# Patient Record
Sex: MALE | Race: WHITE | NOT HISPANIC OR LATINO | Employment: FULL TIME | URBAN - METROPOLITAN AREA
[De-identification: names, ages, dates, MRNs, and addresses within clinical notes are randomized per-mention and may not be internally consistent; named-entity substitution may affect disease eponyms.]

---

## 2017-02-17 ENCOUNTER — PATIENT MESSAGE (OUTPATIENT)
Dept: ORTHOPEDICS | Facility: CLINIC | Age: 37
End: 2017-02-17

## 2017-03-03 ENCOUNTER — OFFICE VISIT (OUTPATIENT)
Dept: INTERNAL MEDICINE | Facility: CLINIC | Age: 37
End: 2017-03-03
Payer: COMMERCIAL

## 2017-03-03 VITALS
WEIGHT: 315 LBS | TEMPERATURE: 99 F | BODY MASS INDEX: 40.43 KG/M2 | SYSTOLIC BLOOD PRESSURE: 146 MMHG | HEART RATE: 84 BPM | HEIGHT: 74 IN | DIASTOLIC BLOOD PRESSURE: 96 MMHG

## 2017-03-03 DIAGNOSIS — J02.9 PHARYNGITIS, UNSPECIFIED ETIOLOGY: Primary | ICD-10-CM

## 2017-03-03 PROCEDURE — 99214 OFFICE O/P EST MOD 30 MIN: CPT | Mod: S$GLB,,, | Performed by: FAMILY MEDICINE

## 2017-03-03 PROCEDURE — 99999 PR PBB SHADOW E&M-EST. PATIENT-LVL III: CPT | Mod: PBBFAC,,, | Performed by: FAMILY MEDICINE

## 2017-03-03 PROCEDURE — 1160F RVW MEDS BY RX/DR IN RCRD: CPT | Mod: S$GLB,,, | Performed by: FAMILY MEDICINE

## 2017-03-03 PROCEDURE — 3077F SYST BP >= 140 MM HG: CPT | Mod: S$GLB,,, | Performed by: FAMILY MEDICINE

## 2017-03-03 PROCEDURE — 3080F DIAST BP >= 90 MM HG: CPT | Mod: S$GLB,,, | Performed by: FAMILY MEDICINE

## 2017-03-03 RX ORDER — OMEPRAZOLE 20 MG/1
20 CAPSULE, DELAYED RELEASE ORAL DAILY
COMMUNITY
End: 2018-10-08

## 2017-03-03 RX ORDER — BENZONATATE 200 MG/1
200 CAPSULE ORAL 3 TIMES DAILY PRN
Qty: 30 CAPSULE | Refills: 0 | Status: SHIPPED | OUTPATIENT
Start: 2017-03-03 | End: 2017-03-13

## 2017-03-03 RX ORDER — AZITHROMYCIN 250 MG/1
TABLET, FILM COATED ORAL
Qty: 6 TABLET | Refills: 0 | Status: SHIPPED | OUTPATIENT
Start: 2017-03-03 | End: 2017-03-31

## 2017-03-03 NOTE — MR AVS SNAPSHOT
[place] [facility]   [facility]  [place] [address]  Phone: [phone]  Fax: [phone]                  Dominic Collazo   3/3/2017 3:40 PM   Office Visit    Description:  Male : 1980   Provider:  Arjun Cross MD   Department:  [place] [facility]           Reason for Visit     Sore Throat           Diagnoses this Visit        Comments    Pharyngitis, unspecified etiology    -  Primary            To Do List           Goals (5 Years of Data)     None      Follow-Up and Disposition     Return if symptoms worsen or fail to improve.       These Medications        Disp Refills Start End    azithromycin (ZITHROMAX Z-PARUL) 250 MG tablet 6 tablet 0 3/3/2017     Take 2 tablets on day 1, then 1 tablet on days 2-5.    Pharmacy: [address] Ph #: [phone]       benzonatate (TESSALON) 200 MG capsule 30 capsule 0 3/3/2017 3/13/2017    Take 1 capsule (200 mg total) by mouth 3 (three) times daily as needed for Cough. - Oral    Pharmacy: [address] Ph #: [phone]         [facility]     [facility] On Call Nurse Care Line -  Assistance  Registered nurses in the [facility] provide clinical advisement, health education, appointment booking, and other advisory services.  Call for this free service at [phone].             Medications           Message regarding Medications     Verify the changes and/or additions to your medication regime listed below are the same as discussed with your clinician today.  If any of these changes or additions are incorrect, please notify your healthcare provider.        START taking these NEW medications        Refills    azithromycin (ZITHROMAX Z-PARUL) 250 MG tablet 0    Sig: Take 2 tablets on day 1, then 1 tablet on days 2-5.    Class: Normal    benzonatate (TESSALON) 200 MG capsule 0  "   Sig: Take 1 capsule (200 mg total) by mouth 3 (three) times daily as needed for Cough.    Class: Normal    Route: Oral      STOP taking these medications     hydrocodone-acetaminophen 10-325mg (NORCO)  mg Tab Take 1 tablet by mouth every 4 (four) hours as needed.    polyethylene glycol (GLYCOLAX) 17 gram/dose powder Take 17 g by mouth once daily.    celecoxib (CELEBREX) 200 MG capsule Take 1 capsule (200 mg total) by mouth 2 (two) times daily.    promethazine (PHENERGAN) 25 MG tablet Take 1 tablet (25 mg total) by mouth every 4 (four) hours as needed for Nausea.    pantoprazole (PROTONIX) 40 MG tablet Take 1 tablet (40 mg total) by mouth 2 (two) times daily.           Verify that the below list of medications is an accurate representation of the medications you are currently taking.  If none reported, the list may be blank. If incorrect, please contact your healthcare provider. Carry this list with you in case of emergency.           Current Medications     buPROPion (WELLBUTRIN XL) 300 MG 24 hr tablet take 1 tablet by mouth once daily    busPIRone (BUSPAR) 10 MG tablet take 1 tablet by mouth three times a day    metoprolol succinate (TOPROL-XL) 100 MG 24 hr tablet take 1 tablet by mouth once daily    omeprazole (PRILOSEC) 20 MG capsule Take 20 mg by mouth once daily.    pravastatin (PRAVACHOL) 10 MG tablet take 1 tablet by mouth once daily    azithromycin (ZITHROMAX Z-PARUL) 250 MG tablet Take 2 tablets on day 1, then 1 tablet on days 2-5.    benzonatate (TESSALON) 200 MG capsule Take 1 capsule (200 mg total) by mouth 3 (three) times daily as needed for Cough.           Clinical Reference Information           Your Vitals Were     BP Pulse Temp Height Weight BMI    146/96 84 99.3 °F (37.4 °C) 6' 2" (1.88 m) 164 kg (361 lb 8.9 oz) 46.42 kg/m2      Blood Pressure          Most Recent Value    BP  (!)  146/96      Allergies as of 3/3/2017     Ciprofloxacin    Penicillins    Sulfa (Sulfonamide Antibiotics)    " Toradol [Ketorolac]    Green Pepper    Meloxicam      Immunizations Administered on Date of Encounter - 3/3/2017     None      Language Assistance Services     ATTENTION: Language assistance services are available, free of charge. Please call [phone].      ATENCIÓN: Si reed puente, tiene a amezcua disposición servicios gratuitos de asistencia lingüística. Llame al [phone].     RADHA Ý: N?u b?n nói Ti?ng Vi?t, có các d?ch v? h? tr? ngôn ng? mi?n phí dành cho b?n. G?i s? [phone].         [place] [facility] complies with applicable Federal civil rights laws and does not discriminate on the basis of race, color, national origin, age, disability, or sex.

## 2017-03-03 NOTE — PROGRESS NOTES
Subjective:       Patient ID: Dominic Collazo is a 36 y.o. male.    Chief Complaint: Sore Throat    HPI 36-year-old white male presents to clinic today secondary to a complaint of subjective fever and chills, fatigue, sore throat, cough, nausea, and generalized body aches worsening since Tuesday.  He has been using Chloraseptic, Allegra, and Neema-Elmira with minimal relief.  His son has also been sick with similar symptoms.  Review of Systems   Constitutional: Positive for chills, fatigue and fever. Negative for appetite change.   HENT: Positive for sore throat. Negative for congestion, ear pain, hearing loss, postnasal drip, rhinorrhea, sinus pressure and tinnitus.    Eyes: Negative for redness, itching and visual disturbance.   Respiratory: Positive for cough. Negative for chest tightness and shortness of breath.    Cardiovascular: Negative for chest pain and palpitations.   Gastrointestinal: Positive for nausea. Negative for abdominal pain, constipation, diarrhea and vomiting.   Genitourinary: Negative for decreased urine volume, difficulty urinating, dysuria, frequency, hematuria and urgency.   Musculoskeletal: Positive for myalgias. Negative for back pain, neck pain and neck stiffness.   Skin: Negative for rash.   Neurological: Negative for dizziness, light-headedness and headaches.   Psychiatric/Behavioral: Negative.        Objective:      Physical Exam   Constitutional: He is oriented to person, place, and time. He appears well-developed and well-nourished. No distress.   HENT:   Head: Normocephalic and atraumatic.   Right Ear: External ear normal. A middle ear effusion is present.   Left Ear: External ear normal. A middle ear effusion is present.   Nose: Mucosal edema and rhinorrhea present. No nose lacerations, sinus tenderness, nasal deformity, septal deviation or nasal septal hematoma. No epistaxis.  No foreign bodies. Right sinus exhibits no maxillary sinus tenderness and no frontal sinus  tenderness. Left sinus exhibits no maxillary sinus tenderness and no frontal sinus tenderness.   Mouth/Throat: Posterior oropharyngeal erythema present. No oropharyngeal exudate.   Eyes: Conjunctivae and EOM are normal. Pupils are equal, round, and reactive to light. Right eye exhibits no discharge. Left eye exhibits no discharge. No scleral icterus.   Neck: Normal range of motion. Neck supple. No JVD present. No tracheal deviation present. No thyromegaly present.   Cardiovascular: Normal rate, regular rhythm, normal heart sounds and intact distal pulses.  Exam reveals no gallop and no friction rub.    No murmur heard.  Pulmonary/Chest: Effort normal and breath sounds normal. No stridor. No respiratory distress. He has no wheezes. He has no rales.   Abdominal: Soft. Bowel sounds are normal. He exhibits no distension and no mass. There is no tenderness. There is no rebound and no guarding.   Musculoskeletal: Normal range of motion. He exhibits no edema or tenderness.   Lymphadenopathy:     He has no cervical adenopathy.   Neurological: He is alert and oriented to person, place, and time.   Skin: Skin is warm and dry. No rash noted. He is not diaphoretic. No erythema. No pallor.   Psychiatric: He has a normal mood and affect. His behavior is normal. Judgment and thought content normal.   Nursing note and vitals reviewed.      Assessment:       1. Pharyngitis, unspecified etiology        Plan:       Pharyngitis, unspecified etiology  -     azithromycin (ZITHROMAX Z-PARUL) 250 MG tablet; Take 2 tablets on day 1, then 1 tablet on days 2-5.  Dispense: 6 tablet; Refill: 0  -     benzonatate (TESSALON) 200 MG capsule; Take 1 capsule (200 mg total) by mouth 3 (three) times daily as needed for Cough.  Dispense: 30 capsule; Refill: 0      Tylenol and ibuprofen as needed for fever or pain.  Saltwater or Listerine gargle as needed for sore throat.  Continue Allegra.  Return to clinic as needed if symptoms persist or worsen.

## 2017-03-27 ENCOUNTER — PATIENT MESSAGE (OUTPATIENT)
Dept: ORTHOPEDICS | Facility: CLINIC | Age: 37
End: 2017-03-27

## 2017-03-31 ENCOUNTER — OFFICE VISIT (OUTPATIENT)
Dept: ORTHOPEDICS | Facility: CLINIC | Age: 37
End: 2017-03-31
Payer: COMMERCIAL

## 2017-03-31 VITALS — HEIGHT: 75 IN | BODY MASS INDEX: 39.17 KG/M2 | WEIGHT: 315 LBS

## 2017-03-31 DIAGNOSIS — M89.8X1 PERISCAPULAR PAIN: Primary | ICD-10-CM

## 2017-03-31 PROCEDURE — 99999 PR PBB SHADOW E&M-EST. PATIENT-LVL III: CPT | Mod: PBBFAC,,, | Performed by: PHYSICIAN ASSISTANT

## 2017-03-31 PROCEDURE — 1160F RVW MEDS BY RX/DR IN RCRD: CPT | Mod: S$GLB,,, | Performed by: PHYSICIAN ASSISTANT

## 2017-03-31 PROCEDURE — 99213 OFFICE O/P EST LOW 20 MIN: CPT | Mod: S$GLB,,, | Performed by: PHYSICIAN ASSISTANT

## 2017-03-31 NOTE — LETTER
March 31, 2017                     [facility]  [address]  [place] [address]  Phone: [phone]   Patient: Dominic Collazo   MR Number: [id number]   YOB: 1980   Date of Visit: 3/31/2017     To whom it may concern,    Mr. Dominic Collazo was seen in the Orthopaedic clinic 3/31/2017.  It would be medically beneficial for him to have a [facility] massage chair.       Sincerely,        Griselda Cao PA-C

## 2017-04-03 NOTE — PROGRESS NOTES
DATE: 4/3/2017  PATIENT: Dominic Collazo    Attending Physician: Marvin Chandler M.D.    HISTORY:  Dominic Collazo is a 36 y.o. male who returns to me today for follow up of periscapular pain.  He was last seen by me 8/23/2016.  Today he is doing well but notes continued left periscapular pain.  He had an GILBERT with Dr. Conner 10/21/2016 and reports some mild relief but says he was also going to PT at the time and isn't sure which gave him better relief.  He has not had any dry needling as the therapist told him it is too dangerous.      The Patient denies myelopathic symptoms such as handwriting changes or difficulty with buttons/coins/keys. Denies perineal paresthesias, bowel/bladder dysfunction.    PMH/PSH/FamHx/SocHx:  Unchanged from prior visit    ROS:  REVIEW OF SYSTEMS:  Constitution: Negative. Negative for chills, fever and night sweats.   HENT: Negative for congestion and headaches.    Eyes: Negative for blurred vision, left vision loss and right vision loss.   Cardiovascular: Negative for chest pain and syncope.   Respiratory: Negative for cough and shortness of breath.    Endocrine: Negative for polydipsia, polyphagia and polyuria.   Hematologic/Lymphatic: Negative for bleeding problem. Does not bruise/bleed easily.   Skin: Negative for dry skin, itching and rash.   Musculoskeletal: Negative for falls and muscle weakness.   Gastrointestinal: Negative for abdominal pain and bowel incontinence.   Allergic/Immunologic: Negative for hives and persistent infections.  Genitourinary: Negative for urinary retention/incontinence and nocturia.   Neurological: Negative for disturbances in coordination, no myelopathic symptoms such as handwriting changes or difficulty with buttons, coins, keys or small objects. No loss of balance and seizures.   Psychiatric/Behavioral: Negative for depression. The patient does not have insomnia.   Denies myelopathic symptoms, perineal paresthesias, bowel or  "bladder incontinence    EXAM:  Ht 6' 3" (1.905 m)  Wt (!) 165.3 kg (364 lb 6.7 oz)  BMI 45.55 kg/m2    My physical examination was notable for the following findings:     Normal station and gait, no difficulty with toe or heel walk.   Cervical skin negative for rashes, lesions, hairy patches and surgical scars.   Cervical range of motion is acceptable.  There is mild periscapular tenderness to palpation.  No pain with range of motion of the bilateral shoulders and elbows.  Normal bulk and contour of the bilateral hands.    Bilateral hands warm and well perfused, radial pulses 2+ bilaterally.   Normal strength and tone in all major motor groups in the bilateral upper and lower extremities. Normal sensation to light touch in the C5-T1 and L2-S1 dermatomes bilaterally.   Deep tendon reflexes symmetric 2+ in the bilateral upper and lower extremities.  Negative Inverted Radial Reflex and Chapman's bilaterally. Negative Babinski bilaterally.     IMAGING:  MRI cervical spine demonstrates mild degenerative changes with no significant spinal stenosis. Mild right neural foraminal narrowing at C3/4 and C4/5.      Today I personally re-reviewed AP, Lat and Flex/Ex upright C-spine films that demonstrate mild straightening of the normal cervical lordosis.       ASSESSMENT/PLAN:    Diagnoses and all orders for this visit:    Periscapular pain    Discussed with Dr. Chandler.  There is no surgical intervention indicated at this time.  The patient continues to have periscapular muscle pain.  He did not have significant relief with the injection.  I think he would benefit from dry needling however he says the therapist would not do it.  I will call PT and get more information regarding dry needling.     Return if symptoms worsen or fail to improve.          "

## 2017-04-05 ENCOUNTER — PATIENT MESSAGE (OUTPATIENT)
Dept: ORTHOPEDICS | Facility: CLINIC | Age: 37
End: 2017-04-05

## 2017-04-05 DIAGNOSIS — M89.8X1 PERISCAPULAR PAIN: Primary | ICD-10-CM

## 2017-04-05 DIAGNOSIS — M54.2 NECK PAIN: ICD-10-CM

## 2017-05-08 ENCOUNTER — LAB VISIT (OUTPATIENT)
Dept: LAB | Facility: HOSPITAL | Age: 37
End: 2017-05-08
Attending: UROLOGY
Payer: COMMERCIAL

## 2017-05-08 ENCOUNTER — OFFICE VISIT (OUTPATIENT)
Dept: UROLOGY | Facility: CLINIC | Age: 37
End: 2017-05-08
Payer: COMMERCIAL

## 2017-05-08 VITALS
SYSTOLIC BLOOD PRESSURE: 114 MMHG | DIASTOLIC BLOOD PRESSURE: 78 MMHG | HEIGHT: 75 IN | BODY MASS INDEX: 39.17 KG/M2 | HEART RATE: 61 BPM | WEIGHT: 315 LBS

## 2017-05-08 DIAGNOSIS — N48.89 PAINFUL PENIS: ICD-10-CM

## 2017-05-08 DIAGNOSIS — N48.6 PEYRONIE'S DISEASE: ICD-10-CM

## 2017-05-08 DIAGNOSIS — R79.89 LOW TESTOSTERONE: ICD-10-CM

## 2017-05-08 DIAGNOSIS — N52.9 ED (ERECTILE DYSFUNCTION) OF ORGANIC ORIGIN: ICD-10-CM

## 2017-05-08 DIAGNOSIS — Z80.42 FAMILY HISTORY OF PROSTATE CANCER: ICD-10-CM

## 2017-05-08 DIAGNOSIS — R79.89 LOW TESTOSTERONE: Primary | ICD-10-CM

## 2017-05-08 LAB — TESTOST SERPL-MCNC: 289 NG/DL

## 2017-05-08 PROCEDURE — 36415 COLL VENOUS BLD VENIPUNCTURE: CPT | Mod: PO

## 2017-05-08 PROCEDURE — 99215 OFFICE O/P EST HI 40 MIN: CPT | Mod: S$GLB,,, | Performed by: UROLOGY

## 2017-05-08 PROCEDURE — 3074F SYST BP LT 130 MM HG: CPT | Mod: S$GLB,,, | Performed by: UROLOGY

## 2017-05-08 PROCEDURE — 1160F RVW MEDS BY RX/DR IN RCRD: CPT | Mod: S$GLB,,, | Performed by: UROLOGY

## 2017-05-08 PROCEDURE — 3078F DIAST BP <80 MM HG: CPT | Mod: S$GLB,,, | Performed by: UROLOGY

## 2017-05-08 PROCEDURE — 84403 ASSAY OF TOTAL TESTOSTERONE: CPT

## 2017-05-08 PROCEDURE — 99999 PR PBB SHADOW E&M-EST. PATIENT-LVL III: CPT | Mod: PBBFAC,,, | Performed by: UROLOGY

## 2017-05-08 RX ORDER — PENTOXIFYLLINE 400 MG/1
400 TABLET, EXTENDED RELEASE ORAL
Qty: 90 TABLET | Refills: 11 | Status: SHIPPED | OUTPATIENT
Start: 2017-05-08 | End: 2018-07-12

## 2017-05-08 RX ORDER — VITAMIN E 268 MG
400 CAPSULE ORAL DAILY
Qty: 100 CAPSULE | Status: SHIPPED | OUTPATIENT
Start: 2017-05-08 | End: 2019-03-18

## 2017-05-08 RX ORDER — SILDENAFIL 100 MG/1
100 TABLET, FILM COATED ORAL DAILY PRN
Qty: 6 TABLET | Refills: 12 | Status: SHIPPED | OUTPATIENT
Start: 2017-05-08 | End: 2018-05-17

## 2017-05-08 NOTE — MR AVS SNAPSHOT
[place] [facility]   [facility]  [place] [address]  Phone: [phone]                  Dominic Collazo   2017 8:40 AM   Office Visit    Description:  Male : 1980   Provider:  Kwesi Rosas MD   Department:  [place] [facility]           Reason for Visit     Low Testosterone           Diagnoses this Visit        Comments    Low testosterone    -  Primary     Family history of prostate cancer         ED (erectile dysfunction) of organic origin         Peyronie's disease         Painful penis                To Do List           Goals (5 Years of Data)     None      Follow-Up and Disposition     Return in about 3 months (around 2017).       These Medications        Disp Refills Start End    vitamin E 400 UNIT capsule 100 capsule prn 2017     Take 1 capsule (400 Units total) by mouth once daily. - Oral    Pharmacy: [address] Ph #: [phone]       pentoxifylline (TRENTAL) 400 mg TbSR 90 tablet 11 2017    Take 1 tablet (400 mg total) by mouth 3 (three) times daily with meals. - Oral    Pharmacy: [address] Ph #: [phone]       sildenafil (VIAGRA) 100 MG tablet 6 tablet 12 2017    Take 1 tablet (100 mg total) by mouth daily as needed for Erectile Dysfunction. - Oral    Pharmacy: [address] Ph #: [phone]         [facility]     [facility] Nurse Care Line -  Assistance  Unless otherwise directed by your provider, please contact [facility], our nurse care line that is available for  assistance.     Registered nurses in the [facility] provide: appointment scheduling, clinical advisement, health education, and other advisory services.  Call: [phone] (toll free)               Medications           Message regarding  "Medications     Verify the changes and/or additions to your medication regime listed below are the same as discussed with your clinician today.  If any of these changes or additions are incorrect, please notify your healthcare provider.        START taking these NEW medications        Refills    vitamin E 400 UNIT capsule prn    Sig: Take 1 capsule (400 Units total) by mouth once daily.    Class: Normal    Route: Oral    pentoxifylline (TRENTAL) 400 mg TbSR 11    Sig: Take 1 tablet (400 mg total) by mouth 3 (three) times daily with meals.    Class: Normal    Route: Oral    sildenafil (VIAGRA) 100 MG tablet 12    Sig: Take 1 tablet (100 mg total) by mouth daily as needed for Erectile Dysfunction.    Class: Print    Route: Oral           Verify that the below list of medications is an accurate representation of the medications you are currently taking.  If none reported, the list may be blank. If incorrect, please contact your healthcare provider. Carry this list with you in case of emergency.           Current Medications     buPROPion (WELLBUTRIN XL) 300 MG 24 hr tablet take 1 tablet by mouth once daily    busPIRone (BUSPAR) 10 MG tablet take 1 tablet by mouth three times a day    metoprolol succinate (TOPROL-XL) 100 MG 24 hr tablet take 1 tablet by mouth once daily    omeprazole (PRILOSEC) 20 MG capsule Take 20 mg by mouth once daily.    pravastatin (PRAVACHOL) 10 MG tablet take 1 tablet by mouth once daily    pentoxifylline (TRENTAL) 400 mg TbSR Take 1 tablet (400 mg total) by mouth 3 (three) times daily with meals.    sildenafil (VIAGRA) 100 MG tablet Take 1 tablet (100 mg total) by mouth daily as needed for Erectile Dysfunction.    vitamin E 400 UNIT capsule Take 1 capsule (400 Units total) by mouth once daily.           Clinical Reference Information           Your Vitals Were     BP Pulse Height Weight BMI    114/78 61 6' 3" (1.905 m) 160.6 kg (354 lb 0.9 oz) 44.25 kg/m2      Blood Pressure          Most " Recent Value    BP  114/78      Allergies as of 5/8/2017     Ciprofloxacin    Penicillins    Sulfa (Sulfonamide Antibiotics)    Toradol [Ketorolac]    Green Pepper    Meloxicam      Immunizations Administered on Date of Encounter - 5/8/2017     None      Orders Placed During Today's Visit     Future Labs/Procedures Expected by Expires    Testosterone  5/8/2017 7/7/2018      Language Assistance Services     ATTENTION: Language assistance services are available, free of charge. Please call [phone].      ATENCIÓN: Si habla kawme, tiene a amezcua disposición servicios gratuitos de asistencia lingüística. Llame al [phone].     CHÚ Ý: N?u b?n nói Ti?ng Vi?t, có các d?ch v? h? tr? ngôn ng? mi?n phí dành cho b?n. G?i s? [phone].         [place] [facility] complies with applicable Federal civil rights laws and does not discriminate on the basis of race, color, national origin, age, disability, or sex.
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
## 2017-05-24 ENCOUNTER — OFFICE VISIT (OUTPATIENT)
Dept: INTERNAL MEDICINE | Facility: CLINIC | Age: 37
End: 2017-05-24
Payer: COMMERCIAL

## 2017-05-24 VITALS
SYSTOLIC BLOOD PRESSURE: 118 MMHG | BODY MASS INDEX: 39.17 KG/M2 | WEIGHT: 315 LBS | DIASTOLIC BLOOD PRESSURE: 88 MMHG | RESPIRATION RATE: 16 BRPM | TEMPERATURE: 98 F | HEART RATE: 68 BPM | HEIGHT: 75 IN

## 2017-05-24 DIAGNOSIS — F41.9 ANXIETY: Primary | ICD-10-CM

## 2017-05-24 PROCEDURE — 3079F DIAST BP 80-89 MM HG: CPT | Mod: S$GLB,,, | Performed by: FAMILY MEDICINE

## 2017-05-24 PROCEDURE — 3074F SYST BP LT 130 MM HG: CPT | Mod: S$GLB,,, | Performed by: FAMILY MEDICINE

## 2017-05-24 PROCEDURE — 99999 PR PBB SHADOW E&M-EST. PATIENT-LVL III: CPT | Mod: PBBFAC,,, | Performed by: FAMILY MEDICINE

## 2017-05-24 PROCEDURE — 1160F RVW MEDS BY RX/DR IN RCRD: CPT | Mod: S$GLB,,, | Performed by: FAMILY MEDICINE

## 2017-05-24 PROCEDURE — 99214 OFFICE O/P EST MOD 30 MIN: CPT | Mod: S$GLB,,, | Performed by: FAMILY MEDICINE

## 2017-05-24 RX ORDER — BUPROPION HYDROCHLORIDE 150 MG/1
150 TABLET ORAL DAILY
Qty: 30 TABLET | Refills: 11 | Status: SHIPPED | OUTPATIENT
Start: 2017-05-24 | End: 2018-05-17

## 2017-05-24 RX ORDER — LORAZEPAM 0.5 MG/1
0.5 TABLET ORAL EVERY 8 HOURS PRN
Qty: 30 TABLET | Refills: 0 | Status: SHIPPED | OUTPATIENT
Start: 2017-05-24 | End: 2018-07-12 | Stop reason: SDUPTHER
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
## 2017-07-16 RX ORDER — BUPROPION HYDROCHLORIDE 300 MG/1
TABLET ORAL
Qty: 30 TABLET | Refills: 11 | Status: SHIPPED | OUTPATIENT
Start: 2017-07-16 | End: 2018-07-25 | Stop reason: SDUPTHER

## 2017-07-16 RX ORDER — METOPROLOL SUCCINATE 100 MG/1
TABLET, EXTENDED RELEASE ORAL
Qty: 30 TABLET | Refills: 11 | Status: SHIPPED | OUTPATIENT
Start: 2017-07-16 | End: 2018-07-24 | Stop reason: SDUPTHER

## 2017-12-28 ENCOUNTER — TELEPHONE (OUTPATIENT)
Dept: ORTHOPEDICS | Facility: CLINIC | Age: 37
End: 2017-12-28

## 2018-01-04 RX ORDER — PRAVASTATIN SODIUM 10 MG/1
TABLET ORAL
Qty: 30 TABLET | Refills: 11 | Status: SHIPPED | OUTPATIENT
Start: 2018-01-04 | End: 2018-08-09 | Stop reason: SDUPTHER

## 2018-01-04 RX ORDER — BUSPIRONE HYDROCHLORIDE 10 MG/1
TABLET ORAL
Qty: 90 TABLET | Refills: 11 | Status: SHIPPED | OUTPATIENT
Start: 2018-01-04 | End: 2018-08-09 | Stop reason: SDUPTHER

## 2018-01-14 RX ORDER — PRAVASTATIN SODIUM 10 MG/1
TABLET ORAL
Qty: 30 TABLET | Refills: 11 | Status: SHIPPED | OUTPATIENT
Start: 2018-01-14 | End: 2018-07-12 | Stop reason: SDUPTHER

## 2018-01-14 RX ORDER — BUSPIRONE HYDROCHLORIDE 10 MG/1
TABLET ORAL
Qty: 90 TABLET | Refills: 11 | Status: SHIPPED | OUTPATIENT
Start: 2018-01-14 | End: 2018-07-12 | Stop reason: SDUPTHER

## 2018-03-06 ENCOUNTER — OFFICE VISIT (OUTPATIENT)
Dept: INTERNAL MEDICINE | Facility: CLINIC | Age: 38
End: 2018-03-06
Payer: COMMERCIAL

## 2018-03-06 VITALS
RESPIRATION RATE: 16 BRPM | DIASTOLIC BLOOD PRESSURE: 95 MMHG | SYSTOLIC BLOOD PRESSURE: 169 MMHG | BODY MASS INDEX: 39.17 KG/M2 | WEIGHT: 315 LBS | HEART RATE: 79 BPM | HEIGHT: 75 IN | TEMPERATURE: 98 F

## 2018-03-06 DIAGNOSIS — R53.1 DECREASED STRENGTH: ICD-10-CM

## 2018-03-06 DIAGNOSIS — Z00.00 ANNUAL PHYSICAL EXAM: Primary | ICD-10-CM

## 2018-03-06 DIAGNOSIS — R68.82 DECREASED SEX DRIVE: ICD-10-CM

## 2018-03-06 DIAGNOSIS — F41.9 ANXIETY: ICD-10-CM

## 2018-03-06 DIAGNOSIS — E78.9 LIPID DISORDER: ICD-10-CM

## 2018-03-06 DIAGNOSIS — I49.9 IRREGULAR HEART RHYTHM: ICD-10-CM

## 2018-03-06 PROCEDURE — 99999 PR PBB SHADOW E&M-EST. PATIENT-LVL III: CPT | Mod: PBBFAC,,, | Performed by: INTERNAL MEDICINE

## 2018-03-06 PROCEDURE — 99395 PREV VISIT EST AGE 18-39: CPT | Mod: S$GLB,,, | Performed by: INTERNAL MEDICINE

## 2018-03-09 ENCOUNTER — LAB VISIT (OUTPATIENT)
Dept: LAB | Facility: HOSPITAL | Age: 38
End: 2018-03-09
Attending: INTERNAL MEDICINE
Payer: COMMERCIAL

## 2018-03-09 DIAGNOSIS — R68.82 DECREASED SEX DRIVE: ICD-10-CM

## 2018-03-09 DIAGNOSIS — R53.1 DECREASED STRENGTH: ICD-10-CM

## 2018-03-09 DIAGNOSIS — Z00.00 ANNUAL PHYSICAL EXAM: ICD-10-CM

## 2018-03-09 DIAGNOSIS — E78.9 LIPID DISORDER: ICD-10-CM

## 2018-03-09 LAB
ALBUMIN SERPL BCP-MCNC: 4.1 G/DL
ALP SERPL-CCNC: 58 U/L
ALT SERPL W/O P-5'-P-CCNC: 78 U/L
ANION GAP SERPL CALC-SCNC: 10 MMOL/L
AST SERPL-CCNC: 39 U/L
BASOPHILS # BLD AUTO: 0.08 K/UL
BASOPHILS NFR BLD: 1 %
BILIRUB SERPL-MCNC: 0.3 MG/DL
BUN SERPL-MCNC: 18 MG/DL
CALCIUM SERPL-MCNC: 9.3 MG/DL
CHLORIDE SERPL-SCNC: 104 MMOL/L
CHOLEST SERPL-MCNC: 206 MG/DL
CHOLEST/HDLC SERPL: 6.6 {RATIO}
CO2 SERPL-SCNC: 26 MMOL/L
CREAT SERPL-MCNC: 1 MG/DL
DIFFERENTIAL METHOD: ABNORMAL
EOSINOPHIL # BLD AUTO: 0.6 K/UL
EOSINOPHIL NFR BLD: 7.6 %
ERYTHROCYTE [DISTWIDTH] IN BLOOD BY AUTOMATED COUNT: 13.9 %
ERYTHROCYTE [SEDIMENTATION RATE] IN BLOOD BY WESTERGREN METHOD: 7 MM/HR
EST. GFR  (AFRICAN AMERICAN): >60 ML/MIN/1.73 M^2
EST. GFR  (NON AFRICAN AMERICAN): >60 ML/MIN/1.73 M^2
FSH SERPL-ACNC: 2.7 MIU/ML
GLUCOSE SERPL-MCNC: 97 MG/DL
HCT VFR BLD AUTO: 39.5 %
HDLC SERPL-MCNC: 31 MG/DL
HDLC SERPL: 15 %
HGB BLD-MCNC: 12.9 G/DL
IMM GRANULOCYTES # BLD AUTO: 0.02 K/UL
IMM GRANULOCYTES NFR BLD AUTO: 0.3 %
LDLC SERPL CALC-MCNC: 143.2 MG/DL
LYMPHOCYTES # BLD AUTO: 2.5 K/UL
LYMPHOCYTES NFR BLD: 32.8 %
MCH RBC QN AUTO: 28.5 PG
MCHC RBC AUTO-ENTMCNC: 32.7 G/DL
MCV RBC AUTO: 87 FL
MONOCYTES # BLD AUTO: 0.6 K/UL
MONOCYTES NFR BLD: 7.6 %
NEUTROPHILS # BLD AUTO: 3.9 K/UL
NEUTROPHILS NFR BLD: 50.7 %
NONHDLC SERPL-MCNC: 175 MG/DL
NRBC BLD-RTO: 0 /100 WBC
PLATELET # BLD AUTO: 258 K/UL
PMV BLD AUTO: 11.8 FL
POTASSIUM SERPL-SCNC: 4.3 MMOL/L
PROT SERPL-MCNC: 7.1 G/DL
RBC # BLD AUTO: 4.52 M/UL
SODIUM SERPL-SCNC: 140 MMOL/L
TESTOST SERPL-MCNC: 254 NG/DL
TRIGL SERPL-MCNC: 159 MG/DL
TSH SERPL DL<=0.005 MIU/L-ACNC: 1.65 UIU/ML
WBC # BLD AUTO: 7.65 K/UL

## 2018-03-09 PROCEDURE — 85025 COMPLETE CBC W/AUTO DIFF WBC: CPT

## 2018-03-09 PROCEDURE — 80061 LIPID PANEL: CPT

## 2018-03-09 PROCEDURE — 80053 COMPREHEN METABOLIC PANEL: CPT

## 2018-03-09 PROCEDURE — 84403 ASSAY OF TOTAL TESTOSTERONE: CPT

## 2018-03-09 PROCEDURE — 83001 ASSAY OF GONADOTROPIN (FSH): CPT

## 2018-03-09 PROCEDURE — 36415 COLL VENOUS BLD VENIPUNCTURE: CPT | Mod: PO

## 2018-03-09 PROCEDURE — 84443 ASSAY THYROID STIM HORMONE: CPT

## 2018-03-09 PROCEDURE — 85651 RBC SED RATE NONAUTOMATED: CPT

## 2018-03-10 ENCOUNTER — TELEPHONE (OUTPATIENT)
Dept: INTERNAL MEDICINE | Facility: CLINIC | Age: 38
End: 2018-03-10

## 2018-03-10 NOTE — TELEPHONE ENCOUNTER
His lft and lipids are elevated, and if he uses etoh he should stop.  Otherwise get on lo fat diet, st loss and exercise.  He should see Imsais in 3-4 mo to see if this has improved and needs anything more

## 2018-03-10 NOTE — PROGRESS NOTES
Subjective:       Patient ID: Dominic Collazo is a 37 y.o. male.    Chief Complaint: Annual Exam  HISTORY OF PRESENT ILLNESS:  A 37-year-old white male patient of Dr. Cross   comes in for a physical because Dr. Cross was out of town.  The patient did not   come in fasting.  The patient appears to have last seen Dr. Cross for a   general physical in November 2015.  He comes in feeling that he may be low in   testosterone, which he had been low on before.  He also has a history of anxiety   for which he is on Wellbutrin and BuSpar.  The patient has a history of   hyperlipidemia, obesity, low testosterone for which he was on Clomid given to   him by Dr. Rosas, which raised his testosterone but he did not really feel any   better.  He also has a history of CPAP dependency, but has not been using that   and apparently had an abnormal sleep study prior to that.  The patient in   addition to his anxiety, complains of feeling like his strength is reduced, his   energy level reduced and he says that occasionally he will have palpitations,   but apparently it sounds like just a skipped beat.    PHYSICAL EXAMINATION:  VITAL SIGNS:  Normal except his blood pressure 169/95 but he rushed to get here   and that he was late, so I asked him to monitor that.  HEENT:  Clear.  NECK:  Shows no adenopathy, thyroid enlargement or bruit.  CHEST:  Clear.  HEART:  There is no murmur or gallop.  ABDOMEN:  Nontender and obese.  GENITOURINARY:  Scrotal exam shows normal testicles.  No nodules.  No inguinal   hernia.  Penis appears normal.  EXTREMITIES:  Show normal muscles, joints, pulses.  NEUROLOGIC:  Normal.    IMPRESSION:  1.  History of low testosterone and going to be rechecked.  2.  Anxiety disorder for which he is on medication.  3.  History of GERD, on Prilosec.  4.  Hyperlipidemia, on medication.   5.  Erectile dysfunction for which he gets Viagra.  I have ordered the   appropriate lab.  I also ordered an echocardiogram  because of his history of   irregular heartbeat and his reduced strength and fatigue.  5.  Obesity.    His lab has come back showing normal FSH, CBC, cholesterol 206, triglyceride   159, low HDL.  Chemistries are normal except for an elevated ALT, TSH is normal.    Testosterone is 254, which is on low side.  Sed rate of 7.  Urinalysis   unremarkable.  His echo is pending.  Going to suggest exercise, weight loss   again because of his lab work and he will need followup regarding his elevated   ALT.  If he is drinking alcohol, he will be instructed to discontinue.  I will   have him come back and see Dr. Cross in three to four months for the lab.      JDC/HN  dd: 03/10/2018 13:00:29 (CST)  td: 03/10/2018 16:43:10 (CST)  Doc ID   #[id number]  Job ID #[id number]    CC:     Dict [id number]  HPI  Review of Systems   Constitutional: Positive for fatigue. Negative for activity change, appetite change and unexpected weight change.   HENT: Negative for dental problem, hearing loss, mouth sores, postnasal drip and sinus pressure.    Eyes: Negative for discharge and visual disturbance.   Respiratory: Negative for cough and shortness of breath.    Cardiovascular: Negative for chest pain and palpitations.   Gastrointestinal: Negative for abdominal pain, blood in stool, constipation, diarrhea and nausea.   Genitourinary: Negative for dysuria, hematuria and testicular pain.   Musculoskeletal: Negative for arthralgias, back pain, joint swelling and neck pain.   Skin: Negative for rash.   Neurological: Negative for weakness and headaches.   Psychiatric/Behavioral: Positive for agitation. Negative for sleep disturbance. The patient is nervous/anxious.        Objective:      Physical Exam   Constitutional: He is oriented to person, place, and time. He appears well-developed and well-nourished.   HENT:   Head: Normocephalic.   Right Ear: External ear normal.   Left Ear: External ear normal.   Mouth/Throat: Oropharynx is clear and moist.   Eyes:  EOM are normal. Pupils are equal, round, and reactive to light. Right eye exhibits no discharge.   Neck: Normal range of motion. No JVD present. No tracheal deviation present. No thyromegaly present.   Cardiovascular: Normal rate, regular rhythm, normal heart sounds and intact distal pulses.  Exam reveals no gallop.    No murmur heard.  Pulmonary/Chest: Effort normal and breath sounds normal. He has no wheezes. He has no rales.   Abdominal: Soft. Bowel sounds are normal. He exhibits no mass. There is no tenderness.   Genitourinary: Prostate normal and penis normal. Rectal exam shows guaiac negative stool.   Musculoskeletal: Normal range of motion. He exhibits no edema.   Lymphadenopathy:     He has no cervical adenopathy.   Neurological: He is oriented to person, place, and time. He displays normal reflexes. No cranial nerve deficit. Coordination normal.   Skin: Skin is warm. No rash noted. No pallor.   Psychiatric: He has a normal mood and affect.       Assessment:       1. Annual physical exam    2. Anxiety    3. Decreased strength    4. Decreased sex drive    5. Lipid disorder    6. Irregular heart rhythm        Plan:

## 2018-03-12 ENCOUNTER — CLINICAL SUPPORT (OUTPATIENT)
Dept: CARDIOLOGY | Facility: CLINIC | Age: 38
End: 2018-03-12
Attending: INTERNAL MEDICINE
Payer: COMMERCIAL

## 2018-03-12 DIAGNOSIS — I49.9 IRREGULAR HEART RHYTHM: ICD-10-CM

## 2018-03-12 LAB
DIASTOLIC DYSFUNCTION: NO
ESTIMATED PA SYSTOLIC PRESSURE: 22.54
RETIRED EF AND QEF - SEE NOTES: 60 (ref 55–65)
TRICUSPID VALVE REGURGITATION: NORMAL

## 2018-03-12 PROCEDURE — 93306 TTE W/DOPPLER COMPLETE: CPT | Mod: S$GLB,,, | Performed by: INTERNAL MEDICINE

## 2018-03-27 ENCOUNTER — TELEPHONE (OUTPATIENT)
Dept: ORTHOPEDICS | Facility: CLINIC | Age: 38
End: 2018-03-27

## 2018-03-27 NOTE — TELEPHONE ENCOUNTER
----- Message from Justyna Serrano MA sent at 3/27/2018 10:02 AM CDT -----  Contact: Melisa (Jenny Styles )  Message will be printed and put on your desk. Please advise.   ----- Message -----  From: Esthela Tillman  Sent: 3/27/2018   9:43 AM  To: Linda Russo    x_  1st Request  _  2nd Request  _  3rd Request    Who: Melisa (Jenny Styles )    Why: Melisa would like to speak with jac in reference to scheduling a deposition.      What Number to Call Back:[phone]    When to Expect a call back: (Within 24 hours)    Please return the call at earliest convenience. Thanks!

## 2018-04-01 ENCOUNTER — PATIENT MESSAGE (OUTPATIENT)
Dept: INTERNAL MEDICINE | Facility: CLINIC | Age: 38
End: 2018-04-01

## 2018-04-01 DIAGNOSIS — N48.6 PEYRONIE'S DISEASE: ICD-10-CM

## 2018-04-01 DIAGNOSIS — R79.89 LOW TESTOSTERONE: Primary | ICD-10-CM

## 2018-04-01 DIAGNOSIS — G47.33 OSA (OBSTRUCTIVE SLEEP APNEA): ICD-10-CM

## 2018-04-02 ENCOUNTER — PATIENT MESSAGE (OUTPATIENT)
Dept: INTERNAL MEDICINE | Facility: CLINIC | Age: 38
End: 2018-04-02

## 2018-04-19 ENCOUNTER — TELEPHONE (OUTPATIENT)
Dept: INTERNAL MEDICINE | Facility: CLINIC | Age: 38
End: 2018-04-19

## 2018-04-19 NOTE — TELEPHONE ENCOUNTER
"" His lft and lipids are elevated, and if he uses etoh he should stop.  Otherwise get on lo fat diet, st loss and exercise.  He should see Imsais in 3-4 mo to see if this has improved and needs anything more "    Sent email to patient on all.  "

## 2018-04-19 NOTE — TELEPHONE ENCOUNTER
----- Message from Margarito Silva MD sent at 4/19/2018  7:58 AM CDT -----  His echocardiogram is normal and lab is also good including sex hormone levels.  His testosterone is on low side, so next step if he wants to take wit would be to see urology for trial of testosterone

## 2018-05-17 ENCOUNTER — OFFICE VISIT (OUTPATIENT)
Dept: SLEEP MEDICINE | Facility: CLINIC | Age: 38
End: 2018-05-17
Payer: COMMERCIAL

## 2018-05-17 DIAGNOSIS — G47.33 OSA (OBSTRUCTIVE SLEEP APNEA): Primary | ICD-10-CM

## 2018-05-17 PROCEDURE — 99999 PR PBB SHADOW E&M-EST. PATIENT-LVL II: CPT | Mod: PBBFAC,,, | Performed by: PSYCHIATRY & NEUROLOGY

## 2018-05-17 PROCEDURE — 99204 OFFICE O/P NEW MOD 45 MIN: CPT | Mod: S$GLB,,, | Performed by: PSYCHIATRY & NEUROLOGY

## 2018-05-17 NOTE — PROGRESS NOTES
Dominic Collazo  was seen at the request of  Self, Aaareferral for sleep evaluation.    05/17/2018 INITIAL HISTORY OF PRESENT ILLNESS:  Dominic Collazo is a 37 y.o. male is here to be evaluated for a sleep disorder.       CHIEF COMPLAINT:      The patient's complaints include excessive daytime sleepiness, excessive daytime fatigue, snoring,  witnessed breathing pauses,  gasping for air in sleep and interrupted sleep since  Over 5 yrs ago. Received CPAP->BPAP after being diagnosed with LYNDA 5 yrs ago. Used CPAP, but     mouth breathing was a problem with his straight CPAP and BPAP machines (did not bring today). Met compliance at that point. Was benefiting from using CPAP/BPAP.  Eligible replacement - interested in autoBPAP.    Difficulty with mouth breath breathing - but FFM made his skin break.      EPWORTH SLEEPINESS SCALE 5/17/2018   Sitting and reading 3   Watching TV 2   Sitting, inactive in a public place (e.g. a theatre or a meeting) 1   As a passenger in a car for an hour without a break 3   Lying down to rest in the afternoon when circumstances permit 2   Sitting and talking to someone 0   Sitting quietly after a lunch without alcohol 0   In a car, while stopped for a few minutes in traffic 0   Total score 11       SLEEP ROUTINE AND LIFESTYLE 05/17/2018 :  Sleep Clinic New Patient 5/17/2018   What time do you go to bed on a week day? (Give a range) 11pm   What time do you go to bed on a day off? (Give a range) 12am   How long does it take you to fall asleep? (Give a range) 30min-1 hour   How long does it take you to fall back into sleep? (Give a range) 3-5  5-10mins   What time do you wake up to start your day on a week day? (Give a range) 615am   What time do you wake up to start your day on a day off? (Give a range) 9am   What time do you get out of bed? (Give a range) 615am-630am   Rate your sleep quality from 0 to 5 (0-poor, 5-great). 2   Have you experienced:  N/a   Have you ever had a  sleep study/CPAP machine/surgery for sleep apnea? Yes   Have you ever had a CPAP machine for sleep apnea? Yes   Have you ever had surgery for sleep apnea? No       Sleep Clinic ROS  5/17/2018   Difficulty breathing through the nose?  Sometimes   Sore throat or dry mouth in the morning? Yes   Irregular or very fast heart beat?  Sometimes   Shortness of breath?  Sometimes   Acid reflux? Yes   Body aches and pains?  Yes   Morning headaches? No   Dizziness? No   Mood changes?  Sometimes   Do you exercise?  Sometimes   Do you feel like moving your legs a lot?  Yes     INTERVAL HISTORY:          ADDENDUM.   07/02/2018: sleep study results were received from [facility] - [facility] order BPAP machine; EPAP min 9; IPAP max 25; PS 4-8 cm H2O  PSG 12/26/9: Significant Obstructive sleep apnea (LYNDA) with AHI (apnea hypopnea Index) of 15.7 and SaO2 of 81% (weight  325).  CPAP titration on 12/28/09: Effective control of respiratory events was achieved at 8 cm of H2O. Weight 325.  CPAP titration on 10.27.11: Effective control of respiratory events was achieved at 16/12 cm of H2O. Weight 312.       Social History:      Occupation:teacher [employer]  Bed partner: his wife  Exercise routine: trying       PREVIOUS SLEEP STUDIES:   Will be sent from [facility] (sleep center [facility])      DME:       PAST MEDICAL HISTORY:    Active Ambulatory Problems     Diagnosis Date Noted    HTN (hypertension) 09/09/2014    Hyperlipidemia 09/09/2014    Low testosterone 09/09/2014    Anxiety 09/09/2014    LYNDA (obstructive sleep apnea) 09/09/2014    Acromioclavicular joint separation, type 1 09/25/2014    Family history of prostate cancer 09/29/2014    ED (erectile dysfunction) of organic origin 04/06/2015    Left wrist sprain 05/09/2016    Whiplash injury 05/09/2016    Mid back pain 06/03/2016    Decreased ROM of wrist 06/20/2016    Decreased strength 06/20/2016    Chronic pain 10/21/2016    Tenosynovitis, de Quervain  11/02/2016    Peyronie's disease 05/08/2017    Painful penis 05/08/2017     Resolved Ambulatory Problems     Diagnosis Date Noted    No Resolved Ambulatory Problems     Past Medical History:   Diagnosis Date    Anxiety     Colon polyp     Depression     Hyperlipidemia     Hypertension     Low testosterone                 PAST SURGICAL HISTORY:    Past Surgical History:   Procedure Laterality Date    ADENOIDECTOMY      TONSILLECTOMY           FAMILY HISTORY:                Family History   Problem Relation Age of Onset    Hypertension Mother     Hyperlipidemia Mother     Diabetes Father     Hyperlipidemia Father     Hypertension Father     Heart disease Father 46        CAD    No Known Problems Sister        SOCIAL HISTORY:          Tobacco:   History   Smoking Status    Never Smoker   Smokeless Tobacco    Never Used       alcohol use:    History   Alcohol Use    Yes     Comment: rare                   ALLERGIES:    Review of patient's allergies indicates:   Allergen Reactions    Ciprofloxacin      swelling    Penicillins Rash    Sulfa (sulfonamide antibiotics) Rash    Toradol [ketorolac] Nausea Only    Green pepper Nausea Only    Meloxicam Nausea Only       CURRENT MEDICATIONS:    Current Outpatient Prescriptions   Medication Sig Dispense Refill    buPROPion (WELLBUTRIN XL) 300 MG 24 hr tablet take 1 tablet by mouth once daily 30 tablet 11    busPIRone (BUSPAR) 10 MG tablet take 1 tablet by mouth three times a day 90 tablet 11    busPIRone (BUSPAR) 10 MG tablet take 1 tablet by mouth three times a day 90 tablet 11    metoprolol succinate (TOPROL-XL) 100 MG 24 hr tablet take 1 tablet by mouth once daily 30 tablet 11    omeprazole (PRILOSEC) 20 MG capsule Take 20 mg by mouth once daily.      pravastatin (PRAVACHOL) 10 MG tablet take 1 tablet by mouth once daily 30 tablet 11    pravastatin (PRAVACHOL) 10 MG tablet take 1 tablet by mouth once daily 30 tablet 11    vitamin E 400 UNIT  capsule Take 1 capsule (400 Units total) by mouth once daily. 100 capsule prn    lorazepam (ATIVAN) 0.5 MG tablet Take 1 tablet (0.5 mg total) by mouth every 8 (eight) hours as needed for Anxiety. 30 tablet 0    pentoxifylline (TRENTAL) 400 mg TbSR Take 1 tablet (400 mg total) by mouth 3 (three) times daily with meals. 90 tablet 11     No current facility-administered medications for this visit.                     Otherwise, a balance of 10 systems reviewed is negative.    PHYSICAL EXAM:  There were no vitals taken for this visit.  GENERAL: Normal development, well groomed.  HEENT:   HEENT:  Conjunctivae are non-erythematous; Pupils equal, round, and reactive to light; Nose is symmetrical; Nasal mucosa is pink and moist; Septum is midline; Inferior turbinates are normal; Nasal airflow is normal; Posterior pharynx is pink; Modified Mallampati:III-IV; Posterior palate is low; Tonsils not visualized; Uvula is reddened;Tongue is enlarged; Dentition is fair; No TMJ tenderness; Jaw opening and protrusion without click and without discomfort.  NECK: Supple. Neck circumference is  inches. No thyromegaly. No palpable nodes.     SKIN: On face and neck: No abrasions, no rashes, no lesions.  No subcutaneous nodules are palpable.  RESPIRATORY: Chest is clear to auscultation.  Normal chest expansion and non-labored breathing at rest.  CARDIOVASCULAR: Normal S1, S2.  No murmurs, gallops or rubs. No carotid bruits bilaterally.  No edema. No clubbing. No cyanosis.    NEURO: Oriented to time, place and person. Normal attention span and concentration. Gait normal.    PSYCH: Affect is full. Mood is normal  MUSCULOSKELETAL: Moves 4 extremities. Gait normal.         Using [facility]: yes      ASSESSMENT:    1. LYNDA. Previously improved on BPAP - needs replacement. The patient symptomatically has  excessive daytime sleepiness, snoring,  witnessed breathing pauses, excessive daytime fatigue, gasping for air in sleep, interrupted sleep  "and nocturia  with exam findings of "a crowded oral airway and elevated body mass index. The patient has medical co-morbidities of hypertension,  which can be worsened by LYNDA. This warrants treatment.          PLAN:      Will order AUtoBPAP once we get psg and titration rep oprt from [facility].  Will order will [facility].  Will expect his communication through [facility] and will schedule follow up.    Treatment: prescription  for  cm with the mask of a patient's choice was given to the patient.    Education: During our discussion today, we talked about the etiology of obstructive sleep apnea as well as the potential ramifications of untreated sleep apnea, which could include daytime sleepiness, hypertension, heart disease and/or stroke.      We discussed potential treatment options, which could include weight loss, body positioning, continuous positive airway pressure (CPAP), or referral for surgical consideration. The patient preferred CPAP option.     Discussed purpose of PAP therapy, health benefits of CPAP, as well as the potential ramifications of untreated sleep apnea, which could include daytime sleepiness, hypertension, heart disease and/or stroke. An AHI of 15 is associated with increased risk CVD.     The patient should avoid ETOH and sedatives at night, as it tends to aggravate LYNDA. Regular replacement of CPAP mask, tubing and filter was recommended.    Precautions: The patient was advised to abstain from driving should he feel sleepy or drowsy.    Follow up: MD/NP after 1 month of PAP use.    Thank you for allowing me the opportunity to participate in the care of your patient.    "

## 2018-05-17 NOTE — PATIENT INSTRUCTIONS
Please send me a message if you have not heard from [facility] by next Wed    DME [facility]:  [phone] - Religious:  or [phone] (ext [phone])- [facility]  30 days to swap the mask

## 2018-05-22 ENCOUNTER — PATIENT MESSAGE (OUTPATIENT)
Dept: SLEEP MEDICINE | Facility: CLINIC | Age: 38
End: 2018-05-22

## 2018-06-01 ENCOUNTER — TELEPHONE (OUTPATIENT)
Dept: SLEEP MEDICINE | Facility: CLINIC | Age: 38
End: 2018-06-01

## 2018-06-01 ENCOUNTER — PATIENT MESSAGE (OUTPATIENT)
Dept: SLEEP MEDICINE | Facility: CLINIC | Age: 38
End: 2018-06-01

## 2018-06-01 NOTE — TELEPHONE ENCOUNTER
----- Message from Makayla Larson MA sent at 5/31/2018  4:29 PM CDT -----  Shalom Benavides sent the release form to Will be sent from [facility] (sleep [facility])   - I still do not see them in Media.   Could you please call Will be sent from [facility] ([facility]) again to swee if they could re-fax them?     TY!!!! (Routing comment)

## 2018-06-01 NOTE — TELEPHONE ENCOUNTER
----- Message from Viki Cunningham sent at 6/1/2018 11:20 AM CDT -----  Contact: SELWYN OLEA [[id number]]            Name of Who is Calling: SELWYN OLEA [[id number]]    What is the request in detail: Returning a call. Patient states sleep study place is [facility] phone number [phone]    Can the clinic reply by [facility]:no    What Number to Call Back if not in [facility]: [phone] (home)

## 2018-06-01 NOTE — TELEPHONE ENCOUNTER
Patient returned my phone call in search of the name of the facility in which he had his initial sleep study done in [facility].  Patient did confirm that the name of the facility is the [facility].     Release of information will be re-faxed over to them so that we may obtain those sleep study records.

## 2018-06-01 NOTE — TELEPHONE ENCOUNTER
Left vm for the patient to return call to the office and possibly be able to tell us the name of the company in [place], [place] where he had his sleep study done.    Please advise.

## 2018-06-07 ENCOUNTER — PATIENT MESSAGE (OUTPATIENT)
Dept: SLEEP MEDICINE | Facility: CLINIC | Age: 38
End: 2018-06-07

## 2018-06-07 ENCOUNTER — TELEPHONE (OUTPATIENT)
Dept: SLEEP MEDICINE | Facility: CLINIC | Age: 38
End: 2018-06-07

## 2018-06-07 NOTE — TELEPHONE ENCOUNTER
----- Message from Viki Cunningham sent at 6/7/2018  3:52 PM CDT -----  Contact: SELWYN OLEA [[id number]]            Name of Who is Calling: SELWYN OLEA [[id number]]    What is the request in detail: pt would like to know if sleep study record was received. Please call      Can the clinic reply by [facility]:no      What Number to Call Back if not in [facility]: [phone]

## 2018-06-08 ENCOUNTER — TELEPHONE (OUTPATIENT)
Dept: SLEEP MEDICINE | Facility: CLINIC | Age: 38
End: 2018-06-08

## 2018-06-09 NOTE — TELEPHONE ENCOUNTER
Makayla,    Could you please call [facility] to tell them that we only received his CPAP/BPAP study, but did not receive his baseline study.   Could you check if they have the baseline study?    Thanks!    anival    -------------------------------------------  Dear Mr. Collazo       We recently received sleep study from [facility], however it is the second study with the machine, and they did not send up the first baseline study proving that you have sleep apnea in the first place.   Without us submitting the baseline study, insurance won't approve the machine.    I will have my medical assistant, Makayla, call [facility] again to clarify if they have your baseline study.  Please check back if you have not received response by Wednesday.    Sincerely,    Latasha Jones MD    ===View-only below this line===      ----- Message -----     From: Dominic Collazo     Sent: 6/7/2018  5:03 PM CDT       To: Latasha Jones MD  Subject: Visit Follow-Up    Could you please let me know where we are at in regards to getting my BiPAP machine at this point? I havent heard anything other than that you have my previous study, but Im not sure if its still good etc.   ----- Message -----  From: Latasha Jones MD  Sent: 5/23/2018  3:43 PM CDT  To: Dominic Collazo  Subject: RE: Visit Follow-Up  Thank you!    ----- Message -----     From: Dominic Collazo     Sent: 5/22/2018  2:25 PM CDT       To: Latasha Jones MD  Subject: Visit Follow-Up    I was wanting to see if my previous test results came back and if that was going to be sufficient to attempt to get an auto-BiPAP machine. I'd like to get started with that as soon as possible.

## 2018-06-29 ENCOUNTER — TELEPHONE (OUTPATIENT)
Dept: SLEEP MEDICINE | Facility: CLINIC | Age: 38
End: 2018-06-29

## 2018-06-29 NOTE — TELEPHONE ENCOUNTER
Called patient to let him know that we have received his records from [facility].     Please advise.

## 2018-06-29 NOTE — TELEPHONE ENCOUNTER
----- Message from Melanie Tsang sent at 6/29/2018 12:24 PM CDT -----  Contact: Pt  Name of Who is Calling: SELWYN OLEA [[id number]]      What is the request in detail: Patient states he would like to speak with the staff to verify his medical records have been received......Please contact to further discuss and advise       Can the clinic reply by [facility]: No      [facility]: [phone]

## 2018-07-02 ENCOUNTER — TELEPHONE (OUTPATIENT)
Dept: SLEEP MEDICINE | Facility: CLINIC | Age: 38
End: 2018-07-02

## 2018-07-02 NOTE — TELEPHONE ENCOUNTER
----- Message from Jaci Hoffmann sent at 7/2/2018  4:36 PM CDT -----  Contact: Self            Name of Who is Calling: Self      What is the request in detail: Pt states he would like to follow up with the clinical team regarding hsi records being received from another facility.      Can the clinic reply by [facility]: N      What Number to Call Back if not in [facility]: [phone]

## 2018-07-02 NOTE — TELEPHONE ENCOUNTER
Patient called to check on receipt of outside records.   Informed the patient that we did leave him a message on 6/29 informing him that we had in fact received those records.   Advised patient that Dr. Jones would now place order for machine.

## 2018-07-12 ENCOUNTER — OFFICE VISIT (OUTPATIENT)
Dept: INTERNAL MEDICINE | Facility: CLINIC | Age: 38
End: 2018-07-12
Payer: COMMERCIAL

## 2018-07-12 VITALS
BODY MASS INDEX: 39.17 KG/M2 | WEIGHT: 315 LBS | HEIGHT: 75 IN | DIASTOLIC BLOOD PRESSURE: 86 MMHG | SYSTOLIC BLOOD PRESSURE: 112 MMHG | OXYGEN SATURATION: 98 % | HEART RATE: 74 BPM | TEMPERATURE: 98 F | RESPIRATION RATE: 18 BRPM

## 2018-07-12 DIAGNOSIS — S39.012A STRAIN OF LUMBAR REGION, INITIAL ENCOUNTER: Primary | ICD-10-CM

## 2018-07-12 DIAGNOSIS — F41.9 ANXIETY: ICD-10-CM

## 2018-07-12 PROCEDURE — 3074F SYST BP LT 130 MM HG: CPT | Mod: CPTII,S$GLB,, | Performed by: FAMILY MEDICINE

## 2018-07-12 PROCEDURE — 99214 OFFICE O/P EST MOD 30 MIN: CPT | Mod: S$GLB,,, | Performed by: FAMILY MEDICINE

## 2018-07-12 PROCEDURE — 3079F DIAST BP 80-89 MM HG: CPT | Mod: CPTII,S$GLB,, | Performed by: FAMILY MEDICINE

## 2018-07-12 PROCEDURE — 3008F BODY MASS INDEX DOCD: CPT | Mod: CPTII,S$GLB,, | Performed by: FAMILY MEDICINE

## 2018-07-12 PROCEDURE — 99999 PR PBB SHADOW E&M-EST. PATIENT-LVL III: CPT | Mod: PBBFAC,,, | Performed by: FAMILY MEDICINE

## 2018-07-12 RX ORDER — CYCLOBENZAPRINE HCL 10 MG
10 TABLET ORAL 3 TIMES DAILY PRN
Qty: 30 TABLET | Refills: 3 | Status: SHIPPED | OUTPATIENT
Start: 2018-07-12 | End: 2018-07-22

## 2018-07-12 RX ORDER — LORAZEPAM 0.5 MG/1
0.5 TABLET ORAL EVERY 8 HOURS PRN
Qty: 30 TABLET | Refills: 0 | Status: SHIPPED | OUTPATIENT
Start: 2018-07-12 | End: 2019-02-04 | Stop reason: SDUPTHER

## 2018-07-12 NOTE — PROGRESS NOTES
Subjective:       Patient ID: Dominic Collazo is a 37 y.o. male.    Chief Complaint: Low-back Pain (6/10 when still, 10/10 when in motion) and Medication Refill (ativan)    HPI 37-year-old white male presents to clinic today secondary to a complaint of low back pain without radiation into his extremities since yesterday.  He reports that he is currently in the process of moving and has been running up and down stairs all day.  Yesterday evening he began noticing tightness and stiffness to his lower back.  He has attempted use of a heating pad with mild relief but notes that the pain returned once he began moving today.  Secondarily he requests a refill of Ativan which he uses as needed for anxiety.  Last refill was 1 year ago.  Review of Systems   Constitutional: Negative for appetite change, chills, fatigue and fever.   HENT: Negative for congestion, ear pain, hearing loss, postnasal drip, rhinorrhea, sinus pressure, sore throat and tinnitus.    Eyes: Negative for redness, itching and visual disturbance.   Respiratory: Negative for cough, chest tightness and shortness of breath.    Cardiovascular: Negative for chest pain and palpitations.   Gastrointestinal: Negative for abdominal pain, constipation, diarrhea, nausea and vomiting.   Genitourinary: Negative for decreased urine volume, difficulty urinating, dysuria, frequency, hematuria and urgency.   Musculoskeletal: Positive for back pain. Negative for myalgias, neck pain and neck stiffness.   Skin: Negative for rash.   Neurological: Negative for dizziness, light-headedness and headaches.   Psychiatric/Behavioral: Negative.        Objective:      Physical Exam   Constitutional: He is oriented to person, place, and time. He appears well-developed and well-nourished. No distress.   HENT:   Head: Normocephalic and atraumatic.   Right Ear: External ear normal.   Left Ear: External ear normal.   Nose: Nose normal.   Mouth/Throat: Oropharynx is clear and moist.  No oropharyngeal exudate.   Eyes: Conjunctivae and EOM are normal. Pupils are equal, round, and reactive to light. Right eye exhibits no discharge. Left eye exhibits no discharge. No scleral icterus.   Neck: Normal range of motion. Neck supple. No JVD present. No tracheal deviation present. No thyromegaly present.   Cardiovascular: Normal rate, regular rhythm, normal heart sounds and intact distal pulses.  Exam reveals no gallop and no friction rub.    No murmur heard.  Pulmonary/Chest: Effort normal and breath sounds normal. No stridor. No respiratory distress. He has no wheezes. He has no rales.   Abdominal: Soft. Bowel sounds are normal. He exhibits no distension and no mass. There is no tenderness. There is no rebound and no guarding.   Musculoskeletal: Normal range of motion. He exhibits no edema.        Lumbar back: He exhibits tenderness, pain and spasm.   Lymphadenopathy:     He has no cervical adenopathy.   Neurological: He is alert and oriented to person, place, and time.   Skin: Skin is warm and dry. No rash noted. He is not diaphoretic. No erythema. No pallor.   Psychiatric: He has a normal mood and affect. His behavior is normal. Judgment and thought content normal.   Nursing note and vitals reviewed.      Assessment:       1. Strain of lumbar region, initial encounter    2. Anxiety        Plan:       Strain of lumbar region, initial encounter  -     cyclobenzaprine (FLEXERIL) 10 MG tablet; Take 1 tablet (10 mg total) by mouth 3 (three) times daily as needed for Muscle spasms.  Dispense: 30 tablet; Refill: 3   Tylenol and ibuprofen as needed for pain.   Heat, massage, and stretching as discussed.    Anxiety  -     LORazepam (ATIVAN) 0.5 MG tablet; Take 1 tablet (0.5 mg total) by mouth every 8 (eight) hours as needed for Anxiety.  Dispense: 30 tablet; Refill: 0      Return to clinic as needed if symptoms persist or worsen.

## 2018-07-20 ENCOUNTER — TELEPHONE (OUTPATIENT)
Dept: ORTHOPEDICS | Facility: CLINIC | Age: 38
End: 2018-07-20

## 2018-07-20 NOTE — TELEPHONE ENCOUNTER
----- Message from Joselyn Mancilla sent at 7/20/2018 10:41 AM CDT -----            Name of Who is Calling: Melisa(Jenny Pacheco)      What is the request in detail: Rep calling to speak with Danna to schedule a deposition with Dr. Mckeon. Please call to discuss.      Can the clinic reply by [facility]: no      What Number to Call Back if not in [facility]: [phone]

## 2018-07-20 NOTE — TELEPHONE ENCOUNTER
Spoke w/Melisa. Notified all depostions need to be scheduled thru Litigation [employer], phone number provided.      Telephone: [phone]

## 2018-07-25 RX ORDER — METOPROLOL SUCCINATE 100 MG/1
100 TABLET, EXTENDED RELEASE ORAL DAILY
Qty: 30 TABLET | Refills: 11 | Status: SHIPPED | OUTPATIENT
Start: 2018-07-25 | End: 2018-08-01 | Stop reason: SDUPTHER

## 2018-07-25 RX ORDER — BUPROPION HYDROCHLORIDE 300 MG/1
300 TABLET ORAL DAILY
Qty: 30 TABLET | Refills: 11 | Status: SHIPPED | OUTPATIENT
Start: 2018-07-25 | End: 2018-07-30 | Stop reason: SDUPTHER

## 2018-07-25 NOTE — TELEPHONE ENCOUNTER
"----- Message from Chanelle Malloy sent at 7/25/2018  3:24 PM CDT -----  Contact: Self   RX request - refill or new RX.  Is this a refill or new RX:  Refill   RX name and strength: buPROPion (WELLBUTRIN XL) 300 MG 24 hr tablet  Directions:   Is this a 30 day or 90 day RX:    Local pharmacy or mail order pharmacy:  Local Pharmacy   Pharmacy name and phone # (DON'T enter "on file" or "in chart"): [id number] - [place], [place] - [address] AT [facility] [address] [phone] (Phone)  [phone] (Fax)      Comments:        RX request - refill or new RX.  Is this a refill or new RX:  Refill   RX name and strength: metoprolol succinate (TOPROL-XL) 100 MG 24 hr tablet  Directions:   Is this a 30 day or 90 day RX:    Local pharmacy or mail order pharmacy:  Local Pharmacy   Pharmacy name and phone # (DON'T enter "on file" or "in chart"): [id number] - [place], [place] - [address] AT [facility] [address] [phone] (Phone)  [phone] (Fax)      Comments:          "

## 2018-07-26 ENCOUNTER — NURSE TRIAGE (OUTPATIENT)
Dept: ADMINISTRATIVE | Facility: CLINIC | Age: 38
End: 2018-07-26

## 2018-07-27 NOTE — TELEPHONE ENCOUNTER
Reason for Disposition   [1] Prescription not at pharmacy AND [2] was prescribed today by PCP    Protocols used: ST MEDICATION QUESTION CALL-A-    Pt calling regarding medication that was suppose to be refilled was not.  Called local [facility] and spoke to a Staff Member.  Medication was not electronically sent.  Noted in medication section of Pt's chart two medications (Toprol-XL and Wellbutrin XL) prescribed on 7/25/2018.  Medications phoned in per protocol.  Pt called and notified.  (Pt is currently in [place], notified Pt medication will be placed on hold until he goes to [facility] in [place]).

## 2018-07-30 RX ORDER — BUPROPION HYDROCHLORIDE 300 MG/1
300 TABLET ORAL DAILY
Qty: 30 TABLET | Refills: 11 | Status: SHIPPED | OUTPATIENT
Start: 2018-07-30 | End: 2018-08-01 | Stop reason: SDUPTHER

## 2018-08-01 RX ORDER — METOPROLOL SUCCINATE 100 MG/1
100 TABLET, EXTENDED RELEASE ORAL DAILY
Qty: 30 TABLET | Refills: 11 | Status: SHIPPED | OUTPATIENT
Start: 2018-08-01 | End: 2018-08-09 | Stop reason: SDUPTHER

## 2018-08-01 RX ORDER — BUPROPION HYDROCHLORIDE 300 MG/1
300 TABLET ORAL DAILY
Qty: 30 TABLET | Refills: 11 | Status: SHIPPED | OUTPATIENT
Start: 2018-08-01 | End: 2018-08-09 | Stop reason: SDUPTHER

## 2018-08-09 RX ORDER — METOPROLOL SUCCINATE 100 MG/1
100 TABLET, EXTENDED RELEASE ORAL DAILY
Qty: 90 TABLET | Refills: 3 | Status: SHIPPED | OUTPATIENT
Start: 2018-08-09 | End: 2019-06-12 | Stop reason: SDUPTHER

## 2018-08-09 RX ORDER — PRAVASTATIN SODIUM 10 MG/1
10 TABLET ORAL DAILY
Qty: 90 TABLET | Refills: 3 | Status: SHIPPED | OUTPATIENT
Start: 2018-08-09 | End: 2019-06-12 | Stop reason: SDUPTHER

## 2018-08-09 RX ORDER — BUPROPION HYDROCHLORIDE 300 MG/1
300 TABLET ORAL DAILY
Qty: 90 TABLET | Refills: 3 | Status: SHIPPED | OUTPATIENT
Start: 2018-08-09 | End: 2019-05-02 | Stop reason: SDUPTHER

## 2018-08-09 RX ORDER — BUSPIRONE HYDROCHLORIDE 10 MG/1
10 TABLET ORAL 3 TIMES DAILY
Qty: 270 TABLET | Refills: 3 | Status: SHIPPED | OUTPATIENT
Start: 2018-08-09 | End: 2018-12-19

## 2018-08-29 ENCOUNTER — PATIENT MESSAGE (OUTPATIENT)
Dept: ADMINISTRATIVE | Facility: OTHER | Age: 38
End: 2018-08-29

## 2018-08-29 ENCOUNTER — PATIENT MESSAGE (OUTPATIENT)
Dept: SLEEP MEDICINE | Facility: CLINIC | Age: 38
End: 2018-08-29

## 2018-10-08 ENCOUNTER — OFFICE VISIT (OUTPATIENT)
Dept: SLEEP MEDICINE | Facility: CLINIC | Age: 38
End: 2018-10-08
Payer: COMMERCIAL

## 2018-10-08 VITALS
DIASTOLIC BLOOD PRESSURE: 77 MMHG | WEIGHT: 315 LBS | HEIGHT: 75 IN | BODY MASS INDEX: 39.17 KG/M2 | SYSTOLIC BLOOD PRESSURE: 120 MMHG | HEART RATE: 69 BPM

## 2018-10-08 DIAGNOSIS — G47.33 OSA (OBSTRUCTIVE SLEEP APNEA): Primary | ICD-10-CM

## 2018-10-08 PROCEDURE — 99999 PR PBB SHADOW E&M-EST. PATIENT-LVL III: CPT | Mod: PBBFAC,,, | Performed by: PSYCHIATRY & NEUROLOGY

## 2018-10-08 PROCEDURE — 3078F DIAST BP <80 MM HG: CPT | Mod: CPTII,S$GLB,, | Performed by: PSYCHIATRY & NEUROLOGY

## 2018-10-08 PROCEDURE — 3008F BODY MASS INDEX DOCD: CPT | Mod: CPTII,S$GLB,, | Performed by: PSYCHIATRY & NEUROLOGY

## 2018-10-08 PROCEDURE — 99214 OFFICE O/P EST MOD 30 MIN: CPT | Mod: S$GLB,,, | Performed by: PSYCHIATRY & NEUROLOGY

## 2018-10-08 PROCEDURE — 3074F SYST BP LT 130 MM HG: CPT | Mod: CPTII,S$GLB,, | Performed by: PSYCHIATRY & NEUROLOGY

## 2018-10-08 RX ORDER — MODAFINIL 100 MG/1
TABLET ORAL
Qty: 30 TABLET | Refills: 5 | Status: SHIPPED | OUTPATIENT
Start: 2018-10-08 | End: 2020-12-07
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
## 2018-10-08 NOTE — PATIENT INSTRUCTIONS
PLAN:    Will continue autoBPAP - will increase starting pressure to 14/10.    Given significant residual sleepiness, will order titration with ETCO2 if possible (will need to go to [facility]).   Will order AUtoBPAP once we get psg and titration rep oprt from [facility].  Will add Dtremwear with prongs and heated hose to order.   Will order will Imani View FFM.  Will expect his communication through [facility] and will schedule follow up.    Will start Modafinil when sleepy in the early 12-1 afternoon - please start with 1/2 pillSLEEP LAB ([facility]) will contact you to schedulethe sleep study. Their number is [phone] (ext 2). Please call them if you do not hear from them in 10 business days from now.  The [facility] Sleep Lab is located on 7th floor of the [facility]; [place] lab is located in [facility].    SLEEP CLINIC (my assistant) will call you when the sleep study results are ready - if you have not heard from us by 2 weeks from the date of the study, please call [phone] (ext 1) or you can use [facility] to contact me.    You are advised to abstain from driving should you feel sleepy or drowsy.  Consider checking B12, D,  Ferritin, Iron, CBC and Chem 14 as other possible causes of daytime fatigue. TSH - was already NL

## 2018-10-10 ENCOUNTER — TELEPHONE (OUTPATIENT)
Dept: SLEEP MEDICINE | Facility: OTHER | Age: 38
End: 2018-10-10

## 2018-10-16 ENCOUNTER — OFFICE VISIT (OUTPATIENT)
Dept: INTERNAL MEDICINE | Facility: CLINIC | Age: 38
End: 2018-10-16
Payer: COMMERCIAL

## 2018-10-16 VITALS
DIASTOLIC BLOOD PRESSURE: 85 MMHG | HEIGHT: 75 IN | WEIGHT: 315 LBS | TEMPERATURE: 98 F | SYSTOLIC BLOOD PRESSURE: 166 MMHG | BODY MASS INDEX: 39.17 KG/M2 | RESPIRATION RATE: 16 BRPM | HEART RATE: 62 BPM

## 2018-10-16 DIAGNOSIS — E66.01 MORBID OBESITY: ICD-10-CM

## 2018-10-16 DIAGNOSIS — S16.1XXA STRAIN OF NECK MUSCLE, INITIAL ENCOUNTER: Primary | ICD-10-CM

## 2018-10-16 PROCEDURE — 96372 THER/PROPH/DIAG INJ SC/IM: CPT | Mod: S$GLB,,, | Performed by: FAMILY MEDICINE

## 2018-10-16 PROCEDURE — 99999 PR PBB SHADOW E&M-EST. PATIENT-LVL IV: CPT | Mod: PBBFAC,,, | Performed by: FAMILY MEDICINE

## 2018-10-16 PROCEDURE — 3077F SYST BP >= 140 MM HG: CPT | Mod: CPTII,S$GLB,, | Performed by: FAMILY MEDICINE

## 2018-10-16 PROCEDURE — 3079F DIAST BP 80-89 MM HG: CPT | Mod: CPTII,S$GLB,, | Performed by: FAMILY MEDICINE

## 2018-10-16 PROCEDURE — 3008F BODY MASS INDEX DOCD: CPT | Mod: CPTII,S$GLB,, | Performed by: FAMILY MEDICINE

## 2018-10-16 PROCEDURE — 99214 OFFICE O/P EST MOD 30 MIN: CPT | Mod: 25,S$GLB,, | Performed by: FAMILY MEDICINE

## 2018-10-16 RX ORDER — TRIAMCINOLONE ACETONIDE 40 MG/ML
40 INJECTION, SUSPENSION INTRA-ARTICULAR; INTRAMUSCULAR
Status: COMPLETED | OUTPATIENT
Start: 2018-10-16 | End: 2018-10-16

## 2018-10-16 RX ORDER — CYCLOBENZAPRINE HCL 10 MG
10 TABLET ORAL 3 TIMES DAILY PRN
Qty: 30 TABLET | Refills: 0 | Status: SHIPPED | OUTPATIENT
Start: 2018-10-16 | End: 2018-10-26

## 2018-10-16 RX ADMIN — TRIAMCINOLONE ACETONIDE 40 MG: 40 INJECTION, SUSPENSION INTRA-ARTICULAR; INTRAMUSCULAR at 04:10
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
## 2018-10-19 ENCOUNTER — TELEPHONE (OUTPATIENT)
Dept: SLEEP MEDICINE | Facility: OTHER | Age: 38
End: 2018-10-19

## 2018-10-25 ENCOUNTER — TELEPHONE (OUTPATIENT)
Dept: SLEEP MEDICINE | Facility: OTHER | Age: 38
End: 2018-10-25

## 2018-11-05 ENCOUNTER — TELEPHONE (OUTPATIENT)
Dept: SLEEP MEDICINE | Facility: OTHER | Age: 38
End: 2018-11-05

## 2018-12-19 ENCOUNTER — TELEPHONE (OUTPATIENT)
Dept: INTERNAL MEDICINE | Facility: CLINIC | Age: 38
End: 2018-12-19

## 2018-12-19 RX ORDER — HYDROXYZINE PAMOATE 25 MG/1
25 CAPSULE ORAL EVERY 8 HOURS
Qty: 270 CAPSULE | Refills: 3 | Status: SHIPPED | OUTPATIENT
Start: 2018-12-19 | End: 2019-01-02 | Stop reason: SDUPTHER

## 2018-12-19 NOTE — TELEPHONE ENCOUNTER
----- Message from Izabela Enamorado sent at 12/19/2018  2:27 PM CST -----  Contact: Luiza with [facility] [phone]  Pharmacy is calling to clarify an RX.  RX name:   busPIRone (BUSPAR) 10 MG tablet  What do they need to clarify:  This Rx is currently on indefinite back order / Please call in a different medication for patient.   Comments:

## 2019-01-02 RX ORDER — HYDROXYZINE PAMOATE 25 MG/1
25 CAPSULE ORAL EVERY 8 HOURS
Qty: 270 CAPSULE | Refills: 3 | Status: SHIPPED | OUTPATIENT
Start: 2019-01-02 | End: 2019-11-19

## 2019-01-02 NOTE — TELEPHONE ENCOUNTER
"----- Message from Tabatha Nazario sent at 1/2/2019 12:22 PM CST -----  Contact: self [phone]  RX request - refill or new RX.  Is this a refill or new RX:  Refill   RX name and strength: hydrOXYzine pamoate (VISTARIL) 25 MG Cap  Directions: Take 1 capsule (25 mg total) by mouth every 8 (eight) hours.  Is this a 30 day or 90 day RX:    Local pharmacy or mail order pharmacy:  Mail order   Pharmacy name and phone # (DON'T enter "on file" or "in chart"): [facility]  [phone] (Phone)  [phone] (Fax)  Comments:          "

## 2019-01-24 ENCOUNTER — TELEPHONE (OUTPATIENT)
Dept: INTERNAL MEDICINE | Facility: CLINIC | Age: 39
End: 2019-01-24

## 2019-01-24 ENCOUNTER — PATIENT MESSAGE (OUTPATIENT)
Dept: INTERNAL MEDICINE | Facility: CLINIC | Age: 39
End: 2019-01-24

## 2019-01-24 DIAGNOSIS — R79.89 LOW TESTOSTERONE: ICD-10-CM

## 2019-01-24 DIAGNOSIS — I10 ESSENTIAL HYPERTENSION: ICD-10-CM

## 2019-01-24 DIAGNOSIS — N48.6 PEYRONIE'S DISEASE: ICD-10-CM

## 2019-01-24 DIAGNOSIS — E78.5 HYPERLIPIDEMIA, UNSPECIFIED HYPERLIPIDEMIA TYPE: ICD-10-CM

## 2019-01-24 DIAGNOSIS — Z00.00 WELL ADULT EXAM: Primary | ICD-10-CM

## 2019-02-03 ENCOUNTER — PATIENT MESSAGE (OUTPATIENT)
Dept: INTERNAL MEDICINE | Facility: CLINIC | Age: 39
End: 2019-02-03

## 2019-02-03 DIAGNOSIS — F41.9 ANXIETY: ICD-10-CM

## 2019-02-04 RX ORDER — LORAZEPAM 0.5 MG/1
0.5 TABLET ORAL EVERY 8 HOURS PRN
Qty: 30 TABLET | Refills: 0 | Status: SHIPPED | OUTPATIENT
Start: 2019-02-04 | End: 2019-06-12 | Stop reason: SDUPTHER

## 2019-02-04 NOTE — TELEPHONE ENCOUNTER
Refill approved.  I also recommend that the patient follow up with a psychiatrist for further evaluation for other alternatives.

## 2019-02-19 ENCOUNTER — PATIENT MESSAGE (OUTPATIENT)
Dept: INTERNAL MEDICINE | Facility: CLINIC | Age: 39
End: 2019-02-19

## 2019-02-22 ENCOUNTER — OFFICE VISIT (OUTPATIENT)
Dept: INTERNAL MEDICINE | Facility: CLINIC | Age: 39
End: 2019-02-22
Payer: COMMERCIAL

## 2019-02-22 VITALS
HEIGHT: 75 IN | RESPIRATION RATE: 16 BRPM | OXYGEN SATURATION: 97 % | WEIGHT: 315 LBS | SYSTOLIC BLOOD PRESSURE: 115 MMHG | HEART RATE: 72 BPM | DIASTOLIC BLOOD PRESSURE: 80 MMHG | BODY MASS INDEX: 39.17 KG/M2 | TEMPERATURE: 98 F

## 2019-02-22 DIAGNOSIS — I10 ESSENTIAL HYPERTENSION: ICD-10-CM

## 2019-02-22 DIAGNOSIS — R79.89 LOW TESTOSTERONE: ICD-10-CM

## 2019-02-22 DIAGNOSIS — E78.5 HYPERLIPIDEMIA, UNSPECIFIED HYPERLIPIDEMIA TYPE: ICD-10-CM

## 2019-02-22 DIAGNOSIS — M54.5 LOW BACK PAIN, UNSPECIFIED BACK PAIN LATERALITY, UNSPECIFIED CHRONICITY, WITH SCIATICA PRESENCE UNSPECIFIED: ICD-10-CM

## 2019-02-22 DIAGNOSIS — Z00.00 WELL ADULT EXAM: Primary | ICD-10-CM

## 2019-02-22 DIAGNOSIS — M54.9 MID BACK PAIN: ICD-10-CM

## 2019-02-22 PROCEDURE — 3074F PR MOST RECENT SYSTOLIC BLOOD PRESSURE < 130 MM HG: ICD-10-PCS | Mod: CPTII,S$GLB,, | Performed by: FAMILY MEDICINE

## 2019-02-22 PROCEDURE — 99395 PR PREVENTIVE VISIT,EST,18-39: ICD-10-PCS | Mod: S$GLB,,, | Performed by: FAMILY MEDICINE

## 2019-02-22 PROCEDURE — 99999 PR PBB SHADOW E&M-EST. PATIENT-LVL IV: ICD-10-PCS | Mod: PBBFAC,,, | Performed by: FAMILY MEDICINE

## 2019-02-22 PROCEDURE — 99999 PR PBB SHADOW E&M-EST. PATIENT-LVL IV: CPT | Mod: PBBFAC,,, | Performed by: FAMILY MEDICINE

## 2019-02-22 PROCEDURE — 3079F DIAST BP 80-89 MM HG: CPT | Mod: CPTII,S$GLB,, | Performed by: FAMILY MEDICINE

## 2019-02-22 PROCEDURE — 99395 PREV VISIT EST AGE 18-39: CPT | Mod: S$GLB,,, | Performed by: FAMILY MEDICINE

## 2019-02-22 PROCEDURE — 3079F PR MOST RECENT DIASTOLIC BLOOD PRESSURE 80-89 MM HG: ICD-10-PCS | Mod: CPTII,S$GLB,, | Performed by: FAMILY MEDICINE

## 2019-02-22 PROCEDURE — 3074F SYST BP LT 130 MM HG: CPT | Mod: CPTII,S$GLB,, | Performed by: FAMILY MEDICINE
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
## 2019-02-23 ENCOUNTER — LAB VISIT (OUTPATIENT)
Dept: LAB | Facility: HOSPITAL | Age: 39
End: 2019-02-23
Attending: FAMILY MEDICINE
Payer: COMMERCIAL

## 2019-02-23 DIAGNOSIS — N48.6 PEYRONIE'S DISEASE: ICD-10-CM

## 2019-02-23 DIAGNOSIS — E78.5 HYPERLIPIDEMIA, UNSPECIFIED HYPERLIPIDEMIA TYPE: ICD-10-CM

## 2019-02-23 DIAGNOSIS — R79.89 LOW TESTOSTERONE: ICD-10-CM

## 2019-02-23 DIAGNOSIS — Z00.00 WELL ADULT EXAM: ICD-10-CM

## 2019-02-23 LAB
25(OH)D3+25(OH)D2 SERPL-MCNC: 30 NG/ML
ALBUMIN SERPL BCP-MCNC: 4.1 G/DL
ALP SERPL-CCNC: 58 U/L
ALT SERPL W/O P-5'-P-CCNC: 94 U/L
ANION GAP SERPL CALC-SCNC: 11 MMOL/L
AST SERPL-CCNC: 56 U/L
BASOPHILS # BLD AUTO: 0.1 K/UL
BASOPHILS NFR BLD: 1.1 %
BILIRUB SERPL-MCNC: 0.3 MG/DL
BUN SERPL-MCNC: 15 MG/DL
CALCIUM SERPL-MCNC: 9.4 MG/DL
CHLORIDE SERPL-SCNC: 104 MMOL/L
CHOLEST SERPL-MCNC: 261 MG/DL
CHOLEST/HDLC SERPL: 7.3 {RATIO}
CO2 SERPL-SCNC: 23 MMOL/L
CREAT SERPL-MCNC: 0.9 MG/DL
DIFFERENTIAL METHOD: ABNORMAL
EOSINOPHIL # BLD AUTO: 0.3 K/UL
EOSINOPHIL NFR BLD: 3.3 %
ERYTHROCYTE [DISTWIDTH] IN BLOOD BY AUTOMATED COUNT: 14.2 %
EST. GFR  (AFRICAN AMERICAN): >60 ML/MIN/1.73 M^2
EST. GFR  (NON AFRICAN AMERICAN): >60 ML/MIN/1.73 M^2
ESTIMATED AVG GLUCOSE: 128 MG/DL
GLUCOSE SERPL-MCNC: 99 MG/DL
HBA1C MFR BLD HPLC: 6.1 %
HCT VFR BLD AUTO: 45.5 %
HDLC SERPL-MCNC: 36 MG/DL
HDLC SERPL: 13.8 %
HGB BLD-MCNC: 14.2 G/DL
IMM GRANULOCYTES # BLD AUTO: 0.02 K/UL
IMM GRANULOCYTES NFR BLD AUTO: 0.2 %
LDLC SERPL CALC-MCNC: 185.4 MG/DL
LYMPHOCYTES # BLD AUTO: 2.9 K/UL
LYMPHOCYTES NFR BLD: 32.4 %
MCH RBC QN AUTO: 28.5 PG
MCHC RBC AUTO-ENTMCNC: 31.2 G/DL
MCV RBC AUTO: 91 FL
MONOCYTES # BLD AUTO: 0.7 K/UL
MONOCYTES NFR BLD: 7.6 %
NEUTROPHILS # BLD AUTO: 5 K/UL
NEUTROPHILS NFR BLD: 55.4 %
NONHDLC SERPL-MCNC: 225 MG/DL
NRBC BLD-RTO: 0 /100 WBC
PLATELET # BLD AUTO: 319 K/UL
PMV BLD AUTO: 11.6 FL
POTASSIUM SERPL-SCNC: 4.3 MMOL/L
PROT SERPL-MCNC: 7.5 G/DL
RBC # BLD AUTO: 4.99 M/UL
SODIUM SERPL-SCNC: 138 MMOL/L
T4 FREE SERPL-MCNC: 1.2 NG/DL
TESTOST SERPL-MCNC: 224 NG/DL
TRIGL SERPL-MCNC: 198 MG/DL
TSH SERPL DL<=0.005 MIU/L-ACNC: 1.47 UIU/ML
WBC # BLD AUTO: 8.98 K/UL

## 2019-02-23 PROCEDURE — 84439 ASSAY OF FREE THYROXINE: CPT

## 2019-02-23 PROCEDURE — 80061 LIPID PANEL: CPT

## 2019-02-23 PROCEDURE — 83036 HEMOGLOBIN GLYCOSYLATED A1C: CPT

## 2019-02-23 PROCEDURE — 84403 ASSAY OF TOTAL TESTOSTERONE: CPT

## 2019-02-23 PROCEDURE — 80053 COMPREHEN METABOLIC PANEL: CPT

## 2019-02-23 PROCEDURE — 85025 COMPLETE CBC W/AUTO DIFF WBC: CPT

## 2019-02-23 PROCEDURE — 36415 COLL VENOUS BLD VENIPUNCTURE: CPT | Mod: PO

## 2019-02-23 PROCEDURE — 84443 ASSAY THYROID STIM HORMONE: CPT

## 2019-02-23 PROCEDURE — 82306 VITAMIN D 25 HYDROXY: CPT

## 2019-02-25 ENCOUNTER — PATIENT MESSAGE (OUTPATIENT)
Dept: INTERNAL MEDICINE | Facility: CLINIC | Age: 39
End: 2019-02-25

## 2019-02-25 ENCOUNTER — PATIENT MESSAGE (OUTPATIENT)
Dept: UROLOGY | Facility: CLINIC | Age: 39
End: 2019-02-25

## 2019-02-25 DIAGNOSIS — E66.01 MORBID OBESITY: ICD-10-CM

## 2019-02-25 DIAGNOSIS — R79.89 LOW TESTOSTERONE: ICD-10-CM

## 2019-02-25 DIAGNOSIS — N48.6 PEYRONIE'S DISEASE: ICD-10-CM

## 2019-02-25 DIAGNOSIS — R73.03 PREDIABETES: ICD-10-CM

## 2019-03-07 ENCOUNTER — CLINICAL SUPPORT (OUTPATIENT)
Dept: REHABILITATION | Facility: HOSPITAL | Age: 39
End: 2019-03-07
Attending: FAMILY MEDICINE
Payer: COMMERCIAL

## 2019-03-07 DIAGNOSIS — G89.29 CHRONIC LEFT-SIDED THORACIC BACK PAIN: ICD-10-CM

## 2019-03-07 DIAGNOSIS — M54.6 CHRONIC LEFT-SIDED THORACIC BACK PAIN: ICD-10-CM

## 2019-03-07 PROCEDURE — 97530 THERAPEUTIC ACTIVITIES: CPT | Mod: PO

## 2019-03-07 PROCEDURE — 97161 PT EVAL LOW COMPLEX 20 MIN: CPT | Mod: PO

## 2019-03-07 NOTE — PROGRESS NOTES
[facility]  Physical Therapy Initial Evaluation    Name: Dominic Collazo  Clinic Number: [id number]    Therapy Diagnosis:   Encounter Diagnosis   Name Primary?    Chronic left-sided thoracic back pain      Physician: Arjun Cross MD    Physician Orders: PT Eval and Treat   Medical Diagnosis: mid back pain  Evaluation Date: 3/7/2019  Authorization period Expiration: 2/22/2020  Plan of Care Certification Period: 5/7/2019    Visit #: 1/ Visits authorized: 25  Time In:4:00  Time Out: 5:00  Total Billable Time: 60 minutes    Precautions: Standard  Subjective   Date of onset: April of 2016  History of current condition - Dominic reports: continued pain in the L side of his mid back.       Past Medical History:   Diagnosis Date    Anxiety     Colon polyp     Depression     Hyperlipidemia     Hypertension     Low testosterone      Dominic Collazo  has a past surgical history that includes Tonsillectomy and Adenoidectomy.    Dominic has a current medication list which includes the following prescription(s): bupropion, hydroxyzine pamoate, lorazepam, metoprolol succinate, modafinil, pravastatin, and vitamin e.    Review of patient's allergies indicates:   Allergen Reactions    Ciprofloxacin      swelling    Penicillins Rash    Sulfa (sulfonamide antibiotics) Rash    Toradol [ketorolac] Nausea Only    Green pepper Nausea Only    Meloxicam Nausea Only        Imaging, X rays:     Prior Therapy: yes  Social History: Pt lives in a multi- level home.  Pt lives with his family  Occupation: teacher  Prior Level of Function: Independent in [facility]'s  Current Level of Function: may miss work or social events secondary to pain    Pain:  Current 4/10, worst 8/10, best 4/10   Location: back  left  Description: Sharp  Aggravating Factors: Strenuous activity  Easing Factors: Stretching and a heating pad/ice pack    Pts goals: To relieve his pain        Objective     Mental  status :alert  Posture Alignment :decreased kyphosis  Sensation: Light Touch: Intact         ,.    ROM:  Cx spine  Flexion WFL no Sx  Extension 50% sore L scapulae  Rotation WFL to R & L  Side bending R tight on L, L WFL    UPPER EXTREMITY--AROM/PROM  (R) UE: limited as follows: Shoulder flexion 173 degrees  (L) UE: limited as follows:  Shoulder flexion 167 degrees           RANGE OF MOTION--LOWER EXTREMITIES - not tested       Upper Extremity Strength  (R) UE  (L) UE    Shoulder flexion: 5/5 Shoulder flexion: 4/5 p   Shoulder Abduction: nt Shoulder abduction: nt   Elbow flexion: 5/5 Elbow flexion: 4+/5   Elbow extension: 5/5 Elbow extension: 4/5   Wrist flexion: 5/5 Wrist flexion: 4/5   Wrist extension: 5/5 Wrist extension: 4/5    5/5 : 4/5       Lower Extremity Strength - not tested  GAIT: Dominic ambulates 200 feet with no assistive device, independently.     GAIT DEVIATIONS: Dominic displays steady gait    L upper thoracic spine is hypomobile and painful to palpation.    TREATMENT   Treatment Time In: 4:45  Treatment Time Out: 5:15  Total Treatment time separate from Evaluation time:30 min    Dominic received therapeutic exercises to develop posture for 10 minutes including:  Scapular retractions  Shoulder shrugs    Dominic received the following manual therapy techniques: Joint mobilizations, Manual traction and Soft tissue Mobilization were applied to the: Cx and thoracic spine for 20  minutes.       Home Exercises and Patient Education Provided    Education provided re: Scapular retractions and shoulder shrugs  - progress towards goals   - role of therapy in multi - disciplinary team, goals for therapy  No spiritual or educational barriers to learning provided    Written Home Exercises Provided: no.  Exercises were reviewed and Dominic was able to demonstrate them prior to the end of the session.  Dominic demonstrated good  understanding of the education provided.       Assessment   Dominic is a 38  y.o. male referred to outpatient Physical Therapy with a medical diagnosis of Mid back pain. Pt presents with Pt in his L scapular region, a hypomobile lower Cx and upper Thoracic spine on the L and limited L shoulder flexion.    Pt prognosis is Good.   Pt will benefit from skilled outpatient Physical Therapy to address the deficits stated above and in the chart below, provide pt/family education, and to maximize pt's level of independence.     Plan of care discussed with patient: No  Pt's spiritual, cultural and educational needs considered and pt agreeable to plan of care and goals as stated below:     Anticipated Barriers for therapy: none    Medical Necessity is demonstrated by the following  History  Co-morbidities and personal factors that may impact the plan of care Examination  Body Structures and Functions, activity limitations and participation restrictions that may impact the plan of care    Clinical Presentation   Co-morbidities:   anxiety, depression, difficulty sleeping and high BMI        Personal Factors:   no deficits Body Regions:   neck  back  upper extremities    Body Systems:    ROM  strength            Participation Restrictions:   Over head work     Activity limitations:   Learning and applying knowledge  no deficits    General Tasks and Commands  no deficits    Communication  no deficits    Mobility  lifting and carrying objects    Self care  no deficits    Domestic Life  doing house work (cleaning house, washing dishes, laundry)    Interactions/Relationships  no deficits    Life Areas  no deficits    Community and Social Life  no deficits         stable and uncomplicated                      low   low  low Decision Making/ Complexity Score:  low       Short Term GOALS: 3 weeks. Pt agrees with goals set.  Pt independent in a HEP to address functional deficits  Pt with improved joint mobility in his L upper thoracic spine and rib cage    Long Term GOALS: 8  weeks. Pt agrees with goals set.  Pt  to report decreased L upper thoracic pain at a level </= 3/10, at worse with ADL's  Pt with improved thoracic spine mobility WFL to allow for improved function  Pt with improved L upper 1/4 MMT to improve function  PT to report improved functional abilities through an improved FOTO score      Plan     Certification Period: 3/7/2019 to 5/7/2019.    Outpatient Physical Therapy 2 times weekly for 8 weeks to include the following interventions: Cervical/Lumbar Traction, Electrical Stimulation IFC, Manual Therapy, Moist Heat/ Ice, Neuromuscular Re-ed, Patient Education, Therapeutic Activites, Therapeutic Exercise and Dry needling.     Arjun Pace, PT

## 2019-03-08 NOTE — PLAN OF CARE
[facility]  Physical Therapy Initial Evaluation    Name: Dominic Collazo  Clinic Number: [id number]    Therapy Diagnosis:   Encounter Diagnosis   Name Primary?    Chronic left-sided thoracic back pain      Physician: Arjun Cross MD    Physician Orders: PT Eval and Treat   Medical Diagnosis: mid back pain  Evaluation Date: 3/7/2019  Authorization period Expiration: 2/22/2020  Plan of Care Certification Period: 5/7/2019    Visit #: 1/ Visits authorized: 25  Time In:4:00  Time Out: 5:00  Total Billable Time: 60 minutes    Precautions: Standard  Subjective   Date of onset: April of 2016  History of current condition - Dominic reports: continued pain in the L side of his mid back.       Past Medical History:   Diagnosis Date    Anxiety     Colon polyp     Depression     Hyperlipidemia     Hypertension     Low testosterone      Dominic Collazo  has a past surgical history that includes Tonsillectomy and Adenoidectomy.    Dominic has a current medication list which includes the following prescription(s): bupropion, hydroxyzine pamoate, lorazepam, metoprolol succinate, modafinil, pravastatin, and vitamin e.    Review of patient's allergies indicates:   Allergen Reactions    Ciprofloxacin      swelling    Penicillins Rash    Sulfa (sulfonamide antibiotics) Rash    Toradol [ketorolac] Nausea Only    Green pepper Nausea Only    Meloxicam Nausea Only        Imaging, X rays:     Prior Therapy: yes  Social History: Pt lives in a multi- level home.  Pt lives with his family  Occupation: teacher  Prior Level of Function: Independent in [facility]'s  Current Level of Function: may miss work or social events secondary to pain    Pain:  Current 4/10, worst 8/10, best 4/10   Location: back  left  Description: Sharp  Aggravating Factors: Strenuous activity  Easing Factors: Stretching and a heating pad/ice pack    Pts goals: To relieve his pain        Objective     Mental  status :alert  Posture Alignment :decreased kyphosis  Sensation: Light Touch: Intact         ,.    ROM:  Cx spine  Flexion WFL no Sx  Extension 50% sore L scapulae  Rotation WFL to R & L  Side bending R tight on L, L WFL    UPPER EXTREMITY--AROM/PROM  (R) UE: limited as follows: Shoulder flexion 173 degrees  (L) UE: limited as follows:  Shoulder flexion 167 degrees           RANGE OF MOTION--LOWER EXTREMITIES - not tested       Upper Extremity Strength  (R) UE  (L) UE    Shoulder flexion: 5/5 Shoulder flexion: 4/5 p   Shoulder Abduction: nt Shoulder abduction: nt   Elbow flexion: 5/5 Elbow flexion: 4+/5   Elbow extension: 5/5 Elbow extension: 4/5   Wrist flexion: 5/5 Wrist flexion: 4/5   Wrist extension: 5/5 Wrist extension: 4/5    5/5 : 4/5       Lower Extremity Strength - not tested  GAIT: Dominic ambulates 200 feet with no assistive device, independently.     GAIT DEVIATIONS: Dominic displays steady gait    L upper thoracic spine is hypomobile and painful to palpation.    TREATMENT   Treatment Time In: 4:45  Treatment Time Out: 5:15  Total Treatment time separate from Evaluation time:30 min    Dominic received therapeutic exercises to develop posture for 10 minutes including:  Scapular retractions  Shoulder shrugs    Dominic received the following manual therapy techniques: Joint mobilizations, Manual traction and Soft tissue Mobilization were applied to the: Cx and thoracic spine for 20  minutes.       Home Exercises and Patient Education Provided    Education provided re: Scapular retractions and shoulder shrugs  - progress towards goals   - role of therapy in multi - disciplinary team, goals for therapy  No spiritual or educational barriers to learning provided    Written Home Exercises Provided: no.  Exercises were reviewed and Dominic was able to demonstrate them prior to the end of the session.  Dominic demonstrated good  understanding of the education provided.       Assessment   Dominic is a 38  y.o. male referred to outpatient Physical Therapy with a medical diagnosis of Mid back pain. Pt presents with Pt in his L scapular region, a hypomobile lower Cx and upper Thoracic spine on the L and limited L shoulder flexion.    Pt prognosis is Good.   Pt will benefit from skilled outpatient Physical Therapy to address the deficits stated above and in the chart below, provide pt/family education, and to maximize pt's level of independence.     Plan of care discussed with patient: No  Pt's spiritual, cultural and educational needs considered and pt agreeable to plan of care and goals as stated below:     Anticipated Barriers for therapy: none    Medical Necessity is demonstrated by the following  History  Co-morbidities and personal factors that may impact the plan of care Examination  Body Structures and Functions, activity limitations and participation restrictions that may impact the plan of care    Clinical Presentation   Co-morbidities:   anxiety, depression, difficulty sleeping and high BMI        Personal Factors:   no deficits Body Regions:   neck  back  upper extremities    Body Systems:    ROM  strength            Participation Restrictions:   Over head work     Activity limitations:   Learning and applying knowledge  no deficits    General Tasks and Commands  no deficits    Communication  no deficits    Mobility  lifting and carrying objects    Self care  no deficits    Domestic Life  doing house work (cleaning house, washing dishes, laundry)    Interactions/Relationships  no deficits    Life Areas  no deficits    Community and Social Life  no deficits         stable and uncomplicated                      low   low  low Decision Making/ Complexity Score:  low       Short Term GOALS: 3 weeks. Pt agrees with goals set.  Pt independent in a HEP to address functional deficits  Pt with improved joint mobility in his L upper thoracic spine and rib cage    Long Term GOALS: 8  weeks. Pt agrees with goals set.  Pt  to report decreased L upper thoracic pain at a level </= 3/10, at worse with ADL's  Pt with improved thoracic spine mobility WFL to allow for improved function  Pt with improved L upper 1/4 MMT to improve function  PT to report improved functional abilities through an improved FOTO score      Plan     Certification Period: 3/7/2019 to 5/7/2019.    Outpatient Physical Therapy 2 times weekly for 8 weeks to include the following interventions: Cervical/Lumbar Traction, Electrical Stimulation IFC, Manual Therapy, Moist Heat/ Ice, Neuromuscular Re-ed, Patient Education, Therapeutic Activites, Therapeutic Exercise and Dry needling.     Arjun Pace, PT

## 2019-03-18 ENCOUNTER — OFFICE VISIT (OUTPATIENT)
Dept: UROLOGY | Facility: CLINIC | Age: 39
End: 2019-03-18
Payer: COMMERCIAL

## 2019-03-18 VITALS
HEART RATE: 73 BPM | BODY MASS INDEX: 39.17 KG/M2 | HEIGHT: 75 IN | WEIGHT: 315 LBS | SYSTOLIC BLOOD PRESSURE: 130 MMHG | DIASTOLIC BLOOD PRESSURE: 80 MMHG

## 2019-03-18 DIAGNOSIS — N52.01 ERECTILE DYSFUNCTION DUE TO ARTERIAL INSUFFICIENCY: ICD-10-CM

## 2019-03-18 DIAGNOSIS — I10 ESSENTIAL HYPERTENSION: ICD-10-CM

## 2019-03-18 DIAGNOSIS — E78.5 HYPERLIPIDEMIA, UNSPECIFIED HYPERLIPIDEMIA TYPE: ICD-10-CM

## 2019-03-18 DIAGNOSIS — E29.1 MALE HYPOGONADISM: Primary | ICD-10-CM

## 2019-03-18 PROBLEM — N48.89: Status: RESOLVED | Noted: 2017-05-08 | Resolved: 2019-03-18

## 2019-03-18 PROBLEM — N48.6 PEYRONIE'S DISEASE: Status: RESOLVED | Noted: 2017-05-08 | Resolved: 2019-03-18

## 2019-03-18 PROCEDURE — 99244 PR OFFICE CONSULTATION,LEVEL IV: ICD-10-PCS | Mod: S$GLB,,, | Performed by: UROLOGY

## 2019-03-18 PROCEDURE — 99999 PR PBB SHADOW E&M-EST. PATIENT-LVL III: CPT | Mod: PBBFAC,,, | Performed by: UROLOGY

## 2019-03-18 PROCEDURE — 99999 PR PBB SHADOW E&M-EST. PATIENT-LVL III: ICD-10-PCS | Mod: PBBFAC,,, | Performed by: UROLOGY

## 2019-03-18 PROCEDURE — 99244 OFF/OP CNSLTJ NEW/EST MOD 40: CPT | Mod: S$GLB,,, | Performed by: UROLOGY

## 2019-03-18 NOTE — LETTER
March 18, 2019      Arjun Cross MD  [address] [facility] [place] [address]           [facility] 4th [facility]  [address]  [facility] [address]  Phone: [phone]          Patient: Dominic Collazo   MR Number: [id number]   YOB: 1980   Date of Visit: 3/18/2019       Dear Dr. Arjun Cross:    Thank you for referring Dominic Collazo to me for evaluation. Attached you will find relevant portions of my assessment and plan of care.    If you have questions, please do not hesitate to call me. I look forward to following Dominic Collazo along with you.    Sincerely,    Wil Pimentel MD    Enclosure  CC:  No Recipients    If you would like to receive this communication electronically, please contact [email or web address] or [phone] to request more information on [facility] Link access.    For providers and/or their staff who would like to refer a patient to [facility], please contact us through our one-stop-shop provider referral line, [facility], at [phone].    If you feel you have received this communication in error or would no longer like to receive these types of communications, please e-mail [email or web address]

## 2019-03-18 NOTE — PATIENT INSTRUCTIONS
Testosterone Implant  What is this medicine?  TESTOSTERONE (linda TOS ter one) is the main male hormone. It supports normal male traits such as muscle growth, facial hair, and deep voice. This medicine is used in males to treat low testosterone levels.  This medicine may be used for other purposes; ask your health care provider or pharmacist if you have questions.  What should I tell my health care provider before I take this medicine?  They need to know if you have any of these conditions:  · breast cancer  · diabetes  · heart disease  · kidney disease  · liver disease  · lung disease  · prostate cancer, enlargement  · an unusual or allergic reaction to testosterone, other medicines, foods, dyes, or preservatives  · this drug is not for use in females  How should I use this medicine?  This medicine will be inserted under your skin by your doctor or health care professional.  Contact your pediatrician or health care professional regarding the use of this medicine in children. While this medicine may be prescribed for children for selected conditions, precautions do apply.  Overdosage: If you think you have taken too much of this medicine contact a poison control center or emergency room at once.  NOTE: This medicine is only for you. Do not share this medicine with others.  What if I miss a dose?  Try not to miss a dose. Your doctor or health care professional will tell you when your next dose is due. Notify the office if you are unable to keep an appointment.  What may interact with this medicine?  · medicines for diabetes  · medicines that treat or prevent blood clots like warfarin  · oxyphenbutazone  · propranolol  · steroid medicines like prednisone or cortisone  This list may not describe all possible interactions. Give your health care provider a list of all the medicines, herbs, non-prescription drugs, or dietary supplements you use. Also tell them if you smoke, drink alcohol, or use illegal drugs. Some items  may interact with your medicine.  What should I watch for while using this medicine?  Visit your doctor or health care professional for regular checks on your progress. They will need to check the level of testosterone in your blood.  This medicine may affect blood sugar levels. If you have diabetes, check with your doctor or health care professional before you change your diet or the dose of your diabetic medicine.  Call your doctor or health care professional if you notice a change in the way this medicine is working. It is possible that the pellets may accidentally fall out.  This drug is banned from use in athletes by most athletic organizations.  What side effects may I notice from receiving this medicine?  Side effects that you should report to your doctor or health care professional as soon as possible:  · allergic reactions like skin rash, itching or hives, swelling of the face, lips, or tongue  · Infection  · breast enlargement  · breathing problems  · changes in mood, especially anger, depression, or rage  · dark urine  · general ill feeling or flu-like symptoms  · light-colored stools  · loss of appetite, nausea  · nausea, vomiting  · right upper belly pain  · stomach pain  · swelling of ankles  · too frequent or persistent erections  · trouble passing urine or change in the amount of urine  · unusually weak or tired  · yellowing of eyes or skin  Side effects that usually do not require medical attention (report to your doctor or health care professional if they continue or are bothersome):  · acne  · change in sex drive or performance  · hair loss  · headache  This list may not describe all possible side effects. Call your doctor for medical advice about side effects. You may report side effects to FDA at 2-827-FDA-4523.  Where should I keep my medicine?  This drug will be inserted under your skin by your doctor. It will not be stored at home.  NOTE:This sheet is a summary. It may not cover all possible  information. If you have questions about this medicine, talk to your doctor, pharmacist, or health care provider. Copyright© 2011 [employer]

## 2019-03-18 NOTE — PROGRESS NOTES
CHIEF COMPLAINT:    Mr. Collazo is a 38 y.o. male presenting for a consultation at the request of Dr. Cross. Patient presents with hypogonadism    PRESENTING ILLNESS:    Dominic Collazo is a 38 y.o. male who c/o hypogonadism.  This has been present for > 1 year.  He has fatigue, ED, and loss of body hair.  While on TRT, he has improvement.  Was on IM TRT in the past.  Has been on clomid as well, but he didn't notice improvement in his symptoms while on the clomid.  His wife has had a hysterectomy, so he's no longer interested in fertility.    He denies hematuria.  No dysuria.  He's pleased with how he voids.    The ED improves with TRT.    REVIEW OF SYSTEMS:    Dominic Collazo denies headache, blurred vision, fever, nausea, vomiting, chills, abdominal pain, bleeding per rectum, cough, SOB, recent loss of consciousness, recent mental status changes, seizures, dizziness, or upper or lower extremity weakness.    YUSRA  1. 2  2. 1  3. 2  4. 2  5. 1      PATIENT HISTORY:    Past Medical History:   Diagnosis Date    Anxiety     Colon polyp     Depression     Family history of prostate cancer 9/29/2014    Hyperlipidemia     Hypertension     Low testosterone        Past Surgical History:   Procedure Laterality Date    ADENOIDECTOMY      INJECTION-STEROID-EPIDURAL-CERVICAL N/A 10/21/2016    Performed by Joaquín Conner MD at [facility], operated by [facility]    RELEASE-DEQUERVAIN'S left Left 11/2/2016    Performed by Ciarra Mckeon [facility] at [facility] OR [facility]    TONSILLECTOMY         Family History   Problem Relation Age of Onset    Hypertension Mother     Hyperlipidemia Mother     Diabetes Father     Hyperlipidemia Father     Hypertension Father     Heart disease Father 46        CAD    No Known Problems Sister     Cancer Paternal Uncle         prostate cancer       Social History     Socioeconomic History    Marital status:      Spouse name: Not on file    Number of children: Not on file     Years of education: Not on file    Highest education level: Not on file   Social Needs    Financial resource strain: Not on file    Food insecurity - worry: Not on file    Food insecurity - inability: Not on file    Transportation needs - medical: Not on file    Transportation needs - non-medical: Not on file   Occupational History    Occupation: Teacher      Employer: [employer]     Comment: [employer]   Tobacco Use    Smoking status: Never Smoker    Smokeless tobacco: Never Used   Substance and Sexual Activity    Alcohol use: Yes     Comment: rare    Drug use: No    Sexual activity: Yes     Partners: Female   Other Topics Concern    Not on file   Social History Narrative    Not on file       Allergies:  Ciprofloxacin; Penicillins; Sulfa (sulfonamide antibiotics); Toradol [ketorolac]; Green pepper; and Meloxicam    Medications:    Current Outpatient Medications:     buPROPion (WELLBUTRIN XL) 300 MG 24 hr tablet, Take 1 tablet (300 mg total) by mouth once daily., Disp: 90 tablet, Rfl: 3    hydrOXYzine pamoate (VISTARIL) 25 MG Cap, Take 1 capsule (25 mg total) by mouth every 8 (eight) hours., Disp: 270 capsule, Rfl: 3    metoprolol succinate (TOPROL-XL) 100 MG 24 hr tablet, Take 1 tablet (100 mg total) by mouth once daily., Disp: 90 tablet, Rfl: 3    modafinil (PROVIGIL) 100 MG Tab, 1 pill PO as needed morning or early afternoon for excessive daytime sleepiness., Disp: 30 tablet, Rfl: 5    pravastatin (PRAVACHOL) 10 MG tablet, Take 1 tablet (10 mg total) by mouth once daily., Disp: 90 tablet, Rfl: 3    LORazepam (ATIVAN) 0.5 MG tablet, Take 1 tablet (0.5 mg total) by mouth every 8 (eight) hours as needed for Anxiety., Disp: 30 tablet, Rfl: 0    PHYSICAL EXAMINATION:    The patient generally appears in good health, is appropriately interactive, and is in no apparent distress.     Eyes: anicteric sclerae, moist conjunctivae; no lid-lag; PERRLA     HENT: Atraumatic; oropharynx clear with  moist mucous membranes and no mucosal ulcerations;normal hard and soft palate.  No evidence of lymphadenopathy.    Neck: Trachea midline.  No thyromegaly.    Musculoskeletal: No abnormal gait.    Skin: No lesions.    Mental: Cooperative with normal affect.  Is oriented to time, place, and person.    Neuro: Grossly intact.    Chest: Normal inspiratory effort.   No accessory muscles.  No audible wheezes.  Respirations symmetric on inspiration and expiration.    Heart: Regular rhythm.      Abdomen:  Soft, non-tender. No masses or organomegaly. Bladder is not palpable. No evidence of flank discomfort. No evidence of inguinal hernia.    Genitourinary: The penis is circumcised with no evidence of plaques or induration. The urethral meatus is normal. The testes, epididymides, and cord structures are normal in size and contour bilaterally. The scrotum is normal in size and contour.    Extremities: No clubbing, cyanosis, or edema      LABS:      Lab Results   Component Value Date    PSADIAG 1.6 09/29/2014       IMPRESSION:    Encounter Diagnoses   Name Primary?    Male hypogonadism Yes    Erectile dysfunction due to arterial insufficiency     Essential hypertension     Hyperlipidemia, unspecified hyperlipidemia type    HTN, controlled  Hyperlipidemia, controlled      PLAN:    1. Discussed the risks and benefits of testosterone replacement today.  This included possible cardiac risks.  He would like to try this.  He'd like testopel.  2. Risks and benefits of testopel were discussed today.  We discussed infection, pain, bleeding, pellet extrusion.  He was given the chance to ask questions.  3. Will use TRT for the ED.    4. RTC for testopel    Copy to: [place]

## 2019-03-21 ENCOUNTER — CLINICAL SUPPORT (OUTPATIENT)
Dept: REHABILITATION | Facility: HOSPITAL | Age: 39
End: 2019-03-21
Attending: FAMILY MEDICINE
Payer: COMMERCIAL

## 2019-03-21 DIAGNOSIS — M54.6 CHRONIC LEFT-SIDED THORACIC BACK PAIN: ICD-10-CM

## 2019-03-21 DIAGNOSIS — G89.29 CHRONIC LEFT-SIDED THORACIC BACK PAIN: ICD-10-CM

## 2019-03-21 PROCEDURE — 97140 MANUAL THERAPY 1/> REGIONS: CPT | Mod: PO
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
## 2019-03-22 ENCOUNTER — CLINICAL SUPPORT (OUTPATIENT)
Dept: REHABILITATION | Facility: HOSPITAL | Age: 39
End: 2019-03-22
Attending: FAMILY MEDICINE
Payer: COMMERCIAL

## 2019-03-22 DIAGNOSIS — G89.29 CHRONIC LEFT-SIDED THORACIC BACK PAIN: ICD-10-CM

## 2019-03-22 DIAGNOSIS — M54.6 CHRONIC LEFT-SIDED THORACIC BACK PAIN: ICD-10-CM

## 2019-03-22 PROCEDURE — 97140 MANUAL THERAPY 1/> REGIONS: CPT | Mod: PO

## 2019-03-22 PROCEDURE — 97530 THERAPEUTIC ACTIVITIES: CPT | Mod: PO
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
## 2019-03-27 ENCOUNTER — CLINICAL SUPPORT (OUTPATIENT)
Dept: REHABILITATION | Facility: HOSPITAL | Age: 39
End: 2019-03-27
Attending: FAMILY MEDICINE
Payer: COMMERCIAL

## 2019-03-27 DIAGNOSIS — G89.29 CHRONIC LEFT-SIDED THORACIC BACK PAIN: ICD-10-CM

## 2019-03-27 DIAGNOSIS — M54.6 CHRONIC LEFT-SIDED THORACIC BACK PAIN: ICD-10-CM

## 2019-03-27 PROCEDURE — 97110 THERAPEUTIC EXERCISES: CPT | Mod: PO

## 2019-03-27 PROCEDURE — 97140 MANUAL THERAPY 1/> REGIONS: CPT | Mod: PO
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
## 2019-04-02 ENCOUNTER — CLINICAL SUPPORT (OUTPATIENT)
Dept: REHABILITATION | Facility: HOSPITAL | Age: 39
End: 2019-04-02
Attending: FAMILY MEDICINE
Payer: COMMERCIAL

## 2019-04-02 DIAGNOSIS — M54.6 CHRONIC LEFT-SIDED THORACIC BACK PAIN: ICD-10-CM

## 2019-04-02 DIAGNOSIS — G89.29 CHRONIC LEFT-SIDED THORACIC BACK PAIN: ICD-10-CM

## 2019-04-02 PROCEDURE — 97140 MANUAL THERAPY 1/> REGIONS: CPT | Mod: PO

## 2019-04-02 PROCEDURE — 97530 THERAPEUTIC ACTIVITIES: CPT | Mod: PO
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
## 2019-04-11 ENCOUNTER — CLINICAL SUPPORT (OUTPATIENT)
Dept: REHABILITATION | Facility: HOSPITAL | Age: 39
End: 2019-04-11
Attending: FAMILY MEDICINE
Payer: COMMERCIAL

## 2019-04-11 DIAGNOSIS — G89.29 CHRONIC LEFT-SIDED THORACIC BACK PAIN: ICD-10-CM

## 2019-04-11 DIAGNOSIS — M54.6 CHRONIC LEFT-SIDED THORACIC BACK PAIN: ICD-10-CM

## 2019-04-11 PROCEDURE — 97140 MANUAL THERAPY 1/> REGIONS: CPT | Mod: PO

## 2019-04-11 PROCEDURE — 97110 THERAPEUTIC EXERCISES: CPT | Mod: PO
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
## 2019-04-17 ENCOUNTER — PATIENT MESSAGE (OUTPATIENT)
Dept: UROLOGY | Facility: CLINIC | Age: 39
End: 2019-04-17

## 2019-04-18 ENCOUNTER — PATIENT MESSAGE (OUTPATIENT)
Dept: UROLOGY | Facility: CLINIC | Age: 39
End: 2019-04-18

## 2019-04-18 ENCOUNTER — TELEPHONE (OUTPATIENT)
Dept: UROLOGY | Facility: CLINIC | Age: 39
End: 2019-04-18

## 2019-04-18 NOTE — TELEPHONE ENCOUNTER
Message left on pts voicemail informing him appt needed to be rescheduled. Asked to please contact us back to reschedule appt.

## 2019-04-26 ENCOUNTER — CLINICAL SUPPORT (OUTPATIENT)
Dept: REHABILITATION | Facility: HOSPITAL | Age: 39
End: 2019-04-26
Attending: FAMILY MEDICINE
Payer: COMMERCIAL

## 2019-04-26 DIAGNOSIS — M54.6 CHRONIC LEFT-SIDED THORACIC BACK PAIN: ICD-10-CM

## 2019-04-26 DIAGNOSIS — G89.29 CHRONIC LEFT-SIDED THORACIC BACK PAIN: ICD-10-CM

## 2019-04-26 PROCEDURE — 97140 MANUAL THERAPY 1/> REGIONS: CPT | Mod: PO

## 2019-04-29 ENCOUNTER — PATIENT MESSAGE (OUTPATIENT)
Dept: UROLOGY | Facility: CLINIC | Age: 39
End: 2019-04-29
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
## 2019-05-02 RX ORDER — BUPROPION HYDROCHLORIDE 300 MG/1
300 TABLET ORAL DAILY
Qty: 90 TABLET | Refills: 3 | Status: SHIPPED | OUTPATIENT
Start: 2019-05-02 | End: 2020-04-07

## 2019-05-15 ENCOUNTER — OFFICE VISIT (OUTPATIENT)
Dept: UROLOGY | Facility: CLINIC | Age: 39
End: 2019-05-15
Payer: COMMERCIAL

## 2019-05-15 VITALS
DIASTOLIC BLOOD PRESSURE: 83 MMHG | SYSTOLIC BLOOD PRESSURE: 129 MMHG | HEIGHT: 75 IN | WEIGHT: 315 LBS | BODY MASS INDEX: 39.17 KG/M2 | HEART RATE: 71 BPM

## 2019-05-15 DIAGNOSIS — E29.1 MALE HYPOGONADISM: Primary | ICD-10-CM

## 2019-05-15 DIAGNOSIS — N52.01 ERECTILE DYSFUNCTION DUE TO ARTERIAL INSUFFICIENCY: ICD-10-CM

## 2019-05-15 DIAGNOSIS — E78.5 HYPERLIPIDEMIA, UNSPECIFIED HYPERLIPIDEMIA TYPE: ICD-10-CM

## 2019-05-15 DIAGNOSIS — I10 ESSENTIAL HYPERTENSION: ICD-10-CM

## 2019-05-15 PROCEDURE — 3079F DIAST BP 80-89 MM HG: CPT | Mod: CPTII,S$GLB,ICN, | Performed by: UROLOGY

## 2019-05-15 PROCEDURE — 11980 IMPLANT HORMONE PELLET(S): CPT | Mod: S$GLB,ICN,, | Performed by: UROLOGY

## 2019-05-15 PROCEDURE — 99214 PR OFFICE/OUTPT VISIT, EST, LEVL IV, 30-39 MIN: ICD-10-PCS | Mod: 25,S$GLB,ICN, | Performed by: UROLOGY

## 2019-05-15 PROCEDURE — 99999 PR PBB SHADOW E&M-EST. PATIENT-LVL III: ICD-10-PCS | Mod: PBBFAC,,, | Performed by: UROLOGY

## 2019-05-15 PROCEDURE — 11980 PR IMPLANT,HORMONE,SUBCUTANEOUS: ICD-10-PCS | Mod: S$GLB,ICN,, | Performed by: UROLOGY

## 2019-05-15 PROCEDURE — 3008F BODY MASS INDEX DOCD: CPT | Mod: CPTII,S$GLB,ICN, | Performed by: UROLOGY

## 2019-05-15 PROCEDURE — 3074F SYST BP LT 130 MM HG: CPT | Mod: CPTII,S$GLB,ICN, | Performed by: UROLOGY

## 2019-05-15 PROCEDURE — 99999 PR PBB SHADOW E&M-EST. PATIENT-LVL III: CPT | Mod: PBBFAC,,, | Performed by: UROLOGY

## 2019-05-15 PROCEDURE — 3079F PR MOST RECENT DIASTOLIC BLOOD PRESSURE 80-89 MM HG: ICD-10-PCS | Mod: CPTII,S$GLB,ICN, | Performed by: UROLOGY

## 2019-05-15 PROCEDURE — 3008F PR BODY MASS INDEX (BMI) DOCUMENTED: ICD-10-PCS | Mod: CPTII,S$GLB,ICN, | Performed by: UROLOGY

## 2019-05-15 PROCEDURE — 3074F PR MOST RECENT SYSTOLIC BLOOD PRESSURE < 130 MM HG: ICD-10-PCS | Mod: CPTII,S$GLB,ICN, | Performed by: UROLOGY

## 2019-05-15 PROCEDURE — S0189 TESTOSTERONE PELLET 75 MG: HCPCS | Mod: S$GLB,ICN,, | Performed by: UROLOGY

## 2019-05-15 PROCEDURE — 99214 OFFICE O/P EST MOD 30 MIN: CPT | Mod: 25,S$GLB,ICN, | Performed by: UROLOGY

## 2019-05-15 PROCEDURE — S0189 PR TESTOSTERONE PELLET 75 MG: ICD-10-PCS | Mod: S$GLB,ICN,, | Performed by: UROLOGY

## 2019-05-15 NOTE — PROGRESS NOTES
CHIEF COMPLAINT:    Mr. Collazo is a 38 y.o. male presenting with hypogonadism    PRESENTING ILLNESS:    Dominic Collazo is a 38 y.o. male who c/o hypogonadism.  This has been present for > 1 year.  He has fatigue, ED, and loss of body hair.  While on TRT, he has improvement.  Was on IM TRT in the past.  Has been on clomid as well, but he didn't notice improvement in his symptoms while on the clomid.  His wife has had a hysterectomy, so he's no longer interested in fertility.    He denies hematuria.  No dysuria.  He's pleased with how he voids.    The ED improves with TRT.    REVIEW OF SYSTEMS:    Dominic Collazo denies headache, blurred vision, fever, nausea, vomiting, chills, abdominal pain, bleeding per rectum, cough, SOB, recent loss of consciousness, recent mental status changes, seizures, dizziness, or upper or lower extremity weakness.    YUSRA  1. 2  2. 1  3. 2  4. 2  5. 1      PATIENT HISTORY:    Past Medical History:   Diagnosis Date    Anxiety     Colon polyp     Depression     Family history of prostate cancer 9/29/2014    Hyperlipidemia     Hypertension     Low testosterone        Past Surgical History:   Procedure Laterality Date    ADENOIDECTOMY      INJECTION-STEROID-EPIDURAL-CERVICAL N/A 10/21/2016    Performed [facility] at [facility] PAIN MGT    RELEASE-DEQUERVAIN'S left Left 11/2/2016    [facility] OR 1ST FLR    TONSILLECTOMY         Family History   Problem Relation Age of Onset    Hypertension Mother     Hyperlipidemia Mother     Diabetes Father     Hyperlipidemia Father     Hypertension Father     Heart disease Father 46        CAD    No Known Problems Sister     Cancer Paternal Uncle         prostate cancer       Social History     Socioeconomic History    Marital status:      Spouse name: Not on file    Number of children: Not on file    Years of education: Not on file    Highest education level: Not  on file   Occupational History    Occupation: Teacher      Employer: [employer]     Comment: [employer]   Social Needs    Financial resource strain: Not on file    Food insecurity:     Worry: Not on file     Inability: Not on file    Transportation needs:     Medical: Not on file     Non-medical: Not on file   Tobacco Use    Smoking status: Never Smoker    Smokeless tobacco: Never Used   Substance and Sexual Activity    Alcohol use: Yes     Comment: rare    Drug use: No    Sexual activity: Yes     Partners: Female   Lifestyle    Physical activity:     Days per week: Not on file     Minutes per session: Not on file    Stress: Not on file   Relationships    Social connections:     Talks on phone: Not on file     Gets together: Not on file     Attends Protestant service: Not on file     Active member of club or organization: Not on file     Attends meetings of clubs or organizations: Not on file     Relationship status: Not on file   Other Topics Concern    Not on file   Social History Narrative    Not on file       Allergies:  Ciprofloxacin; Penicillins; Sulfa (sulfonamide antibiotics); Toradol [ketorolac]; Green pepper; and Meloxicam    Medications:    Current Outpatient Medications:     buPROPion (WELLBUTRIN XL) 300 MG 24 hr tablet, Take 1 tablet (300 mg total) by mouth once daily., Disp: 90 tablet, Rfl: 3    hydrOXYzine pamoate (VISTARIL) 25 MG Cap, Take 1 capsule (25 mg total) by mouth every 8 (eight) hours., Disp: 270 capsule, Rfl: 3    metoprolol succinate (TOPROL-XL) 100 MG 24 hr tablet, Take 1 tablet (100 mg total) by mouth once daily., Disp: 90 tablet, Rfl: 3    modafinil (PROVIGIL) 100 MG Tab, 1 pill PO as needed morning or early afternoon for excessive daytime sleepiness., Disp: 30 tablet, Rfl: 5    pravastatin (PRAVACHOL) 10 MG tablet, Take 1 tablet (10 mg total) by mouth once daily., Disp: 90 tablet, Rfl: 3    LORazepam (ATIVAN) 0.5 MG tablet, Take 1 tablet (0.5 mg total) by  mouth every 8 (eight) hours as needed for Anxiety., Disp: 30 tablet, Rfl: 0  No current facility-administered medications for this visit.     PHYSICAL EXAMINATION:    The patient generally appears in good health, is appropriately interactive, and is in no apparent distress.     Eyes: anicteric sclerae, moist conjunctivae; no lid-lag; PERRLA     HENT: Atraumatic; oropharynx clear with moist mucous membranes and no mucosal ulcerations;normal hard and soft palate.  No evidence of lymphadenopathy.    Neck: Trachea midline.  No thyromegaly.    Musculoskeletal: No abnormal gait.    Skin: No lesions.    Mental: Cooperative with normal affect.  Is oriented to time, place, and person.    Neuro: Grossly intact.    Chest: Normal inspiratory effort.   No accessory muscles.  No audible wheezes.  Respirations symmetric on inspiration and expiration.    Heart: Regular rhythm.      Abdomen:  Soft, non-tender. No masses or organomegaly. Bladder is not palpable. No evidence of flank discomfort. No evidence of inguinal hernia.    Genitourinary: The penis is circumcised with no evidence of plaques or induration. The urethral meatus is normal. The testes, epididymides, and cord structures are normal in size and contour bilaterally. The scrotum is normal in size and contour.    Extremities: No clubbing, cyanosis, or edema      LABS:      Lab Results   Component Value Date    PSADIAG 1.6 09/29/2014       IMPRESSION:    Encounter Diagnoses   Name Primary?    Male hypogonadism Yes    Erectile dysfunction due to arterial insufficiency     Hyperlipidemia, unspecified hyperlipidemia type     Essential hypertension    HTN, controlled  Hyperlipidemia, controlled      PLAN:    1. Will use TRT for the ED.    2. Procedure Report:Testopel Insertion    A Time Out was performed with the patient and nurse in the room.   The site was marked with a yes. Verbal consent was obtained. The risks and benefits of testosterone replacement were explained.  The alternatives of observation and treatment with injections and gel therapy were discussed.     The risks of testopel explained to the patient include but are not limited to worsening of BPH, edema with or without congestive heart failure, gynecomastia, cellulitis and abscess, venous thromboembolic events, testicular atrophy, possible cardiac sequelae and with high dose testosterone the risk of liver function elevation (peliosis hepatitis) and hepatocellular carcinoma. Peliosis hepatitis can be a fatal complication.     L gluteal region prepped and draped in sterile fashion.  20 cc of 2% lidocaine used for local analgesia.  Small incision made with a 15 blade.  Standard trocars used for subcutaneous insertion of 12 pellets total in a V shaped tract.  No active bleeding noted.  Area cleaned and dried. Steri strips applied. Dressing applied.      He can RTC 6 months with T, psa, cbc, hepatic panel lipid panel.     Will check a T in 1 month.    Copy to:

## 2019-06-12 DIAGNOSIS — F41.9 ANXIETY: ICD-10-CM

## 2019-06-12 RX ORDER — METOPROLOL SUCCINATE 100 MG/1
TABLET, EXTENDED RELEASE ORAL
Qty: 90 TABLET | Refills: 3 | Status: SHIPPED | OUTPATIENT
Start: 2019-06-12 | End: 2020-05-09

## 2019-06-12 RX ORDER — PRAVASTATIN SODIUM 10 MG/1
TABLET ORAL
Qty: 90 TABLET | Refills: 3 | Status: SHIPPED | OUTPATIENT
Start: 2019-06-12 | End: 2020-05-09

## 2019-06-12 RX ORDER — LORAZEPAM 0.5 MG/1
TABLET ORAL
Qty: 30 TABLET | Refills: 0 | Status: SHIPPED | OUTPATIENT
Start: 2019-06-12 | End: 2020-12-07

## 2019-06-14 ENCOUNTER — LAB VISIT (OUTPATIENT)
Dept: LAB | Facility: HOSPITAL | Age: 39
End: 2019-06-14
Attending: UROLOGY
Payer: COMMERCIAL

## 2019-06-14 DIAGNOSIS — E29.1 MALE HYPOGONADISM: ICD-10-CM

## 2019-06-14 LAB — TESTOST SERPL-MCNC: 644 NG/DL (ref 304–1227)

## 2019-06-14 PROCEDURE — 84403 ASSAY OF TOTAL TESTOSTERONE: CPT

## 2019-06-14 PROCEDURE — 36415 COLL VENOUS BLD VENIPUNCTURE: CPT | Mod: PO

## 2019-09-22 ENCOUNTER — PATIENT MESSAGE (OUTPATIENT)
Dept: INTERNAL MEDICINE | Facility: CLINIC | Age: 39
End: 2019-09-22

## 2019-09-23 RX ORDER — SCOLOPAMINE TRANSDERMAL SYSTEM 1 MG/1
1 PATCH, EXTENDED RELEASE TRANSDERMAL
Qty: 10 PATCH | Refills: 0 | Status: SHIPPED | OUTPATIENT
Start: 2019-09-23 | End: 2020-12-07

## 2019-11-16 ENCOUNTER — LAB VISIT (OUTPATIENT)
Dept: LAB | Facility: HOSPITAL | Age: 39
End: 2019-11-16
Attending: UROLOGY
Payer: COMMERCIAL

## 2019-11-16 DIAGNOSIS — E29.1 MALE HYPOGONADISM: ICD-10-CM

## 2019-11-16 LAB
ALBUMIN SERPL BCP-MCNC: 3.9 G/DL (ref 3.5–5.2)
ALP SERPL-CCNC: 52 U/L (ref 55–135)
ALT SERPL W/O P-5'-P-CCNC: 113 U/L (ref 10–44)
AST SERPL-CCNC: 58 U/L (ref 10–40)
BASOPHILS # BLD AUTO: 0.08 K/UL (ref 0–0.2)
BASOPHILS NFR BLD: 0.9 % (ref 0–1.9)
BILIRUB DIRECT SERPL-MCNC: 0.2 MG/DL (ref 0.1–0.3)
BILIRUB SERPL-MCNC: 0.3 MG/DL (ref 0.1–1)
CHOLEST SERPL-MCNC: 214 MG/DL (ref 120–199)
CHOLEST/HDLC SERPL: 5.1 {RATIO} (ref 2–5)
COMPLEXED PSA SERPL-MCNC: 0.86 NG/ML (ref 0–4)
DIFFERENTIAL METHOD: ABNORMAL
EOSINOPHIL # BLD AUTO: 0.4 K/UL (ref 0–0.5)
EOSINOPHIL NFR BLD: 3.9 % (ref 0–8)
ERYTHROCYTE [DISTWIDTH] IN BLOOD BY AUTOMATED COUNT: 15.4 % (ref 11.5–14.5)
HCT VFR BLD AUTO: 43.1 % (ref 40–54)
HDLC SERPL-MCNC: 42 MG/DL (ref 40–75)
HDLC SERPL: 19.6 % (ref 20–50)
HGB BLD-MCNC: 14 G/DL (ref 14–18)
IMM GRANULOCYTES # BLD AUTO: 0.03 K/UL (ref 0–0.04)
IMM GRANULOCYTES NFR BLD AUTO: 0.3 % (ref 0–0.5)
LDLC SERPL CALC-MCNC: 146 MG/DL (ref 63–159)
LYMPHOCYTES # BLD AUTO: 3.1 K/UL (ref 1–4.8)
LYMPHOCYTES NFR BLD: 35 % (ref 18–48)
MCH RBC QN AUTO: 29.2 PG (ref 27–31)
MCHC RBC AUTO-ENTMCNC: 32.5 G/DL (ref 32–36)
MCV RBC AUTO: 90 FL (ref 82–98)
MONOCYTES # BLD AUTO: 0.7 K/UL (ref 0.3–1)
MONOCYTES NFR BLD: 7.9 % (ref 4–15)
NEUTROPHILS # BLD AUTO: 4.7 K/UL (ref 1.8–7.7)
NEUTROPHILS NFR BLD: 52 % (ref 38–73)
NONHDLC SERPL-MCNC: 172 MG/DL
NRBC BLD-RTO: 0 /100 WBC
PLATELET # BLD AUTO: 291 K/UL (ref 150–350)
PMV BLD AUTO: 11.8 FL (ref 9.2–12.9)
PROT SERPL-MCNC: 6.9 G/DL (ref 6–8.4)
RBC # BLD AUTO: 4.8 M/UL (ref 4.6–6.2)
TESTOST SERPL-MCNC: 243 NG/DL (ref 304–1227)
TRIGL SERPL-MCNC: 130 MG/DL (ref 30–150)
WBC # BLD AUTO: 8.97 K/UL (ref 3.9–12.7)

## 2019-11-16 PROCEDURE — 80076 HEPATIC FUNCTION PANEL: CPT

## 2019-11-16 PROCEDURE — 36415 COLL VENOUS BLD VENIPUNCTURE: CPT | Mod: PO

## 2019-11-16 PROCEDURE — 84403 ASSAY OF TOTAL TESTOSTERONE: CPT

## 2019-11-16 PROCEDURE — 80061 LIPID PANEL: CPT

## 2019-11-16 PROCEDURE — 85025 COMPLETE CBC W/AUTO DIFF WBC: CPT

## 2019-11-16 PROCEDURE — 84153 ASSAY OF PSA TOTAL: CPT

## 2019-11-18 ENCOUNTER — PATIENT MESSAGE (OUTPATIENT)
Dept: UROLOGY | Facility: CLINIC | Age: 39
End: 2019-11-18

## 2019-11-18 ENCOUNTER — TELEPHONE (OUTPATIENT)
Dept: UROLOGY | Facility: CLINIC | Age: 39
End: 2019-11-18

## 2019-11-18 NOTE — TELEPHONE ENCOUNTER
Please notify his lipids are high.  He should see his PCP regarding this.    His liver enzymes are high as well.  Needs to be evaluated and cleared to continue TRT before we can do testopel again.  Can see hepatology or his PCP for this.

## 2019-11-19 ENCOUNTER — OFFICE VISIT (OUTPATIENT)
Dept: INTERNAL MEDICINE | Facility: CLINIC | Age: 39
End: 2019-11-19
Payer: COMMERCIAL

## 2019-11-19 ENCOUNTER — LAB VISIT (OUTPATIENT)
Dept: LAB | Facility: HOSPITAL | Age: 39
End: 2019-11-19
Attending: FAMILY MEDICINE
Payer: COMMERCIAL

## 2019-11-19 VITALS
HEIGHT: 75 IN | SYSTOLIC BLOOD PRESSURE: 108 MMHG | WEIGHT: 315 LBS | TEMPERATURE: 98 F | RESPIRATION RATE: 18 BRPM | BODY MASS INDEX: 39.17 KG/M2 | DIASTOLIC BLOOD PRESSURE: 80 MMHG | HEART RATE: 79 BPM

## 2019-11-19 DIAGNOSIS — R79.89 ELEVATED LFTS: Primary | ICD-10-CM

## 2019-11-19 DIAGNOSIS — R79.89 ELEVATED LFTS: ICD-10-CM

## 2019-11-19 DIAGNOSIS — E66.01 MORBID OBESITY: ICD-10-CM

## 2019-11-19 PROCEDURE — 99214 OFFICE O/P EST MOD 30 MIN: CPT | Mod: S$GLB,,, | Performed by: FAMILY MEDICINE

## 2019-11-19 PROCEDURE — 3008F BODY MASS INDEX DOCD: CPT | Mod: CPTII,S$GLB,, | Performed by: FAMILY MEDICINE

## 2019-11-19 PROCEDURE — 3079F PR MOST RECENT DIASTOLIC BLOOD PRESSURE 80-89 MM HG: ICD-10-PCS | Mod: CPTII,S$GLB,, | Performed by: FAMILY MEDICINE

## 2019-11-19 PROCEDURE — 3008F PR BODY MASS INDEX (BMI) DOCUMENTED: ICD-10-PCS | Mod: CPTII,S$GLB,, | Performed by: FAMILY MEDICINE

## 2019-11-19 PROCEDURE — 3079F DIAST BP 80-89 MM HG: CPT | Mod: CPTII,S$GLB,, | Performed by: FAMILY MEDICINE

## 2019-11-19 PROCEDURE — 99214 PR OFFICE/OUTPT VISIT, EST, LEVL IV, 30-39 MIN: ICD-10-PCS | Mod: S$GLB,,, | Performed by: FAMILY MEDICINE

## 2019-11-19 PROCEDURE — 99999 PR PBB SHADOW E&M-EST. PATIENT-LVL III: ICD-10-PCS | Mod: PBBFAC,,, | Performed by: FAMILY MEDICINE

## 2019-11-19 PROCEDURE — 3074F PR MOST RECENT SYSTOLIC BLOOD PRESSURE < 130 MM HG: ICD-10-PCS | Mod: CPTII,S$GLB,, | Performed by: FAMILY MEDICINE

## 2019-11-19 PROCEDURE — 36415 COLL VENOUS BLD VENIPUNCTURE: CPT | Mod: PO

## 2019-11-19 PROCEDURE — 3074F SYST BP LT 130 MM HG: CPT | Mod: CPTII,S$GLB,, | Performed by: FAMILY MEDICINE

## 2019-11-19 PROCEDURE — 99999 PR PBB SHADOW E&M-EST. PATIENT-LVL III: CPT | Mod: PBBFAC,,, | Performed by: FAMILY MEDICINE

## 2019-11-19 PROCEDURE — 80074 ACUTE HEPATITIS PANEL: CPT

## 2019-11-19 RX ORDER — BUSPIRONE HYDROCHLORIDE 10 MG/1
10 TABLET ORAL 3 TIMES DAILY
Qty: 270 TABLET | Refills: 3 | Status: SHIPPED | OUTPATIENT
Start: 2019-11-19 | End: 2020-10-04

## 2019-11-19 NOTE — PROGRESS NOTES
Subjective:       Patient ID: Dominic Collazo is a 39 y.o. male.    Chief Complaint: Advice Only (labs concerns)    HPI 39-year-old morbidly obese white male presents to clinic today for further evaluation of elevated liver enzymes.  The patient has underwent treatment in the past for low testosterone and prior to repeat treatment the patient's labs were checked including lipid panel and hepatic panel.  Hepatic panel returned revealing elevated liver enzymes.  The patient presents for further evaluation.  I have recommended acute hepatitis panel and liver ultrasound for further evaluation but suspect that the levels are likely elevated secondary to fatty liver disease as the patient is morbidly obese with a BMI of 46.9.  I have encouraged diet exercise for further treatment.  Finally, the patient is interested in restarting BuSpar for treatment of anxiety.  Review of Systems   Constitutional: Negative for appetite change, chills, fatigue and fever.   HENT: Negative for congestion, ear pain, hearing loss, postnasal drip, rhinorrhea, sinus pressure, sore throat and tinnitus.    Eyes: Negative for redness, itching and visual disturbance.   Respiratory: Negative for cough, chest tightness and shortness of breath.    Cardiovascular: Negative for chest pain and palpitations.   Gastrointestinal: Negative for abdominal pain, constipation, diarrhea, nausea and vomiting.   Genitourinary: Negative for decreased urine volume, difficulty urinating, dysuria, frequency, hematuria and urgency.   Musculoskeletal: Negative for back pain, myalgias, neck pain and neck stiffness.   Skin: Negative for rash.   Neurological: Negative for dizziness, light-headedness and headaches.   Psychiatric/Behavioral: Negative.        Objective:      Physical Exam   Constitutional: He is oriented to person, place, and time. He appears well-developed and well-nourished. No distress.   HENT:   Head: Normocephalic and atraumatic.   Right Ear:  External ear normal.   Left Ear: External ear normal.   Nose: Nose normal.   Mouth/Throat: Oropharynx is clear and moist. No oropharyngeal exudate.   Eyes: Pupils are equal, round, and reactive to light. Conjunctivae and EOM are normal. Right eye exhibits no discharge. Left eye exhibits no discharge. No scleral icterus.   Neck: Normal range of motion. Neck supple. No JVD present. No tracheal deviation present. No thyromegaly present.   Cardiovascular: Normal rate, regular rhythm, normal heart sounds and intact distal pulses. Exam reveals no gallop and no friction rub.   No murmur heard.  Pulmonary/Chest: Effort normal and breath sounds normal. No stridor. No respiratory distress. He has no wheezes. He has no rales.   Abdominal: Soft. Bowel sounds are normal. He exhibits no distension and no mass. There is no tenderness. There is no rebound and no guarding.   Musculoskeletal: Normal range of motion. He exhibits no edema or tenderness.   Lymphadenopathy:     He has no cervical adenopathy.   Neurological: He is alert and oriented to person, place, and time.   Skin: Skin is warm and dry. No rash noted. He is not diaphoretic. No erythema. No pallor.   Psychiatric: He has a normal mood and affect. His behavior is normal. Judgment and thought content normal.   Nursing note and vitals reviewed.      Assessment:       1. Elevated LFTs    2. Morbid obesity        Plan:       Elevated LFTs  -     US Abdomen Limited; Future; Expected date: 11/19/2019  -     HEPATITIS PANEL, ACUTE; Future; Expected date: 11/19/2019    Morbid obesity   Encourage diet and exercise.    Other orders  -     busPIRone (BUSPAR) 10 MG tablet; Take 1 tablet (10 mg total) by mouth 3 (three) times daily.  Dispense: 270 tablet; Refill: 3      Return to clinic as needed if symptoms persist or worsen.

## 2019-11-20 ENCOUNTER — PATIENT OUTREACH (OUTPATIENT)
Dept: ADMINISTRATIVE | Facility: OTHER | Age: 39
End: 2019-11-20

## 2019-11-20 LAB
HAV IGM SERPL QL IA: NEGATIVE
HBV CORE IGM SERPL QL IA: NEGATIVE
HBV SURFACE AG SERPL QL IA: NEGATIVE
HCV AB SERPL QL IA: NEGATIVE

## 2019-11-25 ENCOUNTER — HOSPITAL ENCOUNTER (OUTPATIENT)
Dept: RADIOLOGY | Facility: HOSPITAL | Age: 39
Discharge: HOME | End: 2019-11-25
Attending: FAMILY MEDICINE
Payer: COMMERCIAL

## 2019-11-25 DIAGNOSIS — R79.89 ELEVATED LFTS: ICD-10-CM

## 2019-11-25 PROCEDURE — 76705 ECHO EXAM OF ABDOMEN: CPT | Mod: 26,,, | Performed by: INTERNAL MEDICINE

## 2019-11-25 PROCEDURE — 76705 ECHO EXAM OF ABDOMEN: CPT | Mod: TC

## 2019-11-25 PROCEDURE — 76705 US ABDOMEN LIMITED: ICD-10-PCS | Mod: 26,,, | Performed by: INTERNAL MEDICINE

## 2019-12-27 ENCOUNTER — PATIENT MESSAGE (OUTPATIENT)
Dept: SLEEP MEDICINE | Facility: CLINIC | Age: 39
End: 2019-12-27

## 2020-01-29 NOTE — TELEPHONE ENCOUNTER
----- Message from Radha Alvarenga sent at 5/2/2019  2:57 PM CDT -----  Contact: Steven @ [facility] # [phone], fax# [phone]   Patient is calling for an RX refill or new RX.  Is this a refill or new RX:  Refill  RX name and strength: Buspirone 10 mg  Directions (copy/paste from chart):    Is this a 30 day or 90 day RX:  90  Local pharmacy or mail order pharmacy:  [place] [address] [address] -[phone]  Pharmacy name and phone [facility] Phone#  [phone],Fax# [phone]      Discussed \"flutters\" in heart rate. Occurs after activity, but can also occur at rest. Denies chest pain, denies SOB , is able to lie supine without difficulty, denies increase in swelling, denies numbness or tingling. Episodes spontaneously resolve.  Discussed warning signs and to call if any worsening symptoms. Discussed referral to cardiology.  Labor precautions discussed.   IOL after 39 weeks discussed.

## 2020-01-30 ENCOUNTER — OFFICE VISIT (OUTPATIENT)
Dept: INTERNAL MEDICINE | Facility: CLINIC | Age: 40
End: 2020-01-30
Payer: COMMERCIAL

## 2020-01-30 VITALS
BODY MASS INDEX: 39.17 KG/M2 | TEMPERATURE: 98 F | HEIGHT: 75 IN | DIASTOLIC BLOOD PRESSURE: 84 MMHG | HEART RATE: 66 BPM | WEIGHT: 315 LBS | RESPIRATION RATE: 16 BRPM | SYSTOLIC BLOOD PRESSURE: 116 MMHG

## 2020-01-30 DIAGNOSIS — E66.01 MORBID OBESITY: ICD-10-CM

## 2020-01-30 DIAGNOSIS — G44.209 TENSION HEADACHE: Primary | ICD-10-CM

## 2020-01-30 PROCEDURE — 3079F PR MOST RECENT DIASTOLIC BLOOD PRESSURE 80-89 MM HG: ICD-10-PCS | Mod: CPTII,S$GLB,, | Performed by: FAMILY MEDICINE

## 2020-01-30 PROCEDURE — 99214 PR OFFICE/OUTPT VISIT, EST, LEVL IV, 30-39 MIN: ICD-10-PCS | Mod: S$GLB,,, | Performed by: FAMILY MEDICINE

## 2020-01-30 PROCEDURE — 3074F SYST BP LT 130 MM HG: CPT | Mod: CPTII,S$GLB,, | Performed by: FAMILY MEDICINE

## 2020-01-30 PROCEDURE — 3079F DIAST BP 80-89 MM HG: CPT | Mod: CPTII,S$GLB,, | Performed by: FAMILY MEDICINE

## 2020-01-30 PROCEDURE — 3074F PR MOST RECENT SYSTOLIC BLOOD PRESSURE < 130 MM HG: ICD-10-PCS | Mod: CPTII,S$GLB,, | Performed by: FAMILY MEDICINE

## 2020-01-30 PROCEDURE — 99999 PR PBB SHADOW E&M-EST. PATIENT-LVL III: CPT | Mod: PBBFAC,,, | Performed by: FAMILY MEDICINE

## 2020-01-30 PROCEDURE — 99999 PR PBB SHADOW E&M-EST. PATIENT-LVL III: ICD-10-PCS | Mod: PBBFAC,,, | Performed by: FAMILY MEDICINE

## 2020-01-30 PROCEDURE — 99214 OFFICE O/P EST MOD 30 MIN: CPT | Mod: S$GLB,,, | Performed by: FAMILY MEDICINE

## 2020-01-30 PROCEDURE — 3008F PR BODY MASS INDEX (BMI) DOCUMENTED: ICD-10-PCS | Mod: CPTII,S$GLB,, | Performed by: FAMILY MEDICINE

## 2020-01-30 PROCEDURE — 3008F BODY MASS INDEX DOCD: CPT | Mod: CPTII,S$GLB,, | Performed by: FAMILY MEDICINE
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
## 2020-02-03 ENCOUNTER — OFFICE VISIT (OUTPATIENT)
Dept: INTERNAL MEDICINE | Facility: CLINIC | Age: 40
End: 2020-02-03
Payer: COMMERCIAL

## 2020-02-03 ENCOUNTER — DOCUMENTATION ONLY (OUTPATIENT)
Dept: TRANSPLANT | Facility: CLINIC | Age: 40
End: 2020-02-03

## 2020-02-03 VITALS
SYSTOLIC BLOOD PRESSURE: 120 MMHG | DIASTOLIC BLOOD PRESSURE: 84 MMHG | RESPIRATION RATE: 16 BRPM | TEMPERATURE: 99 F | HEART RATE: 64 BPM | BODY MASS INDEX: 39.17 KG/M2 | WEIGHT: 315 LBS | HEIGHT: 75 IN

## 2020-02-03 DIAGNOSIS — R73.03 PREDIABETES: ICD-10-CM

## 2020-02-03 DIAGNOSIS — G47.9 SLEEP DISTURBANCES: ICD-10-CM

## 2020-02-03 DIAGNOSIS — G47.33 OSA (OBSTRUCTIVE SLEEP APNEA): ICD-10-CM

## 2020-02-03 DIAGNOSIS — Z00.00 ANNUAL PHYSICAL EXAM: Primary | ICD-10-CM

## 2020-02-03 DIAGNOSIS — F41.9 ANXIETY: ICD-10-CM

## 2020-02-03 DIAGNOSIS — E78.5 HYPERLIPIDEMIA, UNSPECIFIED HYPERLIPIDEMIA TYPE: ICD-10-CM

## 2020-02-03 DIAGNOSIS — L98.491 CHRONIC SKIN ULCER, LIMITED TO BREAKDOWN OF SKIN: ICD-10-CM

## 2020-02-03 DIAGNOSIS — R79.89 LOW TESTOSTERONE: ICD-10-CM

## 2020-02-03 DIAGNOSIS — E66.01 MORBID OBESITY: ICD-10-CM

## 2020-02-03 DIAGNOSIS — I10 ESSENTIAL HYPERTENSION: ICD-10-CM

## 2020-02-03 DIAGNOSIS — K76.0 FATTY LIVER: ICD-10-CM

## 2020-02-03 DIAGNOSIS — R16.1 SPLENOMEGALY: ICD-10-CM

## 2020-02-03 PROCEDURE — 99999 PR PBB SHADOW E&M-EST. PATIENT-LVL V: CPT | Mod: PBBFAC,,, | Performed by: INTERNAL MEDICINE

## 2020-02-03 PROCEDURE — 3074F SYST BP LT 130 MM HG: CPT | Mod: CPTII,S$GLB,, | Performed by: INTERNAL MEDICINE

## 2020-02-03 PROCEDURE — 99395 PR PREVENTIVE VISIT,EST,18-39: ICD-10-PCS | Mod: S$GLB,,, | Performed by: INTERNAL MEDICINE

## 2020-02-03 PROCEDURE — 99395 PREV VISIT EST AGE 18-39: CPT | Mod: S$GLB,,, | Performed by: INTERNAL MEDICINE

## 2020-02-03 PROCEDURE — 99999 PR PBB SHADOW E&M-EST. PATIENT-LVL V: ICD-10-PCS | Mod: PBBFAC,,, | Performed by: INTERNAL MEDICINE

## 2020-02-03 PROCEDURE — 3074F PR MOST RECENT SYSTOLIC BLOOD PRESSURE < 130 MM HG: ICD-10-PCS | Mod: CPTII,S$GLB,, | Performed by: INTERNAL MEDICINE

## 2020-02-03 PROCEDURE — 3079F DIAST BP 80-89 MM HG: CPT | Mod: CPTII,S$GLB,, | Performed by: INTERNAL MEDICINE

## 2020-02-03 PROCEDURE — 3079F PR MOST RECENT DIASTOLIC BLOOD PRESSURE 80-89 MM HG: ICD-10-PCS | Mod: CPTII,S$GLB,, | Performed by: INTERNAL MEDICINE

## 2020-02-03 RX ORDER — ZOLPIDEM TARTRATE 10 MG/1
10 TABLET ORAL NIGHTLY PRN
Qty: 30 TABLET | Refills: 1 | Status: SHIPPED | OUTPATIENT
Start: 2020-02-03 | End: 2020-03-23

## 2020-02-03 RX ORDER — FEXOFENADINE HCL 60 MG
60 TABLET ORAL EVERY MORNING
COMMUNITY
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
## 2020-02-03 NOTE — LETTER
February 3, 2020    Dominic Collazo  [address]   [id number]  [facility] [address]      Dear Dominic Collazo:    Your doctor has referred you to the [facility]. We are sending this letter to remind you to make an appointment with us to complete the referral process.     Please call us at [phone] to schedule an appointment. We look forward to seeing you soon.     If you received a call and have been scheduled, please disregard this letter.       Sincerely,        [facility]   [facility] [place] [address]  [phone]

## 2020-02-03 NOTE — NURSING
Pt records reviewed.   Pt will be referred to Hepatology.  Fatty liver  Splenomegaly  Low testosterone  Initial referral received  from the [facility].   Referring Provider/diagnosis  Margarito Silva MD      Referral letter sent to patient.

## 2020-02-19 ENCOUNTER — PATIENT MESSAGE (OUTPATIENT)
Dept: UROLOGY | Facility: CLINIC | Age: 40
End: 2020-02-19

## 2020-03-06 ENCOUNTER — OFFICE VISIT (OUTPATIENT)
Dept: URGENT CARE | Facility: CLINIC | Age: 40
End: 2020-03-06
Payer: COMMERCIAL

## 2020-03-06 ENCOUNTER — TELEPHONE (OUTPATIENT)
Dept: INTERNAL MEDICINE | Facility: CLINIC | Age: 40
End: 2020-03-06

## 2020-03-06 ENCOUNTER — PATIENT OUTREACH (OUTPATIENT)
Dept: ADMINISTRATIVE | Facility: OTHER | Age: 40
End: 2020-03-06

## 2020-03-06 VITALS
WEIGHT: 315 LBS | SYSTOLIC BLOOD PRESSURE: 121 MMHG | RESPIRATION RATE: 19 BRPM | TEMPERATURE: 99 F | DIASTOLIC BLOOD PRESSURE: 82 MMHG | BODY MASS INDEX: 39.17 KG/M2 | OXYGEN SATURATION: 98 % | HEART RATE: 72 BPM | HEIGHT: 75 IN

## 2020-03-06 DIAGNOSIS — Z86.59 HISTORY OF ANXIETY: Primary | ICD-10-CM

## 2020-03-06 DIAGNOSIS — Z76.0 ENCOUNTER FOR MEDICATION REFILL: ICD-10-CM

## 2020-03-06 PROCEDURE — 99214 OFFICE O/P EST MOD 30 MIN: CPT | Mod: S$GLB,,, | Performed by: NURSE PRACTITIONER

## 2020-03-06 PROCEDURE — 99214 PR OFFICE/OUTPT VISIT, EST, LEVL IV, 30-39 MIN: ICD-10-PCS | Mod: S$GLB,,, | Performed by: NURSE PRACTITIONER

## 2020-03-06 NOTE — PROGRESS NOTES
"Subjective:       Patient ID: Dominic Collazo is a 39 y.o. male.    Vitals:  height is 6' 3" (1.905 m) and weight is 165.6 kg (365 lb) (abnormal). His oral temperature is 98.9 °F (37.2 °C). His blood pressure is 121/82 and his pulse is 72. His respiration is 19 and oxygen saturation is 98%.     Chief Complaint: Panic Attack    Patient states that he has had 8-9 panic attacks in the past 24 hours.  PATIENT IS NO APPARENT DISTRESS AT THIS TIME THERE IS NO EVIDENCE OF PANIC ATTACK.  PATIENT HAS COUGH AND ABLE TO SPEAK IN FULL SENTENCES WITHOUT DIFFICULTY.  PATIENT STATES THAT HE HAS HAD A STRESSFUL WEEK AT WORK AND IS NOT, HAVING A PANIC ATTACK AT THIS TIME BUT WOULD LIKE OUT PRESCRIPTIONS OF BENZOS SINCE HIS DOCTOR CANNOT SEE HIM AT 4:00 P.M..    Other   This is a new problem. The current episode started yesterday. The problem occurs constantly. The problem has been unchanged. Pertinent negatives include no abdominal pain, anorexia, arthralgias, change in bowel habit, chest pain, chills, congestion, coughing, diaphoresis, fatigue, fever, headaches, joint swelling, myalgias, nausea, neck pain, numbness, rash, sore throat, swollen glands, urinary symptoms, vertigo, visual change, vomiting or weakness. Associated symptoms comments: Crying, confusion, SOB . Treatments tried: anxiety medication. The treatment provided mild relief.       Constitution: Negative for chills, sweating, fatigue and fever.   HENT: Negative for congestion and sore throat.    Neck: Negative for neck pain and painful lymph nodes.   Cardiovascular: Negative for chest pain and leg swelling.   Eyes: Negative for double vision and blurred vision.   Respiratory: Negative for cough and shortness of breath.    Gastrointestinal: Negative for abdominal pain, nausea, vomiting and diarrhea.   Genitourinary: Negative for dysuria, frequency and urgency.   Musculoskeletal: Negative for joint pain, joint swelling, muscle cramps and muscle ache.   Skin: " Negative for color change, pale and rash.   Allergic/Immunologic: Negative for seasonal allergies.   Neurological: Negative for dizziness, history of vertigo, light-headedness, passing out, headaches and numbness.   Hematologic/Lymphatic: Negative for swollen lymph nodes, easy bruising/bleeding and history of blood clots. Does not bruise/bleed easily.   Psychiatric/Behavioral: Positive for nervous/anxious. Negative for sleep disturbance and depression. The patient is nervous/anxious.        Objective:      Physical Exam   Constitutional: He is oriented to person, place, and time. He appears well-developed and well-nourished. He is cooperative.  Non-toxic appearance. He does not appear ill. No distress.   HENT:   Head: Normocephalic and atraumatic.   Right Ear: Hearing, tympanic membrane, external ear and ear canal normal.   Left Ear: Hearing, tympanic membrane, external ear and ear canal normal.   Nose: Nose normal. No mucosal edema, rhinorrhea or nasal deformity. No epistaxis. Right sinus exhibits no maxillary sinus tenderness and no frontal sinus tenderness. Left sinus exhibits no maxillary sinus tenderness and no frontal sinus tenderness.   Mouth/Throat: Uvula is midline, oropharynx is clear and moist and mucous membranes are normal. No trismus in the jaw. Normal dentition. No uvula swelling. No posterior oropharyngeal erythema.   Eyes: Conjunctivae and lids are normal. Right eye exhibits no discharge. Left eye exhibits no discharge. No scleral icterus.   Neck: Trachea normal, normal range of motion, full passive range of motion without pain and phonation normal. Neck supple.   Cardiovascular: Normal rate, regular rhythm, normal heart sounds, intact distal pulses and normal pulses.   Pulmonary/Chest: Effort normal and breath sounds normal. No respiratory distress.   Abdominal: Soft. Normal appearance and bowel sounds are normal. He exhibits no distension, no pulsatile midline mass and no mass. There is no  tenderness.   Musculoskeletal: Normal range of motion. He exhibits no edema or deformity.   Neurological: He is alert and oriented to person, place, and time. He exhibits normal muscle tone. Coordination normal.   Skin: Skin is warm, dry, intact, not diaphoretic and not pale.   Psychiatric: He has a normal mood and affect. His speech is normal and behavior is normal. Judgment and thought content normal. His mood appears not anxious. His affect is not angry, not blunt, not labile and not inappropriate. He is not actively hallucinating. Cognition and memory are normal. He does not exhibit a depressed mood. He is attentive.   Nursing note and vitals reviewed.        Assessment:       1. History of anxiety    2. Encounter for medication refill        Plan:     ALTHOUGH THE PATIENT HISTORY OF PATIENT DECLINED STATING HE DOES NOT WANT THAT HE WANTS 20 BENZODIAZEPINES.  I ADVISED PATIENT THAT HE SHOULD KEEP HIS APPOINTMENT AT 4:00 P.M. TO FOLLOW UP WITH HIS PRIMARY CARE PROVIDER IN ORDER TO GET THOSE MEDICATIONS.  PATIENT IS IN NO APPARENT DISTRESS AT THIS TIME NO ACTIVE PANIC ATTACK.  PATIENT JUST DID NOT WANT TO WAIT TILL 4:00 P.M. TO GO SEE HIS PRIMARY CARE PROVIDER.  PATIENT VERBALIZED UNDERSTANDING ER PRECAUTIONS GIVEN.    History of anxiety    Encounter for medication refill          Patient Instructions   General Discharge Instructions   If you were prescribed a narcotic or controlled medication, do not drive or operate heavy equipment or machinery while taking these medications.  If you were prescribed antibiotics, please take them to completion.  You must understand that you've received an Urgent Care treatment only and that you may be released before all your medical problems are known or treated. You, the patient, will arrange for follow up care as instructed.  Follow up with your PCP or specialty clinic as directed in the next 1-2 weeks if not improved or as needed.  You can call [phone] to schedule an  appointment with the appropriate provider.  If your condition worsens we recommend that you receive another evaluation at the emergency room immediately or contact your primary medical clinics after hours call service to discuss your concerns.  Please return here or go to the Emergency Department for any concerns or worsening of condition.       RED FLAGS/WARNING SYMPTOMS DISCUSSED WITH PATIENT THAT WOULD WARRANT EMERGENT MEDICAL ATTENTION. PATIENT VERBALIZED UNDERSTANDING.       PLEASE GO TO YOUR APPOINTMENT AT 4 PM AS DISCUSSED

## 2020-03-06 NOTE — TELEPHONE ENCOUNTER
----- Message from Izabela Enamorado sent at 3/6/2020 10:08 AM CST -----  Contact: [phone]  Patient is having panic attacks and would like to see or call in an Rx,. He tentatively made a late appointment , 4:00 pm today with Dr Plunkett but not sure if he wait that long.       [facility]  [phone] (Phone) and [phone] (Fax)

## 2020-03-06 NOTE — PATIENT INSTRUCTIONS
General Discharge Instructions   If you were prescribed a narcotic or controlled medication, do not drive or operate heavy equipment or machinery while taking these medications.  If you were prescribed antibiotics, please take them to completion.  You must understand that you've received an Urgent Care treatment only and that you may be released before all your medical problems are known or treated. You, the patient, will arrange for follow up care as instructed.  Follow up with your PCP or specialty clinic as directed in the next 1-2 weeks if not improved or as needed.  You can call [phone] to schedule an appointment with the appropriate provider.  If your condition worsens we recommend that you receive another evaluation at the emergency room immediately or contact your primary medical clinics after hours call service to discuss your concerns.  Please return here or go to the Emergency Department for any concerns or worsening of condition.       RED FLAGS/WARNING SYMPTOMS DISCUSSED WITH PATIENT THAT WOULD WARRANT EMERGENT MEDICAL ATTENTION. PATIENT VERBALIZED UNDERSTANDING.       PLEASE GO TO YOUR APPOINTMENT AT 4 PM AS DISCUSSED

## 2020-03-06 NOTE — TELEPHONE ENCOUNTER
If the patient is actively having a panic attack, he should proceed to urgent care to be seen.  Please inform the patient.  Thank you.

## 2020-03-06 NOTE — TELEPHONE ENCOUNTER
Spoke with pt re: anxiety (panic attack). Pt needs to keep appt with Dr. Plunkett or go to an Urgent care now . De can not prescribe anything without seeing the pt.

## 2020-03-09 ENCOUNTER — PATIENT MESSAGE (OUTPATIENT)
Dept: UROLOGY | Facility: CLINIC | Age: 40
End: 2020-03-09

## 2020-03-22 DIAGNOSIS — G47.9 SLEEP DISTURBANCES: ICD-10-CM

## 2020-03-23 RX ORDER — ZOLPIDEM TARTRATE 10 MG/1
TABLET ORAL
Qty: 30 TABLET | Refills: 0 | Status: SHIPPED | OUTPATIENT
Start: 2020-03-23 | End: 2020-04-19

## 2020-03-25 ENCOUNTER — TELEPHONE (OUTPATIENT)
Dept: HEPATOLOGY | Facility: CLINIC | Age: 40
End: 2020-03-25

## 2020-03-25 NOTE — TELEPHONE ENCOUNTER
Attempted to contact patient and offer video visit on tomorrow or Friday with Dr. Sherman but patient did not answer. Left patient a voicemail stating the purpose for the call. Awaiting a call back.

## 2020-04-03 ENCOUNTER — TELEPHONE (OUTPATIENT)
Dept: TRANSPLANT | Facility: CLINIC | Age: 40
End: 2020-04-03

## 2020-04-03 ENCOUNTER — TELEPHONE (OUTPATIENT)
Dept: HEPATOLOGY | Facility: CLINIC | Age: 40
End: 2020-04-03

## 2020-04-03 NOTE — TELEPHONE ENCOUNTER
----- Message from Emy Gonzales sent at 4/3/2020  4:23 PM CDT -----  Regarding: Referral   All, just an FYI, still unsuccessful in reaching Mr. Collazo to offer him an appointment w/Hep, l/m on v/m, final letter mailed.    Emy,    ----- Message -----  From: Colleen Adame LPN  Sent: 2/3/2020   4:11 PM CDT  To: Hepatology Scheduling    Pt records reviewed.   Pt will be referred to Hepatology.  Fatty liver  Splenomegaly  Low testosterone  Initial referral received  from the [facility].   Referring Provider/diagnosis  Margarito Silva MD

## 2020-04-04 ENCOUNTER — TELEPHONE (OUTPATIENT)
Dept: INTERNAL MEDICINE | Facility: CLINIC | Age: 40
End: 2020-04-04

## 2020-04-05 NOTE — TELEPHONE ENCOUNTER
Noted and better that this be put off until after the COVID crisis has cleared.  This is not urgent problem under circumstances.  He should call PCP again after things have cleared to proceed

## 2020-04-07 RX ORDER — BUPROPION HYDROCHLORIDE 300 MG/1
TABLET ORAL
Qty: 90 TABLET | Refills: 3 | Status: SHIPPED | OUTPATIENT
Start: 2020-04-07 | End: 2021-01-06

## 2020-04-19 DIAGNOSIS — G47.9 SLEEP DISTURBANCES: ICD-10-CM

## 2020-04-19 RX ORDER — ZOLPIDEM TARTRATE 10 MG/1
TABLET ORAL
Qty: 30 TABLET | Refills: 0 | Status: SHIPPED | OUTPATIENT
Start: 2020-04-19 | End: 2020-04-27 | Stop reason: SDUPTHER

## 2020-04-27 ENCOUNTER — PATIENT MESSAGE (OUTPATIENT)
Dept: INTERNAL MEDICINE | Facility: CLINIC | Age: 40
End: 2020-04-27

## 2020-04-27 DIAGNOSIS — G47.9 SLEEP DISTURBANCES: ICD-10-CM

## 2020-04-27 RX ORDER — ZOLPIDEM TARTRATE 10 MG/1
10 TABLET ORAL NIGHTLY PRN
Qty: 30 TABLET | Refills: 0 | Status: SHIPPED | OUTPATIENT
Start: 2020-04-27 | End: 2020-05-22 | Stop reason: SDUPTHER

## 2020-05-09 RX ORDER — PRAVASTATIN SODIUM 10 MG/1
TABLET ORAL
Qty: 90 TABLET | Refills: 3 | Status: SHIPPED | OUTPATIENT
Start: 2020-05-09 | End: 2021-03-31

## 2020-05-09 RX ORDER — METOPROLOL SUCCINATE 100 MG/1
TABLET, EXTENDED RELEASE ORAL
Qty: 90 TABLET | Refills: 3 | Status: SHIPPED | OUTPATIENT
Start: 2020-05-09 | End: 2021-03-31

## 2020-05-21 ENCOUNTER — PATIENT MESSAGE (OUTPATIENT)
Dept: INTERNAL MEDICINE | Facility: CLINIC | Age: 40
End: 2020-05-21

## 2020-05-21 DIAGNOSIS — G47.9 SLEEP DISTURBANCES: ICD-10-CM

## 2020-05-22 RX ORDER — ZOLPIDEM TARTRATE 10 MG/1
10 TABLET ORAL NIGHTLY PRN
Qty: 30 TABLET | Refills: 0 | Status: SHIPPED | OUTPATIENT
Start: 2020-05-22 | End: 2020-06-22 | Stop reason: SDUPTHER

## 2020-06-22 ENCOUNTER — PATIENT MESSAGE (OUTPATIENT)
Dept: INTERNAL MEDICINE | Facility: CLINIC | Age: 40
End: 2020-06-22

## 2020-06-22 DIAGNOSIS — G47.9 SLEEP DISTURBANCES: ICD-10-CM

## 2020-06-22 RX ORDER — ZOLPIDEM TARTRATE 10 MG/1
10 TABLET ORAL NIGHTLY PRN
Qty: 30 TABLET | Refills: 0 | Status: SHIPPED | OUTPATIENT
Start: 2020-06-22 | End: 2020-07-20 | Stop reason: SDUPTHER

## 2020-07-20 ENCOUNTER — OFFICE VISIT (OUTPATIENT)
Dept: INTERNAL MEDICINE | Facility: CLINIC | Age: 40
End: 2020-07-20
Payer: COMMERCIAL

## 2020-07-20 ENCOUNTER — PATIENT MESSAGE (OUTPATIENT)
Dept: INTERNAL MEDICINE | Facility: CLINIC | Age: 40
End: 2020-07-20

## 2020-07-20 DIAGNOSIS — G47.9 SLEEP DISTURBANCES: ICD-10-CM

## 2020-07-20 DIAGNOSIS — E66.01 MORBID OBESITY: ICD-10-CM

## 2020-07-20 DIAGNOSIS — J06.9 UPPER RESPIRATORY TRACT INFECTION, UNSPECIFIED TYPE: Primary | ICD-10-CM

## 2020-07-20 PROCEDURE — 99213 OFFICE O/P EST LOW 20 MIN: CPT | Mod: 95,,, | Performed by: INTERNAL MEDICINE

## 2020-07-20 PROCEDURE — 99213 PR OFFICE/OUTPT VISIT, EST, LEVL III, 20-29 MIN: ICD-10-PCS | Mod: 95,,, | Performed by: INTERNAL MEDICINE

## 2020-07-20 RX ORDER — ZOLPIDEM TARTRATE 10 MG/1
10 TABLET ORAL NIGHTLY PRN
Qty: 90 TABLET | Refills: 0 | Status: SHIPPED | OUTPATIENT
Start: 2020-07-20 | End: 2020-11-30 | Stop reason: SDUPTHER

## 2020-07-20 RX ORDER — ZOLPIDEM TARTRATE 10 MG/1
10 TABLET ORAL NIGHTLY PRN
Qty: 30 TABLET | Refills: 0 | Status: SHIPPED | OUTPATIENT
Start: 2020-07-20 | End: 2020-07-20 | Stop reason: SDUPTHER

## 2020-07-29 NOTE — PROGRESS NOTES
Subjective:       Patient ID: Dominic Collazo is a 39 y.o. male.    Chief Complaint: No chief complaint on file.  Dictation #1  MRN:[id number]  CSN:[id number]  Dict   HPI  Review of Systems   Constitutional: Negative for activity change, appetite change, fatigue and unexpected weight change.   HENT: Positive for nasal congestion and postnasal drip. Negative for dental problem, hearing loss, mouth sores and sinus pressure/congestion.    Eyes: Negative for discharge and visual disturbance.   Respiratory: Positive for cough. Negative for shortness of breath.    Cardiovascular: Negative for chest pain and palpitations.   Gastrointestinal: Negative for abdominal pain, blood in stool, constipation, diarrhea and nausea.   Genitourinary: Negative for dysuria, hematuria and testicular pain.   Musculoskeletal: Positive for myalgias. Negative for arthralgias, back pain, joint swelling and neck pain.   Integumentary:  Negative for rash.   Neurological: Positive for headaches. Negative for weakness.   Psychiatric/Behavioral: Negative for agitation and sleep disturbance.       The patient location is: home  The chief complaint leading to consultation is: refills    Visit type: audiovisual    Face to Face time with patient: 10 min  15 minutes of total time spent on the encounter, which includes face to face time and non-face to face time preparing to see the patient (eg, review of tests), Obtaining and/or reviewing separately obtained history, Documenting clinical information in the electronic or other health record, Independently interpreting results (not separately reported) and communicating results to the patient/family/caregiver, or Care coordination (not separately reported).         Each patient to whom he or she provides medical services by telemedicine is:  (1) informed of the relationship between the physician and patient and the respective role of any other health care provider with respect to management of the  patient; and (2) notified that he or she may decline to receive medical services by telemedicine and may withdraw from such care at any time.    Notes:  39-year-old male patient who is calling for refill of his Ambien.  He has a longstanding sleep disorder and his doctor is currently on available.    However he relates to me that he has been sick for approximately 3 days with the severe upper respiratory infection consisting of sinus and nasal congestion, headache, muscle aches, cough which is nonproductive.  He has no shortness of breath, change in taste or smell, chest pain.  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    Physical exam:  Not done since this is a virtual visit    Impression:  1.  Sleep disorder-refilled his Ambien  2.  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    Plan:  1.  I discussed at length his illness is consistent with COVID and he has agreed to have lab testing done for COVID and some additional testing with CBC, BMP, sed rate to see whether not any of those are out of order.  He has some metabolic problems and is obese.  2.  Ambien was written.    Objective:        Assessment:       1. Upper respiratory tract infection, unspecified type    2. Sleep disturbances        Plan:

## 2020-08-27 ENCOUNTER — TELEPHONE (OUTPATIENT)
Dept: INTERNAL MEDICINE | Facility: CLINIC | Age: 40
End: 2020-08-27

## 2020-08-27 ENCOUNTER — PATIENT MESSAGE (OUTPATIENT)
Dept: INTERNAL MEDICINE | Facility: CLINIC | Age: 40
End: 2020-08-27

## 2020-08-27 DIAGNOSIS — Z12.11 COLON CANCER SCREENING: ICD-10-CM

## 2020-08-27 DIAGNOSIS — Z80.0 FAMILY HISTORY OF COLON CANCER: Primary | ICD-10-CM

## 2020-08-28 ENCOUNTER — LAB VISIT (OUTPATIENT)
Dept: LAB | Facility: HOSPITAL | Age: 40
End: 2020-08-28
Attending: INTERNAL MEDICINE
Payer: COMMERCIAL

## 2020-08-28 ENCOUNTER — OFFICE VISIT (OUTPATIENT)
Dept: HEPATOLOGY | Facility: CLINIC | Age: 40
End: 2020-08-28
Payer: COMMERCIAL

## 2020-08-28 ENCOUNTER — PATIENT OUTREACH (OUTPATIENT)
Dept: ADMINISTRATIVE | Facility: OTHER | Age: 40
End: 2020-08-28

## 2020-08-28 DIAGNOSIS — R73.03 PRE-DIABETES: ICD-10-CM

## 2020-08-28 DIAGNOSIS — R74.8 ELEVATED LIVER ENZYMES: ICD-10-CM

## 2020-08-28 DIAGNOSIS — Z00.00 ANNUAL PHYSICAL EXAM: ICD-10-CM

## 2020-08-28 DIAGNOSIS — K76.0 NAFLD (NONALCOHOLIC FATTY LIVER DISEASE): Primary | ICD-10-CM

## 2020-08-28 DIAGNOSIS — J06.9 UPPER RESPIRATORY TRACT INFECTION, UNSPECIFIED TYPE: ICD-10-CM

## 2020-08-28 LAB
SARS-COV-2 IGG SERPLBLD QL IA.RAPID: NEGATIVE
T4 FREE SERPL-MCNC: 1.15 NG/DL (ref 0.71–1.51)
TSH SERPL DL<=0.005 MIU/L-ACNC: 1.55 UIU/ML (ref 0.4–4)

## 2020-08-28 PROCEDURE — 99214 OFFICE O/P EST MOD 30 MIN: CPT | Mod: 95,,, | Performed by: NURSE PRACTITIONER

## 2020-08-28 PROCEDURE — 84439 ASSAY OF FREE THYROXINE: CPT

## 2020-08-28 PROCEDURE — 99214 PR OFFICE/OUTPT VISIT, EST, LEVL IV, 30-39 MIN: ICD-10-PCS | Mod: 95,,, | Performed by: NURSE PRACTITIONER

## 2020-08-28 PROCEDURE — 86769 SARS-COV-2 COVID-19 ANTIBODY: CPT

## 2020-08-28 PROCEDURE — 84443 ASSAY THYROID STIM HORMONE: CPT

## 2020-08-28 NOTE — PROGRESS NOTES
[facility] - New Patient    REFERRING PROVIDER: Dr. Margarito Silva    The patient location is: [place], [place]  The chief complaint leading to consultation is: Fatty liver, elevated liver enzymes    Visit type: audiovisual    Face to Face time with patient: 25 min  30 minutes of total time spent on the encounter, which includes face to face time and non-face to face time preparing to see the patient (eg, review of tests), Obtaining and/or reviewing separately obtained history, Documenting clinical information in the electronic or other health record, Independently interpreting results (not separately reported) and communicating results to the patient/family/caregiver, or Care coordination (not separately reported).     Each patient to whom he or she provides medical services by telemedicine is:  (1) informed of the relationship between the physician and patient and the respective role of any other health care provider with respect to management of the patient; and (2) notified that he or she may decline to receive medical services by telemedicine and may withdraw from such care at any time.        HPI: This is a 39 y.o. White male referred for evaluation of fatty liver first noted on abd US 11/2019.     Abd US 11/2019 -- hepatomegaly (22.9 cm), fatty liver, splenomegaly (14.4 cm), no biliary dilation    His transaminases have consistently been elevated since at least 9/2014. ALT>AST. Trending up more recently: AST 58, . Alk phos and synthetic liver function normal; platelets normal.    Screened for viral hepatitis last year, negative.    He has been receiving testosterone replacement since 2012. This has been put on hold recently due to higher liver enzymes.     The patient's risk factors for fatty liver include:     · Weight -- Last BMI ~45; reports weight is currently trending down  · Dyslipidemia -- yes, on statin                              · Insulin resistance / diabetes -- pre-diabetes           · Hypertension -- yes  · Alcohol use -- very rare, less than once a month    Meds:  Rx: on statin for multiple years; on testosterone since 2012  OTC: none concerning  Herbal supplements: none concerning     No family history of liver disease.      Denies symptoms of hepatic decompensation including jaundice, ascites, cognitive problems to suggest hepatic encephalopathy, or GI bleeding.     Works as a teacher.          Review of patient's allergies indicates:   Allergen Reactions    Ciprofloxacin      swelling    Penicillins Rash    Sulfa (sulfonamide antibiotics) Rash    Toradol [ketorolac] Nausea Only    Green pepper Nausea Only    Meloxicam Nausea Only        Current Outpatient Medications on File Prior to Visit   Medication Sig Dispense Refill    AFLURIA QUAD 2019-20,6MO UP, 60 mcg (15 mcg x 4)/0.5 mL Susp   0    buPROPion (WELLBUTRIN XL) 300 MG 24 hr tablet Take 1 tablet by mouth daily. 90 tablet 3    busPIRone (BUSPAR) 10 MG tablet Take 1 tablet (10 mg total) by mouth 3 (three) times daily. 270 tablet 3    ergocalciferol, vitamin D2, (VITAMIN D ORAL) Take 5,000 Units by mouth once daily.      fexofenadine (ALLEGRA) 60 MG tablet Take 60 mg by mouth every morning.      LORazepam (ATIVAN) 0.5 MG tablet TAKE 1 TABLET BY MOUTH EVERY 8 HOURS AS NEEDED FOR ANXIETY 30 tablet 0    metoprolol succinate (TOPROL-XL) 100 MG 24 hr tablet Take 1 tablet by mouth daily. 90 tablet 3    modafinil (PROVIGIL) 100 MG Tab 1 pill PO as needed morning or early afternoon for excessive daytime sleepiness. 30 tablet 5    multivitamin capsule Take 1 capsule by mouth once daily.      pravastatin (PRAVACHOL) 10 MG tablet Take 1 tablet by mouth daily. 90 tablet 3    scopolamine (TRANSDERM-SCOP) 1.3-1.5 mg (1 mg over 3 days) Place 1 patch onto the skin every 72 hours. (Patient not taking: Reported on 2/3/2020) 10 patch 0    zolpidem (AMBIEN) 10 mg Tab Take 1 tablet (10 mg total) by mouth nightly as needed. 90 tablet 0      No current facility-administered medications on file prior to visit.          PMHX:  has a past medical history of Anxiety, Colon polyp, Depression, Family history of prostate cancer (9/29/2014), Hyperlipidemia, Hypertension, and Low testosterone.    PSHX:  has a past surgical history that includes Tonsillectomy and Adenoidectomy.    FAMILY HISTORY:   Family History   Problem Relation Age of Onset    Hypertension Mother     Hyperlipidemia Mother     Diabetes Father     Hyperlipidemia Father     Hypertension Father     Heart disease Father 46        CAD    No Known Problems Sister     Cancer Paternal Uncle         prostate cancer       SOCIAL HISTORY:   Social History     Tobacco Use   Smoking Status Never Smoker   Smokeless Tobacco Never Used       Social History     Substance and Sexual Activity   Alcohol Use Yes    Frequency: Monthly or less    Drinks per session: 1 or 2    Binge frequency: Never    Comment: less than once per month       Social History     Substance and Sexual Activity   Drug Use No         Review of Systems   Constitutional: Negative for appetite change, chills, fever and unexpected weight change. (+) fatigue  Eyes: Negative for visual disturbance.   Respiratory: Negative for cough and shortness of breath.    Cardiovascular: Negative for chest pain, palpitations and leg swelling.   Gastrointestinal: Negative for abdominal distention, abdominal pain, blood in stool, constipation, diarrhea, nausea and vomiting. No change in stool color.  Genitourinary: Negative for dysuria and hematuria. Denies dark urine.   Musculoskeletal: Negative for arthralgias, gait problem, joint swelling and myalgias.   Skin: Negative for color change, rash and wound. Denies itching.   Neurological: Negative for dizziness, tremors, speech difficulty, and headaches.   Hematological: Does not bruise/bleed easily.   Psychiatric/Behavioral: Negative for confusion, decreased concentration and sleep disturbance.  Denies memory loss. Denies depression. The patient is not nervous/anxious.        Physical Exam   Limited by video visit  Constitutional: No distress. Alert and oriented to person, place, and time.  Pulmonary/Chest: No respiratory distress.    Skin: No jaundice.   Psychiatric: Normal mood and affect. Speech, behavior, and thought content normal. No depression or anxiety noted.         LABS:  Lab Results   Component Value Date    WBC 8.97 11/16/2019    HGB 14.0 11/16/2019    HCT 43.1 11/16/2019     11/16/2019     02/23/2019    K 4.3 02/23/2019    CREATININE 0.9 02/23/2019     (H) 11/16/2019    AST 58 (H) 11/16/2019    ALKPHOS 52 (L) 11/16/2019    BILITOT 0.3 11/16/2019    BILIDIR 0.2 11/16/2019    ALBUMIN 3.9 11/16/2019    CHOL 214 (H) 11/16/2019    TRIG 130 11/16/2019    LDLCALC 146.0 11/16/2019    HGBA1C 6.1 (H) 02/23/2019       No results found for: HEPAIGG    Hepatitis B Surface Ag   Date Value Ref Range Status   11/19/2019 Negative Negative Final     No results found for: HEPBCAB  No results found for: HEPBSAB  Hepatitis C Ab   Date Value Ref Range Status   11/19/2019 Negative Negative Final     Immunization History   Administered Date(s) Administered    Influenza 10/07/2019    Influenza - Quadrivalent 09/29/2017, 10/26/2018, 10/07/2019    Influenza - Quadrivalent - PF *Preferred* (6 months and older) 11/24/2015, 10/14/2016    Influenza - Trivalent - PF (ADULT) 11/24/2015    Influenza Split 10/14/2016          DIAGNOSTIC STUDIES:  ABD. US  Results for orders placed during the hospital encounter of 11/25/19   US Abdomen Limited    Narrative EXAMINATION:  US ABDOMEN LIMITED    CLINICAL HISTORY:  Abnormal results of liver function studies    TECHNIQUE:  Limited ultrasound of the right upper quadrant of the abdomen (including pancreas, liver, gallbladder, common bile duct, and right kidney) was performed.    COMPARISON:  None    FINDINGS:  The visualized pancreas is within normal  limits.    The liver is enlarged, 22.9 cm, and demonstrates fatty infiltration.    There is no gallstone and the common duct is normal caliber, 4 mm.    Spleen is mildly enlarged, 14.4 cm.      Impression Hepatosplenomegaly with fatty infiltration of the liver      Electronically signed by: Dali Rousseau MD  Date:    11/25/2019  Time:    15:48         ASSESSMENT / PLAN:  39 y.o. White male with:    1. NAFLD   -- Fibroscan would be limited by BMI 45, recommend MRI elastography for fibrosis and steatosis staging. Splenomegaly noted though platelets normal, ? advanced fibrosis   -- Discussed importance of lifestyle modifications including healthy diet and adequate physical activity to achieve and maintain ideal body weight.   -- Low carbohydrate, low sugar diet.  -- Maintain control of blood pressure, cholesterol, and blood sugar as these are risk factors for fatty liver  -- Will check immunity markers for hepatitis A/B and arrange for vaccination if needed.    *If statins are being considered for HLD, statins are safe in patients with liver disease. Statins can be used to treat dyslipidemia in patients with both NAFLD and HAMM.    2. Elevated liver enzymes  -- Likely due to fatty liver/HAMM though will complete serologic workup to rule out other causes of liver disease. Unlikely due to testosterone, no contraindications to restarting.  -- Repeat enzymes, may have improved as he reports weight loss recently     3. BMI 45, HTN, HLD, pre-diabetes  -- Interested in medical weight loss, referral to bariatric medicine         Follow-up visit 1-2 weeks after labs to discuss results, can do video visit.  He will let us know when he is ready to proceed with MRI elastography.         Orders Placed This Encounter   Procedures    Alpha-1-Antitrypsin    TEVIN Screen w/Reflex    Antimitochondrial Antibody    Anti-Smooth Muscle Antibody    Ceruloplasmin    IgG    Iron and TIBC    Ferritin    Hepatic function panel     Hepatitis A antibody, IgG    Hepatitis B Surface Ab, Qualitative    Hepatitis B Core Antibody, Total    Ambulatory referral/consult to Bariatric Medicine             EDUCATION / DISCUSSION:   We discussed the manifestations of fatty liver. At this time there is no FDA approved therapy for fatty liver. The patient and I discussed the importance of lifestyle modifications such as diet, exercise, and weight loss for management of fatty liver. We reviewed modification of risk factors such as hypertension, hyperlipidemia, and diabetes mellitus / impaired fasting glucose. Discussed that excessive alcohol consumption can also cause fatty liver. We discussed a low carb, low sugar diet and a goal of 30 minutes of exercise 5 times per week. Patient is aware that fatty liver can progress to steatohepatitis and possibly to cirrhosis, putting one at increased risk for liver cancer or liver failure.         Thank you for allowing me to participate in the care of Dominic Pope, AQUIL[facility]  Hepatology

## 2020-08-28 NOTE — LETTER
August 28, 2020      Margarito Silva MD  [address] [facility] [id number]

## 2020-08-28 NOTE — Clinical Note
Please schedule all labs anytime over the next few weeks. F/u visit 1-2 weeks after labs to discuss results (can do video visit). Thanks!

## 2020-08-31 PROBLEM — R74.8 ELEVATED LIVER ENZYMES: Status: ACTIVE | Noted: 2020-08-31

## 2020-08-31 PROBLEM — K76.0 NAFLD (NONALCOHOLIC FATTY LIVER DISEASE): Status: ACTIVE | Noted: 2020-08-31

## 2020-09-01 ENCOUNTER — TELEPHONE (OUTPATIENT)
Dept: INTERNAL MEDICINE | Facility: CLINIC | Age: 40
End: 2020-09-01

## 2020-09-01 NOTE — TELEPHONE ENCOUNTER
----- Message from Margarito Silva MD sent at 8/30/2020 11:12 PM CDT -----  Thyroid function is good

## 2020-09-02 ENCOUNTER — TELEPHONE (OUTPATIENT)
Dept: HEPATOLOGY | Facility: CLINIC | Age: 40
End: 2020-09-02

## 2020-09-02 NOTE — TELEPHONE ENCOUNTER
----- Message from Romel Walker MA sent at 8/31/2020  3:14 PM CDT -----    ----- Message -----  From: Jenny Pope NP  Sent: 8/31/2020  10:57 AM CDT  To: [facility] Staff    Please schedule all labs anytime over the next few weeks. F/u visit 1-2 weeks after labs to discuss results (can do video visit). Thanks!

## 2020-09-03 ENCOUNTER — OFFICE VISIT (OUTPATIENT)
Dept: UROLOGY | Facility: CLINIC | Age: 40
End: 2020-09-03
Payer: COMMERCIAL

## 2020-09-03 VITALS
WEIGHT: 315 LBS | SYSTOLIC BLOOD PRESSURE: 141 MMHG | HEIGHT: 75 IN | DIASTOLIC BLOOD PRESSURE: 94 MMHG | HEART RATE: 85 BPM | BODY MASS INDEX: 39.17 KG/M2

## 2020-09-03 DIAGNOSIS — I10 ESSENTIAL HYPERTENSION: ICD-10-CM

## 2020-09-03 DIAGNOSIS — E29.1 MALE HYPOGONADISM: Primary | ICD-10-CM

## 2020-09-03 DIAGNOSIS — E78.5 HYPERLIPIDEMIA, UNSPECIFIED HYPERLIPIDEMIA TYPE: ICD-10-CM

## 2020-09-03 DIAGNOSIS — N52.01 ERECTILE DYSFUNCTION DUE TO ARTERIAL INSUFFICIENCY: ICD-10-CM

## 2020-09-03 PROCEDURE — 99999 PR PBB SHADOW E&M-EST. PATIENT-LVL IV: ICD-10-PCS | Mod: PBBFAC,,, | Performed by: UROLOGY

## 2020-09-03 PROCEDURE — 99214 PR OFFICE/OUTPT VISIT, EST, LEVL IV, 30-39 MIN: ICD-10-PCS | Mod: S$GLB,,, | Performed by: UROLOGY

## 2020-09-03 PROCEDURE — 3077F SYST BP >= 140 MM HG: CPT | Mod: CPTII,S$GLB,, | Performed by: UROLOGY

## 2020-09-03 PROCEDURE — 3008F PR BODY MASS INDEX (BMI) DOCUMENTED: ICD-10-PCS | Mod: CPTII,S$GLB,, | Performed by: UROLOGY

## 2020-09-03 PROCEDURE — 3080F PR MOST RECENT DIASTOLIC BLOOD PRESSURE >= 90 MM HG: ICD-10-PCS | Mod: CPTII,S$GLB,, | Performed by: UROLOGY

## 2020-09-03 PROCEDURE — 3077F PR MOST RECENT SYSTOLIC BLOOD PRESSURE >= 140 MM HG: ICD-10-PCS | Mod: CPTII,S$GLB,, | Performed by: UROLOGY

## 2020-09-03 PROCEDURE — 3080F DIAST BP >= 90 MM HG: CPT | Mod: CPTII,S$GLB,, | Performed by: UROLOGY

## 2020-09-03 PROCEDURE — 99999 PR PBB SHADOW E&M-EST. PATIENT-LVL IV: CPT | Mod: PBBFAC,,, | Performed by: UROLOGY

## 2020-09-03 PROCEDURE — 99214 OFFICE O/P EST MOD 30 MIN: CPT | Mod: S$GLB,,, | Performed by: UROLOGY

## 2020-09-03 PROCEDURE — 3008F BODY MASS INDEX DOCD: CPT | Mod: CPTII,S$GLB,, | Performed by: UROLOGY

## 2020-09-03 RX ORDER — TESTOSTERONE 20.25 MG/1.25G
GEL TOPICAL
Qty: 1 BOTTLE | Refills: 5 | Status: SHIPPED | OUTPATIENT
Start: 2020-09-03 | End: 2021-03-03 | Stop reason: SDUPTHER

## 2020-09-03 NOTE — PROGRESS NOTES
CHIEF COMPLAINT:    Mr. Collazo is a 39 y.o. male presenting with hypogonadism    PRESENTING ILLNESS:    Dominic Collazo is a 39 y.o. male who c/o hypogonadism.  This has been present for > 1 year.  He has fatigue, ED, and loss of body hair.  While on TRT, he has improvement.  Was on IM TRT in the past.  Has been on clomid as well, but he didn't notice improvement in his symptoms while on the clomid.  His wife has had a hysterectomy, so he's no longer interested in fertility.  He'd like testopel, but he has BCBS.    He has a history of elevated LFT's and has been cleared by hepatology to continue TRT.    He denies hematuria.  No dysuria.  He's pleased with how he voids.    The ED improves with TRT.    REVIEW OF SYSTEMS:    Dominic Collazo denies chest pain, sore throat, headache, blurred vision, fever, nausea, vomiting, chills, abdominal pain, bleeding per rectum, cough, SOB, recent loss of consciousness, recent mental status changes, seizures, dizziness, or upper or lower extremity weakness.    YUSRA  1. 1  2. 0  3. 0  4. 0  5. 0     PATIENT HISTORY:    Past Medical History:   Diagnosis Date    Anxiety     Colon polyp     Depression     Family history of prostate cancer 9/29/2014    Hyperlipidemia     Hypertension     Low testosterone        Past Surgical History:   Procedure Laterality Date    ADENOIDECTOMY      TONSILLECTOMY         Family History   Problem Relation Age of Onset    Hypertension Mother     Hyperlipidemia Mother     Diabetes Father     Hyperlipidemia Father     Hypertension Father     Heart disease Father 46        CAD    No Known Problems Sister     Cancer Paternal Uncle         prostate cancer       Social History     Socioeconomic History    Marital status:      Spouse name: Not on file    Number of children: Not on file    Years of education: Not on file    Highest education level: Not on file   Occupational History    Occupation: Teacher       Employer: [employer]     Comment: [employer]   Social Needs    Financial resource strain: Not very hard    Food insecurity     Worry: Never true     Inability: Never true    Transportation needs     Medical: No     Non-medical: No   Tobacco Use    Smoking status: Never Smoker    Smokeless tobacco: Never Used   Substance and Sexual Activity    Alcohol use: Yes     Frequency: Monthly or less     Drinks per session: 1 or 2     Binge frequency: Never     Comment: less than once per month    Drug use: No    Sexual activity: Yes     Partners: Female   Lifestyle    Physical activity     Days per week: 3 days     Minutes per session: 10 min    Stress: To some extent   Relationships    Social connections     Talks on phone: Twice a week     Gets together: Three times a week     Attends Christian service: Not on file     Active member of club or organization: Yes     Attends meetings of clubs or organizations: More than 4 times per year     Relationship status:    Other Topics Concern    Not on file   Social History Narrative    Not on file       Allergies:  Ciprofloxacin, Penicillins, Sulfa (sulfonamide antibiotics), Toradol [ketorolac], Green pepper, and Meloxicam    Medications:    Current Outpatient Medications:     AFLURIA QUAD 2019-20,6MO UP, 60 mcg (15 mcg x 4)/0.5 mL Susp, , Disp: , Rfl: 0    buPROPion (WELLBUTRIN XL) 300 MG 24 hr tablet, Take 1 tablet by mouth daily., Disp: 90 tablet, Rfl: 3    busPIRone (BUSPAR) 10 MG tablet, Take 1 tablet (10 mg total) by mouth 3 (three) times daily., Disp: 270 tablet, Rfl: 3    ergocalciferol, vitamin D2, (VITAMIN D ORAL), Take 5,000 Units by mouth once daily., Disp: , Rfl:     fexofenadine (ALLEGRA) 60 MG tablet, Take 60 mg by mouth every morning., Disp: , Rfl:     LORazepam (ATIVAN) 0.5 MG tablet, TAKE 1 TABLET BY MOUTH EVERY 8 HOURS AS NEEDED FOR ANXIETY, Disp: 30 tablet, Rfl: 0    metoprolol succinate (TOPROL-XL) 100 MG 24 hr tablet, Take  1 tablet by mouth daily., Disp: 90 tablet, Rfl: 3    modafinil (PROVIGIL) 100 MG Tab, 1 pill PO as needed morning or early afternoon for excessive daytime sleepiness., Disp: 30 tablet, Rfl: 5    multivitamin capsule, Take 1 capsule by mouth once daily., Disp: , Rfl:     pravastatin (PRAVACHOL) 10 MG tablet, Take 1 tablet by mouth daily., Disp: 90 tablet, Rfl: 3    scopolamine (TRANSDERM-SCOP) 1.3-1.5 mg (1 mg over 3 days), Place 1 patch onto the skin every 72 hours. (Patient not taking: Reported on 2/3/2020), Disp: 10 patch, Rfl: 0    zolpidem (AMBIEN) 10 mg Tab, Take 1 tablet (10 mg total) by mouth nightly as needed., Disp: 90 tablet, Rfl: 0    PHYSICAL EXAMINATION:    The patient generally appears in good health, is appropriately interactive, and is in no apparent distress.     Eyes: anicteric sclerae, moist conjunctivae; no lid-lag; PERRLA     HENT: Atraumatic; oropharynx clear with moist mucous membranes and no mucosal ulcerations;normal hard and soft palate.  No evidence of lymphadenopathy.    Neck: Trachea midline.  No thyromegaly.    Musculoskeletal: No abnormal gait.    Skin: No lesions.    Mental: Cooperative with normal affect.  Is oriented to time, place, and person.    Neuro: Grossly intact.    Chest: Normal inspiratory effort.   No accessory muscles.  No audible wheezes.  Respirations symmetric on inspiration and expiration.    Heart: Regular rhythm.      Abdomen:  Soft, non-tender. No masses or organomegaly. Bladder is not palpable. No evidence of flank discomfort. No evidence of inguinal hernia.    Genitourinary: The penis is circumcised with no evidence of plaques or induration. The urethral meatus is normal. The testes, epididymides, and cord structures are normal in size and contour bilaterally. The scrotum is normal in size and contour.    Extremities: No clubbing, cyanosis, or edema      LABS:      Lab Results   Component Value Date    PSADIAG 0.86 11/16/2019    PSADIAG 1.6 09/29/2014        IMPRESSION:    Encounter Diagnoses   Name Primary?    Male hypogonadism Yes    Erectile dysfunction due to arterial insufficiency     Hyperlipidemia, unspecified hyperlipidemia type     Essential hypertension    HTN, controlled  Hyperlipidemia, controlled      PLAN:    1. Will use TRT for the ED.    2. Discussed the risks and benefits of testosterone replacement today.  This included possible cardiac risks.  He would like to try this.  Will check a T in 2 weeks and adjust the dose of TRT if necessary.  He can then RTC 6 months with T, PSA, CBC, hepatic panel, lipid panel.  A new Rx for Androgel 1.62% was given today.      Copy to:

## 2020-09-09 ENCOUNTER — OFFICE VISIT (OUTPATIENT)
Dept: DERMATOLOGY | Facility: CLINIC | Age: 40
End: 2020-09-09
Payer: COMMERCIAL

## 2020-09-09 DIAGNOSIS — D22.9 NUMEROUS MOLES: ICD-10-CM

## 2020-09-09 DIAGNOSIS — L57.0 AK (ACTINIC KERATOSIS): Primary | ICD-10-CM

## 2020-09-09 DIAGNOSIS — L91.0 KELOID: ICD-10-CM

## 2020-09-09 DIAGNOSIS — D22.9 BENIGN MOLE: ICD-10-CM

## 2020-09-09 PROCEDURE — 99999 PR PBB SHADOW E&M-EST. PATIENT-LVL III: CPT | Mod: PBBFAC,,, | Performed by: DERMATOLOGY

## 2020-09-09 PROCEDURE — 17000 DESTRUCT PREMALG LESION: CPT | Mod: S$GLB,,, | Performed by: DERMATOLOGY

## 2020-09-09 PROCEDURE — 99203 PR OFFICE/OUTPT VISIT, NEW, LEVL III, 30-44 MIN: ICD-10-PCS | Mod: 25,S$GLB,, | Performed by: DERMATOLOGY

## 2020-09-09 PROCEDURE — 99999 PR PBB SHADOW E&M-EST. PATIENT-LVL III: ICD-10-PCS | Mod: PBBFAC,,, | Performed by: DERMATOLOGY

## 2020-09-09 PROCEDURE — 99203 OFFICE O/P NEW LOW 30 MIN: CPT | Mod: 25,S$GLB,, | Performed by: DERMATOLOGY

## 2020-09-09 PROCEDURE — 17000 PR DESTRUCTION(LASER SURGERY,CRYOSURGERY,CHEMOSURGERY),PREMALIGNANT LESIONS,FIRST LESION: ICD-10-PCS | Mod: S$GLB,,, | Performed by: DERMATOLOGY

## 2020-09-09 RX ORDER — TRIAMCINOLONE ACETONIDE 1 MG/G
CREAM TOPICAL
Qty: 30 G | Refills: 0 | Status: SHIPPED | OUTPATIENT
Start: 2020-09-09 | End: 2022-07-22

## 2020-09-09 NOTE — PROGRESS NOTES
Subjective:       Patient ID:  Dominic Collazo is a 39 y.o. male who presents for   Chief Complaint   Patient presents with    Mole     R forearm, x 1 yr, not healing, bleeding, change in color, tx neosporin    Skin Tags     L hip, x yrs, painful, no tx      Mole - Initial  Affected locations: right arm  Signs / symptoms: pain (not healing)  Relieving factors/Treatments tried: OTC moisturizer    Skin Tags - Initial  Affected locations: left hip  Signs and Symptoms: comes and goes.  Relieving factors/Treatments tried: nothing        Review of Systems   Constitutional: Negative for fever, chills, fatigue and malaise.   HENT: Negative for congestion and sore throat.    Respiratory: Negative for cough and shortness of breath.    Skin: Positive for itching.        Objective:    Physical Exam   Constitutional: He appears well-developed and well-nourished. He is obese.  No distress.   Eyes: No conjunctival no injection.   Neurological: He is alert and oriented to person, place, and time. He is not disoriented.   Psychiatric: He has a normal mood and affect.   Skin:   Areas Examined (abnormalities noted in diagram):   Neck Inspection Performed  Chest / Axilla Inspection Performed  Abdomen Inspection Performed  Back Inspection Performed  RUE Inspected  LUE Inspection Performed              Diagram Legend     Erythematous scaling macule/papule c/w actinic keratosis       Vascular papule c/w angioma      Pigmented verrucoid papule/plaque c/w seborrheic keratosis      Yellow umbilicated papule c/w sebaceous hyperplasia      Irregularly shaped tan macule c/w lentigo     1-2 mm smooth white papules consistent with Milia      Movable subcutaneous cyst with punctum c/w epidermal inclusion cyst      Subcutaneous movable cyst c/w pilar cyst      Firm pink to brown papule c/w dermatofibroma      Pedunculated fleshy papule(s) c/w skin tag(s)      Evenly pigmented macule c/w junctional nevus     Mildly variegated pigmented,  slightly irregular-bordered macule c/w mildly atypical nevus      Flesh colored to evenly pigmented papule c/w intradermal nevus       Pink pearly papule/plaque c/w basal cell carcinoma      Erythematous hyperkeratotic cursted plaque c/w SCC      Surgical scar with no sign of skin cancer recurrence      Open and closed comedones      Inflammatory papules and pustules      Verrucoid papule consistent consistent with wart     Erythematous eczematous patches and plaques     Dystrophic onycholytic nail with subungual debris c/w onychomycosis     Umbilicated papule    Erythematous-base heme-crusted tan verrucoid plaque consistent with inflamed seborrheic keratosis     Erythematous Silvery Scaling Plaque c/w Psoriasis     See annotation      Assessment / Plan:        AK (actinic keratosis)  Discussed all of the following:  Actinic keratosis is a skin growth caused by sun damage. These growths are not cancer, but they may develop into skin cancer (precancerous). Many people get these growths, especially as they age. This is because of cumulative sun exposure over years. Multiple growths are called actinic keratoses.    Cryosurgery Procedure Note    Verbal consent from the patient is obtained including, but not limited to, risk of hypopigmentation/hyperpigmentation, scar, recurrence of lesion. The patient is aware of the precancerous quality and need for treatment of these lesions. Liquid nitrogen cryosurgery is applied to the 1 actinic keratoses, as detailed in the physical exam, to produce a freeze injury. The patient is aware that blisters may form and is instructed on wound care with gentle cleansing and use of vaseline ointment to keep moist until healed. The patient is supplied a handout on cryosurgery and is instructed to call if lesions do not completely resolve.    We will recheck the  r f arm at our follow up appointment.    Benign mole  Discussed all of the following with the patient.    Patient to check their  breasts (if applicable), buttocks, and groin with a mirror.  Patient deferred our examination of these areas today.    Each month, check your body for any spots such as freckles, age spots, and moles.  Watch for color changes and shape changes and growth in size.    Have your negron or  pay attention to any dark color changes to moles of the scalp.    Moles, also called nevi, are small, colored (pigmented) marks on the skin. They have no known purpose. Many moles appear before age 30, but they also increase frequently as people age. Moles most often are not cancer (benign) and are harmless. But some become cancerous (malignant). Thats why you need to watch the moles on your body and tell your healthcare provider about any that concern you.    Numerous moles  We will recheck the back at our follow up appointment.  Consider biopsy of any suspicious moles or lesions later.  Plan x 3 for back.    Keloid  -     triamcinolone acetonide 0.1% (KENALOG) 0.1 % cream; AAA bid  Dispense: 30 g; Refill: 0  Discussed with patient the etiology and pathogenesis of the disease or skin lesion(s) and possible treatments and aggravators.    Reviewed with patient different treatment options and associated risks.  Proper application of medications and or care for affected area(s) and condition(s) reviewed.    Irritated mole  Plan shave x 1 l lower back/waist line.  Discussed with patient the etiology and pathogenesis of the disease or skin lesion(s) and possible treatments and aggravators.               Follow up in about 4 weeks (around 10/7/2020).

## 2020-09-09 NOTE — LETTER
September 9, 2020      Margarito Silva MD  [address] [facility] [address]  [place] [address]           [facility] Dermatology [address]  [address]  [facility] [address]  Phone: [phone]  Fax: [phone]          Patient: Dominic Collazo   MR Number: [id number]   YOB: 1980   Date of Visit: 9/9/2020       Dear Dr. Margarito Silva:    Thank you for referring Dominic Collazo to me for evaluation. Attached you will find relevant portions of my assessment and plan of care.    If you have questions, please do not hesitate to call me. I look forward to following Dominic Collazo along with you.    Sincerely,    Emmanuel Galvan MD    Enclosure  CC:  No Recipients    If you would like to receive this communication electronically, please contact [email or web address] or [phone] to request more information on [employer] access.    For providers and/or their staff who would like to refer a patient to [facility], please contact us through our one-stop-shop provider referral line, [facility], at [phone].    If you feel you have received this communication in error or would no longer like to receive these types of communications, please e-mail [email or web address]

## 2020-09-09 NOTE — PATIENT INSTRUCTIONS

## 2020-09-20 ENCOUNTER — PATIENT MESSAGE (OUTPATIENT)
Dept: DERMATOLOGY | Facility: CLINIC | Age: 40
End: 2020-09-20

## 2020-09-22 ENCOUNTER — OFFICE VISIT (OUTPATIENT)
Dept: URGENT CARE | Facility: CLINIC | Age: 40
End: 2020-09-22
Payer: COMMERCIAL

## 2020-09-22 VITALS
RESPIRATION RATE: 16 BRPM | HEART RATE: 90 BPM | DIASTOLIC BLOOD PRESSURE: 76 MMHG | SYSTOLIC BLOOD PRESSURE: 138 MMHG | HEIGHT: 75 IN | BODY MASS INDEX: 39.17 KG/M2 | TEMPERATURE: 98 F | WEIGHT: 315 LBS | OXYGEN SATURATION: 96 %

## 2020-09-22 DIAGNOSIS — S05.01XA ABRASION OF RIGHT CORNEA, INITIAL ENCOUNTER: Primary | ICD-10-CM

## 2020-09-22 PROCEDURE — 99213 PR OFFICE/OUTPT VISIT, EST, LEVL III, 20-29 MIN: ICD-10-PCS | Mod: S$GLB,,, | Performed by: FAMILY MEDICINE

## 2020-09-22 PROCEDURE — 99213 OFFICE O/P EST LOW 20 MIN: CPT | Mod: S$GLB,,, | Performed by: FAMILY MEDICINE

## 2020-09-22 RX ORDER — ERYTHROMYCIN 5 MG/G
OINTMENT OPHTHALMIC 3 TIMES DAILY
Qty: 3.5 G | Refills: 0 | Status: SHIPPED | OUTPATIENT
Start: 2020-09-22 | End: 2020-12-07

## 2020-09-22 NOTE — PROGRESS NOTES
"Subjective:       Patient ID: Dominic Collazo is a 39 y.o. male.    Vitals:  height is 6' 3" (1.905 m) and weight is 165.6 kg (365 lb) (abnormal). His temperature is 98.3 °F (36.8 °C). His blood pressure is 138/76 and his pulse is 90. His respiration is 16 and oxygen saturation is 96%.     Chief Complaint: Eye Problem    Eye Problem   The right eye is affected. This is a new problem. The current episode started today. The problem occurs constantly. The problem has been gradually worsening. There was no injury mechanism. There is no known exposure to pink eye. He wears contacts. Associated symptoms include eye redness and itching. Pertinent negatives include no blurred vision, eye discharge, double vision, fever, nausea, photophobia or vomiting. He has tried commercial eye wash for the symptoms.       Constitution: Negative for chills and fever.   HENT: Negative for congestion and sinus pain.    Eyes: Positive for eye itching, eye pain and eye redness. Negative for eye trauma, foreign body in eye, eye discharge, photophobia, vision loss, double vision, blurred vision and eyelid swelling.   Gastrointestinal: Negative for nausea and vomiting.   Skin: Negative for rash.   Allergic/Immunologic: Negative for seasonal allergies and itching.   Neurological: Negative for headaches.       Objective:      Physical Exam   Eyes: Lids are normal. Lids are everted and swept, no foreign bodies found. Right eye exhibits discharge. No foreign body present in the right eye. Right conjunctiva is injected.     extraocular movement intactvision grossly intact  Nursing note and vitals reviewed.        Assessment:       1. Abrasion of right cornea, initial encounter        Plan:         Abrasion of right cornea, initial encounter  -     erythromycin (ROMYCIN) ophthalmic ointment; Place into both eyes 3 (three) times daily.  Dispense: 3.5 g; Refill: 0           "

## 2020-10-05 ENCOUNTER — PATIENT OUTREACH (OUTPATIENT)
Dept: ADMINISTRATIVE | Facility: OTHER | Age: 40
End: 2020-10-05

## 2020-10-05 ENCOUNTER — CLINICAL SUPPORT (OUTPATIENT)
Dept: URGENT CARE | Facility: CLINIC | Age: 40
End: 2020-10-05
Payer: COMMERCIAL

## 2020-10-05 VITALS — OXYGEN SATURATION: 97 % | TEMPERATURE: 98 F | HEART RATE: 82 BPM

## 2020-10-05 DIAGNOSIS — Z03.818 ENCOUNTER FOR OBSERVATION FOR SUSPECTED EXPOSURE TO OTHER BIOLOGICAL AGENTS RULED OUT: Primary | ICD-10-CM

## 2020-10-05 LAB
CTP QC/QA: YES
SARS-COV-2 RDRP RESP QL NAA+PROBE: NEGATIVE

## 2020-10-05 PROCEDURE — U0002 COVID-19 LAB TEST NON-CDC: HCPCS | Mod: QW,S$GLB,, | Performed by: STUDENT IN AN ORGANIZED HEALTH CARE EDUCATION/TRAINING PROGRAM

## 2020-10-05 PROCEDURE — U0002: ICD-10-PCS | Mod: QW,S$GLB,, | Performed by: STUDENT IN AN ORGANIZED HEALTH CARE EDUCATION/TRAINING PROGRAM

## 2020-10-05 NOTE — PROGRESS NOTES
Requested updates within Care Everywhere.  Patient's chart was reviewed for overdue TIA topics.  Immunizations reconciled.    Orders placed:n/a  Tasked appts:n/a  Labs Linked:n/a

## 2020-10-07 ENCOUNTER — OFFICE VISIT (OUTPATIENT)
Dept: DERMATOLOGY | Facility: CLINIC | Age: 40
End: 2020-10-07
Payer: COMMERCIAL

## 2020-10-07 DIAGNOSIS — D49.2 NEOPLASM OF SKIN OF BACK: ICD-10-CM

## 2020-10-07 DIAGNOSIS — D49.9 NEOPLASM: Primary | ICD-10-CM

## 2020-10-07 DIAGNOSIS — D22.9 BENIGN MOLE: ICD-10-CM

## 2020-10-07 DIAGNOSIS — D49.2 NEOPLASM OF SKIN: ICD-10-CM

## 2020-10-07 DIAGNOSIS — D48.9 NEOPLASM OF UNCERTAIN BEHAVIOR: ICD-10-CM

## 2020-10-07 DIAGNOSIS — L90.5 SCAR: ICD-10-CM

## 2020-10-07 DIAGNOSIS — L57.0 ACTINIC KERATOSIS: ICD-10-CM

## 2020-10-07 PROCEDURE — 88305 TISSUE EXAM BY PATHOLOGIST: ICD-10-PCS | Mod: 26,,, | Performed by: DERMATOLOGY

## 2020-10-07 PROCEDURE — 11103 TANGNTL BX SKIN EA SEP/ADDL: CPT | Mod: S$GLB,,, | Performed by: DERMATOLOGY

## 2020-10-07 PROCEDURE — 88342 IMHCHEM/IMCYTCHM 1ST ANTB: CPT | Mod: 26,,, | Performed by: DERMATOLOGY

## 2020-10-07 PROCEDURE — 11102 TANGNTL BX SKIN SINGLE LES: CPT | Mod: S$GLB,,, | Performed by: DERMATOLOGY

## 2020-10-07 PROCEDURE — 11103 PR TANGENTIAL BIOPSY, SKIN, EA ADDTL LESION: ICD-10-PCS | Mod: S$GLB,,, | Performed by: DERMATOLOGY

## 2020-10-07 PROCEDURE — 99213 OFFICE O/P EST LOW 20 MIN: CPT | Mod: 25,S$GLB,, | Performed by: DERMATOLOGY

## 2020-10-07 PROCEDURE — 99213 PR OFFICE/OUTPT VISIT, EST, LEVL III, 20-29 MIN: ICD-10-PCS | Mod: 25,S$GLB,, | Performed by: DERMATOLOGY

## 2020-10-07 PROCEDURE — 88305 TISSUE EXAM BY PATHOLOGIST: CPT | Mod: 26,,, | Performed by: DERMATOLOGY

## 2020-10-07 PROCEDURE — 99999 PR PBB SHADOW E&M-EST. PATIENT-LVL II: CPT | Mod: PBBFAC,,, | Performed by: DERMATOLOGY

## 2020-10-07 PROCEDURE — 99999 PR PBB SHADOW E&M-EST. PATIENT-LVL II: ICD-10-PCS | Mod: PBBFAC,,, | Performed by: DERMATOLOGY

## 2020-10-07 PROCEDURE — 88342 IMHCHEM/IMCYTCHM 1ST ANTB: CPT | Mod: 59 | Performed by: DERMATOLOGY

## 2020-10-07 PROCEDURE — 88342 CHG IMMUNOCYTOCHEMISTRY: ICD-10-PCS | Mod: 26,,, | Performed by: DERMATOLOGY

## 2020-10-07 PROCEDURE — 11102 PR TANGENTIAL BIOPSY, SKIN, SINGLE LESION: ICD-10-PCS | Mod: S$GLB,,, | Performed by: DERMATOLOGY

## 2020-10-07 PROCEDURE — 88305 TISSUE EXAM BY PATHOLOGIST: CPT | Mod: 59 | Performed by: DERMATOLOGY

## 2020-10-07 NOTE — PATIENT INSTRUCTIONS

## 2020-10-07 NOTE — PROGRESS NOTES
Subjective:       Patient ID:  Dominic Collazo is a 39 y.o. male who presents for   Chief Complaint   Patient presents with    Actinic Keratosis     Patient is a 40 yo mal e present for Ak f/u    Also has moles of the back for bx's.    Has scar on the l wrist.      Review of Systems   Constitutional: Negative for fever.   HENT: Negative for sore throat.    Respiratory: Negative for cough.    Skin: Negative for itching.        Objective:    Physical Exam   Constitutional: He appears well-developed and well-nourished. He is obese.    Eyes: No conjunctival no injection.   Neurological: He is alert and oriented to person, place, and time.   Psychiatric: He has a normal mood and affect.   Skin:   Areas Examined (abnormalities noted in diagram):   Back Inspection Performed  RUE Inspected  LUE Inspection Performed              Diagram Legend     Erythematous scaling macule/papule c/w actinic keratosis       Vascular papule c/w angioma      Pigmented verrucoid papule/plaque c/w seborrheic keratosis      Yellow umbilicated papule c/w sebaceous hyperplasia      Irregularly shaped tan macule c/w lentigo     1-2 mm smooth white papules consistent with Milia      Movable subcutaneous cyst with punctum c/w epidermal inclusion cyst      Subcutaneous movable cyst c/w pilar cyst      Firm pink to brown papule c/w dermatofibroma      Pedunculated fleshy papule(s) c/w skin tag(s)      Evenly pigmented macule c/w junctional nevus     Mildly variegated pigmented, slightly irregular-bordered macule c/w mildly atypical nevus      Flesh colored to evenly pigmented papule c/w intradermal nevus       Pink pearly papule/plaque c/w basal cell carcinoma      Erythematous hyperkeratotic cursted plaque c/w SCC      Surgical scar with no sign of skin cancer recurrence      Open and closed comedones      Inflammatory papules and pustules      Verrucoid papule consistent consistent with wart     Erythematous eczematous patches and  plaques     Dystrophic onycholytic nail with subungual debris c/w onychomycosis     Umbilicated papule    Erythematous-base heme-crusted tan verrucoid plaque consistent with inflamed seborrheic keratosis     Erythematous Silvery Scaling Plaque c/w Psoriasis     See annotation          Assessment / Plan:      Pathology Orders:     Normal Orders This Visit    Specimen to Pathology, Dermatology     Comments:    Number of Specimens:->3  ------------------------->-------------------------  Spec 1 Procedure:->Biopsy  Spec 1 Clinical Impression:->atyp mole  Spec 1 Source:->central back  ------------------------->-------------------------  Spec 2 Procedure:->Biopsy  Spec 2 Clinical Impression:->atyp mole  Spec 2 Source:->l back  ------------------------->-------------------------  Spec 3 Procedure:->Biopsy  Spec 3 Clinical Impression:->atyp mole  Spec 3 Source:->lower back    Questions:    Procedure Type: Dermatology and skin neoplasms    Number of Specimens: 3    ------------------------: -------------------------    Spec 1 Procedure: Biopsy    Spec 1 Clinical Impression: atyp mole    Spec 1 Source: central back    ------------------------: -------------------------    Spec 2 Procedure: Biopsy    Spec 2 Clinical Impression: atyp mole    Spec 2 Source: l back    ------------------------: -------------------------    Spec 3 Procedure: Biopsy    Spec 3 Clinical Impression: atyp mole    Spec 3 Source: lower back    Specimen to Pathology, Dermatology     Comments:    Number of Specimens:->1  ------------------------->-------------------------  Spec 1 Procedure:->Biopsy  Spec 1 Clinical Impression:->atyp mole  Spec 1 Source:->waistline    Questions:    Procedure Type: Dermatology and skin neoplasms    Number of Specimens: 1    ------------------------: -------------------------    Spec 1 Procedure: Biopsy    Spec 1 Clinical Impression: atyp mole    Spec 1 Source: waistline        Neoplasm  -     Specimen to Pathology,  Dermatology  Shave biopsy procedure note:    Shave biopsy performed after verbal consent including risk of infection, scar, recurrence, need for additional treatment of site. Area prepped with alcohol, anesthetized with approximately 1.0cc of 1% lidocaine with epinephrine. Lesional tissue shaved with razor blade. Hemostasis achieved with application of aluminum chloride followed by hyfrecation. No complications. Dressing applied. Wound care explained.      Neoplasm of skin  -     Specimen to Pathology, Dermatology  Shave biopsy procedure note:    Shave biopsy performed after verbal consent including risk of infection, scar, recurrence, need for additional treatment of site. Area prepped with alcohol, anesthetized with approximately 1.0cc of 1% lidocaine with epinephrine. Lesional tissue shaved with razor blade. Hemostasis achieved with application of aluminum chloride followed by hyfrecation. No complications. Dressing applied. Wound care explained.      Neoplasm of uncertain behavior  -     Specimen to Pathology, Dermatology  Shave biopsy procedure note:    Shave biopsy performed after verbal consent including risk of infection, scar, recurrence, need for additional treatment of site. Area prepped with alcohol, anesthetized with approximately 1.0cc of 1% lidocaine with epinephrine. Lesional tissue shaved with razor blade. Hemostasis achieved with application of aluminum chloride followed by hyfrecation. No complications. Dressing applied. Wound care explained.      Actinic keratosis  Resolved.  Patient to contact us for earlier follow up if anything recurs.  We will recheck the r f arm at our follow up appointment.  Discussed with patient the etiology and pathogenesis of the disease or skin lesion(s) and possible treatments and aggravators.    Instructed patient to use petroleum jelly at least daily on affected areas for residual scab.      Benign mole  Discussed with patient the benign nature of these lesions and  that no treatment is indicated.    We will recheck the l upper back at our follow up appointment.  Consider biopsy of any suspicious moles or lesions later.      Scar  Condition is stable.  We will continue present management.  Cont pt's tac bid focally.  Proper application of medications and or care for affected area(s) and condition(s) reviewed.    Neoplasm of skin of back  -     Specimen to Pathology, Dermatology  Shave biopsy procedure note:    Shave biopsy performed after verbal consent including risk of infection, scar, recurrence, need for additional treatment of site. Area prepped with alcohol, anesthetized with approximately 1.0cc of 1% lidocaine with epinephrine. Lesional tissue shaved with razor blade. Hemostasis achieved with application of aluminum chloride followed by hyfrecation. No complications. Dressing applied. Wound care explained.             Follow up in about 6 months (around 4/7/2021) for TBSE.

## 2020-10-12 ENCOUNTER — PATIENT MESSAGE (OUTPATIENT)
Dept: INTERNAL MEDICINE | Facility: CLINIC | Age: 40
End: 2020-10-12

## 2020-10-12 NOTE — TELEPHONE ENCOUNTER
"Get ready:  His email said :    " I would like to open a dialogue with you about my potential COVID risk factors. 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 Just today, I had a student with confirmed COVID-19. This student happened to be the literal closest student to my desk. They measured from his chair to mine, and it was 73", just out of the 6 ft. requirement. Needless to say, my keyboard, mouse, books etc. were all under the six feet threshold but that one inch is supposed to magically save me from exposure.     Am I crazy to be concerned? I have high blood pressure and obesity, which I am still working on with diet, but I know those are risk factors. My school system has a procedure in place for having teachers with risk factors, of which HBP is identified, teach from home. I was actually already on a 14 day quarantine due to exposure outside of work and I taught from home just fine.    Should I pursue teaching from home or should I just get over it? I can say that since March, I have been around fewer than 10 people closely. We don't go out too much and I take all the precautions I am supposed to with my wife and two children. I don't like the idea of getting exposed due to people playing it loose with the COVID protocol simply to save a few dollars on a substitute.     Any advice would be appreciated. Thank you for your time.  "    Please advise what we should tell him.  "

## 2020-10-12 NOTE — LETTER
October 12, 2020    Dominic Collazo  [address]  [address]  [place] [address]             [place] - Internal Medicine  [address] [facility]  [place] [address]  Phone: [phone]  Fax: [phone] To whom it May concern:    Secondary to his chronic medical conditions, it is in Mr. Collazo' best interest to work from home until further notice secondary to being in a high risk group if he were to contract COVID.  If you have any further questions or concerns, please do not hesitate to call.    Sincerely,    Arjun Cross MD

## 2020-10-13 ENCOUNTER — PATIENT MESSAGE (OUTPATIENT)
Dept: INTERNAL MEDICINE | Facility: CLINIC | Age: 40
End: 2020-10-13

## 2020-10-13 LAB
FINAL PATHOLOGIC DIAGNOSIS: NORMAL
GROSS: NORMAL
MICROSCOPIC EXAM: NORMAL

## 2020-10-15 ENCOUNTER — OFFICE VISIT (OUTPATIENT)
Dept: URGENT CARE | Facility: CLINIC | Age: 40
End: 2020-10-15
Payer: COMMERCIAL

## 2020-10-15 ENCOUNTER — HOSPITAL ENCOUNTER (OUTPATIENT)
Facility: HOSPITAL | Age: 40
Discharge: HOME | End: 2020-10-17
Attending: EMERGENCY MEDICINE | Admitting: STUDENT IN AN ORGANIZED HEALTH CARE EDUCATION/TRAINING PROGRAM
Payer: COMMERCIAL

## 2020-10-15 VITALS
OXYGEN SATURATION: 97 % | HEART RATE: 87 BPM | TEMPERATURE: 98 F | HEIGHT: 75 IN | DIASTOLIC BLOOD PRESSURE: 84 MMHG | SYSTOLIC BLOOD PRESSURE: 132 MMHG | WEIGHT: 315 LBS | BODY MASS INDEX: 39.17 KG/M2

## 2020-10-15 DIAGNOSIS — K42.9 UMBILICAL HERNIA WITHOUT OBSTRUCTION AND WITHOUT GANGRENE: Primary | ICD-10-CM

## 2020-10-15 DIAGNOSIS — K42.0 IRREDUCIBLE UMBILICAL HERNIA: Primary | ICD-10-CM

## 2020-10-15 LAB
ALBUMIN SERPL BCP-MCNC: 4.2 G/DL (ref 3.5–5.2)
ALP SERPL-CCNC: 56 U/L (ref 55–135)
ALT SERPL W/O P-5'-P-CCNC: 78 U/L (ref 10–44)
ANION GAP SERPL CALC-SCNC: 9 MMOL/L (ref 8–16)
AST SERPL-CCNC: 41 U/L (ref 10–40)
BASOPHILS # BLD AUTO: 0.08 K/UL (ref 0–0.2)
BASOPHILS NFR BLD: 0.9 % (ref 0–1.9)
BILIRUB SERPL-MCNC: 0.3 MG/DL (ref 0.1–1)
BUN SERPL-MCNC: 15 MG/DL (ref 6–20)
BUN SERPL-MCNC: 15 MG/DL (ref 6–30)
CALCIUM SERPL-MCNC: 9.3 MG/DL (ref 8.7–10.5)
CHLORIDE SERPL-SCNC: 103 MMOL/L (ref 95–110)
CHLORIDE SERPL-SCNC: 105 MMOL/L (ref 95–110)
CO2 SERPL-SCNC: 23 MMOL/L (ref 23–29)
CREAT SERPL-MCNC: 0.8 MG/DL (ref 0.5–1.4)
CREAT SERPL-MCNC: 0.9 MG/DL (ref 0.5–1.4)
CTP QC/QA: YES
DIFFERENTIAL METHOD: ABNORMAL
EOSINOPHIL # BLD AUTO: 0.4 K/UL (ref 0–0.5)
EOSINOPHIL NFR BLD: 4.3 % (ref 0–8)
ERYTHROCYTE [DISTWIDTH] IN BLOOD BY AUTOMATED COUNT: 14.1 % (ref 11.5–14.5)
EST. GFR  (AFRICAN AMERICAN): >60 ML/MIN/1.73 M^2
EST. GFR  (NON AFRICAN AMERICAN): >60 ML/MIN/1.73 M^2
GLUCOSE SERPL-MCNC: 76 MG/DL (ref 70–110)
GLUCOSE SERPL-MCNC: 78 MG/DL (ref 70–110)
HCT VFR BLD AUTO: 40.7 % (ref 40–54)
HCT VFR BLD CALC: 38 %PCV (ref 36–54)
HGB BLD-MCNC: 13.4 G/DL (ref 14–18)
IMM GRANULOCYTES # BLD AUTO: 0.02 K/UL (ref 0–0.04)
IMM GRANULOCYTES NFR BLD AUTO: 0.2 % (ref 0–0.5)
LACTATE SERPL-SCNC: 0.9 MMOL/L (ref 0.5–2.2)
LYMPHOCYTES # BLD AUTO: 3.5 K/UL (ref 1–4.8)
LYMPHOCYTES NFR BLD: 38.6 % (ref 18–48)
MCH RBC QN AUTO: 29.1 PG (ref 27–31)
MCHC RBC AUTO-ENTMCNC: 32.9 G/DL (ref 32–36)
MCV RBC AUTO: 88 FL (ref 82–98)
MONOCYTES # BLD AUTO: 0.7 K/UL (ref 0.3–1)
MONOCYTES NFR BLD: 7.3 % (ref 4–15)
NEUTROPHILS # BLD AUTO: 4.4 K/UL (ref 1.8–7.7)
NEUTROPHILS NFR BLD: 48.7 % (ref 38–73)
NRBC BLD-RTO: 0 /100 WBC
PLATELET # BLD AUTO: 275 K/UL (ref 150–350)
PMV BLD AUTO: 11.2 FL (ref 9.2–12.9)
POC IONIZED CALCIUM: 1.13 MMOL/L (ref 1.06–1.42)
POC TCO2 (MEASURED): 21 MMOL/L (ref 23–29)
POTASSIUM BLD-SCNC: 3.8 MMOL/L (ref 3.5–5.1)
POTASSIUM SERPL-SCNC: 3.9 MMOL/L (ref 3.5–5.1)
PROT SERPL-MCNC: 7.3 G/DL (ref 6–8.4)
RBC # BLD AUTO: 4.61 M/UL (ref 4.6–6.2)
SAMPLE: ABNORMAL
SARS-COV-2 RDRP RESP QL NAA+PROBE: NEGATIVE
SODIUM BLD-SCNC: 139 MMOL/L (ref 136–145)
SODIUM SERPL-SCNC: 137 MMOL/L (ref 136–145)
WBC # BLD AUTO: 8.98 K/UL (ref 3.9–12.7)

## 2020-10-15 PROCEDURE — 83605 ASSAY OF LACTIC ACID: CPT

## 2020-10-15 PROCEDURE — 99285 EMERGENCY DEPT VISIT HI MDM: CPT | Mod: ,,, | Performed by: EMERGENCY MEDICINE

## 2020-10-15 PROCEDURE — 85025 COMPLETE CBC W/AUTO DIFF WBC: CPT

## 2020-10-15 PROCEDURE — 63600175 PHARM REV CODE 636 W HCPCS: Performed by: EMERGENCY MEDICINE

## 2020-10-15 PROCEDURE — 82330 ASSAY OF CALCIUM: CPT

## 2020-10-15 PROCEDURE — 80053 COMPREHEN METABOLIC PANEL: CPT

## 2020-10-15 PROCEDURE — G0378 HOSPITAL OBSERVATION PER HR: HCPCS

## 2020-10-15 PROCEDURE — 99214 PR OFFICE/OUTPT VISIT, EST, LEVL IV, 30-39 MIN: ICD-10-PCS | Mod: S$GLB,,, | Performed by: INTERNAL MEDICINE

## 2020-10-15 PROCEDURE — 96376 TX/PRO/DX INJ SAME DRUG ADON: CPT

## 2020-10-15 PROCEDURE — 99285 EMERGENCY DEPT VISIT HI MDM: CPT | Mod: 25

## 2020-10-15 PROCEDURE — 99285 PR EMERGENCY DEPT VISIT,LEVEL V: ICD-10-PCS | Mod: ,,, | Performed by: EMERGENCY MEDICINE

## 2020-10-15 PROCEDURE — U0002 COVID-19 LAB TEST NON-CDC: HCPCS | Performed by: EMERGENCY MEDICINE

## 2020-10-15 PROCEDURE — 80047 BASIC METABLC PNL IONIZED CA: CPT

## 2020-10-15 PROCEDURE — 96374 THER/PROPH/DIAG INJ IV PUSH: CPT | Mod: 59

## 2020-10-15 PROCEDURE — 99214 OFFICE O/P EST MOD 30 MIN: CPT | Mod: S$GLB,,, | Performed by: INTERNAL MEDICINE

## 2020-10-15 PROCEDURE — 25500020 PHARM REV CODE 255: Performed by: EMERGENCY MEDICINE

## 2020-10-15 RX ORDER — PRAVASTATIN SODIUM 10 MG/1
10 TABLET ORAL DAILY
Status: DISCONTINUED | OUTPATIENT
Start: 2020-10-16 | End: 2020-10-17 | Stop reason: HOSPADM

## 2020-10-15 RX ORDER — MORPHINE SULFATE 2 MG/ML
6 INJECTION, SOLUTION INTRAMUSCULAR; INTRAVENOUS
Status: COMPLETED | OUTPATIENT
Start: 2020-10-15 | End: 2020-10-15

## 2020-10-15 RX ORDER — METOPROLOL SUCCINATE 100 MG/1
100 TABLET, EXTENDED RELEASE ORAL DAILY
Status: DISCONTINUED | OUTPATIENT
Start: 2020-10-16 | End: 2020-10-17 | Stop reason: HOSPADM

## 2020-10-15 RX ORDER — HYDROMORPHONE HYDROCHLORIDE 1 MG/ML
1 INJECTION, SOLUTION INTRAMUSCULAR; INTRAVENOUS; SUBCUTANEOUS EVERY 4 HOURS PRN
Status: DISCONTINUED | OUTPATIENT
Start: 2020-10-15 | End: 2020-10-17 | Stop reason: HOSPADM

## 2020-10-15 RX ORDER — SODIUM CHLORIDE 0.9 % (FLUSH) 0.9 %
10 SYRINGE (ML) INJECTION
Status: DISCONTINUED | OUTPATIENT
Start: 2020-10-15 | End: 2020-10-17 | Stop reason: HOSPADM

## 2020-10-15 RX ORDER — BUSPIRONE HYDROCHLORIDE 5 MG/1
10 TABLET ORAL 3 TIMES DAILY
Status: DISCONTINUED | OUTPATIENT
Start: 2020-10-16 | End: 2020-10-17 | Stop reason: HOSPADM

## 2020-10-15 RX ORDER — ONDANSETRON 2 MG/ML
4 INJECTION INTRAMUSCULAR; INTRAVENOUS EVERY 8 HOURS PRN
Status: DISCONTINUED | OUTPATIENT
Start: 2020-10-15 | End: 2020-10-17 | Stop reason: HOSPADM

## 2020-10-15 RX ORDER — HYDROMORPHONE HYDROCHLORIDE 1 MG/ML
0.5 INJECTION, SOLUTION INTRAMUSCULAR; INTRAVENOUS; SUBCUTANEOUS EVERY 4 HOURS PRN
Status: DISCONTINUED | OUTPATIENT
Start: 2020-10-15 | End: 2020-10-16

## 2020-10-15 RX ORDER — BUPROPION HYDROCHLORIDE 300 MG/1
300 TABLET ORAL DAILY
Status: DISCONTINUED | OUTPATIENT
Start: 2020-10-16 | End: 2020-10-17 | Stop reason: HOSPADM

## 2020-10-15 RX ADMIN — IOHEXOL 100 ML: 350 INJECTION, SOLUTION INTRAVENOUS at 09:10

## 2020-10-15 RX ADMIN — MORPHINE SULFATE 6 MG: 2 INJECTION, SOLUTION INTRAMUSCULAR; INTRAVENOUS at 08:10

## 2020-10-15 RX ADMIN — MORPHINE SULFATE 6 MG: 2 INJECTION, SOLUTION INTRAMUSCULAR; INTRAVENOUS at 06:10

## 2020-10-15 NOTE — PROGRESS NOTES
"Subjective:       Patient ID: Dominic Collazo is a 40 y.o. male.    Vitals:  height is 6' 3" (1.905 m) and weight is 165.6 kg (365 lb) (abnormal). His temperature is 97.5 °F (36.4 °C). His blood pressure is 132/84 and his pulse is 87. His oxygen saturation is 97%.     Chief Complaint: Umbilical Hernia    Pt states that he was moving something heavy yesterday, and felt something pop.    Other  This is a new problem. The current episode started yesterday. The problem occurs constantly. The problem has been unchanged. Pertinent negatives include no abdominal pain, anorexia, arthralgias, change in bowel habit, chest pain, chills, congestion, coughing, diaphoresis, fatigue, fever, headaches, joint swelling, myalgias, nausea, neck pain, numbness, rash, sore throat, swollen glands, urinary symptoms, vertigo, visual change, vomiting or weakness. The symptoms are aggravated by bending and standing. He has tried nothing for the symptoms. The treatment provided no relief.       Constitution: Negative for chills, sweating, fatigue and fever.   HENT: Negative for congestion and sore throat.    Neck: Negative for neck pain.   Cardiovascular: Negative for chest pain.   Respiratory: Negative for cough.    Gastrointestinal: Negative for abdominal pain, nausea and vomiting.   Musculoskeletal: Negative for joint pain, joint swelling and muscle ache.   Skin: Negative for rash.   Neurological: Negative for history of vertigo, headaches and numbness.       Objective:      Physical Exam   Constitutional: normal  HENT:   Head: Normocephalic and atraumatic.   Neck: Normal range of motion. Neck supple.   Cardiovascular: Normal rate and regular rhythm.   Pulmonary/Chest: Effort normal and breath sounds normal.   Abdominal: Normal appearance.      Comments: Palpable umbilical hernia; able to partially reduce but increasing pain and not able to fully reduce and began with radiation of pain across lower abdomen in to scrotal area "   Neurological: He is alert.   Nursing note and vitals reviewed.        Assessment:       1. Irreducible umbilical hernia        Plan:         Irreducible umbilical hernia

## 2020-10-16 ENCOUNTER — ANESTHESIA (OUTPATIENT)
Dept: SURGERY | Facility: HOSPITAL | Age: 40
End: 2020-10-16
Payer: COMMERCIAL

## 2020-10-16 ENCOUNTER — ANESTHESIA EVENT (OUTPATIENT)
Dept: SURGERY | Facility: HOSPITAL | Age: 40
End: 2020-10-16
Payer: COMMERCIAL

## 2020-10-16 PROBLEM — K42.9 UMBILICAL HERNIA WITHOUT OBSTRUCTION AND WITHOUT GANGRENE: Status: ACTIVE | Noted: 2020-10-16

## 2020-10-16 PROBLEM — K42.9 UMBILICAL HERNIA WITHOUT OBSTRUCTION AND WITHOUT GANGRENE: Status: RESOLVED | Noted: 2020-10-15 | Resolved: 2020-10-16

## 2020-10-16 PROBLEM — Z87.19 HX OF UMBILICAL HERNIA REPAIR: Status: ACTIVE | Noted: 2020-10-16

## 2020-10-16 PROBLEM — Z98.890 HX OF UMBILICAL HERNIA REPAIR: Status: ACTIVE | Noted: 2020-10-16

## 2020-10-16 LAB
ALBUMIN SERPL BCP-MCNC: 3.9 G/DL (ref 3.5–5.2)
ALP SERPL-CCNC: 51 U/L (ref 55–135)
ALT SERPL W/O P-5'-P-CCNC: 86 U/L (ref 10–44)
ANION GAP SERPL CALC-SCNC: 8 MMOL/L (ref 8–16)
AST SERPL-CCNC: 53 U/L (ref 10–40)
BASOPHILS # BLD AUTO: 0.08 K/UL (ref 0–0.2)
BASOPHILS NFR BLD: 1 % (ref 0–1.9)
BILIRUB SERPL-MCNC: 0.3 MG/DL (ref 0.1–1)
BILIRUB UR QL STRIP: NEGATIVE
BUN SERPL-MCNC: 15 MG/DL (ref 6–20)
CALCIUM SERPL-MCNC: 9.1 MG/DL (ref 8.7–10.5)
CHLORIDE SERPL-SCNC: 102 MMOL/L (ref 95–110)
CLARITY UR REFRACT.AUTO: CLEAR
CO2 SERPL-SCNC: 28 MMOL/L (ref 23–29)
COLOR UR AUTO: YELLOW
CREAT SERPL-MCNC: 1 MG/DL (ref 0.5–1.4)
DIFFERENTIAL METHOD: ABNORMAL
EOSINOPHIL # BLD AUTO: 0.4 K/UL (ref 0–0.5)
EOSINOPHIL NFR BLD: 4.8 % (ref 0–8)
ERYTHROCYTE [DISTWIDTH] IN BLOOD BY AUTOMATED COUNT: 14.1 % (ref 11.5–14.5)
EST. GFR  (AFRICAN AMERICAN): >60 ML/MIN/1.73 M^2
EST. GFR  (NON AFRICAN AMERICAN): >60 ML/MIN/1.73 M^2
GLUCOSE SERPL-MCNC: 152 MG/DL (ref 70–110)
GLUCOSE UR QL STRIP: NEGATIVE
HCT VFR BLD AUTO: 40.5 % (ref 40–54)
HGB BLD-MCNC: 13.1 G/DL (ref 14–18)
HGB UR QL STRIP: NEGATIVE
IMM GRANULOCYTES # BLD AUTO: 0.03 K/UL (ref 0–0.04)
IMM GRANULOCYTES NFR BLD AUTO: 0.4 % (ref 0–0.5)
KETONES UR QL STRIP: NEGATIVE
LEUKOCYTE ESTERASE UR QL STRIP: NEGATIVE
LYMPHOCYTES # BLD AUTO: 2.9 K/UL (ref 1–4.8)
LYMPHOCYTES NFR BLD: 34.5 % (ref 18–48)
MCH RBC QN AUTO: 29 PG (ref 27–31)
MCHC RBC AUTO-ENTMCNC: 32.3 G/DL (ref 32–36)
MCV RBC AUTO: 90 FL (ref 82–98)
MONOCYTES # BLD AUTO: 0.6 K/UL (ref 0.3–1)
MONOCYTES NFR BLD: 6.7 % (ref 4–15)
NEUTROPHILS # BLD AUTO: 4.4 K/UL (ref 1.8–7.7)
NEUTROPHILS NFR BLD: 52.6 % (ref 38–73)
NITRITE UR QL STRIP: NEGATIVE
NRBC BLD-RTO: 0 /100 WBC
PH UR STRIP: 5 [PH] (ref 5–8)
PLATELET # BLD AUTO: 247 K/UL (ref 150–350)
PMV BLD AUTO: 11.5 FL (ref 9.2–12.9)
POTASSIUM SERPL-SCNC: 3.5 MMOL/L (ref 3.5–5.1)
PROT SERPL-MCNC: 6.6 G/DL (ref 6–8.4)
PROT UR QL STRIP: NEGATIVE
RBC # BLD AUTO: 4.51 M/UL (ref 4.6–6.2)
SODIUM SERPL-SCNC: 138 MMOL/L (ref 136–145)
SP GR UR STRIP: >1.03 (ref 1–1.03)
URN SPEC COLLECT METH UR: ABNORMAL
WBC # BLD AUTO: 8.4 K/UL (ref 3.9–12.7)

## 2020-10-16 PROCEDURE — 63600175 PHARM REV CODE 636 W HCPCS: Performed by: STUDENT IN AN ORGANIZED HEALTH CARE EDUCATION/TRAINING PROGRAM

## 2020-10-16 PROCEDURE — 99499 NO LOS: ICD-10-PCS | Mod: ,,, | Performed by: STUDENT IN AN ORGANIZED HEALTH CARE EDUCATION/TRAINING PROGRAM

## 2020-10-16 PROCEDURE — 94761 N-INVAS EAR/PLS OXIMETRY MLT: CPT

## 2020-10-16 PROCEDURE — 49587 PR REPAIR UMBILICAL HERN,5+Y/O,STRANG: ICD-10-PCS | Mod: ,,, | Performed by: SURGERY

## 2020-10-16 PROCEDURE — 25000003 PHARM REV CODE 250: Performed by: SURGERY

## 2020-10-16 PROCEDURE — G0378 HOSPITAL OBSERVATION PER HR: HCPCS

## 2020-10-16 PROCEDURE — 80053 COMPREHEN METABOLIC PANEL: CPT

## 2020-10-16 PROCEDURE — 25000003 PHARM REV CODE 250: Performed by: STUDENT IN AN ORGANIZED HEALTH CARE EDUCATION/TRAINING PROGRAM

## 2020-10-16 PROCEDURE — C1781 MESH (IMPLANTABLE): HCPCS | Performed by: STUDENT IN AN ORGANIZED HEALTH CARE EDUCATION/TRAINING PROGRAM

## 2020-10-16 PROCEDURE — 81003 URINALYSIS AUTO W/O SCOPE: CPT

## 2020-10-16 PROCEDURE — 71000033 HC RECOVERY, INTIAL HOUR: Performed by: STUDENT IN AN ORGANIZED HEALTH CARE EDUCATION/TRAINING PROGRAM

## 2020-10-16 PROCEDURE — D9220A PRA ANESTHESIA: Mod: ,,, | Performed by: ANESTHESIOLOGY

## 2020-10-16 PROCEDURE — 49587 PR REPAIR UMBILICAL HERN,5+Y/O,STRANG: CPT | Mod: ,,, | Performed by: SURGERY

## 2020-10-16 PROCEDURE — 36415 COLL VENOUS BLD VENIPUNCTURE: CPT

## 2020-10-16 PROCEDURE — 37000008 HC ANESTHESIA 1ST 15 MINUTES: Performed by: STUDENT IN AN ORGANIZED HEALTH CARE EDUCATION/TRAINING PROGRAM

## 2020-10-16 PROCEDURE — D9220A PRA ANESTHESIA: ICD-10-PCS | Mod: ,,, | Performed by: ANESTHESIOLOGY

## 2020-10-16 PROCEDURE — 85025 COMPLETE CBC W/AUTO DIFF WBC: CPT

## 2020-10-16 PROCEDURE — 36000707: Performed by: STUDENT IN AN ORGANIZED HEALTH CARE EDUCATION/TRAINING PROGRAM

## 2020-10-16 PROCEDURE — 36000706: Performed by: STUDENT IN AN ORGANIZED HEALTH CARE EDUCATION/TRAINING PROGRAM

## 2020-10-16 PROCEDURE — 37000009 HC ANESTHESIA EA ADD 15 MINS: Performed by: STUDENT IN AN ORGANIZED HEALTH CARE EDUCATION/TRAINING PROGRAM

## 2020-10-16 PROCEDURE — 99499 UNLISTED E&M SERVICE: CPT | Mod: ,,, | Performed by: STUDENT IN AN ORGANIZED HEALTH CARE EDUCATION/TRAINING PROGRAM

## 2020-10-16 DEVICE — PATCH HERNIA MESH VENTRALEX: Type: IMPLANTABLE DEVICE | Site: ABDOMEN | Status: FUNCTIONAL

## 2020-10-16 RX ORDER — HYDROMORPHONE HYDROCHLORIDE 1 MG/ML
0.2 INJECTION, SOLUTION INTRAMUSCULAR; INTRAVENOUS; SUBCUTANEOUS EVERY 5 MIN PRN
Status: DISCONTINUED | OUTPATIENT
Start: 2020-10-16 | End: 2020-10-17 | Stop reason: HOSPADM

## 2020-10-16 RX ORDER — FENTANYL CITRATE 50 UG/ML
25 INJECTION, SOLUTION INTRAMUSCULAR; INTRAVENOUS EVERY 5 MIN PRN
Status: COMPLETED | OUTPATIENT
Start: 2020-10-16 | End: 2020-10-16

## 2020-10-16 RX ORDER — OXYCODONE AND ACETAMINOPHEN 10; 325 MG/1; MG/1
1 TABLET ORAL EVERY 4 HOURS PRN
Status: DISCONTINUED | OUTPATIENT
Start: 2020-10-16 | End: 2020-10-17 | Stop reason: HOSPADM

## 2020-10-16 RX ORDER — CETIRIZINE HYDROCHLORIDE 10 MG/1
10 TABLET ORAL DAILY
Status: DISCONTINUED | OUTPATIENT
Start: 2020-10-17 | End: 2020-10-17 | Stop reason: HOSPADM

## 2020-10-16 RX ORDER — ROCURONIUM BROMIDE 10 MG/ML
INJECTION, SOLUTION INTRAVENOUS
Status: DISCONTINUED | OUTPATIENT
Start: 2020-10-16 | End: 2020-10-16

## 2020-10-16 RX ORDER — LIDOCAINE HYDROCHLORIDE 20 MG/ML
INJECTION INTRAVENOUS
Status: DISCONTINUED | OUTPATIENT
Start: 2020-10-16 | End: 2020-10-16

## 2020-10-16 RX ORDER — CLINDAMYCIN PHOSPHATE 900 MG/50ML
INJECTION, SOLUTION INTRAVENOUS
Status: DISCONTINUED | OUTPATIENT
Start: 2020-10-16 | End: 2020-10-16

## 2020-10-16 RX ORDER — PROPOFOL 10 MG/ML
VIAL (ML) INTRAVENOUS
Status: DISCONTINUED | OUTPATIENT
Start: 2020-10-16 | End: 2020-10-16

## 2020-10-16 RX ORDER — KETAMINE HCL IN 0.9 % NACL 50 MG/5 ML
SYRINGE (ML) INTRAVENOUS
Status: DISCONTINUED | OUTPATIENT
Start: 2020-10-16 | End: 2020-10-16

## 2020-10-16 RX ORDER — SODIUM CHLORIDE 9 MG/ML
INJECTION, SOLUTION INTRAVENOUS CONTINUOUS PRN
Status: DISCONTINUED | OUTPATIENT
Start: 2020-10-16 | End: 2020-10-16

## 2020-10-16 RX ORDER — BUPIVACAINE HYDROCHLORIDE 5 MG/ML
INJECTION, SOLUTION EPIDURAL; INTRACAUDAL
Status: DISCONTINUED | OUTPATIENT
Start: 2020-10-16 | End: 2020-10-16 | Stop reason: HOSPADM

## 2020-10-16 RX ORDER — MIDAZOLAM HYDROCHLORIDE 1 MG/ML
INJECTION, SOLUTION INTRAMUSCULAR; INTRAVENOUS
Status: DISCONTINUED | OUTPATIENT
Start: 2020-10-16 | End: 2020-10-16

## 2020-10-16 RX ORDER — DEXAMETHASONE SODIUM PHOSPHATE 4 MG/ML
INJECTION, SOLUTION INTRA-ARTICULAR; INTRALESIONAL; INTRAMUSCULAR; INTRAVENOUS; SOFT TISSUE
Status: DISCONTINUED | OUTPATIENT
Start: 2020-10-16 | End: 2020-10-16

## 2020-10-16 RX ORDER — ONDANSETRON 2 MG/ML
4 INJECTION INTRAMUSCULAR; INTRAVENOUS DAILY PRN
Status: DISCONTINUED | OUTPATIENT
Start: 2020-10-16 | End: 2020-10-17 | Stop reason: HOSPADM

## 2020-10-16 RX ORDER — ONDANSETRON 2 MG/ML
INJECTION INTRAMUSCULAR; INTRAVENOUS
Status: DISCONTINUED | OUTPATIENT
Start: 2020-10-16 | End: 2020-10-16

## 2020-10-16 RX ORDER — ACETAMINOPHEN 325 MG/1
650 TABLET ORAL EVERY 6 HOURS PRN
Status: DISCONTINUED | OUTPATIENT
Start: 2020-10-16 | End: 2020-10-17 | Stop reason: HOSPADM

## 2020-10-16 RX ORDER — SODIUM CHLORIDE 0.9 % (FLUSH) 0.9 %
10 SYRINGE (ML) INJECTION
Status: DISCONTINUED | OUTPATIENT
Start: 2020-10-16 | End: 2020-10-17 | Stop reason: HOSPADM

## 2020-10-16 RX ORDER — FENTANYL CITRATE 50 UG/ML
INJECTION, SOLUTION INTRAMUSCULAR; INTRAVENOUS
Status: DISCONTINUED | OUTPATIENT
Start: 2020-10-16 | End: 2020-10-16

## 2020-10-16 RX ADMIN — PROPOFOL 30 MG: 10 INJECTION, EMULSION INTRAVENOUS at 11:10

## 2020-10-16 RX ADMIN — BUSPIRONE HYDROCHLORIDE 10 MG: 5 TABLET ORAL at 08:10

## 2020-10-16 RX ADMIN — FENTANYL CITRATE 25 MCG: 50 INJECTION INTRAMUSCULAR; INTRAVENOUS at 11:10

## 2020-10-16 RX ADMIN — PROPOFOL 20 MG: 10 INJECTION, EMULSION INTRAVENOUS at 11:10

## 2020-10-16 RX ADMIN — PROPOFOL 200 MG: 10 INJECTION, EMULSION INTRAVENOUS at 10:10

## 2020-10-16 RX ADMIN — SUGAMMADEX 600 MG: 100 INJECTION, SOLUTION INTRAVENOUS at 11:10

## 2020-10-16 RX ADMIN — ONDANSETRON 4 MG: 2 INJECTION, SOLUTION INTRAMUSCULAR; INTRAVENOUS at 11:10

## 2020-10-16 RX ADMIN — OXYCODONE HYDROCHLORIDE AND ACETAMINOPHEN 1 TABLET: 10; 325 TABLET ORAL at 11:10

## 2020-10-16 RX ADMIN — OXYCODONE HYDROCHLORIDE AND ACETAMINOPHEN 1 TABLET: 10; 325 TABLET ORAL at 10:10

## 2020-10-16 RX ADMIN — OXYCODONE HYDROCHLORIDE AND ACETAMINOPHEN 1 TABLET: 10; 325 TABLET ORAL at 06:10

## 2020-10-16 RX ADMIN — LIDOCAINE HYDROCHLORIDE 100 MG: 20 INJECTION, SOLUTION INTRAVENOUS at 10:10

## 2020-10-16 RX ADMIN — FENTANYL CITRATE 25 MCG: 50 INJECTION INTRAMUSCULAR; INTRAVENOUS at 12:10

## 2020-10-16 RX ADMIN — SODIUM CHLORIDE: 0.9 INJECTION, SOLUTION INTRAVENOUS at 09:10

## 2020-10-16 RX ADMIN — DEXAMETHASONE SODIUM PHOSPHATE 4 MG: 4 INJECTION, SOLUTION INTRAMUSCULAR; INTRAVENOUS at 10:10

## 2020-10-16 RX ADMIN — ROCURONIUM BROMIDE 50 MG: 10 INJECTION, SOLUTION INTRAVENOUS at 10:10

## 2020-10-16 RX ADMIN — ROCURONIUM BROMIDE 30 MG: 10 INJECTION, SOLUTION INTRAVENOUS at 10:10

## 2020-10-16 RX ADMIN — Medication 40 MG: at 10:10

## 2020-10-16 RX ADMIN — ONDANSETRON 4 MG: 2 INJECTION INTRAMUSCULAR; INTRAVENOUS at 12:10

## 2020-10-16 RX ADMIN — FENTANYL CITRATE 100 MCG: 50 INJECTION, SOLUTION INTRAMUSCULAR; INTRAVENOUS at 10:10

## 2020-10-16 RX ADMIN — CLINDAMYCIN PHOSPHATE 900 MG: 18 INJECTION, SOLUTION INTRAVENOUS at 10:10

## 2020-10-16 RX ADMIN — OXYCODONE HYDROCHLORIDE AND ACETAMINOPHEN 1 TABLET: 10; 325 TABLET ORAL at 02:10

## 2020-10-16 RX ADMIN — MIDAZOLAM HYDROCHLORIDE 2 MG: 1 INJECTION, SOLUTION INTRAMUSCULAR; INTRAVENOUS at 10:10

## 2020-10-16 NOTE — ASSESSMENT & PLAN NOTE
To OR today for umbilical hernia repair  NPO since midnight  We, again, discussed his risk for recurrence given his obesity  consented

## 2020-10-16 NOTE — ANESTHESIA POSTPROCEDURE EVALUATION
Anesthesia Post Evaluation    Patient: Dominic Collazo    Procedure(s) Performed: Procedure(s) (LRB):  REPAIR, HERNIA, UMBILICAL, INCARCERATED, AGE 5 YEARS OR OLDER with mesh  (N/A)    Final Anesthesia Type: general    Patient location during evaluation: PACU  Patient participation: Yes- Able to Participate  Level of consciousness: awake and alert  Post-procedure vital signs: reviewed and stable  Pain management: adequate  Airway patency: patent    PONV status at discharge: No PONV  Anesthetic complications: no      Cardiovascular status: blood pressure returned to baseline  Respiratory status: spontaneous ventilation and room air  Hydration status: euvolemic  Follow-up not needed.          Vitals Value Taken Time   /64 10/16/20 1218   Temp 36.7 °C (98 °F) 10/16/20 1215   Pulse 64 10/16/20 1223   Resp 18 10/16/20 1431   SpO2 94 % 10/16/20 1223   Vitals shown include unvalidated device data.      Event Time   Out of Recovery 10/16/2020 12:30:00         Pain/Vaibhav Score: Pain Rating Prior to Med Admin: 9 (10/16/2020  2:31 PM)  Pain Rating Post Med Admin: 3 (10/16/2020  1:00 PM)  Vaibhav Score: 10 (10/16/2020 12:00 PM)

## 2020-10-16 NOTE — PROGRESS NOTES
Arrived to floor via wheelchair with escort in attendance. Awake, alert oriented, ambulatory.. Assisted to stretcher. No apparent distress Noted.

## 2020-10-16 NOTE — PLAN OF CARE
Pt AAOx 4. Pt NAD; respirations 16, even, and unlabored. Pt skin warm, dry, and intact. Pt denies any pain upon assessment. Pt remained NPO after midnight for procedure this AM. Pt IV access flushes without difficulty. Pt remains afebrile and free from any fall or injury. Pt ambulatory and last BM was yesterday even. Pt VSS. Pt informed of POC; and verbalize understanding. All safety measure implemented. Pt bed wheels locked, SR up x2, pt given call light within reach, and bed in lowest position. Will continue to monitor.

## 2020-10-16 NOTE — NURSING
Received pt from PACU. Stable upon tx back to 7th OBS. Incision CDI w/ dermabond, light gauze laid over it to monitor for leakage. Reports pain but is not grimacing/guarding, and is sleeping between care. Mild nausea reported, but not due for meds quite yet - OK to wait per pt. Offered ice and water and crackers. Declined regular tray for lunch. WCTM. Wife @ bedside, involved & supportive.

## 2020-10-16 NOTE — SUBJECTIVE & OBJECTIVE
Interval History: no acute events. Still having pain.    Medications:  Continuous Infusions:  Scheduled Meds:   buPROPion  300 mg Oral Daily    busPIRone  10 mg Oral TID    metoprolol succinate  100 mg Oral Daily    pravastatin  10 mg Oral Daily     PRN Meds:HYDROmorphone, HYDROmorphone, ondansetron, sodium chloride 0.9%     Review of patient's allergies indicates:   Allergen Reactions    Ciprofloxacin      swelling    Penicillins Rash    Sulfa (sulfonamide antibiotics) Rash    Toradol [ketorolac] Nausea Only    Green pepper Nausea Only    Meloxicam Nausea Only     Objective:     Vital Signs (Most Recent):  Temp: 96.5 °F (35.8 °C) (10/16/20 0845)  Pulse: 72 (10/16/20 0845)  Resp: 18 (10/16/20 0845)  BP: (!) 104/52 (10/16/20 0845)  SpO2: 96 % (10/16/20 0845) Vital Signs (24h Range):  Temp:  [96.5 °F (35.8 °C)-98.4 °F (36.9 °C)] 96.5 °F (35.8 °C)  Pulse:  [70-87] 72  Resp:  [16-20] 18  SpO2:  [92 %-99 %] 96 %  BP: ()/(52-84) 104/52     Weight: (!) 167.8 kg (369 lb 14.9 oz)  Body mass index is 46.24 kg/m².    Intake/Output - Last 3 Shifts       10/14 0700 - 10/15 0659 10/15 0700 - 10/16 0659 10/16 0700 - 10/17 0659    Urine (mL/kg/hr)   600 (1.9)    Total Output   600    Net   -600                 Physical Exam  Vitals signs and nursing note reviewed.   Constitutional:       Appearance: Normal appearance. He is obese.   HENT:      Head: Normocephalic.   Neck:      Musculoskeletal: Normal range of motion.   Cardiovascular:      Rate and Rhythm: Normal rate.      Pulses: Normal pulses.   Pulmonary:      Effort: Pulmonary effort is normal.   Abdominal:      General: Abdomen is flat.      Palpations: Abdomen is soft.      Hernia: A hernia (painful, umbilical, non reducible) is present.   Musculoskeletal: Normal range of motion.   Skin:     General: Skin is warm and dry.      Capillary Refill: Capillary refill takes less than 2 seconds.   Neurological:      General: No focal deficit present.      Mental  Status: He is alert and oriented to person, place, and time.   Psychiatric:         Mood and Affect: Mood normal.         Behavior: Behavior normal.         Significant Labs:  CBC:   Recent Labs   Lab 10/16/20  0300   WBC 8.40   RBC 4.51*   HGB 13.1*   HCT 40.5      MCV 90   MCH 29.0   MCHC 32.3     CMP:   Recent Labs   Lab 10/16/20  0300   *   CALCIUM 9.1   ALBUMIN 3.9   PROT 6.6      K 3.5   CO2 28      BUN 15   CREATININE 1.0   ALKPHOS 51*   ALT 86*   AST 53*   BILITOT 0.3       Significant Diagnostics:  none

## 2020-10-16 NOTE — ANESTHESIA PREPROCEDURE EVALUATION
[facility]  Anesthesia Pre-Operative Evaluation         Patient Name: Dominic Collazo  YOB: 1980  MRN: [id number]    SUBJECTIVE:     Pre-operative evaluation for Procedure(s) (LRB):  REPAIR, HERNIA, UMBILICAL, INCARCERATED, AGE 5 YEARS OR OLDER (N/A)     10/16/2020    Dominic Collazo is a 40 y.o. male w/ a significant PMHx of HTN, HLD, LYNDA, anxiety, depression, obesity presents with non-reducible fat-containing umbilical hernia. Plan for open umbilical hernia repair with or without mesh.     Patient now presents for the above procedure(s).      LDA:        Peripheral IV - Single Lumen 10/15/20 1852 18 G Right Antecubital (Active)   Site Assessment Clean;Dry;Intact 10/15/20 1853   Line Status Blood return noted;Flushed 10/15/20 1853   Number of days: 0       Prev airway: None documented.    Drips: None       Patient Active Problem List   Diagnosis    HTN (hypertension)    Hyperlipidemia    Anxiety    LYNDA (obstructive sleep apnea)    Acromioclavicular joint separation, type 1    Left wrist sprain    Whiplash injury    Mid back pain    Decreased ROM of wrist    Decreased strength    Chronic pain    Tenosynovitis, de Quervain    Morbid obesity    Prediabetes    Thoracic back pain    Erectile dysfunction due to arterial insufficiency    Male hypogonadism    NAFLD (nonalcoholic fatty liver disease)    Elevated liver enzymes    Umbilical hernia without obstruction and without gangrene       Review of patient's allergies indicates:   Allergen Reactions    Ciprofloxacin      swelling    Penicillins Rash    Sulfa (sulfonamide antibiotics) Rash    Toradol [ketorolac] Nausea Only    Green pepper Nausea Only    Meloxicam Nausea Only       Current Inpatient Medications:   buPROPion  300 mg Oral Daily    busPIRone  10 mg Oral TID    metoprolol succinate  100 mg Oral Daily    pravastatin  10 mg Oral Daily       No current facility-administered  medications on file prior to encounter.      Current Outpatient Medications on File Prior to Encounter   Medication Sig Dispense Refill    buPROPion (WELLBUTRIN XL) 300 MG 24 hr tablet Take 1 tablet by mouth daily. 90 tablet 3    busPIRone (BUSPAR) 10 MG tablet Take 1 tablet by mouth three times daily. 270 tablet 1    ergocalciferol, vitamin D2, (VITAMIN D ORAL) Take 5,000 Units by mouth once daily.      fexofenadine (ALLEGRA) 60 MG tablet Take 60 mg by mouth every morning.      LORazepam (ATIVAN) 0.5 MG tablet TAKE 1 TABLET BY MOUTH EVERY 8 HOURS AS NEEDED FOR ANXIETY 30 tablet 0    metoprolol succinate (TOPROL-XL) 100 MG 24 hr tablet Take 1 tablet by mouth daily. 90 tablet 3    multivitamin capsule Take 1 capsule by mouth once daily.      pravastatin (PRAVACHOL) 10 MG tablet Take 1 tablet by mouth daily. 90 tablet 3    testosterone (ANDROGEL) 20.25 mg/1.25 gram (1.62 %) GlPm Apply 2 pumps to shoulders daily 1 Bottle 5    zolpidem (AMBIEN) 10 mg Tab Take 1 tablet (10 mg total) by mouth nightly as needed. 90 tablet 0    AFLURIA QUAD 2019-20,6MO UP, 60 mcg (15 mcg x 4)/0.5 mL Susp   0    erythromycin (ROMYCIN) ophthalmic ointment Place into both eyes 3 (three) times daily. (Patient not taking: Reported on 10/15/2020) 3.5 g 0    modafinil (PROVIGIL) 100 MG Tab 1 pill PO as needed morning or early afternoon for excessive daytime sleepiness. (Patient not taking: Reported on 10/15/2020) 30 tablet 5    scopolamine (TRANSDERM-SCOP) 1.3-1.5 mg (1 mg over 3 days) Place 1 patch onto the skin every 72 hours. (Patient not taking: Reported on 2/3/2020) 10 patch 0    triamcinolone acetonide 0.1% (KENALOG) 0.1 % cream AAA bid (Patient not taking: Reported on 10/15/2020) 30 g 0       Past Surgical History:   Procedure Laterality Date    ADENOIDECTOMY      TONSILLECTOMY         Social History     Socioeconomic History    Marital status:      Spouse name: Not on file    Number of children: Not on file     Years of education: Not on file    Highest education level: Not on file   Occupational History    Occupation: Teacher      Employer: [employer]     Comment: [employer]   Social Needs    Financial resource strain: Not very hard    Food insecurity     Worry: Never true     Inability: Never true    Transportation needs     Medical: No     Non-medical: No   Tobacco Use    Smoking status: Never Smoker    Smokeless tobacco: Never Used   Substance and Sexual Activity    Alcohol use: Yes     Frequency: Monthly or less     Drinks per session: 1 or 2     Binge frequency: Never     Comment: less than once per month    Drug use: No    Sexual activity: Yes     Partners: Female   Lifestyle    Physical activity     Days per week: 3 days     Minutes per session: 10 min    Stress: To some extent   Relationships    Social connections     Talks on phone: Twice a week     Gets together: Three times a week     Attends [employer] service: Not on file     Active member of club or organization: Yes     Attends meetings of clubs or organizations: More than 4 times per year     Relationship status:    Other Topics Concern    Not on file   Social History Narrative    Not on file       OBJECTIVE:     Vital Signs Range (Last 24H):  Temp:  [36.4 °C (97.5 °F)-36.9 °C (98.4 °F)]   Pulse:  [70-87]   Resp:  [16-20]   BP: ()/(55-84)   SpO2:  [92 %-99 %]       Significant Labs:  Lab Results   Component Value Date    WBC 8.98 10/15/2020    HGB 13.4 (L) 10/15/2020    HCT 38 10/15/2020     10/15/2020    CHOL 214 (H) 11/16/2019    TRIG 130 11/16/2019    HDL 42 11/16/2019    ALT 78 (H) 10/15/2020    AST 41 (H) 10/15/2020     10/15/2020    K 3.9 10/15/2020     10/15/2020    CREATININE 0.9 10/15/2020    BUN 15 10/15/2020    CO2 23 10/15/2020    TSH 1.555 08/28/2020    HGBA1C 6.1 (H) 02/23/2019       Diagnostic Studies:   CT Abd/Pelvis  Fat containing umbilical hernia without CT findings to suggest  strangulation.    EKG:   No results found for this or any previous visit.    2D ECHO:  TTE: 3/2018  CONCLUSIONS     1 - Normal left ventricular systolic function (EF 60-65%).     2 - Normal right ventricular systolic function .     3 - Normal left ventricular diastolic function.     4 - The estimated PA systolic pressure is 23 mmHg.     5 - Low central venous pressure.     ASSESSMENT/PLAN:         Anesthesia Evaluation    I have reviewed the Patient Summary Reports.    I have reviewed the Nursing Notes.    I have reviewed the Medications.     Review of Systems  Anesthesia Hx:  No problems with previous Anesthesia  History of prior surgery of interest to airway management or planning: Denies Family Hx of Anesthesia complications.   Denies Personal Hx of Anesthesia complications.   Hematology/Oncology:  Hematology Normal   Oncology Normal     Cardiovascular:   Hypertension    Pulmonary:   Sleep Apnea    Renal/:  Renal/ Normal     Hepatic/GI:   Denies GERD.    Neurological:  Neurology Normal    Endocrine:  Endocrine Normal    Psych:   anxiety depression          Physical Exam  General:  Obesity    Airway/Jaw/Neck:  Airway Findings: Mouth Opening: Normal Tongue: Large  General Airway Assessment: Possible difficult mask airway  Mallampati: II  TM Distance: < 4 cm  Jaw/Neck Findings:  Neck Findings:  Girth Increased, Short Neck      Dental:  Dental Findings: In tact   Chest/Lungs:  Chest/Lungs Findings: Clear to auscultation, Normal Respiratory Rate     Heart/Vascular:  Heart Findings: Rate: Normal  Rhythm: Regular Rhythm  Sounds: Normal        Mental Status:  Mental Status Findings:  Cooperative, Alert and Oriented         Anesthesia Plan  Type of Anesthesia, risks & benefits discussed:  Anesthesia Type:  general  Patient's Preference:   Intra-op Monitoring Plan: standard ASA monitors  Intra-op Monitoring Plan Comments:   Post Op Pain Control Plan: per primary service following discharge from PACU, IV/PO Opioids PRN  and multimodal analgesia  Post Op Pain Control Plan Comments:   Induction:   IV  Beta Blocker:         Informed Consent: Patient understands risks and agrees with Anesthesia plan.  Questions answered. Anesthesia consent signed with patient.  ASA Score: 3     Day of Surgery Review of History & Physical:    H&P update referred to the surgeon.         Ready For Surgery From Anesthesia Perspective.

## 2020-10-16 NOTE — TRANSFER OF CARE
"Anesthesia Transfer of Care Note    Patient: Dominic Collazo    Procedure(s) Performed: Procedure(s) (LRB):  REPAIR, HERNIA, UMBILICAL, INCARCERATED, AGE 5 YEARS OR OLDER with mesh  (N/A)    Anesthesia PACU Handoff    Last vitals:   Visit Vitals  /66 (BP Location: Left arm, Patient Position: Lying)   Pulse 74   Temp 36.6 °C (97.9 °F) (Temporal)   Resp 14   Ht 6' 3" (1.905 m)   Wt (!) 167.8 kg (370 lb)   SpO2 97%   BMI 46.25 kg/m²     "

## 2020-10-16 NOTE — PROGRESS NOTES
[facility]  General Surgery  Progress Note    Subjective:     History of Present Illness:  No notes on file    Post-Op Info:  Procedure(s) (LRB):  REPAIR, HERNIA, UMBILICAL, INCARCERATED, AGE 5 YEARS OR OLDER (N/A)   Day of Surgery     Interval History: no acute events. Still having pain.    Medications:  Continuous Infusions:  Scheduled Meds:   buPROPion  300 mg Oral Daily    busPIRone  10 mg Oral TID    metoprolol succinate  100 mg Oral Daily    pravastatin  10 mg Oral Daily     PRN Meds:HYDROmorphone, HYDROmorphone, ondansetron, sodium chloride 0.9%     Review of patient's allergies indicates:   Allergen Reactions    Ciprofloxacin      swelling    Penicillins Rash    Sulfa (sulfonamide antibiotics) Rash    Toradol [ketorolac] Nausea Only    Green pepper Nausea Only    Meloxicam Nausea Only     Objective:     Vital Signs (Most Recent):  Temp: 96.5 °F (35.8 °C) (10/16/20 0845)  Pulse: 72 (10/16/20 0845)  Resp: 18 (10/16/20 0845)  BP: (!) 104/52 (10/16/20 0845)  SpO2: 96 % (10/16/20 0845) Vital Signs (24h Range):  Temp:  [96.5 °F (35.8 °C)-98.4 °F (36.9 °C)] 96.5 °F (35.8 °C)  Pulse:  [70-87] 72  Resp:  [16-20] 18  SpO2:  [92 %-99 %] 96 %  BP: ()/(52-84) 104/52     Weight: (!) 167.8 kg (369 lb 14.9 oz)  Body mass index is 46.24 kg/m².    Intake/Output - Last 3 Shifts       10/14 0700 - 10/15 0659 10/15 0700 - 10/16 0659 10/16 0700 - 10/17 0659    Urine (mL/kg/hr)   600 (1.9)    Total Output   600    Net   -600                 Physical Exam  Vitals signs and nursing note reviewed.   Constitutional:       Appearance: Normal appearance. He is obese.   HENT:      Head: Normocephalic.   Neck:      Musculoskeletal: Normal range of motion.   Cardiovascular:      Rate and Rhythm: Normal rate.      Pulses: Normal pulses.   Pulmonary:      Effort: Pulmonary effort is normal.   Abdominal:      General: Abdomen is flat.      Palpations: Abdomen is soft.      Hernia: A hernia (painful,  umbilical, non reducible) is present.   Musculoskeletal: Normal range of motion.   Skin:     General: Skin is warm and dry.      Capillary Refill: Capillary refill takes less than 2 seconds.   Neurological:      General: No focal deficit present.      Mental Status: He is alert and oriented to person, place, and time.   Psychiatric:         Mood and Affect: Mood normal.         Behavior: Behavior normal.         Significant Labs:  CBC:   Recent Labs   Lab 10/16/20  0300   WBC 8.40   RBC 4.51*   HGB 13.1*   HCT 40.5      MCV 90   MCH 29.0   MCHC 32.3     CMP:   Recent Labs   Lab 10/16/20  0300   *   CALCIUM 9.1   ALBUMIN 3.9   PROT 6.6      K 3.5   CO2 28      BUN 15   CREATININE 1.0   ALKPHOS 51*   ALT 86*   AST 53*   BILITOT 0.3       Significant Diagnostics:  none    Assessment/Plan:     Umbilical hernia without obstruction and without gangrene  To OR today for umbilical hernia repair  NPO since midnight  We, again, discussed his risk for recurrence given his obesity  consented        Yanique Leger MD  General Surgery  [facility]

## 2020-10-16 NOTE — NURSING TRANSFER
Nursing Transfer Note      10/16/2020     Transfer to: 729    Transfer via: Stretcher    Transfer with: Face Mask in place    Transported by: PCT    Medicines sent: N/A    Chart send with patient: Yes    Notified: spouse; Report called to ROBI Juarez    Patient reassessed at: 1217

## 2020-10-16 NOTE — H&P
See consult note dated 10/15/2020 for H&P note.      Conrad Loco MD  General Surgery and Surgical Critical Care  [facility]

## 2020-10-16 NOTE — PLAN OF CARE
CM met with patient at the bedside to discuss discharge planning assessment. Pt lives with spouse and children and has transportation at discharge. Pt verified PCP and Pharmacy. CM will continue to follow for discharge needs.         10/16/20 0859   Discharge Assessment   Assessment Type Discharge Planning Assessment   Confirmed/corrected address and phone number on facesheet? Yes   Assessment information obtained from? Patient   Expected Length of Stay (days) 2   Communicated expected length of stay with patient/caregiver yes   Prior to hospitilization cognitive status: Alert/Oriented   Prior to hospitalization functional status: Independent   Current cognitive status: Alert/Oriented   Current Functional Status: Independent   Lives With spouse;child(rosario), dependent   Able to Return to Prior Arrangements yes   Is patient able to care for self after discharge? Yes   Patient's perception of discharge disposition home or selfcare   Readmission Within the Last 30 Days no previous admission in last 30 days   Patient currently being followed by outpatient case management? No   Patient currently receives any other outside agency services? No   Equipment Currently Used at Home CPAP   Do you have any problems affording any of your prescribed medications? No   Is the patient taking medications as prescribed? yes   Does the patient have transportation home? Yes   Transportation Anticipated family or friend will provide   Does the patient receive services at the [facility]madin Clinic? No   Discharge Plan A Home with family   Discharge Plan B Home with family   DME Needed Upon Discharge  none   Patient/Family in Agreement with Plan yes

## 2020-10-16 NOTE — OP NOTE
DATE: 10/16/2020.    PREOPERATIVE DIAGNOSIS:  Incarcerated umbilical hernia.    POSTOPERATIVE DIAGNOSIS:  Incarcerated umbilical hernia.    PROCEDURE:  Repair of incarcerated umbilical hernia with mesh.    ATTENDING SURGEON: Dante Houston MD.    RESIDENT: Killian Pickett MD.    ANESTHESIA:  General.    INDICATION:  Patient is a 40-year-old male who presented with severe pain at the umbilicus associated with an incarcerated umbilical hernia.  We recommended repair and the patient signed the informed consent.    PROCEDURE IN DETAIL:  Patient was taken to the operating room and placed supine.  After induction of general endotracheal tube anesthesia, his abdomen was prepped and draped in the standard fashion.  Time-out was performed.  Transverse infraumbilical incision was made and was carried down to the fascia.  Umbilical stalk and hernia were bluntly encircled and divided at the level of the fascia.  There was a 2 x 2 cm umbilical hernia defect present.  Contents were reduced.  Mesh was secured in a preperitoneal underlay with transfascial 0 PDS suture.  Defect was closed with interrupted 0 PDS suture.  Hemostasis confirmed.  Umbilicus was recreated by tacking the umbilical stalk to the investing fascia.  Wound was then closed in layers with 3-0 Vicryl deep and subcuticular 4-0 Monocryl.  Dermabond was applied.  Patient was awakened from anesthesia and transferred to the PACU in good condition.  All needle and sponge counts were correct at the conclusion of the case.  I was present for the procedure in its entirety.    EBL: Less than 5 mL.    FINDINGS: 2x2 cm umbilical hernia repaired with mesh.

## 2020-10-16 NOTE — ED PROVIDER NOTES
Encounter Date: 10/15/2020       History     Chief Complaint   Patient presents with    Abdominal Pain     sent from  not able to reduce umbilical hernia, pain started yest    Umbilical Hernia     Dominic Collazo is a 40-year-old male past medical history of depression, hypertension, hyperlipidemia presenting today with abdominal pain.  Symptoms started 2 days ago while lifting furniture, felt a pop in the center of his abdomen.  While taking shower the night, he noticed a bulge in the anterior part of the stomach near his umbilicus.  He reports pain since, described as throbbing/aching, 10/10, with associated nausea, and radiation to the groin.  No previous symptoms similar to this in the past.  Denies fever, chills, cough.  No chest pain.  Did report loose bowel movement yesterday.  No hematuria/dysuria        Review of patient's allergies indicates:   Allergen Reactions    Ciprofloxacin      swelling    Penicillins Rash    Sulfa (sulfonamide antibiotics) Rash    Toradol [ketorolac] Nausea Only    Green pepper Nausea Only    Meloxicam Nausea Only     Past Medical History:   Diagnosis Date    Anxiety     Colon polyp     Depression     Family history of prostate cancer 9/29/2014    Hyperlipidemia     Hypertension     Low testosterone      Past Surgical History:   Procedure Laterality Date    ADENOIDECTOMY      TONSILLECTOMY       Family History   Problem Relation Age of Onset    Hypertension Mother     Hyperlipidemia Mother     Diabetes Father     Hyperlipidemia Father     Hypertension Father     Heart disease Father 46        CAD    No Known Problems Sister     Cancer Paternal Uncle         prostate cancer     Social History     Tobacco Use    Smoking status: Never Smoker    Smokeless tobacco: Never Used   Substance Use Topics    Alcohol use: Yes     Frequency: Monthly or less     Drinks per session: 1 or 2     Binge frequency: Never     Comment: less than once per month    Drug use:  No     Review of Systems   Constitutional: Negative for fever.   HENT: Negative for congestion and sore throat.    Respiratory: Negative for shortness of breath.    Cardiovascular: Negative for chest pain.   Gastrointestinal: Positive for abdominal pain, diarrhea and nausea. Negative for vomiting.   Genitourinary: Negative for dysuria and hematuria.   Musculoskeletal: Negative for back pain and myalgias.   Skin: Negative for rash.   Neurological: Negative for weakness.   Hematological: Does not bruise/bleed easily.       Physical Exam     Initial Vitals [10/15/20 1711]   BP Pulse Resp Temp SpO2   125/77 74 18 98.4 °F (36.9 °C) 99 %      MAP       --         Physical Exam    Nursing note and vitals reviewed.  Constitutional: He appears well-developed and well-nourished. He appears distressed (Appears uncomfortable but nontoxic).   HENT:   Mouth/Throat: Oropharynx is clear and moist.   Eyes: Conjunctivae are normal. No scleral icterus.   Cardiovascular: Normal rate, regular rhythm and intact distal pulses.   Pulmonary/Chest: Breath sounds normal. He has no wheezes. He has no rales.   Abdominal: Soft. Bowel sounds are normal. There is abdominal tenderness (Focal tenderness with palpable hernia, unable to reduce.  No surrounding skin changes).   Musculoskeletal: No edema.   Neurological: He is alert.   Skin: Skin is warm. No rash noted.         ED Course   Procedures  Labs Reviewed   CBC W/ AUTO DIFFERENTIAL - Abnormal; Notable for the following components:       Result Value    Hemoglobin 13.4 (*)     All other components within normal limits   COMPREHENSIVE METABOLIC PANEL - Abnormal; Notable for the following components:    AST 41 (*)     ALT 78 (*)     All other components within normal limits   ISTAT PROCEDURE - Abnormal; Notable for the following components:    POC TCO2 (MEASURED) 21 (*)     All other components within normal limits   LACTIC ACID, PLASMA   URINALYSIS, REFLEX TO URINE CULTURE   SARS-COV-2 RDRP GENE    ISTAT CHEM8          Imaging Results          CT Abdomen Pelvis With Contrast (Final result)  Result time 10/15/20 21:37:46    Final result by Derek Foley MD (10/15/20 21:37:46)                 Impression:      No acute finding in the abdomen or pelvis.    Hepatomegaly and hepatic steatosis.    Fat containing umbilical hernia without CT findings to suggest strangulation.      Electronically signed by: Derek Foley MD  Date:    10/15/2020  Time:    21:37             Narrative:    EXAMINATION:  CT ABDOMEN PELVIS WITH CONTRAST    CLINICAL HISTORY:  Hernia, complicated;    TECHNIQUE:  Low dose axial images, sagittal and coronal reformations were obtained from the lung bases to the pubic symphysis following the IV administration of 100 mL of Omnipaque 350 .  Oral contrast was not given.    COMPARISON:  Abdominal ultrasound, 11/25/2019.    FINDINGS:  Lower Chest:    Lung bases are clear.  Heart size is normal.    Abdomen:    Liver is enlarged, measuring approximately 22 cm in craniocaudal length.  Mild diffuse hypoattenuation of the hepatic parenchyma suggestive of steatosis.  No suspicious hepatic mass identified.  Gallbladder is unremarkable. No intrahepatic biliary ductal dilatation.    Spleen is at the upper limits of normal in size at approximately 13 cm in length.  Adrenal glands are unremarkable.  Pancreas is unremarkable.    The kidneys are symmetric.  No hydronephrosis.    No small bowel obstruction.  No findings of acute appendicitis.    No pneumoperitoneum or organized fluid collection.    No bulky lymphadenopathy.    Abdominal aorta is normal in caliber.    Portal, splenic, and superior mesenteric veins are patent.    Pelvis:    Urinary bladder, pelvic organs, and rectum are unremarkable.  No free fluid in the pelvis.  No pelvic lymphadenopathy.    Bones and soft tissues:    No aggressive osseous lesions.  Mild degenerative changes in the spine.  Small umbilical hernia containing only abdominal  fat.  No surrounding fat stranding.  Minimal right inguinal hernia containing only fat.                                 Medical Decision Making:   History:   Old Medical Records: I decided to obtain old medical records.  Initial Assessment:   Emergent evaluation of 40-year-old male transferred from urgent care for incarcerated hernia.  Patient appears uncomfortable, but nontoxic.  Vital signs stable.  Differential Diagnosis:   Incarcerated hernia, obstruction, abscess, hematoma  Independently Interpreted Test(s):   I have ordered and independently interpreted X-rays - see prior notes.  Clinical Tests:   Lab Tests: Reviewed and Ordered  Radiological Study: Ordered and Reviewed  ED Management:  - labs  - ct abd/pelvis  - morphine IV    9:00 PM  Patient signed out to Dr. Cantor in stable condition pending CT abdomen pelvis, re-evaluation final disposition.                             Clinical Impression:     ICD-10-CM ICD-9-CM   1. Umbilical hernia without obstruction and without gangrene  K42.9 553.1                                               Alexandra Medina MD  10/15/20 6453

## 2020-10-16 NOTE — PLAN OF CARE
Pt AAOx4. Complaints of moderate pain to incisional abdomen. PRN PO and IV pain medication administered as ordered. Lower abdomen incision remains CDI with dermabond. Tolerating sips of water. VSS. Safety maintained. Pt to be transported back to room.

## 2020-10-16 NOTE — ANESTHESIA PROCEDURE NOTES
Intubation  Performed by: Jaylon Beard MD  Authorized by: Last Barrett Jr., MD     Intubation:     Induction:  Intravenous    Intubated:  Postinduction    Mask Ventilation:  Easy mask    Attempts:  1    Attempted By:  Resident anesthesiologist    Method of Intubation:  Direct    Blade:  Scott 2    Laryngeal View Grade: Grade I - full view of chords      Difficult Airway Encountered?: No      Complications:  None    Airway Device:  Oral endotracheal tube    Airway Device Size:  8.0    Style/Cuff Inflation:  Cuffed (inflated to minimal occlusive pressure)    Inflation Amount (mL):  8    Tube secured:  24    Secured at:  The teeth    Placement Verified By:  Capnometry    Complicating Factors:  None, obesity and oropharyngeal edema or fat    Findings Post-Intubation:  BS equal bilateral and atraumatic/condition of teeth unchanged

## 2020-10-17 VITALS
OXYGEN SATURATION: 95 % | RESPIRATION RATE: 18 BRPM | DIASTOLIC BLOOD PRESSURE: 57 MMHG | HEIGHT: 75 IN | WEIGHT: 315 LBS | HEART RATE: 80 BPM | TEMPERATURE: 97 F | BODY MASS INDEX: 39.17 KG/M2 | SYSTOLIC BLOOD PRESSURE: 116 MMHG

## 2020-10-17 PROCEDURE — 96375 TX/PRO/DX INJ NEW DRUG ADDON: CPT

## 2020-10-17 PROCEDURE — 25000003 PHARM REV CODE 250: Performed by: STUDENT IN AN ORGANIZED HEALTH CARE EDUCATION/TRAINING PROGRAM

## 2020-10-17 PROCEDURE — 94761 N-INVAS EAR/PLS OXIMETRY MLT: CPT

## 2020-10-17 PROCEDURE — 63600175 PHARM REV CODE 636 W HCPCS: Performed by: STUDENT IN AN ORGANIZED HEALTH CARE EDUCATION/TRAINING PROGRAM

## 2020-10-17 PROCEDURE — G0378 HOSPITAL OBSERVATION PER HR: HCPCS

## 2020-10-17 PROCEDURE — 96376 TX/PRO/DX INJ SAME DRUG ADON: CPT

## 2020-10-17 PROCEDURE — 25000003 PHARM REV CODE 250: Performed by: SURGERY

## 2020-10-17 RX ORDER — HYDROCODONE BITARTRATE AND ACETAMINOPHEN 5; 325 MG/1; MG/1
1 TABLET ORAL EVERY 6 HOURS PRN
Qty: 10 TABLET | Refills: 0 | Status: SHIPPED | OUTPATIENT
Start: 2020-10-17 | End: 2020-12-07

## 2020-10-17 RX ADMIN — BUPROPION HYDROCHLORIDE 300 MG: 300 TABLET, FILM COATED, EXTENDED RELEASE ORAL at 08:10

## 2020-10-17 RX ADMIN — PRAVASTATIN SODIUM 10 MG: 10 TABLET ORAL at 08:10

## 2020-10-17 RX ADMIN — OXYCODONE HYDROCHLORIDE AND ACETAMINOPHEN 1 TABLET: 10; 325 TABLET ORAL at 11:10

## 2020-10-17 RX ADMIN — CETIRIZINE HYDROCHLORIDE 10 MG: 10 TABLET, FILM COATED ORAL at 08:10

## 2020-10-17 RX ADMIN — BUSPIRONE HYDROCHLORIDE 10 MG: 5 TABLET ORAL at 08:10

## 2020-10-17 RX ADMIN — OXYCODONE HYDROCHLORIDE AND ACETAMINOPHEN 1 TABLET: 10; 325 TABLET ORAL at 05:10

## 2020-10-17 RX ADMIN — OXYCODONE HYDROCHLORIDE AND ACETAMINOPHEN 1 TABLET: 10; 325 TABLET ORAL at 01:10

## 2020-10-17 RX ADMIN — HYDROMORPHONE HYDROCHLORIDE 1 MG: 1 INJECTION, SOLUTION INTRAMUSCULAR; INTRAVENOUS; SUBCUTANEOUS at 08:10

## 2020-10-17 RX ADMIN — HYDROMORPHONE HYDROCHLORIDE 1 MG: 1 INJECTION, SOLUTION INTRAMUSCULAR; INTRAVENOUS; SUBCUTANEOUS at 04:10

## 2020-10-17 NOTE — PLAN OF CARE
Pt resting in bed in NAD. VSS. Pain well controlled this shift. Ambulates to bathroom w/o assist. No safety issues this shift

## 2020-10-17 NOTE — PLAN OF CARE
Patient awake, alert and responsive.  Oriented x 4.  Vitals stable.  No distress noted.  Pain managed with PRN pain medication.  Patient aware of the plan for discharge planning.  Verbalized understanding.

## 2020-10-17 NOTE — DISCHARGE SUMMARY
[facility]  General Surgery  Discharge Summary      Patient Name: Dominic Collazo  MRN: [id number]  Admission Date: 10/15/2020  Hospital Length of Stay: 0 days  Discharge Date and Time:  10/17/2020 10:27 AM  Attending Physician: Conrad Loco MD   Discharging Provider: Cassy Monroy MD  Primary Care Provider: Arjun Cross MD     HPI: Mr. Collazo is a 41 yo male with hypertension, anxiety, fatty liver and obesity (370 lbs BMI 46) here with two days of acute umbilical pain. He was lifting something heavy 2 days ago, felt a pop in his abdomen followed by intense pain. He has had the constant pain ever since and feels like he can't go on with his daily life the pain is so severe. Associated with nausea related to pain. He notes a bulge there. CT in ED shows a fat containing umbilical hernia. He states he hasn't been eating much d/t pain. Normal bowel function. Not on anticoagulation.    Procedure(s) (LRB):  REPAIR, HERNIA, UMBILICAL, INCARCERATED, AGE 5 YEARS OR OLDER with mesh  (N/A)     Hospital Course: Patient was admitted to the hospital on the 10/15/20 for evaluation of umbilical hernia, that was unable to be reduced in the emergency department. He was taken for an open umbilical hernia repair on 10/16/20, which he tolerated well without complication. Post-operatively, his diet was advanced as tolerated and his pain was well controlled. On POD#1 patient is deemed ready for discharge, he will follow-up with Dr. Loco as an outpatient in 2 weeks.     Consults:   Consults (From admission, onward)        Status Ordering Provider     Inpatient consult to General Surgery  Once     Provider:  (Not yet assigned)    Completed JEREMY TORREZ          Significant Diagnostic Studies: Labs:   BMP:   Recent Labs   Lab 10/15/20  1852 10/16/20  0300   GLU 78 152*    138   K 3.9 3.5    102   CO2 23 28   BUN 15 15   CREATININE 0.9 1.0   CALCIUM 9.3 9.1   , CMP   Recent  Labs   Lab 10/15/20  1852 10/16/20  0300    138   K 3.9 3.5    102   CO2 23 28   GLU 78 152*   BUN 15 15   CREATININE 0.9 1.0   CALCIUM 9.3 9.1   PROT 7.3 6.6   ALBUMIN 4.2 3.9   BILITOT 0.3 0.3   ALKPHOS 56 51*   AST 41* 53*   ALT 78* 86*   ANIONGAP 9 8   ESTGFRAFRICA >60.0 >60.0   EGFRNONAA >60.0 >60.0    and CBC   Recent Labs   Lab 10/15/20  1852  10/16/20  0300   WBC 8.98  --  8.40   HGB 13.4*  --  13.1*   HCT 40.7   < > 40.5     --  247    < > = values in this interval not displayed.     Physical Exam:  Overweight male lying in bed in no acute distress  RRR, CTAB  Soft, non-tender, non-distended abdomen with umbilical hernia incision clean, dry, and intact with dermabond intact    Pending Diagnostic Studies:     None        Final Active Diagnoses:    Diagnosis Date Noted POA    PRINCIPAL PROBLEM:  Hx of umbilical hernia repair [Z98.890, Z87.19] 10/16/2020 Not Applicable    Umbilical hernia without obstruction and without gangrene [K42.9] 10/16/2020 Yes      Problems Resolved During this Admission:    Diagnosis Date Noted Date Resolved POA    Umbilical hernia without obstruction and without gangrene [K42.9] 10/15/2020 10/16/2020 Yes      Discharged Condition: good    Disposition: Home or Self Care    Follow Up:  Follow-up Information     Conrad Loco MD In 2 weeks.    Specialty: General Surgery  Why: Post-op umbilical hernia repair   Contact information:  [address]  [facility] [address]  [phone]                 Patient Instructions:      Diet general     Call MD for:  temperature >100.4     Call MD for:  persistent nausea and vomiting     Call MD for:  severe uncontrolled pain     Call MD for:  redness, tenderness, or signs of infection (pain, swelling, redness, odor or green/yellow discharge around incision site)     No dressing needed     Activity as tolerated   Order Comments: No heavy lifting for 4-6 weeks, activity as tolerated. Patient can shower, allow water to run  over incisions. No soaking in bath or pools for 2 weeks. Do not pick at surgical glue, it will come off on its own.     Medications:  Reconciled Home Medications:      Medication List      START taking these medications    HYDROcodone-acetaminophen 5-325 mg per tablet  Commonly known as: NORCO  Take 1 tablet by mouth every 6 (six) hours as needed for Pain.        CONTINUE taking these medications    AFLURIA QUAD 2019-20(6MO UP) 60 mcg (15 mcg x 4)/0.5 mL Susp  Generic drug: flu vacc qs 2019-20 (6 mos up)     buPROPion 300 MG 24 hr tablet  Commonly known as: WELLBUTRIN XL  Take 1 tablet by mouth daily.     busPIRone 10 MG tablet  Commonly known as: BUSPAR  Take 1 tablet by mouth three times daily.     erythromycin ophthalmic ointment  Commonly known as: ROMYCIN  Place into both eyes 3 (three) times daily.     fexofenadine 60 MG tablet  Commonly known as: ALLEGRA  Take 60 mg by mouth every morning.     LORazepam 0.5 MG tablet  Commonly known as: ATIVAN  TAKE 1 TABLET BY MOUTH EVERY 8 HOURS AS NEEDED FOR ANXIETY     metoprolol succinate 100 MG 24 hr tablet  Commonly known as: TOPROL-XL  Take 1 tablet by mouth daily.     modafiniL 100 MG Tab  Commonly known as: PROVIGIL  1 pill PO as needed morning or early afternoon for excessive daytime sleepiness.     multivitamin capsule  Take 1 capsule by mouth once daily.     pravastatin 10 MG tablet  Commonly known as: PRAVACHOL  Take 1 tablet by mouth daily.     scopolamine 1.3-1.5 mg (1 mg over 3 days)  Commonly known as: TRANSDERM-SCOP  Place 1 patch onto the skin every 72 hours.     testosterone 20.25 mg/1.25 gram (1.62 %) Glpm  Commonly known as: AndroGeL  Apply 2 pumps to shoulders daily     triamcinolone acetonide 0.1% 0.1 % cream  Commonly known as: KENALOG  AAA bid     VITAMIN D ORAL  Take 5,000 Units by mouth once daily.     zolpidem 10 mg Tab  Commonly known as: AMBIEN  Take 1 tablet (10 mg total) by mouth nightly as needed.            Cassy Monroy MD  General  Surgery  [facility]

## 2020-10-19 ENCOUNTER — PATIENT MESSAGE (OUTPATIENT)
Dept: SURGERY | Facility: CLINIC | Age: 40
End: 2020-10-19

## 2020-10-19 NOTE — PLAN OF CARE
10/19/20 0937   Final Note   Assessment Type Final Discharge Note   Anticipated Discharge Disposition Home   What phone number can be called within the next 1-3 days to see how you are doing after discharge? [phone]   Discharge plans and expectations educations in teach back method with documentation complete? Yes   Right Care Referral Info   Post Acute Recommendation No Care   Post-Acute Status   Post-Acute Authorization Other     Future Appointments   Date Time Provider Department Center   10/29/2020  1:30 PM Arjun Cross MD [facility] IM [place]   11/3/2020 11:45 AM Emmanuel Galvan MD [facility] DERM [place] [facility]   12/3/2020  4:00 PM Alexa Moreno MD [facility] BARIAT [place]   3/3/2021  8:30 AM Wil Pimentel MD [facility] UROLOGY [place] [facility]

## 2020-10-20 ENCOUNTER — TELEPHONE (OUTPATIENT)
Dept: SURGERY | Facility: CLINIC | Age: 40
End: 2020-10-20

## 2020-10-20 RX ORDER — TRAMADOL HYDROCHLORIDE 50 MG/1
50 TABLET ORAL EVERY 6 HOURS
COMMUNITY
End: 2020-12-07

## 2020-10-20 NOTE — TELEPHONE ENCOUNTER
Rx called to pharmacy per Dr. Houston for Ultram 50mg Tab A3facpt prn for pain.  #20 with no refills.

## 2020-10-27 ENCOUNTER — PATIENT MESSAGE (OUTPATIENT)
Dept: SURGERY | Facility: CLINIC | Age: 40
End: 2020-10-27

## 2020-10-27 ENCOUNTER — OFFICE VISIT (OUTPATIENT)
Dept: URGENT CARE | Facility: CLINIC | Age: 40
End: 2020-10-27
Payer: COMMERCIAL

## 2020-10-27 VITALS
WEIGHT: 315 LBS | BODY MASS INDEX: 40.43 KG/M2 | SYSTOLIC BLOOD PRESSURE: 134 MMHG | HEART RATE: 71 BPM | DIASTOLIC BLOOD PRESSURE: 89 MMHG | TEMPERATURE: 97 F | HEIGHT: 74 IN
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
## 2020-10-27 DIAGNOSIS — R10.33 PERIUMBILICAL ABDOMINAL PAIN: Primary | ICD-10-CM

## 2020-10-27 DIAGNOSIS — Z98.890 STATUS POST SURGERY: ICD-10-CM

## 2020-10-27 PROCEDURE — 99203 PR OFFICE/OUTPT VISIT, NEW, LEVL III, 30-44 MIN: ICD-10-PCS | Mod: S$GLB,,, | Performed by: PHYSICIAN ASSISTANT

## 2020-10-27 PROCEDURE — 99203 OFFICE O/P NEW LOW 30 MIN: CPT | Mod: S$GLB,,, | Performed by: PHYSICIAN ASSISTANT

## 2020-10-27 NOTE — PROGRESS NOTES
Subjective:       Patient ID: Dominic Collazo is a 40 y.o. male.    Chief Complaint: Abdominal Pain    Pt. 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  He states that he went to fall and caught himself resulting in abdominal pain.  He states that he had a hernia repair surgery on 10/16/2020.  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  He states his pain is a 5/10,  It radiates from the midline to both sides.  LW    Abdominal Pain  Associated symptoms include nausea. Pertinent negatives include no arthralgias, diarrhea, fever or vomiting.       Constitution: Negative for chills and fever.   HENT: Negative for facial trauma.    Neck: Negative for neck pain and neck stiffness.   Cardiovascular: Negative for chest pain.   Eyes: Negative for eye trauma and eye pain.   Respiratory: Negative for shortness of breath and wheezing.    Gastrointestinal: Positive for abdominal pain and nausea. Negative for abdominal trauma, vomiting and diarrhea.   Musculoskeletal: Positive for pain. Negative for joint pain, joint swelling and abnormal ROM of joint.   Skin: Negative for wound, abrasion, laceration, puncture wound and bruising.   Neurological: Negative for loss of consciousness, numbness and tingling.   Psychiatric/Behavioral: Negative for nervous/anxious. The patient is not nervous/anxious.         Objective:      Physical Exam  Vitals signs and nursing note reviewed.   Constitutional:       Appearance: Normal appearance. He is well-developed. He is morbidly obese. He is not diaphoretic.   HENT:      Head: Normocephalic and atraumatic.      Right Ear: External ear normal.      Left Ear: External ear normal.      Nose: Nose normal.   Eyes:      General: Lids are normal.      Conjunctiva/sclera: Conjunctivae normal.   Neck:      Musculoskeletal: Full passive range of motion without pain and neck supple.       Trachea: Trachea normal.   Cardiovascular:      Rate and Rhythm: Normal rate and regular rhythm.      Heart sounds: Normal heart sounds.   Pulmonary:      Effort: Pulmonary effort is normal. No respiratory distress.      Breath sounds: Normal breath sounds.   Abdominal:      General: Bowel sounds are normal. There is no distension or abdominal bruit.      Palpations: Abdomen is soft. There is no mass or pulsatile mass.      Tenderness: There is abdominal tenderness in the periumbilical area.       Musculoskeletal: Normal range of motion.   Skin:     General: Skin is warm and dry.      Coloration: Skin is not pale.   Neurological:      Mental Status: He is alert and oriented to person, place, and time.   Psychiatric:         Speech: Speech normal.         Behavior: Behavior normal.         Thought Content: Thought content normal.         Judgment: Judgment normal.         Assessment:       1. Periumbilical abdominal pain    2. Status post surgery    3. Work related injury        Plan:            Patient Instructions: Referral to specialist to be scheduled, once authorized   Restrictions: Regular Duty, Discharged to Ortho/Neuro/Opthomologist/Surgeon  Follow up if symptoms worsen or fail to improve.

## 2020-10-27 NOTE — LETTER
[facility]  [address], [address]  [place] [address]  Phone: [phone]  Fax: [phone]  [facility]: [email or web address]    Pt Name: Dominic Collazo  Injury Date: 10/27/2020   Employee ID# [id number] Date of First Treatment: 10/27/2020   Company: [employer]      Appointment Time:  Arrived: 12:10 PM   Provider: [place] PA-C Time Out: 2:35 PM     Office Treatment:   1. Periumbilical abdominal pain    2. Status post surgery    3. Work related injury          Patient Instructions: Referral to specialist to be scheduled, once authorized    Restrictions: Regular Duty, Discharged to Ortho/Neuro/Opthomologist/Surgeon     SH

## 2020-11-03 ENCOUNTER — OFFICE VISIT (OUTPATIENT)
Dept: DERMATOLOGY | Facility: CLINIC | Age: 40
End: 2020-11-03
Payer: COMMERCIAL

## 2020-11-03 DIAGNOSIS — D22.9 ATYPICAL MOLE: Primary | ICD-10-CM

## 2020-11-03 PROCEDURE — 88305 TISSUE EXAM BY PATHOLOGIST: CPT | Mod: 26,,, | Performed by: PATHOLOGY

## 2020-11-03 PROCEDURE — 99499 UNLISTED E&M SERVICE: CPT | Mod: S$GLB,,, | Performed by: DERMATOLOGY

## 2020-11-03 PROCEDURE — 11301 SHAVE SKIN LESION 0.6-1.0 CM: CPT | Mod: S$GLB,,, | Performed by: DERMATOLOGY

## 2020-11-03 PROCEDURE — 88305 TISSUE EXAM BY PATHOLOGIST: CPT | Performed by: PATHOLOGY

## 2020-11-03 PROCEDURE — 99499 NO LOS: ICD-10-PCS | Mod: S$GLB,,, | Performed by: DERMATOLOGY

## 2020-11-03 PROCEDURE — 11301 PR SHAV SKIN LES 0.6-1.0 CM TRUNK,ARM,LEG: ICD-10-PCS | Mod: S$GLB,,, | Performed by: DERMATOLOGY

## 2020-11-03 PROCEDURE — 88305 TISSUE EXAM BY PATHOLOGIST: ICD-10-PCS | Mod: 26,,, | Performed by: PATHOLOGY

## 2020-11-03 PROCEDURE — 99999 PR PBB SHADOW E&M-EST. PATIENT-LVL III: ICD-10-PCS | Mod: PBBFAC,,, | Performed by: DERMATOLOGY

## 2020-11-03 PROCEDURE — 99999 PR PBB SHADOW E&M-EST. PATIENT-LVL III: CPT | Mod: PBBFAC,,, | Performed by: DERMATOLOGY

## 2020-11-03 NOTE — PROGRESS NOTES
Subjective:       Patient ID:  Dominic Collazo is a 40 y.o. male who presents for   Chief Complaint   Patient presents with    Biopsy     HPI    Review of Systems   Constitutional: Negative for fever and chills.   Respiratory: Negative for cough and shortness of breath.         Objective:    Physical Exam       Diagram Legend     Erythematous scaling macule/papule c/w actinic keratosis       Vascular papule c/w angioma      Pigmented verrucoid papule/plaque c/w seborrheic keratosis      Yellow umbilicated papule c/w sebaceous hyperplasia      Irregularly shaped tan macule c/w lentigo     1-2 mm smooth white papules consistent with Milia      Movable subcutaneous cyst with punctum c/w epidermal inclusion cyst      Subcutaneous movable cyst c/w pilar cyst      Firm pink to brown papule c/w dermatofibroma      Pedunculated fleshy papule(s) c/w skin tag(s)      Evenly pigmented macule c/w junctional nevus     Mildly variegated pigmented, slightly irregular-bordered macule c/w mildly atypical nevus      Flesh colored to evenly pigmented papule c/w intradermal nevus       Pink pearly papule/plaque c/w basal cell carcinoma      Erythematous hyperkeratotic cursted plaque c/w SCC      Surgical scar with no sign of skin cancer recurrence      Open and closed comedones      Inflammatory papules and pustules      Verrucoid papule consistent consistent with wart     Erythematous eczematous patches and plaques     Dystrophic onycholytic nail with subungual debris c/w onychomycosis     Umbilicated papule    Erythematous-base heme-crusted tan verrucoid plaque consistent with inflamed seborrheic keratosis     Erythematous Silvery Scaling Plaque c/w Psoriasis     See annotation      Assessment / Plan:      Pathology Orders:     Normal Orders This Visit    Specimen to Pathology, Dermatology     Questions:    Procedure Type: Dermatology and skin neoplasms    Number of Specimens: 1    ------------------------:  -------------------------    Spec 1 Procedure: Other (Specify) Comment - shave    Spec 1 Clinical Impression: mild atyp mole    Spec 1 Source: central back        Atypical mole  Shave removal procedure note:    Lesion size 0.8 cm    Shave performed after verbal consent including risk of infection, scar, recurrence, need for additional treatment of site. Area prepped with alcohol, anesthetized with approximately 1.0cc of 1% lidocaine with epinephrine. Lesional tissue shaved with razor blade. Hemostasis achieved with application of aluminum chloride followed by hyfrecation as needed. No complications. Dressing applied. Wound care explained.                 Follow up if symptoms worsen or fail to improve, for Regularly set appointment.

## 2020-11-03 NOTE — PATIENT INSTRUCTIONS
Shave Biopsy Wound Care    Your doctor has performed a shave biopsy today.  A band aid and vaseline ointment has been placed over the site.  This should remain in place for 24 hours.  It is recommended that you keep the area dry for the first 24 hours.  After 24 hours, you may remove the band aid and wash the area with warm soap and water and apply Vaseline jelly.  Many patients prefer to use Neosporin or Bacitracin ointment.  This is acceptable; however, know that you can develop an allergy to this medication even if you have used it safely for years.  It is important to keep the area moist.  Letting it dry out and get air slows healing time, and will worsen the scar.  Band aid is optional after first 24 hours.      If you notice increasing redness, tenderness, pain, or yellow drainage at the biopsy site, please notify your doctor.  These are signs of an infection.    If your biopsy site is bleeding, apply firm pressure for 15 minutes straight.  Repeat for another 15 minutes, if it is still bleeding.   If the surgical site continues to bleed, then please contact your doctor.      For [facility] users:   You will receive your biopsy results in [facility] as soon as they are available. Please be assured that your physician/provider will review your results and will then determine what further treatment, evaluation, or planning is required. You should be contacted by your physician's/provider's office within 5 business days of receiving your results; If not, please reach out to directly. This is one more way [facility] is putting you first.     [address] [place] [address]/ [phone] FAX/ [email or web address]

## 2020-11-06 ENCOUNTER — OFFICE VISIT (OUTPATIENT)
Dept: SURGERY | Facility: CLINIC | Age: 40
End: 2020-11-06
Payer: COMMERCIAL

## 2020-11-06 VITALS
DIASTOLIC BLOOD PRESSURE: 78 MMHG | HEIGHT: 74 IN | SYSTOLIC BLOOD PRESSURE: 147 MMHG | HEART RATE: 86 BPM | WEIGHT: 315 LBS | TEMPERATURE: 98 F | BODY MASS INDEX: 40.43 KG/M2

## 2020-11-06 DIAGNOSIS — Z09 S/P UMBILICAL HERNIA REPAIR, FOLLOW-UP EXAM: Primary | ICD-10-CM

## 2020-11-06 PROBLEM — K42.9 UMBILICAL HERNIA WITHOUT OBSTRUCTION AND WITHOUT GANGRENE: Status: RESOLVED | Noted: 2020-10-16 | Resolved: 2020-11-06

## 2020-11-06 PROCEDURE — 99999 PR PBB SHADOW E&M-EST. PATIENT-LVL IV: CPT | Mod: PBBFAC,,, | Performed by: STUDENT IN AN ORGANIZED HEALTH CARE EDUCATION/TRAINING PROGRAM

## 2020-11-06 PROCEDURE — 99999 PR PBB SHADOW E&M-EST. PATIENT-LVL IV: ICD-10-PCS | Mod: PBBFAC,,, | Performed by: STUDENT IN AN ORGANIZED HEALTH CARE EDUCATION/TRAINING PROGRAM

## 2020-11-06 PROCEDURE — 99024 POSTOP FOLLOW-UP VISIT: CPT | Mod: S$GLB,,, | Performed by: STUDENT IN AN ORGANIZED HEALTH CARE EDUCATION/TRAINING PROGRAM

## 2020-11-06 PROCEDURE — 99024 PR POST-OP FOLLOW-UP VISIT: ICD-10-PCS | Mod: S$GLB,,, | Performed by: STUDENT IN AN ORGANIZED HEALTH CARE EDUCATION/TRAINING PROGRAM

## 2020-11-06 NOTE — PROGRESS NOTES
[facility]  General Surgery  Post-Operative Note    Subjective:       Dominic Collazo presents to the clinic 3 weeks following open umbilical hernia repair with mesh due to an incarcerated umbilical hernia. He is eating a regular diet without difficulty. Bowel movements are normal.  Pain is controlled without any medications. 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 It has subsequently improved. No nausea or emesis. Ambulating without any issues. Wound with no drainage.     Objective:      There were no vitals taken for this visit.    General:  alert, appears stated age and cooperative   Abdomen: soft, bowel sounds active, non-tender   Incision:   healing well, no drainage, no erythema, no swelling, no dehiscence, incision well approximated       Assessment:     Dominic Collazo is doing well postoperatively s/p open umbilical hernia with mesh     Plan:     1. Continue any current medications.  2. Wound care discussed.  3. Return to full duty in 2 weeks.  4. Follow up as needed.    Conrad Loco MD  General Surgery and Surgical Critical Care  [facility]

## 2020-11-09 LAB
FINAL PATHOLOGIC DIAGNOSIS: NORMAL
GROSS: NORMAL
MICROSCOPIC EXAM: NORMAL

## 2020-11-30 ENCOUNTER — PATIENT MESSAGE (OUTPATIENT)
Dept: INTERNAL MEDICINE | Facility: CLINIC | Age: 40
End: 2020-11-30

## 2020-11-30 DIAGNOSIS — G47.9 SLEEP DISTURBANCES: ICD-10-CM

## 2020-11-30 DIAGNOSIS — Z00.00 WELL ADULT EXAM: Primary | ICD-10-CM

## 2020-11-30 DIAGNOSIS — Z12.5 PROSTATE CANCER SCREENING: ICD-10-CM

## 2020-11-30 RX ORDER — ZOLPIDEM TARTRATE 10 MG/1
10 TABLET ORAL NIGHTLY PRN
Qty: 90 TABLET | Refills: 0 | Status: SHIPPED | OUTPATIENT
Start: 2020-11-30 | End: 2021-01-06

## 2020-12-01 ENCOUNTER — PATIENT MESSAGE (OUTPATIENT)
Dept: BARIATRICS | Facility: CLINIC | Age: 40
End: 2020-12-01

## 2020-12-01 NOTE — TELEPHONE ENCOUNTER
Spoke to pt in regards to rescheduling consult with . Informed pt the $250 copay is a one time payment for the inial consult visit. Pt requested appt in January to prepared for payment.

## 2020-12-07 ENCOUNTER — OFFICE VISIT (OUTPATIENT)
Dept: INTERNAL MEDICINE | Facility: CLINIC | Age: 40
End: 2020-12-07
Payer: COMMERCIAL

## 2020-12-07 VITALS
HEART RATE: 75 BPM | BODY MASS INDEX: 40.43 KG/M2 | HEIGHT: 74 IN | SYSTOLIC BLOOD PRESSURE: 112 MMHG | DIASTOLIC BLOOD PRESSURE: 76 MMHG | WEIGHT: 315 LBS | TEMPERATURE: 97 F

## 2020-12-07 DIAGNOSIS — F41.9 ANXIETY: Primary | ICD-10-CM

## 2020-12-07 PROCEDURE — 90471 FLU VACCINE (QUAD) GREATER THAN OR EQUAL TO 3YO PRESERVATIVE FREE IM: ICD-10-PCS | Mod: S$GLB,,, | Performed by: FAMILY MEDICINE

## 2020-12-07 PROCEDURE — 90686 FLU VACCINE (QUAD) GREATER THAN OR EQUAL TO 3YO PRESERVATIVE FREE IM: ICD-10-PCS | Mod: S$GLB,,, | Performed by: FAMILY MEDICINE

## 2020-12-07 PROCEDURE — 1126F AMNT PAIN NOTED NONE PRSNT: CPT | Mod: S$GLB,,, | Performed by: FAMILY MEDICINE

## 2020-12-07 PROCEDURE — 90686 IIV4 VACC NO PRSV 0.5 ML IM: CPT | Mod: S$GLB,,, | Performed by: FAMILY MEDICINE

## 2020-12-07 PROCEDURE — 99999 PR PBB SHADOW E&M-EST. PATIENT-LVL IV: CPT | Mod: PBBFAC,,, | Performed by: FAMILY MEDICINE

## 2020-12-07 PROCEDURE — 99999 PR PBB SHADOW E&M-EST. PATIENT-LVL IV: ICD-10-PCS | Mod: PBBFAC,,, | Performed by: FAMILY MEDICINE

## 2020-12-07 PROCEDURE — 3074F PR MOST RECENT SYSTOLIC BLOOD PRESSURE < 130 MM HG: ICD-10-PCS | Mod: CPTII,S$GLB,, | Performed by: FAMILY MEDICINE

## 2020-12-07 PROCEDURE — 3074F SYST BP LT 130 MM HG: CPT | Mod: CPTII,S$GLB,, | Performed by: FAMILY MEDICINE

## 2020-12-07 PROCEDURE — 3008F BODY MASS INDEX DOCD: CPT | Mod: CPTII,S$GLB,, | Performed by: FAMILY MEDICINE

## 2020-12-07 PROCEDURE — 3078F DIAST BP <80 MM HG: CPT | Mod: CPTII,S$GLB,, | Performed by: FAMILY MEDICINE

## 2020-12-07 PROCEDURE — 1126F PR PAIN SEVERITY QUANTIFIED, NO PAIN PRESENT: ICD-10-PCS | Mod: S$GLB,,, | Performed by: FAMILY MEDICINE

## 2020-12-07 PROCEDURE — 99214 PR OFFICE/OUTPT VISIT, EST, LEVL IV, 30-39 MIN: ICD-10-PCS | Mod: 25,S$GLB,, | Performed by: FAMILY MEDICINE

## 2020-12-07 PROCEDURE — 3008F PR BODY MASS INDEX (BMI) DOCUMENTED: ICD-10-PCS | Mod: CPTII,S$GLB,, | Performed by: FAMILY MEDICINE

## 2020-12-07 PROCEDURE — 90471 IMMUNIZATION ADMIN: CPT | Mod: S$GLB,,, | Performed by: FAMILY MEDICINE

## 2020-12-07 PROCEDURE — 99214 OFFICE O/P EST MOD 30 MIN: CPT | Mod: 25,S$GLB,, | Performed by: FAMILY MEDICINE

## 2020-12-07 PROCEDURE — 3078F PR MOST RECENT DIASTOLIC BLOOD PRESSURE < 80 MM HG: ICD-10-PCS | Mod: CPTII,S$GLB,, | Performed by: FAMILY MEDICINE

## 2020-12-07 RX ORDER — ESCITALOPRAM OXALATE 5 MG/1
5 TABLET ORAL DAILY
Qty: 30 TABLET | Refills: 11 | Status: SHIPPED | OUTPATIENT
Start: 2020-12-07 | End: 2021-01-14

## 2020-12-07 NOTE — PROGRESS NOTES
Subjective:       Patient ID: Dominic Collazo is a 40 y.o. male.    Chief Complaint: Anxiety and Insomnia    HPI 40-year-old white male  presents to clinic today secondary to a complaint of worsening anxiety and difficulty sleeping.  He reports increased anxiety this year related to the pandemic and the affects of the pandemic.  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  He reports frequent difficulty sleeping as he has racing thoughts and had notes frequent difficulty turning of his mind at night to sleep.  Review of Systems   Constitutional: Negative for appetite change, chills, fatigue and fever.   HENT: Negative for nasal congestion, ear pain, hearing loss, postnasal drip, rhinorrhea, sinus pressure/congestion, sore throat and tinnitus.    Eyes: Negative for redness, itching and visual disturbance.   Respiratory: Negative for cough, chest tightness and shortness of breath.    Cardiovascular: Negative for chest pain and palpitations.   Gastrointestinal: Negative for abdominal pain, constipation, diarrhea, nausea and vomiting.   Genitourinary: Negative for decreased urine volume, difficulty urinating, dysuria, frequency, hematuria and urgency.   Musculoskeletal: Negative for back pain, myalgias, neck pain and neck stiffness.   Integumentary:  Negative for rash.   Neurological: Negative for dizziness, light-headedness and headaches.   Psychiatric/Behavioral: Positive for sleep disturbance. The patient is nervous/anxious.          Objective:      Physical Exam  Vitals signs and nursing note reviewed.   Constitutional:       General: He is not in acute distress.     Appearance: He is well-developed. He is not diaphoretic.   HENT:      Head:  Normocephalic and atraumatic.      Right Ear: External ear normal.      Left Ear: External ear normal.      Nose: Nose normal.      Mouth/Throat:      Pharynx: No oropharyngeal exudate.   Eyes:      General: No scleral icterus.        Right eye: No discharge.         Left eye: No discharge.      Conjunctiva/sclera: Conjunctivae normal.      Pupils: Pupils are equal, round, and reactive to light.   Neck:      Musculoskeletal: Normal range of motion and neck supple.      Thyroid: No thyromegaly.      Vascular: No JVD.      Trachea: No tracheal deviation.   Cardiovascular:      Rate and Rhythm: Normal rate and regular rhythm.      Heart sounds: Normal heart sounds. No murmur. No friction rub. No gallop.    Pulmonary:      Effort: Pulmonary effort is normal. No respiratory distress.      Breath sounds: Normal breath sounds. No stridor. No wheezing or rales.   Abdominal:      General: Bowel sounds are normal. There is no distension.      Palpations: Abdomen is soft. There is no mass.      Tenderness: There is no abdominal tenderness. There is no guarding or rebound.   Musculoskeletal: Normal range of motion.         General: No tenderness.   Lymphadenopathy:      Cervical: No cervical adenopathy.   Skin:     General: Skin is warm and dry.      Coloration: Skin is not pale.      Findings: No erythema or rash.   Neurological:      Mental Status: He is alert and oriented to person, place, and time.   Psychiatric:         Behavior: Behavior normal.         Thought Content: Thought content normal.         Judgment: Judgment normal.         Assessment:       1. Anxiety        Plan:       1.  Continue Wellbutrin 300 mg daily and BuSpar 20 mg in the morning and 10 mg in the evening.  Start Lexapro 5 mg daily.  2.  Refer to Psychiatry for further evaluation and treatment of anxiety.  3.  Return to clinic as needed or as previously scheduled for annual exam.

## 2020-12-11 ENCOUNTER — OFFICE VISIT (OUTPATIENT)
Dept: PSYCHIATRY | Facility: CLINIC | Age: 40
End: 2020-12-11
Payer: COMMERCIAL

## 2020-12-11 DIAGNOSIS — F41.0 PANIC ATTACK: ICD-10-CM

## 2020-12-11 DIAGNOSIS — F99 INSOMNIA DUE TO OTHER MENTAL DISORDER: ICD-10-CM

## 2020-12-11 DIAGNOSIS — F51.05 INSOMNIA DUE TO OTHER MENTAL DISORDER: ICD-10-CM

## 2020-12-11 DIAGNOSIS — F43.22 ADJUSTMENT DISORDER WITH ANXIOUS MOOD: ICD-10-CM

## 2020-12-11 DIAGNOSIS — F43.23 ADJUSTMENT DISORDER WITH MIXED ANXIETY AND DEPRESSED MOOD: Primary | ICD-10-CM

## 2020-12-11 PROCEDURE — 99205 PR OFFICE/OUTPT VISIT, NEW, LEVL V, 60-74 MIN: ICD-10-PCS | Mod: 95,,, | Performed by: NURSE PRACTITIONER

## 2020-12-11 PROCEDURE — 99205 OFFICE O/P NEW HI 60 MIN: CPT | Mod: 95,,, | Performed by: NURSE PRACTITIONER

## 2020-12-11 RX ORDER — LORAZEPAM 1 MG/1
1 TABLET ORAL EVERY 6 HOURS PRN
Qty: 5 TABLET | Refills: 0 | Status: SHIPPED | OUTPATIENT
Start: 2020-12-11 | End: 2021-01-06 | Stop reason: SDUPTHER

## 2020-12-11 RX ORDER — PROPRANOLOL HYDROCHLORIDE 20 MG/1
20 TABLET ORAL 3 TIMES DAILY
Qty: 90 TABLET | Refills: 2 | Status: SHIPPED | OUTPATIENT
Start: 2020-12-11 | End: 2021-03-01 | Stop reason: SDUPTHER
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
## 2020-12-18 ENCOUNTER — PATIENT MESSAGE (OUTPATIENT)
Dept: UROLOGY | Facility: CLINIC | Age: 40
End: 2020-12-18

## 2020-12-19 ENCOUNTER — LAB VISIT (OUTPATIENT)
Dept: LAB | Facility: HOSPITAL | Age: 40
End: 2020-12-19
Attending: FAMILY MEDICINE
Payer: COMMERCIAL

## 2020-12-19 DIAGNOSIS — Z12.5 PROSTATE CANCER SCREENING: ICD-10-CM

## 2020-12-19 DIAGNOSIS — E29.1 MALE HYPOGONADISM: ICD-10-CM

## 2020-12-19 DIAGNOSIS — Z00.00 WELL ADULT EXAM: ICD-10-CM

## 2020-12-19 LAB
25(OH)D3+25(OH)D2 SERPL-MCNC: 40 NG/ML (ref 30–96)
ALBUMIN SERPL BCP-MCNC: 3.9 G/DL (ref 3.5–5.2)
ALP SERPL-CCNC: 61 U/L (ref 55–135)
ALT SERPL W/O P-5'-P-CCNC: 82 U/L (ref 10–44)
ANION GAP SERPL CALC-SCNC: 9 MMOL/L (ref 8–16)
AST SERPL-CCNC: 40 U/L (ref 10–40)
BASOPHILS # BLD AUTO: 0.08 K/UL (ref 0–0.2)
BASOPHILS NFR BLD: 0.9 % (ref 0–1.9)
BILIRUB SERPL-MCNC: 0.3 MG/DL (ref 0.1–1)
BUN SERPL-MCNC: 14 MG/DL (ref 6–20)
CALCIUM SERPL-MCNC: 9.4 MG/DL (ref 8.7–10.5)
CHLORIDE SERPL-SCNC: 105 MMOL/L (ref 95–110)
CHOLEST SERPL-MCNC: 203 MG/DL (ref 120–199)
CHOLEST/HDLC SERPL: 4.8 {RATIO} (ref 2–5)
CO2 SERPL-SCNC: 25 MMOL/L (ref 23–29)
COMPLEXED PSA SERPL-MCNC: 0.59 NG/ML (ref 0–4)
CREAT SERPL-MCNC: 0.8 MG/DL (ref 0.5–1.4)
DIFFERENTIAL METHOD: ABNORMAL
EOSINOPHIL # BLD AUTO: 0.3 K/UL (ref 0–0.5)
EOSINOPHIL NFR BLD: 3 % (ref 0–8)
ERYTHROCYTE [DISTWIDTH] IN BLOOD BY AUTOMATED COUNT: 13.6 % (ref 11.5–14.5)
EST. GFR  (AFRICAN AMERICAN): >60 ML/MIN/1.73 M^2
EST. GFR  (NON AFRICAN AMERICAN): >60 ML/MIN/1.73 M^2
ESTIMATED AVG GLUCOSE: 140 MG/DL (ref 68–131)
GLUCOSE SERPL-MCNC: 127 MG/DL (ref 70–110)
HBA1C MFR BLD HPLC: 6.5 % (ref 4–5.6)
HCT VFR BLD AUTO: 43.1 % (ref 40–54)
HDLC SERPL-MCNC: 42 MG/DL (ref 40–75)
HDLC SERPL: 20.7 % (ref 20–50)
HGB BLD-MCNC: 13.4 G/DL (ref 14–18)
IMM GRANULOCYTES # BLD AUTO: 0.04 K/UL (ref 0–0.04)
IMM GRANULOCYTES NFR BLD AUTO: 0.4 % (ref 0–0.5)
LDLC SERPL CALC-MCNC: 127.4 MG/DL (ref 63–159)
LYMPHOCYTES # BLD AUTO: 3 K/UL (ref 1–4.8)
LYMPHOCYTES NFR BLD: 32.5 % (ref 18–48)
MCH RBC QN AUTO: 28.6 PG (ref 27–31)
MCHC RBC AUTO-ENTMCNC: 31.1 G/DL (ref 32–36)
MCV RBC AUTO: 92 FL (ref 82–98)
MONOCYTES # BLD AUTO: 0.7 K/UL (ref 0.3–1)
MONOCYTES NFR BLD: 7.2 % (ref 4–15)
NEUTROPHILS # BLD AUTO: 5.2 K/UL (ref 1.8–7.7)
NEUTROPHILS NFR BLD: 56 % (ref 38–73)
NONHDLC SERPL-MCNC: 161 MG/DL
NRBC BLD-RTO: 0 /100 WBC
PLATELET # BLD AUTO: 258 K/UL (ref 150–350)
PMV BLD AUTO: 11.6 FL (ref 9.2–12.9)
POTASSIUM SERPL-SCNC: 4.4 MMOL/L (ref 3.5–5.1)
PROT SERPL-MCNC: 7.1 G/DL (ref 6–8.4)
RBC # BLD AUTO: 4.68 M/UL (ref 4.6–6.2)
SODIUM SERPL-SCNC: 139 MMOL/L (ref 136–145)
T4 FREE SERPL-MCNC: 1 NG/DL (ref 0.71–1.51)
TESTOST SERPL-MCNC: 155 NG/DL (ref 304–1227)
TRIGL SERPL-MCNC: 168 MG/DL (ref 30–150)
TSH SERPL DL<=0.005 MIU/L-ACNC: 0.92 UIU/ML (ref 0.4–4)
WBC # BLD AUTO: 9.28 K/UL (ref 3.9–12.7)

## 2020-12-19 PROCEDURE — 80061 LIPID PANEL: CPT

## 2020-12-19 PROCEDURE — 80053 COMPREHEN METABOLIC PANEL: CPT

## 2020-12-19 PROCEDURE — 85025 COMPLETE CBC W/AUTO DIFF WBC: CPT

## 2020-12-19 PROCEDURE — 83036 HEMOGLOBIN GLYCOSYLATED A1C: CPT

## 2020-12-19 PROCEDURE — 36415 COLL VENOUS BLD VENIPUNCTURE: CPT | Mod: PO

## 2020-12-19 PROCEDURE — 84153 ASSAY OF PSA TOTAL: CPT

## 2020-12-19 PROCEDURE — 82306 VITAMIN D 25 HYDROXY: CPT

## 2020-12-19 PROCEDURE — 84439 ASSAY OF FREE THYROXINE: CPT

## 2020-12-19 PROCEDURE — 84443 ASSAY THYROID STIM HORMONE: CPT

## 2020-12-19 PROCEDURE — 84403 ASSAY OF TOTAL TESTOSTERONE: CPT

## 2020-12-21 ENCOUNTER — TELEPHONE (OUTPATIENT)
Dept: UROLOGY | Facility: CLINIC | Age: 40
End: 2020-12-21

## 2020-12-21 ENCOUNTER — PATIENT MESSAGE (OUTPATIENT)
Dept: UROLOGY | Facility: CLINIC | Age: 40
End: 2020-12-21

## 2020-12-21 ENCOUNTER — TELEPHONE (OUTPATIENT)
Dept: INTERNAL MEDICINE | Facility: CLINIC | Age: 40
End: 2020-12-21

## 2020-12-28 ENCOUNTER — TELEPHONE (OUTPATIENT)
Dept: INTERNAL MEDICINE | Facility: CLINIC | Age: 40
End: 2020-12-28

## 2020-12-28 ENCOUNTER — PATIENT MESSAGE (OUTPATIENT)
Dept: INTERNAL MEDICINE | Facility: CLINIC | Age: 40
End: 2020-12-28

## 2020-12-28 NOTE — TELEPHONE ENCOUNTER
----- Message from Rajat Cunningham DO sent at 12/23/2020  7:02 AM CST -----  This is Dr. Cunningham covering for Dr. Cross. Labs confirm you have mild diabetes. Discuss further at your visit with Dr. Cross. All other labs are stable.

## 2020-12-30 ENCOUNTER — PATIENT OUTREACH (OUTPATIENT)
Dept: ADMINISTRATIVE | Facility: OTHER | Age: 40
End: 2020-12-30

## 2020-12-30 NOTE — PROGRESS NOTES
LINKS immunization registry updated  Care Everywhere updated  Health Maintenance updated  Chart reviewed for overdue Proactive [facility] Encounters (TIA) health maintenance testing (CRS, Breast Ca, Diabetic Eye Exam)   Orders entered:N/A

## 2021-01-06 ENCOUNTER — OFFICE VISIT (OUTPATIENT)
Dept: PSYCHIATRY | Facility: CLINIC | Age: 41
End: 2021-01-06
Payer: COMMERCIAL

## 2021-01-06 DIAGNOSIS — F43.23 ADJUSTMENT DISORDER WITH MIXED ANXIETY AND DEPRESSED MOOD: ICD-10-CM

## 2021-01-06 DIAGNOSIS — F41.0 PANIC ATTACK: ICD-10-CM

## 2021-01-06 DIAGNOSIS — F51.02 ADJUSTMENT INSOMNIA: Primary | ICD-10-CM

## 2021-01-06 PROCEDURE — 99214 OFFICE O/P EST MOD 30 MIN: CPT | Mod: 95,,, | Performed by: NURSE PRACTITIONER

## 2021-01-06 PROCEDURE — 99214 PR OFFICE/OUTPT VISIT, EST, LEVL IV, 30-39 MIN: ICD-10-PCS | Mod: 95,,, | Performed by: NURSE PRACTITIONER

## 2021-01-06 PROCEDURE — 90833 PSYTX W PT W E/M 30 MIN: CPT | Mod: 95,,, | Performed by: NURSE PRACTITIONER

## 2021-01-06 PROCEDURE — 90833 PR PSYCHOTHERAPY W/PATIENT W/E&M, 30 MIN (ADD ON): ICD-10-PCS | Mod: 95,,, | Performed by: NURSE PRACTITIONER

## 2021-01-06 RX ORDER — BUPROPION HYDROCHLORIDE 150 MG/1
150 TABLET ORAL DAILY
Qty: 30 TABLET | Refills: 11 | Status: SHIPPED | OUTPATIENT
Start: 2021-01-06 | End: 2021-03-01 | Stop reason: SDUPTHER

## 2021-01-06 RX ORDER — FLUOXETINE HYDROCHLORIDE 20 MG/1
20 CAPSULE ORAL DAILY
Qty: 30 CAPSULE | Refills: 2 | Status: SHIPPED | OUTPATIENT
Start: 2021-01-06 | End: 2021-03-01 | Stop reason: SDUPTHER

## 2021-01-06 RX ORDER — LORAZEPAM 1 MG/1
1 TABLET ORAL EVERY 6 HOURS PRN
Qty: 5 TABLET | Refills: 0 | Status: SHIPPED | OUTPATIENT
Start: 2021-01-06 | End: 2021-09-16 | Stop reason: SDUPTHER

## 2021-01-06 RX ORDER — ZOLPIDEM TARTRATE 12.5 MG/1
12.5 TABLET, FILM COATED, EXTENDED RELEASE ORAL NIGHTLY PRN
Qty: 26 TABLET | Refills: 2 | Status: SHIPPED | OUTPATIENT
Start: 2021-01-06 | End: 2021-03-01

## 2021-01-12 RX ORDER — TESTOSTERONE 20.25 MG/1.25G
GEL TOPICAL
Qty: 1 BOTTLE | Refills: 5 | Status: CANCELLED | OUTPATIENT
Start: 2021-01-12

## 2021-01-14 ENCOUNTER — OFFICE VISIT (OUTPATIENT)
Dept: INTERNAL MEDICINE | Facility: CLINIC | Age: 41
End: 2021-01-14
Payer: COMMERCIAL

## 2021-01-14 VITALS
OXYGEN SATURATION: 96 % | HEIGHT: 75 IN | SYSTOLIC BLOOD PRESSURE: 130 MMHG | TEMPERATURE: 98 F | WEIGHT: 315 LBS | BODY MASS INDEX: 39.17 KG/M2 | DIASTOLIC BLOOD PRESSURE: 84 MMHG | HEART RATE: 67 BPM

## 2021-01-14 DIAGNOSIS — G47.33 OSA (OBSTRUCTIVE SLEEP APNEA): ICD-10-CM

## 2021-01-14 DIAGNOSIS — E66.01 MORBID OBESITY: ICD-10-CM

## 2021-01-14 DIAGNOSIS — F51.02 ADJUSTMENT INSOMNIA: ICD-10-CM

## 2021-01-14 DIAGNOSIS — Z00.00 WELL ADULT EXAM: Primary | ICD-10-CM

## 2021-01-14 DIAGNOSIS — F41.0 PANIC ATTACK: ICD-10-CM

## 2021-01-14 DIAGNOSIS — R74.8 ELEVATED LIVER ENZYMES: ICD-10-CM

## 2021-01-14 DIAGNOSIS — K76.0 NAFLD (NONALCOHOLIC FATTY LIVER DISEASE): ICD-10-CM

## 2021-01-14 DIAGNOSIS — R73.03 PREDIABETES: ICD-10-CM

## 2021-01-14 DIAGNOSIS — I10 ESSENTIAL HYPERTENSION: ICD-10-CM

## 2021-01-14 DIAGNOSIS — E78.5 HYPERLIPIDEMIA, UNSPECIFIED HYPERLIPIDEMIA TYPE: ICD-10-CM

## 2021-01-14 DIAGNOSIS — F43.23 ADJUSTMENT DISORDER WITH MIXED ANXIETY AND DEPRESSED MOOD: ICD-10-CM

## 2021-01-14 PROCEDURE — 3079F PR MOST RECENT DIASTOLIC BLOOD PRESSURE 80-89 MM HG: ICD-10-PCS | Mod: CPTII,S$GLB,, | Performed by: FAMILY MEDICINE

## 2021-01-14 PROCEDURE — 99999 PR PBB SHADOW E&M-EST. PATIENT-LVL III: ICD-10-PCS | Mod: PBBFAC,,, | Performed by: FAMILY MEDICINE

## 2021-01-14 PROCEDURE — 3075F PR MOST RECENT SYSTOLIC BLOOD PRESS GE 130-139MM HG: ICD-10-PCS | Mod: CPTII,S$GLB,, | Performed by: FAMILY MEDICINE

## 2021-01-14 PROCEDURE — 99396 PREV VISIT EST AGE 40-64: CPT | Mod: S$GLB,,, | Performed by: FAMILY MEDICINE

## 2021-01-14 PROCEDURE — 3079F DIAST BP 80-89 MM HG: CPT | Mod: CPTII,S$GLB,, | Performed by: FAMILY MEDICINE

## 2021-01-14 PROCEDURE — 99999 PR PBB SHADOW E&M-EST. PATIENT-LVL III: CPT | Mod: PBBFAC,,, | Performed by: FAMILY MEDICINE

## 2021-01-14 PROCEDURE — 3008F PR BODY MASS INDEX (BMI) DOCUMENTED: ICD-10-PCS | Mod: CPTII,S$GLB,, | Performed by: FAMILY MEDICINE

## 2021-01-14 PROCEDURE — 3075F SYST BP GE 130 - 139MM HG: CPT | Mod: CPTII,S$GLB,, | Performed by: FAMILY MEDICINE

## 2021-01-14 PROCEDURE — 99396 PR PREVENTIVE VISIT,EST,40-64: ICD-10-PCS | Mod: S$GLB,,, | Performed by: FAMILY MEDICINE

## 2021-01-14 PROCEDURE — 3008F BODY MASS INDEX DOCD: CPT | Mod: CPTII,S$GLB,, | Performed by: FAMILY MEDICINE

## 2021-01-14 RX ORDER — BUSPIRONE HYDROCHLORIDE 10 MG/1
10 TABLET ORAL 3 TIMES DAILY
Qty: 270 TABLET | Refills: 3 | Status: SHIPPED | OUTPATIENT
Start: 2021-01-14 | End: 2021-03-01 | Stop reason: SDUPTHER

## 2021-02-02 RX ORDER — TESTOSTERONE 20.25 MG/1.25G
GEL TOPICAL
Qty: 1 BOTTLE | Refills: 5 | OUTPATIENT
Start: 2021-02-02

## 2021-02-04 ENCOUNTER — DOCUMENTATION ONLY (OUTPATIENT)
Dept: PSYCHIATRY | Facility: HOSPITAL | Age: 41
End: 2021-02-04

## 2021-02-21 ENCOUNTER — OFFICE VISIT (OUTPATIENT)
Dept: URGENT CARE | Facility: CLINIC | Age: 41
End: 2021-02-21
Payer: COMMERCIAL

## 2021-02-21 VITALS
HEART RATE: 65 BPM | WEIGHT: 315 LBS | DIASTOLIC BLOOD PRESSURE: 75 MMHG | HEIGHT: 75 IN | SYSTOLIC BLOOD PRESSURE: 118 MMHG | TEMPERATURE: 99 F | OXYGEN SATURATION: 96 % | RESPIRATION RATE: 16 BRPM | BODY MASS INDEX: 39.17 KG/M2

## 2021-02-21 DIAGNOSIS — E66.01 MORBID OBESITY: ICD-10-CM

## 2021-02-21 DIAGNOSIS — L03.032 CELLULITIS OF LEFT TOE: Primary | ICD-10-CM

## 2021-02-21 DIAGNOSIS — L60.0 INGROWN TOENAIL OF LEFT FOOT: ICD-10-CM

## 2021-02-21 DIAGNOSIS — Z23 NEED FOR TDAP VACCINATION: ICD-10-CM

## 2021-02-21 DIAGNOSIS — R43.9 DISTURBANCE OF SMELL: ICD-10-CM

## 2021-02-21 LAB
CTP QC/QA: YES
SARS-COV-2 RDRP RESP QL NAA+PROBE: NEGATIVE

## 2021-02-21 PROCEDURE — 90715 TDAP VACCINE 7 YRS/> IM: CPT | Mod: S$GLB,,, | Performed by: PHYSICIAN ASSISTANT

## 2021-02-21 PROCEDURE — 90471 IMMUNIZATION ADMIN: CPT | Mod: S$GLB,,, | Performed by: PHYSICIAN ASSISTANT

## 2021-02-21 PROCEDURE — 3008F PR BODY MASS INDEX (BMI) DOCUMENTED: ICD-10-PCS | Mod: CPTII,S$GLB,, | Performed by: PHYSICIAN ASSISTANT

## 2021-02-21 PROCEDURE — 99214 OFFICE O/P EST MOD 30 MIN: CPT | Mod: 25,S$GLB,, | Performed by: PHYSICIAN ASSISTANT

## 2021-02-21 PROCEDURE — 90715 TDAP VACCINE GREATER THAN OR EQUAL TO 7YO IM: ICD-10-PCS | Mod: S$GLB,,, | Performed by: PHYSICIAN ASSISTANT

## 2021-02-21 PROCEDURE — U0002: ICD-10-PCS | Mod: QW,S$GLB,, | Performed by: PHYSICIAN ASSISTANT

## 2021-02-21 PROCEDURE — 99214 PR OFFICE/OUTPT VISIT, EST, LEVL IV, 30-39 MIN: ICD-10-PCS | Mod: 25,S$GLB,, | Performed by: PHYSICIAN ASSISTANT

## 2021-02-21 PROCEDURE — 90471 TDAP VACCINE GREATER THAN OR EQUAL TO 7YO IM: ICD-10-PCS | Mod: S$GLB,,, | Performed by: PHYSICIAN ASSISTANT

## 2021-02-21 PROCEDURE — U0002 COVID-19 LAB TEST NON-CDC: HCPCS | Mod: QW,S$GLB,, | Performed by: PHYSICIAN ASSISTANT

## 2021-02-21 PROCEDURE — 3008F BODY MASS INDEX DOCD: CPT | Mod: CPTII,S$GLB,, | Performed by: PHYSICIAN ASSISTANT

## 2021-02-21 RX ORDER — MUPIROCIN 20 MG/G
OINTMENT TOPICAL 3 TIMES DAILY
Qty: 1 TUBE | Refills: 0 | Status: SHIPPED | OUTPATIENT
Start: 2021-02-21 | End: 2021-02-28

## 2021-02-21 RX ORDER — DOXYCYCLINE 100 MG/1
100 CAPSULE ORAL 2 TIMES DAILY
Qty: 20 CAPSULE | Refills: 0 | Status: SHIPPED | OUTPATIENT
Start: 2021-02-21 | End: 2021-03-03

## 2021-03-01 ENCOUNTER — OFFICE VISIT (OUTPATIENT)
Dept: PODIATRY | Facility: CLINIC | Age: 41
End: 2021-03-01
Payer: COMMERCIAL

## 2021-03-01 ENCOUNTER — OFFICE VISIT (OUTPATIENT)
Dept: PSYCHIATRY | Facility: CLINIC | Age: 41
End: 2021-03-01
Payer: COMMERCIAL

## 2021-03-01 VITALS
WEIGHT: 315 LBS | DIASTOLIC BLOOD PRESSURE: 70 MMHG | HEART RATE: 58 BPM | BODY MASS INDEX: 44.42 KG/M2 | SYSTOLIC BLOOD PRESSURE: 116 MMHG

## 2021-03-01 DIAGNOSIS — F41.0 PANIC ATTACK: Primary | ICD-10-CM

## 2021-03-01 DIAGNOSIS — F43.23 ADJUSTMENT DISORDER WITH MIXED ANXIETY AND DEPRESSED MOOD: ICD-10-CM

## 2021-03-01 DIAGNOSIS — F51.02 ADJUSTMENT INSOMNIA: ICD-10-CM

## 2021-03-01 DIAGNOSIS — L60.0 INGROWN NAIL: Primary | ICD-10-CM

## 2021-03-01 PROCEDURE — 99999 PR PBB SHADOW E&M-EST. PATIENT-LVL III: ICD-10-PCS | Mod: PBBFAC,,, | Performed by: PODIATRIST

## 2021-03-01 PROCEDURE — 11750 PR REMOVAL OF NAIL BED: ICD-10-PCS | Mod: TA,S$GLB,, | Performed by: PODIATRIST

## 2021-03-01 PROCEDURE — 3074F SYST BP LT 130 MM HG: CPT | Mod: CPTII,S$GLB,, | Performed by: PODIATRIST

## 2021-03-01 PROCEDURE — 99214 PR OFFICE/OUTPT VISIT, EST, LEVL IV, 30-39 MIN: ICD-10-PCS | Mod: 95,,, | Performed by: NURSE PRACTITIONER

## 2021-03-01 PROCEDURE — 3008F BODY MASS INDEX DOCD: CPT | Mod: CPTII,S$GLB,, | Performed by: PODIATRIST

## 2021-03-01 PROCEDURE — 99203 PR OFFICE/OUTPT VISIT, NEW, LEVL III, 30-44 MIN: ICD-10-PCS | Mod: 25,S$GLB,, | Performed by: PODIATRIST

## 2021-03-01 PROCEDURE — 3008F PR BODY MASS INDEX (BMI) DOCUMENTED: ICD-10-PCS | Mod: CPTII,S$GLB,, | Performed by: PODIATRIST

## 2021-03-01 PROCEDURE — 3078F DIAST BP <80 MM HG: CPT | Mod: CPTII,S$GLB,, | Performed by: PODIATRIST

## 2021-03-01 PROCEDURE — 99214 OFFICE O/P EST MOD 30 MIN: CPT | Mod: 95,,, | Performed by: NURSE PRACTITIONER

## 2021-03-01 PROCEDURE — 11750 EXCISION NAIL&NAIL MATRIX: CPT | Mod: 51,T5,S$GLB, | Performed by: PODIATRIST

## 2021-03-01 PROCEDURE — 99999 PR PBB SHADOW E&M-EST. PATIENT-LVL III: CPT | Mod: PBBFAC,,, | Performed by: PODIATRIST

## 2021-03-01 PROCEDURE — 3074F PR MOST RECENT SYSTOLIC BLOOD PRESSURE < 130 MM HG: ICD-10-PCS | Mod: CPTII,S$GLB,, | Performed by: PODIATRIST

## 2021-03-01 PROCEDURE — 99203 OFFICE O/P NEW LOW 30 MIN: CPT | Mod: 25,S$GLB,, | Performed by: PODIATRIST

## 2021-03-01 PROCEDURE — 3078F PR MOST RECENT DIASTOLIC BLOOD PRESSURE < 80 MM HG: ICD-10-PCS | Mod: CPTII,S$GLB,, | Performed by: PODIATRIST

## 2021-03-01 RX ORDER — BUPROPION HYDROCHLORIDE 150 MG/1
150 TABLET ORAL DAILY
Qty: 30 TABLET | Refills: 11 | Status: SHIPPED | OUTPATIENT
Start: 2021-03-01 | End: 2021-09-16 | Stop reason: SDUPTHER

## 2021-03-01 RX ORDER — PROPRANOLOL HYDROCHLORIDE 10 MG/1
10 TABLET ORAL 2 TIMES DAILY
Qty: 60 TABLET | Refills: 0 | Status: SHIPPED | OUTPATIENT
Start: 2021-03-01 | End: 2021-06-24

## 2021-03-01 RX ORDER — FLUOXETINE HYDROCHLORIDE 20 MG/1
20 CAPSULE ORAL DAILY
Qty: 30 CAPSULE | Refills: 2 | Status: SHIPPED | OUTPATIENT
Start: 2021-03-01 | End: 2021-09-16

## 2021-03-01 RX ORDER — BUSPIRONE HYDROCHLORIDE 10 MG/1
10 TABLET ORAL 3 TIMES DAILY
Qty: 270 TABLET | Refills: 3 | Status: SHIPPED | OUTPATIENT
Start: 2021-03-01 | End: 2021-09-16 | Stop reason: SDUPTHER

## 2021-03-03 ENCOUNTER — PATIENT MESSAGE (OUTPATIENT)
Dept: PSYCHIATRY | Facility: CLINIC | Age: 41
End: 2021-03-03

## 2021-03-03 ENCOUNTER — OFFICE VISIT (OUTPATIENT)
Dept: UROLOGY | Facility: CLINIC | Age: 41
End: 2021-03-03
Payer: COMMERCIAL

## 2021-03-03 VITALS
DIASTOLIC BLOOD PRESSURE: 69 MMHG | BODY MASS INDEX: 39.17 KG/M2 | HEIGHT: 75 IN | WEIGHT: 315 LBS | HEART RATE: 62 BPM | SYSTOLIC BLOOD PRESSURE: 111 MMHG

## 2021-03-03 DIAGNOSIS — N40.1 BPH WITH URINARY OBSTRUCTION: ICD-10-CM

## 2021-03-03 DIAGNOSIS — I10 ESSENTIAL HYPERTENSION: ICD-10-CM

## 2021-03-03 DIAGNOSIS — E29.1 MALE HYPOGONADISM: Primary | ICD-10-CM

## 2021-03-03 DIAGNOSIS — E78.5 HYPERLIPIDEMIA, UNSPECIFIED HYPERLIPIDEMIA TYPE: ICD-10-CM

## 2021-03-03 DIAGNOSIS — N13.8 BPH WITH URINARY OBSTRUCTION: ICD-10-CM

## 2021-03-03 DIAGNOSIS — N52.01 ERECTILE DYSFUNCTION DUE TO ARTERIAL INSUFFICIENCY: ICD-10-CM

## 2021-03-03 PROBLEM — Z87.19 HX OF UMBILICAL HERNIA REPAIR: Status: RESOLVED | Noted: 2020-10-16 | Resolved: 2021-03-03

## 2021-03-03 PROBLEM — Z98.890 HX OF UMBILICAL HERNIA REPAIR: Status: RESOLVED | Noted: 2020-10-16 | Resolved: 2021-03-03

## 2021-03-03 PROCEDURE — 3078F DIAST BP <80 MM HG: CPT | Mod: CPTII,S$GLB,, | Performed by: UROLOGY

## 2021-03-03 PROCEDURE — 1126F PR PAIN SEVERITY QUANTIFIED, NO PAIN PRESENT: ICD-10-PCS | Mod: S$GLB,,, | Performed by: UROLOGY

## 2021-03-03 PROCEDURE — 99999 PR PBB SHADOW E&M-EST. PATIENT-LVL IV: CPT | Mod: PBBFAC,,, | Performed by: UROLOGY

## 2021-03-03 PROCEDURE — 3078F PR MOST RECENT DIASTOLIC BLOOD PRESSURE < 80 MM HG: ICD-10-PCS | Mod: CPTII,S$GLB,, | Performed by: UROLOGY

## 2021-03-03 PROCEDURE — 99999 PR PBB SHADOW E&M-EST. PATIENT-LVL IV: ICD-10-PCS | Mod: PBBFAC,,, | Performed by: UROLOGY

## 2021-03-03 PROCEDURE — 99214 OFFICE O/P EST MOD 30 MIN: CPT | Mod: S$GLB,,, | Performed by: UROLOGY

## 2021-03-03 PROCEDURE — 3008F PR BODY MASS INDEX (BMI) DOCUMENTED: ICD-10-PCS | Mod: CPTII,S$GLB,, | Performed by: UROLOGY

## 2021-03-03 PROCEDURE — 3074F PR MOST RECENT SYSTOLIC BLOOD PRESSURE < 130 MM HG: ICD-10-PCS | Mod: CPTII,S$GLB,, | Performed by: UROLOGY

## 2021-03-03 PROCEDURE — 1126F AMNT PAIN NOTED NONE PRSNT: CPT | Mod: S$GLB,,, | Performed by: UROLOGY

## 2021-03-03 PROCEDURE — 3074F SYST BP LT 130 MM HG: CPT | Mod: CPTII,S$GLB,, | Performed by: UROLOGY

## 2021-03-03 PROCEDURE — 99214 PR OFFICE/OUTPT VISIT, EST, LEVL IV, 30-39 MIN: ICD-10-PCS | Mod: S$GLB,,, | Performed by: UROLOGY

## 2021-03-03 PROCEDURE — 3008F BODY MASS INDEX DOCD: CPT | Mod: CPTII,S$GLB,, | Performed by: UROLOGY

## 2021-03-03 RX ORDER — TESTOSTERONE 20.25 MG/1.25G
GEL TOPICAL
Qty: 2 BOTTLE | Refills: 5 | Status: SHIPPED | OUTPATIENT
Start: 2021-03-03 | End: 2021-06-24

## 2021-03-09 ENCOUNTER — PATIENT MESSAGE (OUTPATIENT)
Dept: PODIATRY | Facility: CLINIC | Age: 41
End: 2021-03-09

## 2021-03-17 ENCOUNTER — LAB VISIT (OUTPATIENT)
Dept: LAB | Facility: HOSPITAL | Age: 41
End: 2021-03-17
Attending: UROLOGY
Payer: COMMERCIAL

## 2021-03-17 DIAGNOSIS — E29.1 MALE HYPOGONADISM: ICD-10-CM

## 2021-03-17 PROCEDURE — 36415 COLL VENOUS BLD VENIPUNCTURE: CPT | Mod: PO | Performed by: UROLOGY

## 2021-03-17 PROCEDURE — 84403 ASSAY OF TOTAL TESTOSTERONE: CPT | Performed by: UROLOGY

## 2021-03-18 ENCOUNTER — PATIENT MESSAGE (OUTPATIENT)
Dept: UROLOGY | Facility: CLINIC | Age: 41
End: 2021-03-18

## 2021-03-18 ENCOUNTER — TELEPHONE (OUTPATIENT)
Dept: UROLOGY | Facility: CLINIC | Age: 41
End: 2021-03-18

## 2021-03-18 LAB — TESTOST SERPL-MCNC: 360 NG/DL (ref 304–1227)

## 2021-03-19 ENCOUNTER — PATIENT MESSAGE (OUTPATIENT)
Dept: UROLOGY | Facility: CLINIC | Age: 41
End: 2021-03-19

## 2021-04-14 ENCOUNTER — LAB VISIT (OUTPATIENT)
Dept: LAB | Facility: HOSPITAL | Age: 41
End: 2021-04-14
Attending: FAMILY MEDICINE
Payer: COMMERCIAL

## 2021-04-14 DIAGNOSIS — R73.03 PREDIABETES: ICD-10-CM

## 2021-04-14 LAB
ALBUMIN SERPL BCP-MCNC: 3.8 G/DL (ref 3.5–5.2)
ALP SERPL-CCNC: 58 U/L (ref 55–135)
ALT SERPL W/O P-5'-P-CCNC: 49 U/L (ref 10–44)
ANION GAP SERPL CALC-SCNC: 10 MMOL/L (ref 8–16)
AST SERPL-CCNC: 31 U/L (ref 10–40)
BILIRUB SERPL-MCNC: 0.3 MG/DL (ref 0.1–1)
BUN SERPL-MCNC: 12 MG/DL (ref 6–20)
CALCIUM SERPL-MCNC: 9.3 MG/DL (ref 8.7–10.5)
CHLORIDE SERPL-SCNC: 106 MMOL/L (ref 95–110)
CO2 SERPL-SCNC: 25 MMOL/L (ref 23–29)
CREAT SERPL-MCNC: 0.8 MG/DL (ref 0.5–1.4)
EST. GFR  (AFRICAN AMERICAN): >60 ML/MIN/1.73 M^2
EST. GFR  (NON AFRICAN AMERICAN): >60 ML/MIN/1.73 M^2
ESTIMATED AVG GLUCOSE: 111 MG/DL (ref 68–131)
GLUCOSE SERPL-MCNC: 105 MG/DL (ref 70–110)
HBA1C MFR BLD: 5.5 % (ref 4–5.6)
POTASSIUM SERPL-SCNC: 4.4 MMOL/L (ref 3.5–5.1)
PROT SERPL-MCNC: 7 G/DL (ref 6–8.4)
SODIUM SERPL-SCNC: 141 MMOL/L (ref 136–145)

## 2021-04-14 PROCEDURE — 83036 HEMOGLOBIN GLYCOSYLATED A1C: CPT | Performed by: FAMILY MEDICINE

## 2021-04-14 PROCEDURE — 80053 COMPREHEN METABOLIC PANEL: CPT | Performed by: FAMILY MEDICINE

## 2021-04-14 PROCEDURE — 36415 COLL VENOUS BLD VENIPUNCTURE: CPT | Mod: PO | Performed by: FAMILY MEDICINE

## 2021-04-16 ENCOUNTER — PATIENT MESSAGE (OUTPATIENT)
Dept: RESEARCH | Facility: HOSPITAL | Age: 41
End: 2021-04-16

## 2021-05-29 ENCOUNTER — PATIENT MESSAGE (OUTPATIENT)
Dept: UROLOGY | Facility: CLINIC | Age: 41
End: 2021-05-29

## 2021-06-14 ENCOUNTER — OFFICE VISIT (OUTPATIENT)
Dept: UROLOGY | Facility: CLINIC | Age: 41
End: 2021-06-14
Payer: COMMERCIAL

## 2021-06-14 VITALS
DIASTOLIC BLOOD PRESSURE: 75 MMHG | BODY MASS INDEX: 39.17 KG/M2 | HEART RATE: 75 BPM | WEIGHT: 315 LBS | SYSTOLIC BLOOD PRESSURE: 114 MMHG | HEIGHT: 75 IN

## 2021-06-14 DIAGNOSIS — N13.8 BPH WITH URINARY OBSTRUCTION: ICD-10-CM

## 2021-06-14 DIAGNOSIS — I10 ESSENTIAL HYPERTENSION: ICD-10-CM

## 2021-06-14 DIAGNOSIS — E78.5 HYPERLIPIDEMIA, UNSPECIFIED HYPERLIPIDEMIA TYPE: ICD-10-CM

## 2021-06-14 DIAGNOSIS — E29.1 MALE HYPOGONADISM: Primary | ICD-10-CM

## 2021-06-14 DIAGNOSIS — N52.01 ERECTILE DYSFUNCTION DUE TO ARTERIAL INSUFFICIENCY: ICD-10-CM

## 2021-06-14 DIAGNOSIS — N40.1 BPH WITH URINARY OBSTRUCTION: ICD-10-CM

## 2021-06-14 PROCEDURE — 1126F PR PAIN SEVERITY QUANTIFIED, NO PAIN PRESENT: ICD-10-PCS | Mod: S$GLB,,, | Performed by: UROLOGY

## 2021-06-14 PROCEDURE — 99214 OFFICE O/P EST MOD 30 MIN: CPT | Mod: S$GLB,,, | Performed by: UROLOGY

## 2021-06-14 PROCEDURE — 99999 PR PBB SHADOW E&M-EST. PATIENT-LVL IV: ICD-10-PCS | Mod: PBBFAC,,, | Performed by: UROLOGY

## 2021-06-14 PROCEDURE — 1126F AMNT PAIN NOTED NONE PRSNT: CPT | Mod: S$GLB,,, | Performed by: UROLOGY

## 2021-06-14 PROCEDURE — 99214 PR OFFICE/OUTPT VISIT, EST, LEVL IV, 30-39 MIN: ICD-10-PCS | Mod: S$GLB,,, | Performed by: UROLOGY

## 2021-06-14 PROCEDURE — 3008F BODY MASS INDEX DOCD: CPT | Mod: CPTII,S$GLB,, | Performed by: UROLOGY

## 2021-06-14 PROCEDURE — 3008F PR BODY MASS INDEX (BMI) DOCUMENTED: ICD-10-PCS | Mod: CPTII,S$GLB,, | Performed by: UROLOGY

## 2021-06-14 PROCEDURE — 99999 PR PBB SHADOW E&M-EST. PATIENT-LVL IV: CPT | Mod: PBBFAC,,, | Performed by: UROLOGY

## 2021-06-19 ENCOUNTER — LAB VISIT (OUTPATIENT)
Dept: LAB | Facility: HOSPITAL | Age: 41
End: 2021-06-19
Attending: UROLOGY
Payer: COMMERCIAL

## 2021-06-19 DIAGNOSIS — E29.1 MALE HYPOGONADISM: ICD-10-CM

## 2021-06-19 LAB
ALBUMIN SERPL BCP-MCNC: 4 G/DL (ref 3.5–5.2)
ALP SERPL-CCNC: 57 U/L (ref 55–135)
ALT SERPL W/O P-5'-P-CCNC: 81 U/L (ref 10–44)
AST SERPL-CCNC: 52 U/L (ref 10–40)
BASOPHILS # BLD AUTO: 0.1 K/UL (ref 0–0.2)
BASOPHILS NFR BLD: 1.2 % (ref 0–1.9)
BILIRUB DIRECT SERPL-MCNC: 0.2 MG/DL (ref 0.1–0.3)
BILIRUB SERPL-MCNC: 0.4 MG/DL (ref 0.1–1)
CHOLEST SERPL-MCNC: 220 MG/DL (ref 120–199)
CHOLEST/HDLC SERPL: 4.7 {RATIO} (ref 2–5)
COMPLEXED PSA SERPL-MCNC: 0.79 NG/ML (ref 0–4)
DIFFERENTIAL METHOD: ABNORMAL
EOSINOPHIL # BLD AUTO: 0.4 K/UL (ref 0–0.5)
EOSINOPHIL NFR BLD: 4.5 % (ref 0–8)
ERYTHROCYTE [DISTWIDTH] IN BLOOD BY AUTOMATED COUNT: 13.2 % (ref 11.5–14.5)
HCT VFR BLD AUTO: 41.4 % (ref 40–54)
HDLC SERPL-MCNC: 47 MG/DL (ref 40–75)
HDLC SERPL: 21.4 % (ref 20–50)
HGB BLD-MCNC: 13.5 G/DL (ref 14–18)
IMM GRANULOCYTES # BLD AUTO: 0.02 K/UL (ref 0–0.04)
IMM GRANULOCYTES NFR BLD AUTO: 0.2 % (ref 0–0.5)
LDLC SERPL CALC-MCNC: 142.6 MG/DL (ref 63–159)
LYMPHOCYTES # BLD AUTO: 3 K/UL (ref 1–4.8)
LYMPHOCYTES NFR BLD: 34.9 % (ref 18–48)
MCH RBC QN AUTO: 29.2 PG (ref 27–31)
MCHC RBC AUTO-ENTMCNC: 32.6 G/DL (ref 32–36)
MCV RBC AUTO: 90 FL (ref 82–98)
MONOCYTES # BLD AUTO: 0.5 K/UL (ref 0.3–1)
MONOCYTES NFR BLD: 6.1 % (ref 4–15)
NEUTROPHILS # BLD AUTO: 4.5 K/UL (ref 1.8–7.7)
NEUTROPHILS NFR BLD: 53.1 % (ref 38–73)
NONHDLC SERPL-MCNC: 173 MG/DL
NRBC BLD-RTO: 0 /100 WBC
PLATELET # BLD AUTO: 297 K/UL (ref 150–450)
PMV BLD AUTO: 11.4 FL (ref 9.2–12.9)
PROT SERPL-MCNC: 7.1 G/DL (ref 6–8.4)
RBC # BLD AUTO: 4.62 M/UL (ref 4.6–6.2)
TESTOST SERPL-MCNC: 260 NG/DL (ref 304–1227)
TRIGL SERPL-MCNC: 152 MG/DL (ref 30–150)
WBC # BLD AUTO: 8.46 K/UL (ref 3.9–12.7)

## 2021-06-19 PROCEDURE — 84153 ASSAY OF PSA TOTAL: CPT | Performed by: UROLOGY

## 2021-06-19 PROCEDURE — 84403 ASSAY OF TOTAL TESTOSTERONE: CPT | Performed by: UROLOGY

## 2021-06-19 PROCEDURE — 85025 COMPLETE CBC W/AUTO DIFF WBC: CPT | Performed by: UROLOGY

## 2021-06-19 PROCEDURE — 80061 LIPID PANEL: CPT | Performed by: UROLOGY

## 2021-06-19 PROCEDURE — 36415 COLL VENOUS BLD VENIPUNCTURE: CPT | Mod: PO | Performed by: UROLOGY

## 2021-06-19 PROCEDURE — 80076 HEPATIC FUNCTION PANEL: CPT | Performed by: UROLOGY

## 2021-06-21 ENCOUNTER — TELEPHONE (OUTPATIENT)
Dept: UROLOGY | Facility: CLINIC | Age: 41
End: 2021-06-21

## 2021-06-24 ENCOUNTER — OFFICE VISIT (OUTPATIENT)
Dept: UROLOGY | Facility: CLINIC | Age: 41
End: 2021-06-24
Payer: COMMERCIAL

## 2021-06-24 VITALS
SYSTOLIC BLOOD PRESSURE: 126 MMHG | WEIGHT: 315 LBS | HEIGHT: 75 IN | DIASTOLIC BLOOD PRESSURE: 84 MMHG | BODY MASS INDEX: 39.17 KG/M2 | HEART RATE: 61 BPM

## 2021-06-24 DIAGNOSIS — R53.83 FATIGUE, UNSPECIFIED TYPE: ICD-10-CM

## 2021-06-24 DIAGNOSIS — E29.1 PRIMARY MALE HYPOGONADISM: Primary | ICD-10-CM

## 2021-06-24 PROCEDURE — 99999 PR PBB SHADOW E&M-EST. PATIENT-LVL IV: CPT | Mod: PBBFAC,,, | Performed by: NURSE PRACTITIONER

## 2021-06-24 PROCEDURE — 99499 UNLISTED E&M SERVICE: CPT | Mod: S$GLB,,, | Performed by: NURSE PRACTITIONER

## 2021-06-24 PROCEDURE — 99499 NO LOS: ICD-10-PCS | Mod: S$GLB,,, | Performed by: NURSE PRACTITIONER

## 2021-06-24 PROCEDURE — 96372 THER/PROPH/DIAG INJ SC/IM: CPT | Mod: S$GLB,,, | Performed by: NURSE PRACTITIONER

## 2021-06-24 PROCEDURE — 96372 PR INJECTION,THERAP/PROPH/DIAG2ST, IM OR SUBCUT: ICD-10-PCS | Mod: S$GLB,,, | Performed by: NURSE PRACTITIONER

## 2021-06-24 PROCEDURE — 3008F PR BODY MASS INDEX (BMI) DOCUMENTED: ICD-10-PCS | Mod: CPTII,S$GLB,, | Performed by: NURSE PRACTITIONER

## 2021-06-24 PROCEDURE — 1126F AMNT PAIN NOTED NONE PRSNT: CPT | Mod: S$GLB,,, | Performed by: NURSE PRACTITIONER

## 2021-06-24 PROCEDURE — 1126F PR PAIN SEVERITY QUANTIFIED, NO PAIN PRESENT: ICD-10-PCS | Mod: S$GLB,,, | Performed by: NURSE PRACTITIONER

## 2021-06-24 PROCEDURE — 99999 PR PBB SHADOW E&M-EST. PATIENT-LVL IV: ICD-10-PCS | Mod: PBBFAC,,, | Performed by: NURSE PRACTITIONER

## 2021-06-24 PROCEDURE — 3008F BODY MASS INDEX DOCD: CPT | Mod: CPTII,S$GLB,, | Performed by: NURSE PRACTITIONER

## 2021-07-21 ENCOUNTER — OFFICE VISIT (OUTPATIENT)
Dept: UROLOGY | Facility: CLINIC | Age: 41
End: 2021-07-21
Payer: COMMERCIAL

## 2021-07-21 VITALS
SYSTOLIC BLOOD PRESSURE: 127 MMHG | BODY MASS INDEX: 39.17 KG/M2 | WEIGHT: 315 LBS | HEART RATE: 69 BPM | HEIGHT: 75 IN | DIASTOLIC BLOOD PRESSURE: 86 MMHG

## 2021-07-21 DIAGNOSIS — E29.1 PRIMARY MALE HYPOGONADISM: Primary | ICD-10-CM

## 2021-07-21 DIAGNOSIS — R53.83 FATIGUE, UNSPECIFIED TYPE: ICD-10-CM

## 2021-07-21 PROCEDURE — 99214 OFFICE O/P EST MOD 30 MIN: CPT | Mod: 25,S$GLB,, | Performed by: NURSE PRACTITIONER

## 2021-07-21 PROCEDURE — 99214 PR OFFICE/OUTPT VISIT, EST, LEVL IV, 30-39 MIN: ICD-10-PCS | Mod: 25,S$GLB,, | Performed by: NURSE PRACTITIONER

## 2021-07-21 PROCEDURE — 99999 PR PBB SHADOW E&M-EST. PATIENT-LVL III: CPT | Mod: PBBFAC,,, | Performed by: NURSE PRACTITIONER

## 2021-07-21 PROCEDURE — 99999 PR PBB SHADOW E&M-EST. PATIENT-LVL III: ICD-10-PCS | Mod: PBBFAC,,, | Performed by: NURSE PRACTITIONER

## 2021-07-21 PROCEDURE — 96372 THER/PROPH/DIAG INJ SC/IM: CPT | Mod: S$GLB,,, | Performed by: NURSE PRACTITIONER

## 2021-07-21 PROCEDURE — 96372 PR INJECTION,THERAP/PROPH/DIAG2ST, IM OR SUBCUT: ICD-10-PCS | Mod: S$GLB,,, | Performed by: NURSE PRACTITIONER

## 2021-07-29 ENCOUNTER — OFFICE VISIT (OUTPATIENT)
Dept: URGENT CARE | Facility: CLINIC | Age: 41
End: 2021-07-29
Payer: COMMERCIAL

## 2021-07-29 VITALS
SYSTOLIC BLOOD PRESSURE: 147 MMHG | BODY MASS INDEX: 39.17 KG/M2 | TEMPERATURE: 99 F | HEART RATE: 73 BPM | HEIGHT: 75 IN | DIASTOLIC BLOOD PRESSURE: 99 MMHG | OXYGEN SATURATION: 96 % | WEIGHT: 315 LBS

## 2021-07-29 DIAGNOSIS — R09.81 SINUS CONGESTION: ICD-10-CM

## 2021-07-29 DIAGNOSIS — J34.89 SINUS PRESSURE: ICD-10-CM

## 2021-07-29 DIAGNOSIS — H93.8X3 EAR FULLNESS, BILATERAL: ICD-10-CM

## 2021-07-29 DIAGNOSIS — J34.9 SINUS PROBLEM: Primary | ICD-10-CM

## 2021-07-29 DIAGNOSIS — R03.0 ELEVATED BLOOD PRESSURE READING: ICD-10-CM

## 2021-07-29 LAB
CTP QC/QA: YES
SARS-COV-2 RDRP RESP QL NAA+PROBE: NEGATIVE

## 2021-07-29 PROCEDURE — 3044F HG A1C LEVEL LT 7.0%: CPT | Mod: CPTII,S$GLB,, | Performed by: PHYSICIAN ASSISTANT

## 2021-07-29 PROCEDURE — 1159F MED LIST DOCD IN RCRD: CPT | Mod: CPTII,S$GLB,, | Performed by: PHYSICIAN ASSISTANT

## 2021-07-29 PROCEDURE — 99214 PR OFFICE/OUTPT VISIT, EST, LEVL IV, 30-39 MIN: ICD-10-PCS | Mod: S$GLB,,, | Performed by: PHYSICIAN ASSISTANT

## 2021-07-29 PROCEDURE — 3044F PR MOST RECENT HEMOGLOBIN A1C LEVEL <7.0%: ICD-10-PCS | Mod: CPTII,S$GLB,, | Performed by: PHYSICIAN ASSISTANT

## 2021-07-29 PROCEDURE — 3077F PR MOST RECENT SYSTOLIC BLOOD PRESSURE >= 140 MM HG: ICD-10-PCS | Mod: CPTII,S$GLB,, | Performed by: PHYSICIAN ASSISTANT

## 2021-07-29 PROCEDURE — 3077F SYST BP >= 140 MM HG: CPT | Mod: CPTII,S$GLB,, | Performed by: PHYSICIAN ASSISTANT

## 2021-07-29 PROCEDURE — 3080F PR MOST RECENT DIASTOLIC BLOOD PRESSURE >= 90 MM HG: ICD-10-PCS | Mod: CPTII,S$GLB,, | Performed by: PHYSICIAN ASSISTANT

## 2021-07-29 PROCEDURE — 99214 OFFICE O/P EST MOD 30 MIN: CPT | Mod: S$GLB,,, | Performed by: PHYSICIAN ASSISTANT

## 2021-07-29 PROCEDURE — 1160F RVW MEDS BY RX/DR IN RCRD: CPT | Mod: CPTII,S$GLB,, | Performed by: PHYSICIAN ASSISTANT

## 2021-07-29 PROCEDURE — 1160F PR REVIEW ALL MEDS BY PRESCRIBER/CLIN PHARMACIST DOCUMENTED: ICD-10-PCS | Mod: CPTII,S$GLB,, | Performed by: PHYSICIAN ASSISTANT

## 2021-07-29 PROCEDURE — U0002 COVID-19 LAB TEST NON-CDC: HCPCS | Mod: QW,S$GLB,, | Performed by: PHYSICIAN ASSISTANT

## 2021-07-29 PROCEDURE — 1159F PR MEDICATION LIST DOCUMENTED IN MEDICAL RECORD: ICD-10-PCS | Mod: CPTII,S$GLB,, | Performed by: PHYSICIAN ASSISTANT

## 2021-07-29 PROCEDURE — 3008F PR BODY MASS INDEX (BMI) DOCUMENTED: ICD-10-PCS | Mod: CPTII,S$GLB,, | Performed by: PHYSICIAN ASSISTANT

## 2021-07-29 PROCEDURE — U0002: ICD-10-PCS | Mod: QW,S$GLB,, | Performed by: PHYSICIAN ASSISTANT

## 2021-07-29 PROCEDURE — 3080F DIAST BP >= 90 MM HG: CPT | Mod: CPTII,S$GLB,, | Performed by: PHYSICIAN ASSISTANT

## 2021-07-29 PROCEDURE — 3008F BODY MASS INDEX DOCD: CPT | Mod: CPTII,S$GLB,, | Performed by: PHYSICIAN ASSISTANT

## 2021-07-29 RX ORDER — FLUTICASONE PROPIONATE 50 MCG
1 SPRAY, SUSPENSION (ML) NASAL DAILY
Qty: 9.9 ML | Refills: 0 | Status: SHIPPED | OUTPATIENT
Start: 2021-07-29 | End: 2022-07-22

## 2021-07-29 RX ORDER — MONTELUKAST SODIUM 10 MG/1
10 TABLET ORAL NIGHTLY
Qty: 14 TABLET | Refills: 0 | Status: SHIPPED | OUTPATIENT
Start: 2021-07-29 | End: 2021-08-12

## 2021-09-14 ENCOUNTER — TELEPHONE (OUTPATIENT)
Dept: BARIATRICS | Facility: CLINIC | Age: 41
End: 2021-09-14

## 2021-09-16 ENCOUNTER — PATIENT MESSAGE (OUTPATIENT)
Dept: UROLOGY | Facility: CLINIC | Age: 41
End: 2021-09-16

## 2021-09-16 ENCOUNTER — OFFICE VISIT (OUTPATIENT)
Dept: PSYCHIATRY | Facility: CLINIC | Age: 41
End: 2021-09-16
Payer: COMMERCIAL

## 2021-09-16 DIAGNOSIS — F43.23 ADJUSTMENT DISORDER WITH MIXED ANXIETY AND DEPRESSED MOOD: ICD-10-CM

## 2021-09-16 DIAGNOSIS — F51.02 ADJUSTMENT INSOMNIA: Primary | ICD-10-CM

## 2021-09-16 PROCEDURE — 1160F PR REVIEW ALL MEDS BY PRESCRIBER/CLIN PHARMACIST DOCUMENTED: ICD-10-PCS | Mod: CPTII,95,, | Performed by: NURSE PRACTITIONER

## 2021-09-16 PROCEDURE — 1160F RVW MEDS BY RX/DR IN RCRD: CPT | Mod: CPTII,95,, | Performed by: NURSE PRACTITIONER

## 2021-09-16 PROCEDURE — 99214 OFFICE O/P EST MOD 30 MIN: CPT | Mod: 95,,, | Performed by: NURSE PRACTITIONER

## 2021-09-16 PROCEDURE — 3044F HG A1C LEVEL LT 7.0%: CPT | Mod: CPTII,95,, | Performed by: NURSE PRACTITIONER

## 2021-09-16 PROCEDURE — 1159F MED LIST DOCD IN RCRD: CPT | Mod: CPTII,95,, | Performed by: NURSE PRACTITIONER

## 2021-09-16 PROCEDURE — 99214 PR OFFICE/OUTPT VISIT, EST, LEVL IV, 30-39 MIN: ICD-10-PCS | Mod: 95,,, | Performed by: NURSE PRACTITIONER

## 2021-09-16 PROCEDURE — 3044F PR MOST RECENT HEMOGLOBIN A1C LEVEL <7.0%: ICD-10-PCS | Mod: CPTII,95,, | Performed by: NURSE PRACTITIONER

## 2021-09-16 PROCEDURE — 1159F PR MEDICATION LIST DOCUMENTED IN MEDICAL RECORD: ICD-10-PCS | Mod: CPTII,95,, | Performed by: NURSE PRACTITIONER

## 2021-09-16 RX ORDER — LORAZEPAM 1 MG/1
1 TABLET ORAL EVERY 6 HOURS PRN
Qty: 5 TABLET | Refills: 0 | Status: SHIPPED | OUTPATIENT
Start: 2021-09-16 | End: 2021-12-07 | Stop reason: SDUPTHER

## 2021-09-16 RX ORDER — ZOLPIDEM TARTRATE 12.5 MG/1
12.5 TABLET, FILM COATED, EXTENDED RELEASE ORAL NIGHTLY PRN
Qty: 26 TABLET | Refills: 2 | Status: SHIPPED | OUTPATIENT
Start: 2021-09-16 | End: 2022-02-14 | Stop reason: SDUPTHER

## 2021-09-16 RX ORDER — BUPROPION HYDROCHLORIDE 300 MG/1
300 TABLET ORAL DAILY
Qty: 90 TABLET | Refills: 0 | Status: SHIPPED | OUTPATIENT
Start: 2021-09-16 | End: 2021-12-07 | Stop reason: SDUPTHER

## 2021-09-16 RX ORDER — BUSPIRONE HYDROCHLORIDE 10 MG/1
10 TABLET ORAL 3 TIMES DAILY
Qty: 270 TABLET | Refills: 0 | Status: SHIPPED | OUTPATIENT
Start: 2021-09-16 | End: 2021-12-07 | Stop reason: SDUPTHER

## 2021-09-17 ENCOUNTER — TELEPHONE (OUTPATIENT)
Dept: BARIATRICS | Facility: CLINIC | Age: 41
End: 2021-09-17

## 2021-09-28 ENCOUNTER — OFFICE VISIT (OUTPATIENT)
Dept: UROLOGY | Facility: CLINIC | Age: 41
End: 2021-09-28
Payer: COMMERCIAL

## 2021-09-28 VITALS
WEIGHT: 315 LBS | DIASTOLIC BLOOD PRESSURE: 88 MMHG | BODY MASS INDEX: 39.17 KG/M2 | SYSTOLIC BLOOD PRESSURE: 130 MMHG | HEIGHT: 75 IN | HEART RATE: 74 BPM

## 2021-09-28 DIAGNOSIS — E29.1 PRIMARY MALE HYPOGONADISM: ICD-10-CM

## 2021-09-28 DIAGNOSIS — R53.83 FATIGUE, UNSPECIFIED TYPE: ICD-10-CM

## 2021-09-28 DIAGNOSIS — E29.1 MALE HYPOGONADISM: ICD-10-CM

## 2021-09-28 PROCEDURE — 99214 PR OFFICE/OUTPT VISIT, EST, LEVL IV, 30-39 MIN: ICD-10-PCS | Mod: 25,S$GLB,, | Performed by: NURSE PRACTITIONER

## 2021-09-28 PROCEDURE — 96372 PR INJECTION,THERAP/PROPH/DIAG2ST, IM OR SUBCUT: ICD-10-PCS | Mod: S$GLB,,, | Performed by: NURSE PRACTITIONER

## 2021-09-28 PROCEDURE — 1160F RVW MEDS BY RX/DR IN RCRD: CPT | Mod: CPTII,S$GLB,, | Performed by: NURSE PRACTITIONER

## 2021-09-28 PROCEDURE — 99999 PR PBB SHADOW E&M-EST. PATIENT-LVL III: ICD-10-PCS | Mod: PBBFAC,,, | Performed by: NURSE PRACTITIONER

## 2021-09-28 PROCEDURE — 3075F SYST BP GE 130 - 139MM HG: CPT | Mod: CPTII,S$GLB,, | Performed by: NURSE PRACTITIONER

## 2021-09-28 PROCEDURE — 1159F MED LIST DOCD IN RCRD: CPT | Mod: CPTII,S$GLB,, | Performed by: NURSE PRACTITIONER

## 2021-09-28 PROCEDURE — 99999 PR PBB SHADOW E&M-EST. PATIENT-LVL III: CPT | Mod: PBBFAC,,, | Performed by: NURSE PRACTITIONER

## 2021-09-28 PROCEDURE — 96372 THER/PROPH/DIAG INJ SC/IM: CPT | Mod: S$GLB,,, | Performed by: NURSE PRACTITIONER

## 2021-09-28 PROCEDURE — 3075F PR MOST RECENT SYSTOLIC BLOOD PRESS GE 130-139MM HG: ICD-10-PCS | Mod: CPTII,S$GLB,, | Performed by: NURSE PRACTITIONER

## 2021-09-28 PROCEDURE — 99214 OFFICE O/P EST MOD 30 MIN: CPT | Mod: 25,S$GLB,, | Performed by: NURSE PRACTITIONER

## 2021-09-28 PROCEDURE — 1159F PR MEDICATION LIST DOCUMENTED IN MEDICAL RECORD: ICD-10-PCS | Mod: CPTII,S$GLB,, | Performed by: NURSE PRACTITIONER

## 2021-09-28 PROCEDURE — 3079F DIAST BP 80-89 MM HG: CPT | Mod: CPTII,S$GLB,, | Performed by: NURSE PRACTITIONER

## 2021-09-28 PROCEDURE — 3044F PR MOST RECENT HEMOGLOBIN A1C LEVEL <7.0%: ICD-10-PCS | Mod: CPTII,S$GLB,, | Performed by: NURSE PRACTITIONER

## 2021-09-28 PROCEDURE — 3044F HG A1C LEVEL LT 7.0%: CPT | Mod: CPTII,S$GLB,, | Performed by: NURSE PRACTITIONER

## 2021-09-28 PROCEDURE — 3008F PR BODY MASS INDEX (BMI) DOCUMENTED: ICD-10-PCS | Mod: CPTII,S$GLB,, | Performed by: NURSE PRACTITIONER

## 2021-09-28 PROCEDURE — 1160F PR REVIEW ALL MEDS BY PRESCRIBER/CLIN PHARMACIST DOCUMENTED: ICD-10-PCS | Mod: CPTII,S$GLB,, | Performed by: NURSE PRACTITIONER

## 2021-09-28 PROCEDURE — 3079F PR MOST RECENT DIASTOLIC BLOOD PRESSURE 80-89 MM HG: ICD-10-PCS | Mod: CPTII,S$GLB,, | Performed by: NURSE PRACTITIONER

## 2021-09-28 PROCEDURE — 3008F BODY MASS INDEX DOCD: CPT | Mod: CPTII,S$GLB,, | Performed by: NURSE PRACTITIONER

## 2021-09-29 ENCOUNTER — OFFICE VISIT (OUTPATIENT)
Dept: INTERNAL MEDICINE | Facility: CLINIC | Age: 41
End: 2021-09-29
Payer: COMMERCIAL

## 2021-09-29 VITALS
HEIGHT: 75 IN | OXYGEN SATURATION: 98 % | RESPIRATION RATE: 16 BRPM | BODY MASS INDEX: 39.17 KG/M2 | HEART RATE: 70 BPM | DIASTOLIC BLOOD PRESSURE: 85 MMHG | SYSTOLIC BLOOD PRESSURE: 135 MMHG | WEIGHT: 315 LBS | TEMPERATURE: 98 F

## 2021-09-29 DIAGNOSIS — M54.31 RIGHT SIDED SCIATICA: Primary | ICD-10-CM

## 2021-09-29 PROCEDURE — 1160F PR REVIEW ALL MEDS BY PRESCRIBER/CLIN PHARMACIST DOCUMENTED: ICD-10-PCS | Mod: CPTII,S$GLB,, | Performed by: FAMILY MEDICINE

## 2021-09-29 PROCEDURE — 99214 PR OFFICE/OUTPT VISIT, EST, LEVL IV, 30-39 MIN: ICD-10-PCS | Mod: S$GLB,,, | Performed by: FAMILY MEDICINE

## 2021-09-29 PROCEDURE — 1160F RVW MEDS BY RX/DR IN RCRD: CPT | Mod: CPTII,S$GLB,, | Performed by: FAMILY MEDICINE

## 2021-09-29 PROCEDURE — 99214 OFFICE O/P EST MOD 30 MIN: CPT | Mod: S$GLB,,, | Performed by: FAMILY MEDICINE

## 2021-09-29 PROCEDURE — 1159F PR MEDICATION LIST DOCUMENTED IN MEDICAL RECORD: ICD-10-PCS | Mod: CPTII,S$GLB,, | Performed by: FAMILY MEDICINE

## 2021-09-29 PROCEDURE — 3044F PR MOST RECENT HEMOGLOBIN A1C LEVEL <7.0%: ICD-10-PCS | Mod: CPTII,S$GLB,, | Performed by: FAMILY MEDICINE

## 2021-09-29 PROCEDURE — 3044F HG A1C LEVEL LT 7.0%: CPT | Mod: CPTII,S$GLB,, | Performed by: FAMILY MEDICINE

## 2021-09-29 PROCEDURE — 99999 PR PBB SHADOW E&M-EST. PATIENT-LVL V: CPT | Mod: PBBFAC,,, | Performed by: FAMILY MEDICINE

## 2021-09-29 PROCEDURE — 1159F MED LIST DOCD IN RCRD: CPT | Mod: CPTII,S$GLB,, | Performed by: FAMILY MEDICINE

## 2021-09-29 PROCEDURE — 3079F PR MOST RECENT DIASTOLIC BLOOD PRESSURE 80-89 MM HG: ICD-10-PCS | Mod: CPTII,S$GLB,, | Performed by: FAMILY MEDICINE

## 2021-09-29 PROCEDURE — 99999 PR PBB SHADOW E&M-EST. PATIENT-LVL V: ICD-10-PCS | Mod: PBBFAC,,, | Performed by: FAMILY MEDICINE

## 2021-09-29 PROCEDURE — 3079F DIAST BP 80-89 MM HG: CPT | Mod: CPTII,S$GLB,, | Performed by: FAMILY MEDICINE

## 2021-09-29 PROCEDURE — 3008F BODY MASS INDEX DOCD: CPT | Mod: CPTII,S$GLB,, | Performed by: FAMILY MEDICINE

## 2021-09-29 PROCEDURE — 3075F SYST BP GE 130 - 139MM HG: CPT | Mod: CPTII,S$GLB,, | Performed by: FAMILY MEDICINE

## 2021-09-29 PROCEDURE — 3075F PR MOST RECENT SYSTOLIC BLOOD PRESS GE 130-139MM HG: ICD-10-PCS | Mod: CPTII,S$GLB,, | Performed by: FAMILY MEDICINE

## 2021-09-29 PROCEDURE — 3008F PR BODY MASS INDEX (BMI) DOCUMENTED: ICD-10-PCS | Mod: CPTII,S$GLB,, | Performed by: FAMILY MEDICINE

## 2021-09-29 RX ORDER — METHOCARBAMOL 750 MG/1
750 TABLET, FILM COATED ORAL 4 TIMES DAILY PRN
Qty: 40 TABLET | Refills: 0 | Status: SHIPPED | OUTPATIENT
Start: 2021-09-29 | End: 2022-01-11

## 2021-10-28 NOTE — TELEPHONE ENCOUNTER
Relevant Problems   PULMONARY   (positive) H/O Clostridium difficile infection   (positive) History of recurrent UTIs   (positive) History of urinary tract infection      NEURO   (positive) H/O Clostridium difficile infection   (positive) History of recurrent UTIs   (positive) History of self-care deficit   (positive) History of urinary tract infection      CARDIAC   (positive) Essential hypertension       Physical Exam    Airway   Mallampati: III  TM distance: >3 FB  Neck ROM: limited       Cardiovascular - normal exam  Rhythm: regular  Rate: normal  (-) murmur     Dental - normal exam           Pulmonary - normal exam  Breath sounds clear to auscultation     Abdominal    Neurological - normal exam                 Anesthesia Plan    ASA 3   ASA physical status 3 criteria: other (comment)    Plan - general       Airway plan will be LMA          Induction: intravenous    Postoperative Plan: Postoperative administration of opioids is intended.    Pertinent diagnostic labs and testing reviewed    Informed Consent:    Anesthetic plan and risks discussed with patient.    Use of blood products discussed with: patient whom consented to blood products.          Lab linked to scheduled appt

## 2021-12-07 ENCOUNTER — OFFICE VISIT (OUTPATIENT)
Dept: PSYCHIATRY | Facility: CLINIC | Age: 41
End: 2021-12-07
Payer: COMMERCIAL

## 2021-12-07 ENCOUNTER — OFFICE VISIT (OUTPATIENT)
Dept: UROLOGY | Facility: CLINIC | Age: 41
End: 2021-12-07
Payer: COMMERCIAL

## 2021-12-07 VITALS
BODY MASS INDEX: 39.17 KG/M2 | DIASTOLIC BLOOD PRESSURE: 89 MMHG | WEIGHT: 315 LBS | HEART RATE: 81 BPM | HEIGHT: 75 IN | SYSTOLIC BLOOD PRESSURE: 139 MMHG

## 2021-12-07 DIAGNOSIS — E78.5 DYSLIPIDEMIA: ICD-10-CM

## 2021-12-07 DIAGNOSIS — F99 INSOMNIA DUE TO OTHER MENTAL DISORDER: ICD-10-CM

## 2021-12-07 DIAGNOSIS — F43.23 ADJUSTMENT DISORDER WITH MIXED ANXIETY AND DEPRESSED MOOD: ICD-10-CM

## 2021-12-07 DIAGNOSIS — F51.05 INSOMNIA DUE TO OTHER MENTAL DISORDER: ICD-10-CM

## 2021-12-07 DIAGNOSIS — R53.83 FATIGUE, UNSPECIFIED TYPE: ICD-10-CM

## 2021-12-07 DIAGNOSIS — F51.02 ADJUSTMENT INSOMNIA: Primary | ICD-10-CM

## 2021-12-07 DIAGNOSIS — E29.1 PRIMARY MALE HYPOGONADISM: Primary | ICD-10-CM

## 2021-12-07 PROCEDURE — 99999 PR PBB SHADOW E&M-EST. PATIENT-LVL IV: ICD-10-PCS | Mod: PBBFAC,,, | Performed by: NURSE PRACTITIONER

## 2021-12-07 PROCEDURE — 96372 THER/PROPH/DIAG INJ SC/IM: CPT | Mod: S$GLB,,, | Performed by: NURSE PRACTITIONER

## 2021-12-07 PROCEDURE — 99214 PR OFFICE/OUTPT VISIT, EST, LEVL IV, 30-39 MIN: ICD-10-PCS | Mod: 95,,, | Performed by: NURSE PRACTITIONER

## 2021-12-07 PROCEDURE — 99499 UNLISTED E&M SERVICE: CPT | Mod: S$GLB,,, | Performed by: NURSE PRACTITIONER

## 2021-12-07 PROCEDURE — 99999 PR PBB SHADOW E&M-EST. PATIENT-LVL IV: CPT | Mod: PBBFAC,,, | Performed by: NURSE PRACTITIONER

## 2021-12-07 PROCEDURE — 99499 NO LOS: ICD-10-PCS | Mod: S$GLB,,, | Performed by: NURSE PRACTITIONER

## 2021-12-07 PROCEDURE — 99214 OFFICE O/P EST MOD 30 MIN: CPT | Mod: 95,,, | Performed by: NURSE PRACTITIONER

## 2021-12-07 PROCEDURE — 96372 PR INJECTION,THERAP/PROPH/DIAG2ST, IM OR SUBCUT: ICD-10-PCS | Mod: S$GLB,,, | Performed by: NURSE PRACTITIONER

## 2021-12-07 RX ORDER — DULOXETIN HYDROCHLORIDE 30 MG/1
30 CAPSULE, DELAYED RELEASE ORAL DAILY
Qty: 30 CAPSULE | Refills: 2 | Status: SHIPPED | OUTPATIENT
Start: 2021-12-07 | End: 2022-03-07

## 2021-12-07 RX ORDER — BUSPIRONE HYDROCHLORIDE 10 MG/1
10 TABLET ORAL 3 TIMES DAILY
Qty: 270 TABLET | Refills: 0 | Status: SHIPPED | OUTPATIENT
Start: 2021-12-07 | End: 2022-04-11 | Stop reason: SDUPTHER

## 2021-12-07 RX ORDER — BUPROPION HYDROCHLORIDE 150 MG/1
150 TABLET ORAL DAILY
Qty: 90 TABLET | Refills: 0 | Status: SHIPPED | OUTPATIENT
Start: 2021-12-07 | End: 2022-03-07

## 2021-12-07 RX ORDER — LORAZEPAM 1 MG/1
1 TABLET ORAL EVERY 6 HOURS PRN
Qty: 5 TABLET | Refills: 0 | Status: SHIPPED | OUTPATIENT
Start: 2021-12-07 | End: 2022-04-11 | Stop reason: SDUPTHER

## 2022-01-11 DIAGNOSIS — M54.31 RIGHT SIDED SCIATICA: ICD-10-CM

## 2022-01-11 RX ORDER — METHOCARBAMOL 750 MG/1
TABLET, FILM COATED ORAL
Qty: 40 TABLET | Refills: 0 | Status: SHIPPED | OUTPATIENT
Start: 2022-01-11 | End: 2022-10-03 | Stop reason: SDUPTHER

## 2022-01-11 NOTE — TELEPHONE ENCOUNTER
No new care gaps identified.  Powered by [employer]. Reference number: [id number].   1/11/2022 6:51:04 AM CST

## 2022-02-09 ENCOUNTER — PATIENT MESSAGE (OUTPATIENT)
Dept: PSYCHIATRY | Facility: CLINIC | Age: 42
End: 2022-02-09
Payer: COMMERCIAL

## 2022-02-09 DIAGNOSIS — E29.1 PRIMARY MALE HYPOGONADISM: Primary | ICD-10-CM

## 2022-02-09 DIAGNOSIS — R53.83 FATIGUE, UNSPECIFIED TYPE: ICD-10-CM

## 2022-02-14 DIAGNOSIS — F51.02 ADJUSTMENT INSOMNIA: ICD-10-CM

## 2022-02-14 RX ORDER — ZOLPIDEM TARTRATE 12.5 MG/1
12.5 TABLET, FILM COATED, EXTENDED RELEASE ORAL NIGHTLY PRN
Qty: 26 TABLET | Refills: 2 | Status: SHIPPED | OUTPATIENT
Start: 2022-02-14 | End: 2022-04-11 | Stop reason: SDUPTHER

## 2022-02-26 NOTE — TELEPHONE ENCOUNTER
No new care gaps identified.  Powered by [employer]. Reference number: [id number].   2/26/2022 7:13:46 AM CST

## 2022-03-03 RX ORDER — METOPROLOL SUCCINATE 100 MG/1
TABLET, EXTENDED RELEASE ORAL
Qty: 90 TABLET | Refills: 2 | Status: SHIPPED | OUTPATIENT
Start: 2022-03-03 | End: 2022-10-03 | Stop reason: SDUPTHER

## 2022-03-03 RX ORDER — PRAVASTATIN SODIUM 10 MG/1
TABLET ORAL
Qty: 90 TABLET | Refills: 0 | Status: SHIPPED | OUTPATIENT
Start: 2022-03-03 | End: 2022-05-25

## 2022-03-03 NOTE — TELEPHONE ENCOUNTER
Refill Authorization Note   Dominic Collazo  is requesting a refill authorization.  Brief Assessment and Rationale for Refill:  Approve     Medication Therapy Plan:  approve    Medication Reconciliation Completed: No   Comments:   --->Care Gap information included below if applicable.       Requested Prescriptions   Pending Prescriptions Disp Refills    metoprolol succinate (TOPROL-XL) 100 MG 24 hr tablet [Pharmacy Med Name: Metoprolol Succinate 100mg Extended-Release Tablet] 90 tablet 2     Sig: Take 1 tablet by mouth daily.       Cardiovascular:  Beta Blockers Passed - 3/3/2022  9:46 AM        Passed - Patient is at least 18 years old        Passed - Last BP in normal range within 360 days     BP Readings from Last 1 Encounters:   12/07/21 139/89               Passed - Last Heart Rate in normal range within 360 days     Pulse Readings from Last 1 Encounters:   12/07/21 81              Passed - Valid encounter within last 15 months     Recent Visits  Date Type Provider Dept   09/29/21 Office Visit Arjun Cross MD [facility]   01/14/21 Office Visit Arjun Cross MD [facility]   12/07/20 Office Visit Arjun Cross MD [facility]   Showing recent visits within past 720 days and meeting all other requirements  Future Appointments  No visits were found meeting these conditions.  Showing future appointments within next 150 days and meeting all other requirements                  pravastatin (PRAVACHOL) 10 MG tablet [Pharmacy Med Name: Pravastatin Sodium 10mg Tablet] 90 tablet 2     Sig: Take 1 tablet by mouth daily.       Cardiovascular:  Antilipid - Statins Passed - 3/3/2022  9:46 AM        Passed - Patient is at least 18 years old        Passed - Valid encounter within last 15 months     Recent Visits  Date Type Provider Dept   09/29/21 Office Visit Arjun Cross MD [facility]   01/14/21 Office Visit Arjun Cross MD [facility]    12/07/20 Office Visit Arjun Cross MD [facility]   Showing recent visits within past 720 days and meeting all other requirements  Future Appointments  No visits were found meeting these conditions.  Showing future appointments within next 150 days and meeting all other requirements                Passed - ALT is 131 or below and within 360 days     ALT   Date Value Ref Range Status   06/19/2021 81 (H) 10 - 44 U/L Final   04/14/2021 49 (H) 10 - 44 U/L Final   12/19/2020 82 (H) 10 - 44 U/L Final              Passed - AST is 119 or below and within 360 days     AST   Date Value Ref Range Status   06/19/2021 52 (H) 10 - 40 U/L Final   04/14/2021 31 10 - 40 U/L Final   12/19/2020 40 10 - 40 U/L Final              Passed - Total Cholesterol within 360 days     Lab Results   Component Value Date    CHOL 220 (H) 06/19/2021    CHOL 203 (H) 12/19/2020    CHOL 214 (H) 11/16/2019              Passed - LDL within 360 days     LDL Cholesterol   Date Value Ref Range Status   06/19/2021 142.6 63.0 - 159.0 mg/dL Final     Comment:     The National Cholesterol Education Program (NCEP) has set the  following guidelines (reference values) for LDL Cholesterol:  Optimal.......................<130 mg/dL  Borderline High...............130-159 mg/dL  High..........................160-189 mg/dL  Very High.....................>190 mg/dL              Passed - HDL within 360 days     HDL   Date Value Ref Range Status   06/19/2021 47 40 - 75 mg/dL Final     Comment:     The National Cholesterol Education Program (NCEP) has set the  following guidelines (reference values) for HDL Cholesterol:  Low...............<40 mg/dL  Optimal...........>60 mg/dL              Passed - Triglycerides within 360 days     Lab Results   Component Value Date    TRIG 152 (H) 06/19/2021    TRIG 168 (H) 12/19/2020    TRIG 130 11/16/2019                  Appointments  past 12m or future 3m with PCP    Date Provider   Last Visit   9/29/2021 Arjun BUSTAMANTE  [facility] [place]   Next Visit   Visit date not found Arjun Cross MD   ED visits in past 90 days: 0     Note composed:9:47 AM 03/03/2022

## 2022-04-11 ENCOUNTER — OFFICE VISIT (OUTPATIENT)
Dept: PSYCHIATRY | Facility: CLINIC | Age: 42
End: 2022-04-11
Payer: COMMERCIAL

## 2022-04-11 ENCOUNTER — PATIENT MESSAGE (OUTPATIENT)
Dept: PSYCHIATRY | Facility: CLINIC | Age: 42
End: 2022-04-11
Payer: COMMERCIAL

## 2022-04-11 DIAGNOSIS — F51.02 ADJUSTMENT INSOMNIA: ICD-10-CM

## 2022-04-11 DIAGNOSIS — F43.23 ADJUSTMENT DISORDER WITH MIXED ANXIETY AND DEPRESSED MOOD: Primary | ICD-10-CM

## 2022-04-11 PROCEDURE — 1160F RVW MEDS BY RX/DR IN RCRD: CPT | Mod: CPTII,95,, | Performed by: NURSE PRACTITIONER

## 2022-04-11 PROCEDURE — 1159F PR MEDICATION LIST DOCUMENTED IN MEDICAL RECORD: ICD-10-PCS | Mod: CPTII,95,, | Performed by: NURSE PRACTITIONER

## 2022-04-11 PROCEDURE — 1159F MED LIST DOCD IN RCRD: CPT | Mod: CPTII,95,, | Performed by: NURSE PRACTITIONER

## 2022-04-11 PROCEDURE — 99214 PR OFFICE/OUTPT VISIT, EST, LEVL IV, 30-39 MIN: ICD-10-PCS | Mod: 95,,, | Performed by: NURSE PRACTITIONER

## 2022-04-11 PROCEDURE — 1160F PR REVIEW ALL MEDS BY PRESCRIBER/CLIN PHARMACIST DOCUMENTED: ICD-10-PCS | Mod: CPTII,95,, | Performed by: NURSE PRACTITIONER

## 2022-04-11 PROCEDURE — 99214 OFFICE O/P EST MOD 30 MIN: CPT | Mod: 95,,, | Performed by: NURSE PRACTITIONER

## 2022-04-11 RX ORDER — BUPROPION HYDROCHLORIDE 150 MG/1
150 TABLET ORAL DAILY
Qty: 90 TABLET | Refills: 0 | Status: SHIPPED | OUTPATIENT
Start: 2022-04-11 | End: 2022-07-14 | Stop reason: SDUPTHER

## 2022-04-11 RX ORDER — ZOLPIDEM TARTRATE 12.5 MG/1
12.5 TABLET, FILM COATED, EXTENDED RELEASE ORAL NIGHTLY PRN
Qty: 26 TABLET | Refills: 2 | Status: SHIPPED | OUTPATIENT
Start: 2022-04-11 | End: 2022-05-24 | Stop reason: SDUPTHER

## 2022-04-11 RX ORDER — VENLAFAXINE HYDROCHLORIDE 75 MG/1
75 CAPSULE, EXTENDED RELEASE ORAL DAILY
Qty: 30 CAPSULE | Refills: 2 | Status: SHIPPED | OUTPATIENT
Start: 2022-04-11 | End: 2022-05-24

## 2022-04-11 RX ORDER — LORAZEPAM 1 MG/1
1 TABLET ORAL EVERY 6 HOURS PRN
Qty: 5 TABLET | Refills: 0 | Status: SHIPPED | OUTPATIENT
Start: 2022-04-11 | End: 2022-12-28

## 2022-04-11 RX ORDER — BUSPIRONE HYDROCHLORIDE 10 MG/1
10 TABLET ORAL 3 TIMES DAILY
Qty: 270 TABLET | Refills: 0 | Status: SHIPPED | OUTPATIENT
Start: 2022-04-11 | End: 2022-08-17 | Stop reason: SDUPTHER

## 2022-04-11 RX ORDER — VENLAFAXINE HYDROCHLORIDE 37.5 MG/1
37.5 CAPSULE, EXTENDED RELEASE ORAL DAILY
Qty: 7 CAPSULE | Refills: 0 | Status: SHIPPED | OUTPATIENT
Start: 2022-04-11 | End: 2022-05-24

## 2022-04-11 NOTE — PROGRESS NOTES
Outpatient Psychiatry Follow-Up Visit (MD/NP)    4/11/2022     The patient location is: [place]  The chief complaint leading to consultation is: adjustments    Visit type: audiovisual    Face to Face time with patient: 38 minutes  41 minutes of total time spent on the encounter, which includes face to face time and non-face to face time preparing to see the patient (eg, review of tests), Obtaining and/or reviewing separately obtained history, Documenting clinical information in the electronic or other health record, Independently interpreting results (not separately reported) and communicating results to the patient/family/caregiver, or Care coordination (not separately reported).     Each patient to whom he or she provides medical services by telemedicine is:  (1) informed of the relationship between the physician and patient and the respective role of any other health care provider with respect to management of the patient; and (2) notified that he or she may decline to receive medical services by telemedicine and may withdraw from such care at any time.    Clinical Status of Patient:  Outpatient (Ambulatory)    Chief Complaint:  Dominic Collazo is a 41 y.o. male who presents today for follow-up of depression, anxiety and adjustment problems.  Met with patient.      Interval History and Content of Current Session:  Interim Events/Subjective Report/Content of Current Session:   Dominic Collazo checked in on time for his appointment. Last visit we stopped Wellbutrin and started Cymbalta. Today he reports improvement in depression and mental cloudiness but not anxiety and also having sexual side effects and retrograde ejaculation. Will stop and switch to Effexor. Patient agreeable.  Denies SI/HI/AVH. No objective s/sx of psychosis or rehan. Patient verbalized motivation for compliance with medications and all other elements of treatment plan.     Doing bariatric surgery soon.  Trying to adopt an  19 y/o student of his - big adjustment for their family    Previous medication trials:  Ativan- effective  Seroquel- sedation  Lexapro  Wellbutrin and Buspar- effective,   vistaril- sedation, still had axniety  Prozac - sexual side effects  Cymbalta Sexual side effects and retrograde ejaculation    Review of Systems   · PSYCHIATRIC: Pertinant items are noted in the narrative.  · MUSCULOSKELETAL: No pain or stiffness of the joints.  · NEUROLOGIC: No weakness, sensory changes, seizures, confusion, memory loss, tremor or other abnormal movements.  · ENT: No dizziness, tinnitus or hearing loss.  · RESPIRATORY: No shortness of breath.  · CARDIOVASCULAR: No tachycardia or chest pain.  · GASTROINTESTINAL: No nausea, vomiting, pain, constipation or diarrhea.    Past Medical, Family and Social History: The patient's past medical, family and social history have been reviewed and updated as appropriate within the electronic medical record - see encounter notes.    Compliance: yes    Side effects: None    Risk Parameters:  Patient reports no suicidal ideation  Patient reports no homicidal ideation  Patient reports no self-injurious behavior  Patient reports no violent behavior    Exam (detailed: at least 9 elements; comprehensive: all 15 elements)   Constitutional  Vitals:    There were no vitals filed for this visit.     General:  unremarkable, age appropriate     Musculoskeletal  Muscle Strength/Tone:  not examined   Gait & Station:  THEA due to video visit      Psychiatric  Speech:  no latency; no press   Mood & Affect:  anxious  congruent and appropriate   Thought Process:  normal and logical   Associations:  intact   Thought Content:  normal, no suicidality, no homicidality, delusions, or paranoia   Insight:  intact   Judgement: behavior is adequate to circumstances   Orientation:  grossly intact   Memory: intact for content of interview   Language: grossly intact   Attention Span & Concentration:  able to focus   Fund of  "Knowledge:  intact and appropriate to age and level of education     Assessment and Diagnosis   Status/Progress: Based on the examination today, the patient's problem(s) is/are adequately but not ideally controlled.  New problems have not been presented today.   Co-morbidities, Diagnostic uncertainty and Lack of compliance are not complicating management of the primary condition.  There are no active rule-out diagnoses for this patient at this time.     General Impression: Dominic Collazo, a 40 y.o. male, presenting for follow-up visit related to anxiety. History of adjustment disorder with anxiety. Recent stressors include wife losing her license, pandemic and co-workers opinion of COVID ("hoaks"). PCP added Lexapro to current regimen - Wellbutrin and Buspar TID. Will stop Lexapro for now and start Propranolol. Presents 1/6/20- Propranolol not effective at 10 mg. Dose increased to 20 mg, Wellbutrin decreased, Prozac added. Patient referred to BMU. Presents 3/1/21- symptoms improvement but some sexual side effects. Presents 9/16/21- decreased motivation and return of insomnia. Presents 12/7/21- Will decrease Wellbutrin and add Cymbalta Presents 4/11/22- partial positive effect of Cymbalta but adverse sexual side effects, Effexor started       ICD-10-CM ICD-9-CM   1. Adjustment disorder with mixed anxiety and depressed mood  F43.23 309.28   2. Adjustment insomnia  F51.02 307.41       Intervention/Counseling/Treatment Plan   Treatment Plan/Recommendations:   · Medication Management: Continue current medications.   ? Wellbutrin  mg daily  ? Buspar - 10 mg TID  ? Stop Cymbalta    ? Start Effexor 37.5 mg x 7 days then increase to 75 mg.   ? Ambien to CR 12.5 formulation  ? Ativan 1 mg daily as needed   ? The risks and benefits of medication were discussed with the patient.  · The treatment plan and follow up plan were reviewed with the patient.  · Discussed diagnosis, risks and benefits of proposed " treatment above vs alternative treatments vs no treatment, and potential side effects of these treatments. The patient expresses understanding of the above and displays the capacity to agree with this treatment given said understanding. Patient also agrees that, currently, the benefits outweigh the risks and would like to pursue treatment at this time.  · Discussed inherent unpredictability of medications in each individual.   · Encouraged Patient to keep future appointments.   · Take medications as prescribed and abstain from substance abuse.   · In the event of an emergency patient was advised to go to the emergency room    Return to Clinic: 3 months    Stacia Dumont DNP-BC PMHNP  [facility]

## 2022-04-12 ENCOUNTER — TELEPHONE (OUTPATIENT)
Dept: BARIATRICS | Facility: CLINIC | Age: 42
End: 2022-04-12
Payer: COMMERCIAL

## 2022-05-19 ENCOUNTER — PATIENT MESSAGE (OUTPATIENT)
Dept: PSYCHIATRY | Facility: CLINIC | Age: 42
End: 2022-05-19
Payer: COMMERCIAL

## 2022-05-19 ENCOUNTER — TELEPHONE (OUTPATIENT)
Dept: BARIATRICS | Facility: CLINIC | Age: 42
End: 2022-05-19
Payer: COMMERCIAL

## 2022-05-24 DIAGNOSIS — F51.02 ADJUSTMENT INSOMNIA: ICD-10-CM

## 2022-05-24 DIAGNOSIS — F43.23 ADJUSTMENT DISORDER WITH MIXED ANXIETY AND DEPRESSED MOOD: ICD-10-CM

## 2022-05-24 RX ORDER — VENLAFAXINE HYDROCHLORIDE 75 MG/1
75 CAPSULE, EXTENDED RELEASE ORAL DAILY
Qty: 30 CAPSULE | Refills: 2 | Status: SHIPPED | OUTPATIENT
Start: 2022-05-24 | End: 2022-10-24 | Stop reason: SDUPTHER

## 2022-05-24 RX ORDER — ZOLPIDEM TARTRATE 12.5 MG/1
12.5 TABLET, FILM COATED, EXTENDED RELEASE ORAL NIGHTLY PRN
Qty: 26 TABLET | Refills: 2 | Status: SHIPPED | OUTPATIENT
Start: 2022-05-24 | End: 2022-07-22

## 2022-05-25 RX ORDER — PRAVASTATIN SODIUM 10 MG/1
TABLET ORAL
Qty: 90 TABLET | Refills: 0 | Status: SHIPPED | OUTPATIENT
Start: 2022-05-25 | End: 2022-08-21

## 2022-05-25 NOTE — TELEPHONE ENCOUNTER
Refill Authorization Note   Dominic Collazo  is requesting a refill authorization.  Brief Assessment and Rationale for Refill:  Approve    -Medication-Related Problems Identified: Requires labs  Medication Therapy Plan:       Medication Reconciliation Completed: No   Comments:     No Care Gaps recommended.     Note composed:10:59 AM 05/25/2022

## 2022-05-25 NOTE — TELEPHONE ENCOUNTER
Care Due:                  Date            Visit Type   Department     Provider  --------------------------------------------------------------------------------                                MYCHART                              FOLLOWUP/OF  [facility] INTERNAL  Last Visit: 09-      FICE VISIT   MEDICINE       Arjun Cross  Next Visit: None Scheduled  None         None Found                                                            Last  Test          Frequency    Reason                     Performed    Due Date  --------------------------------------------------------------------------------    CMP.........  12 months..  pravastatin..............  04- 06-    Lipid Panel.  12 months..  pravastatin..............  06- 06-    [employer] Embedded Care Gaps. Reference number: [id number]. 5/25/2022   5:14:30 AM CDT

## 2022-06-03 ENCOUNTER — OFFICE VISIT (OUTPATIENT)
Dept: SLEEP MEDICINE | Facility: CLINIC | Age: 42
End: 2022-06-03
Payer: COMMERCIAL

## 2022-06-03 DIAGNOSIS — G47.33 OSA (OBSTRUCTIVE SLEEP APNEA): Primary | ICD-10-CM

## 2022-06-03 DIAGNOSIS — E66.01 MORBID OBESITY: ICD-10-CM

## 2022-06-03 DIAGNOSIS — E78.5 HYPERLIPIDEMIA, UNSPECIFIED HYPERLIPIDEMIA TYPE: ICD-10-CM

## 2022-06-03 DIAGNOSIS — I10 PRIMARY HYPERTENSION: ICD-10-CM

## 2022-06-03 DIAGNOSIS — R73.03 PREDIABETES: ICD-10-CM

## 2022-06-03 DIAGNOSIS — G47.10 HYPERSOMNIA: ICD-10-CM

## 2022-06-03 PROCEDURE — 99203 PR OFFICE/OUTPT VISIT, NEW, LEVL III, 30-44 MIN: ICD-10-PCS | Mod: 95,,, | Performed by: INTERNAL MEDICINE

## 2022-06-03 PROCEDURE — 1160F PR REVIEW ALL MEDS BY PRESCRIBER/CLIN PHARMACIST DOCUMENTED: ICD-10-PCS | Mod: CPTII,95,, | Performed by: INTERNAL MEDICINE

## 2022-06-03 PROCEDURE — 1159F MED LIST DOCD IN RCRD: CPT | Mod: CPTII,95,, | Performed by: INTERNAL MEDICINE

## 2022-06-03 PROCEDURE — 1160F RVW MEDS BY RX/DR IN RCRD: CPT | Mod: CPTII,95,, | Performed by: INTERNAL MEDICINE

## 2022-06-03 PROCEDURE — 1159F PR MEDICATION LIST DOCUMENTED IN MEDICAL RECORD: ICD-10-PCS | Mod: CPTII,95,, | Performed by: INTERNAL MEDICINE

## 2022-06-03 PROCEDURE — 99203 OFFICE O/P NEW LOW 30 MIN: CPT | Mod: 95,,, | Performed by: INTERNAL MEDICINE

## 2022-06-03 RX ORDER — MODAFINIL 100 MG/1
100 TABLET ORAL DAILY
Qty: 30 TABLET | Refills: 0 | Status: SHIPPED | OUTPATIENT
Start: 2022-06-03 | End: 2022-07-11

## 2022-06-03 NOTE — PROGRESS NOTES
"Subjective:       Patient ID: Dominic Collazo is a 41 y.o. male.    Chief Complaint: Sleeping Problem    I had the pleasure of seeing Dominic Collazo today, who is a 41 y.o. male that presents with LYNDA.    Dominic Collazo does not have a CDL.    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    Dominic Collazo presents with LYNDA that has been going on for 5+ years   I have copy of 2 titrations in 2009 and 2011.   Currently on auto BIPAP with EPAP minimum 10, PS 4-8 and IPAP max 25.   Compliance 100%.  Estimated AHI 1.1    Bedtime when working ranges from 2200 to 2230.   When not working, bedtime ranges from 2130 to 2200.   Sleep latency ranges from 60 to 90 minutes.     Average number of awakenings is 0-1 and return to sleep is variable.   Wake up time when working is 0630 to 0630.   When not working, wake up time is 0700 to 0730.   Patient does not feel rested upon awakening.    Dominic Collazo consumes approximately 2 beverages with caffeine daily.   An average of 2 beverages with alcohol are consumed monthly   Medications taken for sleep currently: none  Previous medications taken: none     Dominic Collazo does experience daytime sleepiness.   Naps are taken about 5 times weekly, usually lasting 30 to 60 minutes.  Dominic currently does operate an automobile.  Dominic Collazo does not experience drowsiness when driving.   Patient does doze off when sedentary.   Dominic Collazo does not have auxiliary symptoms of narcolepsy including sleep onset paralysis, hypnagogic hallucinations, sleep attacks and cataplexy   Previously on modafinil and armodafinil but felt "jittery"  EPWORTH SLEEPINESS SCALE 12/1/2020   Sitting and reading 3   Watching TV 2   Sitting, inactive in a public place (e.g. a theatre or a meeting) 1   As a passenger in a car for an hour without a break 3   Lying down to rest in the afternoon when circumstances permit 2   Sitting " and talking to someone 0   Sitting quietly after a lunch without alcohol 1   In a car, while stopped for a few minutes in traffic 1   Total score 13       Dominic Collazo has a history of snoring.   Snoring is described as moderate and intermittent.   Apneic episodes have been noticed during sleep.   A witness to sleep is present.   The patient awakens with mouth dryness.      Dominic Collazo does not have symptoms of Restless Legs Syndrome. Nocturnal leg movements have not been noticed.   The patient does not experience sleep related leg cramps.   There is not a history of parasomnia.      Current Outpatient Medications:     buPROPion (WELLBUTRIN XL) 150 MG TB24 tablet, Take 1 tablet (150 mg total) by mouth once daily., Disp: 90 tablet, Rfl: 0    busPIRone (BUSPAR) 10 MG tablet, Take 1 tablet (10 mg total) by mouth 3 (three) times daily., Disp: 270 tablet, Rfl: 0    ergocalciferol, vitamin D2, (VITAMIN D ORAL), Take 5,000 Units by mouth once daily., Disp: , Rfl:     fexofenadine (ALLEGRA) 60 MG tablet, Take 60 mg by mouth every morning., Disp: , Rfl:     fluticasone propionate (FLONASE) 50 mcg/actuation nasal spray, 1 spray (50 mcg total) by Each Nostril route once daily., Disp: 9.9 mL, Rfl: 0    LORazepam (ATIVAN) 1 MG tablet, Take 1 tablet (1 mg total) by mouth every 6 (six) hours as needed for Anxiety., Disp: 5 tablet, Rfl: 0    methocarbamoL (ROBAXIN) 750 MG Tab, TAKE 1 TABLET(750 MG) BY MOUTH FOUR TIMES DAILY AS NEEDED FOR MUSCLE STRAIN, Disp: 40 tablet, Rfl: 0    metoprolol succinate (TOPROL-XL) 100 MG 24 hr tablet, Take 1 tablet by mouth daily., Disp: 90 tablet, Rfl: 2    multivitamin capsule, Take 1 capsule by mouth once daily., Disp: , Rfl:     pravastatin (PRAVACHOL) 10 MG tablet, Take 1 tablet by mouth daily., Disp: 90 tablet, Rfl: 0    triamcinolone acetonide 0.1% (KENALOG) 0.1 % cream, AAA bid, Disp: 30 g, Rfl: 0    venlafaxine (EFFEXOR-XR) 75 MG 24 hr capsule, Take 1  capsule (75 mg total) by mouth once daily., Disp: 30 capsule, Rfl: 2    zolpidem (AMBIEN CR) 12.5 MG CR tablet, Take 1 tablet (12.5 mg total) by mouth nightly as needed for Insomnia., Disp: 26 tablet, Rfl: 2     Review of patient's allergies indicates:   Allergen Reactions    Ciprofloxacin      swelling    Penicillins Rash    Sulfa (sulfonamide antibiotics) Rash    Toradol [ketorolac] Nausea Only    Green pepper Nausea Only    Meloxicam Nausea Only         Past Medical History:   Diagnosis Date    Adjustment disorder with mixed anxiety and depressed mood     Anxiety     Colon polyp     Depression     ED (erectile dysfunction)     Family history of prostate cancer 9/29/2014    Hx of umbilical hernia repair 10/16/2020    Hyperlipidemia     Hypertension     Hypogonadism male     Insomnia     Low testosterone     LYNDA (obstructive sleep apnea)     Prediabetes        Past Surgical History:   Procedure Laterality Date    ADENOIDECTOMY      HERNIA REPAIR      REPAIR OF INCARCERATED UMBILICAL HERNIA N/A 10/16/2020    Procedure: REPAIR, HERNIA, UMBILICAL, INCARCERATED, AGE 5 YEARS OR OLDER with mesh ;  Surgeon: Conrad Loco MD;  Location: [facility] OR [address] [place] [address];  Service: General;  Laterality: N/A;    TONSILLECTOMY         Family History   Problem Relation Age of Onset    Hypertension Mother     Hyperlipidemia Mother     Diabetes Father     Hyperlipidemia Father     Hypertension Father     Heart disease Father 46        CAD    No Known Problems Sister     Cancer Paternal Uncle         prostate cancer       Social History     Socioeconomic History    Marital status:    Occupational History    Occupation: Teacher      Employer: [employer]     Comment: [employer]   Tobacco Use    Smoking status: Never Smoker    Smokeless tobacco: Never Used   Substance and Sexual Activity    Alcohol use: Yes     Comment: less than once per month    Drug use: No    Sexual activity: Yes      Partners: Female           Old medical records.    There were no vitals filed for this visit.           The patient was given open opportunity to ask questions and/or express concerns about treatment plan.   All questions/concerns were discussed.   Driving precautions were provided.     Two patient identifiers used prior to evaluation.    Thank you for referring Dominic Oneill Zay for evaluation.             Past Medical History:   Diagnosis Date    Adjustment disorder with mixed anxiety and depressed mood     Anxiety     Colon polyp     Depression     ED (erectile dysfunction)     Family history of prostate cancer 9/29/2014    Hx of umbilical hernia repair 10/16/2020    Hyperlipidemia     Hypertension     Hypogonadism male     Insomnia     Low testosterone     LYNDA (obstructive sleep apnea)     Prediabetes      Past Surgical History:   Procedure Laterality Date    ADENOIDECTOMY      HERNIA REPAIR      REPAIR OF INCARCERATED UMBILICAL HERNIA N/A 10/16/2020    Procedure: REPAIR, HERNIA, UMBILICAL, INCARCERATED, AGE 5 YEARS OR OLDER with mesh ;  Surgeon: Conrad Loco MD;  Location: [facility] OR [address] [place] [address];  Service: General;  Laterality: N/A;    TONSILLECTOMY       Family History   Problem Relation Age of Onset    Hypertension Mother     Hyperlipidemia Mother     Diabetes Father     Hyperlipidemia Father     Hypertension Father     Heart disease Father 46        CAD    No Known Problems Sister     Cancer Paternal Uncle         prostate cancer     Social History     Socioeconomic History    Marital status:    Occupational History    Occupation: Teacher      Employer: [employer]     Comment: [employer]   Tobacco Use    Smoking status: Never Smoker    Smokeless tobacco: Never Used   Substance and Sexual Activity    Alcohol use: Yes     Comment: less than once per month    Drug use: No    Sexual activity: Yes     Partners: Female       Current Outpatient  Medications   Medication Sig Dispense Refill    buPROPion (WELLBUTRIN XL) 150 MG TB24 tablet Take 1 tablet (150 mg total) by mouth once daily. 90 tablet 0    busPIRone (BUSPAR) 10 MG tablet Take 1 tablet (10 mg total) by mouth 3 (three) times daily. 270 tablet 0    ergocalciferol, vitamin D2, (VITAMIN D ORAL) Take 5,000 Units by mouth once daily.      fexofenadine (ALLEGRA) 60 MG tablet Take 60 mg by mouth every morning.      fluticasone propionate (FLONASE) 50 mcg/actuation nasal spray 1 spray (50 mcg total) by Each Nostril route once daily. 9.9 mL 0    LORazepam (ATIVAN) 1 MG tablet Take 1 tablet (1 mg total) by mouth every 6 (six) hours as needed for Anxiety. 5 tablet 0    methocarbamoL (ROBAXIN) 750 MG Tab TAKE 1 TABLET(750 MG) BY MOUTH FOUR TIMES DAILY AS NEEDED FOR MUSCLE STRAIN 40 tablet 0    metoprolol succinate (TOPROL-XL) 100 MG 24 hr tablet Take 1 tablet by mouth daily. 90 tablet 2    multivitamin capsule Take 1 capsule by mouth once daily.      pravastatin (PRAVACHOL) 10 MG tablet Take 1 tablet by mouth daily. 90 tablet 0    triamcinolone acetonide 0.1% (KENALOG) 0.1 % cream AAA bid 30 g 0    venlafaxine (EFFEXOR-XR) 75 MG 24 hr capsule Take 1 capsule (75 mg total) by mouth once daily. 30 capsule 2    zolpidem (AMBIEN CR) 12.5 MG CR tablet Take 1 tablet (12.5 mg total) by mouth nightly as needed for Insomnia. 26 tablet 2     No current facility-administered medications for this visit.     Review of patient's allergies indicates:   Allergen Reactions    Ciprofloxacin      swelling    Penicillins Rash    Sulfa (sulfonamide antibiotics) Rash    Toradol [ketorolac] Nausea Only    Green pepper Nausea Only    Meloxicam Nausea Only       Review of Systems    Objective:      There were no vitals filed for this visit.  Physical Exam    Lab Review:   CBC:   Lab Results   Component Value Date    WBC 8.46 06/19/2021    RBC 4.62 06/19/2021    HGB 13.5 (L) 06/19/2021    HCT 41.4 06/19/2021    HCT 38  10/15/2020     06/19/2021     BMP:   Lab Results   Component Value Date     04/14/2021     04/14/2021    K 4.4 04/14/2021     04/14/2021    CO2 25 04/14/2021    BUN 12 04/14/2021    CREATININE 0.8 04/14/2021    CALCIUM 9.3 04/14/2021     Diagnostics Review: Echo: Reviewed     Assessment:       1. Morbid obesity    2. Prediabetes    3. LYNDA (obstructive sleep apnea)    4. Primary hypertension    5. Hyperlipidemia, unspecified hyperlipidemia type    6. Hypersomnia        Plan:         The pathophysiology of LYNDA was discussed.   The effects of LYNDA on patient's co-morbid conditions and the increased morbidity and/or mortality associated with this condition were reviewed.   The patient was given open opportunity to ask questions and/or express concerns about treatment plan.   All questions/concerns were discussed.   Driving precautions were provided.       Consider discontinuing Ambien.   Modafinil 100 mg.   Side effects discussed.   May need repeat study.      Thank you for referring Dominic Ortegaamanda Collazo for evaluation.            The patient location is: car  The chief complaint leading to consultation is: daytime sleepiness    Visit type: audiovisual    Face to Face time with patient: 22  32 minutes of total time spent on the encounter, which includes face to face time and non-face to face time preparing to see the patient (eg, review of tests), Obtaining and/or reviewing separately obtained history, Documenting clinical information in the electronic or other health record, Independently interpreting results (not separately reported) and communicating results to the patient/family/caregiver, or Care coordination (not separately reported).         Each patient to whom he or she provides medical services by telemedicine is:  (1) informed of the relationship between the physician and patient and the respective role of any other health care provider with respect to management of the patient;  and (2) notified that he or she may decline to receive medical services by telemedicine and may withdraw from such care at any time.    Notes:

## 2022-06-13 ENCOUNTER — PATIENT MESSAGE (OUTPATIENT)
Dept: SLEEP MEDICINE | Facility: CLINIC | Age: 42
End: 2022-06-13
Payer: COMMERCIAL

## 2022-06-14 ENCOUNTER — TELEPHONE (OUTPATIENT)
Dept: BARIATRICS | Facility: CLINIC | Age: 42
End: 2022-06-14
Payer: COMMERCIAL

## 2022-06-15 ENCOUNTER — PATIENT MESSAGE (OUTPATIENT)
Dept: INTERNAL MEDICINE | Facility: CLINIC | Age: 42
End: 2022-06-15
Payer: COMMERCIAL

## 2022-07-14 DIAGNOSIS — F43.23 ADJUSTMENT DISORDER WITH MIXED ANXIETY AND DEPRESSED MOOD: ICD-10-CM

## 2022-07-15 RX ORDER — BUPROPION HYDROCHLORIDE 150 MG/1
150 TABLET ORAL DAILY
Qty: 90 TABLET | Refills: 0 | Status: SHIPPED | OUTPATIENT
Start: 2022-07-15 | End: 2022-11-11

## 2022-07-22 ENCOUNTER — OFFICE VISIT (OUTPATIENT)
Dept: INTERNAL MEDICINE | Facility: CLINIC | Age: 42
End: 2022-07-22
Payer: COMMERCIAL

## 2022-07-22 VITALS
HEART RATE: 80 BPM | HEIGHT: 75 IN | WEIGHT: 315 LBS | OXYGEN SATURATION: 98 % | BODY MASS INDEX: 39.17 KG/M2 | TEMPERATURE: 98 F | SYSTOLIC BLOOD PRESSURE: 126 MMHG | DIASTOLIC BLOOD PRESSURE: 80 MMHG | RESPIRATION RATE: 16 BRPM

## 2022-07-22 DIAGNOSIS — L03.213 PERIORBITAL CELLULITIS OF LEFT EYE: Primary | ICD-10-CM

## 2022-07-22 PROCEDURE — 3008F BODY MASS INDEX DOCD: CPT | Mod: CPTII,S$GLB,, | Performed by: FAMILY MEDICINE

## 2022-07-22 PROCEDURE — 3080F DIAST BP >= 90 MM HG: CPT | Mod: CPTII,S$GLB,, | Performed by: FAMILY MEDICINE

## 2022-07-22 PROCEDURE — 1159F MED LIST DOCD IN RCRD: CPT | Mod: CPTII,S$GLB,, | Performed by: FAMILY MEDICINE

## 2022-07-22 PROCEDURE — 99214 OFFICE O/P EST MOD 30 MIN: CPT | Mod: S$GLB,,, | Performed by: FAMILY MEDICINE

## 2022-07-22 PROCEDURE — 99999 PR PBB SHADOW E&M-EST. PATIENT-LVL V: CPT | Mod: PBBFAC,,, | Performed by: FAMILY MEDICINE

## 2022-07-22 PROCEDURE — 99214 PR OFFICE/OUTPT VISIT, EST, LEVL IV, 30-39 MIN: ICD-10-PCS | Mod: S$GLB,,, | Performed by: FAMILY MEDICINE

## 2022-07-22 PROCEDURE — 4010F PR ACE/ARB THEARPY RXD/TAKEN: ICD-10-PCS | Mod: CPTII,S$GLB,, | Performed by: FAMILY MEDICINE

## 2022-07-22 PROCEDURE — 1159F PR MEDICATION LIST DOCUMENTED IN MEDICAL RECORD: ICD-10-PCS | Mod: CPTII,S$GLB,, | Performed by: FAMILY MEDICINE

## 2022-07-22 PROCEDURE — 1160F RVW MEDS BY RX/DR IN RCRD: CPT | Mod: CPTII,S$GLB,, | Performed by: FAMILY MEDICINE

## 2022-07-22 PROCEDURE — 3080F PR MOST RECENT DIASTOLIC BLOOD PRESSURE >= 90 MM HG: ICD-10-PCS | Mod: CPTII,S$GLB,, | Performed by: FAMILY MEDICINE

## 2022-07-22 PROCEDURE — 99999 PR PBB SHADOW E&M-EST. PATIENT-LVL V: ICD-10-PCS | Mod: PBBFAC,,, | Performed by: FAMILY MEDICINE

## 2022-07-22 PROCEDURE — 3008F PR BODY MASS INDEX (BMI) DOCUMENTED: ICD-10-PCS | Mod: CPTII,S$GLB,, | Performed by: FAMILY MEDICINE

## 2022-07-22 PROCEDURE — 4010F ACE/ARB THERAPY RXD/TAKEN: CPT | Mod: CPTII,S$GLB,, | Performed by: FAMILY MEDICINE

## 2022-07-22 PROCEDURE — 3074F SYST BP LT 130 MM HG: CPT | Mod: CPTII,S$GLB,, | Performed by: FAMILY MEDICINE

## 2022-07-22 PROCEDURE — 1160F PR REVIEW ALL MEDS BY PRESCRIBER/CLIN PHARMACIST DOCUMENTED: ICD-10-PCS | Mod: CPTII,S$GLB,, | Performed by: FAMILY MEDICINE

## 2022-07-22 PROCEDURE — 3074F PR MOST RECENT SYSTOLIC BLOOD PRESSURE < 130 MM HG: ICD-10-PCS | Mod: CPTII,S$GLB,, | Performed by: FAMILY MEDICINE

## 2022-07-22 RX ORDER — CLINDAMYCIN HYDROCHLORIDE 300 MG/1
300 CAPSULE ORAL EVERY 6 HOURS
Qty: 40 CAPSULE | Refills: 0 | Status: SHIPPED | OUTPATIENT
Start: 2022-07-22 | End: 2022-08-01

## 2022-07-22 RX ORDER — LANOLIN ALCOHOL/MO/W.PET/CERES
100 CREAM (GRAM) TOPICAL DAILY
COMMUNITY

## 2022-07-22 RX ORDER — ERYTHROMYCIN 5 MG/G
OINTMENT OPHTHALMIC 4 TIMES DAILY
Qty: 3.5 G | Refills: 0 | Status: SHIPPED | OUTPATIENT
Start: 2022-07-22 | End: 2022-12-13 | Stop reason: ALTCHOICE

## 2022-07-22 NOTE — PROGRESS NOTES
Subjective:       Patient ID: Dominic Collazo is a 41 y.o. male.    Chief Complaint: Eye Problem (Left eye swollen)    HPI 41-year-old white male presents to clinic today accompanied by his wife secondary to concerns of swelling to the left upper eyelid since yesterday.  He attempted using an over-the-counter stye drop but reports when he awoke this morning the swelling was worse.  Review of Systems   Constitutional: Negative for appetite change, chills, fatigue and fever.   HENT: Negative for nasal congestion, ear pain, hearing loss, postnasal drip, rhinorrhea, sinus pressure/congestion, sore throat and tinnitus.    Eyes: Positive for discharge (left ) and itching (left). Negative for redness and visual disturbance.        Left upper eye lid swelling     Respiratory: Negative for cough, chest tightness and shortness of breath.    Cardiovascular: Negative for chest pain and palpitations.   Gastrointestinal: Negative for abdominal pain, constipation, diarrhea, nausea and vomiting.   Genitourinary: Negative for decreased urine volume, difficulty urinating, dysuria, frequency, hematuria and urgency.   Musculoskeletal: Negative for back pain, myalgias, neck pain and neck stiffness.   Integumentary:  Negative for rash.   Neurological: Negative for dizziness, light-headedness and headaches.   Psychiatric/Behavioral: Negative.          Objective:      Physical Exam  Constitutional:       Appearance: Normal appearance.   HENT:      Head: Normocephalic and atraumatic.   Eyes:     Neurological:      Mental Status: He is alert.         Assessment:       Problem List Items Addressed This Visit    None     Visit Diagnoses     Periorbital cellulitis of left eye    -  Primary    Relevant Medications    erythromycin (ROMYCIN) ophthalmic ointment    clindamycin (CLEOCIN) 300 MG capsule          Plan:       1. Start erythromycin ophthalmic ointment 1 application to the affected eye 4 times daily for 10 days.  2. Clindamycin  300 mg q.i.d. times 10 days.  3. Cool compresses as needed for pain.  4. Return to clinic as needed if symptoms persist or worsen.

## 2022-07-30 ENCOUNTER — PATIENT MESSAGE (OUTPATIENT)
Dept: SLEEP MEDICINE | Facility: CLINIC | Age: 42
End: 2022-07-30
Payer: COMMERCIAL

## 2022-08-01 DIAGNOSIS — G47.33 OSA (OBSTRUCTIVE SLEEP APNEA): ICD-10-CM

## 2022-08-01 DIAGNOSIS — G47.10 HYPERSOMNIA: ICD-10-CM

## 2022-08-01 RX ORDER — MODAFINIL 200 MG/1
200 TABLET ORAL DAILY
Qty: 30 TABLET | Refills: 3 | Status: SHIPPED | OUTPATIENT
Start: 2022-08-01 | End: 2022-08-31

## 2022-08-03 ENCOUNTER — TELEPHONE (OUTPATIENT)
Dept: BARIATRICS | Facility: CLINIC | Age: 42
End: 2022-08-03
Payer: COMMERCIAL

## 2022-08-03 NOTE — TELEPHONE ENCOUNTER
Called and LVM for [facility] to discuss if pt is still interested in proceeding with bariatric workup and scheduling consult appts. Clinic phone number provided to pt in message.

## 2022-08-10 ENCOUNTER — PATIENT MESSAGE (OUTPATIENT)
Dept: SLEEP MEDICINE | Facility: CLINIC | Age: 42
End: 2022-08-10
Payer: COMMERCIAL

## 2022-08-17 ENCOUNTER — PATIENT MESSAGE (OUTPATIENT)
Dept: BARIATRICS | Facility: CLINIC | Age: 42
End: 2022-08-17
Payer: COMMERCIAL

## 2022-08-17 DIAGNOSIS — F43.23 ADJUSTMENT DISORDER WITH MIXED ANXIETY AND DEPRESSED MOOD: ICD-10-CM

## 2022-08-17 RX ORDER — BUSPIRONE HYDROCHLORIDE 10 MG/1
10 TABLET ORAL 3 TIMES DAILY
Qty: 270 TABLET | Refills: 0 | Status: SHIPPED | OUTPATIENT
Start: 2022-08-17 | End: 2022-12-28 | Stop reason: SDUPTHER

## 2022-08-20 NOTE — TELEPHONE ENCOUNTER
Care Due:                  Date            Visit Type   Department     Provider  --------------------------------------------------------------------------------                                SAME DAY -                              ESTABLISHED   [facility] INTERNAL  Last Visit: 07-      PATIENT      MEDICINE       Arjun Cross                              MYCHART                              FOLLOWUP/OF  [facility] INTERNAL  Next Visit: 08-      FICE VISIT   MEDICINE       Arjun Cross                                                            Last  Test          Frequency    Reason                     Performed    Due Date  --------------------------------------------------------------------------------    CMP.........  12 months..  pravastatin..............  04- 06-    Lipid Panel.  12 months..  pravastatin..............  06- 06-    [employer] Embedded Care Gaps. Reference number: [id number]. 8/20/2022   7:46:54 AM CDT

## 2022-08-20 NOTE — TELEPHONE ENCOUNTER
Refill Routing Note   Medication(s) are not appropriate for processing by [facility] for the following reason(s):      - Required laboratory values are outdated    [facility] action(s):  Defer          Medication reconciliation completed: No     Appointments  past 12m or future 3m with PCP    Date Provider   Last Visit   7/22/2022 Arjun Cross MD   Next Visit   8/30/2022 Arjun Cross MD   ED visits in past 90 days: 0        Note composed:9:54 AM 08/20/2022

## 2022-08-21 RX ORDER — PRAVASTATIN SODIUM 10 MG/1
TABLET ORAL
Qty: 90 TABLET | Refills: 3 | Status: SHIPPED | OUTPATIENT
Start: 2022-08-21 | End: 2022-10-03 | Stop reason: SDUPTHER

## 2022-08-31 DIAGNOSIS — I10 HTN (HYPERTENSION): ICD-10-CM

## 2022-09-06 ENCOUNTER — PATIENT MESSAGE (OUTPATIENT)
Dept: SLEEP MEDICINE | Facility: CLINIC | Age: 42
End: 2022-09-06
Payer: COMMERCIAL

## 2022-09-06 DIAGNOSIS — G47.30 HYPERSOMNIA WITH SLEEP APNEA: Primary | ICD-10-CM

## 2022-09-06 DIAGNOSIS — G47.10 HYPERSOMNIA WITH SLEEP APNEA: Primary | ICD-10-CM

## 2022-09-06 RX ORDER — MODAFINIL 200 MG/1
200 TABLET ORAL 2 TIMES DAILY
Qty: 60 TABLET | Refills: 0 | Status: SHIPPED | OUTPATIENT
Start: 2022-09-06 | End: 2022-10-06

## 2022-09-29 ENCOUNTER — OFFICE VISIT (OUTPATIENT)
Dept: URGENT CARE | Facility: CLINIC | Age: 42
End: 2022-09-29
Payer: COMMERCIAL

## 2022-09-29 VITALS
HEIGHT: 75 IN | RESPIRATION RATE: 17 BRPM | DIASTOLIC BLOOD PRESSURE: 81 MMHG | HEART RATE: 90 BPM | WEIGHT: 315 LBS | TEMPERATURE: 98 F | OXYGEN SATURATION: 96 % | SYSTOLIC BLOOD PRESSURE: 126 MMHG | BODY MASS INDEX: 39.17 KG/M2

## 2022-09-29 DIAGNOSIS — M54.2 NECK PAIN: Primary | ICD-10-CM

## 2022-09-29 DIAGNOSIS — T14.90XA TRAUMA: ICD-10-CM

## 2022-09-29 DIAGNOSIS — M25.519 SHOULDER PAIN, UNSPECIFIED CHRONICITY, UNSPECIFIED LATERALITY: ICD-10-CM

## 2022-09-29 PROCEDURE — 3008F PR BODY MASS INDEX (BMI) DOCUMENTED: ICD-10-PCS | Mod: CPTII,S$GLB,, | Performed by: FAMILY MEDICINE

## 2022-09-29 PROCEDURE — 99214 PR OFFICE/OUTPT VISIT, EST, LEVL IV, 30-39 MIN: ICD-10-PCS | Mod: S$GLB,,, | Performed by: FAMILY MEDICINE

## 2022-09-29 PROCEDURE — 3008F BODY MASS INDEX DOCD: CPT | Mod: CPTII,S$GLB,, | Performed by: FAMILY MEDICINE

## 2022-09-29 PROCEDURE — 73030 XR SHOULDER COMPLETE 2 OR MORE VIEWS LEFT: ICD-10-PCS | Mod: LT,S$GLB,, | Performed by: RADIOLOGY

## 2022-09-29 PROCEDURE — 3079F DIAST BP 80-89 MM HG: CPT | Mod: CPTII,S$GLB,, | Performed by: FAMILY MEDICINE

## 2022-09-29 PROCEDURE — 1159F MED LIST DOCD IN RCRD: CPT | Mod: CPTII,S$GLB,, | Performed by: FAMILY MEDICINE

## 2022-09-29 PROCEDURE — 73030 X-RAY EXAM OF SHOULDER: CPT | Mod: LT,S$GLB,, | Performed by: RADIOLOGY

## 2022-09-29 PROCEDURE — 3074F PR MOST RECENT SYSTOLIC BLOOD PRESSURE < 130 MM HG: ICD-10-PCS | Mod: CPTII,S$GLB,, | Performed by: FAMILY MEDICINE

## 2022-09-29 PROCEDURE — 1159F PR MEDICATION LIST DOCUMENTED IN MEDICAL RECORD: ICD-10-PCS | Mod: CPTII,S$GLB,, | Performed by: FAMILY MEDICINE

## 2022-09-29 PROCEDURE — 4010F PR ACE/ARB THEARPY RXD/TAKEN: ICD-10-PCS | Mod: CPTII,S$GLB,, | Performed by: FAMILY MEDICINE

## 2022-09-29 PROCEDURE — 3079F PR MOST RECENT DIASTOLIC BLOOD PRESSURE 80-89 MM HG: ICD-10-PCS | Mod: CPTII,S$GLB,, | Performed by: FAMILY MEDICINE

## 2022-09-29 PROCEDURE — 4010F ACE/ARB THERAPY RXD/TAKEN: CPT | Mod: CPTII,S$GLB,, | Performed by: FAMILY MEDICINE

## 2022-09-29 PROCEDURE — 72040 X-RAY EXAM NECK SPINE 2-3 VW: CPT | Mod: S$GLB,,, | Performed by: RADIOLOGY

## 2022-09-29 PROCEDURE — 72040 XR CERVICAL SPINE AP LATERAL: ICD-10-PCS | Mod: S$GLB,,, | Performed by: RADIOLOGY

## 2022-09-29 PROCEDURE — 99214 OFFICE O/P EST MOD 30 MIN: CPT | Mod: S$GLB,,, | Performed by: FAMILY MEDICINE

## 2022-09-29 PROCEDURE — 3074F SYST BP LT 130 MM HG: CPT | Mod: CPTII,S$GLB,, | Performed by: FAMILY MEDICINE

## 2022-09-29 RX ORDER — CYCLOBENZAPRINE HCL 10 MG
10 TABLET ORAL NIGHTLY PRN
Qty: 30 TABLET | Refills: 0 | Status: SHIPPED | OUTPATIENT
Start: 2022-09-29 | End: 2022-10-03

## 2022-09-29 RX ORDER — GABAPENTIN 300 MG/1
300 CAPSULE ORAL 3 TIMES DAILY
Qty: 90 CAPSULE | Refills: 11 | Status: SHIPPED | OUTPATIENT
Start: 2022-09-29 | End: 2022-12-13

## 2022-09-29 NOTE — PROGRESS NOTES
"Subjective:       Patient ID: Dominic Collazo is a 41 y.o. male.    Vitals:  height is 6' 3" (1.905 m) and weight is 162.4 kg (358 lb) (abnormal). His tympanic temperature is 98 °F (36.7 °C). His blood pressure is 126/81 and his pulse is 90. His respiration is 17 and oxygen saturation is 96%.     Chief Complaint: Cervical Spine Pain (C-spine)    41 y.o male presents today c/o upper/middle back pain that started today.  Patient denies injuries and denies hx of trauma to his upper back area.    Back Pain  This is a new problem. The current episode started today. The pain is present in the thoracic spine. The quality of the pain is described as aching. The pain does not radiate. The pain is at a severity of 4/10. The pain is mild. Exacerbated by: moving. Pertinent negatives include no abdominal pain, bladder incontinence, bowel incontinence, chest pain, dysuria, fever, headaches, leg pain, numbness, paresis, paresthesias, pelvic pain, perianal numbness, tingling, weakness or weight loss. He has tried nothing for the symptoms.     Constitution: Negative for fever.   Cardiovascular:  Negative for chest pain.   Gastrointestinal:  Negative for abdominal pain and bowel incontinence.   Genitourinary:  Negative for dysuria, bladder incontinence and pelvic pain.   Musculoskeletal:  Positive for back pain.   Neurological:  Negative for headaches and numbness.     Objective:      Physical Exam   Constitutional: No distress. obesity  HENT:   Head: Normocephalic and atraumatic.   Nose: No rhinorrhea or congestion.   Mouth/Throat: No oropharyngeal exudate.   Eyes: Conjunctivae are normal. Pupils are equal, round, and reactive to light. Extraocular movement intact   Neck: Neck supple. No JVD present. No crepitus. There are signs of injury. No torticollis present. No neck rigidity present. decreased range of motion present. pain with movement present. No spinous process tenderness present. muscular tenderness present. "   Pulmonary/Chest: Effort normal. No stridor. No respiratory distress.   Abdominal: Normal appearance.   Musculoskeletal:      Right shoulder: Normal. He exhibits normal range of motion and no tenderness.      Left shoulder: He exhibits decreased range of motion and tenderness.      Cervical back: He exhibits tenderness.   Neurological: no focal deficit. He is alert and at baseline. He is disoriented. He displays no weakness. No cranial nerve deficit or sensory deficit. Coordination normal.   Skin: Skin is warm and dry. Capillary refill takes less than 2 seconds. jaundice      Assessment:Plan:       1. Neck pain  - X-Ray Cervical Spine AP And Lateral; Future  - cyclobenzaprine (FLEXERIL) 10 MG tablet; Take 1 tablet (10 mg total) by mouth nightly as needed for Muscle spasms.  Dispense: 30 tablet; Refill: 0  - gabapentin (NEURONTIN) 300 MG capsule; Take 1 capsule (300 mg total) by mouth 3 (three) times daily.  Dispense: 90 capsule; Refill: 11    2. Shoulder pain, unspecified chronicity, unspecified laterality  - X-Ray Shoulder 2 or More Views Left; Future    3. Trauma          Xrays no fractures identified.  Cervical spasm on xray all results discussed with pt.

## 2022-10-03 ENCOUNTER — OFFICE VISIT (OUTPATIENT)
Dept: INTERNAL MEDICINE | Facility: CLINIC | Age: 42
End: 2022-10-03
Payer: COMMERCIAL

## 2022-10-03 VITALS
OXYGEN SATURATION: 96 % | HEIGHT: 75 IN | SYSTOLIC BLOOD PRESSURE: 120 MMHG | HEART RATE: 77 BPM | BODY MASS INDEX: 39.17 KG/M2 | DIASTOLIC BLOOD PRESSURE: 70 MMHG | WEIGHT: 315 LBS | TEMPERATURE: 98 F

## 2022-10-03 DIAGNOSIS — I10 PRIMARY HYPERTENSION: ICD-10-CM

## 2022-10-03 DIAGNOSIS — E66.01 MORBID OBESITY: ICD-10-CM

## 2022-10-03 DIAGNOSIS — F51.05 INSOMNIA DUE TO OTHER MENTAL DISORDER: ICD-10-CM

## 2022-10-03 DIAGNOSIS — G47.33 OSA (OBSTRUCTIVE SLEEP APNEA): ICD-10-CM

## 2022-10-03 DIAGNOSIS — M54.2 NECK PAIN: ICD-10-CM

## 2022-10-03 DIAGNOSIS — E29.1 MALE HYPOGONADISM: ICD-10-CM

## 2022-10-03 DIAGNOSIS — F99 INSOMNIA DUE TO OTHER MENTAL DISORDER: ICD-10-CM

## 2022-10-03 DIAGNOSIS — Z12.5 PROSTATE CANCER SCREENING: ICD-10-CM

## 2022-10-03 DIAGNOSIS — Z00.00 WELL ADULT EXAM: Primary | ICD-10-CM

## 2022-10-03 DIAGNOSIS — R73.03 PREDIABETES: ICD-10-CM

## 2022-10-03 DIAGNOSIS — E78.5 HYPERLIPIDEMIA, UNSPECIFIED HYPERLIPIDEMIA TYPE: ICD-10-CM

## 2022-10-03 PROCEDURE — 1160F PR REVIEW ALL MEDS BY PRESCRIBER/CLIN PHARMACIST DOCUMENTED: ICD-10-PCS | Mod: CPTII,S$GLB,, | Performed by: FAMILY MEDICINE

## 2022-10-03 PROCEDURE — 90471 FLU VACCINE (QUAD) GREATER THAN OR EQUAL TO 3YO PRESERVATIVE FREE IM: ICD-10-PCS | Mod: S$GLB,,, | Performed by: FAMILY MEDICINE

## 2022-10-03 PROCEDURE — 3008F BODY MASS INDEX DOCD: CPT | Mod: CPTII,S$GLB,, | Performed by: FAMILY MEDICINE

## 2022-10-03 PROCEDURE — 99396 PREV VISIT EST AGE 40-64: CPT | Mod: 25,S$GLB,, | Performed by: FAMILY MEDICINE

## 2022-10-03 PROCEDURE — 90686 FLU VACCINE (QUAD) GREATER THAN OR EQUAL TO 3YO PRESERVATIVE FREE IM: ICD-10-PCS | Mod: S$GLB,,, | Performed by: FAMILY MEDICINE

## 2022-10-03 PROCEDURE — 4010F ACE/ARB THERAPY RXD/TAKEN: CPT | Mod: CPTII,S$GLB,, | Performed by: FAMILY MEDICINE

## 2022-10-03 PROCEDURE — 3078F PR MOST RECENT DIASTOLIC BLOOD PRESSURE < 80 MM HG: ICD-10-PCS | Mod: CPTII,S$GLB,, | Performed by: FAMILY MEDICINE

## 2022-10-03 PROCEDURE — 3008F PR BODY MASS INDEX (BMI) DOCUMENTED: ICD-10-PCS | Mod: CPTII,S$GLB,, | Performed by: FAMILY MEDICINE

## 2022-10-03 PROCEDURE — 4010F PR ACE/ARB THEARPY RXD/TAKEN: ICD-10-PCS | Mod: CPTII,S$GLB,, | Performed by: FAMILY MEDICINE

## 2022-10-03 PROCEDURE — 3074F SYST BP LT 130 MM HG: CPT | Mod: CPTII,S$GLB,, | Performed by: FAMILY MEDICINE

## 2022-10-03 PROCEDURE — 90471 IMMUNIZATION ADMIN: CPT | Mod: S$GLB,,, | Performed by: FAMILY MEDICINE

## 2022-10-03 PROCEDURE — 99999 PR PBB SHADOW E&M-EST. PATIENT-LVL IV: ICD-10-PCS | Mod: PBBFAC,,, | Performed by: FAMILY MEDICINE

## 2022-10-03 PROCEDURE — 3074F PR MOST RECENT SYSTOLIC BLOOD PRESSURE < 130 MM HG: ICD-10-PCS | Mod: CPTII,S$GLB,, | Performed by: FAMILY MEDICINE

## 2022-10-03 PROCEDURE — 3078F DIAST BP <80 MM HG: CPT | Mod: CPTII,S$GLB,, | Performed by: FAMILY MEDICINE

## 2022-10-03 PROCEDURE — 1159F MED LIST DOCD IN RCRD: CPT | Mod: CPTII,S$GLB,, | Performed by: FAMILY MEDICINE

## 2022-10-03 PROCEDURE — 99999 PR PBB SHADOW E&M-EST. PATIENT-LVL IV: CPT | Mod: PBBFAC,,, | Performed by: FAMILY MEDICINE

## 2022-10-03 PROCEDURE — 90686 IIV4 VACC NO PRSV 0.5 ML IM: CPT | Mod: S$GLB,,, | Performed by: FAMILY MEDICINE

## 2022-10-03 PROCEDURE — 99396 PR PREVENTIVE VISIT,EST,40-64: ICD-10-PCS | Mod: 25,S$GLB,, | Performed by: FAMILY MEDICINE

## 2022-10-03 PROCEDURE — 1160F RVW MEDS BY RX/DR IN RCRD: CPT | Mod: CPTII,S$GLB,, | Performed by: FAMILY MEDICINE

## 2022-10-03 PROCEDURE — 1159F PR MEDICATION LIST DOCUMENTED IN MEDICAL RECORD: ICD-10-PCS | Mod: CPTII,S$GLB,, | Performed by: FAMILY MEDICINE

## 2022-10-03 RX ORDER — LISINOPRIL 2.5 MG/1
2.5 TABLET ORAL DAILY
COMMUNITY
Start: 2022-06-08 | End: 2022-10-03

## 2022-10-03 RX ORDER — PRAVASTATIN SODIUM 10 MG/1
10 TABLET ORAL DAILY
Qty: 90 TABLET | Refills: 3 | Status: SHIPPED | OUTPATIENT
Start: 2022-10-03 | End: 2023-08-21

## 2022-10-03 RX ORDER — METHOCARBAMOL 750 MG/1
TABLET, FILM COATED ORAL
Qty: 40 TABLET | Refills: 3 | Status: SHIPPED | OUTPATIENT
Start: 2022-10-03 | End: 2023-04-17 | Stop reason: SDUPTHER

## 2022-10-03 RX ORDER — SCOLOPAMINE TRANSDERMAL SYSTEM 1 MG/1
1 PATCH, EXTENDED RELEASE TRANSDERMAL
Qty: 4 PATCH | Refills: 0 | Status: SHIPPED | OUTPATIENT
Start: 2022-10-03

## 2022-10-03 RX ORDER — METOPROLOL SUCCINATE 100 MG/1
100 TABLET, EXTENDED RELEASE ORAL DAILY
Qty: 90 TABLET | Refills: 3 | Status: SHIPPED | OUTPATIENT
Start: 2022-10-03 | End: 2023-07-21
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
## 2022-10-06 ENCOUNTER — PATIENT MESSAGE (OUTPATIENT)
Dept: BARIATRICS | Facility: CLINIC | Age: 42
End: 2022-10-06
Payer: COMMERCIAL

## 2022-10-12 ENCOUNTER — TELEPHONE (OUTPATIENT)
Dept: SLEEP MEDICINE | Facility: CLINIC | Age: 42
End: 2022-10-12
Payer: COMMERCIAL

## 2022-10-13 ENCOUNTER — OFFICE VISIT (OUTPATIENT)
Dept: SLEEP MEDICINE | Facility: CLINIC | Age: 42
End: 2022-10-13
Payer: COMMERCIAL

## 2022-10-13 VITALS
WEIGHT: 315 LBS | HEIGHT: 75 IN | DIASTOLIC BLOOD PRESSURE: 86 MMHG | BODY MASS INDEX: 39.17 KG/M2 | SYSTOLIC BLOOD PRESSURE: 122 MMHG

## 2022-10-13 DIAGNOSIS — F51.09 OTHER INSOMNIA NOT DUE TO A SUBSTANCE OR KNOWN PHYSIOLOGICAL CONDITION: ICD-10-CM

## 2022-10-13 DIAGNOSIS — G47.10 HYPERSOMNOLENCE: Primary | ICD-10-CM

## 2022-10-13 DIAGNOSIS — G47.33 OSA (OBSTRUCTIVE SLEEP APNEA): ICD-10-CM

## 2022-10-13 PROCEDURE — 4010F ACE/ARB THERAPY RXD/TAKEN: CPT | Mod: CPTII,S$GLB,, | Performed by: INTERNAL MEDICINE

## 2022-10-13 PROCEDURE — 99999 PR PBB SHADOW E&M-EST. PATIENT-LVL III: ICD-10-PCS | Mod: PBBFAC,,, | Performed by: INTERNAL MEDICINE

## 2022-10-13 PROCEDURE — 1159F PR MEDICATION LIST DOCUMENTED IN MEDICAL RECORD: ICD-10-PCS | Mod: CPTII,S$GLB,, | Performed by: INTERNAL MEDICINE

## 2022-10-13 PROCEDURE — 99214 OFFICE O/P EST MOD 30 MIN: CPT | Mod: S$GLB,,, | Performed by: INTERNAL MEDICINE

## 2022-10-13 PROCEDURE — 3079F PR MOST RECENT DIASTOLIC BLOOD PRESSURE 80-89 MM HG: ICD-10-PCS | Mod: CPTII,S$GLB,, | Performed by: INTERNAL MEDICINE

## 2022-10-13 PROCEDURE — 99999 PR PBB SHADOW E&M-EST. PATIENT-LVL III: CPT | Mod: PBBFAC,,, | Performed by: INTERNAL MEDICINE

## 2022-10-13 PROCEDURE — 1159F MED LIST DOCD IN RCRD: CPT | Mod: CPTII,S$GLB,, | Performed by: INTERNAL MEDICINE

## 2022-10-13 PROCEDURE — 3079F DIAST BP 80-89 MM HG: CPT | Mod: CPTII,S$GLB,, | Performed by: INTERNAL MEDICINE

## 2022-10-13 PROCEDURE — 99214 PR OFFICE/OUTPT VISIT, EST, LEVL IV, 30-39 MIN: ICD-10-PCS | Mod: S$GLB,,, | Performed by: INTERNAL MEDICINE

## 2022-10-13 PROCEDURE — 3074F PR MOST RECENT SYSTOLIC BLOOD PRESSURE < 130 MM HG: ICD-10-PCS | Mod: CPTII,S$GLB,, | Performed by: INTERNAL MEDICINE

## 2022-10-13 PROCEDURE — 4010F PR ACE/ARB THEARPY RXD/TAKEN: ICD-10-PCS | Mod: CPTII,S$GLB,, | Performed by: INTERNAL MEDICINE

## 2022-10-13 PROCEDURE — 3074F SYST BP LT 130 MM HG: CPT | Mod: CPTII,S$GLB,, | Performed by: INTERNAL MEDICINE

## 2022-10-13 RX ORDER — ARMODAFINIL 250 MG/1
250 TABLET ORAL DAILY
Qty: 30 TABLET | Refills: 5 | Status: SHIPPED | OUTPATIENT
Start: 2022-10-13 | End: 2023-01-12 | Stop reason: SDUPTHER

## 2022-10-13 NOTE — PROGRESS NOTES
ESTABLISHED PATIENT VISIT (Dr. Souza 2022)    Dominic Collazo  is a pleasant 41 y.o. male  with history of  chronic pain, HTN, ED, BMI elevated.    Here today for: PAP follow-up    Plan last visit :  Trial of auto    Since last visit:   Modafinil 200mg helped him stay awake.  Armodafinil 250mg has worked for him in the past      PAP history   Problems    Mask Tried dreamwear nasal  Nasal pillows   Tried FFM (sensitive skin)   Pressure tolerating   Benefit    DME HME   Machine age Has refurbished auto-bipap   Download 10.13.22: AUto Bilevel 10-25 (PS 4-8) 89/90 x 8hr 33min,   (I x/15/18.7, E x/10/12.2), Leak 1hr 33min, AHI 1.1       SLEEP SCHEDULE   Environment    Bed Time 11pM   Sleep Latency    Arousals 3-5   Nocturia    Back to sleep    Wake time 6:15A (9A)   Naps yes   Work        Past Medical History:   Diagnosis Date    Adjustment disorder with mixed anxiety and depressed mood     Anxiety     Colon polyp     Depression     ED (erectile dysfunction)     Family history of prostate cancer 9/29/2014    Hx of umbilical hernia repair 10/16/2020    Hyperlipidemia     Hypertension     Hypogonadism male     Insomnia     Low testosterone     LYNDA (obstructive sleep apnea)     Prediabetes      Patient Active Problem List   Diagnosis    HTN (hypertension)    Hyperlipidemia    Adjustment disorder with mixed anxiety and depressed mood    LYNDA (obstructive sleep apnea)    Acromioclavicular joint separation, type 1    Left wrist sprain    Trauma    Mid back pain    Decreased ROM of wrist    Decreased strength    Chronic pain    Tenosynovitis, de Quervain    Morbid obesity    Prediabetes    Thoracic back pain    Erectile dysfunction due to arterial insufficiency    Male hypogonadism    NAFLD (nonalcoholic fatty liver disease)    Elevated liver enzymes    Panic attack    Insomnia due to other mental disorder    Adjustment insomnia    BPH with urinary obstruction    Shoulder pain    Neck pain       Current Outpatient  Medications:     buPROPion (WELLBUTRIN XL) 150 MG TB24 tablet, Take 1 tablet (150 mg total) by mouth once daily., Disp: 90 tablet, Rfl: 0    busPIRone (BUSPAR) 10 MG tablet, Take 1 tablet (10 mg total) by mouth 3 (three) times daily., Disp: 270 tablet, Rfl: 0    cyanocobalamin (VITAMIN B-12) 1000 MCG tablet, Take 100 mcg by mouth once daily., Disp: , Rfl:     ergocalciferol, vitamin D2, (VITAMIN D ORAL), Take 5,000 Units by mouth once daily., Disp: , Rfl:     erythromycin (ROMYCIN) ophthalmic ointment, Place into the left eye 4 (four) times daily., Disp: 3.5 g, Rfl: 0    fexofenadine (ALLEGRA) 60 MG tablet, Take 60 mg by mouth every morning., Disp: , Rfl:     gabapentin (NEURONTIN) 300 MG capsule, Take 1 capsule (300 mg total) by mouth 3 (three) times daily., Disp: 90 capsule, Rfl: 11    iron,carb/vit C/vit B12/folic (IRON 100 PLUS ORAL), Take by mouth once daily at 6am., Disp: , Rfl:     methocarbamoL (ROBAXIN) 750 MG Tab, TAKE 1 TABLET(750 MG) BY MOUTH FOUR TIMES DAILY AS NEEDED FOR MUSCLE STRAIN Strength: 750 mg, Disp: 40 tablet, Rfl: 3    metoprolol succinate (TOPROL-XL) 100 MG 24 hr tablet, Take 1 tablet (100 mg total) by mouth once daily., Disp: 90 tablet, Rfl: 3    multivitamin capsule, Take 1 capsule by mouth once daily., Disp: , Rfl:     pravastatin (PRAVACHOL) 10 MG tablet, Take 1 tablet (10 mg total) by mouth once daily., Disp: 90 tablet, Rfl: 3    scopolamine (TRANSDERM-SCOP) 1.3-1.5 mg (1 mg over 3 days), Place 1 patch onto the skin every 72 hours., Disp: 4 patch, Rfl: 0    venlafaxine (EFFEXOR-XR) 75 MG 24 hr capsule, Take 1 capsule (75 mg total) by mouth once daily., Disp: 30 capsule, Rfl: 2    LORazepam (ATIVAN) 1 MG tablet, Take 1 tablet (1 mg total) by mouth every 6 (six) hours as needed for Anxiety., Disp: 5 tablet, Rfl: 0       Vitals:    10/13/22 1526   BP: 122/86   BP Location: Right arm   Patient Position: Sitting   BP Method: Large (Automatic)   Weight: (!) 163.9 kg (361 lb 5.3 oz)   Height:  "6' 3" (1.905 m)     Physical Exam:    GEN:   Well-appearing  Psych:  Appropriate affect, demonstrates insight  SKIN:  No rash on the face or bridge of the nose    LABS:  No results found for: HGB, CO2      RECORDS REVIEWED PREVIOUSLY:    PSG Dec 26,2009: AHi 15.7  CPAP12.28.2009: rx 8cwp  BiPAP 10.27.2011: bipap 16/12      PROBLEM DESCRIPTION/ Sx on Presentation Interval Hx STATUS   LYNDA   Moderate severity   Good usage  High mask leak  New to me     Daytime Sx   + sleepiness when inactive   ESS 13/24 on intake armodafinil New to me   Xerostomia Severe dry mouth   persists    Insomnia   Onset:   Maintenance: waking 3-5 times per night  Prior hypnotics:   ambien CR 12.5     Current hypnotics:    Has weaned off of ambien CR 12.5 New to me   Nocturia   x 1-2 per sleep period 1-2 times per night New to me   Other issues:     PLAN     -CPAP titration for residual sleepiness on his current PAP  -trial of full face mask (air touch)  -trial of xylimelts  -using and benefitting from PAP therapy    RTC          "

## 2022-10-23 ENCOUNTER — NURSE TRIAGE (OUTPATIENT)
Dept: ADMINISTRATIVE | Facility: CLINIC | Age: 42
End: 2022-10-23
Payer: COMMERCIAL

## 2022-10-23 NOTE — TELEPHONE ENCOUNTER
[facility] incoming call - Pt c/o covid pos test at home on Friday, uncontrollable cough using tessalon pearles not working, febrile, mild bayron. Covid protocol followed and pt advised to see a doctor within the next 3-4 hours or use a telemedicine doctor or if he cannot do either to go to the UC/[facility] for further tx. [facility] offered and declined. Encounter routed to PCP/staff as high priority. Pt had no further questions at this time.  Reason for Disposition   MILD difficulty breathing (e.g., minimal/no SOB at rest, SOB with walking, pulse <100)    Additional Information   Negative: SEVERE difficulty breathing (e.g., struggling for each breath, speaks in single words)   Negative: Difficult to awaken or acting confused (e.g., disoriented, slurred speech)   Negative: Bluish (or gray) lips or face now   Negative: Shock suspected (e.g., cold/pale/clammy skin, too weak to stand, low BP, rapid pulse)   Negative: Sounds like a life-threatening emergency to the triager   Negative: SEVERE or constant chest pain or pressure  (Exception: Mild central chest pain, present only when coughing.)   Negative: MODERATE difficulty breathing (e.g., speaks in phrases, SOB even at rest, pulse 100-120)   Negative: [1] Headache AND [2] stiff neck (can't touch chin to chest)   Negative: Oxygen level (e.g., pulse oximetry) 90 percent or lower   Negative: Chest pain or pressure  (Exception: MILD central chest pain, present only when coughing)   Negative: Patient sounds very sick or weak to the triager    Protocols used: Coronavirus (COVID-19) Diagnosed or [id number]

## 2022-10-24 ENCOUNTER — PATIENT MESSAGE (OUTPATIENT)
Dept: INTERNAL MEDICINE | Facility: CLINIC | Age: 42
End: 2022-10-24
Payer: COMMERCIAL

## 2022-10-24 ENCOUNTER — TELEPHONE (OUTPATIENT)
Dept: SLEEP MEDICINE | Facility: OTHER | Age: 42
End: 2022-10-24
Payer: COMMERCIAL

## 2022-10-24 DIAGNOSIS — U07.1 COVID: ICD-10-CM

## 2022-10-24 DIAGNOSIS — R05.9 COUGH, UNSPECIFIED TYPE: Primary | ICD-10-CM

## 2022-10-24 DIAGNOSIS — R05.9 COUGH, UNSPECIFIED TYPE: ICD-10-CM

## 2022-10-24 RX ORDER — PROMETHAZINE HYDROCHLORIDE AND CODEINE PHOSPHATE 6.25; 1 MG/5ML; MG/5ML
5 SOLUTION ORAL NIGHTLY PRN
Qty: 240 ML | Refills: 0 | OUTPATIENT
Start: 2022-10-24 | End: 2022-11-03

## 2022-10-24 RX ORDER — PROMETHAZINE HYDROCHLORIDE AND CODEINE PHOSPHATE 6.25; 1 MG/5ML; MG/5ML
5 SOLUTION ORAL NIGHTLY PRN
Qty: 240 ML | Refills: 0 | Status: SHIPPED | OUTPATIENT
Start: 2022-10-24 | End: 2022-12-11

## 2022-10-24 RX ORDER — PROMETHAZINE HYDROCHLORIDE AND CODEINE PHOSPHATE 6.25; 1 MG/5ML; MG/5ML
5 SOLUTION ORAL NIGHTLY PRN
Qty: 240 ML | Refills: 0 | Status: SHIPPED | OUTPATIENT
Start: 2022-10-24 | End: 2022-10-24 | Stop reason: SDUPTHER

## 2022-10-24 NOTE — TELEPHONE ENCOUNTER
Pt states that he tested positive for COVID on Friday     Pt states that he has been taking OTC meds but his cough is really getting to him    PT states that he cant sleep bc of the cough    Pt would like something for the cough

## 2022-10-25 ENCOUNTER — PATIENT MESSAGE (OUTPATIENT)
Dept: PSYCHIATRY | Facility: CLINIC | Age: 42
End: 2022-10-25
Payer: COMMERCIAL

## 2022-11-28 ENCOUNTER — PATIENT MESSAGE (OUTPATIENT)
Dept: INTERNAL MEDICINE | Facility: CLINIC | Age: 42
End: 2022-11-28
Payer: COMMERCIAL

## 2022-11-28 DIAGNOSIS — M25.519 SHOULDER PAIN, UNSPECIFIED CHRONICITY, UNSPECIFIED LATERALITY: Primary | ICD-10-CM

## 2022-11-29 ENCOUNTER — TELEPHONE (OUTPATIENT)
Dept: ORTHOPEDICS | Facility: CLINIC | Age: 42
End: 2022-11-29
Payer: COMMERCIAL

## 2022-11-29 ENCOUNTER — TELEPHONE (OUTPATIENT)
Dept: INTERNAL MEDICINE | Facility: CLINIC | Age: 42
End: 2022-11-29
Payer: COMMERCIAL

## 2022-11-29 ENCOUNTER — OFFICE VISIT (OUTPATIENT)
Dept: ORTHOPEDICS | Facility: CLINIC | Age: 42
End: 2022-11-29
Payer: COMMERCIAL

## 2022-11-29 VITALS
BODY MASS INDEX: 39.17 KG/M2 | HEART RATE: 80 BPM | HEIGHT: 75 IN | SYSTOLIC BLOOD PRESSURE: 117 MMHG | DIASTOLIC BLOOD PRESSURE: 77 MMHG | WEIGHT: 315 LBS

## 2022-11-29 DIAGNOSIS — M25.519 SHOULDER PAIN, UNSPECIFIED CHRONICITY, UNSPECIFIED LATERALITY: ICD-10-CM

## 2022-11-29 DIAGNOSIS — G89.29 CHRONIC LEFT SHOULDER PAIN: Primary | ICD-10-CM

## 2022-11-29 DIAGNOSIS — M25.512 CHRONIC LEFT SHOULDER PAIN: Primary | ICD-10-CM

## 2022-11-29 PROCEDURE — 99999 PR PBB SHADOW E&M-EST. PATIENT-LVL V: ICD-10-PCS | Mod: PBBFAC,,, | Performed by: ORTHOPAEDIC SURGERY

## 2022-11-29 PROCEDURE — 99204 PR OFFICE/OUTPT VISIT, NEW, LEVL IV, 45-59 MIN: ICD-10-PCS | Mod: S$GLB,,, | Performed by: ORTHOPAEDIC SURGERY

## 2022-11-29 PROCEDURE — 3074F SYST BP LT 130 MM HG: CPT | Mod: CPTII,S$GLB,, | Performed by: ORTHOPAEDIC SURGERY

## 2022-11-29 PROCEDURE — 1160F RVW MEDS BY RX/DR IN RCRD: CPT | Mod: CPTII,S$GLB,, | Performed by: ORTHOPAEDIC SURGERY

## 2022-11-29 PROCEDURE — 99204 OFFICE O/P NEW MOD 45 MIN: CPT | Mod: S$GLB,,, | Performed by: ORTHOPAEDIC SURGERY

## 2022-11-29 PROCEDURE — 4010F ACE/ARB THERAPY RXD/TAKEN: CPT | Mod: CPTII,S$GLB,, | Performed by: ORTHOPAEDIC SURGERY

## 2022-11-29 PROCEDURE — 3078F DIAST BP <80 MM HG: CPT | Mod: CPTII,S$GLB,, | Performed by: ORTHOPAEDIC SURGERY

## 2022-11-29 PROCEDURE — 4010F PR ACE/ARB THEARPY RXD/TAKEN: ICD-10-PCS | Mod: CPTII,S$GLB,, | Performed by: ORTHOPAEDIC SURGERY

## 2022-11-29 PROCEDURE — 3074F PR MOST RECENT SYSTOLIC BLOOD PRESSURE < 130 MM HG: ICD-10-PCS | Mod: CPTII,S$GLB,, | Performed by: ORTHOPAEDIC SURGERY

## 2022-11-29 PROCEDURE — 3008F BODY MASS INDEX DOCD: CPT | Mod: CPTII,S$GLB,, | Performed by: ORTHOPAEDIC SURGERY

## 2022-11-29 PROCEDURE — 3078F PR MOST RECENT DIASTOLIC BLOOD PRESSURE < 80 MM HG: ICD-10-PCS | Mod: CPTII,S$GLB,, | Performed by: ORTHOPAEDIC SURGERY

## 2022-11-29 PROCEDURE — 1160F PR REVIEW ALL MEDS BY PRESCRIBER/CLIN PHARMACIST DOCUMENTED: ICD-10-PCS | Mod: CPTII,S$GLB,, | Performed by: ORTHOPAEDIC SURGERY

## 2022-11-29 PROCEDURE — 1159F MED LIST DOCD IN RCRD: CPT | Mod: CPTII,S$GLB,, | Performed by: ORTHOPAEDIC SURGERY

## 2022-11-29 PROCEDURE — 1159F PR MEDICATION LIST DOCUMENTED IN MEDICAL RECORD: ICD-10-PCS | Mod: CPTII,S$GLB,, | Performed by: ORTHOPAEDIC SURGERY

## 2022-11-29 PROCEDURE — 99999 PR PBB SHADOW E&M-EST. PATIENT-LVL V: CPT | Mod: PBBFAC,,, | Performed by: ORTHOPAEDIC SURGERY

## 2022-11-29 PROCEDURE — 3008F PR BODY MASS INDEX (BMI) DOCUMENTED: ICD-10-PCS | Mod: CPTII,S$GLB,, | Performed by: ORTHOPAEDIC SURGERY
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
## 2022-11-29 NOTE — TELEPHONE ENCOUNTER
----- Message from Eldon Burt sent at 11/29/2022 11:30 AM CST -----  Contact: pt  .Type:  Needs Medical Advice    Who Called: pt    Would the patient rather a call back or a response via [facility]? Call back  Best Call Back Number: [phone]  Additional Information: Pt is returning a call to schedule his x-ray prior to his appt.

## 2022-12-04 ENCOUNTER — HOSPITAL ENCOUNTER (OUTPATIENT)
Dept: RADIOLOGY | Facility: HOSPITAL | Age: 42
Discharge: HOME | End: 2022-12-04
Attending: ORTHOPAEDIC SURGERY
Payer: COMMERCIAL

## 2022-12-04 DIAGNOSIS — M25.512 CHRONIC LEFT SHOULDER PAIN: ICD-10-CM

## 2022-12-04 DIAGNOSIS — G89.29 CHRONIC LEFT SHOULDER PAIN: ICD-10-CM

## 2022-12-04 PROCEDURE — 73221 MRI JOINT UPR EXTREM W/O DYE: CPT | Mod: TC,LT

## 2022-12-04 PROCEDURE — 73221 MRI SHOULDER WITHOUT CONTRAST LEFT: ICD-10-PCS | Mod: 26,LT,, | Performed by: RADIOLOGY

## 2022-12-04 PROCEDURE — 73221 MRI JOINT UPR EXTREM W/O DYE: CPT | Mod: 26,LT,, | Performed by: RADIOLOGY

## 2022-12-05 ENCOUNTER — PATIENT MESSAGE (OUTPATIENT)
Dept: ORTHOPEDICS | Facility: CLINIC | Age: 42
End: 2022-12-05
Payer: COMMERCIAL

## 2022-12-07 ENCOUNTER — CLINICAL SUPPORT (OUTPATIENT)
Dept: LAB | Facility: HOSPITAL | Age: 42
End: 2022-12-07
Attending: ORTHOPAEDIC SURGERY
Payer: COMMERCIAL

## 2022-12-07 ENCOUNTER — LAB VISIT (OUTPATIENT)
Dept: LAB | Facility: HOSPITAL | Age: 42
End: 2022-12-07
Payer: COMMERCIAL

## 2022-12-07 ENCOUNTER — OFFICE VISIT (OUTPATIENT)
Dept: ORTHOPEDICS | Facility: CLINIC | Age: 42
End: 2022-12-07
Payer: COMMERCIAL

## 2022-12-07 VITALS
WEIGHT: 315 LBS | SYSTOLIC BLOOD PRESSURE: 120 MMHG | HEART RATE: 80 BPM | DIASTOLIC BLOOD PRESSURE: 85 MMHG | HEIGHT: 75 IN | BODY MASS INDEX: 39.17 KG/M2

## 2022-12-07 DIAGNOSIS — I10 HTN (HYPERTENSION): ICD-10-CM

## 2022-12-07 DIAGNOSIS — R73.03 PREDIABETES: ICD-10-CM

## 2022-12-07 DIAGNOSIS — Z01.818 PRE-OP TESTING: ICD-10-CM

## 2022-12-07 DIAGNOSIS — Z12.5 PROSTATE CANCER SCREENING: ICD-10-CM

## 2022-12-07 DIAGNOSIS — S43.432D SUPERIOR GLENOID LABRUM LESION OF LEFT SHOULDER, SUBSEQUENT ENCOUNTER: Primary | ICD-10-CM

## 2022-12-07 DIAGNOSIS — Z00.00 WELL ADULT EXAM: ICD-10-CM

## 2022-12-07 DIAGNOSIS — E78.5 HYPERLIPIDEMIA, UNSPECIFIED HYPERLIPIDEMIA TYPE: ICD-10-CM

## 2022-12-07 LAB
25(OH)D3+25(OH)D2 SERPL-MCNC: 32 NG/ML (ref 30–96)
ALBUMIN SERPL BCP-MCNC: 3.9 G/DL (ref 3.5–5.2)
ALBUMIN SERPL BCP-MCNC: 3.9 G/DL (ref 3.5–5.2)
ALP SERPL-CCNC: 66 U/L (ref 55–135)
ALP SERPL-CCNC: 66 U/L (ref 55–135)
ALT SERPL W/O P-5'-P-CCNC: 125 U/L (ref 10–44)
ALT SERPL W/O P-5'-P-CCNC: 125 U/L (ref 10–44)
ANION GAP SERPL CALC-SCNC: 10 MMOL/L (ref 8–16)
ANION GAP SERPL CALC-SCNC: 10 MMOL/L (ref 8–16)
AST SERPL-CCNC: 69 U/L (ref 10–40)
AST SERPL-CCNC: 69 U/L (ref 10–40)
BASOPHILS # BLD AUTO: 0.05 K/UL (ref 0–0.2)
BASOPHILS NFR BLD: 1 % (ref 0–1.9)
BILIRUB SERPL-MCNC: 0.6 MG/DL (ref 0.1–1)
BILIRUB SERPL-MCNC: 0.6 MG/DL (ref 0.1–1)
BUN SERPL-MCNC: 14 MG/DL (ref 6–20)
BUN SERPL-MCNC: 14 MG/DL (ref 6–20)
CALCIUM SERPL-MCNC: 9 MG/DL (ref 8.7–10.5)
CALCIUM SERPL-MCNC: 9 MG/DL (ref 8.7–10.5)
CHLORIDE SERPL-SCNC: 103 MMOL/L (ref 95–110)
CHLORIDE SERPL-SCNC: 103 MMOL/L (ref 95–110)
CHOLEST SERPL-MCNC: 234 MG/DL (ref 120–199)
CHOLEST/HDLC SERPL: 9 {RATIO} (ref 2–5)
CO2 SERPL-SCNC: 23 MMOL/L (ref 23–29)
CO2 SERPL-SCNC: 23 MMOL/L (ref 23–29)
COMPLEXED PSA SERPL-MCNC: 0.95 NG/ML (ref 0–4)
CREAT SERPL-MCNC: 0.9 MG/DL (ref 0.5–1.4)
CREAT SERPL-MCNC: 0.9 MG/DL (ref 0.5–1.4)
DIFFERENTIAL METHOD: ABNORMAL
EOSINOPHIL # BLD AUTO: 0.2 K/UL (ref 0–0.5)
EOSINOPHIL NFR BLD: 3.8 % (ref 0–8)
ERYTHROCYTE [DISTWIDTH] IN BLOOD BY AUTOMATED COUNT: 13.8 % (ref 11.5–14.5)
EST. GFR  (NO RACE VARIABLE): >60 ML/MIN/1.73 M^2
EST. GFR  (NO RACE VARIABLE): >60 ML/MIN/1.73 M^2
ESTIMATED AVG GLUCOSE: 295 MG/DL (ref 68–131)
GLUCOSE SERPL-MCNC: 188 MG/DL (ref 70–110)
GLUCOSE SERPL-MCNC: 188 MG/DL (ref 70–110)
HBA1C MFR BLD: 11.9 % (ref 4–5.6)
HCT VFR BLD AUTO: 41.3 % (ref 40–54)
HDLC SERPL-MCNC: 26 MG/DL (ref 40–75)
HDLC SERPL: 11.1 % (ref 20–50)
HGB BLD-MCNC: 13.7 G/DL (ref 14–18)
IMM GRANULOCYTES # BLD AUTO: 0.02 K/UL (ref 0–0.04)
IMM GRANULOCYTES NFR BLD AUTO: 0.4 % (ref 0–0.5)
LDLC SERPL CALC-MCNC: 161.8 MG/DL (ref 63–159)
LYMPHOCYTES # BLD AUTO: 1.6 K/UL (ref 1–4.8)
LYMPHOCYTES NFR BLD: 31.9 % (ref 18–48)
MCH RBC QN AUTO: 29 PG (ref 27–31)
MCHC RBC AUTO-ENTMCNC: 33.2 G/DL (ref 32–36)
MCV RBC AUTO: 88 FL (ref 82–98)
MONOCYTES # BLD AUTO: 0.6 K/UL (ref 0.3–1)
MONOCYTES NFR BLD: 12.4 % (ref 4–15)
NEUTROPHILS # BLD AUTO: 2.5 K/UL (ref 1.8–7.7)
NEUTROPHILS NFR BLD: 50.5 % (ref 38–73)
NONHDLC SERPL-MCNC: 208 MG/DL
NRBC BLD-RTO: 0 /100 WBC
PLATELET # BLD AUTO: 217 K/UL (ref 150–450)
PMV BLD AUTO: 11.2 FL (ref 9.2–12.9)
POTASSIUM SERPL-SCNC: 4.4 MMOL/L (ref 3.5–5.1)
POTASSIUM SERPL-SCNC: 4.4 MMOL/L (ref 3.5–5.1)
PROT SERPL-MCNC: 7 G/DL (ref 6–8.4)
PROT SERPL-MCNC: 7 G/DL (ref 6–8.4)
RBC # BLD AUTO: 4.72 M/UL (ref 4.6–6.2)
SODIUM SERPL-SCNC: 136 MMOL/L (ref 136–145)
SODIUM SERPL-SCNC: 136 MMOL/L (ref 136–145)
T4 FREE SERPL-MCNC: 0.96 NG/DL (ref 0.71–1.51)
TRIGL SERPL-MCNC: 231 MG/DL (ref 30–150)
TSH SERPL DL<=0.005 MIU/L-ACNC: 1.15 UIU/ML (ref 0.4–4)
WBC # BLD AUTO: 4.98 K/UL (ref 3.9–12.7)

## 2022-12-07 PROCEDURE — 3074F SYST BP LT 130 MM HG: CPT | Mod: CPTII,S$GLB,, | Performed by: ORTHOPAEDIC SURGERY

## 2022-12-07 PROCEDURE — 3046F PR MOST RECENT HEMOGLOBIN A1C LEVEL > 9.0%: ICD-10-PCS | Mod: CPTII,S$GLB,, | Performed by: ORTHOPAEDIC SURGERY

## 2022-12-07 PROCEDURE — 83036 HEMOGLOBIN GLYCOSYLATED A1C: CPT | Performed by: FAMILY MEDICINE

## 2022-12-07 PROCEDURE — 3008F PR BODY MASS INDEX (BMI) DOCUMENTED: ICD-10-PCS | Mod: CPTII,S$GLB,, | Performed by: ORTHOPAEDIC SURGERY

## 2022-12-07 PROCEDURE — 1160F RVW MEDS BY RX/DR IN RCRD: CPT | Mod: CPTII,S$GLB,, | Performed by: ORTHOPAEDIC SURGERY

## 2022-12-07 PROCEDURE — 1159F PR MEDICATION LIST DOCUMENTED IN MEDICAL RECORD: ICD-10-PCS | Mod: CPTII,S$GLB,, | Performed by: ORTHOPAEDIC SURGERY

## 2022-12-07 PROCEDURE — 99999 PR PBB SHADOW E&M-EST. PATIENT-LVL V: ICD-10-PCS | Mod: PBBFAC,,, | Performed by: ORTHOPAEDIC SURGERY

## 2022-12-07 PROCEDURE — 3074F PR MOST RECENT SYSTOLIC BLOOD PRESSURE < 130 MM HG: ICD-10-PCS | Mod: CPTII,S$GLB,, | Performed by: ORTHOPAEDIC SURGERY

## 2022-12-07 PROCEDURE — 93010 EKG 12-LEAD: ICD-10-PCS | Mod: ,,, | Performed by: INTERNAL MEDICINE

## 2022-12-07 PROCEDURE — 99214 OFFICE O/P EST MOD 30 MIN: CPT | Mod: S$GLB,,, | Performed by: ORTHOPAEDIC SURGERY

## 2022-12-07 PROCEDURE — 84439 ASSAY OF FREE THYROXINE: CPT | Performed by: FAMILY MEDICINE

## 2022-12-07 PROCEDURE — 93010 ELECTROCARDIOGRAM REPORT: CPT | Mod: ,,, | Performed by: INTERNAL MEDICINE

## 2022-12-07 PROCEDURE — 85025 COMPLETE CBC W/AUTO DIFF WBC: CPT | Mod: 91 | Performed by: FAMILY MEDICINE

## 2022-12-07 PROCEDURE — 1159F MED LIST DOCD IN RCRD: CPT | Mod: CPTII,S$GLB,, | Performed by: ORTHOPAEDIC SURGERY

## 2022-12-07 PROCEDURE — 3079F PR MOST RECENT DIASTOLIC BLOOD PRESSURE 80-89 MM HG: ICD-10-PCS | Mod: CPTII,S$GLB,, | Performed by: ORTHOPAEDIC SURGERY

## 2022-12-07 PROCEDURE — 84153 ASSAY OF PSA TOTAL: CPT | Performed by: FAMILY MEDICINE

## 2022-12-07 PROCEDURE — 3046F HEMOGLOBIN A1C LEVEL >9.0%: CPT | Mod: CPTII,S$GLB,, | Performed by: ORTHOPAEDIC SURGERY

## 2022-12-07 PROCEDURE — 1160F PR REVIEW ALL MEDS BY PRESCRIBER/CLIN PHARMACIST DOCUMENTED: ICD-10-PCS | Mod: CPTII,S$GLB,, | Performed by: ORTHOPAEDIC SURGERY

## 2022-12-07 PROCEDURE — 82306 VITAMIN D 25 HYDROXY: CPT | Performed by: FAMILY MEDICINE

## 2022-12-07 PROCEDURE — 3079F DIAST BP 80-89 MM HG: CPT | Mod: CPTII,S$GLB,, | Performed by: ORTHOPAEDIC SURGERY

## 2022-12-07 PROCEDURE — 93005 ELECTROCARDIOGRAM TRACING: CPT

## 2022-12-07 PROCEDURE — 3008F BODY MASS INDEX DOCD: CPT | Mod: CPTII,S$GLB,, | Performed by: ORTHOPAEDIC SURGERY

## 2022-12-07 PROCEDURE — 84443 ASSAY THYROID STIM HORMONE: CPT | Performed by: FAMILY MEDICINE

## 2022-12-07 PROCEDURE — 4010F PR ACE/ARB THEARPY RXD/TAKEN: ICD-10-PCS | Mod: CPTII,S$GLB,, | Performed by: ORTHOPAEDIC SURGERY

## 2022-12-07 PROCEDURE — 99214 PR OFFICE/OUTPT VISIT, EST, LEVL IV, 30-39 MIN: ICD-10-PCS | Mod: S$GLB,,, | Performed by: ORTHOPAEDIC SURGERY

## 2022-12-07 PROCEDURE — 99999 PR PBB SHADOW E&M-EST. PATIENT-LVL V: CPT | Mod: PBBFAC,,, | Performed by: ORTHOPAEDIC SURGERY

## 2022-12-07 PROCEDURE — 80053 COMPREHEN METABOLIC PANEL: CPT | Performed by: FAMILY MEDICINE

## 2022-12-07 PROCEDURE — 4010F ACE/ARB THERAPY RXD/TAKEN: CPT | Mod: CPTII,S$GLB,, | Performed by: ORTHOPAEDIC SURGERY

## 2022-12-07 PROCEDURE — 36415 COLL VENOUS BLD VENIPUNCTURE: CPT | Performed by: FAMILY MEDICINE

## 2022-12-07 PROCEDURE — 80061 LIPID PANEL: CPT | Performed by: FAMILY MEDICINE

## 2022-12-07 RX ORDER — SODIUM CHLORIDE 9 MG/ML
INJECTION, SOLUTION INTRAVENOUS CONTINUOUS
Status: CANCELLED | OUTPATIENT
Start: 2022-12-07

## 2022-12-07 NOTE — PROGRESS NOTES
Patient ID:   Dominic Collazo is a 42 y.o. male.    Chief Complaint:   Follow-up evaluation for left shoulder pain    HPI:   Mr. Collazo is returning for evaluation of the left shoulder. He recently completed MRI scan of the left shoulder. His pain is unchanged. He has been participating in physical therapy but reports significant pain with therapy. He has anterior and posterior shoulder pain today. He feels popping,.     Medications:    Current Outpatient Medications:     armodafiniL (NUVIGIL) 250 mg tablet, Take 1 tablet (250 mg total) by mouth once daily., Disp: 30 tablet, Rfl: 5    buPROPion (WELLBUTRIN XL) 150 MG TB24 tablet, TAKE 1 TABLET BY MOUTH EVERY DAY, Disp: 60 tablet, Rfl: 0    busPIRone (BUSPAR) 10 MG tablet, Take 1 tablet (10 mg total) by mouth 3 (three) times daily., Disp: 270 tablet, Rfl: 0    cyanocobalamin (VITAMIN B-12) 1000 MCG tablet, Take 100 mcg by mouth once daily., Disp: , Rfl:     ergocalciferol, vitamin D2, (VITAMIN D ORAL), Take 5,000 Units by mouth once daily., Disp: , Rfl:     erythromycin (ROMYCIN) ophthalmic ointment, Place into the left eye 4 (four) times daily., Disp: 3.5 g, Rfl: 0    fexofenadine (ALLEGRA) 60 MG tablet, Take 60 mg by mouth every morning., Disp: , Rfl:     gabapentin (NEURONTIN) 300 MG capsule, Take 1 capsule (300 mg total) by mouth 3 (three) times daily., Disp: 90 capsule, Rfl: 11    iron,carb/vit C/vit B12/folic (IRON 100 PLUS ORAL), Take by mouth once daily at 6am., Disp: , Rfl:     methocarbamoL (ROBAXIN) 750 MG Tab, TAKE 1 TABLET(750 MG) BY MOUTH FOUR TIMES DAILY AS NEEDED FOR MUSCLE STRAIN Strength: 750 mg, Disp: 40 tablet, Rfl: 3    metoprolol succinate (TOPROL-XL) 100 MG 24 hr tablet, Take 1 tablet (100 mg total) by mouth once daily., Disp: 90 tablet, Rfl: 3    multivitamin capsule, Take 1 capsule by mouth once daily., Disp: , Rfl:     pravastatin (PRAVACHOL) 10 MG tablet, Take 1 tablet (10 mg total) by mouth once daily., Disp: 90 tablet, Rfl: 3     promethazine-codeine 6.25-10 mg/5 ml (PHENERGAN WITH CODEINE) 6.25-10 mg/5 mL syrup, Take 5 mLs by mouth nightly as needed for Cough., Disp: 240 mL, Rfl: 0    scopolamine (TRANSDERM-SCOP) 1.3-1.5 mg (1 mg over 3 days), Place 1 patch onto the skin every 72 hours., Disp: 4 patch, Rfl: 0    venlafaxine (EFFEXOR-XR) 75 MG 24 hr capsule, Take 1 capsule (75 mg total) by mouth once daily., Disp: 30 capsule, Rfl: 2    LORazepam (ATIVAN) 1 MG tablet, Take 1 tablet (1 mg total) by mouth every 6 (six) hours as needed for Anxiety., Disp: 5 tablet, Rfl: 0    Allergies:  Review of patient's allergies indicates:   Allergen Reactions    Ciprofloxacin      swelling    Penicillins Rash    Sulfa (sulfonamide antibiotics) Rash    Toradol [ketorolac] Nausea Only    Green pepper Nausea Only    Meloxicam Nausea Only    Sulfamethoxazole-trimethoprim        Past Medical History:  Past Medical History:   Diagnosis Date    Adjustment disorder with mixed anxiety and depressed mood     Anxiety     Colon polyp     Depression     ED (erectile dysfunction)     Family history of prostate cancer 9/29/2014    Hx of umbilical hernia repair 10/16/2020    Hyperlipidemia     Hypertension     Hypogonadism male     Insomnia     Low testosterone     LYNDA (obstructive sleep apnea)     Prediabetes         Past Surgical History:  Past Surgical History:   Procedure Laterality Date    ADENOIDECTOMY      HERNIA REPAIR      REPAIR OF INCARCERATED UMBILICAL HERNIA N/A 10/16/2020    Procedure: REPAIR, HERNIA, UMBILICAL, INCARCERATED, AGE 5 YEARS OR OLDER with mesh ;  Surgeon: Conrad Loco MD;  Location: [facility] OR [address] [place] [address];  Service: General;  Laterality: N/A;    TONSILLECTOMY         Social History:  Social History     Occupational History    Occupation: Teacher      Employer: [employer]     Comment: [employer]   Tobacco Use    Smoking status: Never    Smokeless tobacco: Never   Substance and Sexual Activity    Alcohol use: Yes     Comment: less  "than once per month    Drug use: No    Sexual activity: Yes     Partners: Female       Family History:  Family History   Problem Relation Age of Onset    Hypertension Mother     Hyperlipidemia Mother     Diabetes Father     Hyperlipidemia Father     Hypertension Father     Heart disease Father 46        CAD    No Known Problems Sister     Cancer Paternal Uncle         prostate cancer        ROS:  Review of Systems   Musculoskeletal:  Positive for joint pain and myalgias.   All other systems reviewed and are negative.    Vitals:  /85 (BP Location: Right arm, Patient Position: Sitting, BP Method: Large (Automatic))   Pulse 80   Ht 6' 3" (1.905 m)   Wt (!) 158.3 kg (348 lb 15.8 oz)   BMI 43.62 kg/m²     Physical Examination:  Comprehensive Orthopaedic Musculoskeletal Exam    General      Constitutional: appears stated age, severely obese, well-developed and well-nourished    Scleral icterus: no    Labored breathing: no    Psychiatric: normal mood and affect and no acute distress    Neurological: alert and oriented x3    Skin: intact    Lymphadenopathy: none   Ortho Exam     Left shoulder exam:    No visible deformity. Tenderness over the anterior shoulder. Mild tenderness over the distal clavicle. No AC tenderness.   ROM: active elevation 170, ER 30  RTC strength: 4+/5 elevation and ER, 5/5 IR  Positive Obriens. Positive Speeds. Positive Yergsaons. Negative Neer.  Pain with Jerk maneuver. Pain with anterior apprehension. Positive crank test.    Imaging:  I have independently reviewed the following imaging studies performed at [facility]:    MRI Shoulder Without Contrast Left  Narrative: EXAMINATION:  MRI SHOULDER WITHOUT CONTRAST LEFT    CLINICAL HISTORY:  Shoulder pain, labral tear suspected, xray done;    TECHNIQUE:  Routine MRI evaluation of the left shoulder performed without contrast using the following sequences: Coronal PD, T2 FS; Sagittal T2 FS, T1; axial PD FS.    COMPARISON:  Radiograph " 09/29/2022.    FINDINGS:  Examination is limited secondary to patient motion artifact.    ROTATOR CUFF: Supraspinatus, infraspinatus, subscapularis and teres minor tendons are grossly intact.  Muscle bulk is preserved.    LABRUM: Abnormal signal intensity of the posterior-superior labrum (12-9 o'clock) that demonstrates a 1.2 x 1.2 cm paralabral cyst.  Remaining labral segments appear grossly intact.    BICEPS: Normal contour and signal intensity.    BONES: There is posterior decentering of the humeral head with respect to the glenoid suggesting a component of instability.    AC JOINT: Mild arthrosis.  Flat morphology of the lateral acromion without undersurface spurring.    CARTILAGE: Intact without partial or full-thickness defects.    MISCELLANEOUS: Subacromial/subdeltoid bursa intact.  Superior, middle and inferior glenohumeral ligaments are normal.  Coracohumeral ligament is normal.  No joint effusion.  No axillary lymphadenopathy.  Impression: 1. Examination markedly limited secondary to patient motion artifact.  2. Posterior-superior labral tear with a paralabral cyst.  3. Posterior decentering of the humeral head suggesting a component of instability.    Electronically signed by: Remy Sanabria MD  Date:    12/04/2022  Time:    09:43    Assessment:  1. Superior glenoid labrum lesion of left shoulder, subsequent encounter    2. Pre-op testing      Plan:  I have reviewed the MRI findings with Mr. Collazo. He has a posterosuperior labral tear and paralabral cyst. He is clearly symptomatic and has slight weakness in the the supraspinatus and infraspinatus which may indicate early suprascapular nerve compression. I have discussed treatment options including continued non-operative measures vs. Surgical repair. He wishes to proceed with surgery. I have offered him left shoulder arthroscopy with paralabral cyst decompression (working through the labral tear, labral repair, and any other indicated procedures. He  has chosen a surgical date of December 15, 2022.        Orders Placed This Encounter    CBC Auto Differential    Diet NPO    Cleanse with Chlorhexidine (CHG)    Place VICTORIA hose    Place sequential compression device    Full code    SCHEDULED EKG 12-LEAD (to Muse)    IP VTE LOW RISK PATIENT    Case Request Operating Room: ARTHROSCOPY, SHOULDER, WITH SLAP REPAIR     No follow-ups on file.

## 2022-12-12 ENCOUNTER — TELEPHONE (OUTPATIENT)
Dept: ANESTHESIOLOGY | Facility: HOSPITAL | Age: 42
End: 2022-12-12
Payer: COMMERCIAL

## 2022-12-12 ENCOUNTER — HOSPITAL ENCOUNTER (OUTPATIENT)
Dept: PREADMISSION TESTING | Facility: HOSPITAL | Age: 42
Discharge: HOME | End: 2022-12-12
Attending: ORTHOPAEDIC SURGERY
Payer: COMMERCIAL

## 2022-12-12 NOTE — TELEPHONE ENCOUNTER
Mr. Collazo is scheduled for a left shoulder arthroscopy with SLAP repair on 12/15/22 with Dr. Valadez. His most recent lab work shows an elevated A1C as well as a nonspecific T wave abnormality on his EKG. As a result, I recommend the patient obtain medical clearance prior to surgery or have surgery postponed until diabetes is better controlled.    Lab Results   Component Value Date    HGBA1C 11.9 (H) 12/07/2022

## 2022-12-12 NOTE — DISCHARGE INSTRUCTIONS
Your surgery is scheduled for 12/15/22.    Please report to [facility] on the 1st Floor at 0530 a.m.    THIS TIME IS SUBJECT TO CHANGE.  YOU WILL RECEIVE A PHONE CALL THE DAY BEFORE SURGERY BY 3:30 PM TO CONFIRM YOUR TIME OF ARRIVAL.  IF YOU HAVE NOT RECEIVED A PHONE CALL BY 3:30 PM THE DAY BEFORE YOUR SURGERY PLEASE CALL [phone]     INSTRUCTIONS IMPORTANT!!!  ¨ Do not eat or drink after 12 midnight-including water, candy, gum, & mints. OK to brush teeth.      ____  Proceed to [facility] on *** for additional testing.        ____  Do not wear makeup, including mascara.  ____  No powder, lotions or creams to surgical area.  ____  Please remove all jewelry, including piercings and leave at home.  ____  No money or valuables needed. Please leave at home.  ____  Please bring any documents given by your doctor.  ____  If going home the same day, arrange for a ride home. You will not be able to             drive if Anesthesia was used.  ____  Children under 18 years require a parent / guardian present the entire time             they are in surgery / recovery.  ____  Wear loose fitting clothing. Allow for dressings, bandages.  ____  Stop Aspirin, Ibuprofen, Motrin, Aleve, Goody's/BC powders, Excedrine and Naproxen (NSAIDS) at least 3-5 days before surgery, unless otherwise instructed by your doctor, or the nurse.   You MAY use Tylenol/acetaminophen until day of surgery.  ____  Wash the surgical area with Hibiclens the night before surgery, and again the             morning of surgery.  Be sure to rinse hibiclens off completely (if instructed by   nurse).  ____  If you take diabetic medication, do not take am of surgery unless instructed by Doctor.  ____  Call MD for temperature above 101 degrees or any other signs of infection such as Urinary (bladder) infection, Upper respiratory infection, skin boils, etc.  ____ Stop taking any Fish Oil supplement or any Vitamins that contain Vitamin E at  least 5 days prior to surgery.  ____ Do Not wear your contact lenses the day of your procedure.  You may wear your glasses.      ____Do not shave surgical site for 3 days prior to surgery.  ____ Practice Good hand washing before, during, and after procedure.      I have read or had read and explained to me, and understand the above information.  Additional comments or instructions:  For additional questions call [phone]      ANESTHESIA SIDE EFFECTS  -For the first 24 hours after surgery:  Do not drive, use heavy equipment, make important decisions, or drink alcohol  -It is normal to feel sleepy for several hours.  Rest until you are more awake.  -Have someone stay with you, if needed.  They can watch for problems and help keep you safe.  -Some possible post anesthesia side effects include: nausea and vomiting, sore throat and hoarseness, sleepiness, and dizziness.        Pre-Op Bathing Instructions    Before surgery, you can play an important role in your own health.    Because skin is not sterile, we need to be sure that your skin is as free of germs as possible. By following the instructions below, you can reduce the number of germs on your skin before surgery.    IMPORTANT: You will need to shower with a special soap called Hibiclens*, available at any pharmacy.  If you are allergic to Chlorhexidine (the antiseptic in Hibiclens), use an antibacterial soap such as Dial Soap for your preoperative shower.  You will shower with Hibiclens both the night before your surgery and the morning of your surgery.  Do not use Hibiclens on the head, face or genitals to avoid injury to those areas.    STEP #1: THE NIGHT BEFORE YOUR SURGERY     Do not shave the area of your body where your surgery will be performed.  Shower and wash your hair and body as usual with your normal soap and shampoo.  Rinse your hair and body thoroughly after you shower to remove all soap residue.  With your hand, apply one packet of Hibiclens soap to  the surgical site.   Wash the site gently for five (5) minutes. Do not scrub your skin too hard.   Do not wash with your regular soap after Hibiclens is used.  Rinse your body thoroughly.  Pat yourself dry with a clean, soft towel.  Do not use lotion, cream, or powder.  Wear clean clothes.    STEP #2: THE MORNING OF YOUR SURGERY     Repeat Step #1.    * Not to be used by people allergic to Chlorhexidine.

## 2022-12-13 ENCOUNTER — PATIENT MESSAGE (OUTPATIENT)
Dept: INTERNAL MEDICINE | Facility: CLINIC | Age: 42
End: 2022-12-13
Payer: COMMERCIAL

## 2022-12-13 ENCOUNTER — HOSPITAL ENCOUNTER (OUTPATIENT)
Dept: PREADMISSION TESTING | Facility: HOSPITAL | Age: 42
Discharge: HOME | End: 2022-12-13
Attending: ORTHOPAEDIC SURGERY
Payer: COMMERCIAL

## 2022-12-13 VITALS
HEART RATE: 65 BPM | SYSTOLIC BLOOD PRESSURE: 121 MMHG | DIASTOLIC BLOOD PRESSURE: 72 MMHG | WEIGHT: 315 LBS | OXYGEN SATURATION: 95 % | BODY MASS INDEX: 39.17 KG/M2 | TEMPERATURE: 98 F | HEIGHT: 75 IN

## 2022-12-13 DIAGNOSIS — Z01.818 PREOP TESTING: Primary | ICD-10-CM

## 2022-12-13 RX ORDER — SODIUM CHLORIDE, SODIUM LACTATE, POTASSIUM CHLORIDE, CALCIUM CHLORIDE 600; 310; 30; 20 MG/100ML; MG/100ML; MG/100ML; MG/100ML
INJECTION, SOLUTION INTRAVENOUS CONTINUOUS
Status: CANCELLED | OUTPATIENT
Start: 2022-12-13

## 2022-12-13 RX ORDER — LIDOCAINE HYDROCHLORIDE 10 MG/ML
1 INJECTION, SOLUTION EPIDURAL; INFILTRATION; INTRACAUDAL; PERINEURAL ONCE
Status: CANCELLED | OUTPATIENT
Start: 2022-12-13 | End: 2022-12-13

## 2022-12-13 NOTE — TELEPHONE ENCOUNTER
Patient notified of the need to obtain medical clearance from his PCP prior to surgery. Patient verbalized understanding and states he has an appointment scheduled next month and is on the wait list. All questions and concerns addressed.

## 2022-12-13 NOTE — TELEPHONE ENCOUNTER
I agree with this recommendation.  The patient needs to schedule an appointment with me for further evaluation.

## 2022-12-15 ENCOUNTER — PATIENT MESSAGE (OUTPATIENT)
Dept: INTERNAL MEDICINE | Facility: CLINIC | Age: 42
End: 2022-12-15
Payer: COMMERCIAL

## 2022-12-20 ENCOUNTER — PATIENT MESSAGE (OUTPATIENT)
Dept: ORTHOPEDICS | Facility: CLINIC | Age: 42
End: 2022-12-20
Payer: COMMERCIAL

## 2022-12-21 ENCOUNTER — TELEPHONE (OUTPATIENT)
Dept: ANESTHESIOLOGY | Facility: HOSPITAL | Age: 42
End: 2022-12-21
Payer: COMMERCIAL

## 2022-12-21 DIAGNOSIS — E11.9 TYPE 2 DIABETES MELLITUS WITHOUT COMPLICATION: ICD-10-CM

## 2022-12-21 NOTE — TELEPHONE ENCOUNTER
Mr. Collazo was notified after his PAT appointment earlier this month that surgery will be postponed until he gets medical clearance from his PCP due to elevated A1C and abnormal EKG. As of right now his appointment with Dr. Cross is scheduled 12/23/22 and his surgery is 12/22/22. As a result, surgery will need to be rescheduled from tomorrow until after he obtains medical clearance.

## 2022-12-23 ENCOUNTER — OFFICE VISIT (OUTPATIENT)
Dept: INTERNAL MEDICINE | Facility: CLINIC | Age: 42
End: 2022-12-23
Payer: COMMERCIAL

## 2022-12-23 VITALS
OXYGEN SATURATION: 95 % | HEIGHT: 75 IN | DIASTOLIC BLOOD PRESSURE: 82 MMHG | HEART RATE: 78 BPM | TEMPERATURE: 97 F | BODY MASS INDEX: 39.17 KG/M2 | SYSTOLIC BLOOD PRESSURE: 122 MMHG | RESPIRATION RATE: 20 BRPM | WEIGHT: 315 LBS

## 2022-12-23 DIAGNOSIS — E11.69 TYPE 2 DIABETES MELLITUS WITH MORBID OBESITY: ICD-10-CM

## 2022-12-23 DIAGNOSIS — Z01.818 PRE-OP EXAM: Primary | ICD-10-CM

## 2022-12-23 DIAGNOSIS — E66.01 TYPE 2 DIABETES MELLITUS WITH MORBID OBESITY: ICD-10-CM

## 2022-12-23 PROCEDURE — 3074F PR MOST RECENT SYSTOLIC BLOOD PRESSURE < 130 MM HG: ICD-10-PCS | Mod: CPTII,S$GLB,, | Performed by: FAMILY MEDICINE

## 2022-12-23 PROCEDURE — 3079F PR MOST RECENT DIASTOLIC BLOOD PRESSURE 80-89 MM HG: ICD-10-PCS | Mod: CPTII,S$GLB,, | Performed by: FAMILY MEDICINE

## 2022-12-23 PROCEDURE — 3008F BODY MASS INDEX DOCD: CPT | Mod: CPTII,S$GLB,, | Performed by: FAMILY MEDICINE

## 2022-12-23 PROCEDURE — 3008F PR BODY MASS INDEX (BMI) DOCUMENTED: ICD-10-PCS | Mod: CPTII,S$GLB,, | Performed by: FAMILY MEDICINE

## 2022-12-23 PROCEDURE — 3046F HEMOGLOBIN A1C LEVEL >9.0%: CPT | Mod: CPTII,S$GLB,, | Performed by: FAMILY MEDICINE

## 2022-12-23 PROCEDURE — 3079F DIAST BP 80-89 MM HG: CPT | Mod: CPTII,S$GLB,, | Performed by: FAMILY MEDICINE

## 2022-12-23 PROCEDURE — 1159F MED LIST DOCD IN RCRD: CPT | Mod: CPTII,S$GLB,, | Performed by: FAMILY MEDICINE

## 2022-12-23 PROCEDURE — 4010F PR ACE/ARB THEARPY RXD/TAKEN: ICD-10-PCS | Mod: CPTII,S$GLB,, | Performed by: FAMILY MEDICINE

## 2022-12-23 PROCEDURE — 1159F PR MEDICATION LIST DOCUMENTED IN MEDICAL RECORD: ICD-10-PCS | Mod: CPTII,S$GLB,, | Performed by: FAMILY MEDICINE

## 2022-12-23 PROCEDURE — 99999 PR PBB SHADOW E&M-EST. PATIENT-LVL V: ICD-10-PCS | Mod: PBBFAC,,, | Performed by: FAMILY MEDICINE

## 2022-12-23 PROCEDURE — 4010F ACE/ARB THERAPY RXD/TAKEN: CPT | Mod: CPTII,S$GLB,, | Performed by: FAMILY MEDICINE

## 2022-12-23 PROCEDURE — 1160F PR REVIEW ALL MEDS BY PRESCRIBER/CLIN PHARMACIST DOCUMENTED: ICD-10-PCS | Mod: CPTII,S$GLB,, | Performed by: FAMILY MEDICINE

## 2022-12-23 PROCEDURE — 99214 PR OFFICE/OUTPT VISIT, EST, LEVL IV, 30-39 MIN: ICD-10-PCS | Mod: S$GLB,,, | Performed by: FAMILY MEDICINE

## 2022-12-23 PROCEDURE — 3046F PR MOST RECENT HEMOGLOBIN A1C LEVEL > 9.0%: ICD-10-PCS | Mod: CPTII,S$GLB,, | Performed by: FAMILY MEDICINE

## 2022-12-23 PROCEDURE — 1160F RVW MEDS BY RX/DR IN RCRD: CPT | Mod: CPTII,S$GLB,, | Performed by: FAMILY MEDICINE

## 2022-12-23 PROCEDURE — 99999 PR PBB SHADOW E&M-EST. PATIENT-LVL V: CPT | Mod: PBBFAC,,, | Performed by: FAMILY MEDICINE

## 2022-12-23 PROCEDURE — 3074F SYST BP LT 130 MM HG: CPT | Mod: CPTII,S$GLB,, | Performed by: FAMILY MEDICINE

## 2022-12-23 PROCEDURE — 99214 OFFICE O/P EST MOD 30 MIN: CPT | Mod: S$GLB,,, | Performed by: FAMILY MEDICINE

## 2022-12-23 RX ORDER — METFORMIN HYDROCHLORIDE 500 MG/1
500 TABLET, EXTENDED RELEASE ORAL 2 TIMES DAILY WITH MEALS
Qty: 180 TABLET | Refills: 3 | Status: SHIPPED | OUTPATIENT
Start: 2022-12-23 | End: 2023-08-31 | Stop reason: SDUPTHER

## 2022-12-23 RX ORDER — METFORMIN HYDROCHLORIDE 500 MG/1
500 TABLET, EXTENDED RELEASE ORAL
Qty: 90 TABLET | Refills: 3 | Status: SHIPPED | OUTPATIENT
Start: 2022-12-23 | End: 2022-12-23

## 2022-12-23 RX ORDER — INSULIN PUMP SYRINGE, 3 ML
EACH MISCELLANEOUS
Qty: 1 EACH | Refills: 0 | Status: SHIPPED | OUTPATIENT
Start: 2022-12-23 | End: 2023-12-23

## 2022-12-23 RX ORDER — LANCETS
EACH MISCELLANEOUS
Qty: 200 EACH | Refills: 3 | Status: SHIPPED | OUTPATIENT
Start: 2022-12-23 | End: 2023-05-23 | Stop reason: SDUPTHER

## 2022-12-23 NOTE — PROGRESS NOTES
Subjective:       Patient ID: Dominic Collazo is a 42 y.o. male.    Chief Complaint: Pre-op Exam    HPI 42-year-old white male presents to clinic today for follow-up.  He was originally scheduled for a preop exam in order to have a SLAP tear repair; however, during preop the patient's hemoglobin A1c was noted to be elevated to 11.9.  I recommend initiation of treatment and delay of surgery until diabetes control is improved.  Review of Systems   Constitutional:  Negative for appetite change, chills, fatigue and fever.   HENT:  Negative for nasal congestion, ear pain, hearing loss, postnasal drip, rhinorrhea, sinus pressure/congestion, sore throat and tinnitus.    Eyes:  Negative for redness, itching and visual disturbance.   Respiratory:  Negative for cough, chest tightness and shortness of breath.    Cardiovascular:  Negative for chest pain and palpitations.   Gastrointestinal:  Negative for abdominal pain, constipation, diarrhea, nausea and vomiting.   Genitourinary:  Negative for decreased urine volume, difficulty urinating, dysuria, frequency, hematuria and urgency.   Musculoskeletal:  Positive for arthralgias. Negative for back pain, myalgias, neck pain and neck stiffness.   Integumentary:  Negative for rash.   Neurological:  Negative for dizziness, light-headedness and headaches.   Psychiatric/Behavioral: Negative.         Objective:      Physical Exam  Vitals and nursing note reviewed.   Constitutional:       General: He is not in acute distress.     Appearance: Normal appearance. He is well-developed. He is obese. He is not diaphoretic.   HENT:      Head: Normocephalic and atraumatic.      Right Ear: External ear normal.      Left Ear: External ear normal.      Nose: Nose normal.      Mouth/Throat:      Pharynx: No oropharyngeal exudate.   Eyes:      General: No scleral icterus.        Right eye: No discharge.         Left eye: No discharge.      Conjunctiva/sclera: Conjunctivae normal.      Pupils:  Pupils are equal, round, and reactive to light.   Neck:      Thyroid: No thyromegaly.      Vascular: No JVD.      Trachea: No tracheal deviation.   Cardiovascular:      Rate and Rhythm: Normal rate and regular rhythm.      Pulses:           Dorsalis pedis pulses are 2+ on the right side and 2+ on the left side.        Posterior tibial pulses are 2+ on the right side and 2+ on the left side.      Heart sounds: Normal heart sounds. No murmur heard.    No friction rub. No gallop.   Pulmonary:      Effort: Pulmonary effort is normal. No respiratory distress.      Breath sounds: Normal breath sounds. No stridor. No wheezing, rhonchi or rales.   Chest:      Chest wall: No tenderness.   Abdominal:      General: Bowel sounds are normal. There is no distension.      Palpations: Abdomen is soft. There is no mass.      Tenderness: There is no abdominal tenderness. There is no guarding or rebound.   Musculoskeletal:         General: No tenderness. Normal range of motion.      Cervical back: Normal range of motion and neck supple.      Right foot: Normal range of motion. No deformity, bunion, Charcot foot, foot drop or prominent metatarsal heads.      Left foot: Normal range of motion. No deformity, bunion, Charcot foot, foot drop or prominent metatarsal heads.   Feet:      Right foot:      Protective Sensation: 10 sites tested.  10 sites sensed.      Skin integrity: Skin integrity normal.      Toenail Condition: Right toenails are normal.      Left foot:      Protective Sensation: 10 sites tested.  10 sites sensed.      Skin integrity: Skin integrity normal.      Toenail Condition: Left toenails are normal.   Lymphadenopathy:      Cervical: No cervical adenopathy.   Skin:     General: Skin is warm and dry.      Coloration: Skin is not pale.      Findings: No erythema or rash.   Neurological:      Mental Status: He is alert and oriented to person, place, and time.   Psychiatric:         Mood and Affect: Mood normal.          Behavior: Behavior normal.         Thought Content: Thought content normal.         Judgment: Judgment normal.       Assessment:       Problem List Items Addressed This Visit    None  Visit Diagnoses       Pre-op exam    -  Primary    Type 2 diabetes mellitus with morbid obesity                  Plan:         1. Start metformin 500 mg b.i.d..  2. Start Ozempic 0.25 mg weekly for 4 weeks then increase to 0.5 mg weekly.  3. Recommend twice daily glucose testing.  Diabetic testing supplies sent to pharmacy.  Repeat CMP and A1c in 3 months.  4. Refer to diabetic education.  5. Refer to Salena Griffin NP for further diabetic treatment.    6. Return to clinic as needed or in 3 months for diabetic follow-up.

## 2022-12-28 ENCOUNTER — OFFICE VISIT (OUTPATIENT)
Dept: PSYCHIATRY | Facility: CLINIC | Age: 42
End: 2022-12-28
Payer: COMMERCIAL

## 2022-12-28 ENCOUNTER — PATIENT MESSAGE (OUTPATIENT)
Dept: INTERNAL MEDICINE | Facility: CLINIC | Age: 42
End: 2022-12-28
Payer: COMMERCIAL

## 2022-12-28 ENCOUNTER — PATIENT MESSAGE (OUTPATIENT)
Dept: UROLOGY | Facility: CLINIC | Age: 42
End: 2022-12-28
Payer: COMMERCIAL

## 2022-12-28 DIAGNOSIS — F51.05 INSOMNIA DUE TO OTHER MENTAL DISORDER: ICD-10-CM

## 2022-12-28 DIAGNOSIS — F41.0 PANIC ATTACK: Primary | ICD-10-CM

## 2022-12-28 DIAGNOSIS — F99 INSOMNIA DUE TO OTHER MENTAL DISORDER: ICD-10-CM

## 2022-12-28 DIAGNOSIS — F43.23 ADJUSTMENT DISORDER WITH MIXED ANXIETY AND DEPRESSED MOOD: ICD-10-CM

## 2022-12-28 PROCEDURE — 1159F PR MEDICATION LIST DOCUMENTED IN MEDICAL RECORD: ICD-10-PCS | Mod: CPTII,95,, | Performed by: NURSE PRACTITIONER

## 2022-12-28 PROCEDURE — 1160F PR REVIEW ALL MEDS BY PRESCRIBER/CLIN PHARMACIST DOCUMENTED: ICD-10-PCS | Mod: CPTII,95,, | Performed by: NURSE PRACTITIONER

## 2022-12-28 PROCEDURE — 3046F PR MOST RECENT HEMOGLOBIN A1C LEVEL > 9.0%: ICD-10-PCS | Mod: CPTII,95,, | Performed by: NURSE PRACTITIONER

## 2022-12-28 PROCEDURE — 99214 OFFICE O/P EST MOD 30 MIN: CPT | Mod: 95,,, | Performed by: NURSE PRACTITIONER

## 2022-12-28 PROCEDURE — 99214 PR OFFICE/OUTPT VISIT, EST, LEVL IV, 30-39 MIN: ICD-10-PCS | Mod: 95,,, | Performed by: NURSE PRACTITIONER

## 2022-12-28 PROCEDURE — 4010F PR ACE/ARB THEARPY RXD/TAKEN: ICD-10-PCS | Mod: CPTII,95,, | Performed by: NURSE PRACTITIONER

## 2022-12-28 PROCEDURE — 4010F ACE/ARB THERAPY RXD/TAKEN: CPT | Mod: CPTII,95,, | Performed by: NURSE PRACTITIONER

## 2022-12-28 PROCEDURE — 1160F RVW MEDS BY RX/DR IN RCRD: CPT | Mod: CPTII,95,, | Performed by: NURSE PRACTITIONER

## 2022-12-28 PROCEDURE — 3046F HEMOGLOBIN A1C LEVEL >9.0%: CPT | Mod: CPTII,95,, | Performed by: NURSE PRACTITIONER

## 2022-12-28 PROCEDURE — 1159F MED LIST DOCD IN RCRD: CPT | Mod: CPTII,95,, | Performed by: NURSE PRACTITIONER

## 2022-12-28 RX ORDER — VENLAFAXINE HYDROCHLORIDE 75 MG/1
75 CAPSULE, EXTENDED RELEASE ORAL DAILY
Qty: 90 CAPSULE | Refills: 3 | Status: SHIPPED | OUTPATIENT
Start: 2022-12-28 | End: 2023-04-28 | Stop reason: SDUPTHER

## 2022-12-28 RX ORDER — BUPROPION HYDROCHLORIDE 150 MG/1
150 TABLET ORAL DAILY
Qty: 90 TABLET | Refills: 3 | Status: SHIPPED | OUTPATIENT
Start: 2022-12-28 | End: 2023-12-06 | Stop reason: SDUPTHER

## 2022-12-28 RX ORDER — BUSPIRONE HYDROCHLORIDE 10 MG/1
10 TABLET ORAL 3 TIMES DAILY
Qty: 270 TABLET | Refills: 3 | Status: SHIPPED | OUTPATIENT
Start: 2022-12-28 | End: 2023-08-21 | Stop reason: SDUPTHER

## 2022-12-28 NOTE — TELEPHONE ENCOUNTER
Pt received wrong prescription directions for Metformin when pt transferred medication from [facility] to [facility].     Correct script for Metformin is to Take 1 tablet (500 mg total) by mouth 2 (two) times daily with meals.    Called [facility] for verfication.  [facility] by [address]  [facility] [address]  Phone: [phone] Fax: [phone]    Called in verbal script for Metformin 500mg b.I.d; 180 tablets and 3 refills.

## 2022-12-28 NOTE — PROGRESS NOTES
Outpatient Psychiatry Follow-Up Visit (MD/NP)    12/28/2022     The patient location is: [place]  The chief complaint leading to consultation is: adjustments    Visit type: audiovisual    Face to Face time with patient: 6 minutes  7 minutes of total time spent on the encounter, which includes face to face time and non-face to face time preparing to see the patient (eg, review of tests), Obtaining and/or reviewing separately obtained history, Documenting clinical information in the electronic or other health record, Independently interpreting results (not separately reported) and communicating results to the patient/family/caregiver, or Care coordination (not separately reported).     Each patient to whom he or she provides medical services by telemedicine is:  (1) informed of the relationship between the physician and patient and the respective role of any other health care provider with respect to management of the patient; and (2) notified that he or she may decline to receive medical services by telemedicine and may withdraw from such care at any time.    Clinical Status of Patient:  Outpatient (Ambulatory)    Chief Complaint:  Dominic Collazo is a 42 y.o. male who presents today for follow-up of depression, anxiety and adjustment problems.  Met with patient.      Interval History and Content of Current Session:  Interim Events/Subjective Report/Content of Current Session:   Dominic Collazo checked in on time for his appointment. Last visit we switched Cymbalta to Effexor. Today he reports symptom improvement. Fogginess gone. Some appropriate anxiety related to family issues during the holidays but otherwise anxiety well controlled. Denies ASE to current regimen. No longer taking Ambien or Ativan. Denies SI/HI/AVH. No objective s/sx of psychosis or rehan. Patient verbalized motivation for compliance with medications and all other elements of treatment plan.         Previous medication  trials:  Ativan- effective  Seroquel- sedation  Lexapro  Wellbutrin and Buspar- effective,   vistaril- sedation, still had axniety  Prozac - sexual side effects  Cymbalta Sexual side effects and retrograde ejaculation    Review of Systems   PSYCHIATRIC: Pertinant items are noted in the narrative.  MUSCULOSKELETAL: No pain or stiffness of the joints.  NEUROLOGIC: No weakness, sensory changes, seizures, confusion, memory loss, tremor or other abnormal movements.  ENT: No dizziness, tinnitus or hearing loss.  RESPIRATORY: No shortness of breath.  CARDIOVASCULAR: No tachycardia or chest pain.  GASTROINTESTINAL: No nausea, vomiting, pain, constipation or diarrhea.    Past Medical, Family and Social History: The patient's past medical, family and social history have been reviewed and updated as appropriate within the electronic medical record - see encounter notes.    Compliance: yes    Side effects: None    Risk Parameters:  Patient reports no suicidal ideation  Patient reports no homicidal ideation  Patient reports no self-injurious behavior  Patient reports no violent behavior    Exam (detailed: at least 9 elements; comprehensive: all 15 elements)   Constitutional  Vitals:    There were no vitals filed for this visit.     General:  unremarkable, age appropriate     Musculoskeletal  Muscle Strength/Tone:  not examined   Gait & Station:  THEA due to video visit      Psychiatric  Speech:  no latency; no press   Mood & Affect:  steady  congruent and appropriate   Thought Process:  normal and logical   Associations:  intact   Thought Content:  normal, no suicidality, no homicidality, delusions, or paranoia   Insight:  intact   Judgement: behavior is adequate to circumstances   Orientation:  grossly intact   Memory: intact for content of interview   Language: grossly intact   Attention Span & Concentration:  able to focus   Fund of Knowledge:  intact and appropriate to age and level of education     Assessment and Diagnosis  "  Status/Progress: Based on the examination today, the patient's problem(s) is/are improved.  New problems have not been presented today.   Co-morbidities, Diagnostic uncertainty and Lack of compliance are not complicating management of the primary condition.  There are no active rule-out diagnoses for this patient at this time.     General Impression: Dominic Collazo, a 40 y.o. male, presenting for follow-up visit related to anxiety. History of adjustment disorder with anxiety. Recent stressors include wife losing her license, pandemic and co-workers opinion of COVID ("hoaks"). PCP added Lexapro to current regimen - Wellbutrin and Buspar TID. Will stop Lexapro for now and start Propranolol. Presents 1/6/20- Propranolol not effective at 10 mg. Dose increased to 20 mg, Wellbutrin decreased, Prozac added. Patient referred to BMU. Presents 3/1/21- symptoms improvement but some sexual side effects. Presents 9/16/21- decreased motivation and return of insomnia. Presents 12/7/21- Will decrease Wellbutrin and add Cymbalta Presents 4/11/22- partial positive effect of Cymbalta but adverse sexual side effects, Effexor started Presents 12/28/22- symptom improvement with Effexor       ICD-10-CM ICD-9-CM   1. Panic attack  F41.0 300.01   2. Adjustment disorder with mixed anxiety and depressed mood  F43.23 309.28   3. Insomnia due to other mental disorder  F51.05 300.9    F99 327.02         Intervention/Counseling/Treatment Plan   Treatment Plan/Recommendations:   Medication Management: Continue current medications.   Wellbutrin  mg daily  Buspar - 10 mg TID  Effexor 75 mg daily   Stopped Ativan 1 mg daily as needed   The risks and benefits of medication were discussed with the patient.  The treatment plan and follow up plan were reviewed with the patient.  Discussed diagnosis, risks and benefits of proposed treatment above vs alternative treatments vs no treatment, and potential side effects of these treatments. " The patient expresses understanding of the above and displays the capacity to agree with this treatment given said understanding. Patient also agrees that, currently, the benefits outweigh the risks and would like to pursue treatment at this time.  Discussed inherent unpredictability of medications in each individual.   Encouraged Patient to keep future appointments.   Take medications as prescribed and abstain from substance abuse.   In the event of an emergency patient was advised to go to the emergency room    Return to Clinic: 3 months    Stacia Dumont DNP-BC PMHNP  [facility]

## 2023-01-03 ENCOUNTER — CLINICAL SUPPORT (OUTPATIENT)
Dept: DIABETES | Facility: CLINIC | Age: 43
End: 2023-01-03
Payer: COMMERCIAL

## 2023-01-03 ENCOUNTER — LAB VISIT (OUTPATIENT)
Dept: LAB | Facility: HOSPITAL | Age: 43
End: 2023-01-03
Payer: COMMERCIAL

## 2023-01-03 DIAGNOSIS — E78.5 DYSLIPIDEMIA: ICD-10-CM

## 2023-01-03 DIAGNOSIS — R53.83 FATIGUE, UNSPECIFIED TYPE: ICD-10-CM

## 2023-01-03 DIAGNOSIS — E66.01 TYPE 2 DIABETES MELLITUS WITH MORBID OBESITY: ICD-10-CM

## 2023-01-03 DIAGNOSIS — E11.69 TYPE 2 DIABETES MELLITUS WITH MORBID OBESITY: ICD-10-CM

## 2023-01-03 DIAGNOSIS — E29.1 PRIMARY MALE HYPOGONADISM: ICD-10-CM

## 2023-01-03 LAB
ALBUMIN SERPL BCP-MCNC: 4.3 G/DL (ref 3.5–5.2)
ALP SERPL-CCNC: 61 U/L (ref 55–135)
ALT SERPL W/O P-5'-P-CCNC: 95 U/L (ref 10–44)
AST SERPL-CCNC: 47 U/L (ref 10–40)
BASOPHILS # BLD AUTO: 0.09 K/UL (ref 0–0.2)
BASOPHILS NFR BLD: 0.9 % (ref 0–1.9)
BILIRUB DIRECT SERPL-MCNC: 0.1 MG/DL (ref 0.1–0.3)
BILIRUB SERPL-MCNC: 0.3 MG/DL (ref 0.1–1)
CHOLEST SERPL-MCNC: 230 MG/DL (ref 120–199)
CHOLEST/HDLC SERPL: 6.4 {RATIO} (ref 2–5)
COMPLEXED PSA SERPL-MCNC: 0.97 NG/ML (ref 0–4)
CREAT SERPL-MCNC: 0.9 MG/DL (ref 0.5–1.4)
DIFFERENTIAL METHOD: ABNORMAL
EOSINOPHIL # BLD AUTO: 0.4 K/UL (ref 0–0.5)
EOSINOPHIL NFR BLD: 4.5 % (ref 0–8)
ERYTHROCYTE [DISTWIDTH] IN BLOOD BY AUTOMATED COUNT: 14 % (ref 11.5–14.5)
EST. GFR  (NO RACE VARIABLE): >60 ML/MIN/1.73 M^2
HCT VFR BLD AUTO: 45.4 % (ref 40–54)
HDLC SERPL-MCNC: 36 MG/DL (ref 40–75)
HDLC SERPL: 15.7 % (ref 20–50)
HGB BLD-MCNC: 14.4 G/DL (ref 14–18)
IMM GRANULOCYTES # BLD AUTO: 0.03 K/UL (ref 0–0.04)
IMM GRANULOCYTES NFR BLD AUTO: 0.3 % (ref 0–0.5)
LDLC SERPL CALC-MCNC: 152.2 MG/DL (ref 63–159)
LYMPHOCYTES # BLD AUTO: 3.9 K/UL (ref 1–4.8)
LYMPHOCYTES NFR BLD: 40.5 % (ref 18–48)
MCH RBC QN AUTO: 28.8 PG (ref 27–31)
MCHC RBC AUTO-ENTMCNC: 31.7 G/DL (ref 32–36)
MCV RBC AUTO: 91 FL (ref 82–98)
MONOCYTES # BLD AUTO: 0.6 K/UL (ref 0.3–1)
MONOCYTES NFR BLD: 6.6 % (ref 4–15)
NEUTROPHILS # BLD AUTO: 4.6 K/UL (ref 1.8–7.7)
NEUTROPHILS NFR BLD: 47.2 % (ref 38–73)
NONHDLC SERPL-MCNC: 194 MG/DL
NRBC BLD-RTO: 0 /100 WBC
PLATELET # BLD AUTO: 295 K/UL (ref 150–450)
PMV BLD AUTO: 12.1 FL (ref 9.2–12.9)
PROT SERPL-MCNC: 7.5 G/DL (ref 6–8.4)
RBC # BLD AUTO: 5 M/UL (ref 4.6–6.2)
TESTOST SERPL-MCNC: 169 NG/DL (ref 304–1227)
TRIGL SERPL-MCNC: 209 MG/DL (ref 30–150)
WBC # BLD AUTO: 9.71 K/UL (ref 3.9–12.7)

## 2023-01-03 PROCEDURE — 84153 ASSAY OF PSA TOTAL: CPT | Performed by: NURSE PRACTITIONER

## 2023-01-03 PROCEDURE — 82565 ASSAY OF CREATININE: CPT | Performed by: NURSE PRACTITIONER

## 2023-01-03 PROCEDURE — 36415 COLL VENOUS BLD VENIPUNCTURE: CPT | Mod: PO | Performed by: NURSE PRACTITIONER

## 2023-01-03 PROCEDURE — 85025 COMPLETE CBC W/AUTO DIFF WBC: CPT | Performed by: NURSE PRACTITIONER

## 2023-01-03 PROCEDURE — 99999 PR PBB SHADOW E&M-EST. PATIENT-LVL I: ICD-10-PCS | Mod: PBBFAC,,, | Performed by: DIETITIAN, REGISTERED

## 2023-01-03 PROCEDURE — 80061 LIPID PANEL: CPT | Performed by: NURSE PRACTITIONER

## 2023-01-03 PROCEDURE — G0108 PR DIAB MANAGE TRN  PER INDIV: ICD-10-PCS | Mod: S$GLB,,, | Performed by: DIETITIAN, REGISTERED

## 2023-01-03 PROCEDURE — G0108 DIAB MANAGE TRN  PER INDIV: HCPCS | Mod: S$GLB,,, | Performed by: DIETITIAN, REGISTERED

## 2023-01-03 PROCEDURE — 99999 PR PBB SHADOW E&M-EST. PATIENT-LVL I: CPT | Mod: PBBFAC,,, | Performed by: DIETITIAN, REGISTERED

## 2023-01-03 PROCEDURE — 84403 ASSAY OF TOTAL TESTOSTERONE: CPT | Performed by: NURSE PRACTITIONER

## 2023-01-03 PROCEDURE — 80076 HEPATIC FUNCTION PANEL: CPT | Performed by: NURSE PRACTITIONER
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
## 2023-01-04 ENCOUNTER — OFFICE VISIT (OUTPATIENT)
Dept: UROLOGY | Facility: CLINIC | Age: 43
End: 2023-01-04
Payer: COMMERCIAL

## 2023-01-04 VITALS
HEIGHT: 75 IN | DIASTOLIC BLOOD PRESSURE: 83 MMHG | WEIGHT: 315 LBS | SYSTOLIC BLOOD PRESSURE: 123 MMHG | HEART RATE: 72 BPM | BODY MASS INDEX: 39.17 KG/M2

## 2023-01-04 DIAGNOSIS — N40.1 BPH WITH URINARY OBSTRUCTION: ICD-10-CM

## 2023-01-04 DIAGNOSIS — E29.1 PRIMARY MALE HYPOGONADISM: Primary | ICD-10-CM

## 2023-01-04 DIAGNOSIS — N13.8 BPH WITH URINARY OBSTRUCTION: ICD-10-CM

## 2023-01-04 DIAGNOSIS — N52.01 ERECTILE DYSFUNCTION DUE TO ARTERIAL INSUFFICIENCY: ICD-10-CM

## 2023-01-04 DIAGNOSIS — R53.83 FATIGUE, UNSPECIFIED TYPE: ICD-10-CM

## 2023-01-04 PROCEDURE — 1159F MED LIST DOCD IN RCRD: CPT | Mod: CPTII,S$GLB,, | Performed by: NURSE PRACTITIONER

## 2023-01-04 PROCEDURE — 3008F PR BODY MASS INDEX (BMI) DOCUMENTED: ICD-10-PCS | Mod: CPTII,S$GLB,, | Performed by: NURSE PRACTITIONER

## 2023-01-04 PROCEDURE — 99999 PR PBB SHADOW E&M-EST. PATIENT-LVL IV: ICD-10-PCS | Mod: PBBFAC,,, | Performed by: NURSE PRACTITIONER

## 2023-01-04 PROCEDURE — 3079F PR MOST RECENT DIASTOLIC BLOOD PRESSURE 80-89 MM HG: ICD-10-PCS | Mod: CPTII,S$GLB,, | Performed by: NURSE PRACTITIONER

## 2023-01-04 PROCEDURE — 3079F DIAST BP 80-89 MM HG: CPT | Mod: CPTII,S$GLB,, | Performed by: NURSE PRACTITIONER

## 2023-01-04 PROCEDURE — 1160F PR REVIEW ALL MEDS BY PRESCRIBER/CLIN PHARMACIST DOCUMENTED: ICD-10-PCS | Mod: CPTII,S$GLB,, | Performed by: NURSE PRACTITIONER

## 2023-01-04 PROCEDURE — 3008F BODY MASS INDEX DOCD: CPT | Mod: CPTII,S$GLB,, | Performed by: NURSE PRACTITIONER

## 2023-01-04 PROCEDURE — 99214 PR OFFICE/OUTPT VISIT, EST, LEVL IV, 30-39 MIN: ICD-10-PCS | Mod: S$GLB,,, | Performed by: NURSE PRACTITIONER

## 2023-01-04 PROCEDURE — 99214 OFFICE O/P EST MOD 30 MIN: CPT | Mod: S$GLB,,, | Performed by: NURSE PRACTITIONER

## 2023-01-04 PROCEDURE — 3074F SYST BP LT 130 MM HG: CPT | Mod: CPTII,S$GLB,, | Performed by: NURSE PRACTITIONER

## 2023-01-04 PROCEDURE — 99999 PR PBB SHADOW E&M-EST. PATIENT-LVL IV: CPT | Mod: PBBFAC,,, | Performed by: NURSE PRACTITIONER

## 2023-01-04 PROCEDURE — 3074F PR MOST RECENT SYSTOLIC BLOOD PRESSURE < 130 MM HG: ICD-10-PCS | Mod: CPTII,S$GLB,, | Performed by: NURSE PRACTITIONER

## 2023-01-04 PROCEDURE — 1160F RVW MEDS BY RX/DR IN RCRD: CPT | Mod: CPTII,S$GLB,, | Performed by: NURSE PRACTITIONER

## 2023-01-04 PROCEDURE — 1159F PR MEDICATION LIST DOCUMENTED IN MEDICAL RECORD: ICD-10-PCS | Mod: CPTII,S$GLB,, | Performed by: NURSE PRACTITIONER

## 2023-01-04 RX ORDER — TESTOSTERONE CYPIONATE 1000 MG/10ML
100 INJECTION, SOLUTION INTRAMUSCULAR WEEKLY
Qty: 10 ML | Refills: 5 | Status: SHIPPED | OUTPATIENT
Start: 2023-01-04 | End: 2023-01-10

## 2023-01-04 NOTE — PROGRESS NOTES
CHIEF COMPLAINT:    Mr. Collazo is a 42 y.o. male presenting for   Chief Complaint   Patient presents with    Other     Low testosterone labs ck 1/3/23       PRESENTING ILLNESS:    Dominic Collazo is a 42 y.o. male with a PMH of htn, hld, bph obstruction, nafld, robert who presents for male hypogonadism.     This has been present for > 1 year.  He has fatigue, ED, and loss of body hair.    While on TRT, he has improvement.    Was on IM TRT in the past.    Has been on clomid as well, but he didn't notice improvement in his symptoms while on the clomid.  His wife has had a hysterectomy, so he's no longer interested in fertility.    He failed on Androgel 1.62% using 4 pumps; had concerns with transference to his little girl   The ED improves with TRT.  Started on AVEED but unable to sustain the cost. Would like to return to IM testosterone.     He reports a good urinary stream and complete bladder emptying.  Has no urinary complaints.     Has erectile dysfunction.  Not currently taking any medications.  Would like to assess effects of testosterone replacement prior to adding another medication.    Does have family history of prostate cancer, paternal uncle.  PSA 0.97 1/3/23.      REVIEW OF SYSTEMS:    Review of Systems   Constitutional:  Negative for chills and fever.   Respiratory:  Negative for shortness of breath.    Cardiovascular:  Negative for chest pain.   Gastrointestinal:  Negative for constipation and diarrhea.   Genitourinary:  Negative for dysuria, flank pain, frequency, hematuria and urgency.   Neurological:  Negative for dizziness and weakness.     PATIENT HISTORY:    Past Medical History:   Diagnosis Date    Adjustment disorder with mixed anxiety and depressed mood     Anxiety     Colon polyp     Depression     ED (erectile dysfunction)     Family history of prostate cancer 9/29/2014    Hx of umbilical hernia repair 10/16/2020    Hyperlipidemia     Hypertension     Hypogonadism male     Insomnia      Low testosterone     LYNDA (obstructive sleep apnea)     Prediabetes        Family History   Problem Relation Age of Onset    Hypertension Mother     Hyperlipidemia Mother     Diabetes Father     Hyperlipidemia Father     Hypertension Father     Heart disease Father 46        CAD    No Known Problems Sister     Cancer Paternal Uncle         prostate cancer       Allergies:  Ciprofloxacin, Penicillins, Sulfa (sulfonamide antibiotics), Toradol [ketorolac], Green pepper, Meloxicam, and Sulfamethoxazole-trimethoprim    Medications:    Current Outpatient Medications:     armodafiniL (NUVIGIL) 250 mg tablet, Take 1 tablet (250 mg total) by mouth once daily., Disp: 30 tablet, Rfl: 5    blood sugar diagnostic (ONETOUCH VERIO TEST STRIPS) Strp, 1 each by Misc.(Non-Drug; Combo Route) route 3 (three) times daily., Disp: 100 each, Rfl: 11    blood-glucose meter kit, To check BG 2 times daily, to use with insurance preferred meter, Disp: 1 each, Rfl: 0    buPROPion (WELLBUTRIN XL) 150 MG TB24 tablet, Take 1 tablet (150 mg total) by mouth once daily., Disp: 90 tablet, Rfl: 3    busPIRone (BUSPAR) 10 MG tablet, Take 1 tablet (10 mg total) by mouth 3 (three) times daily., Disp: 270 tablet, Rfl: 3    cyanocobalamin (VITAMIN B-12) 1000 MCG tablet, Take 100 mcg by mouth once daily., Disp: , Rfl:     ergocalciferol, vitamin D2, (VITAMIN D ORAL), Take 5,000 Units by mouth once daily., Disp: , Rfl:     fexofenadine (ALLEGRA) 60 MG tablet, Take 60 mg by mouth every morning., Disp: , Rfl:     iron,carb/vit C/vit B12/folic (IRON 100 PLUS ORAL), Take by mouth once daily at 6am., Disp: , Rfl:     lancets Mis, To check BG 2 times daily, to use with insurance preferred meter, Disp: 200 each, Rfl: 3    metFORMIN (GLUCOPHAGE-XR) 500 MG ER 24hr tablet, Take 1 tablet (500 mg total) by mouth 2 (two) times daily with meals., Disp: 180 tablet, Rfl: 3    methocarbamoL (ROBAXIN) 750 MG Tab, TAKE 1 TABLET(750 MG) BY MOUTH FOUR TIMES DAILY AS NEEDED FOR  MUSCLE STRAIN Strength: 750 mg, Disp: 40 tablet, Rfl: 3    metoprolol succinate (TOPROL-XL) 100 MG 24 hr tablet, Take 1 tablet (100 mg total) by mouth once daily., Disp: 90 tablet, Rfl: 3    multivitamin capsule, Take 1 capsule by mouth once daily., Disp: , Rfl:     pravastatin (PRAVACHOL) 10 MG tablet, Take 1 tablet (10 mg total) by mouth once daily., Disp: 90 tablet, Rfl: 3    scopolamine (TRANSDERM-SCOP) 1.3-1.5 mg (1 mg over 3 days), Place 1 patch onto the skin every 72 hours., Disp: 4 patch, Rfl: 0    semaglutide (OZEMPIC) 0.25 mg or 0.5 mg(2 mg/1.5 mL) pen injector, Inject 0.25 mg into the skin every 7 days for 30 days, THEN 0.5 mg every 7 days., Disp: 2 pen, Rfl: 11    venlafaxine (EFFEXOR-XR) 75 MG 24 hr capsule, Take 1 capsule (75 mg total) by mouth once daily., Disp: 90 capsule, Rfl: 3    testosterone cypionate (DEPOTESTOTERONE CYPIONATE) 100 mg/mL injection, Inject 1 mL (100 mg total) into the muscle once a week., Disp: 5 mL, Rfl: 5    PHYSICAL EXAMINATION:    Physical Exam  Vitals and nursing note reviewed.   Constitutional:       Appearance: Normal appearance. He is well-developed. He is obese.   HENT:      Head: Normocephalic and atraumatic.   Eyes:      Pupils: Pupils are equal, round, and reactive to light.   Pulmonary:      Effort: Pulmonary effort is normal.   Musculoskeletal:         General: Normal range of motion.      Cervical back: Normal range of motion.   Skin:     General: Skin is warm and dry.   Neurological:      Mental Status: He is alert and oriented to person, place, and time.   Psychiatric:         Behavior: Behavior normal.         LABS:    U/a performed in office today: did not void  Lab Results   Component Value Date    PSA 0.95 12/07/2022    PSA 0.59 12/19/2020    PSADIAG 0.97 01/03/2023    PSADIAG 0.79 06/19/2021    PSADIAG 0.86 11/16/2019    PSADIAG 1.6 09/29/2014       IMPRESSION:  Encounter Diagnoses   Name Primary?    Primary male hypogonadism Yes    Fatigue, unspecified type      Erectile dysfunction due to arterial insufficiency     BPH with urinary obstruction          PLAN:  Problem List Items Addressed This Visit       Erectile dysfunction due to arterial insufficiency    BPH with urinary obstruction     Other Visit Diagnoses       Primary male hypogonadism    -  Primary    Relevant Orders    Testosterone    Fatigue, unspecified type                1. Male hypogonadism   Discussed risks and benefits of testosterone replacement.  Denies hx of prostate cancer, chf, pulmonary edema, liver dysfunction, or dyslipidemia.  Explained every 6 month follow up required for TRT including: testosterone level, liver function, PSA, h/h, lipid profile.  Patient stated understanding and agreed to terms of TRT.   Testosterone 100 mg im q week    2. Erectile dysfunction   Monitor for now    3. Rtc in 6 mths      Bridgette Waggoner NP    I spent 30 minutes with the patient. Over 50% of the visit was spent in counseling.

## 2023-01-06 ENCOUNTER — PATIENT MESSAGE (OUTPATIENT)
Dept: ADMINISTRATIVE | Facility: HOSPITAL | Age: 43
End: 2023-01-06
Payer: COMMERCIAL

## 2023-01-06 ENCOUNTER — PATIENT OUTREACH (OUTPATIENT)
Dept: ADMINISTRATIVE | Facility: HOSPITAL | Age: 43
End: 2023-01-06
Payer: COMMERCIAL

## 2023-01-06 ENCOUNTER — TELEPHONE (OUTPATIENT)
Dept: PHARMACY | Facility: CLINIC | Age: 43
End: 2023-01-06
Payer: COMMERCIAL

## 2023-01-10 RX ORDER — TESTOSTERONE CYPIONATE 200 MG/ML
100 INJECTION, SOLUTION INTRAMUSCULAR
Qty: 4 ML | Refills: 5 | Status: SHIPPED | OUTPATIENT
Start: 2023-01-10 | End: 2023-08-03

## 2023-01-12 DIAGNOSIS — G47.30 SLEEP APNEA WITH HYPERSOMNOLENCE: Primary | ICD-10-CM

## 2023-01-12 DIAGNOSIS — G47.10 SLEEP APNEA WITH HYPERSOMNOLENCE: Primary | ICD-10-CM

## 2023-01-12 RX ORDER — ARMODAFINIL 250 MG/1
250 TABLET ORAL DAILY
Qty: 30 TABLET | Refills: 5 | Status: SHIPPED | OUTPATIENT
Start: 2023-01-12 | End: 2023-04-17 | Stop reason: SDUPTHER

## 2023-01-13 ENCOUNTER — PATIENT MESSAGE (OUTPATIENT)
Dept: SLEEP MEDICINE | Facility: CLINIC | Age: 43
End: 2023-01-13
Payer: COMMERCIAL

## 2023-01-19 ENCOUNTER — PATIENT OUTREACH (OUTPATIENT)
Dept: ADMINISTRATIVE | Facility: HOSPITAL | Age: 43
End: 2023-01-19
Payer: COMMERCIAL

## 2023-01-19 DIAGNOSIS — E11.9 TYPE 2 DIABETES MELLITUS WITHOUT COMPLICATION, WITHOUT LONG-TERM CURRENT USE OF INSULIN: Primary | ICD-10-CM

## 2023-01-20 NOTE — PROGRESS NOTES
Health Maintenance Due   Topic Date Due    Pneumococcal Vaccines (Age 0-64) (1 - PCV) Never done    Eye Exam  Never done    HIV Screening  Never done    COVID-19 Vaccine (4 - Booster for Moderna series) 12/22/2021     Chart reviewed.   Immunizations: Reconciled  Orders placed: Diabetic eye cam  Upcoming appts to satisfy TIA topics: N/A

## 2023-01-26 ENCOUNTER — TELEPHONE (OUTPATIENT)
Dept: INTERNAL MEDICINE | Facility: CLINIC | Age: 43
End: 2023-01-26
Payer: COMMERCIAL

## 2023-01-26 NOTE — TELEPHONE ENCOUNTER
CATHERINEM and sent portal message letting pt know that I got a message stating he wanted to talk to me about a Rx. I told him to either call me back or reply to the portal message

## 2023-01-26 NOTE — TELEPHONE ENCOUNTER
----- Message from Annmarie Gustafson sent at 1/26/2023  9:35 AM CST -----  Regarding: SELWYN OLEA [[id number]]  Good Morning,  Patient: SELWYN OLEA [[id number]] is requesting a call from the Nurse please. (Pt has a question about Rx refill). Thank you in advance

## 2023-01-29 ENCOUNTER — PATIENT MESSAGE (OUTPATIENT)
Dept: INTERNAL MEDICINE | Facility: CLINIC | Age: 43
End: 2023-01-29
Payer: COMMERCIAL

## 2023-01-29 DIAGNOSIS — E11.69 TYPE 2 DIABETES MELLITUS WITH MORBID OBESITY: ICD-10-CM

## 2023-01-29 DIAGNOSIS — E66.01 TYPE 2 DIABETES MELLITUS WITH MORBID OBESITY: ICD-10-CM

## 2023-01-30 ENCOUNTER — TELEPHONE (OUTPATIENT)
Dept: PODIATRY | Facility: CLINIC | Age: 43
End: 2023-01-30
Payer: COMMERCIAL

## 2023-01-30 ENCOUNTER — PATIENT MESSAGE (OUTPATIENT)
Dept: INTERNAL MEDICINE | Facility: CLINIC | Age: 43
End: 2023-01-30
Payer: COMMERCIAL

## 2023-01-30 ENCOUNTER — PATIENT MESSAGE (OUTPATIENT)
Dept: PODIATRY | Facility: CLINIC | Age: 43
End: 2023-01-30
Payer: COMMERCIAL

## 2023-01-30 DIAGNOSIS — E66.01 TYPE 2 DIABETES MELLITUS WITH MORBID OBESITY: Primary | ICD-10-CM

## 2023-01-30 DIAGNOSIS — E11.69 TYPE 2 DIABETES MELLITUS WITH MORBID OBESITY: Primary | ICD-10-CM

## 2023-01-31 RX ORDER — SEMAGLUTIDE 1.34 MG/ML
1 INJECTION, SOLUTION SUBCUTANEOUS
Qty: 3 PEN | Refills: 3 | Status: SHIPPED | OUTPATIENT
Start: 2023-01-31 | End: 2023-04-27

## 2023-02-08 ENCOUNTER — OFFICE VISIT (OUTPATIENT)
Dept: INTERNAL MEDICINE | Facility: CLINIC | Age: 43
End: 2023-02-08
Payer: COMMERCIAL

## 2023-02-08 VITALS
HEART RATE: 81 BPM | BODY MASS INDEX: 39.17 KG/M2 | OXYGEN SATURATION: 99 % | TEMPERATURE: 97 F | HEIGHT: 75 IN | SYSTOLIC BLOOD PRESSURE: 120 MMHG | WEIGHT: 315 LBS | DIASTOLIC BLOOD PRESSURE: 80 MMHG

## 2023-02-08 DIAGNOSIS — E11.69 TYPE 2 DIABETES MELLITUS WITH MORBID OBESITY: Primary | ICD-10-CM

## 2023-02-08 DIAGNOSIS — E66.01 TYPE 2 DIABETES MELLITUS WITH MORBID OBESITY: Primary | ICD-10-CM

## 2023-02-08 DIAGNOSIS — E66.01 MORBID OBESITY: ICD-10-CM

## 2023-02-08 PROCEDURE — 1159F MED LIST DOCD IN RCRD: CPT | Mod: CPTII,S$GLB,, | Performed by: FAMILY MEDICINE

## 2023-02-08 PROCEDURE — 3074F PR MOST RECENT SYSTOLIC BLOOD PRESSURE < 130 MM HG: ICD-10-PCS | Mod: CPTII,S$GLB,, | Performed by: FAMILY MEDICINE

## 2023-02-08 PROCEDURE — 3008F PR BODY MASS INDEX (BMI) DOCUMENTED: ICD-10-PCS | Mod: CPTII,S$GLB,, | Performed by: FAMILY MEDICINE

## 2023-02-08 PROCEDURE — 99213 OFFICE O/P EST LOW 20 MIN: CPT | Mod: S$GLB,,, | Performed by: FAMILY MEDICINE

## 2023-02-08 PROCEDURE — 1160F PR REVIEW ALL MEDS BY PRESCRIBER/CLIN PHARMACIST DOCUMENTED: ICD-10-PCS | Mod: CPTII,S$GLB,, | Performed by: FAMILY MEDICINE

## 2023-02-08 PROCEDURE — 3079F DIAST BP 80-89 MM HG: CPT | Mod: CPTII,S$GLB,, | Performed by: FAMILY MEDICINE

## 2023-02-08 PROCEDURE — 99999 PR PBB SHADOW E&M-EST. PATIENT-LVL V: ICD-10-PCS | Mod: PBBFAC,,, | Performed by: FAMILY MEDICINE

## 2023-02-08 PROCEDURE — 3079F PR MOST RECENT DIASTOLIC BLOOD PRESSURE 80-89 MM HG: ICD-10-PCS | Mod: CPTII,S$GLB,, | Performed by: FAMILY MEDICINE

## 2023-02-08 PROCEDURE — 99213 PR OFFICE/OUTPT VISIT, EST, LEVL III, 20-29 MIN: ICD-10-PCS | Mod: S$GLB,,, | Performed by: FAMILY MEDICINE

## 2023-02-08 PROCEDURE — 1159F PR MEDICATION LIST DOCUMENTED IN MEDICAL RECORD: ICD-10-PCS | Mod: CPTII,S$GLB,, | Performed by: FAMILY MEDICINE

## 2023-02-08 PROCEDURE — 3074F SYST BP LT 130 MM HG: CPT | Mod: CPTII,S$GLB,, | Performed by: FAMILY MEDICINE

## 2023-02-08 PROCEDURE — 1160F RVW MEDS BY RX/DR IN RCRD: CPT | Mod: CPTII,S$GLB,, | Performed by: FAMILY MEDICINE

## 2023-02-08 PROCEDURE — 3008F BODY MASS INDEX DOCD: CPT | Mod: CPTII,S$GLB,, | Performed by: FAMILY MEDICINE

## 2023-02-08 PROCEDURE — 99999 PR PBB SHADOW E&M-EST. PATIENT-LVL V: CPT | Mod: PBBFAC,,, | Performed by: FAMILY MEDICINE

## 2023-02-08 RX ORDER — METFORMIN HYDROCHLORIDE 500 MG/1
500 TABLET ORAL 2 TIMES DAILY
COMMUNITY
Start: 2023-01-26 | End: 2023-04-27

## 2023-02-08 NOTE — PROGRESS NOTES
Subjective:       Patient ID: Dominic Collazo is a 42 y.o. male.    Chief Complaint: Diabetes  42-year-old white male presents to clinic today for diabetes follow-up.  He was last seen on December 23, 2022 for a preop in order to have a SLAP tear repair; however, surgery was postponed secondary to an elevated hemoglobin A1c noted during preop.  A1c at that time was 11.9.  He was started on metformin 500 mg b.i.d. and Ozempic 0.25 mg weekly for 4 weeks then increased to 0.5 mg weekly.  He has recently increased the dose of Ozempic up to 1 mg weekly.  He has noted a 14 lb weight loss since initiation of treatment.  He checks his glucose levels at least once daily and has noted a glucose range from .  Diabetes  Pertinent negatives for hypoglycemia include no dizziness or headaches. Pertinent negatives for diabetes include no chest pain and no fatigue.   Review of Systems   Constitutional:  Negative for appetite change, chills, fatigue and fever.   HENT:  Negative for nasal congestion, ear pain, hearing loss, postnasal drip, rhinorrhea, sinus pressure/congestion, sore throat and tinnitus.    Eyes:  Negative for redness, itching and visual disturbance.   Respiratory:  Negative for cough, chest tightness and shortness of breath.    Cardiovascular:  Negative for chest pain and palpitations.   Gastrointestinal:  Negative for abdominal pain, constipation, diarrhea, nausea and vomiting.   Genitourinary:  Negative for decreased urine volume, difficulty urinating, dysuria, frequency, hematuria and urgency.   Musculoskeletal:  Negative for back pain, myalgias, neck pain and neck stiffness.   Integumentary:  Negative for rash.   Neurological:  Negative for dizziness, light-headedness and headaches.   Psychiatric/Behavioral: Negative.         Objective:      Physical Exam  Vitals and nursing note reviewed.   Constitutional:       General: He is not in acute distress.     Appearance: Normal appearance. He is  well-developed. He is not diaphoretic.   HENT:      Head: Normocephalic and atraumatic.      Right Ear: External ear normal.      Left Ear: External ear normal.      Nose: Nose normal.      Mouth/Throat:      Pharynx: No oropharyngeal exudate.   Eyes:      General: No scleral icterus.        Right eye: No discharge.         Left eye: No discharge.      Conjunctiva/sclera: Conjunctivae normal.      Pupils: Pupils are equal, round, and reactive to light.   Neck:      Thyroid: No thyromegaly.      Vascular: No JVD.      Trachea: No tracheal deviation.   Cardiovascular:      Rate and Rhythm: Normal rate and regular rhythm.      Heart sounds: Normal heart sounds. No murmur heard.    No friction rub. No gallop.   Pulmonary:      Effort: Pulmonary effort is normal. No respiratory distress.      Breath sounds: Normal breath sounds. No stridor. No wheezing, rhonchi or rales.   Chest:      Chest wall: No tenderness.   Abdominal:      General: Bowel sounds are normal. There is no distension.      Palpations: Abdomen is soft. There is no mass.      Tenderness: There is no abdominal tenderness. There is no guarding or rebound.   Musculoskeletal:         General: No tenderness. Normal range of motion.      Cervical back: Normal range of motion and neck supple.   Lymphadenopathy:      Cervical: No cervical adenopathy.   Skin:     General: Skin is warm and dry.      Coloration: Skin is not pale.      Findings: No erythema or rash.   Neurological:      Mental Status: He is alert and oriented to person, place, and time.   Psychiatric:         Mood and Affect: Mood normal.         Behavior: Behavior normal.         Thought Content: Thought content normal.         Judgment: Judgment normal.       Assessment:       Problem List Items Addressed This Visit       Morbid obesity     Other Visit Diagnoses       Type 2 diabetes mellitus with morbid obesity    -  Primary    Relevant Medications    metFORMIN (GLUCOPHAGE) 500 MG tablet             Plan:         1. Continue metformin 500 mg b.i.d. and Ozempic 1 mg weekly.  Repeat labs are scheduled next month.    2. Patient has lost 14 lb since initiation of Ozempic.  Will continue at 1 mg dose.    3. At this time based off of the patient's average glucose levels likely A1c has improved to 6 range and it is okay for the patient to proceed with surgery as scheduled.  4. Return to clinic as needed or as previously scheduled for follow-up next month.

## 2023-02-10 ENCOUNTER — PATIENT MESSAGE (OUTPATIENT)
Dept: INTERNAL MEDICINE | Facility: CLINIC | Age: 43
End: 2023-02-10
Payer: COMMERCIAL

## 2023-03-03 ENCOUNTER — PATIENT MESSAGE (OUTPATIENT)
Dept: ORTHOPEDICS | Facility: CLINIC | Age: 43
End: 2023-03-03
Payer: COMMERCIAL

## 2023-03-09 ENCOUNTER — PATIENT OUTREACH (OUTPATIENT)
Dept: ADMINISTRATIVE | Facility: HOSPITAL | Age: 43
End: 2023-03-09
Payer: COMMERCIAL

## 2023-03-09 DIAGNOSIS — S43.432A SUPERIOR LABRUM ANTERIOR-TO-POSTERIOR (SLAP) TEAR OF LEFT SHOULDER: ICD-10-CM

## 2023-03-09 DIAGNOSIS — S43.432D SUPERIOR GLENOID LABRUM LESION OF LEFT SHOULDER, SUBSEQUENT ENCOUNTER: Primary | ICD-10-CM

## 2023-03-09 RX ORDER — SODIUM CHLORIDE 9 MG/ML
INJECTION, SOLUTION INTRAVENOUS CONTINUOUS
Status: CANCELLED | OUTPATIENT
Start: 2023-03-09

## 2023-03-09 NOTE — PROGRESS NOTES
Health Maintenance Due   Topic Date Due    Pneumococcal Vaccines (Age 0-64) (1 - PCV) Never done    Eye Exam  Never done    HIV Screening  Never done    COVID-19 Vaccine (4 - Booster for Moderna series) 12/22/2021     Chart reviewed.   Immunizations: Reconciled  Orders placed: N/A  Upcoming appts to satisfy TIA topics: Labs 3/16/2023

## 2023-03-27 ENCOUNTER — TELEPHONE (OUTPATIENT)
Dept: PODIATRY | Facility: CLINIC | Age: 43
End: 2023-03-27
Payer: COMMERCIAL

## 2023-03-27 NOTE — TELEPHONE ENCOUNTER
Patient sent a message requesting an appt., Left vm message for patient to call back to schedule an appointment with Dr. Pillai(Podiatry) for a toenail he states keeps growing back

## 2023-04-03 ENCOUNTER — PATIENT MESSAGE (OUTPATIENT)
Dept: ADMINISTRATIVE | Facility: HOSPITAL | Age: 43
End: 2023-04-03
Payer: COMMERCIAL

## 2023-04-03 ENCOUNTER — PATIENT MESSAGE (OUTPATIENT)
Dept: INTERNAL MEDICINE | Facility: CLINIC | Age: 43
End: 2023-04-03
Payer: COMMERCIAL

## 2023-04-13 ENCOUNTER — PATIENT OUTREACH (OUTPATIENT)
Dept: ADMINISTRATIVE | Facility: HOSPITAL | Age: 43
End: 2023-04-13
Payer: COMMERCIAL

## 2023-04-13 NOTE — PROGRESS NOTES
Health Maintenance Due   Topic Date Due    Pneumococcal Vaccines (Age 0-64) (1 - PCV) Never done    Eye Exam  Never done    HIV Screening  Never done    COVID-19 Vaccine (4 - Booster for Moderna series) 12/22/2021     Chart reviewed.   Immunizations: Reconciled  Orders placed: N/A  Upcoming appts to satisfy TIA topics: Labs 4/21/2023  Diabetic urine screening added to previously scheduled labs.

## 2023-04-17 ENCOUNTER — PATIENT MESSAGE (OUTPATIENT)
Dept: SLEEP MEDICINE | Facility: CLINIC | Age: 43
End: 2023-04-17
Payer: COMMERCIAL

## 2023-04-19 DIAGNOSIS — G47.33 OSA (OBSTRUCTIVE SLEEP APNEA): ICD-10-CM

## 2023-04-19 DIAGNOSIS — Z78.9 INTOLERANCE OF CONTINUOUS POSITIVE AIRWAY PRESSURE (CPAP) VENTILATION: Primary | ICD-10-CM

## 2023-04-19 DIAGNOSIS — G47.10 HYPERSOMNOLENCE: ICD-10-CM

## 2023-04-20 ENCOUNTER — TELEPHONE (OUTPATIENT)
Dept: PHARMACY | Facility: CLINIC | Age: 43
End: 2023-04-20
Payer: COMMERCIAL

## 2023-04-21 ENCOUNTER — LAB VISIT (OUTPATIENT)
Dept: LAB | Facility: HOSPITAL | Age: 43
End: 2023-04-21
Attending: FAMILY MEDICINE
Payer: COMMERCIAL

## 2023-04-21 DIAGNOSIS — E11.69 TYPE 2 DIABETES MELLITUS WITH MORBID OBESITY: ICD-10-CM

## 2023-04-21 DIAGNOSIS — E66.01 TYPE 2 DIABETES MELLITUS WITH MORBID OBESITY: ICD-10-CM

## 2023-04-21 LAB
ALBUMIN SERPL BCP-MCNC: 4 G/DL (ref 3.5–5.2)
ALP SERPL-CCNC: 55 U/L (ref 55–135)
ALT SERPL W/O P-5'-P-CCNC: 51 U/L (ref 10–44)
ANION GAP SERPL CALC-SCNC: 9 MMOL/L (ref 8–16)
AST SERPL-CCNC: 28 U/L (ref 10–40)
BILIRUB SERPL-MCNC: 0.2 MG/DL (ref 0.1–1)
BUN SERPL-MCNC: 9 MG/DL (ref 6–20)
CALCIUM SERPL-MCNC: 9.1 MG/DL (ref 8.7–10.5)
CHLORIDE SERPL-SCNC: 103 MMOL/L (ref 95–110)
CO2 SERPL-SCNC: 28 MMOL/L (ref 23–29)
CREAT SERPL-MCNC: 0.8 MG/DL (ref 0.5–1.4)
EST. GFR  (NO RACE VARIABLE): >60 ML/MIN/1.73 M^2
ESTIMATED AVG GLUCOSE: 111 MG/DL (ref 68–131)
GLUCOSE SERPL-MCNC: 124 MG/DL (ref 70–110)
HBA1C MFR BLD: 5.5 % (ref 4–5.6)
POTASSIUM SERPL-SCNC: 4.4 MMOL/L (ref 3.5–5.1)
PROT SERPL-MCNC: 7.1 G/DL (ref 6–8.4)
SODIUM SERPL-SCNC: 140 MMOL/L (ref 136–145)

## 2023-04-21 PROCEDURE — 83036 HEMOGLOBIN GLYCOSYLATED A1C: CPT | Performed by: FAMILY MEDICINE

## 2023-04-21 PROCEDURE — 36415 COLL VENOUS BLD VENIPUNCTURE: CPT | Mod: PO | Performed by: FAMILY MEDICINE

## 2023-04-21 PROCEDURE — 80053 COMPREHEN METABOLIC PANEL: CPT | Performed by: FAMILY MEDICINE

## 2023-04-24 ENCOUNTER — PATIENT MESSAGE (OUTPATIENT)
Dept: INTERNAL MEDICINE | Facility: CLINIC | Age: 43
End: 2023-04-24
Payer: COMMERCIAL

## 2023-04-27 ENCOUNTER — OFFICE VISIT (OUTPATIENT)
Dept: INTERNAL MEDICINE | Facility: CLINIC | Age: 43
End: 2023-04-27
Payer: COMMERCIAL

## 2023-04-27 VITALS
SYSTOLIC BLOOD PRESSURE: 122 MMHG | WEIGHT: 315 LBS | OXYGEN SATURATION: 95 % | HEART RATE: 91 BPM | HEIGHT: 75 IN | DIASTOLIC BLOOD PRESSURE: 82 MMHG | BODY MASS INDEX: 39.17 KG/M2 | TEMPERATURE: 98 F | RESPIRATION RATE: 16 BRPM

## 2023-04-27 DIAGNOSIS — E66.01 TYPE 2 DIABETES MELLITUS WITH MORBID OBESITY: ICD-10-CM

## 2023-04-27 DIAGNOSIS — E11.69 TYPE 2 DIABETES MELLITUS WITH MORBID OBESITY: ICD-10-CM

## 2023-04-27 DIAGNOSIS — Z09 FOLLOW-UP EXAM: Primary | ICD-10-CM

## 2023-04-27 PROCEDURE — 1160F PR REVIEW ALL MEDS BY PRESCRIBER/CLIN PHARMACIST DOCUMENTED: ICD-10-PCS | Mod: CPTII,S$GLB,, | Performed by: FAMILY MEDICINE

## 2023-04-27 PROCEDURE — 99999 PR PBB SHADOW E&M-EST. PATIENT-LVL V: CPT | Mod: PBBFAC,,, | Performed by: FAMILY MEDICINE

## 2023-04-27 PROCEDURE — 1160F RVW MEDS BY RX/DR IN RCRD: CPT | Mod: CPTII,S$GLB,, | Performed by: FAMILY MEDICINE

## 2023-04-27 PROCEDURE — 3008F BODY MASS INDEX DOCD: CPT | Mod: CPTII,S$GLB,, | Performed by: FAMILY MEDICINE

## 2023-04-27 PROCEDURE — 3074F PR MOST RECENT SYSTOLIC BLOOD PRESSURE < 130 MM HG: ICD-10-PCS | Mod: CPTII,S$GLB,, | Performed by: FAMILY MEDICINE

## 2023-04-27 PROCEDURE — 1159F PR MEDICATION LIST DOCUMENTED IN MEDICAL RECORD: ICD-10-PCS | Mod: CPTII,S$GLB,, | Performed by: FAMILY MEDICINE

## 2023-04-27 PROCEDURE — 3079F DIAST BP 80-89 MM HG: CPT | Mod: CPTII,S$GLB,, | Performed by: FAMILY MEDICINE

## 2023-04-27 PROCEDURE — 3008F PR BODY MASS INDEX (BMI) DOCUMENTED: ICD-10-PCS | Mod: CPTII,S$GLB,, | Performed by: FAMILY MEDICINE

## 2023-04-27 PROCEDURE — 3079F PR MOST RECENT DIASTOLIC BLOOD PRESSURE 80-89 MM HG: ICD-10-PCS | Mod: CPTII,S$GLB,, | Performed by: FAMILY MEDICINE

## 2023-04-27 PROCEDURE — 3066F PR DOCUMENTATION OF TREATMENT FOR NEPHROPATHY: ICD-10-PCS | Mod: CPTII,S$GLB,, | Performed by: FAMILY MEDICINE

## 2023-04-27 PROCEDURE — 3061F PR NEG MICROALBUMINURIA RESULT DOCUMENTED/REVIEW: ICD-10-PCS | Mod: CPTII,S$GLB,, | Performed by: FAMILY MEDICINE

## 2023-04-27 PROCEDURE — 99999 PR PBB SHADOW E&M-EST. PATIENT-LVL V: ICD-10-PCS | Mod: PBBFAC,,, | Performed by: FAMILY MEDICINE

## 2023-04-27 PROCEDURE — 99214 PR OFFICE/OUTPT VISIT, EST, LEVL IV, 30-39 MIN: ICD-10-PCS | Mod: S$GLB,,, | Performed by: FAMILY MEDICINE

## 2023-04-27 PROCEDURE — 99214 OFFICE O/P EST MOD 30 MIN: CPT | Mod: S$GLB,,, | Performed by: FAMILY MEDICINE

## 2023-04-27 PROCEDURE — 3066F NEPHROPATHY DOC TX: CPT | Mod: CPTII,S$GLB,, | Performed by: FAMILY MEDICINE

## 2023-04-27 PROCEDURE — 3061F NEG MICROALBUMINURIA REV: CPT | Mod: CPTII,S$GLB,, | Performed by: FAMILY MEDICINE

## 2023-04-27 PROCEDURE — 3044F PR MOST RECENT HEMOGLOBIN A1C LEVEL <7.0%: ICD-10-PCS | Mod: CPTII,S$GLB,, | Performed by: FAMILY MEDICINE

## 2023-04-27 PROCEDURE — 3044F HG A1C LEVEL LT 7.0%: CPT | Mod: CPTII,S$GLB,, | Performed by: FAMILY MEDICINE

## 2023-04-27 PROCEDURE — 1159F MED LIST DOCD IN RCRD: CPT | Mod: CPTII,S$GLB,, | Performed by: FAMILY MEDICINE

## 2023-04-27 PROCEDURE — 3074F SYST BP LT 130 MM HG: CPT | Mod: CPTII,S$GLB,, | Performed by: FAMILY MEDICINE

## 2023-04-27 RX ORDER — SEMAGLUTIDE 2.68 MG/ML
2 INJECTION, SOLUTION SUBCUTANEOUS
Qty: 9 ML | Refills: 3 | Status: SHIPPED | OUTPATIENT
Start: 2023-04-27 | End: 2023-08-31 | Stop reason: SDUPTHER

## 2023-04-27 NOTE — Clinical Note
Tanisha,  Patient reports that he is up-to-date with his diabetic eye exam through [facility].  Can you obtain the report?  Thank you, Dr. Cross

## 2023-04-27 NOTE — PROGRESS NOTES
Subjective     Patient ID: Dominic Collazo is a 42 y.o. male.    Chief Complaint: Follow-up and Diabetes  42-year-old white male returns to clinic today for diabetic follow-up.  The patient was seen in December in order to have a preoperative exam for a SLAP tear repair; however, at that time A1c was noted to be elevated to 11.9.  It was recommended that surgery be delayed until diabetic control improved.  He is currently on metformin 1000 mg b.i.d. and Ozempic 1 mg weekly for treatment.  Current hemoglobin A1c has improved to 5.1.  Since December he has lost 8 lb.  Follow-up  Pertinent negatives include no abdominal pain, chest pain, chills, congestion, coughing, fatigue, fever, headaches, myalgias, nausea, neck pain, rash, sore throat or vomiting.   Diabetes  Pertinent negatives for hypoglycemia include no dizziness or headaches. Pertinent negatives for diabetes include no chest pain and no fatigue.   Review of Systems   Constitutional:  Negative for appetite change, chills, fatigue and fever.   HENT:  Negative for nasal congestion, ear pain, hearing loss, postnasal drip, rhinorrhea, sinus pressure/congestion, sore throat and tinnitus.    Eyes:  Negative for redness, itching and visual disturbance.   Respiratory:  Negative for cough, chest tightness and shortness of breath.    Cardiovascular:  Negative for chest pain and palpitations.   Gastrointestinal:  Negative for abdominal pain, constipation, diarrhea, nausea and vomiting.   Genitourinary:  Negative for decreased urine volume, difficulty urinating, dysuria, frequency, hematuria and urgency.   Musculoskeletal:  Negative for back pain, myalgias, neck pain and neck stiffness.   Integumentary:  Negative for rash.   Neurological:  Negative for dizziness, light-headedness and headaches.   Psychiatric/Behavioral: Negative.          Objective     Physical Exam  Vitals and nursing note reviewed.   Constitutional:       General: He is not in acute distress.      Appearance: Normal appearance. He is well-developed. He is not diaphoretic.   HENT:      Head: Normocephalic and atraumatic.      Right Ear: External ear normal.      Left Ear: External ear normal.      Nose: Nose normal.      Mouth/Throat:      Pharynx: No oropharyngeal exudate.   Eyes:      General: No scleral icterus.        Right eye: No discharge.         Left eye: No discharge.      Conjunctiva/sclera: Conjunctivae normal.      Pupils: Pupils are equal, round, and reactive to light.   Neck:      Thyroid: No thyromegaly.      Vascular: No JVD.      Trachea: No tracheal deviation.   Cardiovascular:      Rate and Rhythm: Normal rate and regular rhythm.      Heart sounds: Normal heart sounds. No murmur heard.    No friction rub. No gallop.   Pulmonary:      Effort: Pulmonary effort is normal. No respiratory distress.      Breath sounds: Normal breath sounds. No stridor. No wheezing, rhonchi or rales.   Chest:      Chest wall: No tenderness.   Abdominal:      General: Bowel sounds are normal. There is no distension.      Palpations: Abdomen is soft. There is no mass.      Tenderness: There is no abdominal tenderness. There is no guarding or rebound.   Musculoskeletal:         General: No tenderness. Normal range of motion.      Cervical back: Normal range of motion and neck supple.   Lymphadenopathy:      Cervical: No cervical adenopathy.   Skin:     General: Skin is warm and dry.      Coloration: Skin is not pale.      Findings: No erythema or rash.   Neurological:      Mental Status: He is alert and oriented to person, place, and time.   Psychiatric:         Mood and Affect: Mood normal.         Behavior: Behavior normal.         Thought Content: Thought content normal.         Judgment: Judgment normal.          Assessment and Plan     Problem List Items Addressed This Visit    None  Visit Diagnoses       Follow-up exam    -  Primary    Type 2 diabetes mellitus with morbid obesity        Relevant Medications     semaglutide (OZEMPIC) 2 mg/dose (8 mg/3 mL) PnIj            1. Continue metformin 1000 mg b.i.d. and increase Ozempic to 2 mg weekly.    2. Diabetes is well controlled with current A1c of 5.5.  The patient is okay to proceed with surgery as scheduled.    3. Return to clinic as needed or in 6 months for annual physical exam.

## 2023-04-28 ENCOUNTER — PATIENT OUTREACH (OUTPATIENT)
Dept: ADMINISTRATIVE | Facility: HOSPITAL | Age: 43
End: 2023-04-28
Payer: COMMERCIAL

## 2023-04-28 ENCOUNTER — OFFICE VISIT (OUTPATIENT)
Dept: PSYCHIATRY | Facility: CLINIC | Age: 43
End: 2023-04-28
Payer: COMMERCIAL

## 2023-04-28 DIAGNOSIS — F51.05 INSOMNIA DUE TO OTHER MENTAL DISORDER: ICD-10-CM

## 2023-04-28 DIAGNOSIS — F41.0 PANIC ATTACK: ICD-10-CM

## 2023-04-28 DIAGNOSIS — F99 INSOMNIA DUE TO OTHER MENTAL DISORDER: ICD-10-CM

## 2023-04-28 DIAGNOSIS — F43.23 ADJUSTMENT DISORDER WITH MIXED ANXIETY AND DEPRESSED MOOD: Primary | ICD-10-CM

## 2023-04-28 PROCEDURE — 1159F MED LIST DOCD IN RCRD: CPT | Mod: CPTII,95,, | Performed by: PHYSICIAN ASSISTANT

## 2023-04-28 PROCEDURE — 3061F PR NEG MICROALBUMINURIA RESULT DOCUMENTED/REVIEW: ICD-10-PCS | Mod: CPTII,95,, | Performed by: PHYSICIAN ASSISTANT

## 2023-04-28 PROCEDURE — 3066F PR DOCUMENTATION OF TREATMENT FOR NEPHROPATHY: ICD-10-PCS | Mod: CPTII,95,, | Performed by: PHYSICIAN ASSISTANT

## 2023-04-28 PROCEDURE — 1160F PR REVIEW ALL MEDS BY PRESCRIBER/CLIN PHARMACIST DOCUMENTED: ICD-10-PCS | Mod: CPTII,95,, | Performed by: PHYSICIAN ASSISTANT

## 2023-04-28 PROCEDURE — 3044F PR MOST RECENT HEMOGLOBIN A1C LEVEL <7.0%: ICD-10-PCS | Mod: CPTII,95,, | Performed by: PHYSICIAN ASSISTANT

## 2023-04-28 PROCEDURE — 99214 OFFICE O/P EST MOD 30 MIN: CPT | Mod: 95,,, | Performed by: PHYSICIAN ASSISTANT

## 2023-04-28 PROCEDURE — 3061F NEG MICROALBUMINURIA REV: CPT | Mod: CPTII,95,, | Performed by: PHYSICIAN ASSISTANT

## 2023-04-28 PROCEDURE — 1159F PR MEDICATION LIST DOCUMENTED IN MEDICAL RECORD: ICD-10-PCS | Mod: CPTII,95,, | Performed by: PHYSICIAN ASSISTANT

## 2023-04-28 PROCEDURE — 3066F NEPHROPATHY DOC TX: CPT | Mod: CPTII,95,, | Performed by: PHYSICIAN ASSISTANT

## 2023-04-28 PROCEDURE — 99214 PR OFFICE/OUTPT VISIT, EST, LEVL IV, 30-39 MIN: ICD-10-PCS | Mod: 95,,, | Performed by: PHYSICIAN ASSISTANT

## 2023-04-28 PROCEDURE — 3044F HG A1C LEVEL LT 7.0%: CPT | Mod: CPTII,95,, | Performed by: PHYSICIAN ASSISTANT

## 2023-04-28 PROCEDURE — 1160F RVW MEDS BY RX/DR IN RCRD: CPT | Mod: CPTII,95,, | Performed by: PHYSICIAN ASSISTANT

## 2023-04-28 RX ORDER — ZOLPIDEM TARTRATE 10 MG/1
10 TABLET ORAL NIGHTLY PRN
Qty: 30 TABLET | Refills: 2 | Status: SHIPPED | OUTPATIENT
Start: 2023-04-28 | End: 2023-06-11 | Stop reason: SDUPTHER

## 2023-04-28 RX ORDER — VENLAFAXINE HYDROCHLORIDE 150 MG/1
150 CAPSULE, EXTENDED RELEASE ORAL DAILY
Qty: 90 CAPSULE | Refills: 1 | Status: SHIPPED | OUTPATIENT
Start: 2023-04-28 | End: 2023-08-21 | Stop reason: SDUPTHER

## 2023-04-28 RX ORDER — ALPRAZOLAM 1 MG/1
1 TABLET ORAL DAILY PRN
Qty: 30 TABLET | Refills: 1 | Status: SHIPPED | OUTPATIENT
Start: 2023-04-28 | End: 2023-06-20 | Stop reason: SDUPTHER

## 2023-04-28 NOTE — PROGRESS NOTES
"Outpatient Psychiatry Follow-Up Visit (MD/NP)    4/28/2023     The patient location is: [place]  The chief complaint leading to consultation is: depression and anxiety    Visit type: audiovisual    Face to Face time with patient: 28 minutes  37 minutes of total time spent on the encounter, which includes face to face time and non-face to face time preparing to see the patient (eg, review of tests), Obtaining and/or reviewing separately obtained history, Documenting clinical information in the electronic or other health record, Independently interpreting results (not separately reported) and communicating results to the patient/family/caregiver, or Care coordination (not separately reported).     Each patient to whom he or she provides medical services by telemedicine is:  (1) informed of the relationship between the physician and patient and the respective role of any other health care provider with respect to management of the patient; and (2) notified that he or she may decline to receive medical services by telemedicine and may withdraw from such care at any time.    Clinical Status of Patient:  Outpatient (Ambulatory)    Chief Complaint:  Dominic Collazo is a 42 y.o. male who presents today for follow-up of depression, anxiety and adjustment problems.  Met with patient.      Interval History and Content of Current Session:  Interim Events/Subjective Report/Content of Current Session:     Pt reports today: "previously on ativan, wellbutrin, and cymbalta, had fogginess".    Switched to effexor 1 year ago at 75mg, still taking wellbutrin and buspar 10mg bid. States has been xanax more frequently from old prescription. [facility] used to use 1-2x per month and now using almost daily.    Discussed increasing effexor to 150mg daily for better control of depression and anxiety.    [employer] [facility] job is stressful and lost two close relatives in the last 6 months, has also adopted an 19 yo female.    Reports having panic " attacks 1-2 per week recently, states sometimes triggered by outside stimuli and some panic attacks not caused by a specific reason.    Has been on wellbutrin for many years up to 300mg, now currently at 150mg daily. States he does not believe it has worsened anxiety but likely benefited.    Patients mood is steady, affect appears mood congruent. Linear and logical, friendly and cooperative, good eye contact.    Denies SI/HI/AVH. Pt reports sleeping poorly 3-4 and has hx LYNDA, states compliant with CPAP and normal appetite. Denies side effects of medications.    Pt reports taking medications as prescribed and behaving appropriately during interview today.        Prior visit with Stacia Dumont NP:  Dominic Mai Collazo checked in on time for his appointment. Last visit we switched Cymbalta to Effexor. Today he reports symptom improvement. Fogginess gone. Some appropriate anxiety related to family issues during the holidays but otherwise anxiety well controlled. Denies ASE to current regimen. No longer taking Ambien or Ativan. Denies SI/HI/AVH. No objective s/sx of psychosis or rehan. Patient verbalized motivation for compliance with medications and all other elements of treatment plan.         Previous medication trials:  Ativan- effective  Seroquel- sedation  Lexapro  Wellbutrin and Buspar- effective,   vistaril- sedation, still had axniety  Prozac - sexual side effects  Cymbalta Sexual side effects and retrograde ejaculation    Review of Systems   PSYCHIATRIC: Pertinant items are noted in the narrative.  MUSCULOSKELETAL: No pain or stiffness of the joints.  NEUROLOGIC: No weakness, sensory changes, seizures, confusion, memory loss, tremor or other abnormal movements.  ENT: No dizziness, tinnitus or hearing loss.  RESPIRATORY: No shortness of breath.  CARDIOVASCULAR: No tachycardia or chest pain.  GASTROINTESTINAL: No nausea, vomiting, pain, constipation or diarrhea.    Past Medical, Family and Social History: The  patient's past medical, family and social history have been reviewed and updated as appropriate within the electronic medical record - see encounter notes.    Compliance: yes    Side effects: None    Risk Parameters:  Patient reports no suicidal ideation  Patient reports no homicidal ideation  Patient reports no self-injurious behavior  Patient reports no violent behavior    Exam (detailed: at least 9 elements; comprehensive: all 15 elements)   Constitutional  Vitals:    There were no vitals filed for this visit.     General:  unremarkable, age appropriate     Musculoskeletal  Muscle Strength/Tone:  not examined   Gait & Station:  THEA due to video visit      Psychiatric  Speech:  no latency; no press   Mood & Affect:  steady  congruent and appropriate   Thought Process:  normal and logical   Associations:  intact   Thought Content:  normal, no suicidality, no homicidality, delusions, or paranoia   Insight:  intact   Judgement: behavior is adequate to circumstances   Orientation:  grossly intact   Memory: intact for content of interview   Language: grossly intact   Attention Span & Concentration:  able to focus   Fund of Knowledge:  intact and appropriate to age and level of education     Assessment and Diagnosis   Status/Progress: Based on the examination today, the patient's problem(s) is/are improved.  New problems have not been presented today.   Co-morbidities, Diagnostic uncertainty and Lack of compliance are not complicating management of the primary condition.  There are no active rule-out diagnoses for this patient at this time.     General Impression:       ICD-10-CM ICD-9-CM   1. Adjustment disorder with mixed anxiety and depressed mood  F43.23 309.28   2. Panic attack  F41.0 300.01   3. Insomnia due to other mental disorder  F51.05 300.9    F99 327.02           Intervention/Counseling/Treatment Plan   Treatment Plan/Recommendations:   Medication Management: Continue current medications.   Wellbutrin XL  150 mg daily  Buspar - 10 mg TID  Increase Effexor to 150mg mg daily   Start xanax 1mg daily prn anxiety    Return to Clinic: 1 month    Brandon Burdick PA-C

## 2023-04-28 NOTE — LETTER
AUTHORIZATION FOR RELEASE OF   CONFIDENTIAL INFORMATION        We are seeing Dominic Collazo, date of birth 1980, in the clinic at [facility]. Arjun Cross MD is the patient's PCP. Dominic Collazo has an outstanding lab/procedure at the time we reviewed his chart. In order to help keep his health information updated, he has authorized us to request the following medical record(s):        (  )  MAMMOGRAM                                      (  )  COLONOSCOPY      (  )  PAP SMEAR                                          (  )  OUTSIDE LAB RESULTS     (  )  DEXA SCAN                                          (x  )  EYE EXAM            (  )  FOOT EXAM                                          (  )  ENTIRE RECORD     (  )  OUTSIDE IMMUNIZATIONS                 (  )  _______________         Please fax records to [facility], [place], [place], [phone]     If you have any questions, please contact Tanisha at [phone].           Patient Name: Dominic Collazo  : 1980  Patient Phone #: [phone]

## 2023-05-03 ENCOUNTER — PATIENT MESSAGE (OUTPATIENT)
Dept: SLEEP MEDICINE | Facility: CLINIC | Age: 43
End: 2023-05-03
Payer: COMMERCIAL

## 2023-05-18 ENCOUNTER — PATIENT MESSAGE (OUTPATIENT)
Dept: SURGERY | Facility: HOSPITAL | Age: 43
End: 2023-05-18
Payer: COMMERCIAL

## 2023-05-18 ENCOUNTER — PATIENT MESSAGE (OUTPATIENT)
Dept: UROLOGY | Facility: CLINIC | Age: 43
End: 2023-05-18
Payer: COMMERCIAL

## 2023-05-23 DIAGNOSIS — E11.69 TYPE 2 DIABETES MELLITUS WITH MORBID OBESITY: ICD-10-CM

## 2023-05-23 DIAGNOSIS — E66.01 TYPE 2 DIABETES MELLITUS WITH MORBID OBESITY: ICD-10-CM

## 2023-05-24 ENCOUNTER — LAB VISIT (OUTPATIENT)
Dept: LAB | Facility: HOSPITAL | Age: 43
End: 2023-05-24
Payer: COMMERCIAL

## 2023-05-24 DIAGNOSIS — E29.1 PRIMARY MALE HYPOGONADISM: ICD-10-CM

## 2023-05-24 LAB — TESTOST SERPL-MCNC: 531 NG/DL (ref 304–1227)

## 2023-05-24 PROCEDURE — 84403 ASSAY OF TOTAL TESTOSTERONE: CPT | Performed by: NURSE PRACTITIONER

## 2023-05-24 PROCEDURE — 36415 COLL VENOUS BLD VENIPUNCTURE: CPT | Mod: PO | Performed by: NURSE PRACTITIONER

## 2023-05-24 RX ORDER — LANCETS
EACH MISCELLANEOUS
Qty: 200 EACH | Refills: 3 | Status: SHIPPED | OUTPATIENT
Start: 2023-05-24

## 2023-05-24 NOTE — TELEPHONE ENCOUNTER
No care due was identified.  [facility] Embedded Care Due Messages. Reference number: [id number].   5/23/2023 9:05:51 PM CDT

## 2023-05-25 ENCOUNTER — TELEPHONE (OUTPATIENT)
Dept: ANESTHESIOLOGY | Facility: HOSPITAL | Age: 43
End: 2023-05-25
Payer: COMMERCIAL

## 2023-05-25 ENCOUNTER — PATIENT MESSAGE (OUTPATIENT)
Dept: PSYCHIATRY | Facility: CLINIC | Age: 43
End: 2023-05-25
Payer: COMMERCIAL

## 2023-05-28 ENCOUNTER — PATIENT MESSAGE (OUTPATIENT)
Dept: SURGERY | Facility: HOSPITAL | Age: 43
End: 2023-05-28
Payer: COMMERCIAL

## 2023-05-29 ENCOUNTER — TELEPHONE (OUTPATIENT)
Dept: SLEEP MEDICINE | Facility: OTHER | Age: 43
End: 2023-05-29
Payer: COMMERCIAL

## 2023-05-31 ENCOUNTER — OFFICE VISIT (OUTPATIENT)
Dept: PSYCHIATRY | Facility: CLINIC | Age: 43
End: 2023-05-31
Payer: COMMERCIAL

## 2023-05-31 ENCOUNTER — PATIENT MESSAGE (OUTPATIENT)
Dept: SLEEP MEDICINE | Facility: CLINIC | Age: 43
End: 2023-05-31
Payer: COMMERCIAL

## 2023-05-31 DIAGNOSIS — F99 INSOMNIA DUE TO OTHER MENTAL DISORDER: ICD-10-CM

## 2023-05-31 DIAGNOSIS — Z79.890 LONG-TERM CURRENT USE OF TESTOSTERONE REPLACEMENT THERAPY: ICD-10-CM

## 2023-05-31 DIAGNOSIS — E29.1 MALE HYPOGONADISM: ICD-10-CM

## 2023-05-31 DIAGNOSIS — F43.23 ADJUSTMENT DISORDER WITH MIXED ANXIETY AND DEPRESSED MOOD: ICD-10-CM

## 2023-05-31 DIAGNOSIS — F41.0 GENERALIZED ANXIETY DISORDER WITH PANIC ATTACKS: Primary | ICD-10-CM

## 2023-05-31 DIAGNOSIS — E55.9 VITAMIN D INSUFFICIENCY: ICD-10-CM

## 2023-05-31 DIAGNOSIS — F51.05 INSOMNIA DUE TO OTHER MENTAL DISORDER: ICD-10-CM

## 2023-05-31 DIAGNOSIS — F41.1 GENERALIZED ANXIETY DISORDER WITH PANIC ATTACKS: Primary | ICD-10-CM

## 2023-05-31 PROCEDURE — 3066F PR DOCUMENTATION OF TREATMENT FOR NEPHROPATHY: ICD-10-PCS | Mod: CPTII,95,, | Performed by: PHYSICIAN ASSISTANT

## 2023-05-31 PROCEDURE — 3044F HG A1C LEVEL LT 7.0%: CPT | Mod: CPTII,95,, | Performed by: PHYSICIAN ASSISTANT

## 2023-05-31 PROCEDURE — 1160F PR REVIEW ALL MEDS BY PRESCRIBER/CLIN PHARMACIST DOCUMENTED: ICD-10-PCS | Mod: CPTII,95,, | Performed by: PHYSICIAN ASSISTANT

## 2023-05-31 PROCEDURE — 99214 PR OFFICE/OUTPT VISIT, EST, LEVL IV, 30-39 MIN: ICD-10-PCS | Mod: 95,,, | Performed by: PHYSICIAN ASSISTANT

## 2023-05-31 PROCEDURE — 3061F NEG MICROALBUMINURIA REV: CPT | Mod: CPTII,95,, | Performed by: PHYSICIAN ASSISTANT

## 2023-05-31 PROCEDURE — 1160F RVW MEDS BY RX/DR IN RCRD: CPT | Mod: CPTII,95,, | Performed by: PHYSICIAN ASSISTANT

## 2023-05-31 PROCEDURE — 3061F PR NEG MICROALBUMINURIA RESULT DOCUMENTED/REVIEW: ICD-10-PCS | Mod: CPTII,95,, | Performed by: PHYSICIAN ASSISTANT

## 2023-05-31 PROCEDURE — 99214 OFFICE O/P EST MOD 30 MIN: CPT | Mod: 95,,, | Performed by: PHYSICIAN ASSISTANT

## 2023-05-31 PROCEDURE — 1159F PR MEDICATION LIST DOCUMENTED IN MEDICAL RECORD: ICD-10-PCS | Mod: CPTII,95,, | Performed by: PHYSICIAN ASSISTANT

## 2023-05-31 PROCEDURE — 1159F MED LIST DOCD IN RCRD: CPT | Mod: CPTII,95,, | Performed by: PHYSICIAN ASSISTANT

## 2023-05-31 PROCEDURE — 3066F NEPHROPATHY DOC TX: CPT | Mod: CPTII,95,, | Performed by: PHYSICIAN ASSISTANT

## 2023-05-31 PROCEDURE — 3044F PR MOST RECENT HEMOGLOBIN A1C LEVEL <7.0%: ICD-10-PCS | Mod: CPTII,95,, | Performed by: PHYSICIAN ASSISTANT

## 2023-05-31 NOTE — PROGRESS NOTES
"Outpatient Psychiatry Follow-Up Visit (MD/NP)    5/31/2023     The patient location is: [place]  The chief complaint leading to consultation is: depression and anxiety    Visit type: audiovisual    Face to Face time with patient: 34 minutes  42 minutes of total time spent on the encounter, which includes face to face time and non-face to face time preparing to see the patient (eg, review of tests), Obtaining and/or reviewing separately obtained history, Documenting clinical information in the electronic or other health record, Independently interpreting results (not separately reported) and communicating results to the patient/family/caregiver, or Care coordination (not separately reported).     Each patient to whom he or she provides medical services by telemedicine is:  (1) informed of the relationship between the physician and patient and the respective role of any other health care provider with respect to management of the patient; and (2) notified that he or she may decline to receive medical services by telemedicine and may withdraw from such care at any time.    Clinical Status of Patient:  Outpatient (Ambulatory)    Chief Complaint:  Dominic Collazo is a 42 y.o. male who presents today for follow-up of depression, anxiety and adjustment problems.  Met with patient.      Interval History and Content of Current Session:  Interim Events/Subjective Report/Content of Current Session:     Pt discusses his current testosterone therapy. Currently seeing urology. Last levels in 500s, wnl. Pt reports he has become increasingly emotional, "feel like i'm about to start crying all the time. Very emotional, its not like me. I'm worried my estrogen is too high." Discussed with pt and pt requests to have estrogen level drawn. Will order lab and let patient f/u with urologist.    Reports "I have total exhaustion, i'm tired all the time even though i'm sleeping better." States sleep improved on ambien and " "melatonin at bedtime.    Pt reports today: "anxiety is getting better on effexor, less panicky and able to calm myself"    Patients mood is steady, affect appears mood congruent. Linear and logical, friendly and cooperative, good eye contact.    Denies SI/HI/AVH. Pt reports sleeping improved 5-7 hr per night and normal appetite. Denies other AE of medications besides feeling increasingly emotional.    Pt reports taking medications as prescribed and behaving appropriately during interview today.        Prior visit:    Pt reports today: "previously on ativan, wellbutrin, and cymbalta, had fogginess".    Switched to effexor 1 year ago at 75mg, still taking wellbutrin and buspar 10mg bid. States has been xanax more frequently from old prescription. States used to use 1-2x per month and now using almost daily.    Discussed increasing effexor to 150mg daily for better control of depression and anxiety.    States job is stressful and lost two close relatives in the last 6 months, has also adopted an 19 yo female.    Reports having panic attacks 1-2 per week recently, states sometimes triggered by outside stimuli and some panic attacks not caused by a specific reason.    Has been on wellbutrin for many years up to 300mg, now currently at 150mg daily. States he does not believe it has worsened anxiety but likely benefited.    Patients mood is steady, affect appears mood congruent. Linear and logical, friendly and cooperative, good eye contact.    Denies SI/HI/AVH. Pt reports sleeping poorly 3-4 and has hx LYNDA, states compliant with CPAP and normal appetite. Denies side effects of medications.    Pt reports taking medications as prescribed and behaving appropriately during interview today.    Previous medication trials:  Ativan- effective  Seroquel- sedation  Lexapro  Wellbutrin and Buspar- effective,   vistaril- sedation, still had axniety  Prozac - sexual side effects  Cymbalta Sexual side effects and retrograde " ejaculation    Review of Systems   PSYCHIATRIC: Pertinant items are noted in the narrative.  MUSCULOSKELETAL: No pain or stiffness of the joints.  NEUROLOGIC: No weakness, sensory changes, seizures, confusion, memory loss, tremor or other abnormal movements.  ENT: No dizziness, tinnitus or hearing loss.  RESPIRATORY: No shortness of breath.  CARDIOVASCULAR: No tachycardia or chest pain.  GASTROINTESTINAL: No nausea, vomiting, pain, constipation or diarrhea.    Past Medical, Family and Social History: The patient's past medical, family and social history have been reviewed and updated as appropriate within the electronic medical record - see encounter notes.    Compliance: yes    Side effects: see above    Risk Parameters:  Patient reports no suicidal ideation  Patient reports no homicidal ideation  Patient reports no self-injurious behavior  Patient reports no violent behavior    Exam (detailed: at least 9 elements; comprehensive: all 15 elements)   Constitutional  Vitals:    There were no vitals filed for this visit.     General:  unremarkable, age appropriate     Musculoskeletal  Muscle Strength/Tone:  not examined   Gait & Station:  THEA due to video visit      Psychiatric  Speech:  no latency; no press   Mood & Affect:  steady  congruent and appropriate   Thought Process:  normal and logical   Associations:  intact   Thought Content:  normal, no suicidality, no homicidality, delusions, or paranoia   Insight:  intact   Judgement: behavior is adequate to circumstances   Orientation:  grossly intact   Memory: intact for content of interview   Language: grossly intact   Attention Span & Concentration:  able to focus   Fund of Knowledge:  intact and appropriate to age and level of education     Assessment and Diagnosis   Status/Progress: Based on the examination today, the patient's problem(s) is/are improved and inadequately controlled.  New problems have not been presented today.   Co-morbidities, Diagnostic  uncertainty and Lack of compliance are not complicating management of the primary condition.  There are no active rule-out diagnoses for this patient at this time.     General Impression:       ICD-10-CM ICD-9-CM   1. Generalized anxiety disorder with panic attacks  F41.1 300.02    F41.0 300.01   2. Insomnia due to other mental disorder  F51.05 300.9    F99 327.02   3. Male hypogonadism  E29.1 257.2   4. Vitamin D insufficiency  E55.9 268.9   5. Long-term current use of testosterone replacement therapy  Z79.890 V58.69   6. Adjustment disorder with mixed anxiety and depressed mood  F43.23 309.28             Intervention/Counseling/Treatment Plan   Treatment Plan/Recommendations:   Medication Management: Continue current medications.   Wellbutrin  mg daily  Buspar - 10 mg bid  continue Effexor to 150mg mg daily   continue xanax 1mg daily prn anxiety  Continue ambien 10mg daily    Continue nuvigil 250mg daily as prescribed by sleep medicine provider    Labs: vitamin D, estradiol    F/u with urologist for management of testosterone therapy and to discuss current concern of side effects    Return to Clinic: 6 weeks    Brandon Burdick PA-C

## 2023-06-01 ENCOUNTER — PATIENT MESSAGE (OUTPATIENT)
Dept: PSYCHIATRY | Facility: CLINIC | Age: 43
End: 2023-06-01
Payer: COMMERCIAL

## 2023-06-01 ENCOUNTER — LAB VISIT (OUTPATIENT)
Dept: LAB | Facility: HOSPITAL | Age: 43
End: 2023-06-01
Attending: PHYSICIAN ASSISTANT
Payer: COMMERCIAL

## 2023-06-01 DIAGNOSIS — E55.9 VITAMIN D INSUFFICIENCY: ICD-10-CM

## 2023-06-01 DIAGNOSIS — Z79.890 LONG-TERM CURRENT USE OF TESTOSTERONE REPLACEMENT THERAPY: ICD-10-CM

## 2023-06-01 DIAGNOSIS — E29.1 MALE HYPOGONADISM: ICD-10-CM

## 2023-06-01 LAB
25(OH)D3+25(OH)D2 SERPL-MCNC: 48 NG/ML (ref 30–96)
ESTRADIOL SERPL-MCNC: 31 PG/ML (ref 11–44)

## 2023-06-01 PROCEDURE — 36415 COLL VENOUS BLD VENIPUNCTURE: CPT | Performed by: PHYSICIAN ASSISTANT

## 2023-06-01 PROCEDURE — 82670 ASSAY OF TOTAL ESTRADIOL: CPT | Performed by: PHYSICIAN ASSISTANT

## 2023-06-01 PROCEDURE — 82306 VITAMIN D 25 HYDROXY: CPT | Performed by: PHYSICIAN ASSISTANT

## 2023-06-02 DIAGNOSIS — G47.30 SLEEP APNEA WITH HYPERSOMNOLENCE: Primary | ICD-10-CM

## 2023-06-02 DIAGNOSIS — G47.10 SLEEP APNEA WITH HYPERSOMNOLENCE: Primary | ICD-10-CM

## 2023-06-02 RX ORDER — SOLRIAMFETOL 75 MG/1
75 TABLET, FILM COATED ORAL DAILY
Qty: 7 TABLET | Refills: 0 | Status: SHIPPED | OUTPATIENT
Start: 2023-06-02 | End: 2023-07-06

## 2023-06-03 ENCOUNTER — PATIENT MESSAGE (OUTPATIENT)
Dept: ORTHOPEDICS | Facility: CLINIC | Age: 43
End: 2023-06-03
Payer: COMMERCIAL

## 2023-06-03 ENCOUNTER — PATIENT MESSAGE (OUTPATIENT)
Dept: UROLOGY | Facility: CLINIC | Age: 43
End: 2023-06-03
Payer: COMMERCIAL

## 2023-06-06 DIAGNOSIS — E29.1 PRIMARY MALE HYPOGONADISM: Primary | ICD-10-CM

## 2023-06-06 DIAGNOSIS — S43.432D SUPERIOR GLENOID LABRUM LESION OF LEFT SHOULDER, SUBSEQUENT ENCOUNTER: Primary | ICD-10-CM

## 2023-06-12 ENCOUNTER — TELEPHONE (OUTPATIENT)
Dept: BARIATRICS | Facility: CLINIC | Age: 43
End: 2023-06-12
Payer: COMMERCIAL

## 2023-06-12 NOTE — TELEPHONE ENCOUNTER
Called pt to check in to find out if he was still interested in Bariatric sx.  No answer left detailed VM with callback name and number. Will reach out via the portal also.

## 2023-06-20 ENCOUNTER — PATIENT MESSAGE (OUTPATIENT)
Dept: BARIATRICS | Facility: CLINIC | Age: 43
End: 2023-06-20
Payer: COMMERCIAL

## 2023-06-20 ENCOUNTER — PATIENT MESSAGE (OUTPATIENT)
Dept: PSYCHIATRY | Facility: CLINIC | Age: 43
End: 2023-06-20
Payer: COMMERCIAL

## 2023-06-20 ENCOUNTER — PATIENT MESSAGE (OUTPATIENT)
Dept: ORTHOPEDICS | Facility: CLINIC | Age: 43
End: 2023-06-20
Payer: COMMERCIAL

## 2023-06-20 ENCOUNTER — PATIENT MESSAGE (OUTPATIENT)
Dept: SLEEP MEDICINE | Facility: CLINIC | Age: 43
End: 2023-06-20
Payer: COMMERCIAL

## 2023-06-20 DIAGNOSIS — F41.0 PANIC ATTACK: ICD-10-CM

## 2023-06-20 RX ORDER — ALPRAZOLAM 1 MG/1
1 TABLET ORAL DAILY PRN
Qty: 30 TABLET | Refills: 1 | Status: SHIPPED | OUTPATIENT
Start: 2023-06-20 | End: 2023-08-21 | Stop reason: SDUPTHER

## 2023-06-21 ENCOUNTER — PATIENT MESSAGE (OUTPATIENT)
Dept: SLEEP MEDICINE | Facility: CLINIC | Age: 43
End: 2023-06-21
Payer: COMMERCIAL

## 2023-06-22 ENCOUNTER — TELEPHONE (OUTPATIENT)
Dept: SLEEP MEDICINE | Facility: OTHER | Age: 43
End: 2023-06-22
Payer: COMMERCIAL

## 2023-06-26 ENCOUNTER — TELEPHONE (OUTPATIENT)
Dept: SLEEP MEDICINE | Facility: OTHER | Age: 43
End: 2023-06-26
Payer: COMMERCIAL

## 2023-06-26 ENCOUNTER — PATIENT MESSAGE (OUTPATIENT)
Dept: SLEEP MEDICINE | Facility: OTHER | Age: 43
End: 2023-06-26
Payer: COMMERCIAL

## 2023-06-26 ENCOUNTER — TELEPHONE (OUTPATIENT)
Dept: BARIATRICS | Facility: CLINIC | Age: 43
End: 2023-06-26
Payer: COMMERCIAL

## 2023-06-26 NOTE — TELEPHONE ENCOUNTER
Called pt in regarding continuing Bariatric sx workup. Pt stated that he was not interesting in paying $10,000.00 nor could he afford it. Scheduled pt a financial consult for 7/3/2023. Informed pt to reach to the clinic if he decides to move forward. Understanding stated. All questions and concerns addressed.

## 2023-06-27 ENCOUNTER — TELEPHONE (OUTPATIENT)
Dept: SLEEP MEDICINE | Facility: OTHER | Age: 43
End: 2023-06-27
Payer: COMMERCIAL

## 2023-06-29 ENCOUNTER — TELEPHONE (OUTPATIENT)
Dept: SURGERY | Facility: CLINIC | Age: 43
End: 2023-06-29
Payer: COMMERCIAL

## 2023-07-02 ENCOUNTER — PATIENT MESSAGE (OUTPATIENT)
Dept: INTERNAL MEDICINE | Facility: CLINIC | Age: 43
End: 2023-07-02
Payer: COMMERCIAL
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
## 2023-07-06 ENCOUNTER — PATIENT MESSAGE (OUTPATIENT)
Dept: INTERNAL MEDICINE | Facility: CLINIC | Age: 43
End: 2023-07-06
Payer: COMMERCIAL

## 2023-07-06 ENCOUNTER — DOCUMENTATION ONLY (OUTPATIENT)
Dept: BARIATRICS | Facility: CLINIC | Age: 43
End: 2023-07-06
Payer: COMMERCIAL

## 2023-07-06 ENCOUNTER — PATIENT MESSAGE (OUTPATIENT)
Dept: BARIATRICS | Facility: CLINIC | Age: 43
End: 2023-07-06
Payer: COMMERCIAL

## 2023-07-06 DIAGNOSIS — G47.30 SLEEP APNEA WITH HYPERSOMNOLENCE: Primary | ICD-10-CM

## 2023-07-06 DIAGNOSIS — G47.10 SLEEP APNEA WITH HYPERSOMNOLENCE: Primary | ICD-10-CM

## 2023-07-06 RX ORDER — SOLRIAMFETOL 150 MG/1
150 TABLET, FILM COATED ORAL DAILY
Qty: 30 TABLET | Refills: 5 | Status: SHIPPED | OUTPATIENT
Start: 2023-07-06 | End: 2024-02-21 | Stop reason: SDUPTHER

## 2023-07-06 RX ORDER — SOLRIAMFETOL 150 MG/1
TABLET, FILM COATED ORAL
Qty: 30 TABLET | Refills: 0 | OUTPATIENT
Start: 2023-07-06 | End: 2023-08-05

## 2023-07-06 NOTE — TELEPHONE ENCOUNTER
Requested Prescriptions     Pending Prescriptions Disp Refills    solriamfetoL (SUNOSI) 150 mg Tab 30 tablet 0     Sig: Take 75 mg by mouth daily as needed (excessive sleepiness) for 7 days, THEN 150 mg daily as needed (excessive sleepiness).     LOV:10/13/2022

## 2023-07-10 ENCOUNTER — CLINICAL SUPPORT (OUTPATIENT)
Dept: REHABILITATION | Facility: HOSPITAL | Age: 43
End: 2023-07-10
Payer: COMMERCIAL

## 2023-07-10 DIAGNOSIS — S43.432D SUPERIOR GLENOID LABRUM LESION OF LEFT SHOULDER, SUBSEQUENT ENCOUNTER: ICD-10-CM

## 2023-07-10 DIAGNOSIS — M25.612 DECREASED RANGE OF MOTION OF LEFT SHOULDER: ICD-10-CM

## 2023-07-10 PROCEDURE — 97110 THERAPEUTIC EXERCISES: CPT

## 2023-07-10 PROCEDURE — 97162 PT EVAL MOD COMPLEX 30 MIN: CPT
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
## 2023-07-13 ENCOUNTER — TELEPHONE (OUTPATIENT)
Dept: BARIATRICS | Facility: CLINIC | Age: 43
End: 2023-07-13
Payer: COMMERCIAL

## 2023-07-13 RX ORDER — TESTOSTERONE CYPIONATE 200 MG/ML
100 INJECTION, SOLUTION INTRAMUSCULAR
Qty: 4 ML | Refills: 5 | OUTPATIENT
Start: 2023-07-13 | End: 2024-01-11

## 2023-07-13 NOTE — TELEPHONE ENCOUNTER
Called pt to confirm continuation in Bariatric sx program. No answer, left VM w/call back name and number.

## 2023-07-17 ENCOUNTER — OFFICE VISIT (OUTPATIENT)
Dept: PSYCHIATRY | Facility: CLINIC | Age: 43
End: 2023-07-17
Payer: COMMERCIAL

## 2023-07-17 DIAGNOSIS — F43.23 ADJUSTMENT DISORDER WITH MIXED ANXIETY AND DEPRESSED MOOD: ICD-10-CM

## 2023-07-17 DIAGNOSIS — F41.0 GENERALIZED ANXIETY DISORDER WITH PANIC ATTACKS: Primary | ICD-10-CM

## 2023-07-17 DIAGNOSIS — F99 INSOMNIA DUE TO OTHER MENTAL DISORDER: ICD-10-CM

## 2023-07-17 DIAGNOSIS — F51.05 INSOMNIA DUE TO OTHER MENTAL DISORDER: ICD-10-CM

## 2023-07-17 DIAGNOSIS — E55.9 VITAMIN D INSUFFICIENCY: ICD-10-CM

## 2023-07-17 DIAGNOSIS — F41.1 GENERALIZED ANXIETY DISORDER WITH PANIC ATTACKS: Primary | ICD-10-CM

## 2023-07-17 PROCEDURE — 1160F RVW MEDS BY RX/DR IN RCRD: CPT | Mod: CPTII,95,, | Performed by: PHYSICIAN ASSISTANT

## 2023-07-17 PROCEDURE — 99214 OFFICE O/P EST MOD 30 MIN: CPT | Mod: 95,,, | Performed by: PHYSICIAN ASSISTANT

## 2023-07-17 PROCEDURE — 3061F NEG MICROALBUMINURIA REV: CPT | Mod: CPTII,95,, | Performed by: PHYSICIAN ASSISTANT

## 2023-07-17 PROCEDURE — 1160F PR REVIEW ALL MEDS BY PRESCRIBER/CLIN PHARMACIST DOCUMENTED: ICD-10-PCS | Mod: CPTII,95,, | Performed by: PHYSICIAN ASSISTANT

## 2023-07-17 PROCEDURE — 3044F HG A1C LEVEL LT 7.0%: CPT | Mod: CPTII,95,, | Performed by: PHYSICIAN ASSISTANT

## 2023-07-17 PROCEDURE — 1159F PR MEDICATION LIST DOCUMENTED IN MEDICAL RECORD: ICD-10-PCS | Mod: CPTII,95,, | Performed by: PHYSICIAN ASSISTANT

## 2023-07-17 PROCEDURE — 3066F NEPHROPATHY DOC TX: CPT | Mod: CPTII,95,, | Performed by: PHYSICIAN ASSISTANT

## 2023-07-17 PROCEDURE — 3061F PR NEG MICROALBUMINURIA RESULT DOCUMENTED/REVIEW: ICD-10-PCS | Mod: CPTII,95,, | Performed by: PHYSICIAN ASSISTANT

## 2023-07-17 PROCEDURE — 3044F PR MOST RECENT HEMOGLOBIN A1C LEVEL <7.0%: ICD-10-PCS | Mod: CPTII,95,, | Performed by: PHYSICIAN ASSISTANT

## 2023-07-17 PROCEDURE — 99214 PR OFFICE/OUTPT VISIT, EST, LEVL IV, 30-39 MIN: ICD-10-PCS | Mod: 95,,, | Performed by: PHYSICIAN ASSISTANT

## 2023-07-17 PROCEDURE — 3066F PR DOCUMENTATION OF TREATMENT FOR NEPHROPATHY: ICD-10-PCS | Mod: CPTII,95,, | Performed by: PHYSICIAN ASSISTANT

## 2023-07-17 PROCEDURE — 1159F MED LIST DOCD IN RCRD: CPT | Mod: CPTII,95,, | Performed by: PHYSICIAN ASSISTANT

## 2023-07-17 RX ORDER — HYDROXYZINE PAMOATE 50 MG/1
50 CAPSULE ORAL NIGHTLY PRN
Qty: 30 CAPSULE | Refills: 2 | Status: SHIPPED | OUTPATIENT
Start: 2023-07-17 | End: 2023-08-21

## 2023-07-17 NOTE — PROGRESS NOTES
"Outpatient Psychiatry Follow-Up Visit (MD/NP)    7/17/2023     The patient location is: [place]  The chief complaint leading to consultation is: depression and anxiety    Visit type: audiovisual    Face to Face time with patient: 27 minutes  29 minutes of total time spent on the encounter, which includes face to face time and non-face to face time preparing to see the patient (eg, review of tests), Obtaining and/or reviewing separately obtained history, Documenting clinical information in the electronic or other health record, Independently interpreting results (not separately reported) and communicating results to the patient/family/caregiver, or Care coordination (not separately reported).     Each patient to whom he or she provides medical services by telemedicine is:  (1) informed of the relationship between the physician and patient and the respective role of any other health care provider with respect to management of the patient; and (2) notified that he or she may decline to receive medical services by telemedicine and may withdraw from such care at any time.    Clinical Status of Patient:  Outpatient (Ambulatory)    Chief Complaint:  Dominic Collazo is a 42 y.o. male who presents today for follow-up of depression, anxiety and adjustment problems.  Met with patient.      Interval History and Content of Current Session:  Interim Events/Subjective Report/Content of Current Session:     Pt reports today: "have a lot of pain in my shoulder, have a torn labrum in my shoulder and I have to keep going to PT more"    Pt reports having significant job stress as he has not been getting his full normal compensation. Allan was offered another job which has helped calm his anxiety is the last week.    Reports has been having worsening insomnia recently due to stress and shoulder pain. Reports sleeping 3-4 hrs per night due to "unable to turn my mind off". States dealing with more daytime fatgue. Reports has " "been compliant with CPAP.    Patients mood is depressive and anxious, affect appears mood congruent. Linear and logical, friendly and cooperative, good eye contact.    Denies SI/HI/AVH. Pt reports sleeping poorly and decreased appetite due to ozempic. Denies other side effects of medications.    Pt reports currently on sunosi and nuvigil at the same time, unsure if this has contributed to insomnia issues.    Discussed starting vistaril 50mg qhs prn insomnia to take with ambien if needed for insomnia.    Pt reports taking medications as prescribed and behaving appropriately during interview today.      Prior visit:    Pt discusses his current testosterone therapy. Currently seeing urology. Last levels in 500s, wnl. Pt reports he has become increasingly emotional, "feel like i'm about to start crying all the time. Very emotional, its not like me. I'm worried my estrogen is too high." Discussed with pt and pt requests to have estrogen level drawn. Will order lab and let patient f/u with urologist.    Reports "I have total exhaustion, i'm tired all the time even though i'm sleeping better." States sleep improved on ambien and melatonin at bedtime.    Pt reports today: "anxiety is getting better on effexor, less panicky and able to calm myself"    Patients mood is steady, affect appears mood congruent. Linear and logical, friendly and cooperative, good eye contact.    Denies SI/HI/AVH. Pt reports sleeping improved 5-7 hr per night and normal appetite. Denies other AE of medications besides feeling increasingly emotional.    Pt reports taking medications as prescribed and behaving appropriately during interview today.    Previous medication trials:  Ativan- effective  Seroquel- sedation  Lexapro  Wellbutrin and Buspar- effective,   vistaril- sedation, still had axniety  Prozac - sexual side effects  Cymbalta Sexual side effects and retrograde ejaculation    Review of Systems   PSYCHIATRIC: Pertinant items are noted in the " narrative.  MUSCULOSKELETAL: No pain or stiffness of the joints.  NEUROLOGIC: No weakness, sensory changes, seizures, confusion, memory loss, tremor or other abnormal movements.  ENT: No dizziness, tinnitus or hearing loss.  RESPIRATORY: No shortness of breath.  CARDIOVASCULAR: No tachycardia or chest pain.  GASTROINTESTINAL: No nausea, vomiting, pain, constipation or diarrhea.    Past Medical, Family and Social History: The patient's past medical, family and social history have been reviewed and updated as appropriate within the electronic medical record - see encounter notes.    Compliance: yes    Side effects: see above    Risk Parameters:  Patient reports no suicidal ideation  Patient reports no homicidal ideation  Patient reports no self-injurious behavior  Patient reports no violent behavior    Exam (detailed: at least 9 elements; comprehensive: all 15 elements)   Constitutional  Vitals:    There were no vitals filed for this visit.     General:  unremarkable, age appropriate     Musculoskeletal  Muscle Strength/Tone:  not examined   Gait & Station:  THEA due to video visit      Psychiatric  Speech:  no latency; no press   Mood & Affect:  Depressive, anxious  congruent and appropriate   Thought Process:  normal and logical   Associations:  intact   Thought Content:  normal, no suicidality, no homicidality, delusions, or paranoia   Insight:  intact   Judgement: behavior is adequate to circumstances   Orientation:  grossly intact   Memory: intact for content of interview   Language: grossly intact   Attention Span & Concentration:  able to focus   Fund of Knowledge:  intact and appropriate to age and level of education     Assessment and Diagnosis   Status/Progress: Based on the examination today, the patient's problem(s) is/are inadequately controlled.  New problems have not been presented today.   Co-morbidities, Diagnostic uncertainty and Lack of compliance are not complicating management of the primary  condition.  There are no active rule-out diagnoses for this patient at this time.     General Impression:       ICD-10-CM ICD-9-CM   1. Generalized anxiety disorder with panic attacks  F41.1 300.02    F41.0 300.01   2. Insomnia due to other mental disorder  F51.05 300.9    F99 327.02   3. Vitamin D insufficiency  E55.9 268.9         Intervention/Counseling/Treatment Plan   Treatment Plan/Recommendations:   Medication Management: Continue current medications.   Wellbutrin  mg daily  Buspar - 10 mg bid  continue Effexor to 150mg mg daily   continue xanax 1mg daily prn anxiety  Continue ambien 10mg daily    Start vistaril 50mg qhs prn insomnia    Continue sonisi nuvigil 250mg daily as prescribed by sleep medicine provider      Return to Clinic: 1 month    Brandon Burdick PA-C

## 2023-07-18 ENCOUNTER — TELEPHONE (OUTPATIENT)
Dept: BARIATRICS | Facility: CLINIC | Age: 43
End: 2023-07-18
Payer: COMMERCIAL

## 2023-07-18 RX ORDER — TESTOSTERONE CYPIONATE 200 MG/ML
100 INJECTION, SOLUTION INTRAMUSCULAR
Qty: 4 ML | Refills: 5 | Status: CANCELLED | OUTPATIENT
Start: 2023-07-18 | End: 2024-01-16

## 2023-07-18 NOTE — TELEPHONE ENCOUNTER
Returned pt's portal message. Called pt to scheduling Bariatric workup, no answer will left VM w/detailed information for return call. Will send portal message.

## 2023-07-18 NOTE — PROGRESS NOTES
[facility]   Physical Therapy Treatment Note      Name: Dominic Collazo  Clinic Number: [id number]    Therapy Diagnosis:   Encounter Diagnoses   Name Primary?    Superior glenoid labrum lesion of left shoulder, subsequent encounter Yes    Decreased range of motion of left shoulder      Physician: Sea Valadez MD    Visit Date: 7/19/2023    Physician Orders: PT Eval and Treat   Medical Diagnosis from Referral: S43.432D (ICD-10-CM) - Superior glenoid labrum lesion of left shoulder, subsequent encounter   Evaluation Date: 7/10/2023  Authorization Period Expiration: 06/05/2024   Plan of Care Expiration: 10/10/2023  Progress Note Due: 8/10/2023  Visit # / Visits authorized: 1/1, 1/20  FOTO: 1/3     Precautions: Standard     PTA Visit #: 1/5     Time In: 11:00 AM   Time Out: 11:45 AM   Total Billable Time: 45 minutes    Subjective     Pt reports increased pain after last treatment, therefore, patient canceled his next appointment     He was compliant with home exercise program.  Response to previous treatment: increased pain after last treatment.  Functional change: none    Pain: 2/10  Location: left shoulder      Objective      Objective Measures updated at progress report unless specified.     Treatment     Dominic received the treatments listed below:      therapeutic exercises to develop strength, endurance, ROM, flexibility, and posture for 15 minutes including:     UBE 3 minutes / 3 minutes   Pulley's 3 minutes flexion / 3 minutes scaption   Cross body posterior capsule stretch with scapular retraction and fixed upper extremity support 3 x 10   St. Landmine Press with step thru   Seated No Moneys with green thera band 3 x 10       NEXT VISIT   Lateral hangs      manual therapy techniques: Joint mobilizations were applied to the: left shoulder for 5 minutes, including:     Posterior GHJ mobilizations grade 2-3  Inferior GHJ mobilizations grade 2-3    Patient Education and  Home Exercises       Education provided:   - role of Physical Therapy, plan of care, home exercise program, prognosis, diagnosis  - shoulder anatomy with deficits present and contribution to symptoms     Written Home Exercises Provided: yes. Exercises were reviewed and Dominic was able to demonstrate them prior to the end of the session.  Dominic demonstrated good  understanding of the education provided. See EMR under Patient Instructions for exercises provided during therapy sessions.    Assessment     Patient was able to complete therapeutic exercise today with minimal reports of discomfort throughout treatment. Patient with some discomfort throughout pulley flexion stretching, however, patient with relief with relaxation.  Physical Therapist Assistant discussed with Physical Therapist patient performing lat hangs upon next treatment session to help increase flexion mobility, Physical Therapist agreed. Patient with some tightness throughout inferior and posterior capsular tightness, however, with reports of slight discomfort throughout posterior mobs today than last treatment session.     Dominic Is progressing well towards his goals.   Pt prognosis is Good.     Pt will continue to benefit from skilled outpatient physical therapy to address the deficits listed in the problem list box on initial evaluation, provide pt/family education and to maximize pt's level of independence in the home and community environment.     Pt's spiritual, cultural and educational needs considered and pt agreeable to plan of care and goals.     Anticipated barriers to physical therapy: none    Goals:   Short Term Goals:  6 weeks  1.Report decreased left shoulder pain < / =  4/10  to increase tolerance for overhead motions  2. Increase PROM flexion to 180 degrees   3. Increased strength by 1/3 MMT grade in external rotators to increase tolerance for ADL and work activities.  4. Pt to tolerate HEP to improve ROM and independence with  ADL's     Long Term Goals: 12 weeks  1.Report decreased left shoulder pain  < / =  1 /10  to increase tolerance for overhead motions  2.Increase AROM to 170 degrees in flexion and abduction  3.Increase strength to >/= 4/5 in external rotators to increase tolerance for ADL and work activities.  5. Pt will have improved (20-40% limited) on FOTO shoulder in order to demonstrate true functional improvement.     Plan   Continue with Physical Therapist Plan of Care.     eNd Noriega, PTA

## 2023-07-19 ENCOUNTER — TELEPHONE (OUTPATIENT)
Dept: BARIATRICS | Facility: CLINIC | Age: 43
End: 2023-07-19
Payer: COMMERCIAL

## 2023-07-19 ENCOUNTER — CLINICAL SUPPORT (OUTPATIENT)
Dept: REHABILITATION | Facility: HOSPITAL | Age: 43
End: 2023-07-19
Payer: COMMERCIAL

## 2023-07-19 DIAGNOSIS — M25.612 DECREASED RANGE OF MOTION OF LEFT SHOULDER: ICD-10-CM

## 2023-07-19 DIAGNOSIS — S43.432D SUPERIOR GLENOID LABRUM LESION OF LEFT SHOULDER, SUBSEQUENT ENCOUNTER: Primary | ICD-10-CM

## 2023-07-19 PROCEDURE — 97110 THERAPEUTIC EXERCISES: CPT | Mod: CQ

## 2023-07-19 PROCEDURE — 97140 MANUAL THERAPY 1/> REGIONS: CPT | Mod: CQ

## 2023-07-19 NOTE — TELEPHONE ENCOUNTER
Called pt to schedule Bariatric sx workup appts. No answer, left VM with call back name and number.

## 2023-07-20 RX ORDER — TESTOSTERONE CYPIONATE 200 MG/ML
100 INJECTION, SOLUTION INTRAMUSCULAR
Qty: 4 ML | Refills: 5 | OUTPATIENT
Start: 2023-07-20 | End: 2024-01-18

## 2023-07-21 RX ORDER — METOPROLOL SUCCINATE 100 MG/1
TABLET, EXTENDED RELEASE ORAL
Qty: 90 TABLET | Refills: 3 | Status: SHIPPED | OUTPATIENT
Start: 2023-07-21 | End: 2023-08-31 | Stop reason: SDUPTHER

## 2023-07-21 NOTE — TELEPHONE ENCOUNTER
Care Due:                  Date            Visit Type   Department     Provider  --------------------------------------------------------------------------------                                EP -                              PRIMARY      MET INTERNAL  Last Visit: 04-      CARE ([facility])   MEDICINE       Arjun Cross  Next Visit: None Scheduled  None         None Found                                                            Last  Test          Frequency    Reason                     Performed    Due Date  --------------------------------------------------------------------------------    HBA1C.......  6 months...  metFORMIN, semaglutide...  04-   10-    [facility] Embedded Care Due Messages. Reference number: [id number].   7/21/2023 4:08:51 AM CDT

## 2023-07-21 NOTE — TELEPHONE ENCOUNTER
Provider Staff:  Action required for this patient     Please see care gap opportunities below in Care Due Message.    Thanks!  [facility]     Appointments      Date Provider   Last Visit   4/27/2023 Arjun Cross MD   Next Visit   Visit date not found Arjun Cross MD     Refill Decision Note   Dominic Collazo  is requesting a refill authorization.  Brief Assessment and Rationale for Refill:  Approve     Medication Therapy Plan:         Comments:     Note composed:8:40 AM 07/21/2023

## 2023-07-23 ENCOUNTER — HOSPITAL ENCOUNTER (EMERGENCY)
Facility: HOSPITAL | Age: 43
Discharge: HOME | End: 2023-07-23
Attending: EMERGENCY MEDICINE
Payer: COMMERCIAL

## 2023-07-23 VITALS
TEMPERATURE: 99 F | BODY MASS INDEX: 39.17 KG/M2 | SYSTOLIC BLOOD PRESSURE: 128 MMHG | RESPIRATION RATE: 16 BRPM | HEIGHT: 75 IN | OXYGEN SATURATION: 98 % | HEART RATE: 88 BPM | WEIGHT: 315 LBS | DIASTOLIC BLOOD PRESSURE: 79 MMHG

## 2023-07-23 DIAGNOSIS — R55 VASOVAGAL SYNCOPE: Primary | ICD-10-CM

## 2023-07-23 DIAGNOSIS — M79.672 LEFT FOOT PAIN: ICD-10-CM

## 2023-07-23 DIAGNOSIS — S90.32XA CONTUSION OF LEFT FOOT, INITIAL ENCOUNTER: ICD-10-CM

## 2023-07-23 DIAGNOSIS — R42 DIZZINESS: ICD-10-CM

## 2023-07-23 LAB
ALBUMIN SERPL BCP-MCNC: 4.3 G/DL (ref 3.5–5.2)
ALP SERPL-CCNC: 59 U/L (ref 55–135)
ALT SERPL W/O P-5'-P-CCNC: 67 U/L (ref 10–44)
ANION GAP SERPL CALC-SCNC: 15 MMOL/L (ref 8–16)
AST SERPL-CCNC: 35 U/L (ref 10–40)
BASOPHILS # BLD AUTO: 0.09 K/UL (ref 0–0.2)
BASOPHILS NFR BLD: 0.7 % (ref 0–1.9)
BILIRUB SERPL-MCNC: 0.4 MG/DL (ref 0.1–1)
BUN SERPL-MCNC: 9 MG/DL (ref 6–20)
CALCIUM SERPL-MCNC: 10.3 MG/DL (ref 8.7–10.5)
CHLORIDE SERPL-SCNC: 100 MMOL/L (ref 95–110)
CO2 SERPL-SCNC: 21 MMOL/L (ref 23–29)
CREAT SERPL-MCNC: 0.8 MG/DL (ref 0.5–1.4)
DIFFERENTIAL METHOD: ABNORMAL
EOSINOPHIL # BLD AUTO: 0.4 K/UL (ref 0–0.5)
EOSINOPHIL NFR BLD: 2.9 % (ref 0–8)
ERYTHROCYTE [DISTWIDTH] IN BLOOD BY AUTOMATED COUNT: 15.6 % (ref 11.5–14.5)
EST. GFR  (NO RACE VARIABLE): >60 ML/MIN/1.73 M^2
GLUCOSE SERPL-MCNC: 91 MG/DL (ref 70–110)
HCT VFR BLD AUTO: 51 % (ref 40–54)
HGB BLD-MCNC: 16.7 G/DL (ref 14–18)
IMM GRANULOCYTES # BLD AUTO: 0.05 K/UL (ref 0–0.04)
IMM GRANULOCYTES NFR BLD AUTO: 0.4 % (ref 0–0.5)
LYMPHOCYTES # BLD AUTO: 3.3 K/UL (ref 1–4.8)
LYMPHOCYTES NFR BLD: 25 % (ref 18–48)
MCH RBC QN AUTO: 28.5 PG (ref 27–31)
MCHC RBC AUTO-ENTMCNC: 32.7 G/DL (ref 32–36)
MCV RBC AUTO: 87 FL (ref 82–98)
MONOCYTES # BLD AUTO: 0.9 K/UL (ref 0.3–1)
MONOCYTES NFR BLD: 7 % (ref 4–15)
NEUTROPHILS # BLD AUTO: 8.4 K/UL (ref 1.8–7.7)
NEUTROPHILS NFR BLD: 64 % (ref 38–73)
NRBC BLD-RTO: 0 /100 WBC
PLATELET # BLD AUTO: 341 K/UL (ref 150–450)
PMV BLD AUTO: 10.1 FL (ref 9.2–12.9)
POTASSIUM SERPL-SCNC: 4.5 MMOL/L (ref 3.5–5.1)
PROT SERPL-MCNC: 7.7 G/DL (ref 6–8.4)
RBC # BLD AUTO: 5.85 M/UL (ref 4.6–6.2)
SODIUM SERPL-SCNC: 136 MMOL/L (ref 136–145)
TROPONIN I SERPL DL<=0.01 NG/ML-MCNC: <0.006 NG/ML (ref 0–0.03)
WBC # BLD AUTO: 13.04 K/UL (ref 3.9–12.7)

## 2023-07-23 PROCEDURE — 93005 ELECTROCARDIOGRAM TRACING: CPT

## 2023-07-23 PROCEDURE — 63600175 PHARM REV CODE 636 W HCPCS: Performed by: EMERGENCY MEDICINE

## 2023-07-23 PROCEDURE — 84484 ASSAY OF TROPONIN QUANT: CPT | Performed by: EMERGENCY MEDICINE

## 2023-07-23 PROCEDURE — 93010 EKG 12-LEAD: ICD-10-PCS | Mod: ,,, | Performed by: INTERNAL MEDICINE

## 2023-07-23 PROCEDURE — 96361 HYDRATE IV INFUSION ADD-ON: CPT

## 2023-07-23 PROCEDURE — 93010 ELECTROCARDIOGRAM REPORT: CPT | Mod: ,,, | Performed by: INTERNAL MEDICINE

## 2023-07-23 PROCEDURE — 80053 COMPREHEN METABOLIC PANEL: CPT | Performed by: EMERGENCY MEDICINE

## 2023-07-23 PROCEDURE — 96374 THER/PROPH/DIAG INJ IV PUSH: CPT

## 2023-07-23 PROCEDURE — 85025 COMPLETE CBC W/AUTO DIFF WBC: CPT | Performed by: EMERGENCY MEDICINE

## 2023-07-23 PROCEDURE — 99285 EMERGENCY DEPT VISIT HI MDM: CPT | Mod: 25

## 2023-07-23 RX ORDER — ONDANSETRON 2 MG/ML
4 INJECTION INTRAMUSCULAR; INTRAVENOUS ONCE
Status: COMPLETED | OUTPATIENT
Start: 2023-07-23 | End: 2023-07-23

## 2023-07-23 RX ORDER — ONDANSETRON 4 MG/1
4 TABLET, FILM COATED ORAL EVERY 6 HOURS PRN
Qty: 12 TABLET | Refills: 0 | Status: SHIPPED | OUTPATIENT
Start: 2023-07-23 | End: 2024-01-17

## 2023-07-23 RX ADMIN — SODIUM CHLORIDE, POTASSIUM CHLORIDE, SODIUM LACTATE AND CALCIUM CHLORIDE 1000 ML: 600; 310; 30; 20 INJECTION, SOLUTION INTRAVENOUS at 08:07

## 2023-07-23 RX ADMIN — ONDANSETRON 4 MG: 2 INJECTION INTRAMUSCULAR; INTRAVENOUS at 08:07

## 2023-07-23 NOTE — Clinical Note
"Dominic"Deshawn Collazo was seen and treated in our emergency department on 7/23/2023.  He may return to work on 07/25/2023.       If you have any questions or concerns, please don't hesitate to call.      Cassandra ROSENBAUM    "

## 2023-07-24 NOTE — ED PROVIDER NOTES
Encounter Date: 7/23/2023       History     Chief Complaint   Patient presents with    Loss of Consciousness     Dizzy after standing in bathroom. Woke up in bathtub. Reports he was attempting to have a BM, but was unable to.      Dominic Collazo is a 42-year-old male past medical history of hypertension, hyperlipidemia, LYNDA, anxiety presenting today after a syncopal episode.  Patient states he was straining to have a bowel movement.  When he stood up he felt clammy then passed out.  He woke up in the bath tub.  He does not know how long he was unconscious for.  No recent illness.  He is on Ozempic in his not had any issues except for constipation.  He did have a bowel movement yesterday.  After the fall he did notice that he had bruising on his left foot, it was uncomfortable to walk on it.  Denies chest pain, shortness of breath, rib pain or any other extremity injury.  Since the event, he has had waves of nausea that is currently resolved.    Review of patient's allergies indicates:   Allergen Reactions    Ciprofloxacin      swelling    Penicillins Rash    Sulfa (sulfonamide antibiotics) Rash    Toradol [ketorolac] Nausea Only    Bell pepper Nausea Only    Meloxicam Nausea Only    Sulfamethoxazole-trimethoprim      Past Medical History:   Diagnosis Date    Adjustment disorder with mixed anxiety and depressed mood     Anxiety     Colon polyp     Depression     ED (erectile dysfunction)     Family history of prostate cancer 9/29/2014    Hx of umbilical hernia repair 10/16/2020    Hyperlipidemia     Hypertension     Hypogonadism male     Insomnia     Low testosterone     LYNDA (obstructive sleep apnea)     Prediabetes      Past Surgical History:   Procedure Laterality Date    ADENOIDECTOMY      HERNIA REPAIR      REPAIR OF INCARCERATED UMBILICAL HERNIA N/A 10/16/2020    Procedure: REPAIR, HERNIA, UMBILICAL, INCARCERATED, AGE 5 YEARS OR OLDER with mesh ;  Surgeon: Conrad Loco MD;  Location: [facility] OR [address] [place] [address];   Service: General;  Laterality: N/A;    TONSILLECTOMY       Family History   Problem Relation Age of Onset    Hypertension Mother     Hyperlipidemia Mother     Diabetes Father     Hyperlipidemia Father     Hypertension Father     Heart disease Father 46        CAD    No Known Problems Sister     Cancer Paternal Uncle         prostate cancer     Social History     Tobacco Use    Smoking status: Never     Passive exposure: Past    Smokeless tobacco: Never   Substance Use Topics    Alcohol use: Yes     Comment: less than once per month    Drug use: No     Review of Systems    Physical Exam     Initial Vitals [07/23/23 1708]   BP Pulse Resp Temp SpO2   127/76 87 16 98.5 °F (36.9 °C) 97 %      MAP       --         Physical Exam    Vitals reviewed.  Constitutional: He appears well-developed and well-nourished. No distress.   HENT:   Head: Normocephalic and atraumatic.   Eyes: Conjunctivae and EOM are normal. Pupils are equal, round, and reactive to light.   Neck: Neck supple.   - midline cervical tenderness   Normal range of motion.  Cardiovascular:  Normal rate, regular rhythm and intact distal pulses.           Pulmonary/Chest: Breath sounds normal. He has no wheezes. He has no rales. He exhibits no tenderness.   Abdominal: Abdomen is soft. He exhibits no distension. There is abdominal tenderness (Minimal left flank tenderness). There is no rebound and no guarding.   Musculoskeletal:         General: No edema.      Cervical back: Normal range of motion and neck supple.      Comments: + flexion and extension intact of the left great toe.  Does report slight decreased sensation to the left great toe  - midline thoracic or lumbar tenderness     Neurological: He is alert and oriented to person, place, and time.   Skin:   + bruising to the left great toe       ED Course   Procedures  Labs Reviewed   CBC W/ AUTO DIFFERENTIAL - Abnormal; Notable for the following components:       Result Value    WBC 13.04 (*)     RDW 15.6 (*)      Gran # (ANC) 8.4 (*)     Immature Grans (Abs) 0.05 (*)     All other components within normal limits   COMPREHENSIVE METABOLIC PANEL - Abnormal; Notable for the following components:    CO2 21 (*)     ALT 67 (*)     All other components within normal limits    Narrative:     ADD ON TROPONIN PER DR AXEL GRIMES/ORDER# [id number] @ 20:33   TROPONIN I   TROPONIN I    Narrative:     ADD ON TROPONIN PER DR AXEL GRIMES/ORDER# [id number] @ 20:33     EKG Readings: (Independently Interpreted)   EKG: Sinus rhythm at 90 with a sinus arrhythmia, T-wave inversions in inferior leads, no ST elevation.     Imaging Results              X-Ray Foot Complete Left (Final result)  Result time 07/23/23 20:49:01      Final result by Solo Hasknis MD (07/23/23 20:49:01)                   Impression:      No acute fracture.      Electronically signed by: Solo Haskins MD  Date:    07/23/2023  Time:    20:49               Narrative:    EXAMINATION:  XR FOOT COMPLETE 3 VIEW LEFT    CLINICAL HISTORY:  .  Pain in left foot    TECHNIQUE:  AP, lateral and oblique views of the left foot were performed.    COMPARISON:  None    FINDINGS:  No fracture or dislocation.  Lisfranc articulation is congruent.  Cartilage spaces are maintained.  Soft tissues are unremarkable.                                       Medications   lactated ringers bolus 1,000 mL (0 mLs Intravenous Stopped 7/23/23 2121)   ondansetron injection 4 mg (4 mg Intravenous Given 7/23/23 2012)     Medical Decision Making:   History:   Old Medical Records: I decided to obtain old medical records.  Initial Assessment:   Emergent evaluation a 42-year-old male presenting after he syncopized while trying to have a bowel movement.    Vital signs currently stable    Well-appearing, no distress  Differential Diagnosis:   Vasovagal syncope, arrhythmia, dehydration, electrolyte derangement  Low suspicion for PE given no risk factors, no tachycardia, no hypoxemia and denies shortness of  breath  Clinical Tests:   Lab Tests: Reviewed and Ordered  Medical Tests: Ordered and Reviewed  ED Management:  - labs  - EKG  - IVF, zofran           ED Course as of 07/24/23 0029   Keshia Jul 23, 2023 2034 WBC(!): 13.04 [GM]   2054 CO2(!): 21 [GM]   2208 Troponin I: <0.006 [GM]   2209 X-ray of the foot reviewed and independently interpreted by me as no acute fracture [GM]   2209 Labs reviewed with mild leukocytosis, could be acute stress reaction.  Bicarb 21.  Electrolytes otherwise within normal limits.  Troponin negative.  Vitals remained stable.   [GM]   2209 History and exam most consistent with vasovagal syncope, possible mild hypovolemia which was treated with IV fluids.      Patient reports improvement of symptoms.    Recommend rest, increase oral hydration.  Return precautions given. [GM]      ED Course User Index  [GM] Alexandra Medina MD                 Clinical Impression:   Final diagnoses:  [R42] Dizziness  [M79.672] Left foot pain  [S90.32XA] Contusion of left foot, initial encounter  [R55] Vasovagal syncope (Primary)        ED Disposition Condition    Discharge Stable          ED Prescriptions       Medication Sig Dispense Start Date End Date Auth. Provider    ondansetron (ZOFRAN) 4 MG tablet Take 1 tablet (4 mg total) by mouth every 6 (six) hours as needed for Nausea. 12 tablet 7/23/2023 -- Alexandra Medina MD          Follow-up Information       Follow up With Specialties Details Why Contact Info    Arjun Cross MD Family Medicine Schedule an appointment as soon as possible for a visit   [address] [facility] [place] [address]  [phone]               Alexandra Medina MD  07/24/23 0029

## 2023-07-24 NOTE — ED TRIAGE NOTES
Dominic Collazo, a 42 y.o. male presents to the ED w/ complaint of loss of consciousness on today. Pt states that he doesn't know how long he was out, states he has been faint and dizzy that started yesterday.    Triage note:  Chief Complaint   Patient presents with    Loss of Consciousness     Dizzy after standing in bathroom. Woke up in bathtub. Reports he was attempting to have a BM, but was unable to.      Review of patient's allergies indicates:   Allergen Reactions    Ciprofloxacin      swelling    Penicillins Rash    Sulfa (sulfonamide antibiotics) Rash    Toradol [ketorolac] Nausea Only    Bell pepper Nausea Only    Meloxicam Nausea Only    Sulfamethoxazole-trimethoprim      Past Medical History:   Diagnosis Date    Adjustment disorder with mixed anxiety and depressed mood     Anxiety     Colon polyp     Depression     ED (erectile dysfunction)     Family history of prostate cancer 9/29/2014    Hx of umbilical hernia repair 10/16/2020    Hyperlipidemia     Hypertension     Hypogonadism male     Insomnia     Low testosterone     LYNDA (obstructive sleep apnea)     Prediabetes     Patient identifiers for Dominic Collazo checked and correct.    LOC: The patient is awake, alert and aware of environment with an appropriate affect, the patient is oriented x 4 and speaking appropriately.    APPEARANCE: Patient resting comfortably and in no acute distress, patient is clean and well groomed, patient's clothing is properly fastened.    SKIN: The skin is warm and dry, color consistent with ethnicity, patient has normal skin turgor and moist mucus membranes, skin intact, no breakdown or bruising noted.    MUSCULOSKELETAL: Patient moving all extremities well, no obvious swelling or deformities noted.    RESPIRATORY: Airway is open and patent, respirations are spontaneous and even, patient has a normal effort and rate.    CARDIAC: Patient has a normal rate and rhythm, no periphreal edema noted, capillary  refill < 3 seconds.    ABDOMEN: Soft and non tender to palpation, no distention noted. Patient denies any nausea, vomiting, diarrhea, or constipation.     NEUROLOGIC: Eyes open spontaneously, PERRL, behavior appropriate to situation, follows commands, facial expression symmetrical, bilateral hand grasp equal and even, purposeful motor response noted, normal sensation in all extremities.     HEENT: No abnormalities noted. White sclera and pupils equal round and reactive to light. Pt has had headache and dizziness also.    : Pt voids independently, denies dysuria, hematuria, frequency.

## 2023-07-27 DIAGNOSIS — E29.1 MALE HYPOGONADISM: Primary | ICD-10-CM

## 2023-07-27 RX ORDER — TESTOSTERONE CYPIONATE 200 MG/ML
100 INJECTION, SOLUTION INTRAMUSCULAR
Qty: 4 ML | Refills: 5 | Status: CANCELLED | OUTPATIENT
Start: 2023-07-27 | End: 2024-01-25

## 2023-08-01 ENCOUNTER — PATIENT MESSAGE (OUTPATIENT)
Dept: UROLOGY | Facility: CLINIC | Age: 43
End: 2023-08-01
Payer: COMMERCIAL

## 2023-08-02 ENCOUNTER — PATIENT MESSAGE (OUTPATIENT)
Dept: PSYCHIATRY | Facility: CLINIC | Age: 43
End: 2023-08-02
Payer: COMMERCIAL

## 2023-08-03 ENCOUNTER — PATIENT MESSAGE (OUTPATIENT)
Dept: UROLOGY | Facility: CLINIC | Age: 43
End: 2023-08-03
Payer: COMMERCIAL

## 2023-08-03 ENCOUNTER — DOCUMENTATION ONLY (OUTPATIENT)
Dept: REHABILITATION | Facility: HOSPITAL | Age: 43
End: 2023-08-03
Payer: COMMERCIAL

## 2023-08-03 DIAGNOSIS — E29.1 MALE HYPOGONADISM: ICD-10-CM

## 2023-08-03 RX ORDER — TESTOSTERONE CYPIONATE 200 MG/ML
100 INJECTION, SOLUTION INTRAMUSCULAR
Qty: 5 ML | Refills: 0 | Status: SHIPPED | OUTPATIENT
Start: 2023-08-03 | End: 2023-08-03 | Stop reason: SDUPTHER

## 2023-08-03 RX ORDER — TESTOSTERONE CYPIONATE 200 MG/ML
100 INJECTION, SOLUTION INTRAMUSCULAR
Qty: 5 ML | Refills: 0 | Status: SHIPPED | OUTPATIENT
Start: 2023-08-03 | End: 2023-08-31

## 2023-08-03 RX ORDER — TESTOSTERONE CYPIONATE 200 MG/ML
100 INJECTION, SOLUTION INTRAMUSCULAR
Qty: 5 ML | Refills: 0 | Status: SHIPPED | OUTPATIENT
Start: 2023-08-03 | End: 2023-08-03

## 2023-08-03 NOTE — PROGRESS NOTES
Patient no-showed their appointment today at the [facility].  Left a voicemail to remind pt of their next appointment on 8/3/23. Patient has 3 cancels and 1 no shows as of 8/3/23.

## 2023-08-08 ENCOUNTER — TELEPHONE (OUTPATIENT)
Dept: BARIATRICS | Facility: CLINIC | Age: 43
End: 2023-08-08
Payer: COMMERCIAL

## 2023-08-08 NOTE — LETTER
August 8, 2023    Dominic Collazo  [address] [facility] [address]             [facility] - Bariatric Surg 2nd Fl  [address]  [facility] [address]  Phone: [phone]  Fax: [phone]              [facility] - Bariatric Surg 2nd Fl  [address]  [facility] [address]  Phone: [phone]  Fax: [phone] Dear Mr. Collazo:      Per your policy with [employer] ([employer]) you have been approved to have surgery only during the calendar year 2023. In order to fulfill [employer] requirements your surgery would have to take place prior to 12/31/2023. Due to time constraints, you will not be able to fulfill the 2023  [employer] requirements.  Therefore, we will notify [employer] and you will be removed from the approved 2023 [facility] patient list and subsequent wait list. For future reentry into the program, you will have to reapply for approval to the [facility].    For any questions or concerns please reach out to the clinic.       If you have any questions or concerns, please don't hesitate to call.    Sincerely,    [facility]     [address] [place] [address] [phone]

## 2023-08-20 NOTE — TELEPHONE ENCOUNTER
No care due was identified.  [facility] Embedded Care Due Messages. Reference number: [id number].   8/20/2023 4:10:47 AM CDT

## 2023-08-21 ENCOUNTER — OFFICE VISIT (OUTPATIENT)
Dept: PSYCHIATRY | Facility: CLINIC | Age: 43
End: 2023-08-21
Payer: COMMERCIAL

## 2023-08-21 DIAGNOSIS — F43.23 ADJUSTMENT DISORDER WITH MIXED ANXIETY AND DEPRESSED MOOD: ICD-10-CM

## 2023-08-21 DIAGNOSIS — F51.05 INSOMNIA DUE TO OTHER MENTAL DISORDER: ICD-10-CM

## 2023-08-21 DIAGNOSIS — E29.1 MALE HYPOGONADISM: ICD-10-CM

## 2023-08-21 DIAGNOSIS — F41.0 GENERALIZED ANXIETY DISORDER WITH PANIC ATTACKS: Primary | ICD-10-CM

## 2023-08-21 DIAGNOSIS — F41.1 GENERALIZED ANXIETY DISORDER WITH PANIC ATTACKS: Primary | ICD-10-CM

## 2023-08-21 DIAGNOSIS — F99 INSOMNIA DUE TO OTHER MENTAL DISORDER: ICD-10-CM

## 2023-08-21 DIAGNOSIS — G47.33 OSA ON CPAP: ICD-10-CM

## 2023-08-21 DIAGNOSIS — F41.0 PANIC ATTACK: ICD-10-CM

## 2023-08-21 PROCEDURE — 90836 PR PSYCHOTHERAPY W/PATIENT W/E&M, 45 MIN (ADD ON): ICD-10-PCS | Mod: 95,,, | Performed by: PHYSICIAN ASSISTANT

## 2023-08-21 PROCEDURE — 3044F HG A1C LEVEL LT 7.0%: CPT | Mod: CPTII,95,, | Performed by: PHYSICIAN ASSISTANT

## 2023-08-21 PROCEDURE — 3061F NEG MICROALBUMINURIA REV: CPT | Mod: CPTII,95,, | Performed by: PHYSICIAN ASSISTANT

## 2023-08-21 PROCEDURE — 99214 PR OFFICE/OUTPT VISIT, EST, LEVL IV, 30-39 MIN: ICD-10-PCS | Mod: 95,,, | Performed by: PHYSICIAN ASSISTANT

## 2023-08-21 PROCEDURE — 99214 OFFICE O/P EST MOD 30 MIN: CPT | Mod: 95,,, | Performed by: PHYSICIAN ASSISTANT

## 2023-08-21 PROCEDURE — 3044F PR MOST RECENT HEMOGLOBIN A1C LEVEL <7.0%: ICD-10-PCS | Mod: CPTII,95,, | Performed by: PHYSICIAN ASSISTANT

## 2023-08-21 PROCEDURE — 1159F MED LIST DOCD IN RCRD: CPT | Mod: CPTII,95,, | Performed by: PHYSICIAN ASSISTANT

## 2023-08-21 PROCEDURE — 1159F PR MEDICATION LIST DOCUMENTED IN MEDICAL RECORD: ICD-10-PCS | Mod: CPTII,95,, | Performed by: PHYSICIAN ASSISTANT

## 2023-08-21 PROCEDURE — 1160F PR REVIEW ALL MEDS BY PRESCRIBER/CLIN PHARMACIST DOCUMENTED: ICD-10-PCS | Mod: CPTII,95,, | Performed by: PHYSICIAN ASSISTANT

## 2023-08-21 PROCEDURE — 3066F NEPHROPATHY DOC TX: CPT | Mod: CPTII,95,, | Performed by: PHYSICIAN ASSISTANT

## 2023-08-21 PROCEDURE — 3061F PR NEG MICROALBUMINURIA RESULT DOCUMENTED/REVIEW: ICD-10-PCS | Mod: CPTII,95,, | Performed by: PHYSICIAN ASSISTANT

## 2023-08-21 PROCEDURE — 3066F PR DOCUMENTATION OF TREATMENT FOR NEPHROPATHY: ICD-10-PCS | Mod: CPTII,95,, | Performed by: PHYSICIAN ASSISTANT

## 2023-08-21 PROCEDURE — 90836 PSYTX W PT W E/M 45 MIN: CPT | Mod: 95,,, | Performed by: PHYSICIAN ASSISTANT

## 2023-08-21 PROCEDURE — 1160F RVW MEDS BY RX/DR IN RCRD: CPT | Mod: CPTII,95,, | Performed by: PHYSICIAN ASSISTANT

## 2023-08-21 RX ORDER — ALPRAZOLAM 1 MG/1
1 TABLET ORAL 2 TIMES DAILY PRN
Qty: 60 TABLET | Refills: 2 | Status: SHIPPED | OUTPATIENT
Start: 2023-08-21 | End: 2023-09-24 | Stop reason: SDUPTHER

## 2023-08-21 RX ORDER — PRAVASTATIN SODIUM 10 MG/1
10 TABLET ORAL
Qty: 90 TABLET | Refills: 0 | Status: SHIPPED | OUTPATIENT
Start: 2023-08-21 | End: 2023-08-31 | Stop reason: SDUPTHER

## 2023-08-21 RX ORDER — ZOLPIDEM TARTRATE 10 MG/1
10 TABLET ORAL NIGHTLY PRN
Qty: 30 TABLET | Refills: 2 | Status: SHIPPED | OUTPATIENT
Start: 2023-08-21 | End: 2023-09-11 | Stop reason: SDUPTHER

## 2023-08-21 RX ORDER — ALPRAZOLAM 1 MG/1
1 TABLET ORAL 2 TIMES DAILY PRN
Qty: 60 TABLET | Refills: 2 | Status: CANCELLED | OUTPATIENT
Start: 2023-08-21 | End: 2023-12-19

## 2023-08-21 RX ORDER — BUPROPION HYDROCHLORIDE 150 MG/1
150 TABLET ORAL DAILY
Qty: 90 TABLET | Refills: 1 | Status: CANCELLED | OUTPATIENT
Start: 2023-08-21

## 2023-08-21 RX ORDER — BUSPIRONE HYDROCHLORIDE 15 MG/1
15 TABLET ORAL 2 TIMES DAILY
Qty: 180 TABLET | Refills: 1 | Status: SHIPPED | OUTPATIENT
Start: 2023-08-21 | End: 2023-12-06 | Stop reason: SDUPTHER

## 2023-08-21 RX ORDER — VENLAFAXINE HYDROCHLORIDE 150 MG/1
150 CAPSULE, EXTENDED RELEASE ORAL DAILY
Qty: 90 CAPSULE | Refills: 1 | Status: SHIPPED | OUTPATIENT
Start: 2023-08-21 | End: 2023-12-06 | Stop reason: SDUPTHER

## 2023-08-21 NOTE — PROGRESS NOTES
"Outpatient Psychiatry Follow-Up Visit (MD/NP)    8/21/2023     The patient location is: [place]  The chief complaint leading to consultation is: depression and anxiety    Visit type: audio only- video ethan failure    Face to Face time with patient: 73 minutes  85 minutes of total time spent on the encounter, which includes face to face time and non-face to face time preparing to see the patient (eg, review of tests), Obtaining and/or reviewing separately obtained history, Documenting clinical information in the electronic or other health record, Independently interpreting results (not separately reported) and communicating results to the patient/family/caregiver, or Care coordination (not separately reported).     Each patient to whom he or she provides medical services by telemedicine is:  (1) informed of the relationship between the physician and patient and the respective role of any other health care provider with respect to management of the patient; and (2) notified that he or she may decline to receive medical services by telemedicine and may withdraw from such care at any time.    Clinical Status of Patient:  Outpatient (Ambulatory)    Chief Complaint:  Dominic Collazo is a 42 y.o. male who presents today for follow-up of depression, anxiety and adjustment problems.  Met with patient.      Interval History and Content of Current Session:  Interim Events/Subjective Report/Content of Current Session:     Pt reports today: "mood is up and down a bit". Pt had vasovagal syncope event several weeks ago, hit his head and had concussion. Went to ED and was discharged home.     Pt states he still has sensation of vague pressure in the back of his head currently. States that he has been having "dizzy spells" when he stands up quickly nearly almost every day. States also feels dizzy when when he moves his head quickly.    States he is also having increased frequency of headaches since concussion.    Reports " "he has appt with PCP next week. Instructed to stop vistaril if it may be causing any of these side effects of increased HA and dizziness.    Mood overall "majority of the time I'm doing ok. Theres things going on in extended family that is stressful. Took unexpected pay cut at work which is stressful"    Pt started vistaril around the time the dizziness started. Pt still taking it. Discussed pt to stop taking it at this time. Pt agreeable to plan.    Sleeping poorly, feeling fatigued despite using cpap, has been taking ambien with vistaril. Instructed to stop vistaril and take xanax 1mg qhs for insomnia at this time as it he said it was most helpful in the past. Also instructed that if he is still unable to fall asleep with xanax he can take ambien prn. Pt reports understanding and agreeable to plan.    Pt to see endocrinologist on 8/31 for work up for low testosterone and further management of this issue.    Patients mood is steady. Linear and logical, friendly and cooperative.    Denies SI/HI/AVH. Pt reports sleeping poorly 3-4 hr per night and normal appetite.    Pt reports taking medications as prescribed.    Psychotherapy:  Target symptoms: anxiety , adjustment, work stress  Why chosen therapy is appropriate versus another modality: relevant to diagnosis, patient responds to this modality, evidence based practice  Outcome monitoring methods: self-report  Therapeutic intervention type: insight oriented psychotherapy, supportive psychotherapy  Topics discussed/themes: work stress, stress related to medical comorbidities, building skills sets for symptom management, symptom recognition, financial stressors  The patient's response to the intervention is accepting. The patient's progress toward treatment goals is good.   Duration of intervention: 40 minutes.      Prior visit:    Pt reports today: "have a lot of pain in my shoulder, have a torn labrum in my shoulder and I have to keep going to PT more"    Pt reports " "having significant job stress as he has not been getting his full normal compensation. Allan was offered another job which has helped calm his anxiety is the last week.    Reports has been having worsening insomnia recently due to stress and shoulder pain. Reports sleeping 3-4 hrs per night due to "unable to turn my mind off". States dealing with more daytime fatgue. Reports has been compliant with CPAP.    Patients mood is depressive and anxious, affect appears mood congruent. Linear and logical, friendly and cooperative, good eye contact.    Denies SI/HI/AVH. Pt reports sleeping poorly and decreased appetite due to ozempic. Denies other side effects of medications.    Pt reports currently on sunosi and nuvigil at the same time, unsure if this has contributed to insomnia issues.    Discussed starting vistaril 50mg qhs prn insomnia to take with ambien if needed for insomnia.    Pt reports taking medications as prescribed and behaving appropriately during interview today.    Previous medication trials:  Ativan- effective  Seroquel- sedation  Lexapro  Wellbutrin and Buspar- effective,   vistaril- sedation, still had axniety  Prozac - sexual side effects  Cymbalta Sexual side effects and retrograde ejaculation    Review of Systems     Review of Systems   Constitutional:  Negative for fever.   HENT:  Negative for sore throat.    Eyes:  Negative for photophobia.   Respiratory:  Negative for cough.    Cardiovascular:  Negative for chest pain and palpitations.   Gastrointestinal:  Negative for abdominal pain.   Genitourinary:  Negative for dysuria.   Musculoskeletal:  Negative for myalgias.   Skin:  Negative for rash.   Neurological:  Positive for dizziness and headaches.   Endo/Heme/Allergies:  Does not bruise/bleed easily.   Psychiatric/Behavioral:  Negative for depression, hallucinations, substance abuse and suicidal ideas. The patient is nervous/anxious and has insomnia.          Past Medical, Family and Social " History: The patient's past medical, family and social history have been reviewed and updated as appropriate within the electronic medical record - see encounter notes.    Compliance: yes    Side effects: see above    Risk Parameters:  Patient reports no suicidal ideation  Patient reports no homicidal ideation  Patient reports no self-injurious behavior  Patient reports no violent behavior    Exam (detailed: at least 9 elements; comprehensive: all 15 elements)   Constitutional  Vitals:    There were no vitals filed for this visit.     General:  unremarkable, age appropriate     Musculoskeletal  Muscle Strength/Tone:  not examined   Gait & Station:  THEA due to video visit      Psychiatric  Speech:  no latency; no press   Mood & Affect:  Steady   Thought Process:  normal and logical   Associations:  intact   Thought Content:  normal, no suicidality, no homicidality, delusions, or paranoia   Insight:  intact   Judgement: behavior is adequate to circumstances   Orientation:  grossly intact   Memory: intact for content of interview   Language: grossly intact   Attention Span & Concentration:  able to focus   Fund of Knowledge:  intact and appropriate to age and level of education     Assessment and Diagnosis   Status/Progress: Based on the examination today, the patient's problem(s) is/are inadequately controlled.  New problems have not been presented today.   Co-morbidities, Diagnostic uncertainty and Lack of compliance are not complicating management of the primary condition.  There are no active rule-out diagnoses for this patient at this time.     General Impression:       ICD-10-CM ICD-9-CM   1. Generalized anxiety disorder with panic attacks  F41.1 300.02    F41.0 300.01   2. Insomnia due to other mental disorder  F51.05 300.9    F99 327.02       Intervention/Counseling/Treatment Plan   Treatment Plan/Recommendations:   Medication Management: Continue current medications.   Wellbutrin  mg daily    Increase  buspar to 15mg bid    continue Effexor to 150mg mg daily     continue xanax 1mg bid prn anxiety or insomnia    Continue ambien 10mg qhs prn insomnia if unable to sleep with xanax alone    Stop vistaril    Continue sonisi and nuvigil 250mg daily as prescribed by sleep medicine provider    F/u with PCP next week regarding episodes of dizziness    F/u with endocrinologist for management of low testosterone      Return to Clinic: 1 month      Brandon Burdick PA-C

## 2023-08-21 NOTE — TELEPHONE ENCOUNTER
Refill Decision Note   Dominic Collazo  is requesting a refill authorization.  Brief Assessment and Rationale for Refill:  Approve     Medication Therapy Plan:  ED Visit: 7/23/23 for Vasovagal syncope; FOVS 8/31/23    Medication Reconciliation Completed: No   Comments:     No Care Gaps recommended.     Note composed:10:36 AM 08/21/2023

## 2023-08-23 ENCOUNTER — PATIENT MESSAGE (OUTPATIENT)
Dept: PSYCHIATRY | Facility: CLINIC | Age: 43
End: 2023-08-23
Payer: COMMERCIAL

## 2023-08-29 ENCOUNTER — PATIENT MESSAGE (OUTPATIENT)
Dept: PODIATRY | Facility: CLINIC | Age: 43
End: 2023-08-29
Payer: COMMERCIAL

## 2023-08-29 ENCOUNTER — TELEPHONE (OUTPATIENT)
Dept: PODIATRY | Facility: CLINIC | Age: 43
End: 2023-08-29
Payer: COMMERCIAL

## 2023-08-31 ENCOUNTER — OFFICE VISIT (OUTPATIENT)
Dept: ENDOCRINOLOGY | Facility: CLINIC | Age: 43
End: 2023-08-31
Payer: COMMERCIAL

## 2023-08-31 ENCOUNTER — OFFICE VISIT (OUTPATIENT)
Dept: INTERNAL MEDICINE | Facility: CLINIC | Age: 43
End: 2023-08-31
Payer: COMMERCIAL

## 2023-08-31 VITALS
WEIGHT: 315 LBS | BODY MASS INDEX: 39.17 KG/M2 | HEART RATE: 92 BPM | TEMPERATURE: 98 F | HEIGHT: 75 IN | SYSTOLIC BLOOD PRESSURE: 120 MMHG | DIASTOLIC BLOOD PRESSURE: 90 MMHG | OXYGEN SATURATION: 95 %

## 2023-08-31 VITALS
WEIGHT: 315 LBS | HEART RATE: 82 BPM | SYSTOLIC BLOOD PRESSURE: 120 MMHG | BODY MASS INDEX: 42.06 KG/M2 | DIASTOLIC BLOOD PRESSURE: 80 MMHG | OXYGEN SATURATION: 98 %

## 2023-08-31 DIAGNOSIS — E78.49 OTHER HYPERLIPIDEMIA: ICD-10-CM

## 2023-08-31 DIAGNOSIS — S06.0X9D CONCUSSION WITH LOSS OF CONSCIOUSNESS, SUBSEQUENT ENCOUNTER: ICD-10-CM

## 2023-08-31 DIAGNOSIS — E29.1 PRIMARY MALE HYPOGONADISM: ICD-10-CM

## 2023-08-31 DIAGNOSIS — E29.1 MALE HYPOGONADISM: ICD-10-CM

## 2023-08-31 DIAGNOSIS — Z00.00 WELL ADULT EXAM: Primary | ICD-10-CM

## 2023-08-31 DIAGNOSIS — I10 PRIMARY HYPERTENSION: ICD-10-CM

## 2023-08-31 DIAGNOSIS — Z86.010 PERSONAL HISTORY OF COLONIC POLYPS: ICD-10-CM

## 2023-08-31 DIAGNOSIS — E66.01 TYPE 2 DIABETES MELLITUS WITH MORBID OBESITY: ICD-10-CM

## 2023-08-31 DIAGNOSIS — M54.2 NECK PAIN: ICD-10-CM

## 2023-08-31 DIAGNOSIS — Z12.5 PROSTATE CANCER SCREENING: ICD-10-CM

## 2023-08-31 DIAGNOSIS — F43.23 ADJUSTMENT DISORDER WITH MIXED ANXIETY AND DEPRESSED MOOD: ICD-10-CM

## 2023-08-31 DIAGNOSIS — E11.69 TYPE 2 DIABETES MELLITUS WITH MORBID OBESITY: ICD-10-CM

## 2023-08-31 PROCEDURE — 99999 PR PBB SHADOW E&M-EST. PATIENT-LVL V: CPT | Mod: PBBFAC,,, | Performed by: FAMILY MEDICINE

## 2023-08-31 PROCEDURE — 3080F DIAST BP >= 90 MM HG: CPT | Mod: CPTII,S$GLB,, | Performed by: FAMILY MEDICINE

## 2023-08-31 PROCEDURE — 1160F PR REVIEW ALL MEDS BY PRESCRIBER/CLIN PHARMACIST DOCUMENTED: ICD-10-PCS | Mod: CPTII,S$GLB,, | Performed by: FAMILY MEDICINE

## 2023-08-31 PROCEDURE — 3079F PR MOST RECENT DIASTOLIC BLOOD PRESSURE 80-89 MM HG: ICD-10-PCS | Mod: CPTII,S$GLB,, | Performed by: STUDENT IN AN ORGANIZED HEALTH CARE EDUCATION/TRAINING PROGRAM

## 2023-08-31 PROCEDURE — 3008F PR BODY MASS INDEX (BMI) DOCUMENTED: ICD-10-PCS | Mod: CPTII,S$GLB,, | Performed by: STUDENT IN AN ORGANIZED HEALTH CARE EDUCATION/TRAINING PROGRAM

## 2023-08-31 PROCEDURE — 3079F DIAST BP 80-89 MM HG: CPT | Mod: CPTII,S$GLB,, | Performed by: STUDENT IN AN ORGANIZED HEALTH CARE EDUCATION/TRAINING PROGRAM

## 2023-08-31 PROCEDURE — 3074F PR MOST RECENT SYSTOLIC BLOOD PRESSURE < 130 MM HG: ICD-10-PCS | Mod: CPTII,S$GLB,, | Performed by: FAMILY MEDICINE

## 2023-08-31 PROCEDURE — 3061F NEG MICROALBUMINURIA REV: CPT | Mod: CPTII,S$GLB,, | Performed by: FAMILY MEDICINE

## 2023-08-31 PROCEDURE — 1159F PR MEDICATION LIST DOCUMENTED IN MEDICAL RECORD: ICD-10-PCS | Mod: CPTII,S$GLB,, | Performed by: FAMILY MEDICINE

## 2023-08-31 PROCEDURE — 3061F PR NEG MICROALBUMINURIA RESULT DOCUMENTED/REVIEW: ICD-10-PCS | Mod: CPTII,S$GLB,, | Performed by: STUDENT IN AN ORGANIZED HEALTH CARE EDUCATION/TRAINING PROGRAM

## 2023-08-31 PROCEDURE — 3044F PR MOST RECENT HEMOGLOBIN A1C LEVEL <7.0%: ICD-10-PCS | Mod: CPTII,S$GLB,, | Performed by: STUDENT IN AN ORGANIZED HEALTH CARE EDUCATION/TRAINING PROGRAM

## 2023-08-31 PROCEDURE — 3044F PR MOST RECENT HEMOGLOBIN A1C LEVEL <7.0%: ICD-10-PCS | Mod: CPTII,S$GLB,, | Performed by: FAMILY MEDICINE

## 2023-08-31 PROCEDURE — 3074F SYST BP LT 130 MM HG: CPT | Mod: CPTII,S$GLB,, | Performed by: FAMILY MEDICINE

## 2023-08-31 PROCEDURE — 3080F PR MOST RECENT DIASTOLIC BLOOD PRESSURE >= 90 MM HG: ICD-10-PCS | Mod: CPTII,S$GLB,, | Performed by: FAMILY MEDICINE

## 2023-08-31 PROCEDURE — 99214 PR OFFICE/OUTPT VISIT, EST, LEVL IV, 30-39 MIN: ICD-10-PCS | Mod: S$GLB,,, | Performed by: STUDENT IN AN ORGANIZED HEALTH CARE EDUCATION/TRAINING PROGRAM

## 2023-08-31 PROCEDURE — 3066F NEPHROPATHY DOC TX: CPT | Mod: CPTII,S$GLB,, | Performed by: STUDENT IN AN ORGANIZED HEALTH CARE EDUCATION/TRAINING PROGRAM

## 2023-08-31 PROCEDURE — 99396 PR PREVENTIVE VISIT,EST,40-64: ICD-10-PCS | Mod: S$GLB,,, | Performed by: FAMILY MEDICINE

## 2023-08-31 PROCEDURE — 1159F MED LIST DOCD IN RCRD: CPT | Mod: CPTII,S$GLB,, | Performed by: FAMILY MEDICINE

## 2023-08-31 PROCEDURE — 3066F PR DOCUMENTATION OF TREATMENT FOR NEPHROPATHY: ICD-10-PCS | Mod: CPTII,S$GLB,, | Performed by: STUDENT IN AN ORGANIZED HEALTH CARE EDUCATION/TRAINING PROGRAM

## 2023-08-31 PROCEDURE — 3008F BODY MASS INDEX DOCD: CPT | Mod: CPTII,S$GLB,, | Performed by: STUDENT IN AN ORGANIZED HEALTH CARE EDUCATION/TRAINING PROGRAM

## 2023-08-31 PROCEDURE — 3044F HG A1C LEVEL LT 7.0%: CPT | Mod: CPTII,S$GLB,, | Performed by: FAMILY MEDICINE

## 2023-08-31 PROCEDURE — 3066F PR DOCUMENTATION OF TREATMENT FOR NEPHROPATHY: ICD-10-PCS | Mod: CPTII,S$GLB,, | Performed by: FAMILY MEDICINE

## 2023-08-31 PROCEDURE — 99999 PR PBB SHADOW E&M-EST. PATIENT-LVL V: CPT | Mod: PBBFAC,,, | Performed by: STUDENT IN AN ORGANIZED HEALTH CARE EDUCATION/TRAINING PROGRAM

## 2023-08-31 PROCEDURE — 99214 OFFICE O/P EST MOD 30 MIN: CPT | Mod: S$GLB,,, | Performed by: STUDENT IN AN ORGANIZED HEALTH CARE EDUCATION/TRAINING PROGRAM

## 2023-08-31 PROCEDURE — 3074F SYST BP LT 130 MM HG: CPT | Mod: CPTII,S$GLB,, | Performed by: STUDENT IN AN ORGANIZED HEALTH CARE EDUCATION/TRAINING PROGRAM

## 2023-08-31 PROCEDURE — 3061F NEG MICROALBUMINURIA REV: CPT | Mod: CPTII,S$GLB,, | Performed by: STUDENT IN AN ORGANIZED HEALTH CARE EDUCATION/TRAINING PROGRAM

## 2023-08-31 PROCEDURE — 3044F HG A1C LEVEL LT 7.0%: CPT | Mod: CPTII,S$GLB,, | Performed by: STUDENT IN AN ORGANIZED HEALTH CARE EDUCATION/TRAINING PROGRAM

## 2023-08-31 PROCEDURE — 3074F PR MOST RECENT SYSTOLIC BLOOD PRESSURE < 130 MM HG: ICD-10-PCS | Mod: CPTII,S$GLB,, | Performed by: STUDENT IN AN ORGANIZED HEALTH CARE EDUCATION/TRAINING PROGRAM

## 2023-08-31 PROCEDURE — 3008F BODY MASS INDEX DOCD: CPT | Mod: CPTII,S$GLB,, | Performed by: FAMILY MEDICINE

## 2023-08-31 PROCEDURE — 99999 PR PBB SHADOW E&M-EST. PATIENT-LVL V: ICD-10-PCS | Mod: PBBFAC,,, | Performed by: STUDENT IN AN ORGANIZED HEALTH CARE EDUCATION/TRAINING PROGRAM

## 2023-08-31 PROCEDURE — 3061F PR NEG MICROALBUMINURIA RESULT DOCUMENTED/REVIEW: ICD-10-PCS | Mod: CPTII,S$GLB,, | Performed by: FAMILY MEDICINE

## 2023-08-31 PROCEDURE — 1160F RVW MEDS BY RX/DR IN RCRD: CPT | Mod: CPTII,S$GLB,, | Performed by: FAMILY MEDICINE

## 2023-08-31 PROCEDURE — 3066F NEPHROPATHY DOC TX: CPT | Mod: CPTII,S$GLB,, | Performed by: FAMILY MEDICINE

## 2023-08-31 PROCEDURE — 99999 PR PBB SHADOW E&M-EST. PATIENT-LVL V: ICD-10-PCS | Mod: PBBFAC,,, | Performed by: FAMILY MEDICINE

## 2023-08-31 PROCEDURE — 3008F PR BODY MASS INDEX (BMI) DOCUMENTED: ICD-10-PCS | Mod: CPTII,S$GLB,, | Performed by: FAMILY MEDICINE

## 2023-08-31 PROCEDURE — 99396 PREV VISIT EST AGE 40-64: CPT | Mod: S$GLB,,, | Performed by: FAMILY MEDICINE

## 2023-08-31 RX ORDER — PRAVASTATIN SODIUM 10 MG/1
10 TABLET ORAL NIGHTLY
Qty: 90 TABLET | Refills: 3 | Status: SHIPPED | OUTPATIENT
Start: 2023-08-31 | End: 2023-08-31

## 2023-08-31 RX ORDER — METFORMIN HYDROCHLORIDE 500 MG/1
500 TABLET ORAL 2 TIMES DAILY
COMMUNITY
Start: 2023-08-17 | End: 2023-08-31

## 2023-08-31 RX ORDER — METHOCARBAMOL 750 MG/1
TABLET, FILM COATED ORAL
Qty: 40 TABLET | Refills: 3 | Status: SHIPPED | OUTPATIENT
Start: 2023-08-31 | End: 2024-01-16 | Stop reason: SDUPTHER

## 2023-08-31 RX ORDER — METOPROLOL SUCCINATE 100 MG/1
100 TABLET, EXTENDED RELEASE ORAL DAILY
Qty: 90 TABLET | Refills: 3 | Status: SHIPPED | OUTPATIENT
Start: 2023-08-31

## 2023-08-31 RX ORDER — ROSUVASTATIN CALCIUM 10 MG/1
10 TABLET, COATED ORAL DAILY
Qty: 90 TABLET | Refills: 3 | Status: SHIPPED | OUTPATIENT
Start: 2023-08-31 | End: 2023-09-08

## 2023-08-31 RX ORDER — METFORMIN HYDROCHLORIDE 500 MG/1
500 TABLET, EXTENDED RELEASE ORAL 2 TIMES DAILY WITH MEALS
Qty: 180 TABLET | Refills: 3 | Status: SHIPPED | OUTPATIENT
Start: 2023-08-31 | End: 2024-08-30

## 2023-08-31 RX ORDER — SEMAGLUTIDE 2.68 MG/ML
2 INJECTION, SOLUTION SUBCUTANEOUS
Qty: 9 ML | Refills: 3 | Status: SHIPPED | OUTPATIENT
Start: 2023-08-31 | End: 2023-09-08 | Stop reason: SDUPTHER

## 2023-08-31 NOTE — PROGRESS NOTES
ENDOCRINOLOGY FOLLOW UP VISIT: 09/01/2023    Subjective:      Patient ID: Dominic Collazo is a 42 y.o. male.    Chief Complaint:  Fatigue, low testosterone    History of Present Illness  Dominic Collazo is a 42 y.o. male with a PMH of htn, hld, bph obstruction, nafld, robert who presents for male hypogonadism.      Key History and Diagnostic Findings    He had been following with urology for hypogonadism previously.  This has been present for many years, testosterone below 100.     While on TRT, he has improving.  Has tried different forms of TRT including androgel and undecanoate (stopped for cost reasons).   Has been on clomid as well, but he didn't notice improvement in his symptoms while on the clomid.    His wife has had a hysterectomy, so he's no longer interested in fertility.       He reports a good urinary stream and complete bladder emptying.  Has no urinary complaints.      - ED mostly related to mood, physically able to perform  - Mood changes x 6 months  - Intentional 30lb weight loss      - Current therapy: Testosterone cypionate  mgevery week started in January    - Current symptoms:    No   Yes  [x]    []  Decrease or absence of morning erections  []    [x]  Low libido  [x]    []  Loss of body hair  [x]    []  Low bone mineral density (history of fragility fracture or osteoporosis)  [x]    []  Gynecomastia  [x]    []  Diminished size of testes  []    [x]  Fatigue  []    [x]  Depression  [x]    []  Anemia  [x]    []  Reduced muscular strength  [x]    []  Increased fat mass    - Risk factors:    No   Yes  [x]    []  History of pituitary abnormality, surgery, or radiation  []    [x]  Obesity  []    [x]  Obstructive sleep apnea On Bipap  [x]    []  Radiation to the pelvic area  [x]    []  Chemotherapy  [x]    []  Chronic systemic illness (hepatic cirrhosis, chronic renal failure, or AIDS)  [x]    []  Family history of hypogonadism or testosterone therapy  [x]    []  Cryptorchidism,  testicular torsion, or trauma  [x]    []  History of mumps as a child    - Contraindications:  - Patient denies history of erythrocytosis or venous thromboembolism, coronary artery disease, benign prostatic hypertrophy or severe lower urinary tract symptoms, prostate cancer, breast cancer, decompensated heart failure, or severe obstructive sleep apnea    ROS:   As above    Objective:     /80   Pulse 82   Wt (!) 152.7 kg (336 lb 8.5 oz)   SpO2 98%   BMI 42.06 kg/m²   BP Readings from Last 3 Encounters:   08/31/23 (!) 120/90   08/31/23 120/80   07/23/23 128/79     Wt Readings from Last 1 Encounters:   08/31/23 1542 (!) 153.1 kg (337 lb 8.4 oz)     Body mass index is 42.06 kg/m².      Physical Exam  Vitals reviewed.   Constitutional:       General: He is not in acute distress.     Appearance: Normal appearance.   Eyes:      Extraocular Movements: Extraocular movements intact.      Conjunctiva/sclera: Conjunctivae normal.   Pulmonary:      Effort: Pulmonary effort is normal. No respiratory distress.   Skin:     General: Skin is warm and dry.   Neurological:      Mental Status: He is alert and oriented to person, place, and time. Mental status is at baseline.   Psychiatric:         Mood and Affect: Mood normal.         Thought Content: Thought content normal.              Lab Review:    Latest Reference Range & Units 01/03/23 07:52 05/24/23 08:49 06/01/23 07:35   Estradiol 11 - 44 pg/mL   31   Testosterone, Total 304 - 1227 ng/dL 169 (L) 531      Body mass index is 42.06 kg/m².   Lab Results   Component Value Date    HGBA1C 5.5 04/21/2023     Lab Results   Component Value Date    CHOL 230 (H) 01/03/2023    HDL 36 (L) 01/03/2023    LDLCALC 152.2 01/03/2023    TRIG 209 (H) 01/03/2023    CHOLHDL 15.7 (L) 01/03/2023     Lab Results   Component Value Date     07/23/2023    K 4.5 07/23/2023     07/23/2023    CO2 21 (L) 07/23/2023    GLU 91 07/23/2023    BUN 9 07/23/2023    CREATININE 0.8 07/23/2023     CALCIUM 10.3 07/23/2023    PROT 7.7 07/23/2023    ALBUMIN 4.3 07/23/2023    BILITOT 0.4 07/23/2023    ALKPHOS 59 07/23/2023    AST 35 07/23/2023    ALT 67 (H) 07/23/2023    ANIONGAP 15 07/23/2023    ESTGFRAFRICA >60.0 04/14/2021    EGFRNONAA >60.0 04/14/2021    TSH 1.147 12/07/2022     Vit D, 25-Hydroxy   Date Value Ref Range Status   06/01/2023 48 30 - 96 ng/mL Final     Comment:     Vitamin D deficiency.........<10 ng/mL                              Vitamin D insufficiency......10-29 ng/mL       Vitamin D sufficiency........> or equal to 30 ng/mL  Vitamin D toxicity............>100 ng/mL         Assessment and Plan     Male hypogonadism  We discussed that he had inadvertently been using 200 mg of testosterone d/t concentration changes before his last set of labs were drawn. He realized he was doing it wrong and decreased the dose to 100 mg - he felt much better on 200 mg.    -We will increase his dose to 200 mg weekly  -Monitor CBC, lipid panel and CMP in 3 months  -Repeat testosterone in 3 months - ideally mid-cycle  -Hematocrit was 51; pt also has LYNDA, will monitor      BMI 40.0-44.9, adult  Discussed that Ozempic 2 mg is the highest dose on Ozempic; if he would like increased dosing that would require switching to Wegovy.  He had 30 lb weight loss with Ozepic in 10 months/  -OK to continue with Ozempic at this time  -Weight loss will also help with his hypogonadism      Clare Frazier MD  [facility], 6th Floor  [address] [place], [address]    Office: [phone]  Fax: [phone]

## 2023-08-31 NOTE — PROGRESS NOTES
Subjective     Patient ID: Dominic Collazo is a 42 y.o. male.    Chief Complaint: Annual Exam, Concussion Recovery, Rx Review, and Medication Refill  42-year-old white male presents to clinic today for annual physical exam.  Hypertension remains well controlled on metoprolol 100 mg daily.  He is currently being treated for type 2 diabetes which is currently stable on metformin 500 mg 2 times daily and Ozempic 2 mg weekly.  A1c had previously increased to 11.9 but at last check A1c was 5.5 after initiation of this therapy.  He is currently being followed by Endocrinology secondary to hypogonadism which is stable on testosterone supplementation.  Hyperlipidemia is stable on pravastatin 10 mg daily but HDL cholesterol continues to remain low; therefore, I have recommended changing pravastatin to Crestor 10 mg daily.  He is currently being followed by Psychiatry secondary to anxiety and reports severe anxiety secondary to stress at work.  He recently suffered a concussion secondary to a vasovagal episode and continues to note headaches and feels unstable ever since the injury.  He is unsure if this is secondary to the concussion or continued anxiety.  He is followed by Psychiatry.  He reports a past surgical history of tonsillectomy and appendectomy.  He reports a family history of his mother having hypertension and hyperlipidemia.  His father had hypertension, hyperlipidemia, and multiple angiograms with his 1st occurring at the age of 46.  The patient is up-to-date with all vaccinations.    Medication Refill  Associated symptoms include arthralgias, headaches, neck pain and weakness. Pertinent negatives include no abdominal pain, chest pain, chills, congestion, coughing, fatigue, fever, joint swelling, myalgias, nausea, rash, sore throat or vomiting.     Review of Systems   Constitutional:  Negative for activity change, appetite change, chills, fatigue, fever and unexpected weight change.   HENT:  Negative  for nasal congestion, ear pain, hearing loss, postnasal drip, rhinorrhea, sinus pressure/congestion, sore throat, tinnitus and trouble swallowing.    Eyes:  Negative for discharge, redness, itching and visual disturbance.   Respiratory:  Positive for chest tightness. Negative for cough, shortness of breath and wheezing.    Cardiovascular:  Positive for palpitations. Negative for chest pain.   Gastrointestinal:  Positive for constipation. Negative for abdominal pain, blood in stool, diarrhea, nausea and vomiting.   Endocrine: Negative for polydipsia and polyuria.   Genitourinary:  Negative for decreased urine volume, difficulty urinating, dysuria, frequency, hematuria and urgency.   Musculoskeletal:  Positive for arthralgias and neck pain. Negative for back pain, joint swelling, myalgias and neck stiffness.   Integumentary:  Negative for rash.   Neurological:  Positive for weakness and headaches. Negative for dizziness and light-headedness.   Psychiatric/Behavioral:  Positive for confusion and dysphoric mood. The patient is nervous/anxious.           Objective     Physical Exam  Vitals and nursing note reviewed.   Constitutional:       General: He is not in acute distress.     Appearance: He is well-developed. He is obese. He is not diaphoretic.   HENT:      Head: Normocephalic and atraumatic.      Right Ear: External ear normal.      Left Ear: External ear normal.      Nose: Nose normal.      Mouth/Throat:      Pharynx: No oropharyngeal exudate.   Eyes:      General: No scleral icterus.        Right eye: No discharge.         Left eye: No discharge.      Conjunctiva/sclera: Conjunctivae normal.      Pupils: Pupils are equal, round, and reactive to light.   Neck:      Thyroid: No thyromegaly.      Vascular: No JVD.      Trachea: No tracheal deviation.   Cardiovascular:      Rate and Rhythm: Normal rate and regular rhythm.      Pulses:           Dorsalis pedis pulses are 2+ on the right side and 2+ on the left side.         Posterior tibial pulses are 2+ on the right side and 2+ on the left side.      Heart sounds: Normal heart sounds. No murmur heard.     No friction rub. No gallop.   Pulmonary:      Effort: Pulmonary effort is normal. No respiratory distress.      Breath sounds: Normal breath sounds. No stridor. No wheezing, rhonchi or rales.   Chest:      Chest wall: No tenderness.   Abdominal:      General: Bowel sounds are normal. There is no distension.      Palpations: Abdomen is soft. There is no mass.      Tenderness: There is no abdominal tenderness. There is no guarding or rebound.   Musculoskeletal:         General: No tenderness. Normal range of motion.      Cervical back: Normal range of motion and neck supple.      Right foot: Normal range of motion. No deformity, bunion, Charcot foot, foot drop or prominent metatarsal heads.      Left foot: Normal range of motion. No deformity, bunion, Charcot foot, foot drop or prominent metatarsal heads.   Feet:      Right foot:      Protective Sensation: 10 sites tested.  10 sites sensed.      Skin integrity: Skin integrity normal.      Toenail Condition: Fungal disease present.     Left foot:      Protective Sensation: 10 sites tested.  10 sites sensed.      Skin integrity: Skin integrity normal.      Toenail Condition: Fungal disease present.  Lymphadenopathy:      Cervical: No cervical adenopathy.   Skin:     General: Skin is warm and dry.      Coloration: Skin is not pale.      Findings: No erythema or rash.   Neurological:      Mental Status: He is alert and oriented to person, place, and time.   Psychiatric:         Mood and Affect: Mood normal.         Behavior: Behavior normal.         Thought Content: Thought content normal.         Judgment: Judgment normal.            Assessment and Plan     1. Well adult exam  -     CBC Auto Differential; Future; Expected date: 08/31/2023  -     Comprehensive Metabolic Panel; Future; Expected date: 08/31/2023  -     Lipid Panel;  Future; Expected date: 08/31/2023  -     T4, Free; Future; Expected date: 08/31/2023  -     TSH; Future; Expected date: 08/31/2023  -     Urinalysis; Future; Expected date: 08/31/2023  -     Vitamin D; Future; Expected date: 08/31/2023  -     Hemoglobin A1C; Future; Expected date: 08/31/2023  -     Microalbumin/Creatinine Ratio, Urine; Future; Expected date: 08/31/2023  -     Cancel: PSA, Screening; Future; Expected date: 08/31/2023  -     PSA, Screening; Future; Expected date: 08/31/2023    2. Primary hypertension    3. Other hyperlipidemia  -     Lipid Panel; Future; Expected date: 08/31/2023  -     rosuvastatin (CRESTOR) 10 MG tablet; Take 1 tablet (10 mg total) by mouth once daily.  Dispense: 90 tablet; Refill: 3    4. Adjustment disorder with mixed anxiety and depressed mood    5. LYNDA (obstructive sleep apnea)    6. Type 2 diabetes mellitus with morbid obesity  -     Comprehensive Metabolic Panel; Future; Expected date: 08/31/2023  -     Urinalysis; Future; Expected date: 08/31/2023  -     Hemoglobin A1C; Future; Expected date: 08/31/2023  -     Microalbumin/Creatinine Ratio, Urine; Future; Expected date: 08/31/2023  -     metFORMIN (GLUCOPHAGE-XR) 500 MG ER 24hr tablet; Take 1 tablet (500 mg total) by mouth 2 (two) times daily with meals.  Dispense: 180 tablet; Refill: 3  -     semaglutide (OZEMPIC) 2 mg/dose (8 mg/3 mL) PnIj; Inject 2 mg into the skin every 7 days.  Dispense: 9 mL; Refill: 3    7. Concussion with loss of consciousness, subsequent encounter  -     Ambulatory referral/consult to Neurology; Future; Expected date: 09/07/2023    8. Prostate cancer screening  -     PSA, Screening; Future; Expected date: 08/31/2023    9. Male hypogonadism  -     TESTOSTERONE PANEL; Future; Expected date: 08/31/2023    10. Neck pain  -     methocarbamoL (ROBAXIN) 750 MG Tab; TAKE 1 TABLET(750 MG) BY MOUTH FOUR TIMES DAILY AS NEEDED FOR MUSCLE STRAIN  Dispense: 40 tablet; Refill: 3    11. Personal history of colonic  polyps  -     Ambulatory referral/consult to Endo Procedure ; Future; Expected date: 09/01/2023    Other orders  -     metoprolol succinate (TOPROL-XL) 100 MG 24 hr tablet; Take 1 tablet (100 mg total) by mouth once daily.  Dispense: 90 tablet; Refill: 3  -     Discontinue: pravastatin (PRAVACHOL) 10 MG tablet; Take 1 tablet (10 mg total) by mouth every evening.  Dispense: 90 tablet; Refill: 3        1. CBC, CMP, UA, TSH, free T4, fasting lipids, vitamin-D level, hemoglobin A1c, urine microalbumin to creatinine ratio, PSA level, and testosterone level.    2. Continue metoprolol 100 mg daily.  Hypertension is well controlled.  3. Change pravastatin to Crestor 10 mg nightly for further control of hyperlipidemia.  4. Continue metformin 500 mg b.i.d. and Ozempic 2 mg weekly.  Diabetes is well controlled.  5. Continue follow-up with psychiatry as scheduled.  Adjustment disorder is currently flared.  6. Refer to neurology for further evaluation of concussion.    7. Continue follow-up with endocrinology for continued treatment of hypogonadism.  Will check testosterone levels.    8. Continue use of Robaxin as needed for neck strain.    9. Screening colonoscopy.    10. Return to clinic as needed or in 1 year for annual physical exam.             Follow up in about 1 year (around 8/31/2024), or if symptoms worsen or fail to improve, for Annual exam.

## 2023-09-01 ENCOUNTER — TELEPHONE (OUTPATIENT)
Dept: ENDOSCOPY | Facility: HOSPITAL | Age: 43
End: 2023-09-01

## 2023-09-01 ENCOUNTER — CLINICAL SUPPORT (OUTPATIENT)
Dept: ENDOSCOPY | Facility: HOSPITAL | Age: 43
End: 2023-09-01
Attending: FAMILY MEDICINE
Payer: COMMERCIAL

## 2023-09-01 DIAGNOSIS — E29.1 MALE HYPOGONADISM: ICD-10-CM

## 2023-09-01 DIAGNOSIS — Z86.010 PERSONAL HISTORY OF COLONIC POLYPS: ICD-10-CM

## 2023-09-01 RX ORDER — TESTOSTERONE CYPIONATE 200 MG/ML
200 INJECTION, SOLUTION INTRAMUSCULAR
Qty: 5 ML | Refills: 3 | Status: SHIPPED | OUTPATIENT
Start: 2023-09-01 | End: 2024-03-15 | Stop reason: SDUPTHER

## 2023-09-01 RX ORDER — SODIUM, POTASSIUM,MAG SULFATES 17.5-3.13G
1 SOLUTION, RECONSTITUTED, ORAL ORAL DAILY
Qty: 1 KIT | Refills: 0 | Status: SHIPPED | OUTPATIENT
Start: 2023-09-01 | End: 2023-09-03

## 2023-09-01 NOTE — PROGRESS NOTES
I have seen the patient, reviewed the fellow's history and physical, assessment, plan, and progress note. I have personally interviewed and examined the patient at bedside and agree with the findings.     Suspect secondary hypogonadism related to obesity. Okay to increase testosterone to 200 mg weekly since he felt much better on that dose. We'll need to monitor hematocrit and reduce the dose if too high next time. Encouraged him to donate blood.    Jaylon Frausto MD  Endocrinology Staff

## 2023-09-01 NOTE — ASSESSMENT & PLAN NOTE
We discussed that he had inadvertently been using 200 mg of testosterone d/t concentration changes before his last set of labs were drawn. He realized he was doing it wrong and decreased the dose to 100 mg - he felt much better on 200 mg.    -We will increase his dose to 200 mg weekly  -Monitor CBC, lipid panel and CMP in 3 months  -Repeat testosterone in 3 months - ideally mid-cycle  -Hematocrit was 51; pt also has LYNDA, will monitor

## 2023-09-01 NOTE — ASSESSMENT & PLAN NOTE
Discussed that Ozempic 2 mg is the highest dose on Ozempic; if he would like increased dosing that would require switching to Wegovy.  He had 30 lb weight loss with Ozepic in 10 months/  -OK to continue with Ozempic at this time  -Weight loss will also help with his hypogonadism

## 2023-09-01 NOTE — PLAN OF CARE
Spoke to patient to schedule procedure(s) Colonoscopy       Physician to perform procedure(s) Dr. NATACHA Velazquez  Date of Procedure (s) 12/5/23  Arrival Time 8:20 AM  Time of Procedure(s) 9:20 AM   Location of Procedure(s) [place] 4th Floor  Type of Rx Prep sent to patient: Suprep  Instructions provided to patient via [facility]    Patient was informed on the following information and verbalized understanding. Screening questionnaire reviewed with patient and complete. If procedure requires anesthesia, a responsible adult needs to be present to accompany the patient home, patient cannot drive after receiving anesthesia. Appointment details are tentative, especially check-in time. Patient will receive a prep-op call 4 days prior to confirm check-in time for procedure. If applicable the patient should contact their pharmacy to verify Rx for procedure prep is ready for pick-up. Patient was advised to call the scheduling department at [phone] if pharmacy states no Rx is available. Patient was advised to call the endoscopy scheduling department if any questions or concerns arise.      [facility] Scheduling Department

## 2023-09-01 NOTE — TELEPHONE ENCOUNTER
Spoke to patient to schedule procedure(s) Colonoscopy       Physician to perform procedure(s) Dr. NATACHA Velazquez  Date of Procedure (s) 12/5/23  Arrival Time 8:20 AM  Time of Procedure(s) 9:20 AM   Location of Procedure(s) [place] 4th Floor  Type of Rx Prep sent to patient: Suprep  Instructions provided to patient via [facility]    Patient was informed on the following information and verbalized understanding. Screening questionnaire reviewed with patient and complete. If procedure requires anesthesia, a responsible adult needs to be present to accompany the patient home, patient cannot drive after receiving anesthesia. Appointment details are tentative, especially check-in time. Patient will receive a prep-op call 4 days prior to confirm check-in time for procedure. If applicable the patient should contact their pharmacy to verify Rx for procedure prep is ready for pick-up. Patient was advised to call the scheduling department at [phone] if pharmacy states no Rx is available. Patient was advised to call the endoscopy scheduling department if any questions or concerns arise.      [facility] Scheduling Department

## 2023-09-04 NOTE — TELEPHONE ENCOUNTER
----- Message from Colleen Adame LPN sent at 2/3/2020  4:11 PM CST -----  Pt records reviewed.   Pt will be referred to Hepatology.  Fatty liver  Splenomegaly  Low testosterone  Initial referral received  from the [facility]que.   Referring Provider/diagnosis  Margarito Silva MD  
No, not prescribed...

## 2023-09-05 ENCOUNTER — PATIENT MESSAGE (OUTPATIENT)
Dept: ADMINISTRATIVE | Facility: HOSPITAL | Age: 43
End: 2023-09-05
Payer: COMMERCIAL

## 2023-09-05 ENCOUNTER — PATIENT OUTREACH (OUTPATIENT)
Dept: ADMINISTRATIVE | Facility: HOSPITAL | Age: 43
End: 2023-09-05
Payer: COMMERCIAL

## 2023-09-06 ENCOUNTER — LAB VISIT (OUTPATIENT)
Dept: LAB | Facility: HOSPITAL | Age: 43
End: 2023-09-06
Attending: FAMILY MEDICINE
Payer: COMMERCIAL

## 2023-09-06 DIAGNOSIS — Z00.00 WELL ADULT EXAM: ICD-10-CM

## 2023-09-06 DIAGNOSIS — E66.01 TYPE 2 DIABETES MELLITUS WITH MORBID OBESITY: ICD-10-CM

## 2023-09-06 DIAGNOSIS — E11.69 TYPE 2 DIABETES MELLITUS WITH MORBID OBESITY: ICD-10-CM

## 2023-09-06 LAB
ALBUMIN/CREAT UR: 21.9 UG/MG (ref 0–30)
BACTERIA #/AREA URNS AUTO: ABNORMAL /HPF
BILIRUB UR QL STRIP: NEGATIVE
CLARITY UR REFRACT.AUTO: CLEAR
COLOR UR AUTO: YELLOW
CREAT UR-MCNC: 169 MG/DL (ref 23–375)
GLUCOSE UR QL STRIP: NEGATIVE
HGB UR QL STRIP: NEGATIVE
KETONES UR QL STRIP: NEGATIVE
LEUKOCYTE ESTERASE UR QL STRIP: ABNORMAL
MICROALBUMIN UR DL<=1MG/L-MCNC: 37 UG/ML
MICROSCOPIC COMMENT: ABNORMAL
NITRITE UR QL STRIP: NEGATIVE
PH UR STRIP: 6 [PH] (ref 5–8)
PROT UR QL STRIP: NEGATIVE
RBC #/AREA URNS AUTO: 1 /HPF (ref 0–4)
SP GR UR STRIP: 1.02 (ref 1–1.03)
URN SPEC COLLECT METH UR: ABNORMAL
WBC #/AREA URNS AUTO: 21 /HPF (ref 0–5)

## 2023-09-06 PROCEDURE — 81001 URINALYSIS AUTO W/SCOPE: CPT | Performed by: FAMILY MEDICINE

## 2023-09-06 PROCEDURE — 82043 UR ALBUMIN QUANTITATIVE: CPT | Performed by: FAMILY MEDICINE

## 2023-09-07 ENCOUNTER — PATIENT MESSAGE (OUTPATIENT)
Dept: PSYCHIATRY | Facility: CLINIC | Age: 43
End: 2023-09-07
Payer: COMMERCIAL

## 2023-09-07 DIAGNOSIS — E78.49 OTHER HYPERLIPIDEMIA: ICD-10-CM

## 2023-09-07 NOTE — TELEPHONE ENCOUNTER
Refill Routing Note   Medication(s) are not appropriate for processing by [facility] for the following reason(s):      New or recently adjusted medication  Clarification of medication (Rx) details: See patient comment below    [facility] action(s):  Defer Care Due:  None identified     Medication Therapy Plan: Pharmacy comment: Does this medication replace PRAVASTATIN 10 MG TAB? Please provide guidance.        Appointments  past 12m or future 3m with PCP    Date Provider   Last Visit   8/31/2023 Arjun Cross MD   Next Visit   Visit date not found Arjun Cross MD   ED visits in past 90 days: 1        Note composed:4:41 PM 09/07/2023

## 2023-09-07 NOTE — TELEPHONE ENCOUNTER
No care due was identified.  [facility] Embedded Care Due Messages. Reference number: [id number].   9/07/2023 11:55:15 AM CDT

## 2023-09-08 ENCOUNTER — PATIENT MESSAGE (OUTPATIENT)
Dept: ORTHOPEDICS | Facility: CLINIC | Age: 43
End: 2023-09-08
Payer: COMMERCIAL

## 2023-09-08 DIAGNOSIS — E11.69 TYPE 2 DIABETES MELLITUS WITH MORBID OBESITY: ICD-10-CM

## 2023-09-08 DIAGNOSIS — E66.01 TYPE 2 DIABETES MELLITUS WITH MORBID OBESITY: ICD-10-CM

## 2023-09-08 RX ORDER — ROSUVASTATIN CALCIUM 10 MG/1
10 TABLET, COATED ORAL
Qty: 90 TABLET | Refills: 3 | Status: SHIPPED | OUTPATIENT
Start: 2023-09-08

## 2023-09-08 RX ORDER — SEMAGLUTIDE 2.68 MG/ML
2 INJECTION, SOLUTION SUBCUTANEOUS
Qty: 9 ML | Refills: 3 | Status: SHIPPED | OUTPATIENT
Start: 2023-09-08 | End: 2024-09-07

## 2023-09-08 NOTE — TELEPHONE ENCOUNTER
No care due was identified.  [facility] Embedded Care Due Messages. Reference number: [id number].   9/08/2023 3:15:39 PM CDT

## 2023-09-11 ENCOUNTER — PATIENT OUTREACH (OUTPATIENT)
Dept: ADMINISTRATIVE | Facility: HOSPITAL | Age: 43
End: 2023-09-11
Payer: COMMERCIAL

## 2023-09-11 ENCOUNTER — PATIENT MESSAGE (OUTPATIENT)
Dept: ADMINISTRATIVE | Facility: HOSPITAL | Age: 43
End: 2023-09-11
Payer: COMMERCIAL

## 2023-09-11 ENCOUNTER — PATIENT MESSAGE (OUTPATIENT)
Dept: INTERNAL MEDICINE | Facility: CLINIC | Age: 43
End: 2023-09-11
Payer: COMMERCIAL

## 2023-09-13 ENCOUNTER — OFFICE VISIT (OUTPATIENT)
Dept: ORTHOPEDICS | Facility: CLINIC | Age: 43
End: 2023-09-13
Payer: COMMERCIAL

## 2023-09-13 VITALS
HEART RATE: 94 BPM | DIASTOLIC BLOOD PRESSURE: 95 MMHG | SYSTOLIC BLOOD PRESSURE: 134 MMHG | BODY MASS INDEX: 39.17 KG/M2 | HEIGHT: 75 IN | WEIGHT: 315 LBS

## 2023-09-13 DIAGNOSIS — S43.432D PARALABRAL CYST OF LEFT SHOULDER, SUBSEQUENT ENCOUNTER: ICD-10-CM

## 2023-09-13 DIAGNOSIS — S43.432D SUPERIOR GLENOID LABRUM LESION OF LEFT SHOULDER, SUBSEQUENT ENCOUNTER: Primary | ICD-10-CM

## 2023-09-13 PROCEDURE — 99214 PR OFFICE/OUTPT VISIT, EST, LEVL IV, 30-39 MIN: ICD-10-PCS | Mod: S$GLB,,, | Performed by: ORTHOPAEDIC SURGERY

## 2023-09-13 PROCEDURE — 1160F PR REVIEW ALL MEDS BY PRESCRIBER/CLIN PHARMACIST DOCUMENTED: ICD-10-PCS | Mod: CPTII,S$GLB,, | Performed by: ORTHOPAEDIC SURGERY

## 2023-09-13 PROCEDURE — 1159F PR MEDICATION LIST DOCUMENTED IN MEDICAL RECORD: ICD-10-PCS | Mod: CPTII,S$GLB,, | Performed by: ORTHOPAEDIC SURGERY

## 2023-09-13 PROCEDURE — 3044F PR MOST RECENT HEMOGLOBIN A1C LEVEL <7.0%: ICD-10-PCS | Mod: CPTII,S$GLB,, | Performed by: ORTHOPAEDIC SURGERY

## 2023-09-13 PROCEDURE — 3075F SYST BP GE 130 - 139MM HG: CPT | Mod: CPTII,S$GLB,, | Performed by: ORTHOPAEDIC SURGERY

## 2023-09-13 PROCEDURE — 1160F RVW MEDS BY RX/DR IN RCRD: CPT | Mod: CPTII,S$GLB,, | Performed by: ORTHOPAEDIC SURGERY

## 2023-09-13 PROCEDURE — 3066F NEPHROPATHY DOC TX: CPT | Mod: CPTII,S$GLB,, | Performed by: ORTHOPAEDIC SURGERY

## 2023-09-13 PROCEDURE — 3008F BODY MASS INDEX DOCD: CPT | Mod: CPTII,S$GLB,, | Performed by: ORTHOPAEDIC SURGERY

## 2023-09-13 PROCEDURE — 3080F PR MOST RECENT DIASTOLIC BLOOD PRESSURE >= 90 MM HG: ICD-10-PCS | Mod: CPTII,S$GLB,, | Performed by: ORTHOPAEDIC SURGERY

## 2023-09-13 PROCEDURE — 3075F PR MOST RECENT SYSTOLIC BLOOD PRESS GE 130-139MM HG: ICD-10-PCS | Mod: CPTII,S$GLB,, | Performed by: ORTHOPAEDIC SURGERY

## 2023-09-13 PROCEDURE — 3061F NEG MICROALBUMINURIA REV: CPT | Mod: CPTII,S$GLB,, | Performed by: ORTHOPAEDIC SURGERY

## 2023-09-13 PROCEDURE — 3044F HG A1C LEVEL LT 7.0%: CPT | Mod: CPTII,S$GLB,, | Performed by: ORTHOPAEDIC SURGERY

## 2023-09-13 PROCEDURE — 3008F PR BODY MASS INDEX (BMI) DOCUMENTED: ICD-10-PCS | Mod: CPTII,S$GLB,, | Performed by: ORTHOPAEDIC SURGERY

## 2023-09-13 PROCEDURE — 3080F DIAST BP >= 90 MM HG: CPT | Mod: CPTII,S$GLB,, | Performed by: ORTHOPAEDIC SURGERY

## 2023-09-13 PROCEDURE — 99999 PR PBB SHADOW E&M-EST. PATIENT-LVL V: ICD-10-PCS | Mod: PBBFAC,,, | Performed by: ORTHOPAEDIC SURGERY

## 2023-09-13 PROCEDURE — 99999 PR PBB SHADOW E&M-EST. PATIENT-LVL V: CPT | Mod: PBBFAC,,, | Performed by: ORTHOPAEDIC SURGERY

## 2023-09-13 PROCEDURE — 3066F PR DOCUMENTATION OF TREATMENT FOR NEPHROPATHY: ICD-10-PCS | Mod: CPTII,S$GLB,, | Performed by: ORTHOPAEDIC SURGERY

## 2023-09-13 PROCEDURE — 99214 OFFICE O/P EST MOD 30 MIN: CPT | Mod: S$GLB,,, | Performed by: ORTHOPAEDIC SURGERY

## 2023-09-13 PROCEDURE — 1159F MED LIST DOCD IN RCRD: CPT | Mod: CPTII,S$GLB,, | Performed by: ORTHOPAEDIC SURGERY

## 2023-09-13 PROCEDURE — 3061F PR NEG MICROALBUMINURIA RESULT DOCUMENTED/REVIEW: ICD-10-PCS | Mod: CPTII,S$GLB,, | Performed by: ORTHOPAEDIC SURGERY

## 2023-09-13 RX ORDER — CEFAZOLIN SODIUM 2 G/50ML
2 SOLUTION INTRAVENOUS
Status: CANCELLED | OUTPATIENT
Start: 2023-09-13

## 2023-09-13 RX ORDER — SODIUM CHLORIDE 9 MG/ML
INJECTION, SOLUTION INTRAVENOUS CONTINUOUS
Status: CANCELLED | OUTPATIENT
Start: 2023-09-13
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
## 2023-09-19 ENCOUNTER — PATIENT MESSAGE (OUTPATIENT)
Dept: INTERNAL MEDICINE | Facility: CLINIC | Age: 43
End: 2023-09-19
Payer: COMMERCIAL

## 2023-09-19 DIAGNOSIS — I10 PRIMARY HYPERTENSION: ICD-10-CM

## 2023-09-19 DIAGNOSIS — I10 PRIMARY HYPERTENSION: Primary | ICD-10-CM

## 2023-09-19 RX ORDER — LOSARTAN POTASSIUM 25 MG/1
25 TABLET ORAL DAILY
Qty: 30 TABLET | Refills: 11 | Status: SHIPPED | OUTPATIENT
Start: 2023-09-19 | End: 2024-01-16 | Stop reason: SDUPTHER

## 2023-09-19 RX ORDER — LOSARTAN POTASSIUM 25 MG/1
25 TABLET ORAL DAILY
Qty: 30 TABLET | Refills: 11 | Status: CANCELLED | OUTPATIENT
Start: 2023-09-19 | End: 2024-09-18

## 2023-09-19 NOTE — TELEPHONE ENCOUNTER
No care due was identified.  [facility] Embedded Care Due Messages. Reference number: [id number].   9/19/2023 2:04:05 PM CDT

## 2023-09-19 NOTE — TELEPHONE ENCOUNTER
Pt stated he experiencing immense work stress that he discussed with you previous. Blood pressure is ranging from 130-160 over .

## 2023-09-20 ENCOUNTER — HOSPITAL ENCOUNTER (OUTPATIENT)
Dept: PREADMISSION TESTING | Facility: HOSPITAL | Age: 43
Discharge: HOME | End: 2023-09-20
Attending: NURSE PRACTITIONER
Payer: COMMERCIAL

## 2023-09-20 ENCOUNTER — OFFICE VISIT (OUTPATIENT)
Dept: PSYCHIATRY | Facility: CLINIC | Age: 43
End: 2023-09-20
Payer: COMMERCIAL

## 2023-09-20 ENCOUNTER — ANESTHESIA EVENT (OUTPATIENT)
Dept: SURGERY | Facility: HOSPITAL | Age: 43
End: 2023-09-20
Payer: COMMERCIAL

## 2023-09-20 DIAGNOSIS — G47.33 OSA ON CPAP: ICD-10-CM

## 2023-09-20 DIAGNOSIS — F51.05 INSOMNIA DUE TO OTHER MENTAL DISORDER: ICD-10-CM

## 2023-09-20 DIAGNOSIS — E55.9 VITAMIN D INSUFFICIENCY: ICD-10-CM

## 2023-09-20 DIAGNOSIS — F41.1 GENERALIZED ANXIETY DISORDER WITH PANIC ATTACKS: Primary | ICD-10-CM

## 2023-09-20 DIAGNOSIS — F99 INSOMNIA DUE TO OTHER MENTAL DISORDER: ICD-10-CM

## 2023-09-20 DIAGNOSIS — F41.0 GENERALIZED ANXIETY DISORDER WITH PANIC ATTACKS: Primary | ICD-10-CM

## 2023-09-20 PROCEDURE — 3061F PR NEG MICROALBUMINURIA RESULT DOCUMENTED/REVIEW: ICD-10-PCS | Mod: CPTII,95,, | Performed by: PHYSICIAN ASSISTANT

## 2023-09-20 PROCEDURE — 4010F PR ACE/ARB THEARPY RXD/TAKEN: ICD-10-PCS | Mod: CPTII,95,, | Performed by: PHYSICIAN ASSISTANT

## 2023-09-20 PROCEDURE — 3061F NEG MICROALBUMINURIA REV: CPT | Mod: CPTII,95,, | Performed by: PHYSICIAN ASSISTANT

## 2023-09-20 PROCEDURE — 99214 PR OFFICE/OUTPT VISIT, EST, LEVL IV, 30-39 MIN: ICD-10-PCS | Mod: 95,,, | Performed by: PHYSICIAN ASSISTANT

## 2023-09-20 PROCEDURE — 1160F PR REVIEW ALL MEDS BY PRESCRIBER/CLIN PHARMACIST DOCUMENTED: ICD-10-PCS | Mod: CPTII,95,, | Performed by: PHYSICIAN ASSISTANT

## 2023-09-20 PROCEDURE — 1159F PR MEDICATION LIST DOCUMENTED IN MEDICAL RECORD: ICD-10-PCS | Mod: CPTII,95,, | Performed by: PHYSICIAN ASSISTANT

## 2023-09-20 PROCEDURE — 3044F PR MOST RECENT HEMOGLOBIN A1C LEVEL <7.0%: ICD-10-PCS | Mod: CPTII,95,, | Performed by: PHYSICIAN ASSISTANT

## 2023-09-20 PROCEDURE — 3066F PR DOCUMENTATION OF TREATMENT FOR NEPHROPATHY: ICD-10-PCS | Mod: CPTII,95,, | Performed by: PHYSICIAN ASSISTANT

## 2023-09-20 PROCEDURE — 99214 OFFICE O/P EST MOD 30 MIN: CPT | Mod: 95,,, | Performed by: PHYSICIAN ASSISTANT

## 2023-09-20 PROCEDURE — 1160F RVW MEDS BY RX/DR IN RCRD: CPT | Mod: CPTII,95,, | Performed by: PHYSICIAN ASSISTANT

## 2023-09-20 PROCEDURE — 3044F HG A1C LEVEL LT 7.0%: CPT | Mod: CPTII,95,, | Performed by: PHYSICIAN ASSISTANT

## 2023-09-20 PROCEDURE — 3066F NEPHROPATHY DOC TX: CPT | Mod: CPTII,95,, | Performed by: PHYSICIAN ASSISTANT

## 2023-09-20 PROCEDURE — 4010F ACE/ARB THERAPY RXD/TAKEN: CPT | Mod: CPTII,95,, | Performed by: PHYSICIAN ASSISTANT

## 2023-09-20 PROCEDURE — 1159F MED LIST DOCD IN RCRD: CPT | Mod: CPTII,95,, | Performed by: PHYSICIAN ASSISTANT

## 2023-09-20 RX ORDER — ZOLPIDEM TARTRATE 12.5 MG/1
12.5 TABLET, FILM COATED, EXTENDED RELEASE ORAL NIGHTLY PRN
Qty: 30 TABLET | Refills: 3 | Status: SHIPPED | OUTPATIENT
Start: 2023-09-20 | End: 2023-12-06 | Stop reason: SDUPTHER

## 2023-09-20 NOTE — PRE-PROCEDURE INSTRUCTIONS
Wife    Allergies, medical, surgical, family and psychosocial histories reviewed with patient. Periop plan of care reviewed. Preop instructions given, including medications to take and to hold. Hibiclens soap and instructions on use given. Time allotted for questions to be addressed.  Patient verbalized understanding.      Arrival time 0800    Ozempic last dose on 9/24/23.
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
## 2023-09-20 NOTE — ANESTHESIA PREPROCEDURE EVALUATION
09/20/2023  Dominic Collazo is a 42 y.o., male scheduled for ARTHROSCOPY, SHOULDER, WITH SLAP REPAIR (Left) 12/14/23.    Past Medical History:   Diagnosis Date    Adjustment disorder with mixed anxiety and depressed mood     Anxiety     Colon polyp     Depression     ED (erectile dysfunction)     Family history of prostate cancer 9/29/2014    Hx of umbilical hernia repair 10/16/2020    Hyperlipidemia     Hypertension     Hypogonadism male     Insomnia     Low testosterone     LYNDA (obstructive sleep apnea)     Prediabetes      Past Surgical History:   Procedure Laterality Date    ADENOIDECTOMY      HERNIA REPAIR      REPAIR OF INCARCERATED UMBILICAL HERNIA N/A 10/16/2020    Procedure: REPAIR, HERNIA, UMBILICAL, INCARCERATED, AGE 5 YEARS OR OLDER with mesh ;  Surgeon: Conrad Loco MD;  Location: [facility] OR [address] [place] [address];  Service: General;  Laterality: N/A;    TONSILLECTOMY       Review of patient's allergies indicates:   Allergen Reactions    Ciprofloxacin      swelling    Penicillins Rash    Sulfa (sulfonamide antibiotics) Rash    Toradol [ketorolac] Nausea Only    Bell pepper Nausea Only    Meloxicam Nausea Only    Sulfamethoxazole-trimethoprim      Current Outpatient Medications   Medication Instructions    ALPRAZolam (XANAX) 1 mg, Oral, 2 times daily PRN    armodafiniL (NUVIGIL) 250 mg, Oral, Daily    blood sugar diagnostic (ONETOUCH VERIO TEST STRIPS) Strp 1 each, Misc.(Non-Drug; Combo Route), 3 times daily    blood-glucose meter kit To check BG 2 times daily, to use with insurance preferred meter    buPROPion (WELLBUTRIN XL) 150 mg, Oral, Daily    busPIRone (BUSPAR) 15 mg, Oral, 2 times daily    cyanocobalamin (VITAMIN B-12) 100 mcg, Oral, Daily    ergocalciferol, vitamin D2, (VITAMIN D ORAL) 5,000 Units, Oral, Daily    fexofenadine (ALLEGRA) 60 mg, Oral, Every morning    iron,carb/vit C/vit  B12/folic (IRON 100 PLUS ORAL) Oral, Daily    lancets Misc To check BG 2 times daily    losartan (COZAAR) 25 mg, Oral, Daily    metFORMIN (GLUCOPHAGE-XR) 500 mg, Oral, 2 times daily with meals    methocarbamoL (ROBAXIN) 750 MG Tab TAKE 1 TABLET(750 MG) BY MOUTH FOUR TIMES DAILY AS NEEDED FOR MUSCLE STRAIN    metoprolol succinate (TOPROL-XL) 100 mg, Oral, Daily    multivitamin capsule 1 capsule, Oral, Daily    ondansetron (ZOFRAN) 4 mg, Oral, Every 6 hours PRN    OZEMPIC 2 mg, Subcutaneous, Every 7 days    rosuvastatin (CRESTOR) 10 mg, Oral    scopolamine (TRANSDERM-SCOP) 1.3-1.5 mg (1 mg over 3 days) 1 patch, Transdermal, Every 72 hours    SUNOSI 150 mg Tab Take 1 tablet (150 mg) by mouth once daily in the morning.    testosterone cypionate (DEPOTESTOTERONE CYPIONATE) 200 mg, Intramuscular, Every 7 days    venlafaxine (EFFEXOR-XR) 150 mg, Oral, Daily    zolpidem (AMBIEN) 10 mg, Oral, Nightly PRN       Pre-op Assessment    I have reviewed the Patient Summary Reports.     I have reviewed the Nursing Notes.    I have reviewed the Medications.     Review of Systems  Anesthesia Hx:  No problems with previous Anesthesia               Denies Personal Hx of Anesthesia complications.                    Social:  Non-Smoker, Social Alcohol Use       Hematology/Oncology:       -- Anemia:                                  Cardiovascular:  Exercise tolerance: good   Denies Pacemaker. Hypertension       Denies Angina.     hyperlipidemia                             Pulmonary:       Denies Shortness of breath.  Sleep Apnea, CPAP                Renal/:    BPH              Hepatic/GI:      Denies GERD. Liver Disease,            Musculoskeletal:         Spine Disorders: thoracic Chronic Pain           Neurological:  Denies TIA.  Denies CVA.    Denies Seizures.          Chronic Pain Syndrome                         Endocrine:  Diabetes (a1c 5.9 9/6/23), well controlled, type 2         Morbid Obesity / BMI > 40  Psych:  Psychiatric  History anxiety depression                Physical Exam  General: Cooperative and Oriented    Dental:  Intact      Lab Results   Component Value Date    WBC 11.55 09/06/2023    HGB 16.2 09/06/2023    HCT 50.2 09/06/2023     09/06/2023    CHOL 236 (H) 09/06/2023    TRIG 279 (H) 09/06/2023    HDL 42 09/06/2023    ALT 68 (H) 09/06/2023    AST 43 (H) 09/06/2023     09/06/2023    K 4.6 09/06/2023     09/06/2023    CREATININE 0.8 09/06/2023    BUN 8 09/06/2023    CO2 23 09/06/2023    TSH 1.410 09/06/2023    PSA 1.9 09/06/2023    HGBA1C 5.9 (H) 09/06/2023     Results for orders placed or performed during the hospital encounter of 07/23/23   EKG 12-lead    Collection Time: 07/23/23  5:09 PM    Narrative    Test Reason : R42,    Vent. Rate : 090 BPM     Atrial Rate : 090 BPM     P-R Int : 150 ms          QRS Dur : 096 ms      QT Int : 336 ms       P-R-T Axes : 027 049 172 degrees     QTc Int : 411 ms    Normal sinus rhythm with sinus arrhythmia  ST and T wave abnormality, consider inferolateral ischemia  Abnormal ECG  When compared with ECG of 07-DEC-2022 12:21,  No significant change was found  Confirmed by Paco RUBALCAVA MD ([id number]) on 7/24/2023 11:00:06 AM    Referred By: [email or web address]   SELF           Confirmed By:Paco RUBALCAVA MD         Anesthesia Plan  Type of Anesthesia, risks & benefits discussed:    Anesthesia Type: Regional, Gen ETT  Intra-op Monitoring Plan: Standard ASA Monitors  Post Op Pain Control Plan: multimodal analgesia  Induction:  IV  Airway Plan: Video  ASA Score: 3  Day of Surgery Review of History & Physical: H&P Update referred to the surgeon/provider.  Anesthesia Plan Notes: Anesthesia consent to be signed prior to surgery 12/14/23      Ready For Surgery From Anesthesia Perspective.     .       (4) no limitation

## 2023-09-20 NOTE — DISCHARGE INSTRUCTIONS
Your surgery is scheduled for 10/5/23.    Please report to [facility] on the 1st Floor at 0800 a.m.    THIS TIME IS SUBJECT TO CHANGE.  YOU WILL RECEIVE A PHONE CALL THE DAY BEFORE SURGERY BY 3:30 PM TO CONFIRM YOUR TIME OF ARRIVAL.  IF YOU HAVE NOT RECEIVED A PHONE CALL BY 3:30 PM THE DAY BEFORE YOUR SURGERY PLEASE CALL [phone]     INSTRUCTIONS IMPORTANT!!!  ¨ Do not eat or drink after 12 midnight-including water, candy, gum, & mints. OK to brush teeth.      ____  Proceed to [facility] on *** for additional testing.        ____  Do not wear makeup, including mascara.  ____  No powder, lotions or creams to surgical area.  ____  Please remove all jewelry, including piercings and leave at home.  ____  No money or valuables needed. Please leave at home.  ____  Please bring any documents given by your doctor.  ____  If going home the same day, arrange for a ride home. You will not be able to             drive if Anesthesia was used.  ____  Children under 18 years require a parent / guardian present the entire time             they are in surgery / recovery.  ____  Wear loose fitting clothing. Allow for dressings, bandages.  ____  Stop Aspirin, Ibuprofen, Motrin, Aleve, Goody's/BC powders, Excedrine and Naproxen (NSAIDS) at least 3-5 days before surgery, unless otherwise instructed by your doctor, or the nurse.   You MAY use Tylenol/acetaminophen until day of surgery.  ____  Wash the surgical area with Hibiclens or Dial Antibacterial bar soap the night before surgery, and again the             morning of surgery.  Be sure to rinse hibiclens or Dial Antibacterial bar soap off completely (if instructed by   nurse).  ____  If you take diabetic medication including Metformin, Glimepiride, Glipizide, Glyburide, Byetta, Januvia, Actos, do not take am of surgery unless instructed by Doctor.  ____ Hold Invokana, Farxiga, and Jardiance for 3 days prior to surgery.   ____  Call MD for temperature  above 101 degrees or any other signs of infection such as Urinary (bladder) infection, Upper respiratory infection, skin boils, etc.  ____ Stop taking any Fish Oil supplement or any Vitamins that contain Vitamin E at least 5 days prior to surgery.  ____ Do Not wear your contact lenses the day of your procedure.  You may wear your glasses.      ____Do not shave surgical site for 3 days prior to surgery.  ____ Practice Good hand washing before, during, and after procedure.      I have read or had read and explained to me, and understand the above information.  Additional comments or instructions:  For additional questions call [phone]      ANESTHESIA SIDE EFFECTS  -For the first 24 hours after surgery:  Do not drive, use heavy equipment, make important decisions, or drink alcohol  -It is normal to feel sleepy for several hours.  Rest until you are more awake.  -Have someone stay with you, if needed.  They can watch for problems and help keep you safe.  -Some possible post anesthesia side effects include: nausea and vomiting, sore throat and hoarseness, sleepiness, and dizziness.        Pre-Op Bathing Instructions    Before surgery, you can play an important role in your own health.    Because skin is not sterile, we need to be sure that your skin is as free of germs as possible. By following the instructions below, you can reduce the number of germs on your skin before surgery.    IMPORTANT: You will need to shower with a special soap called Hibiclens*, available at any pharmacy.  If you are allergic to Chlorhexidine (the antiseptic in Hibiclens), use an antibacterial soap such as Dial Soap for your preoperative shower.  You will shower with Hibiclens both the night before your surgery and the morning of your surgery.  Do not use Hibiclens on the head, face or genitals to avoid injury to those areas.    STEP #1: THE NIGHT BEFORE YOUR SURGERY     Do not shave the area of your body where your surgery will be  performed.  Shower and wash your hair and body as usual with your normal soap and shampoo.  Rinse your hair and body thoroughly after you shower to remove all soap residue.  With your hand, apply one packet of Hibiclens soap to the surgical site.   Wash the site gently for five (5) minutes. Do not scrub your skin too hard.   Do not wash with your regular soap after Hibiclens is used.  Rinse your body thoroughly.  Pat yourself dry with a clean, soft towel.  Do not use lotion, cream, or powder.  Wear clean clothes.    STEP #2: THE MORNING OF YOUR SURGERY     Repeat Step #1.    * Not to be used by people allergic to Chlorhexidine.

## 2023-09-24 DIAGNOSIS — F41.0 PANIC ATTACK: ICD-10-CM

## 2023-09-25 RX ORDER — ALPRAZOLAM 1 MG/1
1 TABLET ORAL 2 TIMES DAILY PRN
Qty: 60 TABLET | Refills: 2 | Status: SHIPPED | OUTPATIENT
Start: 2023-09-25 | End: 2023-11-22 | Stop reason: SDUPTHER

## 2023-09-26 ENCOUNTER — PATIENT MESSAGE (OUTPATIENT)
Dept: PSYCHIATRY | Facility: CLINIC | Age: 43
End: 2023-09-26
Payer: COMMERCIAL

## 2023-10-04 NOTE — TELEPHONE ENCOUNTER
----- Message from Alexa Banda sent at 8/27/2020  4:53 PM CDT -----  Regarding: procedure  Type:  Needs Medical Advice    Who Called: patient  Reason for call: To schedule colonoscopy  Would the patient rather a call back or a response via [facility]? callback  Best Call Back Number: [phone]  Additional Information:        
Sent pt msg via portal re: colonoscopy  
5

## 2023-10-05 ENCOUNTER — ANESTHESIA (OUTPATIENT)
Dept: SURGERY | Facility: HOSPITAL | Age: 43
End: 2023-10-05
Payer: COMMERCIAL

## 2023-10-09 ENCOUNTER — CLINICAL SUPPORT (OUTPATIENT)
Dept: REHABILITATION | Facility: HOSPITAL | Age: 43
End: 2023-10-09
Attending: PSYCHIATRY & NEUROLOGY
Payer: COMMERCIAL

## 2023-10-09 ENCOUNTER — OFFICE VISIT (OUTPATIENT)
Dept: NEUROLOGY | Facility: CLINIC | Age: 43
End: 2023-10-09
Payer: COMMERCIAL

## 2023-10-09 VITALS
SYSTOLIC BLOOD PRESSURE: 132 MMHG | DIASTOLIC BLOOD PRESSURE: 89 MMHG | WEIGHT: 315 LBS | HEART RATE: 88 BPM | BODY MASS INDEX: 42.71 KG/M2

## 2023-10-09 DIAGNOSIS — H51.11 CONVERGENCE INSUFFICIENCY: ICD-10-CM

## 2023-10-09 DIAGNOSIS — F07.81 POST CONCUSSION SYNDROME: Primary | ICD-10-CM

## 2023-10-09 DIAGNOSIS — G44.86 CERVICOGENIC HEADACHE: ICD-10-CM

## 2023-10-09 DIAGNOSIS — H55.81 SACCADIC EYE MOVEMENT DEFICIENCY: ICD-10-CM

## 2023-10-09 DIAGNOSIS — S06.0X9S CONCUSSION WITH LOSS OF CONSCIOUSNESS, SEQUELA: ICD-10-CM

## 2023-10-09 DIAGNOSIS — S06.0X9S CONCUSSION WITH LOSS OF CONSCIOUSNESS, SEQUELA: Primary | ICD-10-CM

## 2023-10-09 DIAGNOSIS — H55.82 DEFICIENT SMOOTH PURSUIT EYE MOVEMENTS: ICD-10-CM

## 2023-10-09 DIAGNOSIS — G47.9 FATIGUE DUE TO SLEEP PATTERN DISTURBANCE: ICD-10-CM

## 2023-10-09 DIAGNOSIS — R53.83 FATIGUE DUE TO SLEEP PATTERN DISTURBANCE: ICD-10-CM

## 2023-10-09 DIAGNOSIS — M54.2 CERVICALGIA OF OCCIPITO-ATLANTO-AXIAL REGION: ICD-10-CM

## 2023-10-09 DIAGNOSIS — F34.89 OTHER SPECIFIED PERSISTENT MOOD DISORDERS: ICD-10-CM

## 2023-10-09 DIAGNOSIS — R41.89 COGNITIVE CHANGE: ICD-10-CM

## 2023-10-09 DIAGNOSIS — S13.4XXA WHIPLASH INJURY TO NECK, INITIAL ENCOUNTER: ICD-10-CM

## 2023-10-09 PROBLEM — S06.0X9A CONCUSSION WITH LOSS OF CONSCIOUSNESS: Status: ACTIVE | Noted: 2023-10-09

## 2023-10-09 PROCEDURE — 3008F PR BODY MASS INDEX (BMI) DOCUMENTED: ICD-10-PCS | Mod: CPTII,S$GLB,, | Performed by: PSYCHIATRY & NEUROLOGY

## 2023-10-09 PROCEDURE — 1159F PR MEDICATION LIST DOCUMENTED IN MEDICAL RECORD: ICD-10-PCS | Mod: CPTII,S$GLB,, | Performed by: PSYCHIATRY & NEUROLOGY

## 2023-10-09 PROCEDURE — 3079F PR MOST RECENT DIASTOLIC BLOOD PRESSURE 80-89 MM HG: ICD-10-PCS | Mod: CPTII,S$GLB,, | Performed by: PSYCHIATRY & NEUROLOGY

## 2023-10-09 PROCEDURE — 3066F PR DOCUMENTATION OF TREATMENT FOR NEPHROPATHY: ICD-10-PCS | Mod: CPTII,S$GLB,, | Performed by: PSYCHIATRY & NEUROLOGY

## 2023-10-09 PROCEDURE — 3061F PR NEG MICROALBUMINURIA RESULT DOCUMENTED/REVIEW: ICD-10-PCS | Mod: CPTII,S$GLB,, | Performed by: PSYCHIATRY & NEUROLOGY

## 2023-10-09 PROCEDURE — 3044F HG A1C LEVEL LT 7.0%: CPT | Mod: CPTII,S$GLB,, | Performed by: PSYCHIATRY & NEUROLOGY

## 2023-10-09 PROCEDURE — 3008F BODY MASS INDEX DOCD: CPT | Mod: CPTII,S$GLB,, | Performed by: PSYCHIATRY & NEUROLOGY

## 2023-10-09 PROCEDURE — 3075F PR MOST RECENT SYSTOLIC BLOOD PRESS GE 130-139MM HG: ICD-10-PCS | Mod: CPTII,S$GLB,, | Performed by: PSYCHIATRY & NEUROLOGY

## 2023-10-09 PROCEDURE — 3075F SYST BP GE 130 - 139MM HG: CPT | Mod: CPTII,S$GLB,, | Performed by: PSYCHIATRY & NEUROLOGY

## 2023-10-09 PROCEDURE — 3044F PR MOST RECENT HEMOGLOBIN A1C LEVEL <7.0%: ICD-10-PCS | Mod: CPTII,S$GLB,, | Performed by: PSYCHIATRY & NEUROLOGY

## 2023-10-09 PROCEDURE — 99205 PR OFFICE/OUTPT VISIT, NEW, LEVL V, 60-74 MIN: ICD-10-PCS | Mod: S$GLB,,, | Performed by: PSYCHIATRY & NEUROLOGY

## 2023-10-09 PROCEDURE — 1160F PR REVIEW ALL MEDS BY PRESCRIBER/CLIN PHARMACIST DOCUMENTED: ICD-10-PCS | Mod: CPTII,S$GLB,, | Performed by: PSYCHIATRY & NEUROLOGY

## 2023-10-09 PROCEDURE — 1159F MED LIST DOCD IN RCRD: CPT | Mod: CPTII,S$GLB,, | Performed by: PSYCHIATRY & NEUROLOGY

## 2023-10-09 PROCEDURE — 99205 OFFICE O/P NEW HI 60 MIN: CPT | Mod: S$GLB,,, | Performed by: PSYCHIATRY & NEUROLOGY

## 2023-10-09 PROCEDURE — 3079F DIAST BP 80-89 MM HG: CPT | Mod: CPTII,S$GLB,, | Performed by: PSYCHIATRY & NEUROLOGY

## 2023-10-09 PROCEDURE — 99999 PR PBB SHADOW E&M-EST. PATIENT-LVL IV: ICD-10-PCS | Mod: PBBFAC,,, | Performed by: PSYCHIATRY & NEUROLOGY

## 2023-10-09 PROCEDURE — 97165 OT EVAL LOW COMPLEX 30 MIN: CPT | Mod: PO

## 2023-10-09 PROCEDURE — 99999 PR PBB SHADOW E&M-EST. PATIENT-LVL IV: CPT | Mod: PBBFAC,,, | Performed by: PSYCHIATRY & NEUROLOGY

## 2023-10-09 PROCEDURE — 4010F ACE/ARB THERAPY RXD/TAKEN: CPT | Mod: CPTII,S$GLB,, | Performed by: PSYCHIATRY & NEUROLOGY

## 2023-10-09 PROCEDURE — 3061F NEG MICROALBUMINURIA REV: CPT | Mod: CPTII,S$GLB,, | Performed by: PSYCHIATRY & NEUROLOGY

## 2023-10-09 PROCEDURE — 3066F NEPHROPATHY DOC TX: CPT | Mod: CPTII,S$GLB,, | Performed by: PSYCHIATRY & NEUROLOGY

## 2023-10-09 PROCEDURE — 4010F PR ACE/ARB THEARPY RXD/TAKEN: ICD-10-PCS | Mod: CPTII,S$GLB,, | Performed by: PSYCHIATRY & NEUROLOGY

## 2023-10-09 PROCEDURE — 1160F RVW MEDS BY RX/DR IN RCRD: CPT | Mod: CPTII,S$GLB,, | Performed by: PSYCHIATRY & NEUROLOGY

## 2023-10-09 RX ORDER — METHYLPREDNISOLONE 4 MG/1
TABLET ORAL
Qty: 21 EACH | Refills: 0 | Status: SHIPPED | OUTPATIENT
Start: 2023-10-09 | End: 2023-12-06
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
## 2023-10-10 ENCOUNTER — CLINICAL SUPPORT (OUTPATIENT)
Dept: REHABILITATION | Facility: HOSPITAL | Age: 43
End: 2023-10-10
Attending: PSYCHIATRY & NEUROLOGY
Payer: COMMERCIAL

## 2023-10-10 DIAGNOSIS — R41.89 COGNITIVE CHANGE: ICD-10-CM

## 2023-10-10 DIAGNOSIS — S06.0X9S CONCUSSION WITH LOSS OF CONSCIOUSNESS, SEQUELA: ICD-10-CM

## 2023-10-10 PROCEDURE — 96125 COGNITIVE TEST BY HC PRO: CPT | Mod: PO

## 2023-10-10 NOTE — PROGRESS NOTES
Please see initial plan of care for evaluation details.     BHARATI Vanegas, CCC-SLP  Speech Language Pathologist   10/12/2023
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
## 2023-10-12 ENCOUNTER — PATIENT MESSAGE (OUTPATIENT)
Dept: NEUROLOGY | Facility: CLINIC | Age: 43
End: 2023-10-12
Payer: COMMERCIAL

## 2023-10-12 NOTE — PLAN OF CARE
"[facility]  Speech Therapy Evaluation - Concussion     Date: 10/10/2023     Name: Dominic Collazo   MRN: [id number]    Therapy Diagnosis:   Encounter Diagnoses   Name Primary?    Concussion with loss of consciousness, sequela     Cognitive change       Physician: Keshav Singh MD  Physician Orders: Ambulatory Referral to Speech Therapy   Medical Diagnosis: Concussion with unknown loss of consciousness status, initial encounter [S06.0XAA]    Visit # / Visits Authorized:  1 / 1   Date of Evaluation:  10/10/2023   Insurance Authorization Period: 10/9/23-10/8/24  Plan of Care Certification:    10/10/2023 to 11/21/23      Time In: 9:30am   Time Out: 10:15am    Procedure Min.   Cognitive Communication Evaluation - including administration, scoring and interpretation   45     Precautions: Standard  Subjective   Date of Onset: 7/23/23  History of Current Condition:  Dominic Collazo is a 42 y.o. male who presents to [facility] for evaluation and treatment secondary to post-concussion syndrome. Patient was referred to therapy by Dr. Singh [facility]. Patient's concussion occurred in July after a syncope episode. Patient.  reported initial symptoms included reduced memory, attention, confusion, fogginess, and headaches . Currently, pt is complaining of reduced memory and attention. Patient endorsed changes in mood, more agitiated . Patient endorsed fatigue, "I'm just tired all the time I cant sleep enough". Patient does not feel as though he has returned to baseline cognitive communication functioning.    Previous history of:  Previous concussions: denied  Anxiety: endorsed  Depression: endorsed  Learning Disabilities: denied  ADHD: denied  Headaches and/or Migraines: denied    Past Medical History: Dominic Collazo  has a past medical history of Adjustment disorder with mixed anxiety and depressed mood, Anxiety, Colon " "polyp, Depression, ED (erectile dysfunction), Family history of prostate cancer (9/29/2014), umbilical hernia repair (10/16/2020), Hyperlipidemia, Hypertension, Hypogonadism male, Insomnia, Low testosterone, LYNDA (obstructive sleep apnea), and Prediabetes.  Dominic Collazo  has a past surgical history that includes Tonsillectomy; Adenoidectomy; Repair of incarcerated umbilical hernia (N/A, 10/16/2020); and Hernia repair.  Medical Hx and Allergies: Dominic has a current medication list which includes the following prescription(s): alprazolam, armodafinil, blood sugar diagnostic, blood-glucose meter, bupropion, buspirone, cetirizine hcl, cyanocobalamin, ergocalciferol (vitamin d2), fexofenadine, iron,carb/vit c/vit b12/folic, lancets, losartan, metformin, methocarbamol, methylprednisolone, metoprolol succinate, multivitamin, ondansetron, riboflavin (vitamin b2), rosuvastatin, scopolamine, ozempic, sunosi, testosterone cypionate, venlafaxine, and zolpidem.   Review of patient's allergies indicates:   Allergen Reactions    Ciprofloxacin      swelling    Penicillins Rash    Sulfa (sulfonamide antibiotics) Rash    Toradol [ketorolac] Nausea Only    Bell pepper Nausea Only    Meloxicam Nausea Only    Sulfamethoxazole-trimethoprim        Prior Therapy:  NO St in adult life   Social History:   Lives with: wife 2 kids at home (13 and 16)   Family responsibilities: difficulties when distractions  Occupation: computer programming with various topics   Computer usage: "all the time"  Driving: only short distances; drives manual and feels its easier     Education Level: PHD    Prior Level of Function: very high level processing   Current Level of Function: difficulties with distractions and memory    Pain:   Location: Headache  3/10 currently  5/10 on average  0/10 at best  7/10 at worst  Description: n/a  Aggravating Factors: light, focus, emotions  Easing Factors: resting and medication     Nutrition:  No deficits, " "Oral, Thin liquids (IDDSI 0) and Regular consistencies (IDDSI 7)   Patient's Therapy Goals:  "attention"  Objective   Formal Assessment:    The Cognitive Communication Checklist for Acquired Brain Injury (CCCABI): The CCCABI is a referral tool designed to help flag communication difficulties after brain injury. This questionnaire screens for dysfunction in six domains: Functional Daily Communication, Auditory Comprehension & Information Processing, Expression, Discourse & Social Communication, Reading Comprehension, Written Expression, and Executive Functions & Self-Regulation. The results of the questionnaire are presented below.    Patient completed the questionnaire and 11/45 cognitive communication concerns were identified. These are listed below.    For the below categories only the patients self-identified complaints are listed for each domain.    Domain Patient Self-Identified Complaints   Functional Daily Communications Difficulties with:  Family or social communications  Workplace communications  Communications needed for problem solving/decision making or self advocacy   Auditory Comprehension & Information Processing Difficulties with:  Focusing attention on what is said  Shifting attention from one speaker to another  Staying on track with the conversation, staying on topic  Holding thoughts in mind while talking or listening  Remembering new conversations, events, and new information   Expression, Discourse & Social Communication Difficulties with:  Word finding, word retrieval, thinking of the word, vocabulary, word choice   Reading Comprehension Difficulties with:  Retaining read information over time, remembering, organizing  Attending to what is read, need to read everything twice   Written Expression Difficulties with:  No difficulties reported in this domain   Thinking, Reasoning, Problem Solving, Executive Functions, Self-Regulation Difficulties with:  No difficulties reported in this domain  "   Reference: Tiffanie Varma (2015) Cognitive Communication Checklist for Acquired Brain Injury (CCCABI) [employer] [facility]; [facility], N1H 6J2 , [email or web address]      The Repeatable Battery for the Assessment of Neuropsychological Status (RBANS) Version B was administered to measure the patient's attention, language, visuospatial/constructional abilities, and immediate and delayed memory. The results are outlined below:    Domain Subtest Total Score Index Score   Immediate Memory List Learning 26   106    Story Memory 22    Visuospatial/  Constructional Figure Copy 20   109    Line Orientation 17    Language Picture Naming 10   118    Semantic Fluency 30    Attention Digit Span 15   112    Coding 64      Delayed Memory List Recall 6     110    List Recognition 20     Story Recall 11     Figure Recall 17        Total Scale   116     Percentile   86     Descriptor   Superior     Immediate Memory Score: Recalling information following immediate presentation is assessed through the List Learning and Story Memory subtests. In the List Learning subtest, the patient is given 10 words to remember. This list is presented four times overall. In this subtest, the patient did demonstrate learning over the 4 trials. The patient recalled 7 items on trial one, 9 items on trial two, 10 items on trial three, and 10 items in trial 4. In the Story Memory subtest, the patient recalled 10 details on the first presentation and 12 details on the second presentation. Results of this domain indicate adequate immediate memory score.  Visuospatial score: Perceiving spatial relations and constructing a spatially accurate copy of a drawing is assessed through the Figure Copy and Line Orientation subtests. The Figure Copy subtest asks the patient to copy a complex line drawing. The patient completed with no difficulties. The Line Orientation subtest the presents the patient with 12 line displays and asks the patient to match  two given lines at the bottom to the display at the top.  The patient correctly identified 17/20. Results of this domain indicate adequate skills in visuospatial skills.  Language score: Naming common items and retrieving learned material is assessed through the Picture Naming and Semantic Fluency subtests. The Picture Naming subtest asks the patient to name 10 line drawings. The patient was able to accurately name 10/10 items. The Semantic Fluency subtest asks the patient to name as many animals as he can in 60 seconds. The patient was able to name 26 animals. These results indicate language skills are within functional limits.   Attention score: Attending to, holding and manipulating information presented visually and orally in working memory is assessed with use of the Digit Span and Coding subtests. The Digit Span subtest asks the patient to repeat progressively lengthening strings of numbers. The Coding subtest asks the patient to alternate attention between a key the given work and then to decode symbols to numbers. The patients results on these subtest indicate adequate attention skills in quiet environment, however patient reports consistent attention difficulties in his current functional work place impacting his work function.  Delayed Memory score: Anterograde memory capacity is assessed through the List Recall, List Recognition, Story Recall, and Figure Recall subtests. The List Recall subtest asks the patient to recall items from the list presented at the beginning of the test. The patient was able to recall 6/10 items. The List Recognition subtest has the patient recall whether a word was or was not in the original list. The patient accurately identified whether a word was or was not on the list in 20 of 20 items. On the Story Recall subtest, the patient was able to recall 11 details. Finally, on the Figure Recall, the patient recalled 17 details. Results of this domain indicate his delayed memory is  reduced from his reported previous function.    Overall, according to the RBANS research, total Scale index is a good indicator of general cognitive functioning. Due to patients high cognitive functioning baseline, his results from the RBANS are noted to be above his current function in his everyday life. The patient presents with a mild cognitive communication disorder charaterized by presented deficits and reported deficits in delayed memory, multitasking attention, and processing speed.    Treatment   Total Treatment Time Separate from Evaluation: not applicable   No treatment performed secondary to time to complete evaluation.     Education provided:   -role of Speech Therapy, goals/plan of care, scheduling/cancellations, insurance limitations with patient  -Additional Education provided:   Symptoms relating to post concussion     Patient and/or family members expressed understanding.     Home Program: not yet established   Assessment     Dominic presents to [facility] status post medical diagnosis of Concussion with unknown loss of consciousness status, initial encounter [S06.0XAA].      Interpretation of objective assessment:   Overall, according to the RBANS research, total Scale index is a good indicator of general cognitive functioning. Due to patients high cognitive functioning baseline, his results from the RBANS are noted to be above his current function in his everyday life.     The patient presents with a mild cognitive communication disorder charaterized by presented deficits and reported deficits in delayed memory, multitasking attention, and processing speed.      Demonstrates impairments including limitations as described in the problem list.     Positive prognostic factors: motivation  Negative prognostic factors: none  Barriers to therapy: No barriers to therapy identified.     Patient's spiritual, cultural, and educational needs considered and patient  agreeable to plan of care and goals.    Patient will benefit from skilled therapy.    Rehab Potential: good    Short Term Goals: (4 weeks) Current Progress:    Patient will sustain attention to complete complex reasoning tasks for 2 minutes with distractions with one request for clarification to increase sustained attention.    Progressing/ Not Met 10/10/2023   Established this date   2.  Patient will use attention shifting strategies to shift attention between two tasks with no more than 3 cues or 90% accuracy to improve alternating attention.     Progressing/ Not Met 10/10/2023   Established this date    3. Patient will use Goal Plan Action Review strategy to complete moderate to complex reasoning, planning, or organization tasks with 90% accuracy independently to improve functional executive function skills.    Progressing/ Not Met 10/10/2023   Established this date    4. Patient will complete short term recall tasks after a 5 minutes delay with 90% accuracy  independently  with use of memory strategies to improve recall of information and generalization of memory strategies.    Progressing/ Not Met 10/10/2023   Established this date    5. Patient will complete mental manipulation tasks with 90% acc to improve working memory.    Progressing/ Not Met 10/10/2023   Established this date    6.Patient will independently implement cognitive/sensory rest periods throughout day after identifying cognitive fatigue including limiting sensory input (sound, light, etc.)     Progressing/ Not Met 10/10/2023   Established this date    7.Patient will independently generate strategies to improve ability to complete tasks with enhanced accuracy and time, based on review of objective previous performance on trials of functional tasks with 80% accuracy.     Progressing/ Not Met 10/10/2023   Established this date        Long Term Goals: (6 weeks) Current Progress:    Patient will improve  attention skills to effectively attend to and  communicate in complex daily living tasks in functional living environment.    Established this date     2.  Patient will use appropriate memory strategies to schedule and recall weekly activities, express needs and recall names to maintain safety and participate socially in functional living environment.     Established this date       Plan     Recommended Treatment Plan:  Patient will participate in the [facility] program for speech therapy 1 times per week for 6 weeks to address his Cognition deficits, to educate patient and their family, and to participate in a home exercise program.    Follow up will occur at [facility] for skilled Speech Therapy services.    Other Recommendations:   None at this time     Therapist's Name:   BHARATI Vanegas, CCC-SLP   10/10/2023

## 2023-10-16 RX ORDER — ARMODAFINIL 250 MG/1
250 TABLET ORAL DAILY
Qty: 30 TABLET | Refills: 3 | Status: SHIPPED | OUTPATIENT
Start: 2023-10-16 | End: 2024-02-21 | Stop reason: SDUPTHER

## 2023-10-18 ENCOUNTER — PATIENT MESSAGE (OUTPATIENT)
Dept: PODIATRY | Facility: CLINIC | Age: 43
End: 2023-10-18
Payer: COMMERCIAL

## 2023-10-19 ENCOUNTER — TELEPHONE (OUTPATIENT)
Dept: PODIATRY | Facility: CLINIC | Age: 43
End: 2023-10-19
Payer: COMMERCIAL

## 2023-10-19 NOTE — TELEPHONE ENCOUNTER
Called spouse Viki to inform patient Dominic that we are going to reschedule the office visit today for nail procedure until he completes the medication prescribed by his PCP. She gave verbal understanding to communicate the message. I left three VM on Dominic phone to call the office to get rescheduled for procedure.

## 2023-10-26 NOTE — PROGRESS NOTES
"Occupational Therapy Treatment Note     Date: 10/27/2023  Name: Dominic Collazo  Clinic Number: [id number]    Therapy Diagnosis:   Encounter Diagnoses   Name Primary?    Post concussion syndrome Yes    Deficient smooth pursuit eye movements     Saccadic eye movement deficiency     Convergence insufficiency      Physician: Keshav Singh MD    Physician Orders: Eval and Treat - Vestibular Therapy   Medical Diagnosis:   S06.0X9S (ICD-10-CM) - Concussion with loss of consciousness, sequela   H51.11 (ICD-10-CM) - Convergence insufficiency   Evaluation Date: 10/9/2023  Insurance Authorization Period Expiration: 12/31/2023  Plan of Care Certification Period: 12/29/2023  Date of Return to MD: 12/06/2023    Visit # / Visits authorized: 1 / 12  Time In: 10:17 am  Time Out: 11:07 am  Total Billable (one on one) Time: 50 minutes    Precautions: Standard    Subjective     Pt reports: Pt reports that he currently has a headache (4/10). Pt reports that he is experiencing brain fog, and "just doesn't feel how he used to". Pt reported balance feels off when taking the steps. Pt also reported ongoing room spinning dizziness.   Response to previous treatment:first follow up session  Functional change:     Date of Onset: 7/23/2023    Pain: 4/10  Location:  Headache     Patient's Goals for Therapy: "to get back to normal"     Objective      Dominic participated in neuromuscular re-education activities to improve: oculomotor and balance insurgencies for 50 minutes. The following activities were included:  Oculomotor:  Seated using plain target:  - Smooth pursuits, horizontal/vertical directions, 2 x 30 seconds each   - Saccades, horizontal/vertical directions, 2 x 30 seconds each at 90 bpm  - Pencil push ups holding near point x5 sec, 2 x 10     Positional Canal Testing:  Looking for nystagmus (slow phase followed by quick phase to the affected side for BPPV)    Trial 1:  [place] Hall[place] (posterior / CL anterior)   Right : " "Negative nystagmus, Negative dizziness   Left: Negative nystagmus, Negative dizziness  Horizontal Canals   Right: Negative nystagmus, Negative dizziness   Left: Negative nystagmus, Negative dizziness    Balance/Functional Mobility Assessment:     SERA Sensory Testing:  (P= Pass, F= Fail; note any sway; hold each position for 30")  Condition 1: (firm surface/feet together/eyes open) P; 30 sec  Condition 2: (firm surface/feet together/eyes closed) P; 30 sec  Condition 3: (firm surface/feet in tandem/eyes open) P; 30 sec, RLE forward  Condition 4: (firm surface/feet in tandem/eyes closed) F; 14 sec, RLE forward  Condition 5: (soft surface/feet together/eyes open) P; 30 sec  Condition 6: (soft surface/feet together/eyes closed) P; 30 sec  Condition 7: (Fukuda step test), measure distance varied from center starting position, > 30 deg deviation to either side indicates hypofunction of biased side: NT    Home Exercises and Education Provided      Education provided:   - HEP   - Progress towards goals    Written Home Exercises Provided: horizontal/vertical smooth pursuits, horizontal/vertical saccades, and convergence pencil pushups.  Exercises were reviewed and Dominic was able to demonstrate them prior to the end of the session.    Dominic demonstrated good  understanding of the education provided.     See EMR under Patient Instructions for exercises provided 10/27/2023. (Pursuits, saccades, pencil push ups)    Assessment      Pt completed today's treatment session well. Min VC required during smooth pursuits to track target without moving head. Horizontal gaze shifts were harder than vertical, but pt reported bringing low target into focus was challenging. Noted R eye having more difficulty converging during pencil push ups. Assessed pt for BPPV due to c/o room spinning dizziness, but pt was negative in all canals. Static balance was good, as pt passed all conditions of the SERA Sensory Test except for condition 4 " (tandem stance EC). Pt would continue to benefit from skilled occupational therapy services to maximize oculomotor functioning, habituation for desensitization to environments/movements that elicit/increase symptoms, pt education on mindfulness/relaxation strategies, and HEP/HAP guidance to improve functional participation with meaningful occupations.    Dominic is progressing well towards his goals and there are no updates to goals at this time. Pt prognosis is Good.     Pt will continue to benefit from skilled outpatient occupational therapy to address the deficits listed in the problem list on initial evaluation provide pt/family education and to maximize pt's level of independence in the home and community environment.     Pt's spiritual, cultural and educational needs considered and pt agreeable to plan of care and goals.    Anticipated barriers to occupational therapy:     Goals:  Short Term Goals: 4 weeks   1) Pt will tolerate oculomotor and/or habitation home exercise/activity program, reporting at least 50% compliance. ongoing  2) Pt will verbalize eye ergonomics to decrease stress on eyes. ongoing  3) Pt will demonstrate ability to track single target WFL, in all directions x30 seconds, without increase in headache or nausea, to improve skills needed for technology use and scanning environment. ongoing  4) Pt to perform saccades WFL, in all directions x30 seconds, at 90 bpm without increase in headache, dizziness, or nausea, to improve skills needed for reading. ongoing  5) Pt will report 60 minutes of computer use without eye fatigue, double vision, or increase in headache/dizziness, utilizing activity pacing strategies, needed for return to work at [employer]. ongoing  6) Near point convergence will decrease to 20 cm or less to improve oculomotor coordination and ability to fixate on close up work. ongoing  7) Pt will demonstrate ability to fixate/attend to close up tasks x 30 minutes without onset of headache  or eye fatigue. ongoing     Long Term Goals: 11 weeks   1) Pt will be independent with oculomotor and/or habituation home exercise/activity program. ongoing  2) Pt will demonstrate ability to track single target WFL, in all directions x1 minute, without increase in headache or nausea, to improve skills needed for technology use and scanning environment. ongoing  3) Pt to perform saccades WFL, in all directions x1 minute, at 110 bpm without increase in headache, dizziness, or nausea, to improve skills needed for reading. ongoing  4) Pt will report 90 minutes of computer use without eye fatigue, double vision, or increase in headache/dizziness, utilizing activity pacing strategies, needed for return to work at [employer]. ongoing  5) Near point convergence will decrease to 15 cm or less to improve oculomotor coordination and ability to fixate on close up work. ongoing  6) Pt will demonstrate ability to fixate/attend to close up tasks x 60 minutes without onset of headache or eye fatigue. ongoing    Plan     Certification Period/Plan of care expiration: 10/9/2023 to 12/29/2023.     Outpatient Occupational Therapy 1-2 times weekly for 11 weeks to include the following interventions: Neuromuscular Re-ed, Patient Education, Self Care, Therapeutic Activities, and Therapeutic Exercise.    Updates/Grading for next session: administer FGA; SLS on MAYNOR Echeverria      I certify that I was present in the room directing the student in service delivery and guiding them using my skilled judgment. As the co-signing therapist I have reviewed the students documentation and am responsible for the treatment, assessment, and plan.     Seema Ng, LYLE, LOTR  10/27/2023

## 2023-10-27 ENCOUNTER — CLINICAL SUPPORT (OUTPATIENT)
Dept: REHABILITATION | Facility: HOSPITAL | Age: 43
End: 2023-10-27
Payer: COMMERCIAL

## 2023-10-27 DIAGNOSIS — R41.841 COGNITIVE COMMUNICATION DEFICIT: ICD-10-CM

## 2023-10-27 DIAGNOSIS — F07.81 POST CONCUSSION SYNDROME: Primary | ICD-10-CM

## 2023-10-27 DIAGNOSIS — S06.0X9S CONCUSSION WITH LOSS OF CONSCIOUSNESS, SEQUELA: Primary | ICD-10-CM

## 2023-10-27 DIAGNOSIS — H51.11 CONVERGENCE INSUFFICIENCY: ICD-10-CM

## 2023-10-27 DIAGNOSIS — H55.81 SACCADIC EYE MOVEMENT DEFICIENCY: ICD-10-CM

## 2023-10-27 DIAGNOSIS — H55.82 DEFICIENT SMOOTH PURSUIT EYE MOVEMENTS: ICD-10-CM

## 2023-10-27 PROCEDURE — 97129 THER IVNTJ 1ST 15 MIN: CPT | Mod: PO

## 2023-10-27 PROCEDURE — 97130 THER IVNTJ EA ADDL 15 MIN: CPT | Mod: PO

## 2023-10-27 PROCEDURE — 97112 NEUROMUSCULAR REEDUCATION: CPT | Mod: PO

## 2023-10-27 NOTE — PATIENT INSTRUCTIONS
"  WRAP Write, Repeat, Associate, Picture - group of strategies for recall   Mental Rehearsal For "thinking through" your plan for the next day   Lists Create a list each night for the next day   Sticky Notes Bright and with specific instructions   Alarms  For things you need to remember regularly (i.e. Meals)   Mental Review How did I do with my strategies today?   Goal  Plan  Action  Review Goal - what is my goal?  Plan - how will I do it?  Action - try to complete goal  Review- how did it go?     Spoon theory,  uses spoons, as a visual representation of how much energy someone has throughout their day. For the average healthy person, they would have a seemingly unlimited amount of spoons per day, whilst a person with chronic fatigue would start each day with a limited number of spoons. The Spoon Theory is a concept to explain and manage cognitive fatigue which often results from brain injuries or concussions. Discussed Spoon Theory to highlight self-awareness and self-efficacy when it comes to their cognitive skills.                       "
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
## 2023-10-27 NOTE — PATIENT INSTRUCTIONS
Perform in horizontal and vertical directions. 2 set x 30 seconds. Keep head still, move card slowly, and keep eyes fixated on target. Perform 1-2 times a day.       Complete saccades horizontal and vertical at 90bpm on metronome. Keep head still and move eyes back and forther between targets at metronome speed. (Horizontal: shoulder width; vertical: head and chest height). 2 sets x 30 seconds. Perform 1-2 times a day.     Complete 2 sets of 10 reps holding near point x5 seconds. Bring target as close as you can that it remains single. Perform 1-2 times a day       Retrieved from:

## 2023-11-02 ENCOUNTER — PATIENT MESSAGE (OUTPATIENT)
Dept: PSYCHIATRY | Facility: CLINIC | Age: 43
End: 2023-11-02
Payer: COMMERCIAL

## 2023-11-02 ENCOUNTER — PATIENT MESSAGE (OUTPATIENT)
Dept: REHABILITATION | Facility: HOSPITAL | Age: 43
End: 2023-11-02
Payer: COMMERCIAL

## 2023-11-02 ENCOUNTER — DOCUMENTATION ONLY (OUTPATIENT)
Dept: REHABILITATION | Facility: HOSPITAL | Age: 43
End: 2023-11-02
Payer: COMMERCIAL

## 2023-11-02 NOTE — PROGRESS NOTES
Occupational Therapy: No show/Cancellation of Visit  Date: 11/02/2023    Patient cancelled today's OT appointment. Reason for cancellation: son took his car; no transporation. Patient's next scheduled appointment is 11/9/23.     Cancel: 1  No show: 0    Therapist: Seema Ng, OT

## 2023-11-03 ENCOUNTER — CLINICAL SUPPORT (OUTPATIENT)
Dept: REHABILITATION | Facility: HOSPITAL | Age: 43
End: 2023-11-03
Payer: COMMERCIAL

## 2023-11-03 DIAGNOSIS — H55.82 DEFICIENT SMOOTH PURSUIT EYE MOVEMENTS: ICD-10-CM

## 2023-11-03 DIAGNOSIS — H55.81 SACCADIC EYE MOVEMENT DEFICIENCY: ICD-10-CM

## 2023-11-03 DIAGNOSIS — F07.81 POST CONCUSSION SYNDROME: Primary | ICD-10-CM

## 2023-11-03 DIAGNOSIS — H51.11 CONVERGENCE INSUFFICIENCY: ICD-10-CM

## 2023-11-03 PROCEDURE — 97112 NEUROMUSCULAR REEDUCATION: CPT | Mod: PO

## 2023-11-03 NOTE — PROGRESS NOTES
"Occupational Therapy Treatment Note     Date: 11/3/2023  Name: Dominic Collazo  Clinic Number: [id number]    Therapy Diagnosis:   Encounter Diagnoses   Name Primary?    Post concussion syndrome Yes    Deficient smooth pursuit eye movements     Saccadic eye movement deficiency     Convergence insufficiency        Physician: Keshav Singh MD    Physician Orders: Eval and Treat - Vestibular Therapy   Medical Diagnosis:   S06.0X9S (ICD-10-CM) - Concussion with loss of consciousness, sequela   H51.11 (ICD-10-CM) - Convergence insufficiency   Evaluation Date: 10/9/2023  Insurance Authorization Period Expiration: 12/31/2023  Plan of Care Certification Period: 12/29/2023  Date of Return to MD: 12/06/2023    Visit # / Visits authorized: 2 / 12  Time In: 0921  Time Out: 1009  Total Billable (one on one) Time: 48 minutes    Precautions: Standard    Subjective     Pt reports: overall exercises have been going pretty well. He stated that he is still trying to figure out how far/high to hold targets during eye exercises. He reported that he still feels a little "off" with eye exercises explained as a "whooshing" sensation. Pt did report that he feels like post concussion symptoms are overall improving as noted during how he feels during daily activities. He reported orbital headache behind left eye and dizziness has improved.   Response to previous treatment: compliant with eye exercises   Functional change: ongoing    Date of Onset: 7/23/2023    Pain: 2-3/10  Location:  Headache (frontal headache)     Patient's Goals for Therapy: "to get back to normal"     Objective      Dominic participated in neuromuscular re-education activities to improve: oculomotor and balance insurgencies for 48 minutes. The following activities were included:  Oculomotor:  Seated using plain target:  - Smooth pursuits, horizontal/vertical/diagonal directions, 2 x 30 seconds each   - Saccades, horizontal/vertical/diagonal directions, 1 x 30 " seconds each at 90 bpm  - Pencil push ups holding near point x5 sec, 1 x 10     Balance/Functional Mobility Assessment:     Functional Gait Assessment (FGA):   1. Gait on level surface =  3   (3) Normal: less than 5.5 sec, no A.D., no imbalance, normal gait pattern, deviates< 6in   (2) Mild impairment: 7-5.6 sec, uses A.D., mild gait deviations, or deviates 6-10 in   (1) Moderate impairment: > 7 sec, slow speed, imbalance, deviates 10-15 in.   (0) Severe impairment: needs assist, deviates >15 in, reach/touch wall  2. Change in Gait Speed = 3   (3) Normal: smooth change w/o loss of balance or gait deviation, deviates < 6 in, significant difference between speeds   (2) Mild impairment: changes speed, but demonstrates mild gait deviations, deviates 6-10 in, OR no deviations but unable to significantly speed, OR uses A.D.   (1) Moderate impairment: minor changes to speed, OR changes speed w/ significant deviations, deviates 10-15 in, OR  Changes speed , but loses balance & recovers   (0) Severe impairment: cannot change speed, deviates >15 in, or loses balance & needs assist  3. Gait with horizontal head turns  = 2; slight LOB but self corrected    (3) Normal: no change in gait, deviates <6 in   (2) Mild impairment: slight change in speed, deviates 6-10 in, OR uses A.D.   (1) Moderate impairment: moderate change in speed, deviates 10-15 in   (0) Severe impairment: severe disruption of gait, deviates >15in  4. Gait with vertical head turns = 2; LOB but self corrected and mild dizziness    (3) Normal: no change in gait, deviates <6 in   (2) Mild impairment: slight change in speed, deviates 6-10 in OR uses A.D.   (1) Moderate impairment: moderate change in speed, deviates 10-15 in   (0) Severe impairment: severe disruption of gait, deviates >15 in  5. Gait with pivot turns = 3   (3) Normal: performs safely in 3 sec, no LOB   (2) Mild impairment: performs in >3 sec & no LOB, OR turns safely & requires several steps to  regain LOB   (1) Moderate impairment: turns slow, OR requires several small steps for balance following turn & stop   (0) Severe impairment: cannot turn safely, needs assist  6. Step over obstacle = 3   (3) Normal: steps over 2 stacked boxes w/o change in speed or LOB   (2) Mild impairment: able to step over 1 box w/o change in speed or LOB   (1) Moderate impairment: steps over 1 box but must slow down, may require VC   (0) Severe impairment: cannot perform w/o assist  7. Gait with Narrow KAREEM = 3   (3) Normal: 10 steps no staggering   (2) Mild impairment: 7-9 steps   (1) Moderate impairment: 4-7 steps   (0) Severe impairment: < 4 steps or cannot perform w/o assist  8. Gait with eyes closed = 3   (3) Normal: < 7 sec, no A.D., no LOB, normal gait pattern, deviates <6 in   (2) Mild impairment: 7.1-9 sec, mild gait deviations, deviates 6-10 in   (1) Moderate impairment: > 9 sec, abnormal pattern, LOB, deviates 10-15 in   (0) Severe impairment: cannot perform w/o assist, LOB, deviates >15in  9. Ambulating Backwards = 3   (3) Normal: no A.D., no LOB, normal gait pattern, deviates <6in   (2) Mild impairment: uses A.D., slower speed, mild gait deviations, deviates 6-10 in   (1) Moderate impairment: slow speed, abnormal gait pattern, LOB, deviates 10-15 in   (0) Severe impairment: severe gait deviations or LOB, deviates >15in  10. Steps = 3   (3) Normal: alternating feet, no rail   (2) Mild Impairment: alternating feet, uses rail   (1) Moderate impairment: step-to, uses rail   (0) Severe impairment: cannot perform safely    Score 28/30     Score:   </=22/30 fall risk   <20/30 fall risk in older adults   <18/30 fall risk in Parkinsons     Home Exercises and Education Provided      Education provided:   - HEP   - Progress towards goals    Written Home Exercises Provided: horizontal/vertical smooth pursuits, horizontal/vertical saccades, and convergence pencil pushups.  Exercises were reviewed and Dominic was able to  demonstrate them prior to the end of the session.    Dominic demonstrated good  understanding of the education provided.     See EMR under Patient Instructions for exercises provided 10/27/2023. (Pursuits, saccades, pencil push ups)  11/3/2023: added diagonal pursuits and saccades    Assessment      Pt tolerated today's session well. Mild increase in headache and nausea with all eye exercises. Saccades were more bothersome than tracking. Horizontal gaze shifting was most triggering, but pt reported some difficulty gaze shifting into right upper and lower quadrants. Pt also reported losing focus on target briefly during pursuits. Right eye was more bothersome during today's session, and noted limited convergence of right eye during pencil push ups. Point of convergence also decreased with reps suggesting eye fatigue. Added diagonal pursuits and saccades this date; pt did well. Only 1 set of saccades and pencil push ups were performed secondary to time constraints. FGA score does not place pt in elevated risk for falls category, but pt did lose balance and reported mild dizziness during walking with HT. Pt would continue to benefit from skilled occupational therapy services to maximize oculomotor functioning, habituation for desensitization to environments/movements that elicit/increase symptoms, pt education on mindfulness/relaxation strategies, and HEP/HAP guidance to improve functional participation with meaningful occupations.    Dominic is progressing well towards his goals and there are no updates to goals at this time. Pt prognosis is Good.     Pt will continue to benefit from skilled outpatient occupational therapy to address the deficits listed in the problem list on initial evaluation provide pt/family education and to maximize pt's level of independence in the home and community environment.     Pt's spiritual, cultural and educational needs considered and pt agreeable to plan of care and  goals.    Anticipated barriers to occupational therapy: none noted    Goals:  Short Term Goals: 4 weeks   1) Pt will tolerate oculomotor and/or habitation home exercise/activity program, reporting at least 50% compliance. ongoing  2) Pt will verbalize eye ergonomics to decrease stress on eyes. ongoing  3) Pt will demonstrate ability to track single target WFL, in all directions x30 seconds, without increase in headache or nausea, to improve skills needed for technology use and scanning environment. ongoing  4) Pt to perform saccades WFL, in all directions x30 seconds, at 90 bpm without increase in headache, dizziness, or nausea, to improve skills needed for reading. ongoing  5) Pt will report 60 minutes of computer use without eye fatigue, double vision, or increase in headache/dizziness, utilizing activity pacing strategies, needed for return to work at [employer]. ongoing  6) Near point convergence will decrease to 20 cm or less to improve oculomotor coordination and ability to fixate on close up work. ongoing  7) Pt will demonstrate ability to fixate/attend to close up tasks x 30 minutes without onset of headache or eye fatigue. ongoing     Long Term Goals: 11 weeks   1) Pt will be independent with oculomotor and/or habituation home exercise/activity program. ongoing  2) Pt will demonstrate ability to track single target WFL, in all directions x1 minute, without increase in headache or nausea, to improve skills needed for technology use and scanning environment. ongoing  3) Pt to perform saccades WFL, in all directions x1 minute, at 110 bpm without increase in headache, dizziness, or nausea, to improve skills needed for reading. ongoing  4) Pt will report 90 minutes of computer use without eye fatigue, double vision, or increase in headache/dizziness, utilizing activity pacing strategies, needed for return to work at [employer]. ongoing  5) Near point convergence will decrease to 15 cm or less to improve oculomotor  coordination and ability to fixate on close up work. ongoing  6) Pt will demonstrate ability to fixate/attend to close up tasks x 60 minutes without onset of headache or eye fatigue. ongoing    Plan     Certification Period/Plan of care expiration: 10/9/2023 to 12/29/2023.     Outpatient Occupational Therapy 1-2 times weekly for 11 weeks to include the following interventions: Neuromuscular Re-ed, Patient Education, Self Care, Therapeutic Activities, and Therapeutic Exercise.    Updates/Grading for next session: progress eye exercises; walking with HT; WmLYLE Valdivia, LOTR  11/03/2023

## 2023-11-17 ENCOUNTER — PATIENT OUTREACH (OUTPATIENT)
Dept: ADMINISTRATIVE | Facility: HOSPITAL | Age: 43
End: 2023-11-17
Payer: COMMERCIAL

## 2023-11-17 ENCOUNTER — PATIENT MESSAGE (OUTPATIENT)
Dept: ADMINISTRATIVE | Facility: HOSPITAL | Age: 43
End: 2023-11-17
Payer: COMMERCIAL

## 2023-11-20 ENCOUNTER — TELEPHONE (OUTPATIENT)
Dept: NEUROLOGY | Facility: CLINIC | Age: 43
End: 2023-11-20
Payer: COMMERCIAL

## 2023-11-20 ENCOUNTER — TELEPHONE (OUTPATIENT)
Dept: ADMINISTRATIVE | Facility: HOSPITAL | Age: 43
End: 2023-11-20
Payer: COMMERCIAL

## 2023-11-20 ENCOUNTER — PATIENT MESSAGE (OUTPATIENT)
Dept: NEUROLOGY | Facility: CLINIC | Age: 43
End: 2023-11-20
Payer: COMMERCIAL

## 2023-11-20 VITALS — SYSTOLIC BLOOD PRESSURE: 120 MMHG | DIASTOLIC BLOOD PRESSURE: 80 MMHG

## 2023-11-20 NOTE — TELEPHONE ENCOUNTER
LCSW placed call to patient [phone]  and left VM introducing self.   Requested direct call back or portal message.

## 2023-11-21 ENCOUNTER — PATIENT MESSAGE (OUTPATIENT)
Dept: NEUROLOGY | Facility: CLINIC | Age: 43
End: 2023-11-21
Payer: COMMERCIAL

## 2023-11-21 ENCOUNTER — SOCIAL WORK (OUTPATIENT)
Dept: NEUROLOGY | Facility: CLINIC | Age: 43
End: 2023-11-21
Payer: COMMERCIAL

## 2023-11-21 ENCOUNTER — TELEPHONE (OUTPATIENT)
Dept: NEUROLOGY | Facility: CLINIC | Age: 43
End: 2023-11-21
Payer: COMMERCIAL

## 2023-11-21 NOTE — TELEPHONE ENCOUNTER
Received return VM from patient and returned call.   Left another VM requesting call or portal message.

## 2023-11-21 NOTE — PROGRESS NOTES
"Occupational Therapy Treatment Note     Date: 11/22/2023  Name: Dominic Collazo  Clinic Number: [id number]    Therapy Diagnosis:   Encounter Diagnoses   Name Primary?    Post concussion syndrome Yes    Deficient smooth pursuit eye movements     Saccadic eye movement deficiency     Convergence insufficiency      Physician: Keshav Singh MD    Physician Orders: Eval and Treat - Vestibular Therapy   Medical Diagnosis:   S06.0X9S (ICD-10-CM) - Concussion with loss of consciousness, sequela   H51.11 (ICD-10-CM) - Convergence insufficiency   Evaluation Date: 10/9/2023  Insurance Authorization Period Expiration: 12/31/2023  Plan of Care Certification Period: 12/29/2023  Date of Return to MD: 12/06/2023    Visit # / Visits authorized: 3 / 12  Time In: 11:15  Time Out: 12:08  Total Billable (one on one) Time: 53 minutes    Precautions: Standard    Subjective     Pt reports: he feels like he did well on his balance test the last session but is noticing his balance is worse when he has to do something with divided attention   Reported at end of session, he does note improvement from 1st session   Response to previous treatment: compliant with eye exercises   Functional change: ongoing    Date of Onset: 7/23/2023    Pain: 3/10 upon arrival from waiting room   Location:  Headache - general     Patient's Goals for Therapy: "to get back to normal"     Objective    Received following speech language pathology session     Dominic participated in neuromuscular re-education activities to improve: oculomotor and balance insurgencies for 53 minutes. The following activities were included:  Seated at table top for Oculomotor exercises: using plain target "X":  - Pencil push ups holding near point x5 sec, 1 x 10   - Smooth pursuits, horizontal/vertical/diagonal directions, 2 x 45 seconds each   (Nausea with vertical to 4/10)  - Saccades, horizontal/vertical/diagonal directions, 1 x 45 seconds each at 100  bpm  (Note: diagonals " completed on table top and not holding)    Rest breaks as needed throughout for eyes closed       Home Exercises and Education Provided      Education provided:   - HEP   - Progress towards goals    Written Home Exercises Provided: horizontal/vertical smooth pursuits, horizontal/vertical saccades, and convergence pencil pushups.  Exercises were reviewed and Dominic was able to demonstrate them prior to the end of the session.    Dominic demonstrated good  understanding of the education provided.     See EMR under Patient Instructions for exercises provided   10/27/2023: (Pursuits, saccades, pencil push ups)  11/3/2023: added diagonal pursuits and saccades    Assessment      Pt's near point convergence measured to 14 cm today with activity. Visual slippage noted with vertical smooth pursuits with eyes at midline location with tracking into downward plane. Moreover, with diagonal saccades, pt also with mid range slippage in downward plane. Throughout all exercises, cues for shoulder relaxation needed to avoid shoulder hike.     Dominic is progressing well towards his goals and there are no updates to goals at this time. Pt prognosis is Good.     Pt will continue to benefit from skilled outpatient occupational therapy to address the deficits listed in the problem list on initial evaluation provide pt/family education and to maximize pt's level of independence in the home and community environment.     Pt's spiritual, cultural and educational needs considered and pt agreeable to plan of care and goals.    Anticipated barriers to occupational therapy: none noted    Goals:  Short Term Goals: 4 weeks   1) Pt will tolerate oculomotor and/or habitation home exercise/activity program, reporting at least 50% compliance. ongoing  2) Pt will verbalize eye ergonomics to decrease stress on eyes. ongoing  3) Pt will demonstrate ability to track single target WFL, in all directions x30 seconds, without increase in headache or  nausea, to improve skills needed for technology use and scanning environment. ongoing  4) Pt to perform saccades WFL, in all directions x30 seconds, at 90 bpm without increase in headache, dizziness, or nausea, to improve skills needed for reading. ongoing  5) Pt will report 60 minutes of computer use without eye fatigue, double vision, or increase in headache/dizziness, utilizing activity pacing strategies, needed for return to work at [employer]. ongoing  6) Near point convergence will decrease to 20 cm or less to improve oculomotor coordination and ability to fixate on close up work. ongoing  7) Pt will demonstrate ability to fixate/attend to close up tasks x 30 minutes without onset of headache or eye fatigue. ongoing     Long Term Goals: 11 weeks   1) Pt will be independent with oculomotor and/or habituation home exercise/activity program. ongoing  2) Pt will demonstrate ability to track single target WFL, in all directions x1 minute, without increase in headache or nausea, to improve skills needed for technology use and scanning environment. ongoing  3) Pt to perform saccades WFL, in all directions x1 minute, at 110 bpm without increase in headache, dizziness, or nausea, to improve skills needed for reading. ongoing  4) Pt will report 90 minutes of computer use without eye fatigue, double vision, or increase in headache/dizziness, utilizing activity pacing strategies, needed for return to work at [employer]. ongoing  5) Near point convergence will decrease to 15 cm or less to improve oculomotor coordination and ability to fixate on close up work. ongoing  6) Pt will demonstrate ability to fixate/attend to close up tasks x 60 minutes without onset of headache or eye fatigue. ongoing    Plan     Certification Period/Plan of care expiration: 10/9/2023 to 12/29/2023.     Outpatient Occupational Therapy 1-2 times weekly for 11 weeks to include the following interventions: Neuromuscular Re-ed, Patient Education, Self Care,  Therapeutic Activities, and Therapeutic Exercise.    Updates/Grading for next session: progress eye exercises; walking with HT; Wm String       MARITO Huitron 11/22/2023

## 2023-11-21 NOTE — PROGRESS NOTES
LCSW received call from patient and verbally spoke to him.     Patient and LCSW discussed talk therapy and how to cope with concussion.   Brought up the concept of the invisible injury and how this impacts a person with concussion.     Identify isolation regarding concussion.     Patient reports hx of panic attacks and a recent panic attack / passing out event in which he woke up in the bathtub.     He identified finances and work stressors as triggers.     He is open to and motivated for talk therapy.     LCSW will also send generic information on anxiety, panic attacks, coping skills.     Scheduled virtual visit for 12/08/23 at 3:30 pm.

## 2023-11-22 ENCOUNTER — CLINICAL SUPPORT (OUTPATIENT)
Dept: REHABILITATION | Facility: HOSPITAL | Age: 43
End: 2023-11-22
Payer: COMMERCIAL

## 2023-11-22 DIAGNOSIS — S06.0X9S CONCUSSION WITH LOSS OF CONSCIOUSNESS, SEQUELA: Primary | ICD-10-CM

## 2023-11-22 DIAGNOSIS — H55.81 SACCADIC EYE MOVEMENT DEFICIENCY: ICD-10-CM

## 2023-11-22 DIAGNOSIS — F41.0 PANIC ATTACK: ICD-10-CM

## 2023-11-22 DIAGNOSIS — R41.841 COGNITIVE COMMUNICATION DEFICIT: ICD-10-CM

## 2023-11-22 DIAGNOSIS — H55.82 DEFICIENT SMOOTH PURSUIT EYE MOVEMENTS: ICD-10-CM

## 2023-11-22 DIAGNOSIS — F07.81 POST CONCUSSION SYNDROME: Primary | ICD-10-CM

## 2023-11-22 DIAGNOSIS — H51.11 CONVERGENCE INSUFFICIENCY: ICD-10-CM

## 2023-11-22 PROCEDURE — 97129 THER IVNTJ 1ST 15 MIN: CPT | Mod: PO

## 2023-11-22 PROCEDURE — 97130 THER IVNTJ EA ADDL 15 MIN: CPT | Mod: PO

## 2023-11-22 PROCEDURE — 97112 NEUROMUSCULAR REEDUCATION: CPT | Mod: PO
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
## 2023-11-22 NOTE — PLAN OF CARE
[facility]  Speech Therapy Updated Plan of Care-Neurological Rehabilitation         Date: 11/22/2023   Name: Dominic Collazo  Clinic Number: [id number]    Therapy Diagnosis:   Encounter Diagnoses   Name Primary?    Concussion with loss of consciousness, sequela Yes    Cognitive communication deficit      Physician: Keshav Singh MD    Physician Orders: Ambulatory Referral to Speech Therapy    Medical Diagnosis: Concussion with unknown loss of consciousness status, initial encounter [S06.0XAA]     Visit #/ Visits Authorized:  3/20   Evaluation Date: 10/10/2023  Insurance Authorization Period: 10/9/23-10/8/24   Plan of Care Expiration: 11/21/2023  New POC Certification Period:  12/29/2023    Total Visits Received: 3    Precautions:Standard  Subjective     Update: Dominic reports he wants to attend speech therapy, however, previously had to cancel several appointments due to scheduling conflicts with work. He has only attended 2 sessions to date after his evaluation. He reports he continues to have cognitive difficulties including memory. Attention, and processing speed.     Objective     Update: see follow up note dated 11/22/2023    Assessment     Update: Dominic Collazo presents to [facility] status post medical diagnosis of post concussion syndrome. Demonstrates impairments including limitations as described in the problem list. Positive prognostic factors include motivation. Negative prognostic factors include difficulty with time in his work schedule. He presents with mild cognitive communication disorder characterized by deficits in delayed memory, multitasking attention, and processing speed.  No barriers to therapy identified.. Patient will benefit from skilled, outpatient rehabilitation speech therapy.    Rehab Potential: excellent   Pt's spiritual, cultural, and educational needs considered and patient agreeable to plan of care and goals.    Education:  Plan of Care, role of SLP in care, memory strategies, and scheduling/ cancellation policy     Previous Short Term Goals Status: 4 weeks  Short Term Goals: (4 weeks) Current Progress:    Patient will sustain attention to complete complex reasoning tasks for 2 minutes with distractions with one request for clarification to increase sustained attention.     Goal Not Met / Continue     2.  Patient will use attention shifting strategies to shift attention between two tasks with no more than 3 cues or 90% accuracy to improve alternating attention.       Goal Met / Discontinue        3. Patient will use Goal Plan Action Review strategy to complete moderate to complex reasoning, planning, or organization tasks with 90% accuracy independently to improve functional executive function skills.       Goal Not Met / Continue     4. Patient will complete short term recall tasks after a 5 minutes delay with 90% accuracy  independently  with use of memory strategies to improve recall of information and generalization of memory strategies.      Goal Not Met / Continue   5. Patient will complete mental manipulation tasks with 90% acc to improve working memory.      Goal Not Met / Continue     6.Patient will independently implement cognitive/sensory rest periods throughout day after identifying cognitive fatigue including limiting sensory input (sound, light, etc.)  Goal Not Met / Continue     7.Patient will independently generate strategies to improve ability to complete tasks with enhanced accuracy and time, based on review of objective previous performance on trials of functional tasks with 80% accuracy.    Goal Not Met / Continue          New Short Term Goals: 6 weeks  Short Term Goals: (4 weeks) Current Progress:   1. Patient will sustain attention to complete complex reasoning tasks for 2 minutes with distractions with one request for clarification to increase sustained attention.     Goal Not Met / Continue     2.. Patient will  use Goal Plan Action Review strategy to complete moderate to complex reasoning, planning, or organization tasks with 90% accuracy independently to improve functional executive function skills.       Goal Not Met / Continue     3. Patient will complete short term recall tasks after a 5 minutes delay with 90% accuracy  independently  with use of memory strategies to improve recall of information and generalization of memory strategies.      Goal Not Met / Continue   4. Patient will complete mental manipulation tasks with 90% acc to improve working memory.      Goal Not Met / Continue     5.Patient will independently implement cognitive/sensory rest periods throughout day after identifying cognitive fatigue including limiting sensory input (sound, light, etc.)  Goal Not Met / Continue     6.Patient will independently generate strategies to improve ability to complete tasks with enhanced accuracy and time, based on review of objective previous performance on trials of functional tasks with 80% accuracy.    Goal Not Met / Continue          Long Term Goal Status:  6 weeks     Long Term Goals: (6 weeks) Current Progress:    Patient will improve  attention skills to effectively attend to and communicate in complex daily living tasks in functional living environment.     Goal Not Met / Continue     2.  Patient will use appropriate memory strategies to schedule and recall weekly activities, express needs and recall names to maintain safety and participate socially in functional living environment.     Goal Not Met / Continue         Goals Previously Met:  1.  Patient will use attention shifting strategies to shift attention between two tasks with no more than 3 cues or 90% accuracy to improve alternating attention.       Goal Not Met / Continue          Reasons for Recertification of Therapy: Patient continues to need skilled therapy to address his cognitive communication concerns. He has not yet met any of his current goals  due to having to cancel appointments.     Plan     Updated Certification Period: 11/22/2023 to 12/29/2023    Recommended Treatment Plan: Patient will participate in the [facility] rehabilitation program for speech therapy 1 times per week to address his Cognition deficits, to educate patient and their family, and to participate in a home exercise program.       Therapist's Name:  BHARATI Vanegas, CCC-SLP   11/22/2023      I CERTIFY THE NEED FOR THESE SERVICES FURNISHED UNDER THIS PLAN OF TREATMENT AND WHILE UNDER MY CARE      Physician Name: _______________________________    Physician Signature: ____________________________

## 2023-11-22 NOTE — PATIENT INSTRUCTIONS
- Metronome up to 100 bpm with saccades  - complete at 45 s   - complete diagonal with all exercises   - you do not have to complete everything in one sitting   
Patient

## 2023-11-23 RX ORDER — ALPRAZOLAM 1 MG/1
1 TABLET ORAL 2 TIMES DAILY PRN
Qty: 60 TABLET | Refills: 0 | Status: SHIPPED | OUTPATIENT
Start: 2023-11-23 | End: 2023-12-06 | Stop reason: SDUPTHER

## 2023-11-28 ENCOUNTER — TELEPHONE (OUTPATIENT)
Dept: ENDOSCOPY | Facility: HOSPITAL | Age: 43
End: 2023-11-28
Payer: COMMERCIAL

## 2023-11-30 ENCOUNTER — TELEPHONE (OUTPATIENT)
Dept: PREADMISSION TESTING | Facility: HOSPITAL | Age: 43
End: 2023-11-30
Payer: COMMERCIAL

## 2023-11-30 ENCOUNTER — HOSPITAL ENCOUNTER (OUTPATIENT)
Dept: PREADMISSION TESTING | Facility: HOSPITAL | Age: 43
Discharge: HOME | End: 2023-11-30
Attending: NURSE PRACTITIONER
Payer: COMMERCIAL

## 2023-11-30 NOTE — TELEPHONE ENCOUNTER
Attempted to contact with no avail. Left message to return call regarding any questions prior to surgery on 12/14/23 and when to stop ozempic.

## 2023-11-30 NOTE — DISCHARGE INSTRUCTIONS
Your surgery is scheduled for 12/19/23.    Please report to [facility] on the 1st Floor at 815 a.m.    THIS TIME IS SUBJECT TO CHANGE.  YOU WILL RECEIVE A PHONE CALL THE DAY BEFORE SURGERY BY 3:30 PM TO CONFIRM YOUR TIME OF ARRIVAL.  IF YOU HAVE NOT RECEIVED A PHONE CALL BY 3:30 PM THE DAY BEFORE YOUR SURGERY PLEASE CALL [phone]     INSTRUCTIONS IMPORTANT!!!  ¨ Do not eat or drink after 12 midnight-including water, candy, gum, & mints. OK to brush teeth.      ____  Proceed to [facility] on *** for additional testing.        ____  Do not wear makeup, including mascara.  ____  No powder, lotions or creams to surgical area.  ____  Please remove all jewelry, including piercings and leave at home.  ____  No money or valuables needed. Please leave at home.  ____  Please bring any documents given by your doctor.  ____  If going home the same day, arrange for a ride home. You will not be able to             drive if Anesthesia was used.  ____  Children under 18 years require a parent / guardian present the entire time             they are in surgery / recovery.  ____  Wear loose fitting clothing. Allow for dressings, bandages.  ____  Stop Aspirin, Ibuprofen, Motrin, Aleve, Goody's/BC powders, Excedrine and Naproxen (NSAIDS) at least 3-5 days before surgery, unless otherwise instructed by your doctor, or the nurse.   You MAY use Tylenol/acetaminophen until day of surgery.  ____  Wash the surgical area with Hibiclens or Dial Antibacterial bar soap the night before surgery, and again the             morning of surgery.  Be sure to rinse hibiclens or Dial Antibacterial bar soap off completely (if instructed by   nurse).  ____  If you take diabetic medication including Metformin, Glimepiride, Glipizide, Glyburide, Byetta, Januvia, Actos, do not take am of surgery unless instructed by Doctor.  ____ Hold Invokana, Farxiga, and Jardiance for 3 days prior to surgery.   ____  Call MD for temperature  above 101 degrees or any other signs of infection such as Urinary (bladder) infection, Upper respiratory infection, skin boils, etc.  ____ Stop taking any Fish Oil supplement or any Vitamins that contain Vitamin E at least 5 days prior to surgery.  ____ Do Not wear your contact lenses the day of your procedure.  You may wear your glasses.      ____Do not shave surgical site for 3 days prior to surgery.  ____ Practice Good hand washing before, during, and after procedure.      I have read or had read and explained to me, and understand the above information.  Additional comments or instructions:  For additional questions call [phone]      ANESTHESIA SIDE EFFECTS  -For the first 24 hours after surgery:  Do not drive, use heavy equipment, make important decisions, or drink alcohol  -It is normal to feel sleepy for several hours.  Rest until you are more awake.  -Have someone stay with you, if needed.  They can watch for problems and help keep you safe.  -Some possible post anesthesia side effects include: nausea and vomiting, sore throat and hoarseness, sleepiness, and dizziness.        Pre-Op Bathing Instructions    Before surgery, you can play an important role in your own health.    Because skin is not sterile, we need to be sure that your skin is as free of germs as possible. By following the instructions below, you can reduce the number of germs on your skin before surgery.    IMPORTANT: You will need to shower with a special soap called Hibiclens*, available at any pharmacy.  If you are allergic to Chlorhexidine (the antiseptic in Hibiclens), use an antibacterial soap such as Dial Soap for your preoperative shower.  You will shower with Hibiclens both the night before your surgery and the morning of your surgery.  Do not use Hibiclens on the head, face or genitals to avoid injury to those areas.    STEP #1: THE NIGHT BEFORE YOUR SURGERY     Do not shave the area of your body where your surgery will be  performed.  Shower and wash your hair and body as usual with your normal soap and shampoo.  Rinse your hair and body thoroughly after you shower to remove all soap residue.  With your hand, apply one packet of Hibiclens soap to the surgical site.   Wash the site gently for five (5) minutes. Do not scrub your skin too hard.   Do not wash with your regular soap after Hibiclens is used.  Rinse your body thoroughly.  Pat yourself dry with a clean, soft towel.  Do not use lotion, cream, or powder.  Wear clean clothes.    STEP #2: THE MORNING OF YOUR SURGERY     Repeat Step #1.    * Not to be used by people allergic to Chlorhexidine.

## 2023-12-04 ENCOUNTER — LAB VISIT (OUTPATIENT)
Dept: LAB | Facility: HOSPITAL | Age: 43
End: 2023-12-04
Attending: STUDENT IN AN ORGANIZED HEALTH CARE EDUCATION/TRAINING PROGRAM
Payer: COMMERCIAL

## 2023-12-04 ENCOUNTER — PATIENT MESSAGE (OUTPATIENT)
Dept: ENDOSCOPY | Facility: HOSPITAL | Age: 43
End: 2023-12-04
Payer: COMMERCIAL

## 2023-12-04 ENCOUNTER — TELEPHONE (OUTPATIENT)
Dept: ENDOSCOPY | Facility: HOSPITAL | Age: 43
End: 2023-12-04
Payer: COMMERCIAL

## 2023-12-04 DIAGNOSIS — E29.1 MALE HYPOGONADISM: ICD-10-CM

## 2023-12-04 LAB
CHOLEST SERPL-MCNC: 142 MG/DL (ref 120–199)
CHOLEST/HDLC SERPL: 3.9 {RATIO} (ref 2–5)
ERYTHROCYTE [DISTWIDTH] IN BLOOD BY AUTOMATED COUNT: 14.2 % (ref 11.5–14.5)
HCT VFR BLD AUTO: 46.5 % (ref 40–54)
HDLC SERPL-MCNC: 36 MG/DL (ref 40–75)
HDLC SERPL: 25.4 % (ref 20–50)
HGB BLD-MCNC: 15.5 G/DL (ref 14–18)
LDLC SERPL CALC-MCNC: 77 MG/DL (ref 63–159)
MCH RBC QN AUTO: 29.9 PG (ref 27–31)
MCHC RBC AUTO-ENTMCNC: 33.3 G/DL (ref 32–36)
MCV RBC AUTO: 90 FL (ref 82–98)
NONHDLC SERPL-MCNC: 106 MG/DL
PLATELET # BLD AUTO: 284 K/UL (ref 150–450)
PMV BLD AUTO: 11.8 FL (ref 9.2–12.9)
RBC # BLD AUTO: 5.18 M/UL (ref 4.6–6.2)
TESTOST SERPL-MCNC: 506 NG/DL (ref 304–1227)
TRIGL SERPL-MCNC: 145 MG/DL (ref 30–150)
WBC # BLD AUTO: 9.08 K/UL (ref 3.9–12.7)

## 2023-12-04 PROCEDURE — 36415 COLL VENOUS BLD VENIPUNCTURE: CPT | Performed by: STUDENT IN AN ORGANIZED HEALTH CARE EDUCATION/TRAINING PROGRAM

## 2023-12-04 PROCEDURE — 85027 COMPLETE CBC AUTOMATED: CPT | Performed by: STUDENT IN AN ORGANIZED HEALTH CARE EDUCATION/TRAINING PROGRAM

## 2023-12-04 PROCEDURE — 80061 LIPID PANEL: CPT | Performed by: STUDENT IN AN ORGANIZED HEALTH CARE EDUCATION/TRAINING PROGRAM

## 2023-12-04 PROCEDURE — 84403 ASSAY OF TOTAL TESTOSTERONE: CPT | Performed by: STUDENT IN AN ORGANIZED HEALTH CARE EDUCATION/TRAINING PROGRAM

## 2023-12-05 ENCOUNTER — ANESTHESIA EVENT (OUTPATIENT)
Dept: ENDOSCOPY | Facility: HOSPITAL | Age: 43
End: 2023-12-05
Payer: COMMERCIAL

## 2023-12-05 ENCOUNTER — ANESTHESIA (OUTPATIENT)
Dept: ENDOSCOPY | Facility: HOSPITAL | Age: 43
End: 2023-12-05
Payer: COMMERCIAL

## 2023-12-05 ENCOUNTER — HOSPITAL ENCOUNTER (OUTPATIENT)
Facility: HOSPITAL | Age: 43
Discharge: HOME | End: 2023-12-05
Attending: STUDENT IN AN ORGANIZED HEALTH CARE EDUCATION/TRAINING PROGRAM | Admitting: STUDENT IN AN ORGANIZED HEALTH CARE EDUCATION/TRAINING PROGRAM
Payer: COMMERCIAL

## 2023-12-05 VITALS
HEART RATE: 83 BPM | TEMPERATURE: 98 F | WEIGHT: 315 LBS | RESPIRATION RATE: 18 BRPM | HEIGHT: 75 IN | SYSTOLIC BLOOD PRESSURE: 147 MMHG | BODY MASS INDEX: 39.17 KG/M2 | DIASTOLIC BLOOD PRESSURE: 76 MMHG | OXYGEN SATURATION: 97 %

## 2023-12-05 DIAGNOSIS — Z12.11 ENCOUNTER FOR SCREENING COLONOSCOPY: ICD-10-CM

## 2023-12-05 DIAGNOSIS — Z12.11 SCREENING FOR MALIGNANT NEOPLASM OF COLON: Primary | ICD-10-CM

## 2023-12-05 LAB — POCT GLUCOSE: 122 MG/DL (ref 70–110)

## 2023-12-05 PROCEDURE — G0105 COLORECTAL SCRN; HI RISK IND: ICD-10-PCS | Mod: ,,, | Performed by: STUDENT IN AN ORGANIZED HEALTH CARE EDUCATION/TRAINING PROGRAM

## 2023-12-05 PROCEDURE — 82962 GLUCOSE BLOOD TEST: CPT | Performed by: STUDENT IN AN ORGANIZED HEALTH CARE EDUCATION/TRAINING PROGRAM

## 2023-12-05 PROCEDURE — E9220 PRA ENDO ANESTHESIA: HCPCS | Mod: ,,, | Performed by: REGISTERED NURSE

## 2023-12-05 PROCEDURE — 25000003 PHARM REV CODE 250: Performed by: STUDENT IN AN ORGANIZED HEALTH CARE EDUCATION/TRAINING PROGRAM

## 2023-12-05 PROCEDURE — 25000003 PHARM REV CODE 250: Performed by: REGISTERED NURSE

## 2023-12-05 PROCEDURE — 63600175 PHARM REV CODE 636 W HCPCS: Performed by: REGISTERED NURSE

## 2023-12-05 PROCEDURE — G0105 COLORECTAL SCRN; HI RISK IND: HCPCS | Performed by: STUDENT IN AN ORGANIZED HEALTH CARE EDUCATION/TRAINING PROGRAM

## 2023-12-05 PROCEDURE — 37000009 HC ANESTHESIA EA ADD 15 MINS: Performed by: STUDENT IN AN ORGANIZED HEALTH CARE EDUCATION/TRAINING PROGRAM

## 2023-12-05 PROCEDURE — E9220 PRA ENDO ANESTHESIA: ICD-10-PCS | Mod: ,,, | Performed by: REGISTERED NURSE

## 2023-12-05 PROCEDURE — 37000008 HC ANESTHESIA 1ST 15 MINUTES: Performed by: STUDENT IN AN ORGANIZED HEALTH CARE EDUCATION/TRAINING PROGRAM

## 2023-12-05 PROCEDURE — G0105 COLORECTAL SCRN; HI RISK IND: HCPCS | Mod: ,,, | Performed by: STUDENT IN AN ORGANIZED HEALTH CARE EDUCATION/TRAINING PROGRAM

## 2023-12-05 RX ORDER — SODIUM CHLORIDE 9 MG/ML
INJECTION, SOLUTION INTRAVENOUS CONTINUOUS
Status: DISCONTINUED | OUTPATIENT
Start: 2023-12-05 | End: 2023-12-05 | Stop reason: HOSPADM

## 2023-12-05 RX ORDER — PROPOFOL 10 MG/ML
VIAL (ML) INTRAVENOUS
Status: DISCONTINUED | OUTPATIENT
Start: 2023-12-05 | End: 2023-12-05

## 2023-12-05 RX ORDER — PROPOFOL 10 MG/ML
INJECTION, EMULSION INTRAVENOUS CONTINUOUS PRN
Status: DISCONTINUED | OUTPATIENT
Start: 2023-12-05 | End: 2023-12-05

## 2023-12-05 RX ORDER — LIDOCAINE HYDROCHLORIDE 20 MG/ML
INJECTION INTRAVENOUS
Status: DISCONTINUED | OUTPATIENT
Start: 2023-12-05 | End: 2023-12-05

## 2023-12-05 RX ADMIN — PROPOFOL 30 MG: 10 INJECTION, EMULSION INTRAVENOUS at 09:12

## 2023-12-05 RX ADMIN — PROPOFOL 175 MCG/KG/MIN: 10 INJECTION, EMULSION INTRAVENOUS at 09:12

## 2023-12-05 RX ADMIN — SODIUM CHLORIDE: 0.9 INJECTION, SOLUTION INTRAVENOUS at 09:12

## 2023-12-05 RX ADMIN — PROPOFOL 70 MG: 10 INJECTION, EMULSION INTRAVENOUS at 09:12

## 2023-12-05 RX ADMIN — LIDOCAINE HYDROCHLORIDE 50 MG: 20 INJECTION INTRAVENOUS at 09:12

## 2023-12-05 NOTE — TRANSFER OF CARE
"Anesthesia Transfer of Care Note    Patient: Dominic Collazo    Procedure(s) Performed: Procedure(s) (LRB):  COLONOSCOPY (N/A)    Patient location: GI    Anesthesia Type: general    Transport from OR: Transported from OR on room air with adequate spontaneous ventilation    Post pain: adequate analgesia    Post assessment: no apparent anesthetic complications and tolerated procedure well    Post vital signs: stable    Level of consciousness: awake and alert    Nausea/Vomiting: no nausea/vomiting    Complications: none    Transfer of care protocol was followedComments: Nurse at bedside, VSS, spont reg resp noted    Last vitals: Visit Vitals  /74 (BP Location: Left arm, Patient Position: Lying)   Pulse 87   Temp 36.5 °C (97.7 °F) (Temporal)   Resp 16   Ht 6' 3" (1.905 m)   Wt (!) 154.2 kg (340 lb)   SpO2 (!) 94%   BMI 42.50 kg/m²     "

## 2023-12-05 NOTE — PROVATION PATIENT INSTRUCTIONS
Discharge Summary/Instructions after an Endoscopic Procedure  Patient Name: Dominic Collazo  Patient MRN: [id number]  Patient YOB: 1980  Tuesday, December 5, 2023  Jonnathan Velazquez MD  Dear patient,  As a result of recent federal legislation (The Federal Cures Act), you may   receive lab or pathology results from your procedure in your [facility]   account before your physician is able to contact you. Your physician or   their representative will relay the results to you with their   recommendations at their soonest availability.  Thank you,  RESTRICTIONS:  During your procedure today, you received medications for sedation.  These   medications may affect your judgment, balance and coordination.  Therefore,   for 24 hours, you have the following restrictions:   - DO NOT drive a car, operate machinery, make legal/financial decisions,   sign important papers or drink alcohol.    ACTIVITY:  Today: no heavy lifting, straining or running due to procedural   sedation/anesthesia.  The following day: return to full activity including work.  DIET:  Eat and drink normally unless instructed otherwise.     TREATMENT FOR COMMON SIDE EFFECTS:  - Mild abdominal pain, nausea, belching, bloating or excessive gas:  rest,   eat lightly and use a heating pad.  - Sore Throat: treat with throat lozenges and/or gargle with warm salt   water.  - Because air was used during the procedure, expelling large amounts of air   from your rectum or belching is normal.  - If a bowel prep was taken, you may not have a bowel movement for 1-3 days.    This is normal.  SYMPTOMS TO WATCH FOR AND REPORT TO YOUR PHYSICIAN:  1. Abdominal pain or bloating, other than gas cramps.  2. Chest pain.  3. Back pain.  4. Signs of infection such as: chills or fever occurring within 24 hours   after the procedure.  5. Rectal bleeding, which would show as bright red, maroon, or black stools.   (A tablespoon of blood from the rectum is not serious,  especially if   hemorrhoids are present.)  6. Vomiting.  7. Weakness or dizziness.  GO DIRECTLY TO THE NEAREST EMERGENCY ROOM IF YOU HAVE ANY OF THE FOLLOWING:      Difficulty breathing              Chills and/or fever over 101 F   Persistent vomiting and/or vomiting blood   Severe abdominal pain   Severe chest pain   Black, tarry stools   Bleeding- more than one tablespoon   Any other symptom or condition that you feel may need urgent attention  Your doctor recommends these additional instructions:  If any biopsies were taken, your doctors clinic will contact you in 1 to 2   weeks with any results.  - Discharge patient to home.   - Resume previous diet.   - Continue present medications.   - Repeat colonoscopy in 5 years for surveillance.   - Return to referring physician as previously scheduled.  For questions, problems or results please call your physician - Jonnathan Velazquez MD at Work:  [phone].  [facility] EMERGENCY ROOM PHONE NUMBER: [phone]  IF A COMPLICATION OR EMERGENCY SITUATION ARISES AND YOU ARE UNABLE TO REACH   YOUR PHYSICIAN - GO DIRECTLY TO THE EMERGENCY ROOM.  MD Jonnathan Quintanilla MD  12/5/2023 10:04:01 AM  This report has been verified and signed electronically.  Dear patient,  As a result of recent federal legislation (The Federal Cures Act), you may   receive lab or pathology results from your procedure in your [facility]   account before your physician is able to contact you. Your physician or   their representative will relay the results to you with their   recommendations at their soonest availability.  Thank you,  PROVATION

## 2023-12-05 NOTE — ANESTHESIA PREPROCEDURE EVALUATION
12/05/2023  Dominic Collazo is a 43 y.o., male.  [facility]  Anesthesia Pre-Operative Evaluation       Patient Name: Dominic Collazo  YOB: 1980  MRN: [id number]  CSN: [id number]      Code Status: Prior   Date of Procedure: 12/5/2023  Anesthesia: Choice Procedure: Procedure(s) (LRB):  COLONOSCOPY (N/A)  Pre-Operative Diagnosis: Personal history of colonic polyps [Z86.010]  Proceduralist: Surgeon(s) and Role:     * Jonnathan Velazquez MD - Primary        SUBJECTIVE:   Dominic Collazo is a 43 y.o. male who  has a past medical history of Adjustment disorder with mixed anxiety and depressed mood, Anxiety, Colon polyp, Depression, ED (erectile dysfunction), Family history of prostate cancer (9/29/2014), umbilical hernia repair (10/16/2020), Hyperlipidemia, Hypertension, Hypogonadism male, Insomnia, Low testosterone, LYNDA (obstructive sleep apnea), and Prediabetes. No notes on file    Anticoagulants   Medication Route Frequency       he has a current medication list which includes the following long-term medication(s): alprazolam, blood-glucose meter, bupropion, buspirone, fexofenadine, losartan, metformin, metoprolol succinate, rosuvastatin, testosterone cypionate, venlafaxine, and zolpidem.   ALLERGIES:     Review of patient's allergies indicates:   Allergen Reactions    Ciprofloxacin      swelling    Penicillins Rash    Sulfa (sulfonamide antibiotics) Rash    Toradol [ketorolac] Nausea Only    Bell pepper Nausea Only    Meloxicam Nausea Only    Sulfamethoxazole-trimethoprim      LDA:          Lines/Drains/Airways       None                 MEDICATIONS:     Antibiotics (From admission, onward)      None          VTE Risk Mitigation (From admission, onward)      None          Current Facility-Administered Medications   Medication Dose Route Frequency  Provider Last Rate Last Admin    0.9%  NaCl infusion   Intravenous Continuous Jonnathan Velazquez MD              History:   There are no hospital problems to display for this patient.    Surgical History:    has a past surgical history that includes Tonsillectomy; Adenoidectomy; Repair of incarcerated umbilical hernia (N/A, 10/16/2020); and Hernia repair.   Social History:    reports being sexually active and has had partner(s) who are female.  reports that he has never smoked. He has been exposed to tobacco smoke. He has never used smokeless tobacco. He reports current alcohol use. He reports that he does not use drugs.     OBJECTIVE:     Vital Signs (Most Recent):    Vital Signs Range (Last 24H):          There is no height or weight on file to calculate BMI.   Wt Readings from Last 4 Encounters:   10/09/23 (!) 155 kg (341 lb 11.4 oz)   09/13/23 (!) 155.4 kg (342 lb 9.5 oz)   08/31/23 (!) 153.1 kg (337 lb 8.4 oz)   08/31/23 (!) 152.7 kg (336 lb 8.5 oz)     Significant Labs:  Lab Results   Component Value Date    WBC 9.08 12/04/2023    HGB 15.5 12/04/2023    HCT 46.5 12/04/2023     12/04/2023     09/06/2023    K 4.6 09/06/2023     09/06/2023    CREATININE 0.8 09/06/2023    BUN 8 09/06/2023    CO2 23 09/06/2023     09/06/2023    CALCIUM 9.7 09/06/2023    ALKPHOS 50 (L) 09/06/2023    ALT 68 (H) 09/06/2023    AST 43 (H) 09/06/2023    ALBUMIN 4.1 09/06/2023    ALBUMIN 4.5 09/06/2023    HGBA1C 5.9 (H) 09/06/2023    TROPONINI <0.006 07/23/2023     No LMP for male patient.  Recent Results (from the past 72 hour(s))   CBC Without Differential    Collection Time: 12/04/23  8:58 AM   Result Value Ref Range    WBC 9.08 3.90 - 12.70 K/uL    RBC 5.18 4.60 - 6.20 M/uL    Hemoglobin 15.5 14.0 - 18.0 g/dL    Hematocrit 46.5 40.0 - 54.0 %    MCV 90 82 - 98 fL    MCH 29.9 27.0 - 31.0 pg    MCHC 33.3 32.0 - 36.0 g/dL    RDW 14.2 11.5 - 14.5 %    Platelets 284 150 - 450 K/uL    MPV 11.8 9.2 - 12.9 fL  "  Lipid Panel    Collection Time: 12/04/23  8:58 AM   Result Value Ref Range    Cholesterol 142 120 - 199 mg/dL    Triglycerides 145 30 - 150 mg/dL    HDL 36 (L) 40 - 75 mg/dL    LDL Cholesterol 77.0 63.0 - 159.0 mg/dL    HDL/Cholesterol Ratio 25.4 20.0 - 50.0 %    Total Cholesterol/HDL Ratio 3.9 2.0 - 5.0    Non-HDL Cholesterol 106 mg/dL   Testosterone    Collection Time: 12/04/23  8:58 AM   Result Value Ref Range    Testosterone, Total 506 304 - 1227 ng/dL       EKG:   Results for orders placed or performed during the hospital encounter of 07/23/23   EKG 12-lead    Collection Time: 07/23/23  5:09 PM    Narrative    Test Reason : R42,    Vent. Rate : 090 BPM     Atrial Rate : 090 BPM     P-R Int : 150 ms          QRS Dur : 096 ms      QT Int : 336 ms       P-R-T Axes : 027 049 172 degrees     QTc Int : 411 ms    Normal sinus rhythm with sinus arrhythmia  ST and T wave abnormality, consider inferolateral ischemia  Abnormal ECG  When compared with ECG of 07-DEC-2022 12:21,  No significant change was found  Confirmed by Paco RUBALCAVA MD ([id number]) on 7/24/2023 11:00:06 AM    Referred By: [email or web address]   SELF           Confirmed By:Paco RUBALCAVA MD       TTE:  No results found for this or any previous visit.  No results found for: "EF"   Results for orders placed or performed in visit on 03/12/18   2D Echo w/ Color Flow Doppler   Result Value Ref Range    EF + QEF 60 55 - 65    Diastolic Dysfunction No     Est. PA Systolic Pressure 22.54     Tricuspid Valve Regurgitation TRIVIAL      CARLIE:  No results found for this or any previous visit.  Stress Test:  No results found for this or any previous visit.     LHC:  No results found for this or any previous visit.     PFT:  No results found for: "FEV1", "FVC", "AHL0VBI", "TLC", "DLCO"   ASSESSMENT/PLAN:        Pre-op Assessment    I have reviewed the Patient Summary Reports.    I have reviewed the NPO Status.   I have reviewed the Medications.     Review of Systems  Cardiovascular:     " Hypertension                                        Pulmonary:        Sleep Apnea                Hepatic/GI:      Liver Disease,            Endocrine:  Diabetes               Physical Exam  General: Well nourished    Airway:  Mallampati: III   Mouth Opening: Normal  TM Distance: Normal  Tongue: Normal  Neck ROM: Normal ROM    Dental:  Intact    Anesthesia Plan  Type of Anesthesia, risks & benefits discussed:    Anesthesia Type: Gen ETT, Gen Natural Airway  Intra-op Monitoring Plan: Standard ASA Monitors  Post Op Pain Control Plan: multimodal analgesia  Induction:  IV  ASA Score: 3  Day of Surgery Review of History & Physical: H&P Update referred to the surgeon/provider.I have interviewed and examined the patient. I have reviewed the patient's H&P dated:     Ready For Surgery From Anesthesia Perspective.     .

## 2023-12-05 NOTE — H&P
[facility]  Colon and Rectal Surgery    [address]  [place], [place]  Tel: [phone]  Fax: [phone]  [email or web address]   MD Jeremias Samaniego MD Brian Kann, MD W. Forrest Johnston, MD Matthew Giglia, MD Jennifer Paruch, MD William Kethman, MD Danielle Kay, MD     Patient name: Dominic Collazo   YOB: 1980   MRN: [id number]  Date of procedure: 12/05/2023    Procedure: Colonoscopy  Indications: Previous adenomatous polyp, second degree family member with colon cancer    Two prior colonoscopies - one with polyps    The patient was informed of the availability of a certified  without charge. A certified  was not necessary for this visit.    Sedation plan: MAC  ASA: ASA 2 - Patient with mild systemic disease with no functional limitations    Review of Systems  See above    Past Medical History:   Diagnosis Date    Adjustment disorder with mixed anxiety and depressed mood     Anxiety     Colon polyp     Depression     ED (erectile dysfunction)     Family history of prostate cancer 9/29/2014    Hx of umbilical hernia repair 10/16/2020    Hyperlipidemia     Hypertension     Hypogonadism male     Insomnia     Low testosterone     LYNDA (obstructive sleep apnea)     Prediabetes      Past Surgical History:   Procedure Laterality Date    ADENOIDECTOMY      HERNIA REPAIR      REPAIR OF INCARCERATED UMBILICAL HERNIA N/A 10/16/2020    Procedure: REPAIR, HERNIA, UMBILICAL, INCARCERATED, AGE 5 YEARS OR OLDER with mesh ;  Surgeon: Conrad Loco MD;  Location: [facility] OR [address] [place] [address];  Service: General;  Laterality: N/A;    TONSILLECTOMY       Family History   Problem Relation Age of Onset    Hypertension Mother     Hyperlipidemia Mother     Diabetes Father     Hyperlipidemia Father     Hypertension Father     Heart disease Father 46        CAD    No Known Problems Sister     Cancer Maternal Aunt         colon cancer     Stroke Maternal Uncle     Cancer Paternal Uncle         prostate cancer     Social History     Tobacco Use    Smoking status: Never     Passive exposure: Past    Smokeless tobacco: Never   Substance Use Topics    Alcohol use: Yes     Comment: less than once per month    Drug use: No     Review of patient's allergies indicates:   Allergen Reactions    Ciprofloxacin      swelling    Penicillins Rash    Sulfa (sulfonamide antibiotics) Rash    Toradol [ketorolac] Nausea Only    Bell pepper Nausea Only    Meloxicam Nausea Only    Sulfamethoxazole-trimethoprim        Prior to Admission medications    Medication Sig Start Date End Date Taking? Authorizing Provider   ALPRAZolam (XANAX) 1 MG tablet Take 1 tablet (1 mg total) by mouth 2 (two) times daily as needed for Anxiety. 11/23/23 3/22/24 Yes Meño Burdick PA   armodafiniL (NUVIGIL) 250 mg tablet Take 1 tablet (250 mg total) by mouth once daily. 10/16/23  Yes Sea Fletcher MD   buPROPion (WELLBUTRIN XL) 150 MG TB24 tablet Take 1 tablet (150 mg total) by mouth once daily. 12/28/22  Yes Stacia Dumont DNP   busPIRone (BUSPAR) 15 MG tablet Take 1 tablet (15 mg total) by mouth 2 (two) times daily. 8/21/23  Yes Meño Burdick PA   cetirizine HCl (ZYRTEC ORAL) Take 1 tablet by mouth daily as needed.   Yes Provider, Historical   cyanocobalamin (VITAMIN B-12) 1000 MCG tablet Take 100 mcg by mouth once daily.   Yes Provider, Historical   ergocalciferol, vitamin D2, (VITAMIN D ORAL) Take 5,000 Units by mouth once daily.   Yes Provider, Historical   iron,carb/vit C/vit B12/folic (IRON 100 PLUS ORAL) Take by mouth once daily at 6am.   Yes Provider, Historical   losartan (COZAAR) 25 MG tablet Take 1 tablet (25 mg total) by mouth once daily. 9/19/23 9/18/24 Yes Arjun Cross MD   metFORMIN (GLUCOPHAGE-XR) 500 MG ER 24hr tablet Take 1 tablet (500 mg total) by mouth 2 (two) times daily with meals. 8/31/23 8/30/24 Yes Arjun Cross MD   methocarbamoL (ROBAXIN) 750  MG Tab TAKE 1 TABLET(750 MG) BY MOUTH FOUR TIMES DAILY AS NEEDED FOR MUSCLE STRAIN 8/31/23  Yes Arjun Cross MD   metoprolol succinate (TOPROL-XL) 100 MG 24 hr tablet Take 1 tablet (100 mg total) by mouth once daily. 8/31/23  Yes Arjun Cross MD   multivitamin capsule Take 1 capsule by mouth once daily.   Yes Provider, Historical   riboflavin, vitamin B2, (VITAMIN B-2 ORAL) Take 1 tablet by mouth once daily.   Yes Provider, Historical   rosuvastatin (CRESTOR) 10 MG tablet Take 1 tablet by mouth once daily. 9/8/23  Yes Arjun Cross MD   semaglutide (OZEMPIC) 2 mg/dose (8 mg/3 mL) PnIj Inject 2 mg into the skin every 7 days. 9/8/23 9/7/24 Yes Arjun Cross MD   SUNOSI 150 mg Tab Take 1 tablet (150 mg) by mouth once daily in the morning. 7/6/23  Yes Sea Fletcher MD   testosterone cypionate (DEPOTESTOTERONE CYPIONATE) 200 mg/mL injection Inject 1 mL (200 mg total) into the muscle every 7 days. 9/1/23 3/1/24 Yes Chad Rogel MD   venlafaxine (EFFEXOR-XR) 150 MG Cp24 Take 1 capsule (150 mg total) by mouth once daily. 8/21/23 2/17/24 Yes Meño Burdick PA   zolpidem (AMBIEN CR) 12.5 MG CR tablet Take 1 tablet (12.5 mg total) by mouth nightly as needed for Insomnia. 9/20/23 3/20/24 Yes Meño Burdick PA   blood sugar diagnostic (ONETOUCH VERIO TEST STRIPS) Strp 1 each by Misc.(Non-Drug; Combo Route) route 3 (three) times daily. 3/14/23   Rajat Cunningham, DO   blood-glucose meter kit To check BG 2 times daily, to use with insurance preferred meter 12/23/22 12/23/23  Arjun Cross MD   fexofenadine (ALLEGRA) 60 MG tablet Take 60 mg by mouth every morning.    Provider, Historical   lancets Misc To check BG 2 times daily 5/24/23   Arjun Cross MD   methylPREDNISolone (MEDROL DOSEPACK) 4 mg tablet use as directed 10/9/23   Keshav Singh MD   ondansetron (ZOFRAN) 4 MG tablet Take 1 tablet (4 mg total) by mouth every 6 (six) hours as needed for Nausea. 7/23/23    "Alexandra Medina MD   scopolamine (TRANSDERM-SCOP) 1.3-1.5 mg (1 mg over 3 days) Place 1 patch onto the skin every 72 hours. 10/3/22   Arjun Cross MD       Physical Examination  BP (!) 140/95 (BP Location: Left arm, Patient Position: Lying)   Pulse 84   Temp 98.4 °F (36.9 °C) (Temporal)   Resp 16   Ht 6' 3" (1.905 m)   Wt (!) 154.2 kg (340 lb)   SpO2 98%   BMI 42.50 kg/m²      Constitutional: well developed, no cough, no dyspnea, alert, and no acute distress    Head: Normocephalic, no lesions, without obvious abnormality  Eye: Normal external eye, conjunctiva, and lids, PERRL  Cardiovascular: regular rate and regular rhythm  Respiratory: normal air entry  Gastrointestinal: soft, non-tender, without masses or organomegaly  Neurologic: alert, oriented, normal speech, no focal findings or movement disorder noted  Psychiatric: appropriate, normal mood    Plan of Care    It was a pleasure meeting Mr. Collazo today - we will plan to perform a colonoscopy with monitored anesthesia care. The details of the procedure, the possible need for biopsy or polypectomy and the potential risks including bleeding, perforation, missed polyps were discussed in detail and they consented to undergo the procedure.      Jonnathan Velazquez MD, FACS, FASCRS  Department of Colon & Rectal Surgery  [facility]    "

## 2023-12-05 NOTE — ANESTHESIA POSTPROCEDURE EVALUATION
Anesthesia Post Evaluation    Patient: Dominic Collazo    Procedure(s) Performed: Procedure(s) (LRB):  COLONOSCOPY (N/A)    Final Anesthesia Type: general      Patient location during evaluation: GI PACU  Patient participation: Yes- Able to Participate  Level of consciousness: awake and alert  Post-procedure vital signs: reviewed and stable  Pain management: adequate  Airway patency: patent    PONV status at discharge: No PONV  Anesthetic complications: no      Cardiovascular status: blood pressure returned to baseline and stable  Respiratory status: unassisted, spontaneous ventilation and room air  Hydration status: euvolemic  Follow-up not needed.              Vitals Value Taken Time   /76 12/05/23 1027   Temp 36.5 °C (97.7 °F) 12/05/23 1006   Pulse 83 12/05/23 1027   Resp 18 12/05/23 1027   SpO2 97 % 12/05/23 1027         Event Time   Out of Recovery 10:38:25         Pain/Vaibhav Score: Vaibhav Score: 10 (12/5/2023 10:28 AM)

## 2023-12-06 ENCOUNTER — OFFICE VISIT (OUTPATIENT)
Dept: PSYCHIATRY | Facility: CLINIC | Age: 43
End: 2023-12-06
Payer: COMMERCIAL

## 2023-12-06 ENCOUNTER — OFFICE VISIT (OUTPATIENT)
Dept: NEUROLOGY | Facility: CLINIC | Age: 43
End: 2023-12-06
Payer: COMMERCIAL

## 2023-12-06 VITALS
SYSTOLIC BLOOD PRESSURE: 152 MMHG | WEIGHT: 315 LBS | HEART RATE: 76 BPM | DIASTOLIC BLOOD PRESSURE: 102 MMHG | BODY MASS INDEX: 42.99 KG/M2

## 2023-12-06 DIAGNOSIS — F34.89 OTHER SPECIFIED PERSISTENT MOOD DISORDERS: ICD-10-CM

## 2023-12-06 DIAGNOSIS — S06.0X9S CONCUSSION WITH LOSS OF CONSCIOUSNESS, SEQUELA: Primary | ICD-10-CM

## 2023-12-06 DIAGNOSIS — E11.69 TYPE 2 DIABETES MELLITUS WITH MORBID OBESITY: ICD-10-CM

## 2023-12-06 DIAGNOSIS — F51.05 INSOMNIA DUE TO OTHER MENTAL DISORDER: ICD-10-CM

## 2023-12-06 DIAGNOSIS — R53.83 FATIGUE DUE TO SLEEP PATTERN DISTURBANCE: ICD-10-CM

## 2023-12-06 DIAGNOSIS — G47.33 OSA ON CPAP: ICD-10-CM

## 2023-12-06 DIAGNOSIS — F41.0 GENERALIZED ANXIETY DISORDER WITH PANIC ATTACKS: Primary | ICD-10-CM

## 2023-12-06 DIAGNOSIS — F99 INSOMNIA DUE TO OTHER MENTAL DISORDER: ICD-10-CM

## 2023-12-06 DIAGNOSIS — R26.89 IMBALANCE: ICD-10-CM

## 2023-12-06 DIAGNOSIS — R41.89 COGNITIVE CHANGE: ICD-10-CM

## 2023-12-06 DIAGNOSIS — F43.23 ADJUSTMENT DISORDER WITH MIXED ANXIETY AND DEPRESSED MOOD: ICD-10-CM

## 2023-12-06 DIAGNOSIS — M54.2 CERVICALGIA OF OCCIPITO-ATLANTO-AXIAL REGION: ICD-10-CM

## 2023-12-06 DIAGNOSIS — F41.0 PANIC ATTACK: ICD-10-CM

## 2023-12-06 DIAGNOSIS — S13.4XXD WHIPLASH INJURY TO NECK, SUBSEQUENT ENCOUNTER: ICD-10-CM

## 2023-12-06 DIAGNOSIS — G47.9 FATIGUE DUE TO SLEEP PATTERN DISTURBANCE: ICD-10-CM

## 2023-12-06 DIAGNOSIS — E29.1 MALE HYPOGONADISM: ICD-10-CM

## 2023-12-06 DIAGNOSIS — E66.01 TYPE 2 DIABETES MELLITUS WITH MORBID OBESITY: ICD-10-CM

## 2023-12-06 DIAGNOSIS — Z79.890 LONG-TERM CURRENT USE OF TESTOSTERONE REPLACEMENT THERAPY: ICD-10-CM

## 2023-12-06 DIAGNOSIS — E55.9 VITAMIN D INSUFFICIENCY: ICD-10-CM

## 2023-12-06 DIAGNOSIS — F07.81 POST CONCUSSION SYNDROME: ICD-10-CM

## 2023-12-06 DIAGNOSIS — H51.11 CONVERGENCE INSUFFICIENCY: ICD-10-CM

## 2023-12-06 DIAGNOSIS — F41.1 GENERALIZED ANXIETY DISORDER WITH PANIC ATTACKS: Primary | ICD-10-CM

## 2023-12-06 PROCEDURE — 3008F BODY MASS INDEX DOCD: CPT | Mod: CPTII,S$GLB,, | Performed by: PSYCHIATRY & NEUROLOGY

## 2023-12-06 PROCEDURE — 4010F PR ACE/ARB THEARPY RXD/TAKEN: ICD-10-PCS | Mod: CPTII,S$GLB,, | Performed by: PSYCHIATRY & NEUROLOGY

## 2023-12-06 PROCEDURE — 99999 PR PBB SHADOW E&M-EST. PATIENT-LVL V: CPT | Mod: PBBFAC,,, | Performed by: PSYCHIATRY & NEUROLOGY

## 2023-12-06 PROCEDURE — 1160F RVW MEDS BY RX/DR IN RCRD: CPT | Mod: CPTII,95,, | Performed by: PHYSICIAN ASSISTANT

## 2023-12-06 PROCEDURE — 3066F PR DOCUMENTATION OF TREATMENT FOR NEPHROPATHY: ICD-10-PCS | Mod: CPTII,95,, | Performed by: PHYSICIAN ASSISTANT

## 2023-12-06 PROCEDURE — 3008F PR BODY MASS INDEX (BMI) DOCUMENTED: ICD-10-PCS | Mod: CPTII,S$GLB,, | Performed by: PSYCHIATRY & NEUROLOGY

## 2023-12-06 PROCEDURE — 4010F PR ACE/ARB THEARPY RXD/TAKEN: ICD-10-PCS | Mod: CPTII,95,, | Performed by: PHYSICIAN ASSISTANT

## 2023-12-06 PROCEDURE — 99214 PR OFFICE/OUTPT VISIT, EST, LEVL IV, 30-39 MIN: ICD-10-PCS | Mod: S$GLB,,, | Performed by: PSYCHIATRY & NEUROLOGY

## 2023-12-06 PROCEDURE — 99214 PR OFFICE/OUTPT VISIT, EST, LEVL IV, 30-39 MIN: ICD-10-PCS | Mod: 95,,, | Performed by: PHYSICIAN ASSISTANT

## 2023-12-06 PROCEDURE — 3061F PR NEG MICROALBUMINURIA RESULT DOCUMENTED/REVIEW: ICD-10-PCS | Mod: CPTII,S$GLB,, | Performed by: PSYCHIATRY & NEUROLOGY

## 2023-12-06 PROCEDURE — 1160F PR REVIEW ALL MEDS BY PRESCRIBER/CLIN PHARMACIST DOCUMENTED: ICD-10-PCS | Mod: CPTII,S$GLB,, | Performed by: PSYCHIATRY & NEUROLOGY

## 2023-12-06 PROCEDURE — 3080F DIAST BP >= 90 MM HG: CPT | Mod: CPTII,S$GLB,, | Performed by: PSYCHIATRY & NEUROLOGY

## 2023-12-06 PROCEDURE — 3066F NEPHROPATHY DOC TX: CPT | Mod: CPTII,95,, | Performed by: PHYSICIAN ASSISTANT

## 2023-12-06 PROCEDURE — 4010F ACE/ARB THERAPY RXD/TAKEN: CPT | Mod: CPTII,S$GLB,, | Performed by: PSYCHIATRY & NEUROLOGY

## 2023-12-06 PROCEDURE — 3044F PR MOST RECENT HEMOGLOBIN A1C LEVEL <7.0%: ICD-10-PCS | Mod: CPTII,95,, | Performed by: PHYSICIAN ASSISTANT

## 2023-12-06 PROCEDURE — 1159F MED LIST DOCD IN RCRD: CPT | Mod: CPTII,S$GLB,, | Performed by: PSYCHIATRY & NEUROLOGY

## 2023-12-06 PROCEDURE — 90833 PSYTX W PT W E/M 30 MIN: CPT | Mod: 95,,, | Performed by: PHYSICIAN ASSISTANT

## 2023-12-06 PROCEDURE — 3066F PR DOCUMENTATION OF TREATMENT FOR NEPHROPATHY: ICD-10-PCS | Mod: CPTII,S$GLB,, | Performed by: PSYCHIATRY & NEUROLOGY

## 2023-12-06 PROCEDURE — 99214 OFFICE O/P EST MOD 30 MIN: CPT | Mod: S$GLB,,, | Performed by: PSYCHIATRY & NEUROLOGY

## 2023-12-06 PROCEDURE — 3044F PR MOST RECENT HEMOGLOBIN A1C LEVEL <7.0%: ICD-10-PCS | Mod: CPTII,S$GLB,, | Performed by: PSYCHIATRY & NEUROLOGY

## 2023-12-06 PROCEDURE — 3044F HG A1C LEVEL LT 7.0%: CPT | Mod: CPTII,S$GLB,, | Performed by: PSYCHIATRY & NEUROLOGY

## 2023-12-06 PROCEDURE — 3077F PR MOST RECENT SYSTOLIC BLOOD PRESSURE >= 140 MM HG: ICD-10-PCS | Mod: CPTII,S$GLB,, | Performed by: PSYCHIATRY & NEUROLOGY

## 2023-12-06 PROCEDURE — 1160F RVW MEDS BY RX/DR IN RCRD: CPT | Mod: CPTII,S$GLB,, | Performed by: PSYCHIATRY & NEUROLOGY

## 2023-12-06 PROCEDURE — 1159F MED LIST DOCD IN RCRD: CPT | Mod: CPTII,95,, | Performed by: PHYSICIAN ASSISTANT

## 2023-12-06 PROCEDURE — 99214 OFFICE O/P EST MOD 30 MIN: CPT | Mod: 95,,, | Performed by: PHYSICIAN ASSISTANT

## 2023-12-06 PROCEDURE — 3061F PR NEG MICROALBUMINURIA RESULT DOCUMENTED/REVIEW: ICD-10-PCS | Mod: CPTII,95,, | Performed by: PHYSICIAN ASSISTANT

## 2023-12-06 PROCEDURE — 3077F SYST BP >= 140 MM HG: CPT | Mod: CPTII,S$GLB,, | Performed by: PSYCHIATRY & NEUROLOGY

## 2023-12-06 PROCEDURE — 1159F PR MEDICATION LIST DOCUMENTED IN MEDICAL RECORD: ICD-10-PCS | Mod: CPTII,95,, | Performed by: PHYSICIAN ASSISTANT

## 2023-12-06 PROCEDURE — 4010F ACE/ARB THERAPY RXD/TAKEN: CPT | Mod: CPTII,95,, | Performed by: PHYSICIAN ASSISTANT

## 2023-12-06 PROCEDURE — 3066F NEPHROPATHY DOC TX: CPT | Mod: CPTII,S$GLB,, | Performed by: PSYCHIATRY & NEUROLOGY

## 2023-12-06 PROCEDURE — 1159F PR MEDICATION LIST DOCUMENTED IN MEDICAL RECORD: ICD-10-PCS | Mod: CPTII,S$GLB,, | Performed by: PSYCHIATRY & NEUROLOGY

## 2023-12-06 PROCEDURE — 90833 PR PSYCHOTHERAPY W/PATIENT W/E&M, 30 MIN (ADD ON): ICD-10-PCS | Mod: 95,,, | Performed by: PHYSICIAN ASSISTANT

## 2023-12-06 PROCEDURE — 3044F HG A1C LEVEL LT 7.0%: CPT | Mod: CPTII,95,, | Performed by: PHYSICIAN ASSISTANT

## 2023-12-06 PROCEDURE — 3080F PR MOST RECENT DIASTOLIC BLOOD PRESSURE >= 90 MM HG: ICD-10-PCS | Mod: CPTII,S$GLB,, | Performed by: PSYCHIATRY & NEUROLOGY

## 2023-12-06 PROCEDURE — 1160F PR REVIEW ALL MEDS BY PRESCRIBER/CLIN PHARMACIST DOCUMENTED: ICD-10-PCS | Mod: CPTII,95,, | Performed by: PHYSICIAN ASSISTANT

## 2023-12-06 PROCEDURE — 99999 PR PBB SHADOW E&M-EST. PATIENT-LVL V: ICD-10-PCS | Mod: PBBFAC,,, | Performed by: PSYCHIATRY & NEUROLOGY

## 2023-12-06 PROCEDURE — 3061F NEG MICROALBUMINURIA REV: CPT | Mod: CPTII,95,, | Performed by: PHYSICIAN ASSISTANT

## 2023-12-06 PROCEDURE — 3061F NEG MICROALBUMINURIA REV: CPT | Mod: CPTII,S$GLB,, | Performed by: PSYCHIATRY & NEUROLOGY

## 2023-12-06 RX ORDER — BUSPIRONE HYDROCHLORIDE 10 MG/1
20 TABLET ORAL 2 TIMES DAILY
Qty: 180 TABLET | Refills: 1 | Status: SHIPPED | OUTPATIENT
Start: 2023-12-06 | End: 2024-04-03 | Stop reason: SDUPTHER

## 2023-12-06 RX ORDER — BUPROPION HYDROCHLORIDE 150 MG/1
150 TABLET ORAL DAILY
Qty: 90 TABLET | Refills: 1 | Status: SHIPPED | OUTPATIENT
Start: 2023-12-06 | End: 2024-04-03 | Stop reason: SDUPTHER

## 2023-12-06 RX ORDER — METHYLPREDNISOLONE 4 MG/1
TABLET ORAL
Qty: 21 TABLET | Refills: 0 | Status: SHIPPED | OUTPATIENT
Start: 2023-12-06 | End: 2024-01-17 | Stop reason: ALTCHOICE

## 2023-12-06 RX ORDER — ZOLPIDEM TARTRATE 12.5 MG/1
12.5 TABLET, FILM COATED, EXTENDED RELEASE ORAL NIGHTLY PRN
Qty: 30 TABLET | Refills: 3 | Status: SHIPPED | OUTPATIENT
Start: 2023-12-06 | End: 2024-04-03 | Stop reason: SDUPTHER

## 2023-12-06 RX ORDER — VENLAFAXINE HYDROCHLORIDE 150 MG/1
150 CAPSULE, EXTENDED RELEASE ORAL DAILY
Qty: 90 CAPSULE | Refills: 1 | Status: SHIPPED | OUTPATIENT
Start: 2023-12-06 | End: 2024-04-03 | Stop reason: SDUPTHER

## 2023-12-06 RX ORDER — ALPRAZOLAM 1 MG/1
1 TABLET ORAL 2 TIMES DAILY PRN
Qty: 60 TABLET | Refills: 1 | Status: SHIPPED | OUTPATIENT
Start: 2023-12-06 | End: 2024-03-05 | Stop reason: SDUPTHER

## 2023-12-06 NOTE — PROGRESS NOTES
"Occupational Therapy Treatment Note     Date: 12/7/2023  Name: Dominic Collazo  Clinic Number: [id number]    Therapy Diagnosis:   Encounter Diagnoses   Name Primary?    Post concussion syndrome Yes    Deficient smooth pursuit eye movements     Saccadic eye movement deficiency     Convergence insufficiency        Physician: Keshav Singh MD    Physician Orders: Eval and Treat - Vestibular Therapy   Medical Diagnosis:   S06.0X9S (ICD-10-CM) - Concussion with loss of consciousness, sequela   H51.11 (ICD-10-CM) - Convergence insufficiency   Evaluation Date: 10/9/2023  Insurance Authorization Period Expiration: 12/31/2023  Plan of Care Certification Period: 12/29/2023  Date of Return to MD: 12/06/2023    Visit # / Visits authorized: 4 / 12  Time In: 1021  Time Out: 1103  Total Billable (one on one) Time: 42 minutes    Precautions: Standard    Subjective     Pt reports: He feels like his eyes are feeling almost lazier but that he has been doing more work. He went out of town for a training last week. He was noticing more eye pain and shadowed vision, but both have improved this week since he's been back. He reported his memory seems to be improving. He is getting shoulder surgery next Thursday.   Response to previous treatment: compliant with eye exercises   Functional change: ongoing    Date of Onset: 7/23/2023    Pain: 3-4/10   Location:  Headache - right temple     Patient's Goals for Therapy: "to get back to normal"     Objective      Dominic participated in neuromuscular re-education activities to improve: oculomotor and balance insurgencies for 42 minutes. The following activities were included:  Oculomotor:  Seated using plain target:  - Smooth pursuits, horizontal/vertical/diagonal directions, 2 x 45 seconds each   - Saccades, horizontal/vertical/diagonal directions, 1 x 45 seconds each at 115 bpm  - Wm string, 1 x 10     - Pt educated on importance of monitoring symptoms; if exercises exacerbate " symptoms greater than 2 levels past baseline to take a break, slow down eye movements, or decrease time exercises are being performed     Rest breaks as needed throughout for eyes closed     Home Exercises and Education Provided      Education provided:   - HEP   - Progress towards goals    Written Home Exercises Provided: horizontal/vertical smooth pursuits, horizontal/vertical saccades, and convergence pencil pushups.  Exercises were reviewed and Dominic was able to demonstrate them prior to the end of the session.    Dominic demonstrated good  understanding of the education provided.     See EMR under Patient Instructions for exercises provided   10/27/2023: (Pursuits, saccades, pencil push ups)  11/3/2023: added diagonal pursuits and saccades    Assessment      Pt tolerated today's session well but increased time was required for completion of all eye exercises due to integration of rest breaks for symptom management. Overall, ocular coordination and endurance appear to be improving. No saccadic intrusions were noted during tracking in horizontal, vertical, or diagonal planes this date, and pt tolerated increased saccade speed and time intervals. However, pursuits are more challenging than saccades, and tracking and gaze shifting in horizontal direction, specifically from L to R, is most challenging. Noted occasional squinting of left eye with fatigue, and pt reported mild eye strain (R>L), orbital headache behind left eye, and nausea with smooth pursuits. All symptoms improved with breaks. Pt would continue to benefit from skilled occupational therapy services to maximize oculomotor functioning, habituation for desensitization to environments/movements that elicit/increase symptoms, pt education on mindfulness/relaxation strategies, and HEP/HAP guidance to improve functional participation with meaningful occupations.     Dominic is progressing well towards his goals and there are no updates to goals at this  time. Pt prognosis is Good.     Pt will continue to benefit from skilled outpatient occupational therapy to address the deficits listed in the problem list on initial evaluation provide pt/family education and to maximize pt's level of independence in the home and community environment.     Pt's spiritual, cultural and educational needs considered and pt agreeable to plan of care and goals.    Anticipated barriers to occupational therapy: none noted    Goals:  Short Term Goals: 4 weeks   1) Pt will tolerate oculomotor and/or habitation home exercise/activity program, reporting at least 50% compliance. ongoing  2) Pt will verbalize eye ergonomics to decrease stress on eyes. ongoing  3) Pt will demonstrate ability to track single target WFL, in all directions x30 seconds, without increase in headache or nausea, to improve skills needed for technology use and scanning environment. ongoing  4) Pt to perform saccades WFL, in all directions x30 seconds, at 90 bpm without increase in headache, dizziness, or nausea, to improve skills needed for reading. ongoing  5) Pt will report 60 minutes of computer use without eye fatigue, double vision, or increase in headache/dizziness, utilizing activity pacing strategies, needed for return to work at [employer]. ongoing  6) Near point convergence will decrease to 20 cm or less to improve oculomotor coordination and ability to fixate on close up work. ongoing  7) Pt will demonstrate ability to fixate/attend to close up tasks x 30 minutes without onset of headache or eye fatigue. ongoing     Long Term Goals: 11 weeks   1) Pt will be independent with oculomotor and/or habituation home exercise/activity program. ongoing  2) Pt will demonstrate ability to track single target WFL, in all directions x1 minute, without increase in headache or nausea, to improve skills needed for technology use and scanning environment. ongoing  3) Pt to perform saccades WFL, in all directions x1 minute, at 110  bpm without increase in headache, dizziness, or nausea, to improve skills needed for reading. ongoing  4) Pt will report 90 minutes of computer use without eye fatigue, double vision, or increase in headache/dizziness, utilizing activity pacing strategies, needed for return to work at [employer]. ongoing  5) Near point convergence will decrease to 15 cm or less to improve oculomotor coordination and ability to fixate on close up work. ongoing  6) Pt will demonstrate ability to fixate/attend to close up tasks x 60 minutes without onset of headache or eye fatigue. ongoing    Plan     Certification Period/Plan of care expiration: 10/9/2023 to 12/29/2023.     Outpatient Occupational Therapy 1-2 times weekly for 11 weeks to include the following interventions: Neuromuscular Re-ed, Patient Education, Self Care, Therapeutic Activities, and Therapeutic Exercise.    Updates/Grading for next session: progress eye exercises; walking with HT; Wm String       Seema Ng, SMITH 12/7/2023

## 2023-12-06 NOTE — PATIENT INSTRUCTIONS
Ask your shoulder surgeon if okay to do steroids as below  Medrol dose pack prescribed. Take as instructed on package insert.  Monitor sugar while on Medrol and if sugar goes greater than 200, then stop the Medrol and please let me know  Continue light cardio but no contact sport situations  Continue vestibular PT for eye movements  Continue ST for cognition  Continue social work or psychology (whoever can see the patient soonest) consultation, therapy, and treatment. 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 The information from this consultation will be used by myself and other providers/therapists to help guide an individual care plan to help treat this patient's symptoms from TBI/neck injury. Any delays or failures to address cognitive, sleep, psychological symptoms after TBI/neck injury can contribute to persistent symptoms, prolong their overall recovery process, and potentially lead to permanent symptoms. And of note, this referral is not for neuropsychology or neurocognitive testing. This referral is specifically for social work or psychological interventions based on the consultation these services provide.  Continue to follow with psychiatrist
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
## 2023-12-07 ENCOUNTER — CLINICAL SUPPORT (OUTPATIENT)
Dept: REHABILITATION | Facility: HOSPITAL | Age: 43
End: 2023-12-07
Payer: COMMERCIAL

## 2023-12-07 DIAGNOSIS — H55.82 DEFICIENT SMOOTH PURSUIT EYE MOVEMENTS: ICD-10-CM

## 2023-12-07 DIAGNOSIS — S06.0X9D CONCUSSION WITH LOSS OF CONSCIOUSNESS, SUBSEQUENT ENCOUNTER: Primary | ICD-10-CM

## 2023-12-07 DIAGNOSIS — F07.81 POST CONCUSSION SYNDROME: Primary | ICD-10-CM

## 2023-12-07 DIAGNOSIS — R41.841 COGNITIVE COMMUNICATION DEFICIT: ICD-10-CM

## 2023-12-07 DIAGNOSIS — H55.81 SACCADIC EYE MOVEMENT DEFICIENCY: ICD-10-CM

## 2023-12-07 DIAGNOSIS — H51.11 CONVERGENCE INSUFFICIENCY: ICD-10-CM

## 2023-12-07 PROCEDURE — 97112 NEUROMUSCULAR REEDUCATION: CPT | Mod: PO

## 2023-12-07 PROCEDURE — 97129 THER IVNTJ 1ST 15 MIN: CPT | Mod: PO

## 2023-12-07 PROCEDURE — 97130 THER IVNTJ EA ADDL 15 MIN: CPT | Mod: PO
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
## 2023-12-08 ENCOUNTER — TELEPHONE (OUTPATIENT)
Dept: NEUROLOGY | Facility: CLINIC | Age: 43
End: 2023-12-08
Payer: COMMERCIAL

## 2023-12-08 ENCOUNTER — OFFICE VISIT (OUTPATIENT)
Dept: NEUROLOGY | Facility: CLINIC | Age: 43
End: 2023-12-08
Payer: COMMERCIAL

## 2023-12-08 DIAGNOSIS — F43.23 ADJUSTMENT DISORDER WITH MIXED ANXIETY AND DEPRESSED MOOD: Primary | ICD-10-CM

## 2023-12-08 DIAGNOSIS — F34.89 OTHER SPECIFIED PERSISTENT MOOD DISORDERS: ICD-10-CM

## 2023-12-08 DIAGNOSIS — S06.0X9S CONCUSSION WITH LOSS OF CONSCIOUSNESS, SEQUELA: ICD-10-CM

## 2023-12-08 PROCEDURE — 3061F PR NEG MICROALBUMINURIA RESULT DOCUMENTED/REVIEW: ICD-10-PCS | Mod: CPTII,95,, | Performed by: SOCIAL WORKER

## 2023-12-08 PROCEDURE — 90791 PSYCH DIAGNOSTIC EVALUATION: CPT | Mod: 95,,, | Performed by: SOCIAL WORKER

## 2023-12-08 PROCEDURE — 4010F PR ACE/ARB THEARPY RXD/TAKEN: ICD-10-PCS | Mod: CPTII,95,, | Performed by: SOCIAL WORKER

## 2023-12-08 PROCEDURE — 3066F NEPHROPATHY DOC TX: CPT | Mod: CPTII,95,, | Performed by: SOCIAL WORKER

## 2023-12-08 PROCEDURE — 3044F PR MOST RECENT HEMOGLOBIN A1C LEVEL <7.0%: ICD-10-PCS | Mod: CPTII,95,, | Performed by: SOCIAL WORKER

## 2023-12-08 PROCEDURE — 3066F PR DOCUMENTATION OF TREATMENT FOR NEPHROPATHY: ICD-10-PCS | Mod: CPTII,95,, | Performed by: SOCIAL WORKER

## 2023-12-08 PROCEDURE — 3044F HG A1C LEVEL LT 7.0%: CPT | Mod: CPTII,95,, | Performed by: SOCIAL WORKER

## 2023-12-08 PROCEDURE — 4010F ACE/ARB THERAPY RXD/TAKEN: CPT | Mod: CPTII,95,, | Performed by: SOCIAL WORKER

## 2023-12-08 PROCEDURE — 90791 PR PSYCHIATRIC DIAGNOSTIC EVALUATION: ICD-10-PCS | Mod: 95,,, | Performed by: SOCIAL WORKER

## 2023-12-08 PROCEDURE — 3061F NEG MICROALBUMINURIA REV: CPT | Mod: CPTII,95,, | Performed by: SOCIAL WORKER
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
## 2023-12-08 NOTE — TELEPHONE ENCOUNTER
LCSW logged on to virtual visit and waited for about 10 minutes and no show.   LCSW placed call to patient [phone]  and call went straight to voicemail.     Left VM requesting call back or portal message.
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
## 2023-12-13 ENCOUNTER — CLINICAL SUPPORT (OUTPATIENT)
Dept: REHABILITATION | Facility: HOSPITAL | Age: 43
End: 2023-12-13
Payer: COMMERCIAL

## 2023-12-13 DIAGNOSIS — H51.11 CONVERGENCE INSUFFICIENCY: ICD-10-CM

## 2023-12-13 DIAGNOSIS — F07.81 POST CONCUSSION SYNDROME: Primary | ICD-10-CM

## 2023-12-13 DIAGNOSIS — R41.841 COGNITIVE COMMUNICATION DEFICIT: ICD-10-CM

## 2023-12-13 DIAGNOSIS — H55.82 DEFICIENT SMOOTH PURSUIT EYE MOVEMENTS: ICD-10-CM

## 2023-12-13 DIAGNOSIS — H55.81 SACCADIC EYE MOVEMENT DEFICIENCY: ICD-10-CM

## 2023-12-13 DIAGNOSIS — S06.0X9S CONCUSSION WITH LOSS OF CONSCIOUSNESS, SEQUELA: Primary | ICD-10-CM

## 2023-12-13 PROCEDURE — 97130 THER IVNTJ EA ADDL 15 MIN: CPT

## 2023-12-13 PROCEDURE — 97112 NEUROMUSCULAR REEDUCATION: CPT | Mod: PO

## 2023-12-13 PROCEDURE — 97530 THERAPEUTIC ACTIVITIES: CPT | Mod: PO

## 2023-12-13 PROCEDURE — 97129 THER IVNTJ 1ST 15 MIN: CPT

## 2023-12-13 NOTE — PLAN OF CARE
"Occupational Therapy Updated Plan of Care     Date: 12/13/2023  Name: Dominic Collazo  Clinic Number: [id number]    Therapy Diagnosis:   Encounter Diagnoses   Name Primary?    Post concussion syndrome Yes    Deficient smooth pursuit eye movements     Saccadic eye movement deficiency     Convergence insufficiency        Physician: Keshav Singh MD    Physician Orders: Eval and Treat - Vestibular Therapy   Medical Diagnosis:   S06.0X9S (ICD-10-CM) - Concussion with loss of consciousness, sequela   H51.11 (ICD-10-CM) - Convergence insufficiency   Evaluation Date: 10/9/2023  Insurance Authorization Period Expiration: 12/31/2023  Plan of Care Certification Period: 12/29/2023  Date of Return to MD: 12/06/2023    Visit # / Visits authorized: 5 / 12  Time In: 0848  Time Out: 0933  Total Billable (one on one) Time: 45 minutes    Precautions: Standard    Subjective     Pt reports: that eyes have been feeling okay over the past week. He has tried to take more breaks from the computer. He is getting rotator cuff surgery tomorrow. He is still getting headaches. He feels like he is improving. He still feels like his right eye is lazy but that it's not as bad as it was.   Response to previous treatment: compliant with eye exercises   Functional change: ongoing    Date of Onset: 7/23/2023    Pain: 3/10   Location: Headache - right temple     Patient's Goals for Therapy: "to get back to normal"     Objective      Dominic participated in dynamic functional therapeutic activities to improve functional performance for 10 minutes, including:  Reassessment:   Adult Vision Questionnaire:   Directions: please check the answer that best describes your situation. If you wear glasses or contact lenses, answer the questions assuming that you are wearing them.   Never = never  Occasionally = less than 1 time/week  Frequently = at least 1 time/week  Always = everyday      Do you have headaches or facial pain? Always  Do you have pain " in your eyes with eye movement? Frequently   Do you experience neck or shoulder discomfort? Always  Do you have dizziness, light headed or nausea while performing close up work? (computer work; reading; writing) Always  Do you have dizziness, light headed or nausea while performing far distance activities? (driving, tv, movies) Frequently; situational    Do you experience dizziness when bending down and standing back up, or when getting up quickly? Always   Do you feel unsteady with walking, or drift to one side? Frequently    Do you feel overwhelmed or anxious while walking in a large department store or walking in a crowd? Occasionally   Do you feel dizzy or off balance when walking down a long hallway or on bold patterned carpeting? Occasionally   Does riding in a car make you feel dizzy or uncomfortable? Always   Do you find yourself with your head tilted to one side? Frequently   Does your posture tend to be leaning more forward or backward than your used to? Never  Do you experience poor depth perception or have difficulty estimating distances accurately? Always  Do you experience double/overlapping/shadowed vision at far distances? Frequently   Do you experience double/overlapping/shadowed vision at near distances? Frequently   Do you experience glare or have sensitivity to bright lights? Always; worse with natural lighting   Do you close or cover one eye with near or far tasks? Occasionally   Do you skip lines or lose your place while reading? Frequently   Do you use your finger to keep your place on the page? Frequently - did this prior to concussion but is more aware now   Do you tire easily with close-up tasks? Always  Do you experience blurred vision with far distance tasks? (driving, television, movies, chalkboard at school) Occasionally   Do you experience blurred vision with close up tasks? (computer, reading) Frequently    Do you experience words running together or appearing to move on the page?  Frequently     History:  Have you ever been diagnosed with:  Traumatic brain injury (TBI) or concussion? Yes  Reading disability? No  Lazy eye? No  Have you ever had an eye operation? No    FOTO Administered:   Anselmo: 69%  12/13/2023: 60%      Dominic participated in neuromuscular re-education activities to improve: oculomotor and balance insurgencies for 35 minutes. The following activities were included:  Reassessment:   Physical Exam  Head Control/Neck Mobility: WFL per functional cervical ROM screen but pt reported some pain  Comments: Pt reported feeling a disconnect between head movements and vision with up/down head movements      Oculomotor Exam  Vestibular/Ocular-Motor Screening (VOMS) for Concussion  Vestibular/Ocular Motor Test: Not Tested Headache   0-10 Dizziness  0-10 Nausea   0-10 Fogginess   0-10 Comments   Baseline   3 0 0 0     Smooth Pursuit  (1.5 feet left/right of center; 3 feet away; 2 times; 30 bpm each direction; horizontal and vertical)   3 0 0 0 See below    Saccades - Horizontal  (1.5 feet left/right of center; 3 feet away; 10 times; performed as quickly as possible)   4 0 0 0 See below    Saccades - Vertical   (1.5 feet up/down of center; 3 feet away; 10 times; performed as quickly as possible)   4 2 0 0 See below    Convergence (Near Point)  (14 pt font; stop when pt reports diplopia or outward deviation of eye is observed, blurring is ignored; measure distance between target and tip of nose; abnormal finding is >/= 6 cm)   4 0 0 0 (Near Point in cm):  Measure 1: 16  Measure 2: 13  Measure 3: 15   VOR - Horizontal  (14 pt font; 20 degrees rotation left/right; 10 reps; 180 bpm)   3 0 2 0 No visual slippage noted    VOR - Vertical  (14 pt font; 20 degrees rotation up/down; 10 times; 180 bpm)   3 0 0 0 No visual slippage noted but pt reported more difficulty focusing on target   Visual Motion Sensitivity Test  (80 degrees left/right; 5 times each direction; 50 bpm)   3 0 0 0 No visual  "slippage       Oculomotor ROM: Eye movements intact  Eye Alignment: WNL  Visual Field: NT  Acuity: corrected by glasses/contact for distance vision; has an astigmatism      Spontaneous Nystagmus: None  Gaze Holding Nystagmus: None   Gaze Holding (No Fixation): NT  Smooth Pursuits: Pt reported mild blurriness in left eye   Horizontal: Eye movements intact; no increase in symptoms               Vertical: Eye movements intact; no increase in symptoms    Saccades:               Horizontal: Eye movements intact; mild increase in headache               Vertical: Eye movements intact; was able to tolerate 10 reps but gaze shifts from high>low target felt more challenging; mild lightheadedness reported   Near Point Convergence (cm):   Eval: Impaired; Average 28 cm; tactile cues to bring target to midline (pt tended to hold target just right of midline); more difficult for right eye to converge    12/13/2023: Impaired; Average 14.6 cm   Accommodation (gaze shift between near target (16") and far target (10ft)): Impaired; slowed gaze shifts; pt reported increased time required to bring near target into focus    Fixation (5 second sustained visual attention): Impaired for prolonged periods      VOR Slow Head Movement: Intact  Head Thrust: Negative bilaterally   Dynamic Visual Acuity (DVA) (using ETDRS eye chart):   Eval: 3 line difference (10, 7); read 3/5 letter on line 8  12/13/2023: 2 line difference (10, 8); read 2/5 letters on line 9; 1/10 dizziness reported     Balance/Functional Mobility Assessment:      SERA Sensory Testing:  (P= Pass, F= Fail; note any sway; hold each position for 30")  Condition 1: (firm surface/feet together/eyes open) P; 30 sec  Condition 2: (firm surface/feet together/eyes closed) P; 30 sec  Condition 3: (firm surface/feet in tandem/eyes open) P; 30 sec, RLE forward  Condition 4: (firm surface/feet in tandem/eyes closed) F; 17 sec, RLE forward  Condition 5: (soft surface/feet together/eyes open) " P; 30 sec  Condition 6: (soft surface/feet together/eyes closed) P; 30 sec  Condition 7: (Fukuda step test), measure distance varied from center starting position, > 30 deg deviation to either side indicates hypofunction of biased side: NT    Functional Gait Assessment (FGA):   1. Gait on level surface =  3              (3) Normal: less than 5.5 sec, no A.D., no imbalance, normal gait pattern, deviates< 6in              (2) Mild impairment: 7-5.6 sec, uses A.D., mild gait deviations, or deviates 6-10 in              (1) Moderate impairment: > 7 sec, slow speed, imbalance, deviates 10-15 in.              (0) Severe impairment: needs assist, deviates >15 in, reach/touch wall  2. Change in Gait Speed = 3              (3) Normal: smooth change w/o loss of balance or gait deviation, deviates < 6 in, significant difference between speeds              (2) Mild impairment: changes speed, but demonstrates mild gait deviations, deviates 6-10 in, OR no deviations but unable to significantly speed, OR uses A.D.              (1) Moderate impairment: minor changes to speed, OR changes speed w/ significant deviations, deviates 10-15 in, OR  Changes speed , but loses balance & recovers              (0) Severe impairment: cannot change speed, deviates >15 in, or loses balance & needs assist  3. Gait with horizontal head turns  = 2; slight LOB but self corrected               (3) Normal: no change in gait, deviates <6 in              (2) Mild impairment: slight change in speed, deviates 6-10 in, OR uses A.D.              (1) Moderate impairment: moderate change in speed, deviates 10-15 in              (0) Severe impairment: severe disruption of gait, deviates >15in  4. Gait with vertical head turns = 2; LOB but self corrected and mild dizziness                (3) Normal: no change in gait, deviates <6 in              (2) Mild impairment: slight change in speed, deviates 6-10 in OR uses A.D.              (1) Moderate impairment:  moderate change in speed, deviates 10-15 in              (0) Severe impairment: severe disruption of gait, deviates >15 in  5. Gait with pivot turns = 3              (3) Normal: performs safely in 3 sec, no LOB              (2) Mild impairment: performs in >3 sec & no LOB, OR turns safely & requires several steps to regain LOB              (1) Moderate impairment: turns slow, OR requires several small steps for balance following turn & stop              (0) Severe impairment: cannot turn safely, needs assist  6. Step over obstacle = 3              (3) Normal: steps over 2 stacked boxes w/o change in speed or LOB              (2) Mild impairment: able to step over 1 box w/o change in speed or LOB              (1) Moderate impairment: steps over 1 box but must slow down, may require VC              (0) Severe impairment: cannot perform w/o assist  7. Gait with Narrow KAREEM = 3              (3) Normal: 10 steps no staggering              (2) Mild impairment: 7-9 steps              (1) Moderate impairment: 4-7 steps              (0) Severe impairment: < 4 steps or cannot perform w/o assist  8. Gait with eyes closed = 3              (3) Normal: < 7 sec, no A.D., no LOB, normal gait pattern, deviates <6 in              (2) Mild impairment: 7.1-9 sec, mild gait deviations, deviates 6-10 in              (1) Moderate impairment: > 9 sec, abnormal pattern, LOB, deviates 10-15 in              (0) Severe impairment: cannot perform w/o assist, LOB, deviates >15in  9. Ambulating Backwards = 3              (3) Normal: no A.D., no LOB, normal gait pattern, deviates <6in              (2) Mild impairment: uses A.D., slower speed, mild gait deviations, deviates 6-10 in              (1) Moderate impairment: slow speed, abnormal gait pattern, LOB, deviates 10-15 in              (0) Severe impairment: severe gait deviations or LOB, deviates >15in  10. Steps = 3              (3) Normal: alternating feet, no rail              (2) Mild  Impairment: alternating feet, uses rail              (1) Moderate impairment: step-to, uses rail              (0) Severe impairment: cannot perform safely     Score:   Eval: 28/30   12/13/2023: 28/30     Score:   </=22/30 fall risk   <20/30 fall risk in older adults   <18/30 fall risk in Parkinsons     Home Exercises and Education Provided      Education provided:   - HEP   - Progress towards goals    Written Home Exercises Provided: horizontal/vertical smooth pursuits, horizontal/vertical saccades, and convergence pencil pushups.  Exercises were reviewed and Dominic was able to demonstrate them prior to the end of the session.    Dominic demonstrated good  understanding of the education provided.     See EMR under Patient Instructions for exercises provided   10/27/2023: (Pursuits, saccades, pencil push ups)  11/3/2023: added diagonal pursuits and saccades    Assessment      Pt is making steady improvements with occupational therapy. Oculomotor function and gaze stabilization are improving. Pt's eye movements were more smooth/coordinated and less symptomatic as compared to initial evaluation, and gaze stabilization is WNL as indicated by negative head thrust bilaterally and 2 line difference on the DVA. NPC has improved by 14 cm but is still slightly outside of functional limits. Since starting therapy, pt has gradually tolerated ocular progressions, which is suggestive of improved ocular coordination and endurance. However, FOTO score declined and frequency of some responses on Adult Vision Questionnaire actually increased despite pt's subjective report of noting overall improvement during work related and other daily activities. Pt's static and dynamic balance is good. No change since eval, but this has not been the focus of therapy interventions as oculomotor function was top priority. As oculomotor function continues to improve will gradually begin to incorporate more functional mobility and motion tolerance  activities as well. This was discussed today, and pt was agreeable with this plan. Pt would continue to benefit from skilled occupational therapy services to maximize oculomotor functioning, habituation for desensitization to environments/movements that elicit/increase symptoms, pt education on mindfulness/relaxation strategies, and HEP/HAP guidance to improve functional participation with meaningful occupations.     Dominic is progressing well towards his goals and there are no updates to goals at this time. Pt prognosis is Good.     Pt will continue to benefit from skilled outpatient occupational therapy to address the deficits listed in the problem list on initial evaluation provide pt/family education and to maximize pt's level of independence in the home and community environment.     Pt's spiritual, cultural and educational needs considered and pt agreeable to plan of care and goals.    Anticipated barriers to occupational therapy: none noted    Goals:  Short Term Goals: 4 weeks   1) Pt will tolerate oculomotor and/or habitation home exercise/activity program, reporting at least 50% compliance. MET 12/13/2023  2) Pt will verbalize eye ergonomics to decrease stress on eyes. ongoing  3) Pt will demonstrate ability to track single target WFL, in all directions x30 seconds, without increase in headache or nausea, to improve skills needed for technology use and scanning environment. ongoing  4) Pt to perform saccades WFL, in all directions x30 seconds, at 90 bpm without increase in headache, dizziness, or nausea, to improve skills needed for reading. ongoing  5) Pt will report 60 minutes of computer use without eye fatigue, double vision, or increase in headache/dizziness, utilizing activity pacing strategies, needed for return to work at [employer]. ongoing  6) Near point convergence will decrease to 20 cm or less to improve oculomotor coordination and ability to fixate on close up work. MET 12/13/2023  7) Pt will  demonstrate ability to fixate/attend to close up tasks x 30 minutes without onset of headache or eye fatigue. ongoing     Long Term Goals: 11 weeks   1) Pt will be independent with oculomotor and/or habituation home exercise/activity program. Progressing   2) Pt will demonstrate ability to track single target WFL, in all directions x1 minute, without increase in headache or nausea, to improve skills needed for technology use and scanning environment. ongoing  3) Pt to perform saccades WFL, in all directions x1 minute, at 110 bpm without increase in headache, dizziness, or nausea, to improve skills needed for reading. ongoing  4) Pt will report 90 minutes of computer use without eye fatigue, double vision, or increase in headache/dizziness, utilizing activity pacing strategies, needed for return to work at [employer]. ongoing  5) Near point convergence will decrease to 15 cm or less to improve oculomotor coordination and ability to fixate on close up work. MET 12/13/2023  6) Pt will demonstrate ability to fixate/attend to close up tasks x 60 minutes without onset of headache or eye fatigue. ongoing    Previous Short Term Goals Status: See above for goal status  Long Term Goal Status: continue per initial plan of care; will incorporate functional mobility and motion tolerance alongside oculomotor interventions as indicated   Reasons for Recertification of Therapy: Pt would continue to benefit from skilled occupational therapy services to maximize oculomotor functioning, habituation for desensitization to environments/movements that elicit/increase symptoms, pt education on mindfulness/relaxation strategies, and HEP/HAP guidance to improve functional participation with meaningful occupations.      Plan     Updated Certification Period: 12/13/2023 to 2/2/2023  Recommended Treatment Plan: 1-2 times per week for 7 weeks: Neuromuscular Re-ed, Patient Education, Self Care, Therapeutic Activities, and Therapeutic  Exercise    Updates/Grading for next session: progress eye exercises; walking with HT; Wm String     Seema Ng, SMITH  12/13/2023      I CERTIFY THE NEED FOR THESE SERVICES FURNISHED UNDER THIS PLAN OF TREATMENT AND WHILE UNDER MY CARE    Physician's comments:        Physician's Signature: ___________________________________________________

## 2023-12-13 NOTE — PLAN OF CARE
[facility]  Speech Therapy Updated Plan of Care-Neurological Rehabilitation         Date: 12/13/2023   Name: Dominic Collazo  Clinic Number: [id number]    Therapy Diagnosis:   Encounter Diagnoses   Name Primary?    Concussion with loss of consciousness, sequela Yes    Cognitive communication deficit      Physician: Keshav Singh MD    Physician Orders: Ambulatory Referral to Speech Therapy   Medical Diagnosis: Concussion with unknown loss of consciousness status, initial encounter [S06.0XAA]    Visit #/ Visits Authorized:  4 /20 plus evaluation    Evaluation Date: 10/10/23  Insurance Authorization Period: 10/10/2023 - 12/31/2023   Plan of Care Expiration:  completed  12/13/23  New POC Certification Period:  1/26/24    Total Visits Received: 4 plus evaluation     Precautions:Standard     Subjective     Update: Patient has demonstrated progress since start of care. Patient has demonstrated improved use of strategies, management of symptoms. Deficits continue to be observed in areas of complex multitasking and working in dynamic environments. Skilled speech therapy services continue to be warranted to address this. Patient continues with excellent motivation to utilize learned strategies.     Objective     Update: see follow up note dated 12/13/2023    Assessment     Update: Dominic Collazo presents to [facility] status post medical diagnosis of Concussion with unknown loss of consciousness status, initial encounter [S06.0XAA] . Demonstrates impairments including limitations as described in the problem list. Positive prognostic factors include motivation, self awareness. Negative prognostic factors include none. The patient presents with a mild cognitive communication disorder charaterized by presented deficits and reported deficits in delayed memory, multitasking attention, and processing speed. Barriers to therapy include pain, including but not limited to  frequent headaches . Patient will benefit from skilled, outpatient rehabilitation speech therapy.    Rehab Potential: good   Pt's spiritual, cultural, and educational needs considered and patient agreeable to plan of care and goals.    Education: Plan of Care and role of SLP in care  Discussed at length the roles that a high cognitive baseline in addition to pain/sleep management can play in progress.     Previous Short Term Goals Status:    Short Term Goals: (4 weeks) Current Progress:    Patient will sustain attention to complete complex reasoning tasks for 2 minutes with distractions with one request for clarification to increase sustained attention.    Met 12/13/23      2.  Patient will use attention shifting strategies to shift attention between two tasks with no more than 3 cues or 90% accuracy to improve alternating attention.        Goal Met / Discontinue       3. Patient will use Goal Plan Action Review strategy to complete moderate to complex reasoning, planning, or organization tasks with 90% accuracy independently to improve functional executive function skills.     Progressing/ Not Met    Progressing            4. Patient will complete short term recall tasks after a 5 minutes delay with 90% accuracy  independently  with use of memory strategies to improve recall of information and generalization of memory strategies.     Progressing/ Not Met    Met 1/3      5. Patient will complete mental manipulation tasks with 90% acc to improve working memory.     Progressing/    Met 12/13/23   6.Patient will independently implement cognitive/sensory rest periods throughout day after identifying cognitive fatigue including limiting sensory input (sound, light, etc.)      Progressing/   Met 1/3    7.Patient will independently generate strategies to improve ability to complete tasks with enhanced accuracy and time, based on review of objective previous performance on trials of functional tasks with 80% accuracy.         Met/discontinue        New Short Term Goals:   Short Term Goals: (4 weeks) Current Progress:   1. Patient will use Goal Plan Action Review strategy to complete moderate to complex reasoning, planning, or organization tasks with 90% accuracy independently to improve functional executive function skills.     Progressing/ Not Met    Progressing   2. Patient will complete short term recall tasks after a 5 minutes delay with 90% accuracy  independently  with use of memory strategies to improve recall of information and generalization of memory strategies.     Progressing/ Not Met    Met 1/3      3.Patient will independently implement cognitive/sensory rest periods throughout day after identifying cognitive fatigue including limiting sensory input (sound, light, etc.)      Progressing/   Met 1/3         Long Term Goal Status:     Long Term Goals: (6 weeks) Current Progress:    Patient will improve  attention skills to effectively attend to and communicate in complex daily living tasks in functional living environment.     Progressing   2.  Patient will use appropriate memory strategies to schedule and recall weekly activities, express needs and recall names to maintain safety and participate socially in functional living environment.     Progressing     Reasons for Recertification of Therapy:   Cognition: Deficits in executive functioning, attention, and memory prevent the pt from returning to work, and place his at risk of a decline in quality of life.      Plan     Updated Certification Period: 12/13/2023 to 1/26/24    Recommended Treatment Plan: Patient will participate in the [facility] rehabilitation program for speech therapy 1 times per week to address his Communication and Cognition deficits, to educate patient and their family, and to participate in a home exercise program.     Other recommendations: not applicable      Therapist's Name:  BHARATI Lemus, CF-SLP  Speech-Language Pathology Resident   12/13/2023        I CERTIFY THE NEED FOR THESE SERVICES FURNISHED UNDER THIS PLAN OF TREATMENT AND WHILE UNDER MY CARE      Physician Name: _______________________________    Physician Signature: ____________________________

## 2023-12-14 ENCOUNTER — PATIENT OUTREACH (OUTPATIENT)
Dept: ADMINISTRATIVE | Facility: HOSPITAL | Age: 43
End: 2023-12-14
Payer: COMMERCIAL

## 2023-12-14 ENCOUNTER — PATIENT MESSAGE (OUTPATIENT)
Dept: ADMINISTRATIVE | Facility: HOSPITAL | Age: 43
End: 2023-12-14
Payer: COMMERCIAL

## 2023-12-14 ENCOUNTER — HOSPITAL ENCOUNTER (OUTPATIENT)
Facility: HOSPITAL | Age: 43
Discharge: HOME | End: 2023-12-14
Attending: ORTHOPAEDIC SURGERY | Admitting: ORTHOPAEDIC SURGERY
Payer: COMMERCIAL

## 2023-12-14 VITALS
WEIGHT: 315 LBS | BODY MASS INDEX: 39.17 KG/M2 | TEMPERATURE: 98 F | SYSTOLIC BLOOD PRESSURE: 149 MMHG | HEART RATE: 79 BPM | OXYGEN SATURATION: 95 % | DIASTOLIC BLOOD PRESSURE: 72 MMHG | HEIGHT: 75 IN | RESPIRATION RATE: 17 BRPM

## 2023-12-14 DIAGNOSIS — S43.432A SUPERIOR GLENOID LABRUM LESION OF LEFT SHOULDER, INITIAL ENCOUNTER: ICD-10-CM

## 2023-12-14 DIAGNOSIS — S43.432A PARALABRAL CYST OF LEFT SHOULDER, INITIAL ENCOUNTER: Primary | ICD-10-CM

## 2023-12-14 DIAGNOSIS — S43.432D PARALABRAL CYST OF LEFT SHOULDER, SUBSEQUENT ENCOUNTER: ICD-10-CM

## 2023-12-14 DIAGNOSIS — S43.432D SUPERIOR GLENOID LABRUM LESION OF LEFT SHOULDER, SUBSEQUENT ENCOUNTER: ICD-10-CM

## 2023-12-14 LAB — POCT GLUCOSE: 134 MG/DL (ref 70–110)

## 2023-12-14 PROCEDURE — 63600175 PHARM REV CODE 636 W HCPCS: Performed by: NURSE ANESTHETIST, CERTIFIED REGISTERED

## 2023-12-14 PROCEDURE — 71000015 HC POSTOP RECOV 1ST HR: Performed by: ORTHOPAEDIC SURGERY

## 2023-12-14 PROCEDURE — 25000003 PHARM REV CODE 250: Performed by: ORTHOPAEDIC SURGERY

## 2023-12-14 PROCEDURE — 71000016 HC POSTOP RECOV ADDL HR: Performed by: ORTHOPAEDIC SURGERY

## 2023-12-14 PROCEDURE — 63600175 PHARM REV CODE 636 W HCPCS: Performed by: ANESTHESIOLOGY

## 2023-12-14 PROCEDURE — D9220A PRA ANESTHESIA: Mod: CRNA,,, | Performed by: NURSE ANESTHETIST, CERTIFIED REGISTERED

## 2023-12-14 PROCEDURE — 29806 SHO ARTHRS SRG CAPSULORRAPHY: CPT | Mod: LT,,, | Performed by: ORTHOPAEDIC SURGERY

## 2023-12-14 PROCEDURE — 37000009 HC ANESTHESIA EA ADD 15 MINS: Performed by: ORTHOPAEDIC SURGERY

## 2023-12-14 PROCEDURE — 36000711: Performed by: ORTHOPAEDIC SURGERY

## 2023-12-14 PROCEDURE — C1713 ANCHOR/SCREW BN/BN,TIS/BN: HCPCS | Performed by: ORTHOPAEDIC SURGERY

## 2023-12-14 PROCEDURE — 36000710: Performed by: ORTHOPAEDIC SURGERY

## 2023-12-14 PROCEDURE — 29806 PR SHLDR ARTHROSCOP,SURG,CAPSULORRHAPHY: ICD-10-PCS | Mod: LT,,, | Performed by: ORTHOPAEDIC SURGERY

## 2023-12-14 PROCEDURE — D9220A PRA ANESTHESIA: ICD-10-PCS | Mod: CRNA,,, | Performed by: NURSE ANESTHETIST, CERTIFIED REGISTERED

## 2023-12-14 PROCEDURE — 25000003 PHARM REV CODE 250: Performed by: NURSE ANESTHETIST, CERTIFIED REGISTERED

## 2023-12-14 PROCEDURE — 37000008 HC ANESTHESIA 1ST 15 MINUTES: Performed by: ORTHOPAEDIC SURGERY

## 2023-12-14 PROCEDURE — 71000033 HC RECOVERY, INTIAL HOUR: Performed by: ORTHOPAEDIC SURGERY

## 2023-12-14 PROCEDURE — C9290 INJ, BUPIVACAINE LIPOSOME: HCPCS | Performed by: ANESTHESIOLOGY

## 2023-12-14 PROCEDURE — D9220A PRA ANESTHESIA: Mod: ANES,,, | Performed by: ANESTHESIOLOGY

## 2023-12-14 PROCEDURE — 64416 NJX AA&/STRD BRCH PL NFS IMG: CPT | Performed by: ANESTHESIOLOGY

## 2023-12-14 PROCEDURE — 63600175 PHARM REV CODE 636 W HCPCS: Performed by: ORTHOPAEDIC SURGERY

## 2023-12-14 PROCEDURE — 71000039 HC RECOVERY, EACH ADD'L HOUR: Performed by: ORTHOPAEDIC SURGERY

## 2023-12-14 PROCEDURE — D9220A PRA ANESTHESIA: ICD-10-PCS | Mod: ANES,,, | Performed by: ANESTHESIOLOGY

## 2023-12-14 PROCEDURE — 27201423 OPTIME MED/SURG SUP & DEVICES STERILE SUPPLY: Performed by: ORTHOPAEDIC SURGERY

## 2023-12-14 DEVICE — ANCHOR SUT FIBERTAK 1.8 KNTLS: Type: IMPLANTABLE DEVICE | Site: SHOULDER | Status: FUNCTIONAL

## 2023-12-14 RX ORDER — EPHEDRINE SULFATE 50 MG/ML
INJECTION, SOLUTION INTRAVENOUS
Status: DISCONTINUED | OUTPATIENT
Start: 2023-12-14 | End: 2023-12-14

## 2023-12-14 RX ORDER — HYDROMORPHONE HYDROCHLORIDE 2 MG/ML
0.5 INJECTION, SOLUTION INTRAMUSCULAR; INTRAVENOUS; SUBCUTANEOUS EVERY 5 MIN PRN
Status: DISCONTINUED | OUTPATIENT
Start: 2023-12-14 | End: 2023-12-14 | Stop reason: HOSPADM

## 2023-12-14 RX ORDER — LIDOCAINE HYDROCHLORIDE 20 MG/ML
INJECTION INTRAVENOUS
Status: DISCONTINUED | OUTPATIENT
Start: 2023-12-14 | End: 2023-12-14

## 2023-12-14 RX ORDER — SODIUM CHLORIDE, SODIUM LACTATE, POTASSIUM CHLORIDE, CALCIUM CHLORIDE 600; 310; 30; 20 MG/100ML; MG/100ML; MG/100ML; MG/100ML
INJECTION, SOLUTION INTRAVENOUS CONTINUOUS
Status: ACTIVE | OUTPATIENT
Start: 2023-12-14

## 2023-12-14 RX ORDER — EPINEPHRINE 1 MG/ML
INJECTION, SOLUTION, CONCENTRATE INTRAVENOUS
Status: DISCONTINUED | OUTPATIENT
Start: 2023-12-14 | End: 2023-12-14 | Stop reason: HOSPADM

## 2023-12-14 RX ORDER — MIDAZOLAM HYDROCHLORIDE 1 MG/ML
INJECTION, SOLUTION INTRAMUSCULAR; INTRAVENOUS
Status: DISCONTINUED | OUTPATIENT
Start: 2023-12-14 | End: 2023-12-14

## 2023-12-14 RX ORDER — HYDROCODONE BITARTRATE AND ACETAMINOPHEN 5; 325 MG/1; MG/1
1 TABLET ORAL EVERY 6 HOURS PRN
Qty: 28 TABLET | Refills: 0 | Status: SHIPPED | OUTPATIENT
Start: 2023-12-14 | End: 2023-12-14 | Stop reason: HOSPADM

## 2023-12-14 RX ORDER — ONDANSETRON 4 MG/1
4 TABLET, FILM COATED ORAL EVERY 6 HOURS PRN
Qty: 20 TABLET | Refills: 0 | Status: SHIPPED | OUTPATIENT
Start: 2023-12-14 | End: 2024-01-17

## 2023-12-14 RX ORDER — NOREPINEPHRINE BITARTRATE 1 MG/ML
INJECTION, SOLUTION INTRAVENOUS
Status: DISCONTINUED | OUTPATIENT
Start: 2023-12-14 | End: 2023-12-14

## 2023-12-14 RX ORDER — ONDANSETRON 2 MG/ML
INJECTION INTRAMUSCULAR; INTRAVENOUS
Status: DISCONTINUED | OUTPATIENT
Start: 2023-12-14 | End: 2023-12-14

## 2023-12-14 RX ORDER — ROCURONIUM BROMIDE 10 MG/ML
INJECTION, SOLUTION INTRAVENOUS
Status: DISCONTINUED | OUTPATIENT
Start: 2023-12-14 | End: 2023-12-14

## 2023-12-14 RX ORDER — DEXAMETHASONE SODIUM PHOSPHATE 4 MG/ML
INJECTION, SOLUTION INTRA-ARTICULAR; INTRALESIONAL; INTRAMUSCULAR; INTRAVENOUS; SOFT TISSUE
Status: DISCONTINUED | OUTPATIENT
Start: 2023-12-14 | End: 2023-12-14

## 2023-12-14 RX ORDER — PROPOFOL 10 MG/ML
VIAL (ML) INTRAVENOUS
Status: DISCONTINUED | OUTPATIENT
Start: 2023-12-14 | End: 2023-12-14

## 2023-12-14 RX ORDER — PHENYLEPHRINE HYDROCHLORIDE 10 MG/ML
INJECTION INTRAVENOUS
Status: DISCONTINUED | OUTPATIENT
Start: 2023-12-14 | End: 2023-12-14

## 2023-12-14 RX ORDER — SODIUM CHLORIDE 9 MG/ML
INJECTION, SOLUTION INTRAVENOUS CONTINUOUS
Status: DISCONTINUED | OUTPATIENT
Start: 2023-12-14 | End: 2023-12-14 | Stop reason: HOSPADM

## 2023-12-14 RX ORDER — VASOPRESSIN 20 [USP'U]/ML
INJECTION, SOLUTION INTRAMUSCULAR; SUBCUTANEOUS
Status: DISCONTINUED | OUTPATIENT
Start: 2023-12-14 | End: 2023-12-14

## 2023-12-14 RX ORDER — KETAMINE HCL IN 0.9 % NACL 50 MG/5 ML
SYRINGE (ML) INTRAVENOUS
Status: DISCONTINUED | OUTPATIENT
Start: 2023-12-14 | End: 2023-12-14

## 2023-12-14 RX ORDER — HYDROCODONE BITARTRATE AND ACETAMINOPHEN 10; 325 MG/1; MG/1
1 TABLET ORAL EVERY 6 HOURS PRN
Qty: 28 TABLET | Refills: 0 | Status: SHIPPED | OUTPATIENT
Start: 2023-12-14 | End: 2024-01-17 | Stop reason: ALTCHOICE

## 2023-12-14 RX ORDER — ACETAMINOPHEN 10 MG/ML
INJECTION, SOLUTION INTRAVENOUS
Status: DISCONTINUED | OUTPATIENT
Start: 2023-12-14 | End: 2023-12-14

## 2023-12-14 RX ORDER — BUPIVACAINE HYDROCHLORIDE 2.5 MG/ML
INJECTION, SOLUTION EPIDURAL; INFILTRATION; INTRACAUDAL
Status: COMPLETED | OUTPATIENT
Start: 2023-12-14 | End: 2023-12-14

## 2023-12-14 RX ORDER — FENTANYL CITRATE 50 UG/ML
INJECTION, SOLUTION INTRAMUSCULAR; INTRAVENOUS
Status: DISCONTINUED | OUTPATIENT
Start: 2023-12-14 | End: 2023-12-14

## 2023-12-14 RX ORDER — LIDOCAINE HYDROCHLORIDE 10 MG/ML
1 INJECTION, SOLUTION EPIDURAL; INFILTRATION; INTRACAUDAL; PERINEURAL ONCE
Status: ACTIVE | OUTPATIENT
Start: 2023-12-14

## 2023-12-14 RX ORDER — SODIUM CHLORIDE 0.9 % (FLUSH) 0.9 %
10 SYRINGE (ML) INJECTION
Status: DISCONTINUED | OUTPATIENT
Start: 2023-12-14 | End: 2023-12-14 | Stop reason: HOSPADM

## 2023-12-14 RX ORDER — ONDANSETRON 2 MG/ML
4 INJECTION INTRAMUSCULAR; INTRAVENOUS ONCE AS NEEDED
Status: DISCONTINUED | OUTPATIENT
Start: 2023-12-14 | End: 2023-12-14 | Stop reason: HOSPADM

## 2023-12-14 RX ADMIN — ROCURONIUM BROMIDE 50 MG: 10 INJECTION, SOLUTION INTRAVENOUS at 09:12

## 2023-12-14 RX ADMIN — NOREPINEPHRINE BITARTRATE 16 MCG: 1 SOLUTION INTRAVENOUS at 11:12

## 2023-12-14 RX ADMIN — LIDOCAINE HYDROCHLORIDE 100 MG: 20 INJECTION, SOLUTION INTRAVENOUS at 09:12

## 2023-12-14 RX ADMIN — EPHEDRINE SULFATE 15 MG: 50 INJECTION, SOLUTION INTRAMUSCULAR; INTRAVENOUS; SUBCUTANEOUS at 10:12

## 2023-12-14 RX ADMIN — DEXTROSE 3 G: 50 INJECTION, SOLUTION INTRAVENOUS at 10:12

## 2023-12-14 RX ADMIN — SODIUM CHLORIDE: 9 INJECTION, SOLUTION INTRAVENOUS at 09:12

## 2023-12-14 RX ADMIN — NOREPINEPHRINE BITARTRATE 8 MCG: 1 SOLUTION INTRAVENOUS at 11:12

## 2023-12-14 RX ADMIN — ROCURONIUM BROMIDE 10 MG: 10 INJECTION, SOLUTION INTRAVENOUS at 11:12

## 2023-12-14 RX ADMIN — VASOPRESSIN 1 UNITS: 20 INJECTION, SOLUTION INTRAMUSCULAR; SUBCUTANEOUS at 10:12

## 2023-12-14 RX ADMIN — EPHEDRINE SULFATE 10 MG: 50 INJECTION, SOLUTION INTRAMUSCULAR; INTRAVENOUS; SUBCUTANEOUS at 10:12

## 2023-12-14 RX ADMIN — BUPIVACAINE HYDROCHLORIDE 10 ML: 2.5 INJECTION, SOLUTION EPIDURAL; INFILTRATION; INTRACAUDAL; PERINEURAL at 09:12

## 2023-12-14 RX ADMIN — ONDANSETRON 4 MG: 2 INJECTION, SOLUTION INTRAMUSCULAR; INTRAVENOUS at 12:12

## 2023-12-14 RX ADMIN — ACETAMINOPHEN 1000 MG: 10 INJECTION, SOLUTION INTRAVENOUS at 12:12

## 2023-12-14 RX ADMIN — FENTANYL CITRATE 100 MCG: 50 INJECTION, SOLUTION INTRAMUSCULAR; INTRAVENOUS at 09:12

## 2023-12-14 RX ADMIN — DEXAMETHASONE SODIUM PHOSPHATE 4 MG: 4 INJECTION, SOLUTION INTRA-ARTICULAR; INTRALESIONAL; INTRAMUSCULAR; INTRAVENOUS; SOFT TISSUE at 09:12

## 2023-12-14 RX ADMIN — VASOPRESSIN 2 UNITS: 20 INJECTION, SOLUTION INTRAMUSCULAR; SUBCUTANEOUS at 10:12

## 2023-12-14 RX ADMIN — PHENYLEPHRINE HYDROCHLORIDE 200 MCG: 10 INJECTION INTRAVENOUS at 10:12

## 2023-12-14 RX ADMIN — ROCURONIUM BROMIDE 50 MG: 10 INJECTION, SOLUTION INTRAVENOUS at 10:12

## 2023-12-14 RX ADMIN — PROPOFOL 200 MG: 10 INJECTION, EMULSION INTRAVENOUS at 09:12

## 2023-12-14 RX ADMIN — NOREPINEPHRINE BITARTRATE 16 MCG: 1 SOLUTION INTRAVENOUS at 12:12

## 2023-12-14 RX ADMIN — HYDROMORPHONE HYDROCHLORIDE 0.5 MG: 2 INJECTION, SOLUTION INTRAMUSCULAR; INTRAVENOUS; SUBCUTANEOUS at 12:12

## 2023-12-14 RX ADMIN — PHENYLEPHRINE HYDROCHLORIDE 100 MCG: 10 INJECTION INTRAVENOUS at 10:12

## 2023-12-14 RX ADMIN — SODIUM CHLORIDE, SODIUM LACTATE, POTASSIUM CHLORIDE, AND CALCIUM CHLORIDE: .6; .31; .03; .02 INJECTION, SOLUTION INTRAVENOUS at 11:12

## 2023-12-14 RX ADMIN — SUGAMMADEX 200 MG: 100 INJECTION, SOLUTION INTRAVENOUS at 12:12

## 2023-12-14 RX ADMIN — BUPIVACAINE 10 MG: 13.3 INJECTION, SUSPENSION, LIPOSOMAL INFILTRATION at 09:12

## 2023-12-14 RX ADMIN — Medication 50 MG: at 10:12

## 2023-12-14 RX ADMIN — SODIUM CHLORIDE, SODIUM LACTATE, POTASSIUM CHLORIDE, AND CALCIUM CHLORIDE: .6; .31; .03; .02 INJECTION, SOLUTION INTRAVENOUS at 09:12

## 2023-12-14 RX ADMIN — MIDAZOLAM 2 MG: 1 INJECTION INTRAMUSCULAR; INTRAVENOUS at 09:12

## 2023-12-14 RX ADMIN — VASOPRESSIN 1 UNITS: 20 INJECTION, SOLUTION INTRAMUSCULAR; SUBCUTANEOUS at 11:12

## 2023-12-14 RX ADMIN — GLYCOPYRROLATE 0.2 MG: 0.2 INJECTION, SOLUTION INTRAMUSCULAR; INTRAVITREAL at 10:12
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
## 2023-12-14 NOTE — PROGRESS NOTES
"Pharmacist Dose Adjustment Note    Dominic Collazo is a 43 y.o. male being treated with the medication Cefazolin    Patient Data:    Vital Signs (Most Recent):    Vital Signs (72h Range):        No results for input(s): "CREATININE" in the last 168 hours.  Creatinine clearance cannot be calculated (Patient's most recent lab result is older than the maximum 7 days allowed.)  Wt: 156 kg    Medication:Cefazolin dose: 2 g frequency Once (for on call procedure) will be changed to medication:Cefazolin dose:3 g frequency:Once (for on call procedure)    Pharmacist's Name: Atif Jones  Pharmacist's Extension: [phone]    "

## 2023-12-14 NOTE — LETTER
December 14, 2023          No Recipients                [address]  [place] [address]  Phone: [phone]  Fax: [phone]   Patient: Dominic Collazo   MR Number: [id number]   YOB: 1980   Date of Visit: 9/13/2023       Dear Dr. Stewart Recipients:    Thank you for referring Dominic Collazo to me for evaluation. Below are the relevant portions of my assessment and plan of care.            If you have questions, please do not hesitate to call me. I look forward to following Dominic along with you.    Sincerely,      Hunter Hamilton, RN           CC    No Recipients

## 2023-12-14 NOTE — TRANSFER OF CARE
"Anesthesia Transfer of Care Note    Patient: Dominic Collazo    Procedure(s) Performed: Procedure(s) (LRB):  ARTHROSCOPY, SHOULDER, (Left)  REPAIR,LABRUM,SHOULDER (Left)    Patient location: PACU    Anesthesia Type: general    Transport from OR: Transported from OR on 6-10 L/min O2 by face mask with adequate spontaneous ventilation    Post pain: adequate analgesia    Post assessment: no apparent anesthetic complications and tolerated procedure well    Post vital signs: stable    Level of consciousness: awake    Nausea/Vomiting: no nausea/vomiting    Complications: none    Transfer of care protocol was followed      Last vitals: Visit Vitals  /65   Pulse 75   Temp 36.9 °C (98.4 °F) (Skin)   Resp 16   Ht 6' 3" (1.905 m)   Wt (!) 156 kg (344 lb)   SpO2 (!) 94%   BMI 43.00 kg/m²     "

## 2023-12-14 NOTE — OP NOTE
Facility:  [facility]    Date of Surgery:  12/14/23    Surgeon:  Sea Valadez MD    First Assistant:  Juan C Spivey MD    Anesthesia:  GETA + Interscalene    Pre-operative Diagnosis:  Left shoulder posterosuperior labral tear  Left paralabral cyst    Indication:  Improve pain    Post-operative Diagnosis:  Left shoulder posterosuperior labral tear  Left paralabral cyst    Procedure:  Left shoulder arthroscopic labral repair (180 degree tear)    Findings:  180 degree labral tear from 2:30 position to 8:00 position    The patient was identified in the pre-operative holding area. The correct extremity was marked and written informed consent was verified. Interscalene block was performed. The patient was transferred to the OR. The patient was placed on the OR table. General anesthesia was administered. The operative extremity was prepped and draped in the usual sterile fashion. 12 lbs of traction were used to suspend the arm. A surgical timeout was performed to verify the correct extremely and administration of IV antibiotics withing 30 minutes of surgery start time.     A standard posterior portal was made.  The arthroscope was introduced into the glenohumeral joint.  Using an outside in technique the anterior superior portal was established.  Diagnostic exam of the glenohumeral joint was performed.  The chondral surfaces were smooth.  There was a labral tear extending from the 2:30 position posteriorly down to the 8 o'clock position anterior inferiorly.  The biceps tendon was intact.  The subscapularis was found to be intact.  The articular side of the supraspinatus and the infraspinatus were intact.  No loose bodies were seen.  An anterior inferior portal was established in the rotator interval.  A clear cannula was placed in both anterior portals.  A clear cannula was then placed in the posterior portal.  The tear site was then mobilized and prepared using a combination of an arthroscopic  elevator and rasp.  Cartilage on the edge of the glenoid was removed using a curette to get a good bleeding bed of bone.  The elevator was used to decompress the paralabral cyst.  Cyst-like fluid could could be seen emanating from the area where the cyst was present posteriorly.  A total of 7 Arthrex all suture knotless SutureTak anchors were placed to repair the labrum.  Each anchor was at the following positions to 30, 330, 430, 530, 630, and 8:00 a.m. positions.  The knotless mechanism was deployed in each of the anchors.  The anchors were not tightened completely until the end of the case.  After tightening each anchor, the labrum was well approximated to the glenoid with a nice labral bumper.    A sterile dressing was placed. The patient was awakened and transferred to the PACU in stable condition.     EBL:  Less than 50 cc     Drains:  None     Complications:  None known

## 2023-12-14 NOTE — BRIEF OP NOTE
[facility] - Surgery (Hospital)  Brief Operative Note    Surgery Date: 12/14/2023     Surgeon(s) and Role:     * Sea Valadez MD - Primary    Assisting Surgeon:     Juan C Spivey MD    Pre-op Diagnosis:  Superior glenoid labrum lesion of left shoulder, subsequent encounter [S43.432D]  Paralabral cyst of left shoulder, subsequent encounter [S43.432D]    Post-op Diagnosis:  Post-Op Diagnosis Codes:     * Superior glenoid labrum lesion of left shoulder, subsequent encounter [S43.432D]     * Paralabral cyst of left shoulder, subsequent encounter [S43.432D]    Procedure(s) (LRB):  ARTHROSCOPY, SHOULDER, (Left)  REPAIR,LABRUM,SHOULDER (Left)    Anesthesia: General/Regional    Operative Findings: see op note    Estimated Blood Loss: * No values recorded between 12/14/2023 10:14 AM and 12/14/2023 12:27 PM *         Specimens:   Specimen (24h ago, onward)      None              Discharge Note    OUTCOME: Patient tolerated treatment/procedure well without complication and is now ready for discharge.    DISPOSITION: Home or Self Care    FINAL DIAGNOSIS:  <principal problem not specified>    FOLLOWUP: In clinic    DISCHARGE INSTRUCTIONS:  No discharge procedures on file.    I have reviewed the notes, assessments, and/or procedures performed by Dr. Spivey, I concur with her/his documentation of Dominic Collazo.

## 2023-12-14 NOTE — DISCHARGE INSTRUCTIONS
Use ice 3 to 4 times per day  Keep arm I sling   Call office tomorrow to schedule appointment   Remove dressing in 3 days  and -place water proof dressing to shower  Keep dressing clean and dry

## 2023-12-14 NOTE — ANESTHESIA PROCEDURE NOTES
Peripheral Block    Patient location during procedure: pre-op   Block not for primary anesthetic.  Reason for block: at surgeon's request and post-op pain management   Post-op Pain Location: shoulder pain   Start time: 12/14/2023 9:31 AM  Timeout: 12/14/2023 9:24 AM   End time: 12/14/2023 9:34 AM    Staffing  Authorizing Provider: Patricia Neri MD  Performing Provider: Patricia Neri MD    Staffing  Performed by: Patricia Neri MD  Authorized by: Patricia Neri MD    Preanesthetic Checklist  Completed: patient identified, IV checked, site marked, risks and benefits discussed, surgical consent, monitors and equipment checked, pre-op evaluation and timeout performed  Peripheral Block  Patient position: supine  Prep: ChloraPrep and site prepped and draped  Patient monitoring: heart rate, cardiac monitor, continuous pulse ox, continuous capnometry and frequent blood pressure checks  Block type: interscalene  Laterality: left  Injection technique: continuous  Needle  Needle type: Stimuplex   Needle gauge: 21 G  Needle length: 4 in  Needle localization: ultrasound guidance and nerve stimulator  Needle insertion depth: 4 cm  Catheter type: stimulating  Catheter size: 20 G  Test dose: negative   -ultrasound image captured on disc.  Assessment  Injection assessment: negative aspiration, negative parasthesia and local visualized surrounding nerve  Paresthesia pain: none  Heart rate change: no  Slow fractionated injection: yes  Pain Tolerance: comfortable throughout block  Medications:    Medications: bupivacaine (pf) (MARCAINE) injection 0.25% - Perineural   10 mL - 12/14/2023 9:34:00 AM

## 2023-12-14 NOTE — ANESTHESIA PROCEDURE NOTES
Intubation    Date/Time: 12/14/2023 9:51 AM    Performed by: Lupe Bo CRNA  Authorized by: Patricia Neri MD    Intubation:     Induction:  Intravenous    Intubated:  Postinduction    Mask Ventilation:  Easy with oral airway    Attempts:  1    Attempted By:  CRNA    Method of Intubation:  Video laryngoscopy    Blade:  Cunningham 4    Laryngeal View Grade: Grade I - full view of cords      Difficult Airway Encountered?: No      Complications:  None    Airway Device:  Oral endotracheal tube    Airway Device Size:  7.5    Style/Cuff Inflation:  Cuffed (inflated to minimal occlusive pressure)    Tube secured:  22    Secured at:  The teeth    Placement Verified By:  Capnometry    Complicating Factors:  None    Findings Post-Intubation:  BS equal bilateral and atraumatic/condition of teeth unchanged

## 2023-12-15 NOTE — ANESTHESIA POSTPROCEDURE EVALUATION
Anesthesia Post Evaluation    Patient: Dominic Collazo    Procedure(s) Performed: Procedure(s) (LRB):  ARTHROSCOPY, SHOULDER, (Left)  REPAIR,LABRUM,SHOULDER (Left)    Final Anesthesia Type: general      Patient location during evaluation: PACU  Patient participation: Yes- Able to Participate  Level of consciousness: awake and alert  Post-procedure vital signs: reviewed and stable  Pain management: adequate  Airway patency: patent    PONV status at discharge: No PONV  Anesthetic complications: no      Cardiovascular status: blood pressure returned to baseline  Respiratory status: unassisted  Hydration status: euvolemic  Follow-up not needed.              Vitals Value Taken Time   /72 12/14/23 1600   Temp 36.7 °C (98.1 °F) 12/14/23 1600   Pulse 79 12/14/23 1600   Resp 17 12/14/23 1600   SpO2 95 % 12/14/23 1600         Event Time   Out of Recovery 13:32:00         Pain/Vaibhav Score: Pain Rating Prior to Med Admin: 6 (12/14/2023 12:56 PM)  Pain Rating Post Med Admin: 2 (12/14/2023  1:30 PM)  Vaibhav Score: 10 (12/14/2023  4:00 PM)

## 2023-12-21 ENCOUNTER — PATIENT MESSAGE (OUTPATIENT)
Dept: REHABILITATION | Facility: HOSPITAL | Age: 43
End: 2023-12-21

## 2023-12-21 ENCOUNTER — PATIENT MESSAGE (OUTPATIENT)
Dept: ORTHOPEDICS | Facility: CLINIC | Age: 43
End: 2023-12-21
Payer: COMMERCIAL

## 2023-12-21 ENCOUNTER — TELEPHONE (OUTPATIENT)
Dept: ORTHOPEDICS | Facility: CLINIC | Age: 43
End: 2023-12-21
Payer: COMMERCIAL

## 2023-12-27 NOTE — PROGRESS NOTES
"Occupational Therapy Treatment Note     Date: 12/28/2023  Name: Dominic Collazo  Clinic Number: [id number]    Therapy Diagnosis:   Encounter Diagnoses   Name Primary?    Post concussion syndrome Yes    Deficient smooth pursuit eye movements     Saccadic eye movement deficiency     Convergence insufficiency      Physician: Keshav Singh MD    Physician Orders: Eval and Treat - Vestibular Therapy   Medical Diagnosis:   S06.0X9S (ICD-10-CM) - Concussion with loss of consciousness, sequela   H51.11 (ICD-10-CM) - Convergence insufficiency   Evaluation Date: 10/9/2023  Insurance Authorization Period Expiration: 12/31/2023  Plan of Care Certification Period: 2/2/2023  Date of Return to MD: 1/24/2023 Dr. Singh     Visit # / Visits authorized: 6 / 12  Time In: 0920  Time Out: 1006  Total Billable (one on one) Time: 46 minutes    Precautions: Standard    Subjective     Pt reports: that he is actually feeling a lot better since he got rotator cuff surgery, which Dr. Singh suggested may happen. The only thing he is still feeling is eyes feeling crossed/fatigued by the end of the day. He has not been on the computer or working much since the surgery which happened 2 weeks ago (12/14/2023)  Response to previous treatment: compliant with eye exercises   Functional change: ongoing     Date of Onset: 7/23/2023     Pain: 0/10   Location: Headache     Patient's Goals for Therapy: "to get back to normal"     Objective      Dominic participated in dynamic functional therapeutic activities to improve functional performance for 12 minutes, including:  - Pt education on eye ergonomics including pacing, adjusting lighting in room and on computer, and blue light glasses   - Thoroughly discussed gradual return to work after break since rotator cuff surgery including implementing breaks every few hours and using auditory reminders     Dominic participated in neuromuscular re-education activities to improve: oculomotor and balance " erasmo for 34 minutes. The following activities were included:  Oculomotor:  Seated using plain target:  - Smooth pursuits, horizontal/vertical/diagonal directions, 1 x 45 seconds each   - Saccades, horizontal/vertical/diagonal directions, 1 x 45 seconds each at 120 bpm  - Pencil push ups holding near point x5 sec, 1 x 10       Rest breaks as needed throughout for eyes closed     Home Exercises and Education Provided      Education provided:   - HEP   - Progress towards goals     Written Home Exercises Provided: horizontal/vertical smooth pursuits, horizontal/vertical saccades, and convergence pencil pushups.  Exercises were reviewed and Dominic was able to demonstrate them prior to the end of the session.    Dominic demonstrated good  understanding of the education provided.      See EMR under Patient Instructions for exercises provided  10/27/2023: (Pursuits, saccades, pencil push ups)  11/3/2023: added diagonal pursuits and saccades  12/28/2023: Saccades at 120 bpm; pursuits and saccades x 45 sec each direction, pencil push ups; eye ergonomics      Assessment      Pt tolerated today's session well. Short gap in OT post RTC surgery on 12/14/2023. Since surgery, pt is actually feeling better. Saccade speed was increased by 5 bpm, but no significant progressions made to oculomotor exercises this date. Pt did well with all exercises, and no increase in symptoms was reported other than slight eye lag with diagonal saccades and some dizziness with pencil push ups. Ocular coordination was normal per observation. Cues were needed to keep target at midline rather than right of midline during pencil push ups, and pt had difficulty maintaining this correction without ongoing verbal cues. Pt was very receptive to all education topics discussed. Pt would continue to benefit from skilled occupational therapy services to maximize oculomotor functioning, habituation for desensitization to environments/movements that  elicit/increase symptoms, pt education on mindfulness/relaxation strategies, and HEP/HAP guidance to improve functional participation with meaningful occupations.      Dominic is progressing well towards his goals and there are no updates to goals at this time. Pt prognosis is Good.      Pt will continue to benefit from skilled outpatient occupational therapy to address the deficits listed in the problem list on initial evaluation provide pt/family education and to maximize pt's level of independence in the home and community environment.      Pt's spiritual, cultural and educational needs considered and pt agreeable to plan of care and goals.     Anticipated barriers to occupational therapy: none noted     Goals:  Short Term Goals: 4 weeks   1) Pt will tolerate oculomotor and/or habitation home exercise/activity program, reporting at least 50% compliance. MET 12/13/2023  2) Pt will verbalize eye ergonomics to decrease stress on eyes. ongoing  3) Pt will demonstrate ability to track single target WFL, in all directions x30 seconds, without increase in headache or nausea, to improve skills needed for technology use and scanning environment. ongoing  4) Pt to perform saccades WFL, in all directions x30 seconds, at 90 bpm without increase in headache, dizziness, or nausea, to improve skills needed for reading. ongoing  5) Pt will report 60 minutes of computer use without eye fatigue, double vision, or increase in headache/dizziness, utilizing activity pacing strategies, needed for return to work at [employer]. ongoing  6) Near point convergence will decrease to 20 cm or less to improve oculomotor coordination and ability to fixate on close up work. MET 12/13/2023  7) Pt will demonstrate ability to fixate/attend to close up tasks x 30 minutes without onset of headache or eye fatigue. ongoing     Long Term Goals: 11 weeks   1) Pt will be independent with oculomotor and/or habituation home exercise/activity program. Progressing    2) Pt will demonstrate ability to track single target WFL, in all directions x1 minute, without increase in headache or nausea, to improve skills needed for technology use and scanning environment. ongoing  3) Pt to perform saccades WFL, in all directions x1 minute, at 110 bpm without increase in headache, dizziness, or nausea, to improve skills needed for reading. ongoing  4) Pt will report 90 minutes of computer use without eye fatigue, double vision, or increase in headache/dizziness, utilizing activity pacing strategies, needed for return to work at [employer]. ongoing  5) Near point convergence will decrease to 15 cm or less to improve oculomotor coordination and ability to fixate on close up work. MET 12/13/2023  6) Pt will demonstrate ability to fixate/attend to close up tasks x 60 minutes without onset of headache or eye fatigue. ongoing    Plan     Updated Certification Period: 12/13/2023 to 2/2/2023  Recommended Treatment Plan: 1-2 times per week for 7 weeks: Neuromuscular Re-ed, Patient Education, Self Care, Therapeutic Activities, and Therapeutic Exercise     Updates/Grading for next session: as oculomotor function continues to improve gradually begin to incorporate more functional mobility and motion tolerance activities; cont to progress eye exercises; walking with HT; Wm String       Seema Ng, OT

## 2023-12-28 ENCOUNTER — CLINICAL SUPPORT (OUTPATIENT)
Dept: REHABILITATION | Facility: HOSPITAL | Age: 43
End: 2023-12-28
Payer: COMMERCIAL

## 2023-12-28 DIAGNOSIS — F07.81 POST CONCUSSION SYNDROME: Primary | ICD-10-CM

## 2023-12-28 DIAGNOSIS — H55.81 SACCADIC EYE MOVEMENT DEFICIENCY: ICD-10-CM

## 2023-12-28 DIAGNOSIS — H51.11 CONVERGENCE INSUFFICIENCY: ICD-10-CM

## 2023-12-28 DIAGNOSIS — H55.82 DEFICIENT SMOOTH PURSUIT EYE MOVEMENTS: ICD-10-CM

## 2023-12-28 PROCEDURE — 97530 THERAPEUTIC ACTIVITIES: CPT | Mod: PO

## 2023-12-28 PROCEDURE — 97112 NEUROMUSCULAR REEDUCATION: CPT | Mod: PO

## 2023-12-28 NOTE — PATIENT INSTRUCTIONS
Retrieved from:   [email or web address]    To help avoid workplace dry eye and eye strain, follow these eye ergonomics tips from the American Academy of Ophthalmology:    - Stay at arms length: The eyes actually have to work harder to see close up than far away. If you have a desktop computer, try placing the monitor 25 inches away from your face. No measuring tape? Put your screen an arms length away. You may need to adjust the type to appear larger at that distance.  - Take care of glare: While many new phones and laptops have glass screens with excellent picture quality, they also produce a strong glare that can aggravate the eyes. If you use a glass screen device, try a matte filter for your screen.  - Give your eyes a break: Just as carpal tunnel syndrome from overuse can hurt your wrists, eye strain occurs after long, continuous periods of reading paper or viewing digital screens up close. Follow the 20-20-20 rule: take a break every 20 minutes by looking at an object 20 feet away for 20 seconds. Looking into the distance allows your eyes to relax.  - Defy dry eye: Many newer office buildings have humidity-controlled environments that suck moisture out of the air. In winter, heaters on high can further dry your eyes. Try a desktop humidifier to add localized moisture.[3] Keep artificial tears at hand to help lubricate your eyes.  - Lighten up: When your screen is much brighter than your surroundings, your eyes have to work harder to see. Adjusting your environmental lighting can reduce eye strain. Also, try increasing the contrast on your monitor.    Retrieved from: https://www.aao.org/newsroom/news-releases/detail/ergonomics-eyes-tips-avoid-computer-eye-strai#_edn3    [employer]: Blue light glasses     Saccades at 120 bpm

## 2023-12-29 ENCOUNTER — CLINICAL SUPPORT (OUTPATIENT)
Dept: REHABILITATION | Facility: HOSPITAL | Age: 43
End: 2023-12-29
Payer: COMMERCIAL

## 2023-12-29 DIAGNOSIS — S43.432A SUPERIOR GLENOID LABRUM LESION OF LEFT SHOULDER, INITIAL ENCOUNTER: ICD-10-CM

## 2023-12-29 DIAGNOSIS — S43.432A PARALABRAL CYST OF LEFT SHOULDER, INITIAL ENCOUNTER: ICD-10-CM

## 2023-12-29 DIAGNOSIS — M25.612 IMPAIRED RANGE OF MOTION OF LEFT SHOULDER: Primary | ICD-10-CM

## 2023-12-29 DIAGNOSIS — R68.89 IMPAIRED FUNCTION OF UPPER EXTREMITY: ICD-10-CM

## 2023-12-29 DIAGNOSIS — R29.898 IMPAIRED STRENGTH OF UPPER EXTREMITY: ICD-10-CM

## 2023-12-29 PROCEDURE — 97161 PT EVAL LOW COMPLEX 20 MIN: CPT | Mod: PO

## 2023-12-29 PROCEDURE — 97112 NEUROMUSCULAR REEDUCATION: CPT | Mod: PO

## 2023-12-29 PROCEDURE — 97110 THERAPEUTIC EXERCISES: CPT | Mod: PO
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
## 2023-12-30 NOTE — TELEPHONE ENCOUNTER
History  Chief Complaint   Patient presents with    Fatigue     For the past week having fatigue, increased urinary frequency/urgency, cloudy/dark urine, and no erection. Pt denies hematuria, lesions in penile area or discharge.     Facial Swelling     Pt noticed lesion on right eye with drainage      Patient is a 39M p/w fatigue for 7 days. Patient says that he is leaking urine, sometimes peeing too quickly, peeing every hour, not making it to bathroom. No dysuria. Decreased appetite, but no vomiting/nausea/diarrhea. No fevers/cough/congestion. Diffuse weakness, no specific area of weakness. No paresthesias.         Prior to Admission Medications   Prescriptions Last Dose Informant Patient Reported? Taking?   ketorolac (ACULAR) 0.5 % ophthalmic solution   No No   Sig: Administer 1 drop to both eyes 4 (four) times a day as needed (itchiness) for up to 5 days   naproxen (Naprosyn) 500 mg tablet   No No   Sig: Take 1 tablet (500 mg total) by mouth 2 (two) times a day with meals      Facility-Administered Medications: None       History reviewed. No pertinent past medical history.    History reviewed. No pertinent surgical history.    History reviewed. No pertinent family history.  I have reviewed and agree with the history as documented.    E-Cigarette/Vaping     E-Cigarette/Vaping Substances     Social History     Tobacco Use    Smoking status: Every Day     Types: Cigarettes    Smokeless tobacco: Current   Substance Use Topics    Alcohol use: Not Currently    Drug use: Not Currently     Types: Marijuana        Review of Systems   Constitutional:  Positive for appetite change and fatigue. Negative for chills and fever.   HENT:  Negative for ear pain and sore throat.    Eyes:  Negative for pain and visual disturbance.   Respiratory:  Negative for cough and shortness of breath.    Cardiovascular:  Negative for chest pain and palpitations.   Gastrointestinal:  Negative for abdominal pain and vomiting.   Genitourinary:   I fit him in January for an annual so he can get back in touch with you.  He requested a 730am appt so he can get to school and not miss work.  I set up lab appt in December on  A Saturday  but he has had some labs done recently.    Please send me orders you would like linked to the lab appt.    Thanks luis    Positive for frequency. Negative for dysuria and hematuria.   Musculoskeletal:  Negative for arthralgias and back pain.   Skin:  Negative for color change and rash.   Neurological:  Negative for seizures and syncope.   All other systems reviewed and are negative.      Physical Exam  ED Triage Vitals [12/30/23 1029]   Temperature Pulse Respirations Blood Pressure SpO2   97.6 °F (36.4 °C) 99 17 (!) 174/103 100 %      Temp Source Heart Rate Source Patient Position - Orthostatic VS BP Location FiO2 (%)   Oral Monitor -- Left arm --      Pain Score       No Pain             Orthostatic Vital Signs  Vitals:    12/30/23 1029 12/30/23 1237 12/30/23 1345   BP: (!) 174/103 148/88 (!) 180/89   Pulse: 99 79 70       Physical Exam  Vitals and nursing note reviewed.   Constitutional:       General: He is not in acute distress.     Appearance: He is well-developed.      Comments: Tired appearing   HENT:      Head: Normocephalic and atraumatic.      Mouth/Throat:      Comments: Dry.  Oral mucosa  Eyes:      Conjunctiva/sclera: Conjunctivae normal.   Cardiovascular:      Rate and Rhythm: Normal rate and regular rhythm.      Heart sounds: No murmur heard.  Pulmonary:      Effort: Pulmonary effort is normal. No respiratory distress.      Breath sounds: Normal breath sounds.   Abdominal:      Palpations: Abdomen is soft.      Tenderness: There is no abdominal tenderness.   Musculoskeletal:         General: No swelling.      Cervical back: Neck supple.   Skin:     General: Skin is warm and dry.      Capillary Refill: Capillary refill takes less than 2 seconds.      Comments: Poor skin turgor   Neurological:      Mental Status: He is alert.   Psychiatric:         Mood and Affect: Mood normal.         ED Medications  Medications   insulin regular (HumuLIN R,NovoLIN R) 1 Units/mL in sodium chloride 0.9 % 100 mL infusion (16 Units/hr Intravenous Rate/Dose Change 12/30/23 1443)   multi-electrolyte (ISOLYTE-S PH 7.4) bolus 1,000 mL (1,000 mL  Intravenous New Bag 12/30/23 1427)   chlorhexidine (PERIDEX) 0.12 % oral rinse 15 mL (has no administration in time range)   enoxaparin (LOVENOX) subcutaneous injection 40 mg (has no administration in time range)   multi-electrolyte (PLASMALYTE-A/ISOLYTE-S PH 7.4) IV solution (has no administration in time range)   dextrose 5 % in lactated Ringer's infusion (has no administration in time range)   acetaminophen (TYLENOL) tablet 650 mg (has no administration in time range)   multi-electrolyte (ISOLYTE-S PH 7.4) bolus 1,000 mL (0 mL Intravenous Stopped 12/30/23 1307)   multi-electrolyte (ISOLYTE-S PH 7.4) bolus 1,000 mL (0 mL Intravenous Stopped 12/30/23 1307)       Diagnostic Studies  Results Reviewed       Procedure Component Value Units Date/Time    Fingerstick Glucose (POCT) [[id number]]  (Abnormal) Collected: 12/30/23 1437    Lab Status: Final result Updated: 12/30/23 1438     POC Glucose 364 mg/dl     Hemoglobin A1C [[id number]] Collected: 12/30/23 1433    Lab Status: In process Specimen: Blood from Arm, Left Updated: 12/30/23 1437    Basic metabolic panel [[id number]]     Lab Status: No result Specimen: Blood     Magnesium [[id number]]     Lab Status: No result Specimen: Blood     Phosphorus [[id number]]     Lab Status: No result Specimen: Blood     COVID/FLU/RSV [[id number]] Collected: 12/30/23 1349    Lab Status: In process Specimen: Nares from Nose Updated: 12/30/23 1400    Fingerstick Glucose (POCT) [[id number]]  (Abnormal) Collected: 12/30/23 1311    Lab Status: Final result Updated: 12/30/23 1313     POC Glucose 468 mg/dl     CBC and differential [[id number]]  (Abnormal) Collected: 12/30/23 1137    Lab Status: Final result Specimen: Blood from Arm, Left Updated: 12/30/23 1241     WBC 11.39 Thousand/uL      RBC 5.15 Million/uL      Hemoglobin 14.5 g/dL      Hematocrit 44.6 %      MCV 87 fL      MCH 28.2 pg      MCHC 32.5 g/dL      RDW 13.8 %      Platelets --     nRBC 0 /100 WBCs      Neutrophils Relative 76 %       Immat GRANS % 0 %      Lymphocytes Relative 18 %      Monocytes Relative 5 %      Eosinophils Relative 0 %      Basophils Relative 1 %      Neutrophils Absolute 8.72 Thousands/µL      Immature Grans Absolute 0.05 Thousand/uL      Lymphocytes Absolute 1.99 Thousands/µL      Monocytes Absolute 0.55 Thousand/µL      Eosinophils Absolute 0.01 Thousand/µL      Basophils Absolute 0.07 Thousands/µL     Comprehensive metabolic panel [[id number]]  (Abnormal) Collected: 12/30/23 1148    Lab Status: Final result Specimen: Blood from Arm, Left Updated: 12/30/23 1236     Sodium 134 mmol/L      Potassium 4.8 mmol/L      Chloride 92 mmol/L      CO2 19 mmol/L      ANION GAP 23 mmol/L      BUN 22 mg/dL      Creatinine 1.16 mg/dL      Glucose 579 mg/dL      Calcium 10.2 mg/dL      AST 16 U/L      ALT 28 U/L      Alkaline Phosphatase 145 U/L      Total Protein 7.8 g/dL      Albumin 4.4 g/dL      Total Bilirubin 1.02 mg/dL      eGFR 78 ml/min/1.73sq m     Narrative:      National Kid[employer] guidelines for Chronic Kidney Disease (CKD):     Stage 1 with normal or high GFR (GFR > 90 mL/min/1.73 square meters)    Stage 2 Mild CKD (GFR = 60-89 mL/min/1.73 square meters)    Stage 3A Moderate CKD (GFR = 45-59 mL/min/1.73 square meters)    Stage 3B Moderate CKD (GFR = 30-44 mL/min/1.73 square meters)    Stage 4 Severe CKD (GFR = 15-29 mL/min/1.73 square meters)    Stage 5 End Stage CKD (GFR <15 mL/min/1.73 square meters)  Note: GFR calculation is accurate only with a steady state creatinine    Magnesium [[id number]]  (Normal) Collected: 12/30/23 1148    Lab Status: Final result Specimen: Blood from Arm, Left Updated: 12/30/23 1219     Magnesium 2.1 mg/dL     Phosphorus [[id number]]  (Abnormal) Collected: 12/30/23 1148    Lab Status: Final result Specimen: Blood from Arm, Left Updated: 12/30/23 1219     Phosphorus 6.6 mg/dL     Beta Hydroxybutyrate [[id number]]  (Abnormal) Collected: 12/30/23 1148    Lab Status: Final result  Specimen: Blood from Arm, Left Updated: 12/30/23 1158     BETA-HYDROXYBUTYRATE 5.8 mmol/L     Blood gas, venous [[id number]]  (Abnormal) Collected: 12/30/23 1148    Lab Status: Final result Specimen: Blood from Arm, Left Updated: 12/30/23 1157     pH, Po 7.348     pCO2, Po 33.7 mm Hg      pO2, Po 67.4 mm Hg      HCO3, Po 18.1 mmol/L      Base Excess, Po -6.4 mmol/L      O2 Content, Po 19.8 ml/dL      O2 HGB, VENOUS 89.3 %     Urine Microscopic [[id number]]  (Abnormal) Collected: 12/30/23 1110    Lab Status: Final result Specimen: Urine, Clean Catch Updated: 12/30/23 1137     RBC, UA None Seen /hpf      WBC, UA 1-2 /hpf      Epithelial Cells Occasional /hpf      Bacteria, UA Occasional /hpf      MUCUS THREADS Occasional     Hyaline Casts, UA 3-5 /lpf      Granular Casts, UA 10-25    UA w Reflex to Microscopic w Reflex to Culture [[id number]]  (Abnormal) Collected: 12/30/23 1110    Lab Status: Final result Specimen: Urine, Clean Catch Updated: 12/30/23 1128     Color, UA Light Yellow     Clarity, UA Clear     Specific Gravity, UA 1.034     pH, UA 5.0     Leukocytes, UA Elevated glucose may cause decreased leukocyte values. See urine microscopic for UWBC result     Nitrite, UA Negative     Protein, UA Trace mg/dl      Glucose, UA >=1000 (1%) mg/dl      Ketones,  (3+) mg/dl      Urobilinogen, UA <2.0 mg/dl      Bilirubin, UA Negative     Occult Blood, UA Trace    Fingerstick Glucose (POCT) [[id number]]  (Abnormal) Collected: 12/30/23 1046    Lab Status: Final result Updated: 12/30/23 1047     POC Glucose >500 mg/dl                    No orders to display         Procedures  Procedures      ED Course  ED Course as of 12/30/23 1456   Sat Dec 30, 2023   1048 POC Glucose(!!): >500   1240 Anion Gap: 23   1315 Potassium: 4.8                                       Medical Decision Making  Patient is 39M with no pmh of diabetes who presents to the ED with fatigue, urinary frequency.    Vital signs stable. On exam dry  appearing.    History and physical exam most consistent with diabetes. However, differential diagnosis included but not limited to dka, anemia, electrolyte abnormality. POC glucose >500.     View ED course above for further discussion on patient workup. .    All labs reviewed and utilized in the medical decision making process  All radiology studies independently viewed by me and interpreted by the radiologist.  I reviewed all testing with the patient.     Patient returned with elevated glucose, elevated beta hydroxybutyrate, ketones in urine, anion gap 23.  Will treat his DKA with insulin drip and fluids, potassium 4.8.  Will admit patient to stepdown 1.      Amount and/or Complexity of Data Reviewed  Labs: ordered. Decision-making details documented in ED Course.    Risk  Prescription drug management.  Decision regarding hospitalization.          Disposition  Final diagnoses:   DKA (diabetic ketoacidosis) (HCC)     Time reflects when diagnosis was documented in both MDM as applicable and the Disposition within this note       Time User Action Codes Description Comment    12/30/2023 12:42 PM Alonso Corbett Add [E11.10] DKA (diabetic ketoacidosis) (HCC)           ED Disposition       ED Disposition   Admit    Condition   Stable    Date/Time   Sat Dec 30, 2023  1:42 PM    Comment   Case was discussed with Dr Silva and the patient's admission status was agreed to be Admission Status: inpatient status to the service of Dr. Silva .               Follow-up Information    None         Patient's Medications   Discharge Prescriptions    No medications on file     No discharge procedures on file.    PDMP Review       None             ED Provider  Attending physically available and evaluated Benitez White. I managed the patient along with the ED Attending.    Electronically Signed by           Alonso Corbett MD  12/30/23 4198

## 2024-01-02 ENCOUNTER — OFFICE VISIT (OUTPATIENT)
Dept: ORTHOPEDICS | Facility: CLINIC | Age: 44
End: 2024-01-02
Payer: COMMERCIAL

## 2024-01-02 VITALS
SYSTOLIC BLOOD PRESSURE: 144 MMHG | BODY MASS INDEX: 39.17 KG/M2 | HEIGHT: 75 IN | WEIGHT: 315 LBS | HEART RATE: 106 BPM | DIASTOLIC BLOOD PRESSURE: 97 MMHG

## 2024-01-02 DIAGNOSIS — Z98.890 STATUS POST LABRAL REPAIR OF SHOULDER: Primary | ICD-10-CM

## 2024-01-02 PROCEDURE — 99999 PR PBB SHADOW E&M-EST. PATIENT-LVL III: CPT | Mod: PBBFAC,,, | Performed by: ORTHOPAEDIC SURGERY

## 2024-01-02 PROCEDURE — 1160F RVW MEDS BY RX/DR IN RCRD: CPT | Mod: CPTII,S$GLB,, | Performed by: ORTHOPAEDIC SURGERY

## 2024-01-02 PROCEDURE — 99024 POSTOP FOLLOW-UP VISIT: CPT | Mod: S$GLB,,, | Performed by: ORTHOPAEDIC SURGERY

## 2024-01-02 PROCEDURE — 3077F SYST BP >= 140 MM HG: CPT | Mod: CPTII,S$GLB,, | Performed by: ORTHOPAEDIC SURGERY

## 2024-01-02 PROCEDURE — 3080F DIAST BP >= 90 MM HG: CPT | Mod: CPTII,S$GLB,, | Performed by: ORTHOPAEDIC SURGERY

## 2024-01-02 PROCEDURE — 1159F MED LIST DOCD IN RCRD: CPT | Mod: CPTII,S$GLB,, | Performed by: ORTHOPAEDIC SURGERY

## 2024-01-02 NOTE — PROGRESS NOTES
Patient ID:   Dominic Collazo is a 43 y.o. male.    Chief Complaint:   Surgical aftercare status post left shoulder arthroscopic labral repair (posterior superior 180 degree tear)    HPI:   Patient is returning today for suture removal has been controlled combination of ice and Tylenol.  He is actively participating in physical therapy.    Medications:    Current Outpatient Medications:     ALPRAZolam (XANAX) 1 MG tablet, Take 1 tablet (1 mg total) by mouth 2 (two) times daily as needed for Anxiety., Disp: 60 tablet, Rfl: 1    armodafiniL (NUVIGIL) 250 mg tablet, Take 1 tablet (250 mg total) by mouth once daily., Disp: 30 tablet, Rfl: 3    blood sugar diagnostic (ONETOUCH VERIO TEST STRIPS) Strp, 1 each by Misc.(Non-Drug; Combo Route) route 3 (three) times daily., Disp: 100 each, Rfl: 11    blood-glucose meter kit, To check BG 2 times daily, to use with insurance preferred meter, Disp: 1 each, Rfl: 0    buPROPion (WELLBUTRIN XL) 150 MG TB24 tablet, Take 1 tablet (150 mg total) by mouth once daily., Disp: 90 tablet, Rfl: 1    busPIRone (BUSPAR) 10 MG tablet, Take 2 tablets (20 mg total) by mouth 2 (two) times daily., Disp: 180 tablet, Rfl: 1    cetirizine HCl (ZYRTEC ORAL), Take 1 tablet by mouth daily as needed., Disp: , Rfl:     cyanocobalamin (VITAMIN B-12) 1000 MCG tablet, Take 100 mcg by mouth once daily., Disp: , Rfl:     ergocalciferol, vitamin D2, (VITAMIN D ORAL), Take 5,000 Units by mouth once daily., Disp: , Rfl:     fexofenadine (ALLEGRA) 60 MG tablet, Take 60 mg by mouth every morning., Disp: , Rfl:     HYDROcodone-acetaminophen (NORCO)  mg per tablet, Take 1 tablet by mouth every 6 (six) hours as needed for Pain., Disp: 28 tablet, Rfl: 0    iron,carb/vit C/vit B12/folic (IRON 100 PLUS ORAL), Take by mouth once daily at 6am., Disp: , Rfl:     lancets Misc, To check BG 2 times daily, Disp: 200 each, Rfl: 3    losartan (COZAAR) 25 MG tablet, Take 1 tablet (25 mg total) by mouth once daily.,  Disp: 30 tablet, Rfl: 11    metFORMIN (GLUCOPHAGE-XR) 500 MG ER 24hr tablet, Take 1 tablet (500 mg total) by mouth 2 (two) times daily with meals., Disp: 180 tablet, Rfl: 3    methocarbamoL (ROBAXIN) 750 MG Tab, TAKE 1 TABLET(750 MG) BY MOUTH FOUR TIMES DAILY AS NEEDED FOR MUSCLE STRAIN, Disp: 40 tablet, Rfl: 3    methylPREDNISolone (MEDROL DOSEPACK) 4 mg tablet, Take 6 tablets by mouth on day 1, then decresase by 1 tablet daily for a total of 6 days as directed on package., Disp: 21 tablet, Rfl: 0    metoprolol succinate (TOPROL-XL) 100 MG 24 hr tablet, Take 1 tablet (100 mg total) by mouth once daily., Disp: 90 tablet, Rfl: 3    multivitamin capsule, Take 1 capsule by mouth once daily., Disp: , Rfl:     ondansetron (ZOFRAN) 4 MG tablet, Take 1 tablet (4 mg total) by mouth every 6 (six) hours as needed for Nausea., Disp: 12 tablet, Rfl: 0    ondansetron (ZOFRAN) 4 MG tablet, Take 1 tablet (4 mg total) by mouth every 6 (six) hours as needed for Nausea., Disp: 20 tablet, Rfl: 0    riboflavin, vitamin B2, (VITAMIN B-2 ORAL), Take 1 tablet by mouth once daily., Disp: , Rfl:     rosuvastatin (CRESTOR) 10 MG tablet, Take 1 tablet by mouth once daily., Disp: 90 tablet, Rfl: 3    scopolamine (TRANSDERM-SCOP) 1.3-1.5 mg (1 mg over 3 days), Place 1 patch onto the skin every 72 hours., Disp: 4 patch, Rfl: 0    semaglutide (OZEMPIC) 2 mg/dose (8 mg/3 mL) PnIj, Inject 2 mg into the skin every 7 days., Disp: 9 mL, Rfl: 3    SUNOSI 150 mg Tab, Take 1 tablet (150 mg) by mouth once daily in the morning., Disp: 30 tablet, Rfl: 5    testosterone cypionate (DEPOTESTOTERONE CYPIONATE) 200 mg/mL injection, Inject 1 mL (200 mg total) into the muscle every 7 days., Disp: 5 mL, Rfl: 3    venlafaxine (EFFEXOR-XR) 150 MG Cp24, Take 1 capsule (150 mg total) by mouth once daily., Disp: 90 capsule, Rfl: 1    zolpidem (AMBIEN CR) 12.5 MG CR tablet, Take 1 tablet (12.5 mg total) by mouth nightly as needed for Insomnia., Disp: 30 tablet, Rfl:  3  No current facility-administered medications for this visit.    Facility-Administered Medications Ordered in Other Visits:     lactated ringers infusion, , Intravenous, Continuous, Stan Marcos DNP    LIDOcaine (PF) 10 mg/ml (1%) injection 10 mg, 1 mL, Intradermal, Once, Stan Marcos DNP    Allergies:  Review of patient's allergies indicates:   Allergen Reactions    Ciprofloxacin      swelling    Penicillins Rash    Sulfa (sulfonamide antibiotics) Rash    Toradol [ketorolac] Nausea Only    Bell pepper Nausea Only    Meloxicam Nausea Only    Sulfamethoxazole-trimethoprim        Vitals:  There were no vitals taken for this visit.    Physical Examination:  Ortho Exam   Left shoulder exam:   Skin incisions are clean, dry and intact    Assessment:  1. Status post labral repair of shoulder      Plan:  - Suture removal  - Continue PT  - Follow-up in 4 weeks        No follow-ups on file.

## 2024-01-03 ENCOUNTER — CLINICAL SUPPORT (OUTPATIENT)
Dept: REHABILITATION | Facility: HOSPITAL | Age: 44
End: 2024-01-03
Payer: COMMERCIAL

## 2024-01-03 ENCOUNTER — TELEPHONE (OUTPATIENT)
Dept: REHABILITATION | Facility: HOSPITAL | Age: 44
End: 2024-01-03
Payer: COMMERCIAL

## 2024-01-03 DIAGNOSIS — R29.898 IMPAIRED STRENGTH OF UPPER EXTREMITY: ICD-10-CM

## 2024-01-03 DIAGNOSIS — R68.89 IMPAIRED FUNCTION OF UPPER EXTREMITY: ICD-10-CM

## 2024-01-03 DIAGNOSIS — M25.612 IMPAIRED RANGE OF MOTION OF LEFT SHOULDER: Primary | ICD-10-CM

## 2024-01-03 PROCEDURE — 97112 NEUROMUSCULAR REEDUCATION: CPT | Mod: PO

## 2024-01-03 PROCEDURE — 97110 THERAPEUTIC EXERCISES: CPT | Mod: PO

## 2024-01-03 NOTE — TELEPHONE ENCOUNTER
Attempted to call patient regarding today's missed OT appt. Reminded pt of PT appt scheduled for today at 10:00 and need to schedule additional OT appts. Pt does not have any further OT visits scheduled at this time. Call back number provided.     Seema Ng, LYLE, LOTR  01/03/2024

## 2024-01-03 NOTE — PROGRESS NOTES
[facility]   Physical Therapy Treatment Note      Name: Dominic Collazo  Clinic Number: [id number]    Therapy Diagnosis:   Encounter Diagnosis   Name Primary?    Impaired range of motion of left shoulder Yes     Physician: Sea Valadez MD    Visit Date: 1/3/2024     Physician Orders: PT Eval and Treat   NOTE: 2-3 x per week x 6 weeks     Protocol = [email or web address]  Medical Diagnosis from Referral:   S43.432A (ICD-10-CM) - Paralabral cyst of left shoulder, initial encounter   S43.432A (ICD-10-CM) - Superior glenoid labrum lesion of left shoulder, initial encounter      Evaluation Date: 12/29/2023  Authorization Period Expiration: 12/13/24  Plan of Care Expiration: 2/23/24  Progress Note Due: 1/28/24  Date of Surgery: 12/14/23  Visit # / Visits authorized: 1/ 1   FOTO: 30.29 @ eval   Precautions: see protocol       Time In:  10:05 AM  Time Out: 11:02 AM  Total Billable Time: 57 minutes       PTA Visit #: 0/5          Subjective     Patient reports: No c/o pain. He also states that he is trying to be more mindful of relaxing and performing exercises regularly. Pt reports continued compliance with use of sling w/  abductor pillow when sleeping  He was compliant with home exercise program.  Response to previous treatment: Good  Functional change: none currently    Pain: 3/10 - nagging slight burring, stiffness  Location: left shoulder      Objective      Objective Measures updated at progress report unless specified.     Treatment     Dominic received the treatments listed below:      therapeutic exercises to develop strength, endurance, ROM, flexibility, and posture for 25 minutes including:  Elbow AROM- flexion and extension in seated with towel under arm 2 x 10  Wrist AROM- flex/ext and pronation/supination Flexion/extension 2 x 10  Ball squeeze in sling x 2 min seated in chair supported  Shoulder shrug  2 x 10 seated in chair with   U. Trap Stretch 2 x 30 sec   Pendulums - CCW, CW and lateral x20 each direction- holding 2 lb dumbbell to assist with distraction              Lateral pendulums performed seated DNP 1/3/24      manual therapy techniques: PROM were applied to the: L+ shoulder for 5 minutes following protocol    neuromuscular re-education activities to improve: muscle re-education and motor control of L+ UE, scapula for 25 minutes. The following activities were included:  Submax left shoulder  isometrics- flex abd, ext, IR, and ER x 10 w/ 5 sec hold - PT hand used to ensure submax pressure and not max   Gentle Scap squeeze - small range 3 x 10  Cervical Isometrics x 10 flex/ext/c. Rotation B+      Patient Education and Home Exercises       Education provided:   - Need to be mindful of L+ UE movement when temporarily out of sling ie) not activating shoulder musculature to lift arm into sling    Written Home Exercises Provided: Patient instructed to cont prior HEP. Exercises were reviewed and Dominic was able to demonstrate them prior to the end of the session.  Dominic demonstrated good  understanding of the education provided. See Electronic Medical Record under Patient Instructions for exercises provided during therapy sessions    Assessment   Pt is making good progress and pain appears to be well controlled based on subjective reports. Pt benefits from PT demonstration prior to him performing in order for pt to understand exact musculature and what it should look and feel like during performance. Minimal restrictions noted when performing PROM to L+ shoulder within parameters.     Dominic Is progressing well towards his goals.   Patient prognosis is Excellent.   Patient will continue to benefit from skilled outpatient physical therapy to address the deficits listed in the problem list box on initial evaluation, provide pt/family education and to maximize pt's level of independence in the home and  community environment.   Goals: All Goals are in progress and remain appropriate 1/3/24     Short Term Goals 4 weeks   1. Pt will be independent with HEP to supplement PT in improving functional use of LUE.  2. Pt will increase pain free left shoulder PROM in all planes to WNL to improve functional mobility of UE  3. Formally assess strength when allowed and set goals accordingly  4. Pt will adhere to post op precautions to allow for proper tissue healing     Long Term Goals 8 weeks   1.  Pt will have full PROM of left shoulder to show improvements in overall movement patterns.   2. Pt to show proper postural awareness with no VC from PT  3. Pt to have good tolerance to AAROM and AROM activities per protocol   4. Patient will increase left shoulder strength to >/=4/5     Goals to be updated as patient progresses post operatively.       Plan   Cont current Tx per protocol    Plan of care Certification: 12/29/2023 to 2/23/24.     Outpatient Physical Therapy 2 times weekly for 8 weeks to include the following interventions: Manual Therapy, Moist Heat/ Ice, Neuromuscular Re-ed, Orthotic Management and Training, Patient Education, Self Care, Therapeutic Activities, Therapeutic Exercise, and modalities as appropriate.        Lorena Walker, PT

## 2024-01-05 ENCOUNTER — CLINICAL SUPPORT (OUTPATIENT)
Dept: REHABILITATION | Facility: HOSPITAL | Age: 44
End: 2024-01-05
Payer: COMMERCIAL

## 2024-01-05 ENCOUNTER — OFFICE VISIT (OUTPATIENT)
Dept: NEUROLOGY | Facility: CLINIC | Age: 44
End: 2024-01-05
Payer: COMMERCIAL

## 2024-01-05 DIAGNOSIS — R29.898 IMPAIRED STRENGTH OF UPPER EXTREMITY: ICD-10-CM

## 2024-01-05 DIAGNOSIS — M25.612 IMPAIRED RANGE OF MOTION OF LEFT SHOULDER: Primary | ICD-10-CM

## 2024-01-05 DIAGNOSIS — F43.23 ADJUSTMENT DISORDER WITH MIXED ANXIETY AND DEPRESSED MOOD: Primary | ICD-10-CM

## 2024-01-05 DIAGNOSIS — R68.89 IMPAIRED FUNCTION OF UPPER EXTREMITY: ICD-10-CM

## 2024-01-05 PROCEDURE — 97110 THERAPEUTIC EXERCISES: CPT | Mod: PO

## 2024-01-05 PROCEDURE — 97112 NEUROMUSCULAR REEDUCATION: CPT | Mod: PO

## 2024-01-05 PROCEDURE — 90837 PSYTX W PT 60 MINUTES: CPT | Mod: 95,,, | Performed by: SOCIAL WORKER

## 2024-01-05 NOTE — PROGRESS NOTES
[facility]   Physical Therapy Treatment Note      Name: Dominic Oneill [facility] Number: [id number]    Therapy Diagnosis:   Encounter Diagnoses   Name Primary?    Impaired range of motion of left shoulder Yes    Impaired strength of upper extremity     Impaired function of upper extremity        Physician: Sea Valadez MD    Visit Date: 1/5/2024     Physician Orders: PT Eval and Treat   NOTE: 2-3 x per week x 6 weeks     Protocol = [email or web address]  Medical Diagnosis from Referral:   S43.432A (ICD-10-CM) - Paralabral cyst of left shoulder, initial encounter   S43.432A (ICD-10-CM) - Superior glenoid labrum lesion of left shoulder, initial encounter      Evaluation Date: 12/29/2023  Authorization Period Expiration: 12/13/24  Plan of Care Expiration: 2/23/24  Progress Note Due: 1/28/24  Date of Surgery: 12/14/23  Visit # / Visits authorized: 3/ 20  FOTO: 30.29 @ eval   Precautions: see protocol       Time In:  7:15 AM  Time Out: 8:00 AM  Total Billable Time: 45 minutes       PTA Visit #: 0/5          Subjective     Patient reports: No pain, but a burning tingling, sensation in the shoulder.   He was compliant with home exercise program.  Response to previous treatment: Good  Functional change: none currently    Pain: 3/10 - nagging slight burring, stiffness  Location: left shoulder      Objective      Objective Measures updated at progress report unless specified.     Treatment     Dominic received the treatments listed below:      therapeutic exercises to develop strength, endurance, ROM, flexibility, and posture for 25 minutes including:  Elbow AROM- flexion and extension in seated with towel under arm 2 x 10  Wrist AROM- flex/ext and pronation/supination Flexion/extension 2 x 10  Ball squeeze in sling x 2 min seated in chair supported  Shoulder shrug 2 x 10 seated in chair with   U. Trap Stretch 2 x 30  "sec   Pendulums - CCW, CW and lateral x20 each direction- holding 2 lb dumbbell to assist with distraction              Lateral pendulums performed seated DNP 1/3/24      manual therapy techniques: PROM were applied to the: L+ shoulder for 00 minutes following protocol    neuromuscular re-education activities to improve: muscle re-education and motor control of L+ UE, scapula for 20 minutes. The following activities were included:  Submax left shoulder  isometrics- flex abd, ext, IR, and ER x 10 w/ 5 sec hold - PT hand used to ensure submax pressure and not max   Gentle Scap squeeze - small range 3 x 10  Cervical Isometrics x 10 flex/ext/c. Rotation B+      Patient Education and Home Exercises       Education provided:   - Need to be mindful of L+ UE movement when temporarily out of sling ie) not activating shoulder musculature to lift arm into sling    Written Home Exercises Provided: Patient instructed to cont prior HEP. Exercises were reviewed and Dominic was able to demonstrate them prior to the end of the session.  Dominic demonstrated good  understanding of the education provided. See Electronic Medical Record under Patient Instructions for exercises provided during therapy sessions    Assessment   Pt with c/o a "burning sensation in his L shoulder lately.  He is progressing as planned at this point in his rehabilitation.    Dominic Is progressing well towards his goals.   Patient prognosis is Excellent.   Patient will continue to benefit from skilled outpatient physical therapy to address the deficits listed in the problem list box on initial evaluation, provide pt/family education and to maximize pt's level of independence in the home and community environment.   Goals: All Goals are in progress and remain appropriate 1/3/24     Short Term Goals 4 weeks   1. Pt will be independent with HEP to supplement PT in improving functional use of LUE.  2. Pt will increase pain free left shoulder PROM in all planes to " WNL to improve functional mobility of UE  3. Formally assess strength when allowed and set goals accordingly  4. Pt will adhere to post op precautions to allow for proper tissue healing     Long Term Goals 8 weeks   1.  Pt will have full PROM of left shoulder to show improvements in overall movement patterns.   2. Pt to show proper postural awareness with no VC from PT  3. Pt to have good tolerance to AAROM and AROM activities per protocol   4. Patient will increase left shoulder strength to >/=4/5     Goals to be updated as patient progresses post operatively.       Plan   Cont current Tx per protocol    Plan of care Certification: 12/29/2023 to 2/23/24.     Outpatient Physical Therapy 2 times weekly for 8 weeks to include the following interventions: Manual Therapy, Moist Heat/ Ice, Neuromuscular Re-ed, Orthotic Management and Training, Patient Education, Self Care, Therapeutic Activities, Therapeutic Exercise, and modalities as appropriate.        Arjun Pace, PT

## 2024-01-07 ENCOUNTER — PATIENT MESSAGE (OUTPATIENT)
Dept: INTERNAL MEDICINE | Facility: CLINIC | Age: 44
End: 2024-01-07
Payer: COMMERCIAL

## 2024-01-08 NOTE — PROGRESS NOTES
"Individual Psychotherapy (PhD/LCSW)    1/5/2024    Site:  Telemed         Location: [place]     Therapeutic Intervention: Met with patient.  Outpatient - Insight oriented psychotherapy 60 min - CPT code 04649 and Outpatient - Supportive psychotherapy 60 min - CPT Code 05455    Chief complaint/reason for encounter: depression, anxiety, and somatic     Interval history and content of current session:   LCSW asked patient to begin by discussing work stressors and potential new job. Patient reported there had been a big change this week.  He divulged that his previous new boss left her position or a promotion and his new boss gave him an update on his job. She did not want feedback on his projects and his skills and told him they needed a "worker bee".  He reports this is nto the job he was hopeing for and that he ultimately feels like staying with his current company and current position.  He reports he feels that this is the right thing but also questions it.  LCSW did agree that if he is comfortable with his current job and the new one is no longer there, then staying is a good option.  He reports the financial issues at his current job are resolved and should be through June.     He reports he has had meetings and they will discuss things again with him in a few months.  If things do not work out at this current job, he will seek out support from other connections.  LCSW agreed that although this can be an axniety inducing situation, having a plan and options always helps.     LCSW and patient also discussed trauma and anxiety.  He reports ongoing anxiety to spaces in which he cannot clearly see an exit.  He reports that he has accepted this and that his children and family have as well.  LCSW noted that this is acceptance and that it is ok to be able to acknowledge and cope with this.      Treatment plan:  Target symptoms: anxiety , work stress  Why chosen therapy is appropriate versus another modality: relevant to " diagnosis  Outcome monitoring methods: self-report, observation  Therapeutic intervention type: insight oriented psychotherapy, supportive psychotherapy    Risk parameters:  Patient reports no suicidal ideation  Patient reports no homicidal ideation  Patient reports no self-injurious behavior  Patient reports no violent behavior    Verbal deficits: None    Patient's response to intervention:  The patient's response to intervention is accepting, motivated.    Progress toward goals and other mental status changes:  The patient's progress toward goals is good.    Diagnosis:     ICD-10-CM ICD-9-CM   1. Adjustment disorder with mixed anxiety and depressed mood  F43.23 309.28       Plan:  individual psychotherapy    Return to clinic: as scheduled    Length of Service (minutes): 60

## 2024-01-16 DIAGNOSIS — M54.2 NECK PAIN: ICD-10-CM

## 2024-01-16 DIAGNOSIS — I10 PRIMARY HYPERTENSION: ICD-10-CM

## 2024-01-17 ENCOUNTER — OFFICE VISIT (OUTPATIENT)
Dept: URGENT CARE | Facility: CLINIC | Age: 44
End: 2024-01-17
Payer: COMMERCIAL

## 2024-01-17 VITALS
BODY MASS INDEX: 39.17 KG/M2 | WEIGHT: 315 LBS | RESPIRATION RATE: 18 BRPM | HEIGHT: 75 IN | TEMPERATURE: 99 F | SYSTOLIC BLOOD PRESSURE: 101 MMHG | HEART RATE: 85 BPM | DIASTOLIC BLOOD PRESSURE: 69 MMHG | OXYGEN SATURATION: 96 %

## 2024-01-17 DIAGNOSIS — R05.9 COUGH, UNSPECIFIED TYPE: Primary | ICD-10-CM

## 2024-01-17 DIAGNOSIS — R09.81 SINUS CONGESTION: ICD-10-CM

## 2024-01-17 DIAGNOSIS — J06.9 ACUTE URI: ICD-10-CM

## 2024-01-17 DIAGNOSIS — J02.9 SORE THROAT: ICD-10-CM

## 2024-01-17 LAB
CTP QC/QA: YES
CTP QC/QA: YES
POC MOLECULAR INFLUENZA A AGN: NEGATIVE
POC MOLECULAR INFLUENZA B AGN: NEGATIVE
SARS-COV-2 AG RESP QL IA.RAPID: NEGATIVE

## 2024-01-17 PROCEDURE — 87502 INFLUENZA DNA AMP PROBE: CPT | Mod: QW,S$GLB,, | Performed by: FAMILY MEDICINE

## 2024-01-17 PROCEDURE — 99214 OFFICE O/P EST MOD 30 MIN: CPT | Mod: S$GLB,,, | Performed by: FAMILY MEDICINE

## 2024-01-17 PROCEDURE — 87811 SARS-COV-2 COVID19 W/OPTIC: CPT | Mod: QW,S$GLB,, | Performed by: FAMILY MEDICINE

## 2024-01-17 RX ORDER — PROMETHAZINE HYDROCHLORIDE AND DEXTROMETHORPHAN HYDROBROMIDE 6.25; 15 MG/5ML; MG/5ML
5 SYRUP ORAL 3 TIMES DAILY PRN
Qty: 120 ML | Refills: 0 | Status: SHIPPED | OUTPATIENT
Start: 2024-01-17 | End: 2024-01-27

## 2024-01-17 RX ORDER — LOSARTAN POTASSIUM 25 MG/1
25 TABLET ORAL DAILY
Qty: 30 TABLET | Refills: 11 | Status: SHIPPED | OUTPATIENT
Start: 2024-01-17 | End: 2025-01-16

## 2024-01-17 RX ORDER — METHOCARBAMOL 750 MG/1
TABLET, FILM COATED ORAL
Qty: 40 TABLET | Refills: 3 | Status: SHIPPED | OUTPATIENT
Start: 2024-01-17 | End: 2024-05-21 | Stop reason: SDUPTHER

## 2024-01-17 NOTE — TELEPHONE ENCOUNTER
No care due was identified.  [facility] Embedded Care Due Messages. Reference number: [id number].   1/16/2024 7:38:45 PM CST

## 2024-01-17 NOTE — TELEPHONE ENCOUNTER
Care Due:                  Date            Visit Type   Department     Provider  --------------------------------------------------------------------------------                                MYCHART                              ANNUAL                              CHECKUP/PHY  MET INTERNAL  Last Visit: 08-      [facility]       Arjun Cross  Next Visit: None Scheduled  None         None Found                                                            Last  Test          Frequency    Reason                     Performed    Due Date  --------------------------------------------------------------------------------    HBA1C.......  6 months...  metFORMIN, semaglutide...  09- 03-    [facility] Embedded Care Due Messages. Reference number: [id number].   1/16/2024 7:37:43 PM CST

## 2024-01-17 NOTE — TELEPHONE ENCOUNTER
Refill Routing Note   Medication(s) are not appropriate for processing by [facility] for the following reason(s):        Outside of protocol    [facility] action(s):  Route               Appointments  past 12m or future 3m with PCP    Date Provider   Last Visit   8/31/2023 Arjun Cross MD   Next Visit   1/16/2024 Arjun Cross MD   ED visits in past 90 days: 0        Note composed:9:01 PM 01/16/2024

## 2024-01-18 NOTE — PROGRESS NOTES
"Subjective:      Patient ID: Dominic Collzao is a 43 y.o. male.    Vitals:  height is 6' 3" (1.905 m) and weight is 153.8 kg (339 lb) (abnormal). His oral temperature is 99.1 °F (37.3 °C). His blood pressure is 101/69 and his pulse is 85. His respiration is 18 and oxygen saturation is 96%.     Chief Complaint: Sore Throat (Entered by patient)    Patient present with body aches, headaches, sore throat, cough, sinus congestion for 2 days. Pt denies CP, SOB, weakness/dizziness, N/V, diarrhea, abdominal pain, dysuria, loss of smell or taste.        Sore Throat   This is a new problem. The current episode started yesterday. The problem has been unchanged. The pain is at a severity of 4/10. The pain is moderate. Associated symptoms include congestion, coughing and headaches. Pertinent negatives include no abdominal pain, diarrhea, drooling, ear discharge, ear pain, hoarse voice, plugged ear sensation, neck pain, shortness of breath, stridor, swollen glands, trouble swallowing or vomiting.       HENT:  Positive for congestion, postnasal drip and sore throat. Negative for ear pain, ear discharge, drooling and trouble swallowing.    Neck: Negative for neck pain.   Respiratory:  Positive for cough. Negative for shortness of breath and stridor.    Gastrointestinal:  Negative for abdominal pain, vomiting and diarrhea.   Neurological:  Positive for headaches. Negative for dizziness and light-headedness.      Objective:     Physical Exam   Constitutional: He is oriented to person, place, and time. He appears well-developed. He is cooperative.  Non-toxic appearance. He does not appear ill. No distress.   HENT:   Head: Normocephalic and atraumatic.   Ears:   Right Ear: Hearing, tympanic membrane, external ear and ear canal normal. impacted cerumen  Left Ear: Hearing, tympanic membrane, external ear and ear canal normal. impacted cerumen  Nose: Congestion present. No mucosal edema, rhinorrhea or nasal deformity. No " epistaxis. Right sinus exhibits no maxillary sinus tenderness and no frontal sinus tenderness. Left sinus exhibits no maxillary sinus tenderness and no frontal sinus tenderness.   Mouth/Throat: Uvula is midline, oropharynx is clear and moist and mucous membranes are normal. No trismus in the jaw. Normal dentition. No uvula swelling. No oropharyngeal exudate, posterior oropharyngeal edema or posterior oropharyngeal erythema.   Eyes: Conjunctivae and lids are normal. No scleral icterus.   Neck: Trachea normal and phonation normal. Neck supple. No edema present. No erythema present. No neck rigidity present.   Cardiovascular: Normal rate, regular rhythm, normal heart sounds and normal pulses.   No murmur heard.  Pulmonary/Chest: Effort normal and breath sounds normal. No stridor. No respiratory distress. He has no decreased breath sounds. He has no wheezes. He has no rhonchi. He has no rales.   Abdominal: Normal appearance. He exhibits no distension. There is no abdominal tenderness. There is no left CVA tenderness and no right CVA tenderness.   Musculoskeletal: Normal range of motion.         General: No deformity. Normal range of motion.   Neurological: He is alert and oriented to person, place, and time. He exhibits normal muscle tone. Coordination normal.   Skin: Skin is warm, dry, intact, not diaphoretic and not pale.   Psychiatric: His speech is normal and behavior is normal. Judgment and thought content normal.   Nursing note and vitals reviewed.      Assessment:     1. Cough, unspecified type    2. Sore throat    3. Sinus congestion    4. Acute URI        Plan:   Discussed exam findings/results/diagnosis/plan with patient in clinic. Advised to f/u with PCP within 2-5 days. ER precautions given if symptoms get any worse. All questions answered. Patient verbally understood and agreed with treatment plan.     Cough, unspecified type  -     POCT Influenza A/B Molecular  -     SARS Coronavirus 2 Antigen, POCT Manual  Read    Sore throat    Sinus congestion    Acute URI    Other orders  -     promethazine-dextromethorphan (PROMETHAZINE-DM) 6.25-15 mg/5 mL Syrp; Take 5 mLs by mouth 3 (three) times daily as needed (cough).  Dispense: 120 mL; Refill: 0      Your sample(s) was tested for COVID-19 using the [employer] COVID-19 Antigen Card    If you have tested positive in our clinic today, a positive test result means that the virus that causes COVID-19 was detected in your sample and are contagious.    If you have tested negative in our clinic today, to increase the chance that the negative result for COVID-19 is accurate, you should:     Test again in 48 hours if you have symptoms on the first day of testing.   Test 2 more times at least 48 hours apart if you do not have symptoms on the first day of testing.     A negative test result indicates that the virus that causes COVID-19 was not detected in your sample. A negative result is presumptive, meaning it is not certain that you do not have COVID-19. You may still have COVID-19 and you may still be contagious. There is a higher chance of false negative results with antigen tests compared to laboratory-based tests such as PCR. If you tested negative and continue to experience COVID-19-like symptoms, e.g., fever, cough, and/or shortness of breath, you should seek follow up care with your health care provider.

## 2024-01-24 ENCOUNTER — OFFICE VISIT (OUTPATIENT)
Dept: NEUROLOGY | Facility: CLINIC | Age: 44
End: 2024-01-24
Payer: COMMERCIAL

## 2024-01-24 VITALS
SYSTOLIC BLOOD PRESSURE: 131 MMHG | HEART RATE: 93 BPM | DIASTOLIC BLOOD PRESSURE: 86 MMHG | BODY MASS INDEX: 42.16 KG/M2 | WEIGHT: 315 LBS

## 2024-01-24 DIAGNOSIS — R53.83 FATIGUE DUE TO SLEEP PATTERN DISTURBANCE: ICD-10-CM

## 2024-01-24 DIAGNOSIS — S13.4XXD WHIPLASH INJURY TO NECK, SUBSEQUENT ENCOUNTER: ICD-10-CM

## 2024-01-24 DIAGNOSIS — G44.86 CERVICOGENIC HEADACHE: ICD-10-CM

## 2024-01-24 DIAGNOSIS — G47.9 FATIGUE DUE TO SLEEP PATTERN DISTURBANCE: ICD-10-CM

## 2024-01-24 DIAGNOSIS — S06.0X9S CONCUSSION WITH LOSS OF CONSCIOUSNESS, SEQUELA: Primary | ICD-10-CM

## 2024-01-24 DIAGNOSIS — R41.89 COGNITIVE CHANGE: ICD-10-CM

## 2024-01-24 DIAGNOSIS — M54.2 CERVICALGIA OF OCCIPITO-ATLANTO-AXIAL REGION: ICD-10-CM

## 2024-01-24 DIAGNOSIS — F34.89 OTHER SPECIFIED PERSISTENT MOOD DISORDERS: ICD-10-CM

## 2024-01-24 PROCEDURE — 99999 PR PBB SHADOW E&M-EST. PATIENT-LVL III: CPT | Mod: PBBFAC,,, | Performed by: PSYCHIATRY & NEUROLOGY

## 2024-01-24 PROCEDURE — 4010F ACE/ARB THERAPY RXD/TAKEN: CPT | Mod: CPTII,S$GLB,, | Performed by: PSYCHIATRY & NEUROLOGY

## 2024-01-24 PROCEDURE — 99214 OFFICE O/P EST MOD 30 MIN: CPT | Mod: S$GLB,,, | Performed by: PSYCHIATRY & NEUROLOGY

## 2024-01-24 PROCEDURE — 3075F SYST BP GE 130 - 139MM HG: CPT | Mod: CPTII,S$GLB,, | Performed by: PSYCHIATRY & NEUROLOGY

## 2024-01-24 PROCEDURE — 1159F MED LIST DOCD IN RCRD: CPT | Mod: CPTII,S$GLB,, | Performed by: PSYCHIATRY & NEUROLOGY

## 2024-01-24 PROCEDURE — 1160F RVW MEDS BY RX/DR IN RCRD: CPT | Mod: CPTII,S$GLB,, | Performed by: PSYCHIATRY & NEUROLOGY

## 2024-01-24 PROCEDURE — 3079F DIAST BP 80-89 MM HG: CPT | Mod: CPTII,S$GLB,, | Performed by: PSYCHIATRY & NEUROLOGY

## 2024-01-24 PROCEDURE — 3008F BODY MASS INDEX DOCD: CPT | Mod: CPTII,S$GLB,, | Performed by: PSYCHIATRY & NEUROLOGY

## 2024-01-24 RX ORDER — TIZANIDINE 4 MG/1
2 TABLET ORAL NIGHTLY PRN
Qty: 30 TABLET | Refills: 5 | Status: SHIPPED | OUTPATIENT
Start: 2024-01-24 | End: 2024-04-17 | Stop reason: SDUPTHER

## 2024-01-24 NOTE — PATIENT INSTRUCTIONS
Referral for lower neck/upper back to mid back PT with myofascial release (therapist may choose from and do a combination of: deep tissue massage, cupping, dry needling, etc). No soft tissue manipulation of suboccipital region if you start to feel worse. All joint manipulation is fine.  Continue light cardio but no contact sport situations  Continue vestibular PT for eye movements  Continue ST for cognition  Continue social work or psychology (whoever can see the patient soonest) consultation, therapy, and treatment. 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 The information from this consultation will be used by myself and other providers/therapists to help guide an individual care plan to help treat this patient's symptoms from TBI/neck injury. Any delays or failures to address cognitive, sleep, psychological symptoms after TBI/neck injury can contribute to persistent symptoms, prolong their overall recovery process, and potentially lead to permanent symptoms. And of note, this referral is not for neuropsychology or neurocognitive testing. This referral is specifically for social work or psychological interventions based on the consultation these services provide.  Continue to follow with psychiatrist   Start tizanidine 2 mg (half tablet) nightly. Take 30 minutes before bed. Never drink alcohol or drive after taking this medication  Do not take methocarbamol at same time as tizanidine
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
## 2024-01-29 ENCOUNTER — CLINICAL SUPPORT (OUTPATIENT)
Dept: REHABILITATION | Facility: HOSPITAL | Age: 44
End: 2024-01-29
Payer: COMMERCIAL

## 2024-01-29 DIAGNOSIS — R29.898 IMPAIRED STRENGTH OF UPPER EXTREMITY: ICD-10-CM

## 2024-01-29 DIAGNOSIS — H51.11 CONVERGENCE INSUFFICIENCY: ICD-10-CM

## 2024-01-29 DIAGNOSIS — R68.89 IMPAIRED FUNCTION OF UPPER EXTREMITY: ICD-10-CM

## 2024-01-29 DIAGNOSIS — M25.612 IMPAIRED RANGE OF MOTION OF LEFT SHOULDER: Primary | ICD-10-CM

## 2024-01-29 DIAGNOSIS — H55.81 SACCADIC EYE MOVEMENT DEFICIENCY: ICD-10-CM

## 2024-01-29 DIAGNOSIS — H55.82 DEFICIENT SMOOTH PURSUIT EYE MOVEMENTS: ICD-10-CM

## 2024-01-29 DIAGNOSIS — F07.81 POST CONCUSSION SYNDROME: Primary | ICD-10-CM

## 2024-01-29 PROCEDURE — 97112 NEUROMUSCULAR REEDUCATION: CPT | Mod: PO

## 2024-01-29 PROCEDURE — 97530 THERAPEUTIC ACTIVITIES: CPT | Mod: PO

## 2024-01-29 PROCEDURE — 97110 THERAPEUTIC EXERCISES: CPT | Mod: PO

## 2024-01-29 PROCEDURE — 97140 MANUAL THERAPY 1/> REGIONS: CPT | Mod: PO

## 2024-01-29 NOTE — PROGRESS NOTES
[facility]   Physical Therapy Treatment Note      Name: Dominic Collazo  Clinic Number: [id number]    Therapy Diagnosis:   Encounter Diagnoses   Name Primary?    Impaired range of motion of left shoulder Yes    Impaired strength of upper extremity     Impaired function of upper extremity          Physician: Sea Valadez MD    Visit Date: 1/29/2024     Physician Orders: PT Eval and Treat   NOTE: 2-3 x per week x 6 weeks     Protocol = [email or web address]  Medical Diagnosis from Referral:   S43.432A (ICD-10-CM) - Paralabral cyst of left shoulder, initial encounter   S43.432A (ICD-10-CM) - Superior glenoid labrum lesion of left shoulder, initial encounter      Evaluation Date: 12/29/2023  Authorization Period Expiration: 12/31/24  Plan of Care Expiration: 2/23/24  Progress Note Due: 2/29/24  Date of Surgery: 12/14/23  Visit # / Visits authorized: 4/ 20  FOTO: 30.29 @ eval   Precautions: see protocol       Time In:  10:48 AM  Time Out: 11:42 AM  Total Billable Time: 54 minutes (TE-2, MT-1, NMR-1)       PTA Visit #: 0/5                          Subjective     Patient reports: is going to see the surgeon on Friday. Last Wednesday, he put his hand down for a second to regain balance in his office onto the left arm. When he is using the ice, he does not have pain. He reports missing recent PT due to being at a conference and then got sick.   He was compliant with home exercise program.  Response to previous treatment: bhavin well  Functional change: in progress    Pain: 6/10   Location: left shoulder      Objective        Patient arrives to session in sling.      PROM:   Left shoulder PROM flexion: 130 deg  Left shoulder PROM abduction: 120 deg  Left shoulder PROM ER at 90: 40 deg   Left shoulder IR not tested         Treatment     Dominic received the treatments listed below:        Bold=performed    therapeutic  "exercises to develop strength, endurance, ROM, flexibility, and posture for 24 minutes including:  Reassessment as above  +supine dowel flexion AAROM 2x10 3" holds  +Supine dowel abduction AAROM 2x10 3" holds  +supine dowel ER AAROM 2x10 3" holds  Elbow AROM- flexion and extension in seated with towel under arm 2 x 10  Wrist AROM- flex/ext and pronation/supination Flexion/extension 2 x 10  Ball squeeze in sling x 2 min seated in chair supported  Shoulder shrug 2 x 10 seated in chair with   U. Trap Stretch 2 x 30 sec   Pendulums - CCW, CW and lateral x20 each direction- holding 2 lb dumbbell to assist with distraction              Lateral pendulums performed seated DNP 1/3/24      manual therapy techniques: PROM were applied to the: L+ shoulder for 10 minutes following protocol  PROM with very slight distraction and oscillation- flexion, abduction, and ER  Gr I-II  inferior mobs       neuromuscular re-education activities to improve: muscle re-education and motor control of L+ UE, scapula for 20 minutes. The following activities were included:  +prone scapular retraction 2x10 3-5" holds  +prone rows 2x10  +prone horizontal abd 2x10  +prone extensions 2x10  Submax left shoulder  isometrics- flex abd, ext, IR, and ER x 10 w/ 5 sec hold - PT hand used to ensure submax pressure and not max   Gentle Scap squeeze - small range 3 x 10  Cervical Isometrics x 10 flex/ext/c. Rotation B+      Patient Education and Home Exercises       Education provided:   - progressions per protocol     Written Home Exercises Provided: Patient instructed to cont prior HEP. Exercises were reviewed and Dominic was able to demonstrate them prior to the end of the session.  Dominic demonstrated good  understanding of the education provided. See Electronic Medical Record under Patient Instructions for exercises provided during therapy sessions    Assessment     Dominic is over 6 weeks post op at this time. He has not been seen for post op PT since " 1/5/24 due to being out of town and then being sick. He does show improved passive range of motion today. Able to initiate active assisted motion today supine with dowel for shoulder  flexion, abduction, and ER and initiated pulleys. Also, initiated prone interventions for scapular strengthening and stability. He is educated that we will progress as tolerated per protocol, by making progressions per patient's appropriate functional ability.      Dominic Is progressing well towards his goals.   Patient prognosis is Excellent.   Patient will continue to benefit from skilled outpatient physical therapy to address the deficits listed in the problem list box on initial evaluation, provide pt/family education and to maximize pt's level of independence in the home and community environment.     Goals: All Goals are in progress and remain appropriate 1/3/24     Short Term Goals 4 weeks   1. Pt will be independent with HEP to supplement PT in improving functional use of LUE.- MET  2. Pt will increase pain free left shoulder PROM in all planes to WNL to improve functional mobility of UE- MET  3. Formally assess strength when allowed and set goals accordingly  4. Pt will adhere to post op precautions to allow for proper tissue healing- MET     Long Term Goals 8 weeks   1.  Pt will have full PROM of left shoulder to show improvements in overall movement patterns.   2. Pt to show proper postural awareness with no VC from PT  3. Pt to have good tolerance to AAROM and AROM activities per protocol - progressing well  4. Patient will increase left shoulder strength to >/=4/5     Goals to be updated as patient progresses post operatively.       Plan   Cont current Tx per protocol and patient tolerance     Stephany Styles, PT

## 2024-01-29 NOTE — PLAN OF CARE
"Occupational Therapy Updated Plan of Care     Date: 1/29/2024  Name: Dominic Collazo  Clinic Number: [id number]    Therapy Diagnosis:   No diagnosis found.    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 Singh     Visit # / Visits authorized: 1 / 20 (Total Visits 2023: 7 including eval)  Time In: 0937  Time Out: 1016  Total Billable (one on one) Time: 39 minutes    Precautions: Standard    Subjective     Pt reports: that overall he is feeling better but he still experiences eye fatigue at night. He also notes that his eyes dance/jump behind his eyelids when he's sleeping. Headache frequency and intensity is improved.   Response to previous treatment: compliant with eye exercises   Functional change: ongoing     Date of Onset: 7/23/2023     Pain: 0/10   Location: Headache     Patient's Goals for Therapy: "to get back to normal"     Objective      Dominic participated in dynamic functional therapeutic activities to improve functional performance for 24 minutes, including:  Reassessment:   Adult Vision Questionnaire:   Directions: please check the answer that best describes your situation. If you wear glasses or contact lenses, answer the questions assuming that you are wearing them.   Never = never  Occasionally = less than 1 time/week  Frequently = at least 1 time/week  Always = everyday      Do you have headaches or facial pain? Frequently   Do you have pain in your eyes with eye movement? Frequently   Do you experience neck or shoulder discomfort? Always  Do you have dizziness, light headed or nausea " while performing close up work? (computer work; reading; writing) Frequently   Do you have dizziness, light headed or nausea while performing far distance activities? (driving, tv, movies) Frequently  Do you experience dizziness when bending down and standing back up, or when getting up quickly? Frequently    Do you feel unsteady with walking, or drift to one side? Occasionally   Do you feel overwhelmed or anxious while walking in a large department store or walking in a crowd? Frequently    Do you feel dizzy or off balance when walking down a long hallway or on bold patterned carpeting? Occasionally   Does riding in a car make you feel dizzy or uncomfortable? Frequently    Do you find yourself with your head tilted to one side? Always    Does your posture tend to be leaning more forward or backward than your used to? Occasionally, forward   Do you experience poor depth perception or have difficulty estimating distances accurately? Occasionally    Do you experience double/overlapping/shadowed vision at far distances? Frequently   Do you experience double/overlapping/shadowed vision at near distances? Frequently   Do you experience glare or have sensitivity to bright lights? Frequently    Do you close or cover one eye with near or far tasks? Occasionally   Do you skip lines or lose your place while reading? Occasionally    Do you use your finger to keep your place on the page? Frequently - did this prior to concussion but is more aware now   Do you tire easily with close-up tasks? Frequently   Do you experience blurred vision with far distance tasks? (driving, television, movies, chalkboard at school) Occasionally   Do you experience blurred vision with close up tasks? (computer, reading) Frequently    Do you experience words running together or appearing to move on the page? Frequently     History:  Have you ever been diagnosed with:  Traumatic brain injury (TBI) or concussion? Yes  Reading disability? No  Lazy eye?  No  Have you ever had an eye operation? No     FOTO Administered:   Eval: 69%  12/13/2023: 60%  1/29/2024: 60%      Treatment:   Pt Education:  - Discussed gradual return to reading including pacing, monitoring symptoms, and use of tracking aids as needed   - Discussed importance of mindfulness to help manage restlessness during night hours   - Discussed POC      Dominic participated in neuromuscular re-education activities to improve: oculomotor and balance insurgencies for 15 minutes. The following activities were included:  Reassessment:   Physical Exam  Head Control/Neck Mobility: WFL per functional cervical ROM screen but pt reported some pain  Comments: Pt reported feeling a disconnect between head movements and vision with up/down head movements      Oculomotor Exam  Vestibular/Ocular-Motor Screening (VOMS) for Concussion  Vestibular/Ocular Motor Test: Not Tested Headache   0-10 Dizziness  0-10 Nausea   0-10 Fogginess   0-10 Comments   Baseline   0 0 0 2     Smooth Pursuit  (1.5 feet left/right of center; 3 feet away; 2 times; 30 bpm each direction; horizontal and vertical)   0 0 0 2 See below    Saccades - Horizontal  (1.5 feet left/right of center; 3 feet away; 10 times; performed as quickly as possible)   1 0 0 2 See below    Saccades - Vertical   (1.5 feet up/down of center; 3 feet away; 10 times; performed as quickly as possible)   1 0 0 2 See below    Convergence (Near Point)  (14 pt font; stop when pt reports diplopia or outward deviation of eye is observed, blurring is ignored; measure distance between target and tip of nose; abnormal finding is >/= 6 cm)   1 0 0 0 (Near Point in cm):  Measure 1: 12  Measure 2: 12.5  Measure 3: 11.5   VOR - Horizontal  (14 pt font; 20 degrees rotation left/right; 10 reps; 180 bpm)   NT NT NT NT NT - see VOR assessments below     VOR - Vertical  (14 pt font; 20 degrees rotation up/down; 10 times; 180 bpm)   NT NT NT NT NT - see VOR assessments below   Visual Motion  "Sensitivity Test  (80 degrees left/right; 5 times each direction; 50 bpm)   NT NT NT NT NT      Oculomotor ROM: Eye movements intact  Eye Alignment: WNL  Visual Field: NT  Acuity: corrected by glasses/contact for distance vision; has an astigmatism      Spontaneous Nystagmus: None  Gaze Holding Nystagmus: None   Gaze Holding (No Fixation): NT  Smooth Pursuits:   Horizontal: Eye movements intact; no increase in symptoms               Vertical: Eye movements intact; no increase in symptoms   Saccades:               Horizontal: Eye movements intact; mild increase in headache behind right eye but dissipated quickly               Vertical: Eye movements intact; mild headache   Near Point Convergence (cm):   Eval: Impaired; Average 28 cm; tactile cues to bring target to midline (pt tended to hold target just right of midline); more difficult for right eye to converge    12/13/2023: Impaired; Average 14.6 cm   1/29/2024: Impaired; Average 12 cm; right eye slightly more difficult to converge   Accommodation (gaze shift between near target (16") and far target (10ft)): Gaze shifts completed at normal pace and no blurriness or difficulty bringing targets into focus; mild dizziness reported (1/10)    Fixation (5 second sustained visual attention): Impaired for prolonged periods      VOR Slow Head Movement: Intact  Head Thrust: Negative bilaterally   Dynamic Visual Acuity (DVA) (using ETDRS eye chart):   Eval: 3 line difference (10, 7); read 3/5 letter on line 8  12/13/2023: 2 line difference (10, 8); read 2/5 letters on line 9; 1/10 dizziness reported   1/29/2024: 3 line difference (11, 8); 1 mistake on line 6; no dizziness      Balance/Functional Mobility Assessment:      SERA Sensory Testing: Not tested 1/29/2024; last administered on 12/13/2024     Functional Gait Assessment (FGA): Not tested 1/29/2024   Score:   Eval: 28/30   12/13/2023: 28/30     Score:   </=22/30 fall risk   <20/30 fall risk in older adults   <18/30 fall " risk in Parkinsons     Home Exercises and Education Provided      Education provided:   - HEP   - Progress towards goals     Written Home Exercises Provided: horizontal/vertical smooth pursuits, horizontal/vertical saccades, and convergence pencil pushups.  Exercises were reviewed and Dominic was able to demonstrate them prior to the end of the session.    Dominic demonstrated good  understanding of the education provided.      See EMR under Patient Instructions for exercises provided  10/27/2023: (Pursuits, saccades, pencil push ups)  11/3/2023: added diagonal pursuits and saccades  12/28/2023: Saccades at 120 bpm; pursuits and saccades x 45 sec each direction, pencil push ups; eye ergonomics      Assessment      Pt tolerated today's session well. Reassessment completed. Overall pt is feeling better. Majority of remaining subjective complaints are likely related to fatigue and anxiety/stress. Discussed importance of gradual return to activity, specifically focusing on reading, this date along with adaptive strategies to utilize as needed. Also discussed importance of incorporating mindfulness and relaxation strategies into daily routine. Pt was receptive to all education topics. Eye movements were intact during oculomotor assessments, and pt's headache only increased by 1 point during saccades. NPC continues to improve and is just outside of normal limits. VOR is grossly intact. No dizziness was elicited with gaze stabilization assessments, head thrust was negative bilaterally, and DVA was just 1 line outside of normal limits. Balance/functional mobility assessments were deferred this date, but in the past balance has been good and pt is not complaining of many balance deficits at this point. Plan to update POC for one additional month for pt education/guidance with gradual return to daily/leisure/work activities, HEP guidance, and prepare for discharge.      Dominic is progressing well towards his goals and there  are no updates to goals at this time. Pt prognosis is Good.      Pt will continue to benefit from skilled outpatient occupational therapy to address the deficits listed in the problem list on initial evaluation provide pt/family education and to maximize pt's level of independence in the home and community environment.      Pt's spiritual, cultural and educational needs considered and pt agreeable to plan of care and goals.     Anticipated barriers to occupational therapy: none noted     Goals:  Short Term Goals: 4 weeks   1) Pt will tolerate oculomotor and/or habitation home exercise/activity program, reporting at least 50% compliance. MET 12/13/2023  2) Pt will verbalize eye ergonomics to decrease stress on eyes. ongoing  3) Pt will demonstrate ability to track single target WFL, in all directions x30 seconds, without increase in headache or nausea, to improve skills needed for technology use and scanning environment. ongoing  4) Pt to perform saccades WFL, in all directions x30 seconds, at 90 bpm without increase in headache, dizziness, or nausea, to improve skills needed for reading. ongoing  5) Pt will report 60 minutes of computer use without eye fatigue, double vision, or increase in headache/dizziness, utilizing activity pacing strategies, needed for return to work at [employer]. ongoing  6) Near point convergence will decrease to 20 cm or less to improve oculomotor coordination and ability to fixate on close up work. MET 12/13/2023  7) Pt will demonstrate ability to fixate/attend to close up tasks x 30 minutes without onset of headache or eye fatigue. ongoing     Long Term Goals: 11 weeks   1) Pt will be independent with oculomotor and/or habituation home exercise/activity program. Progressing   2) Pt will demonstrate ability to track single target WFL, in all directions x1 minute, without increase in headache or nausea, to improve skills needed for technology use and scanning environment. ongoing  3) Pt to  perform saccades WFL, in all directions x1 minute, at 110 bpm without increase in headache, dizziness, or nausea, to improve skills needed for reading. ongoing  4) Pt will report 90 minutes of computer use without eye fatigue, double vision, or increase in headache/dizziness, utilizing activity pacing strategies, needed for return to work at [employer]. ongoing  5) Near point convergence will decrease to 15 cm or less to improve oculomotor coordination and ability to fixate on close up work. MET 12/13/2023  6) Pt will demonstrate ability to fixate/attend to close up tasks x 60 minutes without onset of headache or eye fatigue. ongoing    Previous Short Term Goals Status: See above for goal status   Long Term Goal Status:   continue per initial plan of care.  Reasons for Recertification of Therapy: Pt would continue to benefit from skilled occupational therapy services for pt education/guidance with gradual return to daily/leisure/work activities, HEP guidance, and prepare for discharge.    Plan     Updated Certification Period: 1/29/2024 to 3/1/2024  Recommended Treatment Plan: 1 times per week for 4 weeks: Neuromuscular Re-ed, Patient Education, Self Care, Therapeutic Activities, and Therapeutic Exercise    Updates/grading next/future session: oculomotor - assess and update goals; gradual return to activity     Seema Ng OT  1/29/2024      I CERTIFY THE NEED FOR THESE SERVICES FURNISHED UNDER THIS PLAN OF TREATMENT AND WHILE UNDER MY CARE    Physician's comments:        Physician's Signature: ___________________________________________________    Plan     Updated Certification Period: 12/13/2023 to 2/2/2023  Recommended Treatment Plan: 1-2 times per week for 7 weeks: Neuromuscular Re-ed, Patient Education, Self Care, Therapeutic Activities, and Therapeutic Exercise     Updates/Grading for next session: as oculomotor function continues to improve gradually begin to incorporate more functional mobility and motion  tolerance activities; cont to progress eye exercises; walking with HT; Wm String       Seema Ng, OT

## 2024-02-02 ENCOUNTER — OFFICE VISIT (OUTPATIENT)
Dept: ORTHOPEDICS | Facility: CLINIC | Age: 44
End: 2024-02-02
Payer: COMMERCIAL

## 2024-02-02 ENCOUNTER — OFFICE VISIT (OUTPATIENT)
Dept: NEUROLOGY | Facility: CLINIC | Age: 44
End: 2024-02-02
Payer: COMMERCIAL

## 2024-02-02 VITALS — WEIGHT: 315 LBS | HEIGHT: 75 IN | BODY MASS INDEX: 39.17 KG/M2

## 2024-02-02 DIAGNOSIS — Z98.890 STATUS POST LABRAL REPAIR OF SHOULDER: Primary | ICD-10-CM

## 2024-02-02 DIAGNOSIS — F43.23 ADJUSTMENT DISORDER WITH MIXED ANXIETY AND DEPRESSED MOOD: ICD-10-CM

## 2024-02-02 DIAGNOSIS — F07.81 POST CONCUSSION SYNDROME: Primary | ICD-10-CM

## 2024-02-02 PROCEDURE — 99024 POSTOP FOLLOW-UP VISIT: CPT | Mod: S$GLB,,, | Performed by: ORTHOPAEDIC SURGERY

## 2024-02-02 PROCEDURE — 4010F ACE/ARB THERAPY RXD/TAKEN: CPT | Mod: CPTII,95,, | Performed by: SOCIAL WORKER

## 2024-02-02 PROCEDURE — 90837 PSYTX W PT 60 MINUTES: CPT | Mod: 95,,, | Performed by: SOCIAL WORKER

## 2024-02-02 PROCEDURE — 1159F MED LIST DOCD IN RCRD: CPT | Mod: CPTII,S$GLB,, | Performed by: ORTHOPAEDIC SURGERY

## 2024-02-02 PROCEDURE — 99999 PR PBB SHADOW E&M-EST. PATIENT-LVL IV: CPT | Mod: PBBFAC,,, | Performed by: ORTHOPAEDIC SURGERY

## 2024-02-02 PROCEDURE — 4010F ACE/ARB THERAPY RXD/TAKEN: CPT | Mod: CPTII,S$GLB,, | Performed by: ORTHOPAEDIC SURGERY

## 2024-02-02 NOTE — PROGRESS NOTES
Individual Psychotherapy (PhD/LCSW)    2/2/2024    Site:  Telemed         Location: [place]     Therapeutic Intervention: Met with patient.  Outpatient - Insight oriented psychotherapy 60 min - CPT code 65819, Outpatient - Behavior modifying psychotherapy 60 min - CPT code 21651, and Outpatient - Supportive psychotherapy 60 min - CPT Code 10280    Chief complaint/reason for encounter: anxiety and interpersonal     Interval history and content of current session:   Patient began session by reporting that he was just dealt another change in regards to work.  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  The patient's boss told him to immediately stop ordering any other equipment until they can come up with a financial agreement.  The patient notes this is stressful since he was ordering things on his own time line, and if he were aware of this change in rent, he would have ordered things differently.  He is aware that that these things happen but he is going to have a lot of anxiety until Tuesday, when they find out more information.     LCSW encouraged scheduling time to worry - next Tuesday when he gets the update. Also allow for visualizing and being practical of ways to respond to the budget change.  He reports he knows what eh can do and how to set up the next steps.     He has negative thoughts bout the un resigning but discussed counteracting that with the reality he talked to several people who stated to not go to the new job since it changed drastically.     LCSW encouraged patient to imagine the worse possible scenario, then imagine the best case / fantasy scenario and realize that reality will be somewhere in the middle.     He is supervising someone and he realized that this person would enjoy learning  about budgeting etc.   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  LCSW encouraged opening an ongoing and structured communication in order to prevent negative questions and emails that will trigger more stress.     He is working with PT following his shoulder surgery and feeling better. Currently using sling that uses water / cold compress to help.     Treatment plan:  Target symptoms: anxiety , work stress  Why chosen therapy is appropriate versus another modality: relevant to diagnosis, patient responds to this modality  Outcome monitoring methods: self-report, observation  Therapeutic intervention type: insight oriented psychotherapy, behavior modifying psychotherapy, supportive psychotherapy    Risk parameters:  Patient reports no suicidal ideation  Patient reports no homicidal ideation  Patient reports no self-injurious behavior  Patient reports no violent behavior    Verbal deficits: None    Patient's response to intervention:  The patient's response to intervention is accepting, motivated.    Progress toward goals and other mental status changes:  The patient's progress toward goals is good.    Diagnosis:     ICD-10-CM ICD-9-CM   1. Post concussion syndrome  F07.81 310.2   2. Adjustment disorder with mixed anxiety and depressed mood  F43.23 309.28       Plan:  individual psychotherapy    Return to clinic: 2 months    Length of Service (minutes): 60

## 2024-02-03 NOTE — PROGRESS NOTES
Patient ID:   Dominic Collazo is a 43 y.o. male.    Chief Complaint:   7w 1d s/p L posterosuperior labral repair    HPI:   Patient is returning. He is doing okay. Pain level is 5/10. He feels like he is making progress.     Medications:    Current Outpatient Medications:     ALPRAZolam (XANAX) 1 MG tablet, Take 1 tablet (1 mg total) by mouth 2 (two) times daily as needed for Anxiety., Disp: 60 tablet, Rfl: 1    armodafiniL (NUVIGIL) 250 mg tablet, Take 1 tablet (250 mg total) by mouth once daily., Disp: 30 tablet, Rfl: 3    blood sugar diagnostic (ONETOUCH VERIO TEST STRIPS) Strp, 1 each by Misc.(Non-Drug; Combo Route) route 3 (three) times daily., Disp: 100 each, Rfl: 11    buPROPion (WELLBUTRIN XL) 150 MG TB24 tablet, Take 1 tablet (150 mg total) by mouth once daily., Disp: 90 tablet, Rfl: 1    busPIRone (BUSPAR) 10 MG tablet, Take 2 tablets (20 mg total) by mouth 2 (two) times daily., Disp: 180 tablet, Rfl: 1    cetirizine HCl (ZYRTEC ORAL), Take 1 tablet by mouth daily as needed., Disp: , Rfl:     cyanocobalamin (VITAMIN B-12) 1000 MCG tablet, Take 100 mcg by mouth once daily., Disp: , Rfl:     ergocalciferol, vitamin D2, (VITAMIN D ORAL), Take 5,000 Units by mouth once daily., Disp: , Rfl:     fexofenadine (ALLEGRA) 60 MG tablet, Take 60 mg by mouth every morning., Disp: , Rfl:     iron,carb/vit C/vit B12/folic (IRON 100 PLUS ORAL), Take by mouth once daily at 6am., Disp: , Rfl:     lancets Misc, To check BG 2 times daily, Disp: 200 each, Rfl: 3    losartan (COZAAR) 25 MG tablet, Take 1 tablet (25 mg total) by mouth once daily., Disp: 30 tablet, Rfl: 11    metFORMIN (GLUCOPHAGE-XR) 500 MG ER 24hr tablet, Take 1 tablet (500 mg total) by mouth 2 (two) times daily with meals., Disp: 180 tablet, Rfl: 3    methocarbamoL (ROBAXIN) 750 MG Tab, TAKE 1 TABLET(750 MG) BY MOUTH FOUR TIMES DAILY AS NEEDED FOR MUSCLE STRAIN, Disp: 40 tablet, Rfl: 3    metoprolol succinate (TOPROL-XL) 100 MG 24 hr tablet, Take 1  "tablet (100 mg total) by mouth once daily., Disp: 90 tablet, Rfl: 3    multivitamin capsule, Take 1 capsule by mouth once daily., Disp: , Rfl:     riboflavin, vitamin B2, (VITAMIN B-2 ORAL), Take 1 tablet by mouth once daily., Disp: , Rfl:     rosuvastatin (CRESTOR) 10 MG tablet, Take 1 tablet by mouth once daily., Disp: 90 tablet, Rfl: 3    scopolamine (TRANSDERM-SCOP) 1.3-1.5 mg (1 mg over 3 days), Place 1 patch onto the skin every 72 hours., Disp: 4 patch, Rfl: 0    semaglutide (OZEMPIC) 2 mg/dose (8 mg/3 mL) PnIj, Inject 2 mg into the skin every 7 days., Disp: 9 mL, Rfl: 3    SUNOSI 150 mg Tab, Take 1 tablet (150 mg) by mouth once daily in the morning., Disp: 30 tablet, Rfl: 5    testosterone cypionate (DEPOTESTOTERONE CYPIONATE) 200 mg/mL injection, Inject 1 mL (200 mg total) into the muscle every 7 days., Disp: 5 mL, Rfl: 3    tiZANidine (ZANAFLEX) 4 MG tablet, Take 0.5 tablets (2 mg total) by mouth nightly as needed., Disp: 30 tablet, Rfl: 5    venlafaxine (EFFEXOR-XR) 150 MG Cp24, Take 1 capsule (150 mg total) by mouth once daily., Disp: 90 capsule, Rfl: 1    zolpidem (AMBIEN CR) 12.5 MG CR tablet, Take 1 tablet (12.5 mg total) by mouth nightly as needed for Insomnia., Disp: 30 tablet, Rfl: 3    blood-glucose meter kit, To check BG 2 times daily, to use with insurance preferred meter, Disp: 1 each, Rfl: 0  No current facility-administered medications for this visit.    Facility-Administered Medications Ordered in Other Visits:     lactated ringers infusion, , Intravenous, Continuous, Stan Marcos DNP    LIDOcaine (PF) 10 mg/ml (1%) injection 10 mg, 1 mL, Intradermal, Once, Stan Marcos DNP    Allergies:  Review of patient's allergies indicates:   Allergen Reactions    Ciprofloxacin      swelling    Penicillins Rash    Sulfa (sulfonamide antibiotics) Rash    Toradol [ketorolac] Nausea Only    Bell pepper Nausea Only    Meloxicam Nausea Only    Sulfamethoxazole-trimethoprim        Vitals:  Ht 6' 3" " (1.905 m)   Wt (!) 153 kg (337 lb 4.9 oz)   BMI 42.16 kg/m²     Physical Examination:  Ortho Exam   Left shoulder exam:  ROM: elevation 160, ER 30  5/5 RTC strength    Assessment:  1. Status post labral repair of shoulder        Plan:  - Continue PT  - D/C sling  - RTC in 6 weeks       No follow-ups on file.

## 2024-02-06 NOTE — PROGRESS NOTES
[facility]  Occupational Therapy Treatment Note     Date: 2/7/2024  Name: Dominic Collazo  Clinic Number: [id number]    Therapy Diagnosis:   Encounter Diagnoses   Name Primary?    Post concussion syndrome Yes    Deficient smooth pursuit eye movements     Saccadic eye movement deficiency     Convergence insufficiency      Physician: Keshav Singh MD    Physician Orders: Eval and Treat - Vestibular Therapy   Medical Diagnosis:   S06.0X9S (ICD-10-CM) - Concussion with loss of consciousness, sequela   H51.11 (ICD-10-CM) - Convergence insufficiency   Evaluation Date: 10/9/2023  Insurance Authorization Period Expiration: 12/31/2024  Plan of Care Expiration: 3/1/2024  Progress Note Due: D/C scheduled 2/26/2024   Date of Return to MD: 1/24/2023 Dr. Singh   FOTO: 2/2    Visit # / Visits authorized: 1 / 20 (Total Visits 2023: 7 including eval) Private Insurance  Time In: 0803  Time Out: 0847  Total Billable Time: 44 minutes    Precautions:  Standard    SUBJECTIVE     Pt reports: Overall he is feeling good. Symptoms are better. He has been doing a lot of memory exercises, which haven't been bothersome. He has been attempting to read in bed 5-10 minutes at a time but left shoulder pain is limiting his ability to hold his book comfortably. At work, he has been at the computer but not as often as normal because he has been in a lot of meetings. He stated that he used to feel eye fatigue throughout daily activities, but now he is only noting occasional and mild eye fatigue with some eye exercises, specifically vertical saccades. Close up work is a little bothersome still. Pt stated he is using eye ergonomics to decrease stress on eyes. He hasn't used blue light glasses but he adjusts his screen settings and paces.   He was compliant with home exercise program given last session.   Response to previous treatment: positive   Functional change: decreased symptoms     Date of Onset: 7/23/2023  "    Pain: 5/10  Location: Left shoulder     Patient's Goals for Therapy: "to get back to normal"     OBJECTIVE     Objective Measures updated at progress report on 1/29/2024.    Treatment     Dominic received the treatments listed below:     Neuromuscular re-education activities to improve oculomotor function for 36 minutes. The following activities were included:  - Pencil push ups holding near point x5 sec, 2 x 10   - Cristofer string, 2 x 10   - Saccades with identification component, horizontal/vertical/diagonal directions, 1 x 30 seconds each using 'Spot It' cards     Therapeutic activities to improve functional performance for 8  minutes, including:  - Walking with reciprocal ball toss combined with letter identification using lettered soccer ball, 4 laps x 80 ft     Pt Education:  - Pt again encouraged to gradually return to reading and to utilize different modifications, such as a lap table, to maximize comfort/decrease stress on left shoulder when holding book  - Educated pt on how to make a cristofer string to practice convergence     Patient Education and Home Exercises      Education provided:   - HEP   - Progress towards goals     Written Home Exercises Provided: horizontal/vertical smooth pursuits, horizontal/vertical saccades, and convergence pencil pushups.  Exercises were reviewed and Dominic was able to demonstrate them prior to the end of the session.    Dominic demonstrated good  understanding of the education provided.      See EMR under Patient Instructions for exercises provided  10/27/2023: (Pursuits, saccades, pencil push ups)  11/3/2023: added diagonal pursuits and saccades  12/28/2023: Saccades at 120 bpm; pursuits and saccades x 45 sec each direction, pencil push ups; eye ergonomics    2/7/2024: Cristofer string (optional)     ASSESSMENT     Pt tolerated today's session well. Overall symptoms are improving. Right eye is still more difficult to converge during convergence exercises. No real increase " in symptoms with pursuits this date. Mild onset headache (1/10) was more so contributed to neck pain. Additional goals were met today; updates can be seen below. Pt would continue to benefit from skilled occupational therapy services for pt education/guidance with gradual return to daily/leisure/work activities, HEP guidance, and prepare for discharge.     Dominic is progressing well towards his goals and there are no updates to goals at this time. Pt prognosis is Good.     Pt will continue to benefit from skilled outpatient occupational therapy to address the deficits listed in the problem list on initial evaluation provide pt/family education and to maximize pt's level of independence in the home and community environment.     Pt's spiritual, cultural and educational needs considered and pt agreeable to plan of care and goals.    Anticipated barriers to occupational therapy: none noted    Goals:  Short Term Goals: 4 weeks   1) Pt will tolerate oculomotor and/or habitation home exercise/activity program, reporting at least 50% compliance. MET 12/13/2023  2) Pt will verbalize eye ergonomics to decrease stress on eyes. MET 2/7/2024  3) Pt will demonstrate ability to track single target WFL, in all directions x30 seconds, without increase in headache or nausea, to improve skills needed for technology use and scanning environment. MET 2/7/2024  4) Pt to perform saccades WFL, in all directions x30 seconds, at 90 bpm without increase in headache, dizziness, or nausea, to improve skills needed for reading. ongoing  5) Pt will report 60 minutes of computer use without eye fatigue, double vision, or increase in headache/dizziness, utilizing activity pacing strategies, needed for return to work at [employer]. ongoing  6) Near point convergence will decrease to 20 cm or less to improve oculomotor coordination and ability to fixate on close up work. MET 12/13/2023  7) Pt will demonstrate ability to fixate/attend to close up tasks x  30 minutes without onset of headache or eye fatigue. ongoing     Long Term Goals: 11 weeks   1) Pt will be independent with oculomotor and/or habituation home exercise/activity program. MET 2/7/2024; progressing as needed    2) Pt will demonstrate ability to track single target WFL, in all directions x1 minute, without increase in headache or nausea, to improve skills needed for technology use and scanning environment. MET 2/7/2024  3) Pt to perform saccades WFL, in all directions x1 minute, at 110 bpm without increase in headache, dizziness, or nausea, to improve skills needed for reading. ongoing  4) Pt will report 90 minutes of computer use without eye fatigue, double vision, or increase in headache/dizziness, utilizing activity pacing strategies, needed for return to work at [employer]. ongoing  5) Near point convergence will decrease to 15 cm or less to improve oculomotor coordination and ability to fixate on close up work. MET 12/13/2023  6) Pt will demonstrate ability to fixate/attend to close up tasks x 60 minutes without onset of headache or eye fatigue. ongoing    PLAN     Updated Certification Period: 1/29/2024 to 3/1/2024  Recommended Treatment Plan: 1 times per week for 4 weeks: Neuromuscular Re-ed, Patient Education, Self Care, Therapeutic Activities, and Therapeutic Exercise     Updates/grading next/future session: oculomotor - assess and update goals; gradual return to activity       Seema Ng OT

## 2024-02-07 ENCOUNTER — CLINICAL SUPPORT (OUTPATIENT)
Dept: REHABILITATION | Facility: HOSPITAL | Age: 44
End: 2024-02-07
Payer: COMMERCIAL

## 2024-02-07 DIAGNOSIS — R68.89 IMPAIRED FUNCTION OF UPPER EXTREMITY: ICD-10-CM

## 2024-02-07 DIAGNOSIS — H55.81 SACCADIC EYE MOVEMENT DEFICIENCY: ICD-10-CM

## 2024-02-07 DIAGNOSIS — H51.11 CONVERGENCE INSUFFICIENCY: ICD-10-CM

## 2024-02-07 DIAGNOSIS — M25.612 IMPAIRED RANGE OF MOTION OF LEFT SHOULDER: Primary | ICD-10-CM

## 2024-02-07 DIAGNOSIS — H55.82 DEFICIENT SMOOTH PURSUIT EYE MOVEMENTS: ICD-10-CM

## 2024-02-07 DIAGNOSIS — F07.81 POST CONCUSSION SYNDROME: Primary | ICD-10-CM

## 2024-02-07 DIAGNOSIS — R29.898 IMPAIRED STRENGTH OF UPPER EXTREMITY: ICD-10-CM

## 2024-02-07 PROCEDURE — 97112 NEUROMUSCULAR REEDUCATION: CPT | Mod: PO

## 2024-02-07 PROCEDURE — 97110 THERAPEUTIC EXERCISES: CPT | Mod: PO

## 2024-02-07 PROCEDURE — 97530 THERAPEUTIC ACTIVITIES: CPT | Mod: PO

## 2024-02-07 NOTE — PROGRESS NOTES
"[facility] OUTPATIENT THERAPY AND WELLNESS   Physical Therapy Treatment Note      Name: Dominic Collazo  Clinic Number: [id number]    Therapy Diagnosis:   Encounter Diagnoses   Name Primary?    Impaired range of motion of left shoulder Yes    Impaired strength of upper extremity     Impaired function of upper extremity          Physician: Sea Valadez MD    Visit Date: 2/7/2024     Physician Orders: PT Eval and Treat   NOTE: 2-3 x per week x 6 weeks     Protocol = [email or web address]  Medical Diagnosis from Referral:   S43.432A (ICD-10-CM) - Paralabral cyst of left shoulder, initial encounter   S43.432A (ICD-10-CM) - Superior glenoid labrum lesion of left shoulder, initial encounter      Evaluation Date: 12/29/2023  Authorization Period Expiration: 12/31/24  Plan of Care Expiration: 2/23/24  Progress Note Due: 2/29/24  Date of Surgery: 12/14/23  Visit # / Visits authorized: 5/ 20  FOTO: 30.29 @ eval   Precautions: see protocol       Time In:  9:00 AM  Time Out: 9:45 AM  Total Billable Time: 45 minutes        PTA Visit #: 0/5                          Subjective     Patient reports: he saw the surgeon last Friday and they removed the sling. He states that he is a little more pain since out of the sling.    He was compliant with home exercise program.  Response to previous treatment: ongoing   Functional change: ongoing     Pain: 6/10   Location: left shoulder      Objective      Not tested       Treatment     Dominic received the treatments listed below:        Bold=performed    therapeutic exercises to develop strength, endurance, ROM, flexibility, and posture for 15 minutes including:  +supine dowel flexion AAROM 2x10 3" holds  +Supine dowel abduction AAROM 2x10 3" holds  +supine dowel ER AAROM 2x10 3" holds  Elbow AROM- flexion and extension in seated with towel under arm 2 x 10  Wrist AROM- flex/ext and pronation/supination " "Flexion/extension 2 x 10  Ball squeeze in sling x 2 min seated in chair supported  Shoulder shrug 2 x 10 seated in chair with   U. Trap Stretch 2 x 30 sec   Pendulums - CCW, CW and lateral x20 each direction- holding 2 lb dumbbell to assist with distraction              Lateral pendulums performed seated DNP 1/3/24      TIME + ICE 10 mins, left shoulder     manual therapy techniques: PROM were applied to the: L+ shoulder for 10 minutes following protocol  PROM with very slight distraction and oscillation- flexion, abduction, and ER  Gr I-II  inferior mobs       neuromuscular re-education activities to improve: muscle re-education and motor control of L+ UE, scapula for 20 minutes. The following activities were included:  +prone scapular retraction 2x10 3-5" holds  +prone rows 2x10  +prone horizontal abd 2x10  +prone extensions 2x10  Submax left shoulder  isometrics- flex abd, ext, IR, and ER x 10 w/ 5 sec hold - PT hand used to ensure submax pressure and not max   Gentle Scap squeeze - small range 3 x 10  Cervical Isometrics x 10 flex/ext/c. Rotation B+      Patient Education and Home Exercises       Education provided:   - progressions per protocol     Written Home Exercises Provided: Patient instructed to cont prior HEP. Exercises were reviewed and Dominic was able to demonstrate them prior to the end of the session.  Dominic demonstrated good  understanding of the education provided. See Electronic Medical Record under Patient Instructions for exercises provided during therapy sessions    Assessment     Pt is approximately 7 weeks SP LEFT LABRAL REPAIR. Pt is making positive progress with improving left shoulder PROM. He is tolerating exercise progressions well. Plan to progress per surgical to improve shoulder ROM and strength for return to function.       Dominic Is progressing well towards his goals.   Patient prognosis is Excellent.   Patient will continue to benefit from skilled outpatient physical therapy " to address the deficits listed in the problem list box on initial evaluation, provide pt/family education and to maximize pt's level of independence in the home and community environment.     Goals: All Goals are in progress and remain appropriate 1/3/24     Short Term Goals 4 weeks   1. Pt will be independent with HEP to supplement PT in improving functional use of LUE.- MET  2. Pt will increase pain free left shoulder PROM in all planes to WNL to improve functional mobility of UE- MET  3. Formally assess strength when allowed and set goals accordingly  4. Pt will adhere to post op precautions to allow for proper tissue healing- MET     Long Term Goals 8 weeks   1.  Pt will have full PROM of left shoulder to show improvements in overall movement patterns.   2. Pt to show proper postural awareness with no VC from PT  3. Pt to have good tolerance to AAROM and AROM activities per protocol - progressing well  4. Patient will increase left shoulder strength to >/=4/5     Goals to be updated as patient progresses post operatively.       Plan   Cont current Tx per protocol and patient tolerance     Yousuf More, PT

## 2024-02-14 ENCOUNTER — PATIENT MESSAGE (OUTPATIENT)
Dept: SLEEP MEDICINE | Facility: CLINIC | Age: 44
End: 2024-02-14
Payer: COMMERCIAL

## 2024-02-14 ENCOUNTER — CLINICAL SUPPORT (OUTPATIENT)
Dept: REHABILITATION | Facility: HOSPITAL | Age: 44
End: 2024-02-14
Payer: COMMERCIAL

## 2024-02-14 DIAGNOSIS — R68.89 IMPAIRED FUNCTION OF UPPER EXTREMITY: ICD-10-CM

## 2024-02-14 DIAGNOSIS — M25.612 IMPAIRED RANGE OF MOTION OF LEFT SHOULDER: Primary | ICD-10-CM

## 2024-02-14 DIAGNOSIS — R29.898 IMPAIRED STRENGTH OF UPPER EXTREMITY: ICD-10-CM

## 2024-02-14 PROCEDURE — 97112 NEUROMUSCULAR REEDUCATION: CPT | Mod: PO

## 2024-02-14 PROCEDURE — 97140 MANUAL THERAPY 1/> REGIONS: CPT | Mod: PO

## 2024-02-14 PROCEDURE — 97014 ELECTRIC STIMULATION THERAPY: CPT | Mod: PO

## 2024-02-14 PROCEDURE — 97110 THERAPEUTIC EXERCISES: CPT | Mod: PO

## 2024-02-14 RX ORDER — SOLRIAMFETOL 150 MG/1
150 TABLET, FILM COATED ORAL DAILY
Qty: 30 TABLET | Refills: 5 | OUTPATIENT
Start: 2024-02-14

## 2024-02-14 NOTE — TELEPHONE ENCOUNTER
Requested Prescriptions     Pending Prescriptions Disp Refills    SUNOSI 150 mg Tab 30 tablet 5     Sig: Take 1 tablet (150 mg) by mouth once daily in the morning.     Lov 10/13/22

## 2024-02-14 NOTE — PROGRESS NOTES
"[facility]  Occupational Therapy Treatment Note     Date: 2/15/2024  Name: Dominic Oneill [place]  Clinic Number: [id number]    Therapy Diagnosis:   Encounter Diagnoses   Name Primary?    Post concussion syndrome Yes    Deficient smooth pursuit eye movements     Saccadic eye movement deficiency     Convergence insufficiency        Physician: Keshav Singh MD    Physician Orders: Eval and Treat - Vestibular Therapy   Medical Diagnosis:   S06.0X9S (ICD-10-CM) - Concussion with loss of consciousness, sequela   H51.11 (ICD-10-CM) - Convergence insufficiency   Evaluation Date: 10/9/2023  Insurance Authorization Period Expiration: 12/31/2024  Plan of Care Expiration: 3/1/2024  Progress Note Due: D/C scheduled 2/26/2024   Date of Return to MD: 1/24/2023 Dr. Singh   FOTO: 2/2    Visit # / Visits authorized: 2 / 20 (Total Visits 2023: 7 including eval) Private Insurance  Time In: 0806  Time Out: 0848  Total Billable Time: 42 minutes    Precautions:  Standard    SUBJECTIVE     Pt reports: That he made a Wm String and he likes it. He also got some Spot It cards because he liked the challenge. He is still working on his ethan to do word searches, find the hidden pictures, and other games to challenge visual/perceptual skills and memory. He is taking breaks when he feels his eye starts to twitch. He reported that it still takes him a second to readjust when something comes into his visual field that is unexpected.   He was compliant with home exercise program given last session.   Response to previous treatment: positive   Functional change: decreased symptoms     Date of Onset: 7/23/2023     Pain: Not numerically rated today, but pt reported pain   Location: Left shoulder     Patient's Goals for Therapy: "to get back to normal"     OBJECTIVE     Objective Measures updated at progress report on 1/29/2024.    Treatment     Dominic received the treatments listed below:     Neuromuscular " re-education activities to improve oculomotor function for 17 minutes. The following activities were included:  - Saccades, horizontal/vertical/diagonal directions, 1 x 1 minute each at 120 bpm   - Gaze shifts between near and far targets to identify similarities on 'Spot It' cards, 4 x 30 seconds  - Wm string, 1 x 10     Therapeutic activities to improve functional performance for 25 minutes, including:  Pt Education:  - Discussed importance of pacing including: importance of taking a break prior to needing a break (eye twitching), setting a timer/alarm to help with pacing reminders, establishing a baseline for computer use and gradually progress   - Discussed upcoming discharge and that ongoing HEP/progressions with be thoroughly reviewed next session   - Discussed using word searches to challenge visual scanning and gaze shifts  - Encouraged pt to continue working on phone ethan to challenge visual perceptual skills and cognition   - Discussed various ways that activities and home exercises are progressed depending on pt complaints; discussed specific patient centered progressions   - Pt educated on importance of continuing to implement eye ergonomic strategies even after return to PLOF  - Encouraged pt to incorporate activities that challenge spontaneous objects coming into visual field such as pitch & catch with 2 children     Patient Education and Home Exercises      Education provided:   - HEP   - Progress towards goals     Written Home Exercises Provided: horizontal/vertical smooth pursuits, horizontal/vertical saccades, and convergence pencil pushups.  Exercises were reviewed and Dominic was able to demonstrate them prior to the end of the session.    Dominic demonstrated good  understanding of the education provided.      See EMR under Patient Instructions for exercises provided  10/27/2023: (Pursuits, saccades, pencil push ups)  11/3/2023: added diagonal pursuits and saccades  12/28/2023: Saccades at 120  bpm; pursuits and saccades x 45 sec each direction, pencil push ups; eye ergonomics    2/7/2024: Wm string (optional)     ASSESSMENT     Pt tolerated today's session well, and symptoms are continuing to improve. Discharge is scheduled for next session. Pt was receptive to all education topics discussed and did well with all oculomotor exercises/activities completed. Pt without increase in symptoms. Pt would benefit from one additional OT session for formal reassessment, discharge, and pt education to discuss ongoing HEP with any necessary changes.    Dominic is progressing well towards his goals and there are no updates to goals at this time. Pt prognosis is Good.     Pt will continue to benefit from skilled outpatient occupational therapy to address the deficits listed in the problem list on initial evaluation provide pt/family education and to maximize pt's level of independence in the home and community environment.     Pt's spiritual, cultural and educational needs considered and pt agreeable to plan of care and goals.    Anticipated barriers to occupational therapy: none noted    Goals:  Short Term Goals: 4 weeks   1) Pt will tolerate oculomotor and/or habitation home exercise/activity program, reporting at least 50% compliance. MET 12/13/2023  2) Pt will verbalize eye ergonomics to decrease stress on eyes. MET 2/7/2024  3) Pt will demonstrate ability to track single target WFL, in all directions x30 seconds, without increase in headache or nausea, to improve skills needed for technology use and scanning environment. MET 2/7/2024  4) Pt to perform saccades WFL, in all directions x30 seconds, at 90 bpm without increase in headache, dizziness, or nausea, to improve skills needed for reading. MET 2/15/2024  5) Pt will report 60 minutes of computer use without eye fatigue, double vision, or increase in headache/dizziness, utilizing activity pacing strategies, needed for return to work at [employer]. ongoing  6) Near  point convergence will decrease to 20 cm or less to improve oculomotor coordination and ability to fixate on close up work. MET 12/13/2023  7) Pt will demonstrate ability to fixate/attend to close up tasks x 30 minutes without onset of headache or eye fatigue. ongoing     Long Term Goals: 11 weeks   1) Pt will be independent with oculomotor and/or habituation home exercise/activity program. MET 2/7/2024; progressing as needed    2) Pt will demonstrate ability to track single target WFL, in all directions x1 minute, without increase in headache or nausea, to improve skills needed for technology use and scanning environment. MET 2/7/2024  3) Pt to perform saccades WFL, in all directions x1 minute, at 110 bpm without increase in headache, dizziness, or nausea, to improve skills needed for reading. MET 2/15/2024   4) Pt will report 90 minutes of computer use without eye fatigue, double vision, or increase in headache/dizziness, utilizing activity pacing strategies, needed for return to work at [employer]. ongoing  5) Near point convergence will decrease to 15 cm or less to improve oculomotor coordination and ability to fixate on close up work. MET 12/13/2023  6) Pt will demonstrate ability to fixate/attend to close up tasks x 60 minutes without onset of headache or eye fatigue. ongoing    PLAN     Updated Certification Period: 1/29/2024 to 3/1/2024  Recommended Treatment Plan: 1 times per week for 4 weeks: Neuromuscular Re-ed, Patient Education, Self Care, Therapeutic Activities, and Therapeutic Exercise     Updates/grading next/future session: discharge        Seema Ng, OT

## 2024-02-14 NOTE — PROGRESS NOTES
[facility]   Physical Therapy Treatment Note      Name: Dominic Collazo  [facility] Number: [id number]    Therapy Diagnosis:   Encounter Diagnoses   Name Primary?    Impaired range of motion of left shoulder Yes    Impaired strength of upper extremity     Impaired function of upper extremity          Physician: Sea Valadez MD    Visit Date: 2/14/2024     Physician Orders: PT Eval and Treat   NOTE: 2-3 x per week x 6 weeks     Protocol = [email or web address]  Medical Diagnosis from Referral:   S43.432A (ICD-10-CM) - Paralabral cyst of left shoulder, initial encounter   S43.432A (ICD-10-CM) - Superior glenoid labrum lesion of left shoulder, initial encounter      Evaluation Date: 12/29/2023  Authorization Period Expiration: 12/31/24  Plan of Care Expiration: 2/23/24  Progress Note Due: 2/29/24  Date of Surgery: 12/14/23  Visit # / Visits authorized: 5/ 20  FOTO: 30.29 @ eval / follow up visit taken 2/14/24 w/ score 40%  Precautions: see protocol       Time In:  10:00 AM  Time Out: 11:05 AM  Total Billable Time: 65 minutes                      Subjective     Patient reports: Overall, he is doing well. He has experienced a few episodes where he suddenly actively reaches to catch something that may be falling, and this does cause tolerable pain. He states that he is not lifting anything weighted. He also reports frequent use of ice on his shoulder. He is taking new muscle relaxer's that are helping with overall pain and discomfort in L+ shoulder girdle. Pt points to his upper trap, lev, scap and rhomboids on L+ only stating aching pain. He refers to this area as his neck  He was compliant with home exercise program.  Response to previous treatment: ongoing   Functional change: ongoing     Pain: 6/10   Location: left shoulder      Objective      Not tested       Treatment     Dominic received the treatments listed  "below:        Bold=performed    therapeutic exercises to develop strength, endurance, ROM, flexibility, and posture for 15 minutes including:  supine dowel flexion AAROM 2x10 3" holds  Supine dowel abduction AAROM 2x10 3" holds  supine dowel ER AAROM 2x10 3" holds    manual therapy techniques: PROM were applied to the: L+ shoulder for 10 minutes following protocol  PROM with very slight distraction and oscillation- flexion, abduction, and ER  Scapular mobs all 4 directions in SL   Passive stretching to L+ upper trap     neuromuscular re-education activities to improve: muscle re-education and motor control of L+ UE, scapula for 23 minutes. Performed with NMES on L+ interscap musculature for feedback 5sec on/5 off, 2 sec ramp.  The following activities were included:  prone scapular retraction 2x10 3-5" holds  prone rows 2x10  prone horizontal abd 2x10  prone extensions 2x10  Gentle Scap squeeze - small range 3 x 10    CP x 10 min followed by MHP x 8min after Tx to reduce pain/inflammation with use of IFC     Patient Education and Home Exercises       Education provided:   - Discomfort in L+ shoulder/neck area likely due to surgical repair    Written Home Exercises Provided: Patient instructed to cont prior HEP. Exercises were reviewed and Dominic was able to demonstrate them prior to the end of the session.  Dominic demonstrated good  understanding of the education provided. See Electronic Medical Record under Patient Instructions for exercises provided during therapy sessions    Assessment   Pt is making excellent progress. NMES was initiated today due to increased difficulty isolating interscap musculature during AROM scap therex. PT should be appropriate for advanced therex next Tx      Dominic Is progressing well towards his goals.   Patient prognosis is Excellent.   Patient will continue to benefit from skilled outpatient physical therapy to address the deficits listed in the problem list box on initial " evaluation, provide pt/family education and to maximize pt's level of independence in the home and community environment.     Goals: All Goals are in progress and remain appropriate 1/14/24     Short Term Goals 4 weeks   1. Pt will be independent with HEP to supplement PT in improving functional use of LUE.- MET  2. Pt will increase pain free left shoulder PROM in all planes to WNL to improve functional mobility of UE- MET  3. Formally assess strength when allowed and set goals accordingly  4. Pt will adhere to post op precautions to allow for proper tissue healing- MET     Long Term Goals 8 weeks   1.  Pt will have full PROM of left shoulder to show improvements in overall movement patterns.   2. Pt to show proper postural awareness with no VC from PT  3. Pt to have good tolerance to AAROM and AROM activities per protocol - progressing well  4. Patient will increase left shoulder strength to >/=4/5     Goals to be updated as patient progresses post operatively.       Plan   Cont current Tx per protocol and patient tolerance   Add Next Tx: Pulley seated, TBAND step outs all 4 directions (no IR) of GH Joint , Sidelying abduction    Lorena Walker, PT

## 2024-02-15 ENCOUNTER — PATIENT MESSAGE (OUTPATIENT)
Dept: PSYCHIATRY | Facility: CLINIC | Age: 44
End: 2024-02-15
Payer: COMMERCIAL

## 2024-02-15 ENCOUNTER — CLINICAL SUPPORT (OUTPATIENT)
Dept: REHABILITATION | Facility: HOSPITAL | Age: 44
End: 2024-02-15
Payer: COMMERCIAL

## 2024-02-15 ENCOUNTER — TELEPHONE (OUTPATIENT)
Dept: PSYCHIATRY | Facility: CLINIC | Age: 44
End: 2024-02-15
Payer: COMMERCIAL

## 2024-02-15 DIAGNOSIS — H55.81 SACCADIC EYE MOVEMENT DEFICIENCY: ICD-10-CM

## 2024-02-15 DIAGNOSIS — H51.11 CONVERGENCE INSUFFICIENCY: ICD-10-CM

## 2024-02-15 DIAGNOSIS — H55.82 DEFICIENT SMOOTH PURSUIT EYE MOVEMENTS: ICD-10-CM

## 2024-02-15 DIAGNOSIS — F07.81 POST CONCUSSION SYNDROME: Primary | ICD-10-CM

## 2024-02-15 PROCEDURE — 97530 THERAPEUTIC ACTIVITIES: CPT | Mod: PO

## 2024-02-15 PROCEDURE — 97112 NEUROMUSCULAR REEDUCATION: CPT | Mod: PO

## 2024-02-16 NOTE — PROGRESS NOTES
[facility]   Physical Therapy Treatment Note      Name: Dominic Collazo  [facility] Number: [id number]    Therapy Diagnosis:   Encounter Diagnoses   Name Primary?    Impaired range of motion of left shoulder Yes    Impaired strength of upper extremity     Impaired function of upper extremity            Physician: Sea Valadez MD    Visit Date: 2/19/2024     Physician Orders: PT Eval and Treat   NOTE: 2-3 x per week x 6 weeks     Protocol = [email or web address]  Medical Diagnosis from Referral:   S43.432A (ICD-10-CM) - Paralabral cyst of left shoulder, initial encounter   S43.432A (ICD-10-CM) - Superior glenoid labrum lesion of left shoulder, initial encounter      Evaluation Date: 12/29/2023  Authorization Period Expiration: 12/31/24  Plan of Care Expiration: 2/23/24  Progress Note Due: 2/29/24  Date of Surgery: 12/14/23  Visit # / Visits authorized: 6/ 20  FOTO: 30.29 @ eval / follow up visit taken 2/14/24 w/ score 40%  Precautions: see protocol       Time In:  805 AM  Time Out: 8:47 AM  Total Billable Time: 42 minutes (TE-2, MT-1)                     Subjective       Patient is 9 and a half weeks post op .      Patient reports: today specifically  he is sore as he did a couple things last night he should not have done around the house. He is still having trouble reaching for top shelf.  He was compliant with home exercise program.  Response to previous treatment: when he walked out he felt great but then later on it hurt  Functional change: ongoing, consistent burning pain is lowering     Pain: 6/10   Location: left shoulder      Objective        Seated measurements at beginning of session prior to activity or manual therapy:  AROM flexion: 130 deg   AROM abduction: 105 deg  AROM ER at 90 deg abd: 30 deg    Supine measurements after manual therapy interventions:  AROM flexion: 165 deg  AROM abduction: 155  "deg   AROM ER at 90 de deg         Treatment     Dominic received the treatments listed below:          Bold=performed    therapeutic exercises to develop strength, endurance, ROM, flexibility, and posture for 32 minutes including:  +Sidelying shoulder abduction 2x10 no weight  +sidelying flexion 2x10 no weight   +sidelying ER with 2 lb DB 2x10  +serratus punches 3x10 in supine- using wooden dowel  +pulleys 3 mins scaption  supine dowel flexion AAROM 2x10 3" holds  Supine dowel abduction AAROM 2x10 3" holds  supine dowel ER AAROM 2x10 3" holds    manual therapy techniques: PROM were applied to the: L+ shoulder for 10 minutes following protocol  PROM with very slight distraction and oscillation- flexion, abduction, and ER  Scapular mobs all 4 directions in SL   Passive stretching to L+ upper trap     neuromuscular re-education activities to improve: muscle re-education and motor control of L+ UE, scapula for 00 minutes. Performed with NMES on L+ interscap musculature for feedback 5sec on/5 off, 2 sec ramp.  The following activities were included:- not performed  prone scapular retraction 2x10 3-5" holds  prone rows 2x10  prone horizontal abd 2x10  prone extensions 2x10  Gentle Scap squeeze - small range 3 x 10    CP x 0 min followed by MHP x 8min after Tx to reduce pain/inflammation with use of IFC - not performed     Patient Education and Home Exercises       Education provided:   - progress to date and ROM  - form and technique with exercises   - updated HEP    Written Home Exercises Provided: yes. Exercises were reviewed and Dominic was able to demonstrate them prior to the end of the session.  Dominic demonstrated good  understanding of the education provided. See Electronic Medical Record under Patient Instructions for exercises provided during therapy sessions    Assessment     Dominic is 9 and a half weeks post op at this time. Patient shows good flexion and abduction AROM in supine after manual therapy " interventions. He was able to progress with several exercises at today's session. He is educated on proper scapular control and form with exercises as to not compensate with upper trap hiking.  He tolerates sidelying shoulder trio and supine serratus punches well. HEP is updated and reviewed with patient today. Dominic also  has a PT neck referral from Dr. Singh in the system. We discussed deferring whiplash/cervicalgia PT so that we can concentrate on his shoulder recovery and add this upon discharging shoulder rehab if needed. Pt is agreeable to this plan.      Dominic Is progressing well towards his goals.   Patient prognosis is Excellent.   Patient will continue to benefit from skilled outpatient physical therapy to address the deficits listed in the problem list box on initial evaluation, provide pt/family education and to maximize pt's level of independence in the home and community environment.     Goals: All Goals are in progress and remain appropriate      Short Term Goals 4 weeks   1. Pt will be independent with HEP to supplement PT in improving functional use of LUE.- MET  2. Pt will increase pain free left shoulder PROM in all planes to WNL to improve functional mobility of UE- MET  3. Formally assess strength when allowed and set goals accordingly  4. Pt will adhere to post op precautions to allow for proper tissue healing- MET     Long Term Goals 8 weeks   1.  Pt will have full PROM of left shoulder to show improvements in overall movement patterns.   2. Pt to show proper postural awareness with no VC from PT  3. Pt to have good tolerance to AAROM and AROM activities per protocol -MET  4. Patient will increase left shoulder strength to >/=4/5    Updated Goals:  5. Pt to improve shoulder flexion and abduction AROM to >/=160 deg in seated and supine positions to improve movement patterns  6. Pt to improve shoulder ER to >/=50 deg to improve AROM     Goals to be updated as patient progresses post  operatively.         Plan   Cont current Tx per protocol and patient tolerance       Stephany Styles, PT

## 2024-02-19 ENCOUNTER — CLINICAL SUPPORT (OUTPATIENT)
Dept: REHABILITATION | Facility: HOSPITAL | Age: 44
End: 2024-02-19
Payer: COMMERCIAL

## 2024-02-19 DIAGNOSIS — R29.898 IMPAIRED STRENGTH OF UPPER EXTREMITY: ICD-10-CM

## 2024-02-19 DIAGNOSIS — R68.89 IMPAIRED FUNCTION OF UPPER EXTREMITY: ICD-10-CM

## 2024-02-19 DIAGNOSIS — M25.612 IMPAIRED RANGE OF MOTION OF LEFT SHOULDER: Primary | ICD-10-CM

## 2024-02-19 PROCEDURE — 97140 MANUAL THERAPY 1/> REGIONS: CPT | Mod: PO

## 2024-02-19 PROCEDURE — 97110 THERAPEUTIC EXERCISES: CPT | Mod: PO

## 2024-02-19 RX ORDER — SOLRIAMFETOL 150 MG/1
150 TABLET, FILM COATED ORAL DAILY
Qty: 30 TABLET | Refills: 5 | OUTPATIENT
Start: 2024-02-19

## 2024-02-21 ENCOUNTER — PATIENT MESSAGE (OUTPATIENT)
Dept: REHABILITATION | Facility: HOSPITAL | Age: 44
End: 2024-02-21
Payer: COMMERCIAL

## 2024-02-21 RX ORDER — SOLRIAMFETOL 150 MG/1
150 TABLET, FILM COATED ORAL DAILY
Qty: 30 TABLET | Refills: 3 | Status: SHIPPED | OUTPATIENT
Start: 2024-02-21 | End: 2024-03-05

## 2024-02-21 RX ORDER — ARMODAFINIL 250 MG/1
250 TABLET ORAL DAILY
Qty: 30 TABLET | Refills: 3 | Status: SHIPPED | OUTPATIENT
Start: 2024-02-21 | End: 2024-03-05

## 2024-02-21 RX ORDER — SOLRIAMFETOL 150 MG/1
150 TABLET, FILM COATED ORAL DAILY
Qty: 30 TABLET | Refills: 5 | OUTPATIENT
Start: 2024-02-21

## 2024-02-22 ENCOUNTER — PATIENT MESSAGE (OUTPATIENT)
Dept: REHABILITATION | Facility: HOSPITAL | Age: 44
End: 2024-02-22
Payer: COMMERCIAL

## 2024-02-22 ENCOUNTER — CLINICAL SUPPORT (OUTPATIENT)
Dept: REHABILITATION | Facility: HOSPITAL | Age: 44
End: 2024-02-22
Payer: COMMERCIAL

## 2024-02-22 DIAGNOSIS — R68.89 IMPAIRED FUNCTION OF UPPER EXTREMITY: ICD-10-CM

## 2024-02-22 DIAGNOSIS — M25.612 IMPAIRED RANGE OF MOTION OF LEFT SHOULDER: Primary | ICD-10-CM

## 2024-02-22 DIAGNOSIS — R29.898 IMPAIRED STRENGTH OF UPPER EXTREMITY: ICD-10-CM

## 2024-02-22 PROCEDURE — 97112 NEUROMUSCULAR REEDUCATION: CPT | Mod: PO

## 2024-02-22 PROCEDURE — 97140 MANUAL THERAPY 1/> REGIONS: CPT | Mod: PO

## 2024-02-22 PROCEDURE — 97110 THERAPEUTIC EXERCISES: CPT | Mod: PO

## 2024-02-22 NOTE — PROGRESS NOTES
[facility]   Physical Therapy Treatment Note      Name: Dominic Collazo  Clinic Number: [id number]    Therapy Diagnosis:   Encounter Diagnoses   Name Primary?    Impaired range of motion of left shoulder Yes    Impaired strength of upper extremity     Impaired function of upper extremity              Physician: Sea Valadez MD    Visit Date: 2/22/2024     Physician Orders: PT Eval and Treat   NOTE: 2-3 x per week x 6 weeks     Protocol = [email or web address]  Medical Diagnosis from Referral:   S43.432A (ICD-10-CM) - Paralabral cyst of left shoulder, initial encounter   S43.432A (ICD-10-CM) - Superior glenoid labrum lesion of left shoulder, initial encounter      Evaluation Date: 12/29/2023  Authorization Period Expiration: 12/31/24  Plan of Care Expiration: 2/23/24  Progress Note Due: 2/29/24  Date of Surgery: 12/14/23 (10 weeks post op on 2/22/24)  Visit # / Visits authorized: 7/ 20  FOTO: 30.29 @ eval / follow up visit taken 2/14/24 w/ score 40%  Precautions: see protocol       Time In:  305 PM  Time Out: 352 PM  Total Billable Time: 47 minutes (TE-1, MT-1, NMR-1)                         Subjective       Patient is 10 weeks post op.      Patient reports: sore after last visit, he was sore the last 2 days from the new activities.   He was compliant with home exercise program.  Response to previous treatment: pain about an hour after the session  Functional change: ongoing     Pain: 4/10   Location: left shoulder      Objective              Treatment     Dominic received the treatments listed below:          Bold=performed    therapeutic exercises to develop strength, endurance, ROM, flexibility, and posture for 27 minutes including:  Sidelying shoulder abduction 2x10 no weight  sidelying flexion 2x10 no weight   sidelying ER with 2 lb DB 2x10  serratus punches 3x10 in supine- using wooden dowel  pulleys 3  "mins scaption  +UT stretch and LS stretch verbal demo  supine dowel flexion AAROM 2x10 3" holds  Supine dowel abduction AAROM 2x10 3" holds  supine dowel ER AAROM 2x10 3" holds    manual therapy techniques: PROM were applied to the: L+ shoulder for 10 minutes following protocol  PROM with very slight distraction and oscillation- flexion, abduction, and ER  GrI-II posterior and inferior GH mobs   Scapular mobs all 4 directions in SL   Passive stretching to L+ upper trap     neuromuscular re-education activities to improve: muscle re-education and motor control of L+ UE, scapula for 10 minutes. Performed with NMES on L+ interscap musculature for feedback 5sec on/5 off, 2 sec ramp.  The following activities were included  prone scapular retraction 2x10 3-5" holds  prone rows 2x10  prone horizontal abd 2x10  prone extensions 2x10  Gentle Scap squeeze - small range 3 x 10    CP x 0 min followed by MHP x -min after Tx to reduce pain/inflammation with use of IFC - not performed     Patient Education and Home Exercises       Education provided:   - progress to date and ROM  - form and technique with exercises   -continue HEP    Written Home Exercises Provided: Patient instructed to cont prior HEP. Exercises were reviewed and Dominic was able to demonstrate them prior to the end of the session.  Dominic demonstrated good  understanding of the education provided. See Electronic Medical Record under Patient Instructions for exercises provided during therapy sessions    Assessment     Dominic is 10 weeks post op at this time.He is continuing with great progress. His ROM is progressing appropriately. He tolerates sidelying shoulder trio well today with good scapular control. Attempted prone horizontal shoulder abduction with addition of external rotation but pt cannot perform with arm ER but can perform with arm in neutral. Educated patient on upper trap and levator scapulae stretches as he complains of some left sided neck " pain. Educated patient to remain compliant with HEP during this time to maintain improvements and good carryover.      Dominic Is progressing well towards his goals.   Patient prognosis is Excellent.   Patient will continue to benefit from skilled outpatient physical therapy to address the deficits listed in the problem list box on initial evaluation, provide pt/family education and to maximize pt's level of independence in the home and community environment.     Goals: All Goals are in progress and remain appropriate      Short Term Goals 4 weeks   1. Pt will be independent with HEP to supplement PT in improving functional use of LUE.- MET  2. Pt will increase pain free left shoulder PROM in all planes to WNL to improve functional mobility of UE- MET  3. Formally assess strength when allowed and set goals accordingly  4. Pt will adhere to post op precautions to allow for proper tissue healing- MET     Long Term Goals 8 weeks   1.  Pt will have full PROM of left shoulder to show improvements in overall movement patterns.   2. Pt to show proper postural awareness with no VC from PT  3. Pt to have good tolerance to AAROM and AROM activities per protocol -MET  4. Patient will increase left shoulder strength to >/=4/5    Updated Goals:  5. Pt to improve shoulder flexion and abduction AROM to >/=160 deg in seated and supine positions to improve movement patterns  6. Pt to improve shoulder ER to >/=50 deg to improve AROM     Goals to be updated as patient progresses post operatively.         Plan   Cont current Tx per protocol and patient tolerance       Stephany Styles, PT

## 2024-03-01 NOTE — PROGRESS NOTES
[facility]   Physical Therapy Treatment Note / Progress Note / Updated Plan of Care     Name: Dominic Collazo  Clinic Number: [id number]    Therapy Diagnosis:   Encounter Diagnoses   Name Primary?    Impaired range of motion of left shoulder Yes    Impaired strength of upper extremity     Impaired function of upper extremity                Physician: Sea Valadez MD    Visit Date: 3/4/2024     Physician Orders: PT Eval and Treat   NOTE: 2-3 x per week x 6 weeks     Protocol = [email or web address]  Medical Diagnosis from Referral:   S43.432A (ICD-10-CM) - Paralabral cyst of left shoulder, initial encounter   S43.432A (ICD-10-CM) - Superior glenoid labrum lesion of left shoulder, initial encounter      Evaluation Date: 2023  Authorization Period Expiration: 24  Plan of Care Expiration: 24  Progress Note Due: 24  Date of Surgery: 23 (11.5 weeks post op on )  Visit # / Visits authorized:   FOTO: 3rd FOTO performed 3/4/24  Precautions: see protocol       Time In: 7:03 AM  Time Out: 750 AM  Total Billable Time: 47 minutes (TE-2, MT-1)                         Subjective       Patient is 11.5 weeks post op.      Patient reports: had a work conference last week and has some pain in the shoulder. Continues to have trouble with reaching, some issues with getting dressed, reaching across the body. He follows up with Dr. Valadez on Friday.  He was compliant with home exercise program.  Response to previous treatment: felt fine   Functional change: day to day pain has lessened     Pain: 10   Location: left shoulder      Objective        AROM of the shoulder  AROM flexion: 150 deg  AROM abduction: 160 deg   AROM ER at 90 de deg     PROM: WNL abd and flexion, restricted in ER        Strength : MMTs  Flexion : 4/5   Abduction : 4/5   ER: 4+/5       Treatment     Dominic received the  "treatments listed below:          Bold=performed    therapeutic exercises to develop strength, endurance, ROM, flexibility, and posture for 32 minutes including:  FOTO  Reassessment   Sidelying shoulder abduction 3x10 +2# ankle   sidelying flexion 3x10 +2#  sidelying ER with 2 lb DB 3x10  +AAROM dowel Internal rotation with shoulder at 90 deg abd   serratus punches 3x10 in supine- using wooden dowel  pulleys 3 mins scaption  +UT stretch and LS stretch verbal demo  supine dowel flexion AAROM 2x10 3" holds  Supine dowel abduction AAROM 2x10 3" holds  supine dowel ER AAROM 2x10 3" holds    manual therapy techniques: PROM were applied to the: L+ shoulder for 15 minutes following protocol  PROM with very slight distraction and oscillation- flexion, abduction, and ER  GrI-II posterior and inferior GH mobs   +contract relax flexion  +contract relax shoulder abd   Scapular mobs all 4 directions in SL   Passive stretching to L+ upper trap     neuromuscular re-education activities to improve: muscle re-education and motor control of L+ UE, scapula for 00 minutes. Performed with NMES on L+ interscap musculature for feedback 5sec on/5 off, 2 sec ramp.  The following activities were included  prone scapular retraction 2x10 3-5" holds  prone rows 2x10  prone horizontal abd 2x10  prone extensions 2x10  Gentle Scap squeeze - small range 3 x 10    CP x 0 min followed by MHP x -min after Tx to reduce pain/inflammation with use of IFC - not performed     Patient Education and Home Exercises       Education provided:   - progress to date and ROM  - form and technique with exercises   -continue HEP    Written Home Exercises Provided: Patient instructed to cont prior HEP. Exercises were reviewed and Dominic was able to demonstrate them prior to the end of the session.  Dominic demonstrated good  understanding of the education provided. See Electronic Medical Record under Patient Instructions for exercises provided during therapy " sessions    Assessment     Dominic is 11.5 weeks post op at this time. Last week he was out of town due to a work conference and did not have PT. Therefore, today he is a little more stiff and sore. He reports some muscle spasms when reaching too far. No reproduction of these muscle spasms today. Began session with manual therapy techniques to improve mobility and decrease guarding. His ROM is progressing appropriately. His strength is about 4/5 globally in the left shoulder. He is making good progress with skilled therapy interventions though will continue to benefit to address all deficits and improve pain free function. Educated patient to remain compliant with HEP during this time to maintain improvements and good carryover.      Dominic Is progressing well towards his goals.   Patient prognosis is Excellent.   Patient will continue to benefit from skilled outpatient physical therapy to address the deficits listed in the problem list box on initial evaluation, provide pt/family education and to maximize pt's level of independence in the home and community environment.     Goals:      Short Term Goals 4 weeks   1. Pt will be independent with HEP to supplement PT in improving functional use of LUE.- MET  2. Pt will increase pain free left shoulder PROM in all planes to WNL to improve functional mobility of UE- MET  3. Formally assess strength when allowed and set goals accordingly- MET  4. Pt will adhere to post op precautions to allow for proper tissue healing- MET     Long Term Goals 8 weeks   1.  Pt will have full PROM of left shoulder to show improvements in overall movement patterns. - MET  2. Pt to show proper postural awareness with no VC from PT- MET  3. Pt to have good tolerance to AAROM and AROM activities per protocol -MET  4. Patient will increase left shoulder strength to >/=4/5- MET    Updated Goals:  5. Pt to improve shoulder flexion and abduction AROM to >/=160 deg in seated and supine positions to  improve movement patterns  6. Pt to improve shoulder ER to >/=50 deg to improve AROM  7. Pt will be able to don/doff shirts and clothing without pain in order to improve independence  8. Pt will demo strength >/=4+/5 in the affected shoulder to improve use of LUE  9.Pt will report pain free overhead lifting to top shelf of fridge and/or cabinets to improve function             Plan   Continue 2x/week for another month to address all deficits and improve functional use of LUE      Stephany Styles, PT , DPT

## 2024-03-04 ENCOUNTER — CLINICAL SUPPORT (OUTPATIENT)
Dept: REHABILITATION | Facility: HOSPITAL | Age: 44
End: 2024-03-04
Payer: COMMERCIAL

## 2024-03-04 DIAGNOSIS — R29.898 IMPAIRED STRENGTH OF UPPER EXTREMITY: ICD-10-CM

## 2024-03-04 DIAGNOSIS — M25.612 IMPAIRED RANGE OF MOTION OF LEFT SHOULDER: Primary | ICD-10-CM

## 2024-03-04 DIAGNOSIS — R68.89 IMPAIRED FUNCTION OF UPPER EXTREMITY: ICD-10-CM

## 2024-03-04 PROCEDURE — 97140 MANUAL THERAPY 1/> REGIONS: CPT | Mod: PO

## 2024-03-04 PROCEDURE — 97110 THERAPEUTIC EXERCISES: CPT | Mod: PO

## 2024-03-04 NOTE — PLAN OF CARE
[facility]   Physical Therapy Treatment Note / Progress Note / Updated Plan of Care     Name: Dominic Collazo  Clinic Number: [id number]    Therapy Diagnosis:   Encounter Diagnoses   Name Primary?    Impaired range of motion of left shoulder Yes    Impaired strength of upper extremity     Impaired function of upper extremity                Physician: Sea Valadez MD    Visit Date: 3/4/2024     Physician Orders: PT Eval and Treat   NOTE: 2-3 x per week x 6 weeks     Protocol = [email or web address]  Medical Diagnosis from Referral:   S43.432A (ICD-10-CM) - Paralabral cyst of left shoulder, initial encounter   S43.432A (ICD-10-CM) - Superior glenoid labrum lesion of left shoulder, initial encounter      Evaluation Date: 2023  Authorization Period Expiration: 24  Plan of Care Expiration: 24  Progress Note Due: 24  Date of Surgery: 23 (11.5 weeks post op on )  Visit # / Visits authorized:   FOTO: 3rd FOTO performed 3/4/24  Precautions: see protocol       Time In: 7:03 AM  Time Out: 750 AM  Total Billable Time: 47 minutes (TE-2, MT-1)                         Subjective       Patient is 11.5 weeks post op.      Patient reports: had a work conference last week and has some pain in the shoulder. Continues to have trouble with reaching, some issues with getting dressed, reaching across the body. He follows up with Dr. Valadez on Friday.  He was compliant with home exercise program.  Response to previous treatment: felt fine   Functional change: day to day pain has lessened     Pain: 10   Location: left shoulder      Objective        AROM of the shoulder  AROM flexion: 150 deg  AROM abduction: 160 deg   AROM ER at 90 de deg     PROM: WNL abd and flexion, restricted in ER        Strength : MMTs  Flexion : 4/5   Abduction : 4/5   ER: 4+/5       Treatment     Dominic received the  "treatments listed below:          Bold=performed    therapeutic exercises to develop strength, endurance, ROM, flexibility, and posture for 32 minutes including:  FOTO  Reassessment   Sidelying shoulder abduction 3x10 +2# ankle   sidelying flexion 3x10 +2#  sidelying ER with 2 lb DB 3x10  +AAROM dowel Internal rotation with shoulder at 90 deg abd   serratus punches 3x10 in supine- using wooden dowel  pulleys 3 mins scaption  +UT stretch and LS stretch verbal demo  supine dowel flexion AAROM 2x10 3" holds  Supine dowel abduction AAROM 2x10 3" holds  supine dowel ER AAROM 2x10 3" holds    manual therapy techniques: PROM were applied to the: L+ shoulder for 15 minutes following protocol  PROM with very slight distraction and oscillation- flexion, abduction, and ER  GrI-II posterior and inferior GH mobs   +contract relax flexion  +contract relax shoulder abd   Scapular mobs all 4 directions in SL   Passive stretching to L+ upper trap     neuromuscular re-education activities to improve: muscle re-education and motor control of L+ UE, scapula for 00 minutes. Performed with NMES on L+ interscap musculature for feedback 5sec on/5 off, 2 sec ramp.  The following activities were included  prone scapular retraction 2x10 3-5" holds  prone rows 2x10  prone horizontal abd 2x10  prone extensions 2x10  Gentle Scap squeeze - small range 3 x 10    CP x 0 min followed by MHP x -min after Tx to reduce pain/inflammation with use of IFC - not performed     Patient Education and Home Exercises       Education provided:   - progress to date and ROM  - form and technique with exercises   -continue HEP    Written Home Exercises Provided: Patient instructed to cont prior HEP. Exercises were reviewed and Dominic was able to demonstrate them prior to the end of the session.  Dominic demonstrated good  understanding of the education provided. See Electronic Medical Record under Patient Instructions for exercises provided during therapy " sessions    Assessment     Dominic is 11.5 weeks post op at this time. Last week he was out of town due to a work conference and did not have PT. Therefore, today he is a little more stiff and sore. He reports some muscle spasms when reaching too far. No reproduction of these muscle spasms today. Began session with manual therapy techniques to improve mobility and decrease guarding. His ROM is progressing appropriately. His strength is about 4/5 globally in the left shoulder. He is making good progress with skilled therapy interventions though will continue to benefit to address all deficits and improve pain free function. Educated patient to remain compliant with HEP during this time to maintain improvements and good carryover.      Dominic Is progressing well towards his goals.   Patient prognosis is Excellent.   Patient will continue to benefit from skilled outpatient physical therapy to address the deficits listed in the problem list box on initial evaluation, provide pt/family education and to maximize pt's level of independence in the home and community environment.     Goals:      Short Term Goals 4 weeks   1. Pt will be independent with HEP to supplement PT in improving functional use of LUE.- MET  2. Pt will increase pain free left shoulder PROM in all planes to WNL to improve functional mobility of UE- MET  3. Formally assess strength when allowed and set goals accordingly- MET  4. Pt will adhere to post op precautions to allow for proper tissue healing- MET     Long Term Goals 8 weeks   1.  Pt will have full PROM of left shoulder to show improvements in overall movement patterns. - MET  2. Pt to show proper postural awareness with no VC from PT- MET  3. Pt to have good tolerance to AAROM and AROM activities per protocol -MET  4. Patient will increase left shoulder strength to >/=4/5- MET    Updated Goals:  5. Pt to improve shoulder flexion and abduction AROM to >/=160 deg in seated and supine positions to  improve movement patterns  6. Pt to improve shoulder ER to >/=50 deg to improve AROM  7. Pt will be able to don/doff shirts and clothing without pain in order to improve independence  8. Pt will demo strength >/=4+/5 in the affected shoulder to improve use of LUE  9.Pt will report pain free overhead lifting to top shelf of fridge and/or cabinets to improve function             Plan   Continue 2x/week for another month to address all deficits and improve functional use of LUE      Stephany Styles, PT , DPT     I certify the need for these services furnished under this plan of treatment and while under my care.        Sea Valadez MD, FAAOS  , Orthopaedic Sports Medicine  Residency   [facility] Department of Orthopaedic Surgery  Assistant Orthopaedic Surgeon, [facility]  Head Team Physician, [facility]

## 2024-03-05 ENCOUNTER — OFFICE VISIT (OUTPATIENT)
Dept: PSYCHIATRY | Facility: CLINIC | Age: 44
End: 2024-03-05
Payer: COMMERCIAL

## 2024-03-05 DIAGNOSIS — F41.0 GENERALIZED ANXIETY DISORDER WITH PANIC ATTACKS: ICD-10-CM

## 2024-03-05 DIAGNOSIS — Z79.890 LONG-TERM CURRENT USE OF TESTOSTERONE REPLACEMENT THERAPY: ICD-10-CM

## 2024-03-05 DIAGNOSIS — F51.05 INSOMNIA DUE TO OTHER MENTAL DISORDER: ICD-10-CM

## 2024-03-05 DIAGNOSIS — G47.33 OSA ON CPAP: ICD-10-CM

## 2024-03-05 DIAGNOSIS — E55.9 VITAMIN D INSUFFICIENCY: ICD-10-CM

## 2024-03-05 DIAGNOSIS — F90.2 ADHD (ATTENTION DEFICIT HYPERACTIVITY DISORDER), COMBINED TYPE: ICD-10-CM

## 2024-03-05 DIAGNOSIS — F99 INSOMNIA DUE TO OTHER MENTAL DISORDER: ICD-10-CM

## 2024-03-05 DIAGNOSIS — F33.1 MDD (MAJOR DEPRESSIVE DISORDER), RECURRENT EPISODE, MODERATE: Primary | ICD-10-CM

## 2024-03-05 DIAGNOSIS — F41.1 GENERALIZED ANXIETY DISORDER WITH PANIC ATTACKS: ICD-10-CM

## 2024-03-05 PROCEDURE — 1159F MED LIST DOCD IN RCRD: CPT | Mod: CPTII,95,, | Performed by: PHYSICIAN ASSISTANT

## 2024-03-05 PROCEDURE — 99214 OFFICE O/P EST MOD 30 MIN: CPT | Mod: 95,,, | Performed by: PHYSICIAN ASSISTANT

## 2024-03-05 PROCEDURE — 4010F ACE/ARB THERAPY RXD/TAKEN: CPT | Mod: CPTII,95,, | Performed by: PHYSICIAN ASSISTANT

## 2024-03-05 PROCEDURE — 1160F RVW MEDS BY RX/DR IN RCRD: CPT | Mod: CPTII,95,, | Performed by: PHYSICIAN ASSISTANT

## 2024-03-05 PROCEDURE — 90833 PSYTX W PT W E/M 30 MIN: CPT | Mod: 95,,, | Performed by: PHYSICIAN ASSISTANT

## 2024-03-05 RX ORDER — LISDEXAMFETAMINE DIMESYLATE 40 MG/1
40 CAPSULE ORAL DAILY
Qty: 30 CAPSULE | Refills: 0 | Status: SHIPPED | OUTPATIENT
Start: 2024-03-05 | End: 2024-04-03 | Stop reason: SDUPTHER

## 2024-03-05 RX ORDER — ALPRAZOLAM 1 MG/1
1 TABLET ORAL 2 TIMES DAILY PRN
Qty: 60 TABLET | Refills: 1 | Status: SHIPPED | OUTPATIENT
Start: 2024-03-05 | End: 2024-04-03 | Stop reason: SDUPTHER

## 2024-03-05 NOTE — PROGRESS NOTES
"Outpatient Psychiatry Follow-Up Visit (MD/ADA)    3/5/2024     The patient location is: [place]  The chief complaint leading to consultation is: depression and anxiety    Visit type: audiovisual    Face to Face time with patient: 52 minutes  60 minutes of total time spent on the encounter, which includes face to face time and non-face to face time preparing to see the patient (eg, review of tests), Obtaining and/or reviewing separately obtained history, Documenting clinical information in the electronic or other health record, Independently interpreting results (not separately reported) and communicating results to the patient/family/caregiver, or Care coordination (not separately reported).     Each patient to whom he or she provides medical services by telemedicine is:  (1) informed of the relationship between the physician and patient and the respective role of any other health care provider with respect to management of the patient; and (2) notified that he or she may decline to receive medical services by telemedicine and may withdraw from such care at any time.    Clinical Status of Patient:  Outpatient (Ambulatory)    Chief Complaint:  Dominic Collazo is a 43 y.o. male who presents today for follow-up of depression, anxiety, adjustment problems, and attention problems.  Met with patient.      Interval History and Content of Current Session:  Interim Events/Subjective Report/Content of Current Session:     Pt has not gotten labs drawn since last visit. Pt states will be them drawn soon.    Pt reports today: "things have been bad. More anxious. Get angry and anxious easily now, feels like its building up. Small things like the house being a mess by the kids that I normally wouldn't get upset."    Pt states he got new job he applied for and then declined it after finding out that his responsibilities and job duties would be completely different. States a new supervisor took over between time he " "accepted the job and supervisor decided to change his role.    Pt declined the job offer as description changed due to new supervisor, pt had to go back to job he had recently resigned from and now is trying to reestablish relations with company that he had left. He was rehired by prior company.     Pt reports hx ADHD symptoms. Reports since childhood restless and fidgety, distractible, procrastination, racing thoughts, impulsive behaviors, difficulty focusing and not completing any of them, unable to relax during free time due to different thoughts and ideas "running through my mind." Pt reports his family did not want him medicated as child and pt and reports school was difficult.    Discussed stopping nuvigil and and sunosi, trying vyvanse for ADHD and excessive daytime sleepiness.    Mood overall is "not great. I'm down and anxious"    Patients mood is depressive and anxious, affect appears mood congruent. Linear and logical, friendly and cooperative, good eye contact.    Denies SI/HI/AVH. Pt reports sleeping 6-8 hrs per night and  decreased appetite due to ozempic. Also reports constipation since ozempic.    Pt reports taking medications as prescribed and behaving appropriately during interview today.      Psychotherapy:  Target symptoms: depression, anxiety , work stress, ADHD symptoms  Why chosen therapy is appropriate versus another modality: relevant to diagnosis, patient responds to this modality, evidence based practice  Outcome monitoring methods: self-report, observation  Therapeutic intervention type: insight oriented psychotherapy, behavior modifying psychotherapy, supportive psychotherapy  Topics discussed/themes: building skills sets for symptom management, symptom recognition  The patient's response to the intervention is accepting. The patient's progress toward treatment goals is good.   Duration of intervention: 25 minutes.        Prior visit:    Pt reports today: "got my additional pay for this " "year but now I'm going to have to reapply for it next year. The whole situation probably caused my panic attack and passing out". Pt reports very anxious going into new year as has to get his additional work pay approved again.    States scheduled for shoulder surgery next week.    Mood overall is "alright, things are pretty good. Depression is under control. Anxiety issues still there"    States still in therapy for concussion and working on his memory.    "Feel anxiety is ramping up due to the concern for taking a pay cut form work with my additional pay. I'm still owed back pay which is about 40% of your salary." Pt states that he is looking for new job and applying    Patients mood is steady, affect appears mood congruent. Linear and logical, friendly and cooperative, good eye contact.    Denies SI/HI/AVH. Pt reports sleeping poorly recently due to increased anxiety and decreased appetite due to ozempic. Also reports constipation since ozempic.    Reports using CPAP and continues to have excessive daytime sleepiness and on sunsoi and armodafinil.    States previously was sleeping improved, states in last 1-2 weeks sleeping 3-4 hrs per night. Has added low dose xanax prn to his ambien for insomnia due to elevated anxiety which is effective.    Pt reports taking medications as prescribed and behaving appropriately during interview today.    Previous medication trials:  Ativan- effective  Seroquel- sedation  Lexapro  Wellbutrin and Buspar- effective,   vistaril- sedation, still had axniety  Prozac - sexual side effects  Cymbalta Sexual side effects and retrograde ejaculation    Review of Systems     Review of Systems   Constitutional:  Positive for malaise/fatigue. Negative for fever.   HENT:  Negative for sore throat.    Eyes:  Negative for photophobia.   Respiratory:  Negative for cough.    Cardiovascular:  Negative for chest pain and palpitations.   Gastrointestinal:  Positive for constipation. Negative for " abdominal pain.   Genitourinary:  Negative for dysuria.   Musculoskeletal:  Positive for joint pain (R shoulder post op 2 months). Negative for myalgias.   Skin:  Negative for rash.   Neurological:  Negative for dizziness and headaches.   Endo/Heme/Allergies:  Does not bruise/bleed easily.   Psychiatric/Behavioral:  Positive for depression. Negative for hallucinations, substance abuse and suicidal ideas. The patient is nervous/anxious and has insomnia.          Past Medical, Family and Social History: The patient's past medical, family and social history have been reviewed and updated as appropriate within the electronic medical record - see encounter notes.    Compliance: yes    Side effects: see above    Risk Parameters:  Patient reports no suicidal ideation  Patient reports no homicidal ideation  Patient reports no self-injurious behavior  Patient reports no violent behavior    Exam (detailed: at least 9 elements; comprehensive: all 15 elements)   Constitutional  Vitals:    There were no vitals filed for this visit.     General:  age appropriate, obese     Musculoskeletal  Muscle Strength/Tone:  not examined   Gait & Station:  THEA due to video visit      Psychiatric  Speech:  no latency; no press   Mood & Affect:  Anxious and depressed  Mood congruent   Thought Process:  normal and logical   Associations:  intact   Thought Content:  normal, no suicidality, no homicidality, delusions, or paranoia   Insight:  intact   Judgement: behavior is adequate to circumstances   Orientation:  grossly intact   Memory: intact for content of interview   Language: grossly intact   Attention Span & Concentration:  able to focus   Fund of Knowledge:  intact and appropriate to age and level of education     Assessment and Diagnosis   Status/Progress: Based on the examination today, the patient's problem(s) is/are adequately but not ideally controlled.  New problems have not been presented today.   Co-morbidities are complicating  management of the primary condition.  There are no active rule-out diagnoses for this patient at this time.     General Impression:       ICD-10-CM ICD-9-CM   1. MDD (major depressive disorder), recurrent episode, moderate  F33.1 296.32   2. Generalized anxiety disorder with panic attacks  F41.1 300.02    F41.0 300.01   3. Insomnia due to other mental disorder  F51.05 300.9    F99 327.02   4. Long-term current use of testosterone replacement therapy  Z79.890 V58.69   5. ADHD (attention deficit hyperactivity disorder), combined type  F90.2 314.01   6. LYNDA on CPAP  G47.33 327.23   7. Vitamin D insufficiency  E55.9 268.9         Intervention/Counseling/Treatment Plan   Treatment Plan/Recommendations:   Medication Management: Continue current medications.   Labs ordered: E2, A1c, cmp, vitamin D    Continue Wellbutrin  mg daily    Start vyvanse 40mg daily    Decrease vitamin D to 2.5k IU once daily    continue buspar to 20mg bid    continue Effexor to 150mg mg daily     continue xanax 1mg bid prn anxiety or insomnia    continue ambien to CR 12.5mg qhs insomnia    discontinue sonisi and nuvigil. Starting vyvanse for ADHD/daytime sleepiness    Continue testosterone therapy as prescribed by other provider    The risks and benefits of medication were discussed with the patient.  Discussed diagnosis, risks and benefits of proposed treatment above vs alternative treatments vs no treatment, and potential side effects of these treatments. The patient expresses understanding of the above and displays the capacity to agree with this treatment given said understanding. Patient also agrees that, currently, the benefits outweigh the risks and would like to pursue treatment at this time.  Discussed inherent unpredictability of medications in each individual.   Encouraged Patient to keep future appointments.   Take medications as prescribed and abstain from substance abuse.   In the event of an emergency, patient was advised to  go to the emergency room      Return to Clinic: 1 month      Brandon Burdick PA-C

## 2024-03-06 ENCOUNTER — OFFICE VISIT (OUTPATIENT)
Dept: NEUROLOGY | Facility: CLINIC | Age: 44
End: 2024-03-06
Payer: COMMERCIAL

## 2024-03-06 VITALS
BODY MASS INDEX: 43.54 KG/M2 | HEART RATE: 79 BPM | DIASTOLIC BLOOD PRESSURE: 84 MMHG | SYSTOLIC BLOOD PRESSURE: 129 MMHG | WEIGHT: 315 LBS

## 2024-03-06 DIAGNOSIS — R41.89 COGNITIVE CHANGE: ICD-10-CM

## 2024-03-06 DIAGNOSIS — F34.89 OTHER SPECIFIED PERSISTENT MOOD DISORDERS: ICD-10-CM

## 2024-03-06 DIAGNOSIS — S06.0X9S CONCUSSION WITH LOSS OF CONSCIOUSNESS, SEQUELA: Primary | ICD-10-CM

## 2024-03-06 DIAGNOSIS — S13.4XXD WHIPLASH INJURY TO NECK, SUBSEQUENT ENCOUNTER: ICD-10-CM

## 2024-03-06 DIAGNOSIS — G44.86 CERVICOGENIC HEADACHE: ICD-10-CM

## 2024-03-06 DIAGNOSIS — M54.2 CERVICALGIA OF OCCIPITO-ATLANTO-AXIAL REGION: ICD-10-CM

## 2024-03-06 PROCEDURE — 3008F BODY MASS INDEX DOCD: CPT | Mod: CPTII,S$GLB,, | Performed by: PSYCHIATRY & NEUROLOGY

## 2024-03-06 PROCEDURE — 3079F DIAST BP 80-89 MM HG: CPT | Mod: CPTII,S$GLB,, | Performed by: PSYCHIATRY & NEUROLOGY

## 2024-03-06 PROCEDURE — 1159F MED LIST DOCD IN RCRD: CPT | Mod: CPTII,S$GLB,, | Performed by: PSYCHIATRY & NEUROLOGY

## 2024-03-06 PROCEDURE — 1160F RVW MEDS BY RX/DR IN RCRD: CPT | Mod: CPTII,S$GLB,, | Performed by: PSYCHIATRY & NEUROLOGY

## 2024-03-06 PROCEDURE — 99999 PR PBB SHADOW E&M-EST. PATIENT-LVL III: CPT | Mod: PBBFAC,,, | Performed by: PSYCHIATRY & NEUROLOGY

## 2024-03-06 PROCEDURE — 99214 OFFICE O/P EST MOD 30 MIN: CPT | Mod: S$GLB,,, | Performed by: PSYCHIATRY & NEUROLOGY

## 2024-03-06 PROCEDURE — 3074F SYST BP LT 130 MM HG: CPT | Mod: CPTII,S$GLB,, | Performed by: PSYCHIATRY & NEUROLOGY

## 2024-03-06 PROCEDURE — 4010F ACE/ARB THERAPY RXD/TAKEN: CPT | Mod: CPTII,S$GLB,, | Performed by: PSYCHIATRY & NEUROLOGY

## 2024-03-06 NOTE — PATIENT INSTRUCTIONS
Referral for lower neck/upper back to mid back PT with myofascial release placed previously (therapist may choose from and do a combination of: deep tissue massage, cupping, dry needling, etc). No soft tissue manipulation of suboccipital region if you start to feel worse. All joint manipulation is fine.  Continue light cardio but no contact sport situations  Continue vestibular PT for eye movements  Continue ST exercises  Continue social work or psychology (whoever can see the patient soonest) consultation, therapy, and treatment. 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 The information from this consultation will be used by myself and other providers/therapists to help guide an individual care plan to help treat this patient's symptoms from TBI/neck injury. Any delays or failures to address cognitive, sleep, psychological symptoms after TBI/neck injury can contribute to persistent symptoms, prolong their overall recovery process, and potentially lead to permanent symptoms. And of note, this referral is not for neuropsychology or neurocognitive testing. This referral is specifically for social work or psychological interventions based on the consultation these services provide.  Continue to follow with psychiatrist   Continue tizanidine 2 mg (half tablet) nightly. Take 30 minutes before bed. Never drink alcohol or drive after taking this medication  Do not take methocarbamol at same time as tizanidine
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
## 2024-03-07 ENCOUNTER — OFFICE VISIT (OUTPATIENT)
Dept: NEUROLOGY | Facility: CLINIC | Age: 44
End: 2024-03-07
Payer: COMMERCIAL

## 2024-03-07 DIAGNOSIS — F33.1 MDD (MAJOR DEPRESSIVE DISORDER), RECURRENT EPISODE, MODERATE: ICD-10-CM

## 2024-03-07 DIAGNOSIS — F41.0 GENERALIZED ANXIETY DISORDER WITH PANIC ATTACKS: ICD-10-CM

## 2024-03-07 DIAGNOSIS — S06.0X9S CONCUSSION WITH LOSS OF CONSCIOUSNESS, SEQUELA: Primary | ICD-10-CM

## 2024-03-07 DIAGNOSIS — F41.1 GENERALIZED ANXIETY DISORDER WITH PANIC ATTACKS: ICD-10-CM

## 2024-03-07 PROCEDURE — 4010F ACE/ARB THERAPY RXD/TAKEN: CPT | Mod: CPTII,95,, | Performed by: SOCIAL WORKER

## 2024-03-07 PROCEDURE — 90837 PSYTX W PT 60 MINUTES: CPT | Mod: 95,,, | Performed by: SOCIAL WORKER

## 2024-03-07 NOTE — PROGRESS NOTES
Individual Psychotherapy (PhD/LCSW)    3/7/2024    Site:  Telemed         Location :[place]     Therapeutic Intervention: Met with patient.  Outpatient - Insight oriented psychotherapy 60 min - CPT code 46086, Outpatient - Behavior modifying psychotherapy 60 min - CPT code 89834, and Outpatient - Supportive psychotherapy 60 min - CPT Code 89258    Chief complaint/reason for encounter: anxiety and interpersonal     Interval history and content of current session:   Patient began by discussing work stress.  He also saw his neurologist yesterday.   He was also prescribed vyvanse and he hopes this will help with fatigue.     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     LCSW asked patient to consider speaking their language ( corporate speak ) and their interests ( expanding the brand ) of the [employer].  Who are the stake holders and can this be promoted locally.     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  This will help employment for those populations as well as the companies that need them.     LCSW encouraged he send a strongly worded email and request a call for all parties. 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     He admits on going anxiety, but feels better talking to LCSW and making a plan.   He will negotiate his contract in the next few months.     Treatment plan:  Target symptoms: anxiety , adjustment, work stress  Why chosen therapy is appropriate versus another modality: relevant to diagnosis, patient responds to this modality, evidence based practice  Outcome monitoring methods: self-report, observation  Therapeutic intervention type: insight oriented psychotherapy, behavior modifying psychotherapy, supportive psychotherapy    Risk parameters:  Patient  reports no suicidal ideation  Patient reports no homicidal ideation  Patient reports no self-injurious behavior  Patient reports no violent behavior    Verbal deficits: None    Patient's response to intervention:  The patient's response to intervention is accepting, motivated.    Progress toward goals and other mental status changes:  The patient's progress toward goals is good.    Diagnosis:     ICD-10-CM ICD-9-CM   1. Concussion with loss of consciousness, sequela  S06.0X9S 907.0   2. Generalized anxiety disorder with panic attacks  F41.1 300.02    F41.0 300.01   3. MDD (major depressive disorder), recurrent episode, moderate  F33.1 296.32       Plan:  individual psychotherapy    Return to clinic: 1 month    Length of Service (minutes): 60

## 2024-03-08 ENCOUNTER — CLINICAL SUPPORT (OUTPATIENT)
Dept: REHABILITATION | Facility: HOSPITAL | Age: 44
End: 2024-03-08
Payer: COMMERCIAL

## 2024-03-08 ENCOUNTER — OFFICE VISIT (OUTPATIENT)
Dept: ORTHOPEDICS | Facility: CLINIC | Age: 44
End: 2024-03-08
Payer: COMMERCIAL

## 2024-03-08 VITALS — BODY MASS INDEX: 39.17 KG/M2 | WEIGHT: 315 LBS | HEIGHT: 75 IN

## 2024-03-08 DIAGNOSIS — R68.89 IMPAIRED FUNCTION OF UPPER EXTREMITY: ICD-10-CM

## 2024-03-08 DIAGNOSIS — M25.612 IMPAIRED RANGE OF MOTION OF LEFT SHOULDER: Primary | ICD-10-CM

## 2024-03-08 DIAGNOSIS — R29.898 IMPAIRED STRENGTH OF UPPER EXTREMITY: ICD-10-CM

## 2024-03-08 DIAGNOSIS — Z98.890 STATUS POST LABRAL REPAIR OF SHOULDER: Primary | ICD-10-CM

## 2024-03-08 PROCEDURE — 3008F BODY MASS INDEX DOCD: CPT | Mod: CPTII,S$GLB,, | Performed by: ORTHOPAEDIC SURGERY

## 2024-03-08 PROCEDURE — 1160F RVW MEDS BY RX/DR IN RCRD: CPT | Mod: CPTII,S$GLB,, | Performed by: ORTHOPAEDIC SURGERY

## 2024-03-08 PROCEDURE — 97140 MANUAL THERAPY 1/> REGIONS: CPT | Mod: PO

## 2024-03-08 PROCEDURE — 99213 OFFICE O/P EST LOW 20 MIN: CPT | Mod: S$GLB,,, | Performed by: ORTHOPAEDIC SURGERY

## 2024-03-08 PROCEDURE — 1159F MED LIST DOCD IN RCRD: CPT | Mod: CPTII,S$GLB,, | Performed by: ORTHOPAEDIC SURGERY

## 2024-03-08 PROCEDURE — 97110 THERAPEUTIC EXERCISES: CPT | Mod: PO

## 2024-03-08 PROCEDURE — 4010F ACE/ARB THERAPY RXD/TAKEN: CPT | Mod: CPTII,S$GLB,, | Performed by: ORTHOPAEDIC SURGERY

## 2024-03-08 PROCEDURE — 99999 PR PBB SHADOW E&M-EST. PATIENT-LVL IV: CPT | Mod: PBBFAC,,, | Performed by: ORTHOPAEDIC SURGERY

## 2024-03-08 NOTE — PROGRESS NOTES
[facility]   Physical Therapy Treatment Note / Progress Note / Updated Plan of Care     Name: Dominic Collazo  Clinic Number: [id number]    Therapy Diagnosis:   Encounter Diagnoses   Name Primary?    Impaired range of motion of left shoulder Yes    Impaired strength of upper extremity     Impaired function of upper extremity                Physician: Sea Valadez MD    Visit Date: 3/4/2024     Physician Orders: PT Eval and Treat   NOTE: 2-3 x per week x 6 weeks     Protocol = [email or web address]  Medical Diagnosis from Referral:   S43.432A (ICD-10-CM) - Paralabral cyst of left shoulder, initial encounter   S43.432A (ICD-10-CM) - Superior glenoid labrum lesion of left shoulder, initial encounter      Evaluation Date: 2023  Authorization Period Expiration: 24  Plan of Care Expiration: 24  Progress Note Due: 24  Date of Surgery: 23 (12 weeks post op on )  Visit # / Visits authorized:   FOTO: 3rd FOTO performed 3/4/24  Precautions: see protocol       Time In: 7:03 AM  Time Out: 750 AM  Total Billable Time: 47 minutes (TE-2, MT-1)                         Subjective       Patient is 11.5 weeks post op.      Patient reports: Pt states continued discomfort when he suddenly moves, but other than that he is compliant with lifting restrictions   He was compliant with home exercise program.  Response to previous treatment: felt fine   Functional change: day to day pain has lessened     Pain: 4/10   Location: left shoulder      Objective        AROM of the shoulder  AROM flexion: 150 deg  AROM abduction: 160 deg   AROM ER at 90 de deg     PROM: WNL abd and flexion, restricted in ER        Strength : MMTs  Flexion : 4/5   Abduction : 4/5   ER: 4+/5       Treatment     Dominic received the treatments listed below:          Bold=performed    therapeutic exercises to develop strength,  "endurance, ROM, flexibility, and posture for 23 minutes including:  Sidelying shoulder abduction 3x10 +2# ankle   Supine w/ EOM raised 45 deg flexion 3x10 +2#  sidelying ER with 0 lb DB 3x10    +AAROM dowel Internal rotation with shoulder at 90 deg abd   serratus punches 3x10 in supine- using wooden dowel  pulleys 3 mins scaption and flexion   +UT stretch and LS stretch verbal demo  supine dowel flexion AAROM 2x10 3" holds  Supine dowel abduction AAROM 2x10 3" holds  supine dowel ER AAROM 2x10 3" holds    manual therapy techniques: PROM were applied to the: L+ shoulder for 8 minutes following protocol  PROM with very slight distraction and oscillation- flexion, abduction, and ER  GrI-II posterior and inferior GH mobs   +contract relax flexion  +contract relax shoulder abd   Scapular mobs all 4 directions in SL       neuromuscular re-education activities to improve: muscle re-education and motor control of L+ UE, scapula for 00 minutes. Performed with NMES on L+ interscap musculature for feedback 5sec on/5 off, 2 sec ramp.  The following activities were included  prone scapular retraction 2x10 3-5" holds  prone rows 2x10  prone horizontal abd 2x10  prone extensions 2x10  Gentle Scap squeeze - small range 3 x 10    CP x 0 min followed by MHP x -min after Tx to reduce pain/inflammation with use of IFC - not performed     Patient Education and Home Exercises       Education provided:   - progress to date and ROM  - form and technique with exercises   -continue HEP    Written Home Exercises Provided: Patient instructed to cont prior HEP. Exercises were reviewed and Dominic was able to demonstrate them prior to the end of the session.  Dominic demonstrated good  understanding of the education provided. See Electronic Medical Record under Patient Instructions for exercises provided during therapy sessions    Assessment   Without notable changes since last seen. Removed 1# weight with SL ER due to poor quality performance " with weight     Dominic Is progressing well towards his goals.   Patient prognosis is Excellent.   Patient will continue to benefit from skilled outpatient physical therapy to address the deficits listed in the problem list box on initial evaluation, provide pt/family education and to maximize pt's level of independence in the home and community environment.     Goals:      Short Term Goals 4 weeks   1. Pt will be independent with HEP to supplement PT in improving functional use of LUE.- MET  2. Pt will increase pain free left shoulder PROM in all planes to WNL to improve functional mobility of UE- MET  3. Formally assess strength when allowed and set goals accordingly- MET  4. Pt will adhere to post op precautions to allow for proper tissue healing- MET     Long Term Goals 8 weeks   1.  Pt will have full PROM of left shoulder to show improvements in overall movement patterns. - MET  2. Pt to show proper postural awareness with no VC from PT- MET  3. Pt to have good tolerance to AAROM and AROM activities per protocol -MET  4. Patient will increase left shoulder strength to >/=4/5- MET    Updated Goals:  5. Pt to improve shoulder flexion and abduction AROM to >/=160 deg in seated and supine positions to improve movement patterns  6. Pt to improve shoulder ER to >/=50 deg to improve AROM  7. Pt will be able to don/doff shirts and clothing without pain in order to improve independence  8. Pt will demo strength >/=4+/5 in the affected shoulder to improve use of LUE  9.Pt will report pain free overhead lifting to top shelf of fridge and/or cabinets to improve function             Plan   Continue 2x/week for another month to address all deficits and improve functional use of LUE      Stephany Styles, PT , DPT

## 2024-03-08 NOTE — PROGRESS NOTES
Patient ID:   Dominic Collazo is a 43 y.o. male.    Chief Complaint:   12w 1d s/p left arthroscopic labral repair, paralabral cyst decompression    HPI:   Zay is returning today for evaluation of his left shoulder.  He is just over 3 months out from his surgery.  He reports that things have improved over the last week.  He does still report pain.  Pain level is 5/10.  Today, the pain is over the anterior aspect of the shoulder as well as the cervical thoracic junction of his spine.    Medications:    Current Outpatient Medications:     ALPRAZolam (XANAX) 1 MG tablet, Take 1 tablet (1 mg total) by mouth 2 (two) times daily as needed for Anxiety., Disp: 60 tablet, Rfl: 1    blood sugar diagnostic (ONETOUCH VERIO TEST STRIPS) Strp, 1 each by Misc.(Non-Drug; Combo Route) route 3 (three) times daily., Disp: 100 each, Rfl: 11    blood-glucose meter kit, To check BG 2 times daily, to use with insurance preferred meter, Disp: 1 each, Rfl: 0    buPROPion (WELLBUTRIN XL) 150 MG TB24 tablet, Take 1 tablet (150 mg total) by mouth once daily., Disp: 90 tablet, Rfl: 1    busPIRone (BUSPAR) 10 MG tablet, Take 2 tablets (20 mg total) by mouth 2 (two) times daily., Disp: 180 tablet, Rfl: 1    cetirizine HCl (ZYRTEC ORAL), Take 1 tablet by mouth daily as needed., Disp: , Rfl:     cyanocobalamin (VITAMIN B-12) 1000 MCG tablet, Take 100 mcg by mouth once daily., Disp: , Rfl:     ergocalciferol, vitamin D2, (VITAMIN D ORAL), Take 5,000 Units by mouth once daily., Disp: , Rfl:     fexofenadine (ALLEGRA) 60 MG tablet, Take 60 mg by mouth every morning., Disp: , Rfl:     iron,carb/vit C/vit B12/folic (IRON 100 PLUS ORAL), Take by mouth once daily at 6am., Disp: , Rfl:     lancets Mis, To check BG 2 times daily, Disp: 200 each, Rfl: 3    lisdexamfetamine (VYVANSE) 40 MG Cap, Take 1 capsule (40 mg total) by mouth once daily., Disp: 30 capsule, Rfl: 0    losartan (COZAAR) 25 MG tablet, Take 1 tablet (25 mg total) by mouth once  daily., Disp: 30 tablet, Rfl: 11    metFORMIN (GLUCOPHAGE-XR) 500 MG ER 24hr tablet, Take 1 tablet (500 mg total) by mouth 2 (two) times daily with meals., Disp: 180 tablet, Rfl: 3    methocarbamoL (ROBAXIN) 750 MG Tab, TAKE 1 TABLET(750 MG) BY MOUTH FOUR TIMES DAILY AS NEEDED FOR MUSCLE STRAIN, Disp: 40 tablet, Rfl: 3    metoprolol succinate (TOPROL-XL) 100 MG 24 hr tablet, Take 1 tablet (100 mg total) by mouth once daily., Disp: 90 tablet, Rfl: 3    multivitamin capsule, Take 1 capsule by mouth once daily., Disp: , Rfl:     riboflavin, vitamin B2, (VITAMIN B-2 ORAL), Take 1 tablet by mouth once daily., Disp: , Rfl:     rosuvastatin (CRESTOR) 10 MG tablet, Take 1 tablet by mouth once daily., Disp: 90 tablet, Rfl: 3    scopolamine (TRANSDERM-SCOP) 1.3-1.5 mg (1 mg over 3 days), Place 1 patch onto the skin every 72 hours., Disp: 4 patch, Rfl: 0    semaglutide (OZEMPIC) 2 mg/dose (8 mg/3 mL) PnIj, Inject 2 mg into the skin every 7 days., Disp: 9 mL, Rfl: 3    testosterone cypionate (DEPOTESTOTERONE CYPIONATE) 200 mg/mL injection, Inject 1 mL (200 mg total) into the muscle every 7 days., Disp: 5 mL, Rfl: 3    tiZANidine (ZANAFLEX) 4 MG tablet, Take 0.5 tablets (2 mg total) by mouth nightly as needed., Disp: 30 tablet, Rfl: 5    venlafaxine (EFFEXOR-XR) 150 MG Cp24, Take 1 capsule (150 mg total) by mouth once daily., Disp: 90 capsule, Rfl: 1    zolpidem (AMBIEN CR) 12.5 MG CR tablet, Take 1 tablet (12.5 mg total) by mouth nightly as needed for Insomnia., Disp: 30 tablet, Rfl: 3  No current facility-administered medications for this visit.    Facility-Administered Medications Ordered in Other Visits:     lactated ringers infusion, , Intravenous, Continuous, Stan Marcos, MARTI    LIDOcaine (PF) 10 mg/ml (1%) injection 10 mg, 1 mL, Intradermal, Once, Stan Marcos, DNP    Allergies:  Review of patient's allergies indicates:   Allergen Reactions    Ciprofloxacin      swelling    Penicillins Rash    Sulfa (sulfonamide  antibiotics) Rash    Toradol [ketorolac] Nausea Only    Bell pepper Nausea Only    Meloxicam Nausea Only    Sulfamethoxazole-trimethoprim        Vitals:  There were no vitals taken for this visit.    Physical Examination:  Ortho Exam   Left shoulder exam:  Upon inspection, he does have a protracted posture.    Range of motion:  Active elevation to 140, external rotation at the side 10.    Rotator cuff strength 5/5 in elevation, external rotation, internal rotation     Assessment:  1. Status post labral repair of shoulder      Plan:  I have recommended continued formal supervised PT/OT for range of motion and strengthening.  He will return in 6 weeks for a return visit.       No follow-ups on file.

## 2024-03-12 ENCOUNTER — CLINICAL SUPPORT (OUTPATIENT)
Dept: REHABILITATION | Facility: HOSPITAL | Age: 44
End: 2024-03-12
Payer: COMMERCIAL

## 2024-03-12 ENCOUNTER — PATIENT MESSAGE (OUTPATIENT)
Dept: PSYCHIATRY | Facility: CLINIC | Age: 44
End: 2024-03-12
Payer: COMMERCIAL

## 2024-03-12 DIAGNOSIS — R29.898 IMPAIRED STRENGTH OF UPPER EXTREMITY: ICD-10-CM

## 2024-03-12 DIAGNOSIS — M25.612 IMPAIRED RANGE OF MOTION OF LEFT SHOULDER: Primary | ICD-10-CM

## 2024-03-12 DIAGNOSIS — R68.89 IMPAIRED FUNCTION OF UPPER EXTREMITY: ICD-10-CM

## 2024-03-12 PROCEDURE — 97112 NEUROMUSCULAR REEDUCATION: CPT | Mod: PO

## 2024-03-12 PROCEDURE — 97110 THERAPEUTIC EXERCISES: CPT | Mod: PO

## 2024-03-12 NOTE — PROGRESS NOTES
"[facility]  Occupational Therapy Discharge Summary     Date: 3/13/2024  Name: Dominic Oneill Zay  Clinic Number: [id number]    Therapy Diagnosis:   Encounter Diagnoses   Name Primary?    Post concussion syndrome Yes    Deficient smooth pursuit eye movements     Saccadic eye movement deficiency     Convergence insufficiency        Physician: Keshav Singh MD     Physician Orders: Eval and Treat - Vestibular Therapy   Medical Diagnosis:   S06.0X9S (ICD-10-CM) - Concussion with loss of consciousness, sequela   H51.11 (ICD-10-CM) - Convergence insufficiency   Evaluation Date: 10/9/2023  Insurance Authorization Period Expiration: 12/31/2024  Plan of Care Expiration: 3/1/2024  Progress Note Due: D/C scheduled 2/26/2024   Date of Return to MD: 1/24/2023 Dr. Singh   FOTO: 2/2    Visit # / Visits authorized: 3 / 20 (Total Visits 2023: 7 including eval) Private Insurance  Time In: 0811  Time Out: 0839  Total Billable Time: 28 minutes    Precautions:  Standard    SUBJECTIVE     Pt reports: Overall he is feeling better. He still gets occasional headaches about every other day. He and Dr. Singh are both helpful that neck therapy will help. He is still in therapy for his shoulder. He has been reading more and hasn't noticed many issues with his eyes. He still gets some eye fatigue in the afternoons but feels this is probably normal.    He was compliant with home exercise program given last session.   Response to previous treatment: positive   Functional change: decreased symptoms     Date of Onset: 7/23/2023     Pain: Not numerically rated today, but pt reported pain   Location: Left shoulder     Patient's Goals for Therapy: "to get back to normal"     OBJECTIVE     Objective Measures updated at progress report on 1/29/2024.    Reassessment:   Adult Vision Questionnaire:   Directions: please check the answer that best describes your situation. If you wear glasses or contact lenses, answer " the questions assuming that you are wearing them.   Never = never  Occasionally = less than 1 time/week  Frequently = at least 1 time/week  Always = everyday      Do you have headaches or facial pain? Frequently   Do you have pain in your eyes with eye movement? Frequently   Do you experience neck or shoulder discomfort? Always  Do you have dizziness, light headed or nausea while performing close up work? (computer work; reading; writing) Frequently   Do you have dizziness, light headed or nausea while performing far distance activities? (driving, tv, movies) Occasionally   Do you experience dizziness when bending down and standing back up, or when getting up quickly? Frequently    Do you feel unsteady with walking, or drift to one side? Frequently    Do you feel overwhelmed or anxious while walking in a large department store or walking in a crowd? Occasionally   Do you feel dizzy or off balance when walking down a long hallway or on bold patterned carpeting? Occasionally   Does riding in a car make you feel dizzy or uncomfortable? Frequently; this was present prior to concussion    Do you find yourself with your head tilted to one side? Always    Does your posture tend to be leaning more forward or backward than your used to? Always, forward    Do you experience poor depth perception or have difficulty estimating distances accurately? Never  Do you experience double/overlapping/shadowed vision at far distances? Occasionally    Do you experience double/overlapping/shadowed vision at near distances? Frequently   Do you experience glare or have sensitivity to bright lights? Occasionally   Do you close or cover one eye with near or far tasks? Never   Do you skip lines or lose your place while reading? Never   Do you use your finger to keep your place on the page? Never  Do you tire easily with close-up tasks? Occasionally    Do you experience blurred vision with far distance tasks? (driving, television, movies,  chalkboard at school) Occasionally   Do you experience blurred vision with close up tasks? (computer, reading) Frequently; briefly but can correct    Do you experience words running together or appearing to move on the page? Occasionally      History:  Have you ever been diagnosed with:  Traumatic brain injury (TBI) or concussion? Yes  Reading disability? No  Lazy eye? No  Have you ever had an eye operation? No     FOTO Administered:   Eval: 69%  12/13/2023: 60%  1/29/2024: 60%  3/13/2024: 81%      Physical Exam  Head Control/Neck Mobility: WFL per functional cervical ROM screen but pt reported some pain  Comments: Pt reported feeling a disconnect between head movements and vision with up/down head movements      Oculomotor Exam  Vestibular/Ocular-Motor Screening (VOMS) for Concussion  Vestibular/Ocular Motor Test: Not Tested Headache   0-10 Dizziness  0-10 Nausea   0-10 Fogginess   0-10 Comments   Baseline   0 0 0 0     Smooth Pursuit  (1.5 feet left/right of center; 3 feet away; 2 times; 30 bpm each direction; horizontal and vertical)   0 0 0 0 See below    Saccades - Horizontal  (1.5 feet left/right of center; 3 feet away; 10 times; performed as quickly as possible)   0 0 0 0 See below    Saccades - Vertical   (1.5 feet up/down of center; 3 feet away; 10 times; performed as quickly as possible)   0 0 0 0 See below    Convergence (Near Point)  (14 pt font; stop when pt reports diplopia or outward deviation of eye is observed, blurring is ignored; measure distance between target and tip of nose; abnormal finding is >/= 6 cm)   0 0 0 0 (Near Point in cm):  Measure 1: 6.5  Measure 2: 9  Measure 3: 8.5   VOR - Horizontal  (14 pt font; 20 degrees rotation left/right; 10 reps; 180 bpm)   0 0 0 0 No visual slippage   VOR - Vertical  (14 pt font; 20 degrees rotation up/down; 10 times; 180 bpm)   0 0 0 2 No visual slippage    Visual Motion Sensitivity Test  (80 degrees left/right; 5 times each direction; 50 bpm)   0 0 0 0  "No visual slippage      Oculomotor ROM: Eye movements intact  Eye Alignment: WNL  Visual Field: NT  Acuity: corrected by glasses/contact for distance vision; has an astigmatism      Spontaneous Nystagmus: None  Gaze Holding Nystagmus: None   Gaze Holding (No Fixation): NT  Smooth Pursuits:   Horizontal: Eye movements intact; no increase in symptoms               Vertical: Eye movements intact; no increase in symptoms   Saccades:               Horizontal: Eye movements intact; no increase in symptoms               Vertical: Eye movements intact; no increase in symptoms   Near Point Convergence (cm):   Eval: Impaired; Average 28 cm; tactile cues to bring target to midline (pt tended to hold target just right of midline); more difficult for right eye to converge    12/13/2023: Impaired; Average 14.6 cm   1/29/2024: Impaired; Average 12 cm; right eye slightly more difficult to converge   3/13/2024: WFL; Average 8 cm; right eye slightly more difficult to converge   Accommodation (gaze shift between near target (16") and far target (10ft)): Gaze shifts completed at normal pace and no blurriness or difficulty bringing targets into focus; mild orbital headache reported (2/10; behind right eye)    Fixation (5 second sustained visual attention): Intact     VOR Slow Head Movement: Intact  Head Thrust: Negative bilaterally   Dynamic Visual Acuity (DVA) (using ETDRS eye chart):   Eval: 3 line difference (10, 7); read 3/5 letter on line 8  12/13/2023: 2 line difference (10, 8); read 2/5 letters on line 9; 1/10 dizziness reported   1/29/2024: 3 line difference (11, 8); 1 mistake on line 6; no dizziness   3/13/2023: 3 line difference (11, 8); 1 mistake on line 8; 1/10 dizziness    Treatment     Dominic received the treatments listed below:     Neuromuscular re-education activities to improve oculomotor function for 13 minutes. The following activities were included:  - Reassessment: see above for details      Therapeutic activities " to improve functional performance for 15 minutes, including:  - Adult Vision Questionnaire and FOTO administered: see above for details   - Discussed HEP and how to appropriate wean from eye exercises     Patient Education and Home Exercises      Education provided:   - HEP   - Progress towards goals     Written Home Exercises Provided: horizontal/vertical smooth pursuits, horizontal/vertical saccades, and convergence pencil pushups.  Exercises were reviewed and Dominic was able to demonstrate them prior to the end of the session.    Dominic demonstrated good  understanding of the education provided.      See EMR under Patient Instructions for exercises provided  10/27/2023: (Pursuits, saccades, pencil push ups)  11/3/2023: added diagonal pursuits and saccades  12/28/2023: Saccades at 120 bpm; pursuits and saccades x 45 sec each direction, pencil push ups; eye ergonomics    2/7/2024: Wm string (optional)   3/13/2024: HEP review and how to wean from exercises     DISCHARGE SUMMARY & ASSESSMENT     Total No-Shows: 0  Total Cancels: 2    Pt has made good progress with therapy, and all goals were met. Ocular coordination is normal and eye movements are no longer eliciting symptoms. NPC has also improved and is now WFL. Gaze stabilization is also WFL. Head thrust was negative and DVA score was just outside of normal limits at a 3 line difference between static and dynamic head movements. Pt is back to work and able to complete all daily activities and participate in habits, roles, and routines without complaints of oculomotor dysfunction. Discussed weaning for oculomotor HEP but to continue to incorporate activity pacing strategies due to nature of job. Pt was receptive and agreeable. Pt is appropriate for discharge from OT at this time.     Pt's spiritual, cultural and educational needs considered and pt agreeable to plan of care and goals.    Anticipated barriers to occupational therapy: none noted    Goals:  Short  Term Goals: 4 weeks   1) Pt will tolerate oculomotor and/or habitation home exercise/activity program, reporting at least 50% compliance. MET 12/13/2023  2) Pt will verbalize eye ergonomics to decrease stress on eyes. MET 2/7/2024  3) Pt will demonstrate ability to track single target WFL, in all directions x30 seconds, without increase in headache or nausea, to improve skills needed for technology use and scanning environment. MET 2/7/2024  4) Pt to perform saccades WFL, in all directions x30 seconds, at 90 bpm without increase in headache, dizziness, or nausea, to improve skills needed for reading. MET 2/15/2024  5) Pt will report 60 minutes of computer use without eye fatigue, double vision, or increase in headache/dizziness, utilizing activity pacing strategies, needed for return to work at [employer]. MET 3/13/2023, per pt report   6) Near point convergence will decrease to 20 cm or less to improve oculomotor coordination and ability to fixate on close up work. MET 12/13/2023  7) Pt will demonstrate ability to fixate/attend to close up tasks x 30 minutes without onset of headache or eye fatigue. MET 3/13/2024, per pt report     Long Term Goals: 11 weeks   1) Pt will be independent with oculomotor and/or habituation home exercise/activity program. MET 2/7/2024; progressing as needed    2) Pt will demonstrate ability to track single target WFL, in all directions x1 minute, without increase in headache or nausea, to improve skills needed for technology use and scanning environment. MET 2/7/2024  3) Pt to perform saccades WFL, in all directions x1 minute, at 110 bpm without increase in headache, dizziness, or nausea, to improve skills needed for reading. MET 2/15/2024   4) Pt will report 90 minutes of computer use without eye fatigue, double vision, or increase in headache/dizziness, utilizing activity pacing strategies, needed for return to work at [employer]. MET 3/13/2024, per pt report  5) Near point convergence will  decrease to 15 cm or less to improve oculomotor coordination and ability to fixate on close up work. MET 12/13/2023  6) Pt will demonstrate ability to fixate/attend to close up tasks x 60 minutes without onset of headache or eye fatigue. MET 3/13/2024    Status Towards Goals Met: All goals MET    Discharge reason: Met goals    PLAN     This patient is discharged from Outpatient Occupational Therapy Services.     Seema Ng, OT

## 2024-03-12 NOTE — PROGRESS NOTES
[facility]   Physical Therapy Treatment Note / Progress Note / Updated Plan of Care     Name: Dominic Collazo  Clinic Number: [id number]    Therapy Diagnosis:   Encounter Diagnoses   Name Primary?    Impaired range of motion of left shoulder Yes    Impaired strength of upper extremity     Impaired function of upper extremity                Physician: Sea Valadez MD    Visit Date: 3/12/2024     Physician Orders: PT Eval and Treat   NOTE: 2-3 x per week x 6 weeks     Protocol = [email or web address]  Medical Diagnosis from Referral:   S43.432A (ICD-10-CM) - Paralabral cyst of left shoulder, initial encounter   S43.432A (ICD-10-CM) - Superior glenoid labrum lesion of left shoulder, initial encounter      Evaluation Date: 2023  Authorization Period Expiration: 24  Plan of Care Expiration: 24  Progress Note Due: 24  Date of Surgery: 23 (12 weeks post op on )  Visit # / Visits authorized: 10/ 20  FOTO: 3rd FOTO performed 3/4/24  Precautions: see protocol       Time In: 900 AM  Time Out:945 AM  Total Billable Time: 45 minutes (TE-2, MT-1)                         Subjective       Patient is 11.5 weeks post op.      Patient reports: moving into internal rotation still challenging, feeling better overall  He was compliant with home exercise program.  Response to previous treatment: felt fine   Functional change: day to day pain has lessened     Pain: 2/10   Location: left shoulder      Objective        AROM of the shoulder  AROM flexion: 150 deg  AROM abduction: 160 deg   AROM ER at 90 de deg     PROM: WNL abd and flexion, restricted in ER        Strength : MMTs  Flexion : 4/5   Abduction : 4/5   ER: 4+/5       Treatment     Dominic received the treatments listed below:          Bold=performed    therapeutic exercises to develop strength, endurance, ROM, flexibility, and posture for 23  "minutes including:  Sidelying shoulder abduction 3x10 +2# ankle   Supine w/ EOM raised 45 deg flexion 3x10 +2#  sidelying ER with 0 lb DB 3x10    +AAROM dowel Internal rotation with shoulder at 90 deg abd   serratus punches 3x10 in supine- using wooden dowel  pulleys 3 mins scaption and flexion   +3' pulleys into internal rotation  +wall serratus push ups X 20  +UT stretch and LS stretch verbal demo  supine dowel flexion AAROM 2x10 3" holds  Supine dowel abduction AAROM 2x10 3" holds  supine dowel ER AAROM 2x10 3" holds    manual therapy techniques: PROM were applied to the: L+ shoulder for 8 minutes following protocol  PROM with very slight distraction and oscillation- flexion, abduction, and ER  GrI-II posterior and inferior GH mobs   MWM into post glide with internal rotation, inferior glide with scaption  +contract relax flexion  +contract relax shoulder abd   CR internal rotation 5 X 5 sec hod  Scapular mobs all 4 directions in SL       neuromuscular re-education activities to improve: muscle re-education and motor control of L+ UE, scapula for 00 minutes. Performed with NMES on L+ interscap musculature for feedback 5sec on/5 off, 2 sec ramp.  The following activities were included  prone scapular retraction 2x10 3-5" holds  prone rows 2x10  prone horizontal abd 2x10  prone extensions 2x10  Gentle Scap squeeze - small range 3 x 10    CP x 0 min followed by MHP x -min after Tx to reduce pain/inflammation with use of IFC - not performed     Patient Education and Home Exercises       Education provided:   - progress to date and ROM  - form and technique with exercises   -continue HEP    Written Home Exercises Provided: Patient instructed to cont prior HEP. Exercises were reviewed and Dominic was able to demonstrate them prior to the end of the session.  Dominic demonstrated good  understanding of the education provided. See Electronic Medical Record under Patient Instructions for exercises provided during therapy " sessions    Assessment   Dominic did very well with new functional internal rotation pulleys today and modified weight bearing activity onto the wall. He responded very well to joint mobilizations of the shoulder and was able to perform internal rotation with less anterior glenohumeral glides. Progressing well per protocol    Dominic Is progressing well towards his goals.   Patient prognosis is Excellent.   Patient will continue to benefit from skilled outpatient physical therapy to address the deficits listed in the problem list box on initial evaluation, provide pt/family education and to maximize pt's level of independence in the home and community environment.     Goals:      Short Term Goals 4 weeks   1. Pt will be independent with HEP to supplement PT in improving functional use of LUE.- MET  2. Pt will increase pain free left shoulder PROM in all planes to WNL to improve functional mobility of UE- MET  3. Formally assess strength when allowed and set goals accordingly- MET  4. Pt will adhere to post op precautions to allow for proper tissue healing- MET     Long Term Goals 8 weeks   1.  Pt will have full PROM of left shoulder to show improvements in overall movement patterns. - MET  2. Pt to show proper postural awareness with no VC from PT- MET  3. Pt to have good tolerance to AAROM and AROM activities per protocol -MET  4. Patient will increase left shoulder strength to >/=4/5- MET    Updated Goals:  5. Pt to improve shoulder flexion and abduction AROM to >/=160 deg in seated and supine positions to improve movement patterns  6. Pt to improve shoulder ER to >/=50 deg to improve AROM  7. Pt will be able to don/doff shirts and clothing without pain in order to improve independence  8. Pt will demo strength >/=4+/5 in the affected shoulder to improve use of LUE  9.Pt will report pain free overhead lifting to top shelf of fridge and/or cabinets to improve function             Plan   Continue 2x/week for another  month to address all deficits and improve functional use of LUALBERTO Olson, PT , DPT

## 2024-03-13 ENCOUNTER — CLINICAL SUPPORT (OUTPATIENT)
Dept: REHABILITATION | Facility: HOSPITAL | Age: 44
End: 2024-03-13
Attending: FAMILY MEDICINE
Payer: COMMERCIAL

## 2024-03-13 DIAGNOSIS — H55.81 SACCADIC EYE MOVEMENT DEFICIENCY: ICD-10-CM

## 2024-03-13 DIAGNOSIS — H51.11 CONVERGENCE INSUFFICIENCY: ICD-10-CM

## 2024-03-13 DIAGNOSIS — H55.82 DEFICIENT SMOOTH PURSUIT EYE MOVEMENTS: ICD-10-CM

## 2024-03-13 DIAGNOSIS — F90.2 ADHD (ATTENTION DEFICIT HYPERACTIVITY DISORDER), COMBINED TYPE: Primary | ICD-10-CM

## 2024-03-13 DIAGNOSIS — F07.81 POST CONCUSSION SYNDROME: Primary | ICD-10-CM

## 2024-03-13 PROCEDURE — 97530 THERAPEUTIC ACTIVITIES: CPT | Mod: PO

## 2024-03-13 PROCEDURE — 97112 NEUROMUSCULAR REEDUCATION: CPT | Mod: PO

## 2024-03-13 RX ORDER — DEXTROAMPHETAMINE SULFATE 10 MG/1
10 TABLET ORAL
Qty: 30 TABLET | Refills: 0 | Status: SHIPPED | OUTPATIENT
Start: 2024-03-13 | End: 2024-04-03 | Stop reason: SDUPTHER

## 2024-03-14 ENCOUNTER — HOSPITAL ENCOUNTER (EMERGENCY)
Facility: HOSPITAL | Age: 44
Discharge: HOME | End: 2024-03-14
Attending: EMERGENCY MEDICINE
Payer: COMMERCIAL

## 2024-03-14 VITALS
HEIGHT: 75 IN | WEIGHT: 315 LBS | DIASTOLIC BLOOD PRESSURE: 78 MMHG | RESPIRATION RATE: 18 BRPM | SYSTOLIC BLOOD PRESSURE: 136 MMHG | HEART RATE: 77 BPM | BODY MASS INDEX: 39.17 KG/M2 | TEMPERATURE: 98 F | OXYGEN SATURATION: 97 %
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
## 2024-03-14 PROCEDURE — 99283 EMERGENCY DEPT VISIT LOW MDM: CPT | Mod: 25

## 2024-03-14 PROCEDURE — 25000003 PHARM REV CODE 250: Performed by: EMERGENCY MEDICINE

## 2024-03-14 RX ORDER — ACETAMINOPHEN 325 MG/1
650 TABLET ORAL EVERY 6 HOURS PRN
Qty: 30 TABLET | Refills: 0 | Status: SHIPPED | OUTPATIENT
Start: 2024-03-14

## 2024-03-14 RX ORDER — ACETAMINOPHEN 500 MG
1000 TABLET ORAL
Status: COMPLETED | OUTPATIENT
Start: 2024-03-14 | End: 2024-03-14

## 2024-03-14 RX ADMIN — ACETAMINOPHEN 1000 MG: 500 TABLET ORAL at 09:03
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
## 2024-03-14 NOTE — DISCHARGE INSTRUCTIONS
Your x-ray did not show any signs of broken bone.    At home you can take Robaxin and Tylenol as needed for symptom control.    If you continue to have left shoulder discomfort please follow-up with the orthopedics doctor for further outpatient evaluation.    If you develop any new, worsening worrisome symptoms please return emergency department for re-evaluation.

## 2024-03-15 DIAGNOSIS — E29.1 MALE HYPOGONADISM: ICD-10-CM

## 2024-03-15 DIAGNOSIS — E11.00 TYPE II DIABETES MELLITUS WITH HYPEROSMOLARITY, UNCONTROLLED: ICD-10-CM

## 2024-03-15 RX ORDER — NEEDLES, DISPOSABLE 27GX1/2"
1 NEEDLE, DISPOSABLE MISCELLANEOUS WEEKLY
Qty: 12 EACH | Refills: 4 | Status: SHIPPED | OUTPATIENT
Start: 2024-03-15

## 2024-03-15 RX ORDER — TESTOSTERONE CYPIONATE 200 MG/ML
200 INJECTION, SOLUTION INTRAMUSCULAR
Qty: 5 ML | Refills: 3 | Status: SHIPPED | OUTPATIENT
Start: 2024-03-15 | End: 2024-09-13

## 2024-03-15 NOTE — TELEPHONE ENCOUNTER
No care due was identified.  [facility] Embedded Care Due Messages. Reference number: [id number].   3/15/2024 1:17:27 PM CDT

## 2024-03-18 ENCOUNTER — CLINICAL SUPPORT (OUTPATIENT)
Dept: REHABILITATION | Facility: HOSPITAL | Age: 44
End: 2024-03-18
Payer: COMMERCIAL

## 2024-03-18 ENCOUNTER — PATIENT MESSAGE (OUTPATIENT)
Dept: ORTHOPEDICS | Facility: CLINIC | Age: 44
End: 2024-03-18
Payer: COMMERCIAL

## 2024-03-18 DIAGNOSIS — M25.612 IMPAIRED RANGE OF MOTION OF LEFT SHOULDER: Primary | ICD-10-CM

## 2024-03-18 DIAGNOSIS — R68.89 IMPAIRED FUNCTION OF UPPER EXTREMITY: ICD-10-CM

## 2024-03-18 DIAGNOSIS — R29.898 IMPAIRED STRENGTH OF UPPER EXTREMITY: ICD-10-CM

## 2024-03-18 PROCEDURE — 97110 THERAPEUTIC EXERCISES: CPT | Mod: PO

## 2024-03-19 ENCOUNTER — TELEPHONE (OUTPATIENT)
Dept: ORTHOPEDICS | Facility: CLINIC | Age: 44
End: 2024-03-19
Payer: COMMERCIAL

## 2024-03-20 ENCOUNTER — OFFICE VISIT (OUTPATIENT)
Dept: ORTHOPEDICS | Facility: CLINIC | Age: 44
End: 2024-03-20
Payer: COMMERCIAL

## 2024-03-20 VITALS — BODY MASS INDEX: 19.62 KG/M2 | WEIGHT: 157 LBS

## 2024-03-20 DIAGNOSIS — Z98.890 STATUS POST LABRAL REPAIR OF SHOULDER: Primary | ICD-10-CM

## 2024-03-20 PROCEDURE — 3008F BODY MASS INDEX DOCD: CPT | Mod: CPTII,S$GLB,, | Performed by: ORTHOPAEDIC SURGERY

## 2024-03-20 PROCEDURE — 99213 OFFICE O/P EST LOW 20 MIN: CPT | Mod: S$GLB,,, | Performed by: ORTHOPAEDIC SURGERY

## 2024-03-20 PROCEDURE — 1160F RVW MEDS BY RX/DR IN RCRD: CPT | Mod: CPTII,S$GLB,, | Performed by: ORTHOPAEDIC SURGERY

## 2024-03-20 PROCEDURE — 99999 PR PBB SHADOW E&M-EST. PATIENT-LVL IV: CPT | Mod: PBBFAC,,, | Performed by: ORTHOPAEDIC SURGERY

## 2024-03-20 PROCEDURE — 4010F ACE/ARB THERAPY RXD/TAKEN: CPT | Mod: CPTII,S$GLB,, | Performed by: ORTHOPAEDIC SURGERY

## 2024-03-20 PROCEDURE — 1159F MED LIST DOCD IN RCRD: CPT | Mod: CPTII,S$GLB,, | Performed by: ORTHOPAEDIC SURGERY
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
## 2024-03-21 ENCOUNTER — CLINICAL SUPPORT (OUTPATIENT)
Dept: REHABILITATION | Facility: HOSPITAL | Age: 44
End: 2024-03-21
Payer: COMMERCIAL

## 2024-03-21 DIAGNOSIS — R68.89 IMPAIRED FUNCTION OF UPPER EXTREMITY: ICD-10-CM

## 2024-03-21 DIAGNOSIS — M25.612 IMPAIRED RANGE OF MOTION OF LEFT SHOULDER: Primary | ICD-10-CM

## 2024-03-21 DIAGNOSIS — R29.898 IMPAIRED STRENGTH OF UPPER EXTREMITY: ICD-10-CM

## 2024-03-21 PROCEDURE — 97110 THERAPEUTIC EXERCISES: CPT | Mod: PO
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
## 2024-03-25 ENCOUNTER — CLINICAL SUPPORT (OUTPATIENT)
Dept: REHABILITATION | Facility: HOSPITAL | Age: 44
End: 2024-03-25
Payer: COMMERCIAL

## 2024-03-25 DIAGNOSIS — R29.898 IMPAIRED STRENGTH OF UPPER EXTREMITY: ICD-10-CM

## 2024-03-25 DIAGNOSIS — R68.89 IMPAIRED FUNCTION OF UPPER EXTREMITY: ICD-10-CM

## 2024-03-25 DIAGNOSIS — M25.612 IMPAIRED RANGE OF MOTION OF LEFT SHOULDER: Primary | ICD-10-CM

## 2024-03-25 PROCEDURE — 97110 THERAPEUTIC EXERCISES: CPT | Mod: PO

## 2024-03-25 PROCEDURE — 97140 MANUAL THERAPY 1/> REGIONS: CPT | Mod: PO

## 2024-03-25 PROCEDURE — 97112 NEUROMUSCULAR REEDUCATION: CPT | Mod: PO

## 2024-03-26 NOTE — PROGRESS NOTES
[facility]   Physical Therapy Treatment Note      Name: Dominic Collazo  Clinic Number: [id number]    Therapy Diagnosis:   Encounter Diagnoses   Name Primary?    Impaired range of motion of left shoulder Yes    Impaired strength of upper extremity     Impaired function of upper extremity                      Physician: Sea Valadez MD    Visit Date: 3/27/2024     Physician Orders: PT Eval and Treat   NOTE: 2-3 x per week x 6 weeks     Protocol = [email or web address]  Medical Diagnosis from Referral:   S43.432A (ICD-10-CM) - Paralabral cyst of left shoulder, initial encounter   S43.432A (ICD-10-CM) - Superior glenoid labrum lesion of left shoulder, initial encounter      Evaluation Date: 12/29/2023  Authorization Period Expiration: 12/31/24  Plan of Care Expiration: 4/4/24  Progress Note Due: 4/4/24  Date of Surgery: 12/14/23  Visit # / Visits authorized: 14/ 20  FOTO: 3rd FOTO performed 3/4/24  Precautions: see protocol       Time In: 807 AM  Time Out: 854 AM  Total Billable Time: 47 minutes (TE-2, NMR-1, TA-1)                         Subjective         Patient reports: the soreness in the shoulder is subsiding however he has neck pain  He was compliant with home exercise program.  Response to previous treatment: felt good, progressing   Functional change: In progress     Pain: 5/10   Location: left shoulder          Treatment     Dominic received the treatments listed below:          Bold=performed    therapeutic exercises to develop strength, endurance, ROM, flexibility, and posture for 24 minutes including:  Gentle PROM into all planes    *increased time for postural control and cuing for shoulder sidelying trio*  Sidelying shoulder abduction 2x10 - 3#  sidelying ER with 3 lb DB 2x10  Sidelying flexion with 3# 2x10    pulleys 3 mins scaption and flexion each direction  3' pulleys into internal  "rotation  Supine w/ EOM raised 45 deg flexion 2x10   Elevated HOB tricep extension 3 x 10 RTB  +AAROM dowel Internal rotation with shoulder at 90 deg abd   serratus punches 3x10 in supine- using wooden dowel  +LS stretch 3x30"  +UT stretch 3x30"  supine dowel flexion AAROM 2x10 3" holds  Supine dowel abduction AAROM 2x10 3" holds  supine dowel ER AAROM 2x10 3" holds        manual therapy techniques: PROM were applied to the: L+ shoulder for 00 minutes following protocol  PROM with very slight distraction and oscillation- flexion, abduction, and ER  GrI-II  inferior GH mobs   contract relax flexion  +contract relax shoulder abd   Scapular mobs all 4 directions in SL   Passive stretching to L+ upper trap          neuromuscular re-education activities to improve: muscle re-education and motor control of L+ UE, scapula for 15 minutes.   The following activities were included:  ball on the wall ABCs 2 reps  body blade IR/ER 2x30"  wall serratus push ups X 20  shoulder taps at mat  2x10  prone scapular retraction 2x10 3-5" holds  prone rows 2x10  prone horizontal abd 2x10  prone extensions 2x10  Gentle Scap squeeze - small range 3 x 10      Therapeutic activities: 8 minutes to improve overhead function  +overhead press 5# 3x5 (visual cuing w/ mirror)  +overhead carries 3# DB , 3 laps   Education on postural endurance and form with overhead mobility     Patient Education and Home Exercises       Education provided:   - progress and PT POC  - HEP    Written Home Exercises Provided: yes. Exercises were reviewed and Dominic was able to demonstrate them prior to the end of the session.  Dominic demonstrated good  understanding of the education provided. See Electronic Medical Record under Patient Instructions for exercises provided during therapy sessions    Assessment       Dominic's shoulder soreness is slowly resolving but he does have neck pain. Started session with UT and LS stretches to improve neck pain and flexibility. " Will assess neck at last scheduled visit on 5/3/24. Patient has good carryover of activity. He requires tactile cuing for proper scapular depression and retraction with sidelying shoulder trio but improves once cued. Able to add overhead activities (overhead press and overhead carries) with appropriate challenge from patient. Progress as bhavin.        Dominic Is progressing well towards his goals.   Patient prognosis is Excellent.   Patient will continue to benefit from skilled outpatient physical therapy to address the deficits listed in the problem list box on initial evaluation, provide pt/family education and to maximize pt's level of independence in the home and community environment.     Goals:      Short Term Goals 4 weeks   1. Pt will be independent with HEP to supplement PT in improving functional use of LUE.- MET  2. Pt will increase pain free left shoulder PROM in all planes to WNL to improve functional mobility of UE- MET  3. Formally assess strength when allowed and set goals accordingly- MET  4. Pt will adhere to post op precautions to allow for proper tissue healing- MET     Long Term Goals 8 weeks   1.  Pt will have full PROM of left shoulder to show improvements in overall movement patterns. - MET  2. Pt to show proper postural awareness with no VC from PT- MET  3. Pt to have good tolerance to AAROM and AROM activities per protocol -MET  4. Patient will increase left shoulder strength to >/=4/5- MET    Updated Goals:  5. Pt to improve shoulder flexion and abduction AROM to >/=160 deg in seated and supine positions to improve movement patterns  6. Pt to improve shoulder ER to >/=50 deg to improve AROM  7. Pt will be able to don/doff shirts and clothing without pain in order to improve independence- almost met  8. Pt will demo strength >/=4+/5 in the affected shoulder to improve use of LUE  9.Pt will report pain free overhead lifting to top shelf of fridge and/or cabinets to improve function              Plan   Once a week through April       Stephany Styles, PT , DPT

## 2024-03-27 ENCOUNTER — CLINICAL SUPPORT (OUTPATIENT)
Dept: REHABILITATION | Facility: HOSPITAL | Age: 44
End: 2024-03-27
Payer: COMMERCIAL

## 2024-03-27 DIAGNOSIS — R29.898 IMPAIRED STRENGTH OF UPPER EXTREMITY: ICD-10-CM

## 2024-03-27 DIAGNOSIS — M25.612 IMPAIRED RANGE OF MOTION OF LEFT SHOULDER: Primary | ICD-10-CM

## 2024-03-27 DIAGNOSIS — R68.89 IMPAIRED FUNCTION OF UPPER EXTREMITY: ICD-10-CM

## 2024-03-27 PROCEDURE — 97112 NEUROMUSCULAR REEDUCATION: CPT | Mod: PO

## 2024-03-27 PROCEDURE — 97110 THERAPEUTIC EXERCISES: CPT | Mod: PO

## 2024-03-27 PROCEDURE — 97530 THERAPEUTIC ACTIVITIES: CPT | Mod: PO

## 2024-04-01 NOTE — PROGRESS NOTES
[facility]   Physical Therapy Treatment Note      Name: Dominic Collazo  [facility] Number: [id number]    Therapy Diagnosis:   Encounter Diagnoses   Name Primary?    Impaired range of motion of left shoulder Yes    Impaired strength of upper extremity     Impaired function of upper extremity                        Physician: Sea Valadez MD    Visit Date: 4/2/2024     Physician Orders: PT Eval and Treat   NOTE: 2-3 x per week x 6 weeks     Protocol = [email or web address]  Medical Diagnosis from Referral:   S43.432A (ICD-10-CM) - Paralabral cyst of left shoulder, initial encounter   S43.432A (ICD-10-CM) - Superior glenoid labrum lesion of left shoulder, initial encounter      Evaluation Date: 12/29/2023  Authorization Period Expiration: 12/31/24  Plan of Care Expiration: 4/4/24  Progress Note Due: 4/4/24  Date of Surgery: 12/14/23  Visit # / Visits authorized: 15/ 20  FOTO: 3rd FOTO performed 3/4/24  Precautions: see protocol       Time In: 900 AM  Time Out: 945 AM  Total Billable Time: 45 minutes (TE-1, NMR-1, TA-1)                         Subjective         Patient reports: continues with neck pain, shoulder is getting better. He did buy a postural correction device and he feels this has helped a lot.   He was compliant with home exercise program.  Response to previous treatment: felt good, progressing   Functional change: In progress     Pain: 5/10   Location: left shoulder          Treatment     Dominic received the treatments listed below:          Bold=performed    therapeutic exercises to develop strength, endurance, ROM, flexibility, and posture for 20 minutes including:  Gentle PROM into all planes    *increased time for postural control and cuing for shoulder sidelying trio*  Sidelying shoulder abduction 2x10 - 3#- progressed to 4# today  sidelying ER with 3 lb DB 2x10 - progressed to 4#  "today  Sidelying flexion with 3# 2x10- progressed to 4# today  +open books 2x10    pulleys 3 mins scaption and flexion each direction  3' pulleys into internal rotation  Supine w/ EOM raised 45 deg flexion 2x10   Elevated HOB tricep extension 3 x 10 RTB  +AAROM dowel Internal rotation with shoulder at 90 deg abd   serratus punches 3x10 in supine- using wooden dowel  LS stretch 3x30"  UT stretch 3x30"  supine dowel flexion AAROM 2x10 3" holds  Supine dowel abduction AAROM 2x10 3" holds  supine dowel ER AAROM 2x10 3" holds        manual therapy techniques: PROM were applied to the: L+ shoulder for 00 minutes following protocol  PROM with very slight distraction and oscillation- flexion, abduction, and ER  GrI-II  inferior GH mobs   contract relax flexion  +contract relax shoulder abd   Scapular mobs all 4 directions in SL   Passive stretching to L+ upper trap          neuromuscular re-education activities to improve: muscle re-education and motor control of L+ UE, scapula for 17 minutes.   The following activities were included:  ball on the wall ABCs 2 reps  body blade IR/ER 2x30"  wall serratus push ups X 20  shoulder taps at mat  2x10  Push up plus at mat 2x10  +serratus wall slides 2x10 (discontinues after 2nd set due to anterior shoulder pain)  prone scapular retraction 2x10 3-5" holds  prone rows 2x10  prone horizontal abd 2x10  prone extensions 2x10  Gentle Scap squeeze - small range 3 x 10      Therapeutic activities: 8 minutes to improve overhead function  overhead press 5# 1x10, 1x5 due to fatigue (visual cuing w/ mirror)  overhead carries 3# DB , 3 laps   Education on postural endurance and form with overhead mobility     Patient Education and Home Exercises       Education provided:   - progress and PT POC  - HEP    Written Home Exercises Provided: Patient instructed to cont prior HEP. Exercises were reviewed and Dominic was able to demonstrate them prior to the end of the session.  Dominic demonstrated " good  understanding of the education provided. See Electronic Medical Record under Patient Instructions for exercises provided during therapy sessions    Assessment     Dominic's shoulder soreness is progressively resolving but he does have neck pain. Good response to addition of open books and performance of UT and LS stretches to improve flexibility and decrease pain. He has improved with scapular control with sidelying exercises and is able to progress to using 4 lb dumbbell with these. Also, he does well with overhead activities. With overhead presses, he has fatigue with second set and thus only performs 5 reps for the second set. With serratus wall slides, he performs 2x10 but he complains of anterior shoulder pain. Otherwise, good tolerance to session overall. Progress as tolerated.    Dominic Is progressing well towards his goals.   Patient prognosis is Excellent.   Patient will continue to benefit from skilled outpatient physical therapy to address the deficits listed in the problem list box on initial evaluation, provide pt/family education and to maximize pt's level of independence in the home and community environment.     Goals:      Short Term Goals 4 weeks   1. Pt will be independent with HEP to supplement PT in improving functional use of LUE.- MET  2. Pt will increase pain free left shoulder PROM in all planes to WNL to improve functional mobility of UE- MET  3. Formally assess strength when allowed and set goals accordingly- MET  4. Pt will adhere to post op precautions to allow for proper tissue healing- MET     Long Term Goals 8 weeks   1.  Pt will have full PROM of left shoulder to show improvements in overall movement patterns. - MET  2. Pt to show proper postural awareness with no VC from PT- MET  3. Pt to have good tolerance to AAROM and AROM activities per protocol -MET  4. Patient will increase left shoulder strength to >/=4/5- MET    Updated Goals:  5. Pt to improve shoulder flexion and  abduction AROM to >/=160 deg in seated and supine positions to improve movement patterns  6. Pt to improve shoulder ER to >/=50 deg to improve AROM  7. Pt will be able to don/doff shirts and clothing without pain in order to improve independence- almost met  8. Pt will demo strength >/=4+/5 in the affected shoulder to improve use of LUE  9.Pt will report pain free overhead lifting to top shelf of fridge and/or cabinets to improve function             Plan   Once a week through April       Stephany Styles, PT , DPT

## 2024-04-02 ENCOUNTER — CLINICAL SUPPORT (OUTPATIENT)
Dept: REHABILITATION | Facility: HOSPITAL | Age: 44
End: 2024-04-02
Payer: COMMERCIAL

## 2024-04-02 DIAGNOSIS — R68.89 IMPAIRED FUNCTION OF UPPER EXTREMITY: ICD-10-CM

## 2024-04-02 DIAGNOSIS — R29.898 IMPAIRED STRENGTH OF UPPER EXTREMITY: ICD-10-CM

## 2024-04-02 DIAGNOSIS — M25.612 IMPAIRED RANGE OF MOTION OF LEFT SHOULDER: Primary | ICD-10-CM

## 2024-04-02 PROCEDURE — 97110 THERAPEUTIC EXERCISES: CPT | Mod: PO

## 2024-04-02 PROCEDURE — 97530 THERAPEUTIC ACTIVITIES: CPT | Mod: PO

## 2024-04-02 PROCEDURE — 97112 NEUROMUSCULAR REEDUCATION: CPT | Mod: PO

## 2024-04-03 ENCOUNTER — PATIENT MESSAGE (OUTPATIENT)
Dept: PSYCHIATRY | Facility: CLINIC | Age: 44
End: 2024-04-03

## 2024-04-03 ENCOUNTER — OFFICE VISIT (OUTPATIENT)
Dept: PSYCHIATRY | Facility: CLINIC | Age: 44
End: 2024-04-03
Payer: COMMERCIAL

## 2024-04-03 ENCOUNTER — OFFICE VISIT (OUTPATIENT)
Dept: SLEEP MEDICINE | Facility: CLINIC | Age: 44
End: 2024-04-03
Payer: COMMERCIAL

## 2024-04-03 DIAGNOSIS — E29.1 MALE HYPOGONADISM: ICD-10-CM

## 2024-04-03 DIAGNOSIS — F41.1 GENERALIZED ANXIETY DISORDER WITH PANIC ATTACKS: ICD-10-CM

## 2024-04-03 DIAGNOSIS — Z99.89 INSUFFICIENT TREATMENT WITH NASAL CPAP: Primary | ICD-10-CM

## 2024-04-03 DIAGNOSIS — G47.33 OSA ON CPAP: ICD-10-CM

## 2024-04-03 DIAGNOSIS — F90.2 ADHD (ATTENTION DEFICIT HYPERACTIVITY DISORDER), COMBINED TYPE: ICD-10-CM

## 2024-04-03 DIAGNOSIS — F33.0 MDD (MAJOR DEPRESSIVE DISORDER), RECURRENT EPISODE, MILD: Primary | ICD-10-CM

## 2024-04-03 DIAGNOSIS — F41.0 GENERALIZED ANXIETY DISORDER WITH PANIC ATTACKS: ICD-10-CM

## 2024-04-03 DIAGNOSIS — F99 INSOMNIA DUE TO OTHER MENTAL DISORDER: ICD-10-CM

## 2024-04-03 DIAGNOSIS — G47.10 HYPERSOMNOLENCE: ICD-10-CM

## 2024-04-03 DIAGNOSIS — F51.05 INSOMNIA DUE TO OTHER MENTAL DISORDER: ICD-10-CM

## 2024-04-03 DIAGNOSIS — R53.83 FATIGUE, UNSPECIFIED TYPE: ICD-10-CM

## 2024-04-03 DIAGNOSIS — Z79.890 LONG-TERM CURRENT USE OF TESTOSTERONE REPLACEMENT THERAPY: ICD-10-CM

## 2024-04-03 DIAGNOSIS — E55.9 VITAMIN D INSUFFICIENCY: ICD-10-CM

## 2024-04-03 DIAGNOSIS — R79.89 ABNORMAL CBC: ICD-10-CM

## 2024-04-03 DIAGNOSIS — G47.33 SEVERE OBSTRUCTIVE SLEEP APNEA: ICD-10-CM

## 2024-04-03 PROCEDURE — 1160F RVW MEDS BY RX/DR IN RCRD: CPT | Mod: CPTII,95,, | Performed by: PHYSICIAN ASSISTANT

## 2024-04-03 PROCEDURE — 4010F ACE/ARB THERAPY RXD/TAKEN: CPT | Mod: CPTII,95,, | Performed by: INTERNAL MEDICINE

## 2024-04-03 PROCEDURE — 4010F ACE/ARB THERAPY RXD/TAKEN: CPT | Mod: CPTII,95,, | Performed by: PHYSICIAN ASSISTANT

## 2024-04-03 PROCEDURE — 99214 OFFICE O/P EST MOD 30 MIN: CPT | Mod: 95,,, | Performed by: PHYSICIAN ASSISTANT

## 2024-04-03 PROCEDURE — 1159F MED LIST DOCD IN RCRD: CPT | Mod: CPTII,95,, | Performed by: PHYSICIAN ASSISTANT

## 2024-04-03 PROCEDURE — 99214 OFFICE O/P EST MOD 30 MIN: CPT | Mod: 95,,, | Performed by: INTERNAL MEDICINE

## 2024-04-03 RX ORDER — DEXTROAMPHETAMINE SULFATE 10 MG/1
10 TABLET ORAL
Qty: 30 TABLET | Refills: 0 | Status: SHIPPED | OUTPATIENT
Start: 2024-04-03 | End: 2024-04-03

## 2024-04-03 RX ORDER — VENLAFAXINE HYDROCHLORIDE 150 MG/1
150 CAPSULE, EXTENDED RELEASE ORAL DAILY
Qty: 90 CAPSULE | Refills: 1 | Status: SHIPPED | OUTPATIENT
Start: 2024-04-03 | End: 2024-09-30

## 2024-04-03 RX ORDER — DEXTROAMPHETAMINE SULFATE 10 MG/1
10 TABLET ORAL
Qty: 30 TABLET | Refills: 0 | Status: SHIPPED | OUTPATIENT
Start: 2024-04-03 | End: 2024-05-21 | Stop reason: SDUPTHER

## 2024-04-03 RX ORDER — ZOLPIDEM TARTRATE 12.5 MG/1
12.5 TABLET, FILM COATED, EXTENDED RELEASE ORAL NIGHTLY PRN
Qty: 30 TABLET | Refills: 3 | Status: SHIPPED | OUTPATIENT
Start: 2024-04-03 | End: 2024-10-02

## 2024-04-03 RX ORDER — BUPROPION HYDROCHLORIDE 150 MG/1
150 TABLET ORAL DAILY
Qty: 90 TABLET | Refills: 1 | Status: SHIPPED | OUTPATIENT
Start: 2024-04-03

## 2024-04-03 RX ORDER — LISDEXAMFETAMINE DIMESYLATE 40 MG/1
40 CAPSULE ORAL DAILY
Qty: 30 CAPSULE | Refills: 0 | Status: SHIPPED | OUTPATIENT
Start: 2024-04-03 | End: 2024-05-14 | Stop reason: SDUPTHER

## 2024-04-03 RX ORDER — BUSPIRONE HYDROCHLORIDE 10 MG/1
20 TABLET ORAL 2 TIMES DAILY
Qty: 180 TABLET | Refills: 1 | Status: SHIPPED | OUTPATIENT
Start: 2024-04-03

## 2024-04-03 RX ORDER — ALPRAZOLAM 1 MG/1
1 TABLET ORAL 2 TIMES DAILY PRN
Qty: 60 TABLET | Refills: 1 | Status: SHIPPED | OUTPATIENT
Start: 2024-04-03 | End: 2024-08-01

## 2024-04-03 RX ORDER — LISDEXAMFETAMINE DIMESYLATE 40 MG/1
40 CAPSULE ORAL DAILY
Qty: 30 CAPSULE | Refills: 0 | Status: SHIPPED | OUTPATIENT
Start: 2024-04-03 | End: 2024-04-03

## 2024-04-03 NOTE — PROGRESS NOTES
"Outpatient Psychiatry Follow-Up Visit (MD/ADA)    4/3/2024     The patient location is: [place]  The chief complaint leading to consultation is: depression and anxiety    Visit type: audiovisual    Face to Face time with patient: 25 minutes  32 minutes of total time spent on the encounter, which includes face to face time and non-face to face time preparing to see the patient (eg, review of tests), Obtaining and/or reviewing separately obtained history, Documenting clinical information in the electronic or other health record, Independently interpreting results (not separately reported) and communicating results to the patient/family/caregiver, or Care coordination (not separately reported).     Each patient to whom he or she provides medical services by telemedicine is:  (1) informed of the relationship between the physician and patient and the respective role of any other health care provider with respect to management of the patient; and (2) notified that he or she may decline to receive medical services by telemedicine and may withdraw from such care at any time.    Clinical Status of Patient:  Outpatient (Ambulatory)    Chief Complaint:  Dominic Collazo is a 43 y.o. male who presents today for follow-up of depression, anxiety, adjustment problems, and attention problems.  Met with patient.      Interval History and Content of Current Session:  Interim Events/Subjective Report/Content of Current Session:     Pt reports today: "doing better overall, the focus and fatigue is better, I'm more organized and less irritable on the vyvanse"    Had switched from sunsoni and nuvigil to vyvanse and both were ineffective for ADHD and also residual daytime sleepiness despite using cpap for robert.    Mood overall is "pretty good, its improved, still pretty tired. Not too depressed or anxious"    Patients mood is steady, affect appears mood congruent. Linear and logical, friendly and cooperative, good eye " "contact.    Denies SI/HI/AVH. Pt reports sleeping well and normal appetite. Denies side effects of medications.    Pt reports taking medications as prescribed and behaving appropriately during interview today.    Pt reports continued issues with fatigue despite adequate sleep on cpap. Will work up fatigue/depression panel lab work. Also will check testosterone and e2 as pt on test replacement and requested it be rechecked, last was about 4-5 months ago    States he takes 3000mcg b12 otc daily. Instructed pt to discontinue as likely at toxic levels      Prior visit:    Pt has not gotten labs drawn since last visit. Pt states will be them drawn soon.    Pt reports today: "things have been bad. More anxious. Get angry and anxious easily now, feels like its building up. Small things like the house being a mess by the kids that I normally wouldn't get upset."    Pt states he got new job he applied for and then declined it after finding out that his responsibilities and job duties would be completely different. States a new supervisor took over between time he accepted the job and supervisor decided to change his role.    Pt declined the job offer as description changed due to new supervisor, pt had to go back to job he had recently resigned from and now is trying to reestablish relations with company that he had left. He was rehired by prior company.     Pt reports hx ADHD symptoms. Reports since childhood restless and fidgety, distractible, procrastination, racing thoughts, impulsive behaviors, difficulty focusing and not completing any of them, unable to relax during free time due to different thoughts and ideas "running through my mind." Pt reports his family did not want him medicated as child and pt and reports school was difficult.    Discussed stopping nuvigil and and sunosi, trying vyvanse for ADHD and excessive daytime sleepiness.    Mood overall is "not great. I'm down and anxious"    Patients mood is " depressive and anxious, affect appears mood congruent. Linear and logical, friendly and cooperative, good eye contact.    Denies SI/HI/AVH. Pt reports sleeping 6-8 hrs per night and  decreased appetite due to ozempic. Also reports constipation since ozempic.    Pt reports taking medications as prescribed and behaving appropriately during interview today.    Previous medication trials:  Ativan- effective  Seroquel- sedation  Lexapro  Wellbutrin and Buspar- effective,   vistaril- sedation, still had axniety  Prozac - sexual side effects  Cymbalta Sexual side effects and retrograde ejaculation    Review of Systems     Review of Systems   Constitutional:  Positive for malaise/fatigue. Negative for fever.   HENT:  Negative for sore throat.    Eyes:  Negative for photophobia.   Respiratory:  Negative for cough.    Cardiovascular:  Negative for chest pain and palpitations.   Gastrointestinal:  Negative for abdominal pain.   Genitourinary:  Negative for dysuria.   Musculoskeletal:  Negative for myalgias. Joint pain: R shoulder post op 2 months.  Skin:  Negative for rash.   Neurological:  Negative for dizziness and headaches.   Endo/Heme/Allergies:  Does not bruise/bleed easily.   Psychiatric/Behavioral:  Negative for depression, hallucinations, substance abuse and suicidal ideas. The patient is not nervous/anxious and does not have insomnia.          Past Medical, Family and Social History: The patient's past medical, family and social history have been reviewed and updated as appropriate within the electronic medical record - see encounter notes.    Compliance: yes    Side effects: see above    Risk Parameters:  Patient reports no suicidal ideation  Patient reports no homicidal ideation  Patient reports no self-injurious behavior  Patient reports no violent behavior    Exam (detailed: at least 9 elements; comprehensive: all 15 elements)   Constitutional  Vitals:    There were no vitals filed for this visit.     General:   age appropriate, obese     Musculoskeletal  Muscle Strength/Tone:  not examined   Gait & Station:  THEA due to video visit      Psychiatric  Speech:  no latency; no press   Mood & Affect:  Steady  Mood congruent   Thought Process:  normal and logical   Associations:  intact   Thought Content:  normal, no suicidality, no homicidality, delusions, or paranoia   Insight:  intact   Judgement: behavior is adequate to circumstances   Orientation:  grossly intact   Memory: intact for content of interview   Language: grossly intact   Attention Span & Concentration:  able to focus   Fund of Knowledge:  intact and appropriate to age and level of education     Assessment and Diagnosis   Status/Progress: Based on the examination today, the patient's problem(s) is/are adequately but not ideally controlled.  New problems have not been presented today.   Co-morbidities are complicating management of the primary condition.  There are no active rule-out diagnoses for this patient at this time.     General Impression:     No diagnosis found.        Intervention/Counseling/Treatment Plan   Treatment Plan/Recommendations:   Medication Management: Continue current medications.   Labs:  Pt reports continued issues with fatigue despite adequate sleep on cpap. Will work up fatigue/depression panel lab work. Also will check testosterone and e2 as pt on test replacement and requested it be rechecked, last was about 4-5 months ago    States he takes 3000mcg b12 otc daily. Instructed pt to discontinue as likely at toxic levels    Continue Wellbutrin  mg daily    Continue vitamin D to 2.5k IU once daily    continue buspar to 20mg bid    continue Effexor to 150mg mg daily     continue xanax 1mg bid prn anxiety or insomnia    continue ambien to CR 12.5mg qhs insomnia    Continue vyvanse 40mg daily in AM  Continue dexedine 10mg daily prn in afternoon for focus extended work hrs    Continue testosterone therapy as prescribed by other  provider    The risks and benefits of medication were discussed with the patient.  Discussed diagnosis, risks and benefits of proposed treatment above vs alternative treatments vs no treatment, and potential side effects of these treatments. The patient expresses understanding of the above and displays the capacity to agree with this treatment given said understanding. Patient also agrees that, currently, the benefits outweigh the risks and would like to pursue treatment at this time.  Discussed inherent unpredictability of medications in each individual.   Encouraged Patient to keep future appointments.   Take medications as prescribed and abstain from substance abuse.   In the event of an emergency, patient was advised to go to the emergency room      Return to Clinic: 2 months      Brandon Burdick PA-C

## 2024-04-03 NOTE — PROGRESS NOTES
The patient location is: [place]  The chief complaint leading to consultation is: trouble with sleep    Visit type: audiovisual    15 minutes of total time spent on the encounter, which includes face to face time and non-face to face time preparing to see the patient (eg, review of tests), Obtaining and/or reviewing separately obtained history, Documenting clinical information in the electronic or other health record, Independently interpreting results (not separately reported) and communicating results to the patient/family/caregiver, or Care coordination (not separately reported).     Each patient to whom he or she provides medical services by telemedicine is:  (1) informed of the relationship between the physician and patient and the respective role of any other health care provider with respect to management of the patient; and (2) notified that he or she may decline to receive medical services by telemedicine and may withdraw from such care at any time.      ESTABLISHED PATIENT VISIT (Dr. Timmy ZELAYA 2022)    Dominic Collazo  is a pleasant 43 y.o. male  with history of  chronic pain, HTN, ED, BMI elevated.    Here today for: PAP follow-up    Since last visit:     Tried to get CPAP titration, but was not able to pay the copay on the day of the study  Interested in rescheduling   Lost machine on airplane and needs a new one        PAP history   Problems    Mask Nasal pillows (likes, but high leaks)  Tried dreamwear nasal  Tried FFM (sensitive skin)   Pressure tolerating   Benefit    DME HME   Machine age Circa 2015   Download        SLEEP SCHEDULE   Environment    Bed Time 11pM   Sleep Latency    Arousals 3-5   Nocturia    Back to sleep    Wake time 6:15A (9A)   Naps yes   Work        Past Medical History:   Diagnosis Date    Adjustment disorder with mixed anxiety and depressed mood     Anxiety     Colon polyp     Depression     ED (erectile dysfunction)     Family history of prostate cancer 9/29/2014     Hx of umbilical hernia repair 10/16/2020    Hyperlipidemia     Hypertension     Hypogonadism male     Insomnia     Low testosterone     LYNDA (obstructive sleep apnea)     Prediabetes      Patient Active Problem List   Diagnosis    HTN (hypertension)    Hyperlipidemia    Adjustment disorder with mixed anxiety and depressed mood    LYNDA on CPAP    Acromioclavicular joint separation, type 1    Left wrist sprain    Trauma    Mid back pain    Decreased ROM of wrist    Decreased strength    Chronic pain    Tenosynovitis, de Quervain    Morbid obesity    Type 2 diabetes mellitus with morbid obesity    Thoracic back pain    Erectile dysfunction due to arterial insufficiency    Male hypogonadism    NAFLD (nonalcoholic fatty liver disease)    Elevated liver enzymes    Panic attack    Insomnia due to other mental disorder    Adjustment insomnia    BPH with urinary obstruction    Shoulder pain    Neck pain    Decreased range of motion of left shoulder    BMI 40.0-44.9, adult    Other specified persistent mood disorders    Concussion with loss of consciousness    Post concussion syndrome    Deficient smooth pursuit eye movements    Saccadic eye movement deficiency    Convergence insufficiency    Cognitive communication deficit    Paralabral cyst of left shoulder    Superior glenoid labrum lesion of left shoulder    Impaired range of motion of left shoulder    Impaired strength of upper extremity    Impaired function of upper extremity    ADHD (attention deficit hyperactivity disorder), combined type    Long-term current use of testosterone replacement therapy    Vitamin D insufficiency    Generalized anxiety disorder with panic attacks    MDD (major depressive disorder), recurrent episode, moderate       Current Outpatient Medications:     acetaminophen (TYLENOL) 325 MG tablet, Take 2 tablets (650 mg total) by mouth every 6 (six) hours as needed for Pain., Disp: 30 tablet, Rfl: 0    ALPRAZolam (XANAX) 1 MG tablet, Take 1 tablet (1  mg total) by mouth 2 (two) times daily as needed for Anxiety., Disp: 60 tablet, Rfl: 1    blood sugar diagnostic (ONETOUCH VERIO TEST STRIPS) Strp, 1 each by Misc.(Non-Drug; Combo Route) route 3 (three) times daily., Disp: 100 each, Rfl: 11    blood-glucose meter kit, To check BG 2 times daily, to use with insurance preferred meter, Disp: 1 each, Rfl: 0    buPROPion (WELLBUTRIN XL) 150 MG TB24 tablet, Take 1 tablet (150 mg total) by mouth once daily., Disp: 90 tablet, Rfl: 1    busPIRone (BUSPAR) 10 MG tablet, Take 2 tablets (20 mg total) by mouth 2 (two) times daily., Disp: 180 tablet, Rfl: 1    cetirizine HCl (ZYRTEC ORAL), Take 1 tablet by mouth daily as needed., Disp: , Rfl:     cyanocobalamin (VITAMIN B-12) 1000 MCG tablet, Take 100 mcg by mouth once daily., Disp: , Rfl:     dextroamphetamine sulfate (DEXTROSTAT) 10 MG tablet, Take 1 tablet (10 mg total) by mouth after lunch., Disp: 30 tablet, Rfl: 0    ergocalciferol, vitamin D2, (VITAMIN D ORAL), Take 5,000 Units by mouth once daily., Disp: , Rfl:     fexofenadine (ALLEGRA) 60 MG tablet, Take 60 mg by mouth every morning., Disp: , Rfl:     iron,carb/vit C/vit B12/folic (IRON 100 PLUS ORAL), Take by mouth once daily at 6am., Disp: , Rfl:     lancets 33 gauge Misc, Test BG 2 times a day, Disp: 200 each, Rfl: 3    lancets Misc, To check BG 2 times daily, Disp: 200 each, Rfl: 3    lisdexamfetamine (VYVANSE) 40 MG Cap, Take 1 capsule (40 mg total) by mouth once daily., Disp: 30 capsule, Rfl: 0    losartan (COZAAR) 25 MG tablet, Take 1 tablet (25 mg total) by mouth once daily., Disp: 30 tablet, Rfl: 11    metFORMIN (GLUCOPHAGE-XR) 500 MG ER 24hr tablet, Take 1 tablet (500 mg total) by mouth 2 (two) times daily with meals., Disp: 180 tablet, Rfl: 3    methocarbamoL (ROBAXIN) 750 MG Tab, TAKE 1 TABLET(750 MG) BY MOUTH FOUR TIMES DAILY AS NEEDED FOR MUSCLE STRAIN, Disp: 40 tablet, Rfl: 3    metoprolol succinate (TOPROL-XL) 100 MG 24 hr tablet, Take 1 tablet (100 mg  "total) by mouth once daily., Disp: 90 tablet, Rfl: 3    multivitamin capsule, Take 1 capsule by mouth once daily., Disp: , Rfl:     needle, disp, 18 G (BD REGULAR BEVEL NEEDLES) 18 gauge x 1" Ndle, 1 each by Misc.(Non-Drug; Combo Route) route once a week. Use to draw up testosterone, Disp: 12 each, Rfl: 4    riboflavin, vitamin B2, (VITAMIN B-2 ORAL), Take 1 tablet by mouth once daily., Disp: , Rfl:     rosuvastatin (CRESTOR) 10 MG tablet, Take 1 tablet by mouth once daily., Disp: 90 tablet, Rfl: 3    scopolamine (TRANSDERM-SCOP) 1.3-1.5 mg (1 mg over 3 days), Place 1 patch onto the skin every 72 hours., Disp: 4 patch, Rfl: 0    semaglutide (OZEMPIC) 2 mg/dose (8 mg/3 mL) PnIj, Inject 2 mg into the skin every 7 days., Disp: 9 mL, Rfl: 3    syringe with needle 3 mL 21 gauge x 1" Syrg, 1 each by Misc.(Non-Drug; Combo Route) route once a week. Use to inject testosterone, Disp: 12 each, Rfl: 4    testosterone cypionate (DEPOTESTOTERONE CYPIONATE) 200 mg/mL injection, Inject 1 mL (200 mg total) into the muscle every 7 days., Disp: 5 mL, Rfl: 3    tiZANidine (ZANAFLEX) 4 MG tablet, Take 0.5 tablets (2 mg total) by mouth nightly as needed., Disp: 30 tablet, Rfl: 5    venlafaxine (EFFEXOR-XR) 150 MG Cp24, Take 1 capsule (150 mg total) by mouth once daily., Disp: 90 capsule, Rfl: 1    zolpidem (AMBIEN CR) 12.5 MG CR tablet, Take 1 tablet (12.5 mg total) by mouth nightly as needed for Insomnia., Disp: 30 tablet, Rfl: 3  No current facility-administered medications for this visit.    Facility-Administered Medications Ordered in Other Visits:     lactated ringers infusion, , Intravenous, Continuous, Stan Marcos, MARTI    LIDOcaine (PF) 10 mg/ml (1%) injection 10 mg, 1 mL, Intradermal, Once, Stan Marcos DNP       There were no vitals filed for this visit.    Physical Exam:    GEN:   Well-appearing  Psych:  Appropriate affect, demonstrates insight  SKIN:  No rash on the face or bridge of the nose    LABS:  Lab Results "   Component Value Date    HGB 15.5 12/04/2023         RECORDS REVIEWED PREVIOUSLY:    PSG Dec 26,2009: AHi 15.7  CPAP12.28.2009: rx 8cwp  BiPAP 10.27.2011: bipap 16/12    10.13.22: AUto Bilevel 10-25 (PS 4-8) 89/90 x 8hr 33min,   (I x/15/18.7, E x/10/12.2), Leak 1hr 33min, AHI 1.1      PROBLEM DESCRIPTION/ Sx on Presentation Interval Hx STATUS   LYNDA   Moderate severity   Was doing well, lost his machine uncontrolled   Daytime Sx   + sleepiness when inactive   ESS 13/24 on intake    Prior Medications:  Modafinil 200 prn (did not last)  Sunosi 150mg (helps some)  Armodafinil 250 (helps some)    Current medications:  Vyvanse 40mg (helps concentration, some sleepiness)  Dextrostat 10mg prn    Follows with psychiatry       Currently on vyvanse 40mg  Helping with concentration but still sleepy in afternoon Still sleepy   Xerostomia Severe dry mouth   persists N/a   Insomnia   Onset:   Maintenance: waking 3-5 times per night  Prior hypnotics:   ambien CR 12.5     Current hypnotics:    N/A N/a   Nocturia   x 1-2 per sleep period 1-2 times per night stable   Other issues:     PLAN       -trial of modafinil 200mg bid prn sleepiness (send [facility])  -order new machine (no longer has)  -trial nasal mask (or FFM airtouch if has mouth breathing)    -the patient was using and benefitting from CPAP when they had a functioning machine  -the patient needs a new machine   Year of purchase: circa 2015,   out of warranty: circa 2017  condition of current machine: lost  No longer hasreason for replacement: needs CPAP machine to treat obstructive sleep apnea    -studies : in epic  -will order auto BiPAP, EPAP min=10, PS 4, max 20,   -order biPAP titration if still has sleepiness with new silke      RTC 3 months

## 2024-04-04 RX ORDER — MODAFINIL 200 MG/1
200 TABLET ORAL 2 TIMES DAILY PRN
Qty: 60 TABLET | Refills: 2 | Status: SHIPPED | OUTPATIENT
Start: 2024-04-04

## 2024-04-09 ENCOUNTER — CLINICAL SUPPORT (OUTPATIENT)
Dept: REHABILITATION | Facility: HOSPITAL | Age: 44
End: 2024-04-09
Payer: COMMERCIAL

## 2024-04-09 DIAGNOSIS — R29.898 IMPAIRED STRENGTH OF UPPER EXTREMITY: ICD-10-CM

## 2024-04-09 DIAGNOSIS — R68.89 IMPAIRED FUNCTION OF UPPER EXTREMITY: ICD-10-CM

## 2024-04-09 DIAGNOSIS — M25.612 IMPAIRED RANGE OF MOTION OF LEFT SHOULDER: Primary | ICD-10-CM

## 2024-04-09 PROCEDURE — 97140 MANUAL THERAPY 1/> REGIONS: CPT | Mod: PO

## 2024-04-09 PROCEDURE — 97110 THERAPEUTIC EXERCISES: CPT | Mod: PO

## 2024-04-09 NOTE — PROGRESS NOTES
[facility]   Physical Therapy Treatment Note      Name: Dominic Collazo  [facility] Number: [id number]    Therapy Diagnosis:   Encounter Diagnoses   Name Primary?    Impaired range of motion of left shoulder Yes    Impaired strength of upper extremity     Impaired function of upper extremity        Physician: Sea Valadez MD    Visit Date: 4/9/2024     Physician Orders: PT Eval and Treat   NOTE: 2-3 x per week x 6 weeks     Protocol = [email or web address]  Medical Diagnosis from Referral:   S43.432A (ICD-10-CM) - Paralabral cyst of left shoulder, initial encounter   S43.432A (ICD-10-CM) - Superior glenoid labrum lesion of left shoulder, initial encounter      Evaluation Date: 12/29/2023  Authorization Period Expiration: 12/31/24  Plan of Care Expiration: 4/4/24  Progress Note Due: 4/4/24  Date of Surgery: 12/14/23  Visit # / Visits authorized: 16/ 20  FOTO: 3rd FOTO performed 3/4/24  Precautions: see protocol       Time In: 705 AM  Time Out:  800 AM  Total Billable Time: 45 minutes + 10 minutes heat (2 MT, 1 TE)                         Subjective         Patient reports: neck locked up last week and he feels like its not giving way, the shoulder is feeling good and he feels like the neck is prohibiting him from progressing at this time  He was compliant with home exercise program.  Response to previous treatment: felt good, progressing   Functional change: In progress     Pain: 5/10   Location: left shoulder          Treatment     Dominic received the treatments listed below:          Bold=performed    therapeutic exercises to develop strength, endurance, ROM, flexibility, and posture for 18 minutes including:  Gentle PROM into all planes    *increased time for postural control and cuing for shoulder sidelying trio*  Sidelying shoulder abduction 2x10 - 3#- progressed to 4# today  sidelying ER with 3 lb DB 2x10  "- progressed to 4# today  Sidelying flexion with 3# 2x10- progressed to 4# today  +open books 2x10  UBE 3'/3'  pulleys 3 mins scaption and flexion each direction  3' pulleys into internal rotation  Supine w/ EOM raised 45 deg flexion 2x10   Elevated HOB tricep extension 3 x 10 RTB  +AAROM dowel Internal rotation with shoulder at 90 deg abd   serratus punches 3x10 in supine- using wooden dowel  LS stretch 3x30"  UT stretch 3x30"  supine dowel flexion AAROM 2x10 3" holds  Supine dowel abduction AAROM 2x10 3" holds  supine dowel ER AAROM 2x10 3" holds        manual therapy techniques: PROM were applied to the: L+ shoulder for 25 minutes following protocol  STM to left side scalenes, upper trap and SCM and palpation for trigger points for dry needling  This procedure of dry nedling  has been fully reviewed with the patient and written informed consent has been obtained.  Trigger point dry needling performed to left side posterior scalene, upper fibers of SCOM and left side upper trap with Serin J type needles 0.3 X 0.5 and 0.3 X 0.4 length with piston technique with significant reduction of tone and restriction after and no adverse reaction      PROM with very slight distraction and oscillation- flexion, abduction, and ER  GrI-II  inferior GH mobs   contract relax flexion  +contract relax shoulder abd   Scapular mobs all 4 directions in SL   Passive stretching to L+ upper trap          neuromuscular re-education activities to improve: muscle re-education and motor control of L+ UE, scapula for 00 minutes.   The following activities were included:  ball on the wall ABCs 2 reps  body blade IR/ER 2x30"  wall serratus push ups X 20  shoulder taps at mat  2x10  Push up plus at mat 2x10  +serratus wall slides 2x10 (discontinues after 2nd set due to anterior shoulder pain)  prone scapular retraction 2x10 3-5" holds  prone rows 2x10  prone horizontal abd 2x10  prone extensions 2x10  Gentle Scap squeeze - small range 3 x " 10      Therapeutic activities: 0 minutes to improve overhead function  overhead press 5# 1x10, 1x5 due to fatigue (visual cuing w/ mirror)  overhead carries 3# DB , 3 laps   Education on postural endurance and form with overhead mobility     Patient Education and Home Exercises       Education provided:   - progress and PT POC  - HEP    Written Home Exercises Provided: Patient instructed to cont prior HEP. Exercises were reviewed and Dominic was able to demonstrate them prior to the end of the session.  Dominic demonstrated good  understanding of the education provided. See Electronic Medical Record under Patient Instructions for exercises provided during therapy sessions    Assessment     Dominic responded well to dry needling with no adverse affects. He reported an increase in range of motion and marked reduction in tenderness and tightness with shoulder range of motion and neck motions. We will reassess for dry needling in 5-7 days and restart shoulder strengthening at next session.     Dominic Is progressing well towards his goals.   Patient prognosis is Excellent.   Patient will continue to benefit from skilled outpatient physical therapy to address the deficits listed in the problem list box on initial evaluation, provide pt/family education and to maximize pt's level of independence in the home and community environment.     Goals:      Short Term Goals 4 weeks   1. Pt will be independent with HEP to supplement PT in improving functional use of LUE.- MET  2. Pt will increase pain free left shoulder PROM in all planes to WNL to improve functional mobility of UE- MET  3. Formally assess strength when allowed and set goals accordingly- MET  4. Pt will adhere to post op precautions to allow for proper tissue healing- MET     Long Term Goals 8 weeks   1.  Pt will have full PROM of left shoulder to show improvements in overall movement patterns. - MET  2. Pt to show proper postural awareness with no VC from PT-  MET  3. Pt to have good tolerance to AAROM and AROM activities per protocol -MET  4. Patient will increase left shoulder strength to >/=4/5- MET    Updated Goals:  5. Pt to improve shoulder flexion and abduction AROM to >/=160 deg in seated and supine positions to improve movement patterns  6. Pt to improve shoulder ER to >/=50 deg to improve AROM  7. Pt will be able to don/doff shirts and clothing without pain in order to improve independence- almost met  8. Pt will demo strength >/=4+/5 in the affected shoulder to improve use of LUE  9.Pt will report pain free overhead lifting to top shelf of fridge and/or cabinets to improve function             Plan   Once a week through April       Aline Olson, PT , DPT

## 2024-04-11 NOTE — PROGRESS NOTES
[facility]   Physical Therapy Treatment Note / Updated Plan of Care     Name: Dominic Collazo  Clinic Number: [id number]    Therapy Diagnosis:   Encounter Diagnoses   Name Primary?    Impaired range of motion of left shoulder Yes    Impaired strength of upper extremity     Impaired function of upper extremity          Physician: Sea Valadez MD    Visit Date: 4/15/2024     Physician Orders: PT Eval and Treat   NOTE: 2-3 x per week x 6 weeks     Protocol = [email or web address]  Medical Diagnosis from Referral:   S43.432A (ICD-10-CM) - Paralabral cyst of left shoulder, initial encounter   S43.432A (ICD-10-CM) - Superior glenoid labrum lesion of left shoulder, initial encounter      Evaluation Date: 2023  Authorization Period Expiration: 24  Plan of Care Expiration: 24  Progress Note Due: 5/15/24  Date of Surgery: 23  Visit # / Visits authorized:   FOTO:  FOTO performed 4/15  Precautions: see protocol       Time In: 807 AM  Time Out:  850 AM  Total Billable Time: 43 minutes (TE-2, TA-1)                         Subjective     Patient reports: shoulder is improving but neck continues to be bothersome. He is still limited in endurance with the shoulder. Struggles with reaching behind himself or carrying increased weight.  He was compliant with home exercise program.  Response to previous treatment: bhavin dry needling well  Functional change: improved ROM, strength    Pain: /10  Location: left pec, posterior shoulder and neck        ______________________________________________________________________________________________________    Objective    AROM:   Flexion 153 deg   Abduction: 165 deg   AROM ER at 90 de deg   AROM IR at 90 de deg  Functional ER: reached behind neck  Functional IR: ~T10    Strength:   Flexion : >/=4/5   Abduction : >/= 4/5   ER: 5/5   IR: 5/5     FOTO:  "52      Treatment     Dominic received the treatments listed below:          Bold=performed    therapeutic exercises to develop strength, endurance, ROM, flexibility, and posture for 28 minutes including:  Gentle PROM into all planes    *increased time for postural control and cuing for shoulder sidelying trio*  Reassessment   FOTO  Sidelying shoulder abduction 2x10 - 3#- progressed to 5# today  sidelying ER with 3 lb DB 2x10 - progressed to 5# today  Sidelying flexion with 3# 2x10- progressed to 5# today  +open books 2x10  UBE 3'/3'  pulleys 3 mins scaption and flexion each direction  3' pulleys into internal rotation  Supine w/ EOM raised 45 deg flexion 2x10   Elevated HOB tricep extension 3 x 10 RTB  +AAROM dowel Internal rotation with shoulder at 90 deg abd   serratus punches 3x10 in supine- using wooden dowel  LS stretch 3x30"  UT stretch 3x30"  supine dowel flexion AAROM 2x10 3" holds  Supine dowel abduction AAROM 2x10 3" holds  supine dowel ER AAROM 2x10 3" holds        manual therapy techniques: PROM were applied to the: L+ shoulder for 00 minutes following protocol      PROM with very slight distraction and oscillation- flexion, abduction, and ER  GrI-II  inferior GH mobs   contract relax flexion  +contract relax shoulder abd   Scapular mobs all 4 directions in SL   Passive stretching to L+ upper trap          neuromuscular re-education activities to improve: muscle re-education and motor control of L+ UE, scapula for 00 minutes.   The following activities were included:  ball on the wall ABCs 2 reps  body blade IR/ER 2x30"  wall serratus push ups X 20  shoulder taps at mat  2x10  Push up plus at mat 2x10  +serratus wall slides 2x10 (discontinues after 2nd set due to anterior shoulder pain)  prone scapular retraction 2x10 3-5" holds  prone rows 2x10  prone horizontal abd 2x10  prone extensions 2x10  Gentle Scap squeeze - small range 3 x 10      Therapeutic activities: 15 minutes to improve overhead " function, carrying, lifting  overhead press 5# 2x10 (visual cuing at mirror)  overhead carries 3# DB , 3 laps - progressed to 5 lb today  +suitcase carry 15#KB 2 laps around gym  Education on postural endurance and form with overhead mobility     Patient Education and Home Exercises       Education provided:   - progress and PT POC  - HEP    Written Home Exercises Provided: Patient instructed to cont prior HEP. Exercises were reviewed and Dominic was able to demonstrate them prior to the end of the session.  Dominic demonstrated good  understanding of the education provided. See Electronic Medical Record under Patient Instructions for exercises provided during therapy sessions    Assessment       Reassessment demos improved shoulder AROM and PROM. Steady improvement noted in strength but continues with some functional limitations. Able to tolerate progression to 5 lb dumbbell with sidelying shoulder trio and 5 lb dumbbell with overhead carries.  Also added suitcase carries today for functional carrying as well. Assess neck per Dr. Singh referral on 5/3/24 and then incorporate into Plan of Care. Patient scheduled for PT once  a week through 5/28/24    Dominic Is progressing well towards his goals.   Patient prognosis is Excellent.   Patient will continue to benefit from skilled outpatient physical therapy to address the deficits listed in the problem list box on initial evaluation, provide pt/family education and to maximize pt's level of independence in the home and community environment.     Goals:      Short Term Goals 4 weeks   1. Pt will be independent with HEP to supplement PT in improving functional use of LUE.- MET  2. Pt will increase pain free left shoulder PROM in all planes to WNL to improve functional mobility of UE- MET  3. Formally assess strength when allowed and set goals accordingly- MET  4. Pt will adhere to post op precautions to allow for proper tissue healing- MET     Long Term Goals 8 weeks    1.  Pt will have full PROM of left shoulder to show improvements in overall movement patterns. - MET  2. Pt to show proper postural awareness with no VC from PT- MET  3. Pt to have good tolerance to AAROM and AROM activities per protocol -MET  4. Patient will increase left shoulder strength to >/=4/5- MET    Updated Goals:  5. Pt to improve shoulder flexion and abduction AROM to >/=160 deg in seated and supine positions to improve movement patterns- almost met  6. Pt to improve shoulder ER to >/=50 deg to improve AROM- MET  7. Pt will be able to don/doff shirts and clothing without pain in order to improve independence- almost met  8. Pt will demo strength >/=4+/5 in the affected shoulder to improve use of LUE  9.Pt will report pain free overhead lifting to top shelf of fridge and/or cabinets to improve function             Plan   Assess neck per Dr. Singh referral on 5/3/24 and then incorporate into Plan of Care. Patient scheduled for PT once  a week through 5/28/24      Stephany Styles, PT , DPT

## 2024-04-15 ENCOUNTER — CLINICAL SUPPORT (OUTPATIENT)
Dept: REHABILITATION | Facility: HOSPITAL | Age: 44
End: 2024-04-15
Payer: COMMERCIAL

## 2024-04-15 DIAGNOSIS — R68.89 IMPAIRED FUNCTION OF UPPER EXTREMITY: ICD-10-CM

## 2024-04-15 DIAGNOSIS — M25.612 IMPAIRED RANGE OF MOTION OF LEFT SHOULDER: Primary | ICD-10-CM

## 2024-04-15 DIAGNOSIS — R29.898 IMPAIRED STRENGTH OF UPPER EXTREMITY: ICD-10-CM

## 2024-04-15 PROCEDURE — 97110 THERAPEUTIC EXERCISES: CPT | Mod: PO

## 2024-04-15 PROCEDURE — 97530 THERAPEUTIC ACTIVITIES: CPT | Mod: PO

## 2024-04-15 NOTE — PLAN OF CARE
[facility]   Physical Therapy Treatment Note / Updated Plan of Care     Name: Dominic Collazo  Clinic Number: [id number]    Therapy Diagnosis:   Encounter Diagnoses   Name Primary?    Impaired range of motion of left shoulder Yes    Impaired strength of upper extremity     Impaired function of upper extremity          Physician: Sea Valadez MD    Visit Date: 4/15/2024     Physician Orders: PT Eval and Treat   NOTE: 2-3 x per week x 6 weeks     Protocol = [email or web address]  Medical Diagnosis from Referral:   S43.432A (ICD-10-CM) - Paralabral cyst of left shoulder, initial encounter   S43.432A (ICD-10-CM) - Superior glenoid labrum lesion of left shoulder, initial encounter      Evaluation Date: 2023  Authorization Period Expiration: 24  Plan of Care Expiration: 24  Progress Note Due: 5/15/24  Date of Surgery: 23  Visit # / Visits authorized:   FOTO:  FOTO performed 4/15  Precautions: see protocol       Time In: 807 AM  Time Out:  850 AM  Total Billable Time: 43 minutes (TE-2, TA-1)                         Subjective     Patient reports: shoulder is improving but neck continues to be bothersome. He is still limited in endurance with the shoulder. Struggles with reaching behind himself or carrying increased weight.  He was compliant with home exercise program.  Response to previous treatment: bhavin dry needling well  Functional change: improved ROM, strength    Pain: /10  Location: left pec, posterior shoulder and neck        ______________________________________________________________________________________________________    Objective    AROM:   Flexion 153 deg   Abduction: 165 deg   AROM ER at 90 de deg   AROM IR at 90 de deg  Functional ER: reached behind neck  Functional IR: ~T10    Strength:   Flexion : >/=4/5   Abduction : >/= 4/5   ER: 5/5   IR: 5/5     FOTO:  "52      Treatment     Dominic received the treatments listed below:          Bold=performed    therapeutic exercises to develop strength, endurance, ROM, flexibility, and posture for 28 minutes including:  Gentle PROM into all planes    *increased time for postural control and cuing for shoulder sidelying trio*  Reassessment   FOTO  Sidelying shoulder abduction 2x10 - 3#- progressed to 5# today  sidelying ER with 3 lb DB 2x10 - progressed to 5# today  Sidelying flexion with 3# 2x10- progressed to 5# today  +open books 2x10  UBE 3'/3'  pulleys 3 mins scaption and flexion each direction  3' pulleys into internal rotation  Supine w/ EOM raised 45 deg flexion 2x10   Elevated HOB tricep extension 3 x 10 RTB  +AAROM dowel Internal rotation with shoulder at 90 deg abd   serratus punches 3x10 in supine- using wooden dowel  LS stretch 3x30"  UT stretch 3x30"  supine dowel flexion AAROM 2x10 3" holds  Supine dowel abduction AAROM 2x10 3" holds  supine dowel ER AAROM 2x10 3" holds        manual therapy techniques: PROM were applied to the: L+ shoulder for 00 minutes following protocol      PROM with very slight distraction and oscillation- flexion, abduction, and ER  GrI-II  inferior GH mobs   contract relax flexion  +contract relax shoulder abd   Scapular mobs all 4 directions in SL   Passive stretching to L+ upper trap          neuromuscular re-education activities to improve: muscle re-education and motor control of L+ UE, scapula for 00 minutes.   The following activities were included:  ball on the wall ABCs 2 reps  body blade IR/ER 2x30"  wall serratus push ups X 20  shoulder taps at mat  2x10  Push up plus at mat 2x10  +serratus wall slides 2x10 (discontinues after 2nd set due to anterior shoulder pain)  prone scapular retraction 2x10 3-5" holds  prone rows 2x10  prone horizontal abd 2x10  prone extensions 2x10  Gentle Scap squeeze - small range 3 x 10      Therapeutic activities: 15 minutes to improve overhead " function, carrying, lifting  overhead press 5# 2x10 (visual cuing at mirror)  overhead carries 3# DB , 3 laps - progressed to 5 lb today  +suitcase carry 15#KB 2 laps around gym  Education on postural endurance and form with overhead mobility     Patient Education and Home Exercises       Education provided:   - progress and PT POC  - HEP    Written Home Exercises Provided: Patient instructed to cont prior HEP. Exercises were reviewed and Dominic was able to demonstrate them prior to the end of the session.  Dominic demonstrated good  understanding of the education provided. See Electronic Medical Record under Patient Instructions for exercises provided during therapy sessions    Assessment       Reassessment demos improved shoulder AROM and PROM. Steady improvement noted in strength but continues with some functional limitations. Able to tolerate progression to 5 lb dumbbell with sidelying shoulder trio and 5 lb dumbbell with overhead carries.  Also added suitcase carries today for functional carrying as well. Assess neck per Dr. Singh referral on 5/3/24 and then incorporate into Plan of Care. Patient scheduled for PT once  a week through 5/28/24    Dominic Is progressing well towards his goals.   Patient prognosis is Excellent.   Patient will continue to benefit from skilled outpatient physical therapy to address the deficits listed in the problem list box on initial evaluation, provide pt/family education and to maximize pt's level of independence in the home and community environment.     Goals:      Short Term Goals 4 weeks   1. Pt will be independent with HEP to supplement PT in improving functional use of LUE.- MET  2. Pt will increase pain free left shoulder PROM in all planes to WNL to improve functional mobility of UE- MET  3. Formally assess strength when allowed and set goals accordingly- MET  4. Pt will adhere to post op precautions to allow for proper tissue healing- MET     Long Term Goals 8 weeks    1.  Pt will have full PROM of left shoulder to show improvements in overall movement patterns. - MET  2. Pt to show proper postural awareness with no VC from PT- MET  3. Pt to have good tolerance to AAROM and AROM activities per protocol -MET  4. Patient will increase left shoulder strength to >/=4/5- MET    Updated Goals:  5. Pt to improve shoulder flexion and abduction AROM to >/=160 deg in seated and supine positions to improve movement patterns- almost met  6. Pt to improve shoulder ER to >/=50 deg to improve AROM- MET  7. Pt will be able to don/doff shirts and clothing without pain in order to improve independence- almost met  8. Pt will demo strength >/=4+/5 in the affected shoulder to improve use of LUE  9.Pt will report pain free overhead lifting to top shelf of fridge and/or cabinets to improve function             Plan   Assess neck per Dr. Singh referral on 5/3/24 and then incorporate into Plan of Care. Patient scheduled for PT once  a week through 5/28/24      Stephany Styles, PT , DPT       I certify the need for these services furnished under this plan of treatment and while under my care.        Sea Valadez MD, FAAOS  , Orthopaedic Sports Medicine  Residency   \[facility] [place] [facility]\"" Department of Orthopaedic Surgery  Assistant Orthopaedic Surgeon, [facility]  Head Team Physician, [facility]

## 2024-04-16 ENCOUNTER — PATIENT MESSAGE (OUTPATIENT)
Dept: SLEEP MEDICINE | Facility: CLINIC | Age: 44
End: 2024-04-16
Payer: COMMERCIAL

## 2024-04-17 ENCOUNTER — OFFICE VISIT (OUTPATIENT)
Dept: NEUROLOGY | Facility: CLINIC | Age: 44
End: 2024-04-17
Payer: COMMERCIAL

## 2024-04-17 ENCOUNTER — LAB VISIT (OUTPATIENT)
Dept: LAB | Facility: HOSPITAL | Age: 44
End: 2024-04-17
Attending: PHYSICIAN ASSISTANT
Payer: COMMERCIAL

## 2024-04-17 VITALS — DIASTOLIC BLOOD PRESSURE: 102 MMHG | SYSTOLIC BLOOD PRESSURE: 143 MMHG | HEART RATE: 82 BPM

## 2024-04-17 DIAGNOSIS — Z79.890 LONG-TERM CURRENT USE OF TESTOSTERONE REPLACEMENT THERAPY: ICD-10-CM

## 2024-04-17 DIAGNOSIS — M54.2 CERVICALGIA OF OCCIPITO-ATLANTO-AXIAL REGION: ICD-10-CM

## 2024-04-17 DIAGNOSIS — E55.9 VITAMIN D INSUFFICIENCY: ICD-10-CM

## 2024-04-17 DIAGNOSIS — S06.0X9S CONCUSSION WITH LOSS OF CONSCIOUSNESS, SEQUELA: Primary | ICD-10-CM

## 2024-04-17 DIAGNOSIS — G44.86 CERVICOGENIC HEADACHE: ICD-10-CM

## 2024-04-17 DIAGNOSIS — R53.83 FATIGUE, UNSPECIFIED TYPE: ICD-10-CM

## 2024-04-17 DIAGNOSIS — F34.89 OTHER SPECIFIED PERSISTENT MOOD DISORDERS: ICD-10-CM

## 2024-04-17 DIAGNOSIS — G47.9 FATIGUE DUE TO SLEEP PATTERN DISTURBANCE: ICD-10-CM

## 2024-04-17 DIAGNOSIS — E11.69 TYPE 2 DIABETES MELLITUS WITH MORBID OBESITY: ICD-10-CM

## 2024-04-17 DIAGNOSIS — R41.89 COGNITIVE CHANGE: ICD-10-CM

## 2024-04-17 DIAGNOSIS — S13.4XXD WHIPLASH INJURY TO NECK, SUBSEQUENT ENCOUNTER: ICD-10-CM

## 2024-04-17 DIAGNOSIS — E29.1 MALE HYPOGONADISM: ICD-10-CM

## 2024-04-17 DIAGNOSIS — E66.01 TYPE 2 DIABETES MELLITUS WITH MORBID OBESITY: ICD-10-CM

## 2024-04-17 DIAGNOSIS — R79.89 ABNORMAL CBC: ICD-10-CM

## 2024-04-17 DIAGNOSIS — R53.83 FATIGUE DUE TO SLEEP PATTERN DISTURBANCE: ICD-10-CM

## 2024-04-17 LAB
25(OH)D3+25(OH)D2 SERPL-MCNC: 57 NG/ML (ref 30–96)
ALBUMIN SERPL BCP-MCNC: 4.2 G/DL (ref 3.5–5.2)
ALBUMIN SERPL BCP-MCNC: 4.2 G/DL (ref 3.5–5.2)
ALP SERPL-CCNC: 61 U/L (ref 55–135)
ALP SERPL-CCNC: 61 U/L (ref 55–135)
ALT SERPL W/O P-5'-P-CCNC: 90 U/L (ref 10–44)
ALT SERPL W/O P-5'-P-CCNC: 90 U/L (ref 10–44)
ANION GAP SERPL CALC-SCNC: 11 MMOL/L (ref 8–16)
ANION GAP SERPL CALC-SCNC: 11 MMOL/L (ref 8–16)
AST SERPL-CCNC: 63 U/L (ref 10–40)
AST SERPL-CCNC: 63 U/L (ref 10–40)
BASOPHILS # BLD AUTO: 0.09 K/UL (ref 0–0.2)
BASOPHILS NFR BLD: 1 % (ref 0–1.9)
BILIRUB SERPL-MCNC: 0.3 MG/DL (ref 0.1–1)
BILIRUB SERPL-MCNC: 0.3 MG/DL (ref 0.1–1)
BUN SERPL-MCNC: 9 MG/DL (ref 6–20)
BUN SERPL-MCNC: 9 MG/DL (ref 6–20)
CALCIUM SERPL-MCNC: 9.6 MG/DL (ref 8.7–10.5)
CALCIUM SERPL-MCNC: 9.6 MG/DL (ref 8.7–10.5)
CHLORIDE SERPL-SCNC: 104 MMOL/L (ref 95–110)
CHLORIDE SERPL-SCNC: 104 MMOL/L (ref 95–110)
CHOLEST SERPL-MCNC: 196 MG/DL (ref 120–199)
CHOLEST/HDLC SERPL: 4.7 {RATIO} (ref 2–5)
CO2 SERPL-SCNC: 25 MMOL/L (ref 23–29)
CO2 SERPL-SCNC: 25 MMOL/L (ref 23–29)
CREAT SERPL-MCNC: 0.8 MG/DL (ref 0.5–1.4)
CREAT SERPL-MCNC: 0.8 MG/DL (ref 0.5–1.4)
DIFFERENTIAL METHOD BLD: ABNORMAL
EOSINOPHIL # BLD AUTO: 0.3 K/UL (ref 0–0.5)
EOSINOPHIL NFR BLD: 3.6 % (ref 0–8)
ERYTHROCYTE [DISTWIDTH] IN BLOOD BY AUTOMATED COUNT: 13.1 % (ref 11.5–14.5)
EST. GFR  (NO RACE VARIABLE): >60 ML/MIN/1.73 M^2
EST. GFR  (NO RACE VARIABLE): >60 ML/MIN/1.73 M^2
ESTIMATED AVG GLUCOSE: 151 MG/DL (ref 68–131)
ESTIMATED AVG GLUCOSE: 151 MG/DL (ref 68–131)
ESTRADIOL SERPL-MCNC: 35 PG/ML (ref 11–44)
FERRITIN SERPL-MCNC: 166 NG/ML (ref 20–300)
FOLATE SERPL-MCNC: 12.4 NG/ML (ref 4–24)
GLUCOSE SERPL-MCNC: 121 MG/DL (ref 70–110)
GLUCOSE SERPL-MCNC: 121 MG/DL (ref 70–110)
HBA1C MFR BLD: 6.9 % (ref 4–5.6)
HBA1C MFR BLD: 6.9 % (ref 4–5.6)
HCT VFR BLD AUTO: 48 % (ref 40–54)
HDLC SERPL-MCNC: 42 MG/DL (ref 40–75)
HDLC SERPL: 21.4 % (ref 20–50)
HGB BLD-MCNC: 16 G/DL (ref 14–18)
IMM GRANULOCYTES # BLD AUTO: 0.03 K/UL (ref 0–0.04)
IMM GRANULOCYTES NFR BLD AUTO: 0.3 % (ref 0–0.5)
IRON SERPL-MCNC: 92 UG/DL (ref 45–160)
LDLC SERPL CALC-MCNC: 101.4 MG/DL (ref 63–159)
LYMPHOCYTES # BLD AUTO: 2.5 K/UL (ref 1–4.8)
LYMPHOCYTES NFR BLD: 29.3 % (ref 18–48)
MCH RBC QN AUTO: 31.1 PG (ref 27–31)
MCHC RBC AUTO-ENTMCNC: 33.3 G/DL (ref 32–36)
MCV RBC AUTO: 93 FL (ref 82–98)
MONOCYTES # BLD AUTO: 0.7 K/UL (ref 0.3–1)
MONOCYTES NFR BLD: 7.7 % (ref 4–15)
NEUTROPHILS # BLD AUTO: 5 K/UL (ref 1.8–7.7)
NEUTROPHILS NFR BLD: 58.1 % (ref 38–73)
NONHDLC SERPL-MCNC: 154 MG/DL
NRBC BLD-RTO: 0 /100 WBC
PLATELET # BLD AUTO: 269 K/UL (ref 150–450)
PMV BLD AUTO: 11.6 FL (ref 9.2–12.9)
POTASSIUM SERPL-SCNC: 4.3 MMOL/L (ref 3.5–5.1)
POTASSIUM SERPL-SCNC: 4.3 MMOL/L (ref 3.5–5.1)
PROT SERPL-MCNC: 7.3 G/DL (ref 6–8.4)
PROT SERPL-MCNC: 7.3 G/DL (ref 6–8.4)
RBC # BLD AUTO: 5.15 M/UL (ref 4.6–6.2)
SATURATED IRON: 21 % (ref 20–50)
SODIUM SERPL-SCNC: 140 MMOL/L (ref 136–145)
SODIUM SERPL-SCNC: 140 MMOL/L (ref 136–145)
T3FREE SERPL-MCNC: 2.8 PG/ML (ref 2.3–4.2)
T4 FREE SERPL-MCNC: 0.77 NG/DL (ref 0.71–1.51)
TESTOST SERPL-MCNC: 504 NG/DL (ref 304–1227)
TOTAL IRON BINDING CAPACITY: 441 UG/DL (ref 250–450)
TRANSFERRIN SERPL-MCNC: 298 MG/DL (ref 200–375)
TRIGL SERPL-MCNC: 263 MG/DL (ref 30–150)
TSH SERPL DL<=0.005 MIU/L-ACNC: 1.58 UIU/ML (ref 0.4–4)
VIT B12 SERPL-MCNC: >2000 PG/ML (ref 210–950)
WBC # BLD AUTO: 8.67 K/UL (ref 3.9–12.7)

## 2024-04-17 PROCEDURE — 80053 COMPREHEN METABOLIC PANEL: CPT | Performed by: PHYSICIAN ASSISTANT

## 2024-04-17 PROCEDURE — 84443 ASSAY THYROID STIM HORMONE: CPT | Performed by: PHYSICIAN ASSISTANT

## 2024-04-17 PROCEDURE — 84481 FREE ASSAY (FT-3): CPT | Performed by: PHYSICIAN ASSISTANT

## 2024-04-17 PROCEDURE — 36415 COLL VENOUS BLD VENIPUNCTURE: CPT | Performed by: PHYSICIAN ASSISTANT

## 2024-04-17 PROCEDURE — 83540 ASSAY OF IRON: CPT | Performed by: PHYSICIAN ASSISTANT

## 2024-04-17 PROCEDURE — 3080F DIAST BP >= 90 MM HG: CPT | Mod: CPTII,S$GLB,, | Performed by: PSYCHIATRY & NEUROLOGY

## 2024-04-17 PROCEDURE — 82746 ASSAY OF FOLIC ACID SERUM: CPT | Performed by: PHYSICIAN ASSISTANT

## 2024-04-17 PROCEDURE — 80061 LIPID PANEL: CPT | Performed by: PHYSICIAN ASSISTANT

## 2024-04-17 PROCEDURE — 84403 ASSAY OF TOTAL TESTOSTERONE: CPT | Performed by: PHYSICIAN ASSISTANT

## 2024-04-17 PROCEDURE — 1159F MED LIST DOCD IN RCRD: CPT | Mod: CPTII,S$GLB,, | Performed by: PSYCHIATRY & NEUROLOGY

## 2024-04-17 PROCEDURE — 1160F RVW MEDS BY RX/DR IN RCRD: CPT | Mod: CPTII,S$GLB,, | Performed by: PSYCHIATRY & NEUROLOGY

## 2024-04-17 PROCEDURE — 82306 VITAMIN D 25 HYDROXY: CPT | Performed by: PHYSICIAN ASSISTANT

## 2024-04-17 PROCEDURE — 3077F SYST BP >= 140 MM HG: CPT | Mod: CPTII,S$GLB,, | Performed by: PSYCHIATRY & NEUROLOGY

## 2024-04-17 PROCEDURE — 4010F ACE/ARB THERAPY RXD/TAKEN: CPT | Mod: CPTII,S$GLB,, | Performed by: PSYCHIATRY & NEUROLOGY

## 2024-04-17 PROCEDURE — 82180 ASSAY OF ASCORBIC ACID: CPT | Performed by: PHYSICIAN ASSISTANT

## 2024-04-17 PROCEDURE — 82607 VITAMIN B-12: CPT | Performed by: PHYSICIAN ASSISTANT

## 2024-04-17 PROCEDURE — 99999 PR PBB SHADOW E&M-EST. PATIENT-LVL III: CPT | Mod: PBBFAC,,, | Performed by: PSYCHIATRY & NEUROLOGY

## 2024-04-17 PROCEDURE — 82728 ASSAY OF FERRITIN: CPT | Performed by: PHYSICIAN ASSISTANT

## 2024-04-17 PROCEDURE — 82670 ASSAY OF TOTAL ESTRADIOL: CPT | Performed by: PHYSICIAN ASSISTANT

## 2024-04-17 PROCEDURE — 83036 HEMOGLOBIN GLYCOSYLATED A1C: CPT | Performed by: PHYSICIAN ASSISTANT

## 2024-04-17 PROCEDURE — 84207 ASSAY OF VITAMIN B-6: CPT | Performed by: PHYSICIAN ASSISTANT

## 2024-04-17 PROCEDURE — 84439 ASSAY OF FREE THYROXINE: CPT | Performed by: PHYSICIAN ASSISTANT

## 2024-04-17 PROCEDURE — 99215 OFFICE O/P EST HI 40 MIN: CPT | Mod: S$GLB,,, | Performed by: PSYCHIATRY & NEUROLOGY

## 2024-04-17 PROCEDURE — 85025 COMPLETE CBC W/AUTO DIFF WBC: CPT | Performed by: PHYSICIAN ASSISTANT

## 2024-04-17 RX ORDER — TIZANIDINE 4 MG/1
4 TABLET ORAL NIGHTLY PRN
Qty: 30 TABLET | Refills: 5 | Status: SHIPPED | OUTPATIENT
Start: 2024-04-17 | End: 2024-05-22

## 2024-04-17 NOTE — PATIENT INSTRUCTIONS
Referral for lower neck/upper back to mid back PT with myofascial release placed previously (therapist may choose from and do a combination of: deep tissue massage, cupping, dry needling, etc). No soft tissue manipulation of suboccipital region if you start to feel worse. All joint manipulation is fine.  Continue light cardio but no contact sport situations  Continue vestibular PT exercises  Continue ST exercises  Continue social work or psychology (whoever can see the patient soonest) consultation, therapy, and treatment. 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 The information from this consultation will be used by myself and other providers/therapists to help guide an individual care plan to help treat this patient's symptoms from TBI/neck injury. Any delays or failures to address cognitive, sleep, psychological symptoms after TBI/neck injury can contribute to persistent symptoms, prolong their overall recovery process, and potentially lead to permanent symptoms. And of note, this referral is not for neuropsychology or neurocognitive testing. This referral is specifically for social work or psychological interventions based on the consultation these services provide.  Continue to follow with psychiatrist   Continue tizanidine 4 mg (full tablet) nightly. Take 30 minutes before bed. Never drink alcohol or drive after taking this medication  Do not take methocarbamol at same time as tizanidine
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
## 2024-04-18 ENCOUNTER — PATIENT OUTREACH (OUTPATIENT)
Dept: ADMINISTRATIVE | Facility: HOSPITAL | Age: 44
End: 2024-04-18
Payer: COMMERCIAL

## 2024-04-18 NOTE — PROGRESS NOTES
[facility]   Physical Therapy Treatment Note      Name: Dominic Collazo  [facility] Number: [id number]    Therapy Diagnosis:   Encounter Diagnoses   Name Primary?    Impaired range of motion of left shoulder Yes    Impaired strength of upper extremity     Impaired function of upper extremity            Physician: Sea Valadez MD    Visit Date: 4/22/2024     Physician Orders: PT Eval and Treat   NOTE: 2-3 x per week x 6 weeks     Protocol = [email or web address]  Medical Diagnosis from Referral:   S43.432A (ICD-10-CM) - Paralabral cyst of left shoulder, initial encounter   S43.432A (ICD-10-CM) - Superior glenoid labrum lesion of left shoulder, initial encounter      Evaluation Date: 12/29/2023  Authorization Period Expiration: 12/31/24  Plan of Care Expiration: 5/28/24  Progress Note Due: 5/15/24  Date of Surgery: 12/14/23  Visit # / Visits authorized: 18/ 20  FOTO:  FOTO performed 4/15  Precautions: see protocol       Time In: 706 AM  Time Out:  750 AM +10 mins ice pack  Total Billable Time: 44 minutes (TE-2, NMR-1)                         Subjective     Patient reports: he saw Dr Valadez on Friday and was told he had a sulcus sign and could have some instability so he wants us to work on that. Discussed that the shoulder is doing well, and does not really have shoulder pain. He does continue with pain in the neck and under the shoulder blade.   He was compliant with home exercise program.  Response to previous treatment: fine with shoulder  Functional change: improved shoulder strength    Pain: 7/10  Location: left posterior shoulder blade, periscapular region, and neck      ______________________________________________________________________________________________________      Treatment     Dominic received the treatments listed below:          Bold=performed    therapeutic exercises to develop strength,  "endurance, ROM, flexibility, and posture for 29 minutes including:  Gentle PROM into all planes    *increased time for postural control and cuing for shoulder sidelying trio*  Sidelying shoulder abduction 2x10 - 3#- progressed to 5#   sidelying ER with 3 lb DB 2x10 - progressed to 5#   Sidelying flexion with 3# 2x10- progressed to 5#   open books 2x10  UBE 3'/3'  pulleys 3 mins scaption and flexion each direction  3' pulleys into internal rotation  Supine w/ EOM raised 45 deg flexion 2x10   Elevated HOB tricep extension 3 x 10 RTB  +AAROM dowel Internal rotation with shoulder at 90 deg abd   serratus punches 3x10 in supine- using wooden dowel  LS stretch 3x30"  UT stretch 3x30"  +mid back stretch 3x30"  supine dowel flexion AAROM 2x10 3" holds  Supine dowel abduction AAROM 2x10 3" holds  supine dowel ER AAROM 2x10 3" holds        manual therapy techniques: PROM were applied to the: L+ shoulder for 00 minutes following protocol      PROM with very slight distraction and oscillation- flexion, abduction, and ER  GrI-II  inferior GH mobs   contract relax flexion  +contract relax shoulder abd   Scapular mobs all 4 directions in SL   Passive stretching to L+ upper trap          neuromuscular re-education activities to improve: muscle re-education and motor control of L+ UE, scapula for 15 minutes.   The following activities were included:  ball on the wall ABCs 2 reps- used red weighted medicine ball today  body blade IR/ER 2x30"  wall serratus push ups X 20  shoulder taps at mat  2x10  Push up plus at mat 2x10  serratus wall slides 2x10   prone scapular retraction 2x10 3-5" holds  prone rows 2x10  prone horizontal abd 2x10  prone extensions 2x10  Gentle Scap squeeze - small range 3 x 10      Therapeutic activities: 00 minutes to improve overhead function, carrying, lifting  overhead press 5# 2x10 (visual cuing at mirror)  overhead carries 3# DB , 3 laps - progressed to 5 lb today  +suitcase carry 15#KB 2 laps around " gym  Education on postural endurance and form with overhead mobility       10 mins ice pack to left neck/ shoulder upon completion of session    Patient Education and Home Exercises       Education provided:   - progress and PT POC  - HEP    Written Home Exercises Provided: Patient instructed to cont prior HEP. Exercises were reviewed and Dominic was able to demonstrate them prior to the end of the session.  Dominic demonstrated good  understanding of the education provided. See Electronic Medical Record under Patient Instructions for exercises provided during therapy sessions    Assessment     Continues with improved shoulder pain but has pain at neck and left shoulder blade region. Began session with UT stretch, LS stretch, open books, and mid back stretch to alleviate some of this pain and improve soft tissue extensibility. Patient reports Dr. Valadez told him he had a sulcus sign and likely needs to work on stability. Resumed scapular stability activities today without adverse effect. Assess neck per Dr. Singh referral on 5/3/24 and then incorporate into Plan of Care. Patient scheduled for PT once  a week through 5/28/24.    Dominic Is progressing well towards his goals.   Patient prognosis is Excellent.   Patient will continue to benefit from skilled outpatient physical therapy to address the deficits listed in the problem list box on initial evaluation, provide pt/family education and to maximize pt's level of independence in the home and community environment.     Goals:      Short Term Goals 4 weeks   1. Pt will be independent with HEP to supplement PT in improving functional use of LUE.- MET  2. Pt will increase pain free left shoulder PROM in all planes to WNL to improve functional mobility of UE- MET  3. Formally assess strength when allowed and set goals accordingly- MET  4. Pt will adhere to post op precautions to allow for proper tissue healing- MET     Long Term Goals 8 weeks   1.  Pt will have full  PROM of left shoulder to show improvements in overall movement patterns. - MET  2. Pt to show proper postural awareness with no VC from PT- MET  3. Pt to have good tolerance to AAROM and AROM activities per protocol -MET  4. Patient will increase left shoulder strength to >/=4/5- MET    Updated Goals:  5. Pt to improve shoulder flexion and abduction AROM to >/=160 deg in seated and supine positions to improve movement patterns- almost met  6. Pt to improve shoulder ER to >/=50 deg to improve AROM- MET  7. Pt will be able to don/doff shirts and clothing without pain in order to improve independence- almost met  8. Pt will demo strength >/=4+/5 in the affected shoulder to improve use of LUE  9.Pt will report pain free overhead lifting to top shelf of fridge and/or cabinets to improve function             Plan   Assess neck per Dr. Singh referral on 5/3/24 and then incorporate into Plan of Care. Patient scheduled for PT once  a week through 5/28/24      Stephany Styles, PT , DPT

## 2024-04-19 ENCOUNTER — OFFICE VISIT (OUTPATIENT)
Dept: ORTHOPEDICS | Facility: CLINIC | Age: 44
End: 2024-04-19
Payer: COMMERCIAL

## 2024-04-19 ENCOUNTER — PATIENT MESSAGE (OUTPATIENT)
Dept: ADMINISTRATIVE | Facility: HOSPITAL | Age: 44
End: 2024-04-19
Payer: COMMERCIAL

## 2024-04-19 VITALS — BODY MASS INDEX: 39.17 KG/M2 | HEIGHT: 75 IN | WEIGHT: 315 LBS

## 2024-04-19 DIAGNOSIS — Z98.890 STATUS POST LABRAL REPAIR OF SHOULDER: Primary | ICD-10-CM

## 2024-04-19 PROCEDURE — 3008F BODY MASS INDEX DOCD: CPT | Mod: CPTII,S$GLB,, | Performed by: ORTHOPAEDIC SURGERY

## 2024-04-19 PROCEDURE — 99999 PR PBB SHADOW E&M-EST. PATIENT-LVL IV: CPT | Mod: PBBFAC,,, | Performed by: ORTHOPAEDIC SURGERY

## 2024-04-19 PROCEDURE — 4010F ACE/ARB THERAPY RXD/TAKEN: CPT | Mod: CPTII,S$GLB,, | Performed by: ORTHOPAEDIC SURGERY

## 2024-04-19 PROCEDURE — 1159F MED LIST DOCD IN RCRD: CPT | Mod: CPTII,S$GLB,, | Performed by: ORTHOPAEDIC SURGERY

## 2024-04-19 PROCEDURE — 3044F HG A1C LEVEL LT 7.0%: CPT | Mod: CPTII,S$GLB,, | Performed by: ORTHOPAEDIC SURGERY

## 2024-04-19 PROCEDURE — 1160F RVW MEDS BY RX/DR IN RCRD: CPT | Mod: CPTII,S$GLB,, | Performed by: ORTHOPAEDIC SURGERY

## 2024-04-19 PROCEDURE — 99213 OFFICE O/P EST LOW 20 MIN: CPT | Mod: S$GLB,,, | Performed by: ORTHOPAEDIC SURGERY

## 2024-04-22 ENCOUNTER — CLINICAL SUPPORT (OUTPATIENT)
Dept: REHABILITATION | Facility: HOSPITAL | Age: 44
End: 2024-04-22
Payer: COMMERCIAL

## 2024-04-22 DIAGNOSIS — R68.89 IMPAIRED FUNCTION OF UPPER EXTREMITY: ICD-10-CM

## 2024-04-22 DIAGNOSIS — R29.898 IMPAIRED STRENGTH OF UPPER EXTREMITY: ICD-10-CM

## 2024-04-22 DIAGNOSIS — M25.612 IMPAIRED RANGE OF MOTION OF LEFT SHOULDER: Primary | ICD-10-CM

## 2024-04-22 PROCEDURE — 97110 THERAPEUTIC EXERCISES: CPT | Mod: PO

## 2024-04-22 PROCEDURE — 97112 NEUROMUSCULAR REEDUCATION: CPT | Mod: PO
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
## 2024-04-23 LAB — VIT C SERPL-MCNC: 3 MG/L (ref 2–19)

## 2024-04-24 LAB — PYRIDOXAL SERPL-MCNC: 16 UG/L (ref 5–50)
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
## 2024-05-03 ENCOUNTER — CLINICAL SUPPORT (OUTPATIENT)
Dept: REHABILITATION | Facility: HOSPITAL | Age: 44
End: 2024-05-03
Payer: COMMERCIAL

## 2024-05-03 DIAGNOSIS — R29.898 IMPAIRED STRENGTH OF UPPER EXTREMITY: ICD-10-CM

## 2024-05-03 DIAGNOSIS — R68.89 IMPAIRED FUNCTION OF UPPER EXTREMITY: ICD-10-CM

## 2024-05-03 DIAGNOSIS — M25.612 IMPAIRED RANGE OF MOTION OF LEFT SHOULDER: Primary | ICD-10-CM

## 2024-05-03 PROCEDURE — 97110 THERAPEUTIC EXERCISES: CPT | Mod: PO

## 2024-05-03 PROCEDURE — 97164 PT RE-EVAL EST PLAN CARE: CPT | Mod: PO

## 2024-05-03 PROCEDURE — 97140 MANUAL THERAPY 1/> REGIONS: CPT | Mod: PO

## 2024-05-03 NOTE — PLAN OF CARE
[facility]   Physical Therapy Treatment Note / RE EVAL FOR NECK      Name: Dominic Collazo  Clinic Number: [id number]    Therapy Diagnosis:   Encounter Diagnoses   Name Primary?    Impaired range of motion of left shoulder Yes    Impaired strength of upper extremity     Impaired function of upper extremity              Physician: Sea Valadez MD    Visit Date: 5/3/2024     Physician Orders: PT Eval and Treat   NOTE: 2-3 x per week x 6 weeks     Protocol = [email or web address]  Medical Diagnosis from Referral:   S43.432A (ICD-10-CM) - Paralabral cyst of left shoulder, initial encounter   S43.432A (ICD-10-CM) - Superior glenoid labrum lesion of left shoulder, initial encounter        Dr. Singh Referral:   S13.4XXD (ICD-10-CM) - Whiplash injury to neck, subsequent encounter   M54.2 (ICD-10-CM) - Cervicalgia of yizoaxnq-yhvasfr-isoff region   G44.86 (ICD-10-CM) - Cervicogenic headache     Evaluation Date: 12/29/2023  Authorization Period Expiration: 12/31/24  Plan of Care Expiration: 5/28/24  Progress Note Due: 5/15/24  Date of Surgery: 12/14/23  Visit # / Visits authorized: 19/ 40  FOTO:  FOTO performed 4/15  Precautions: see protocol   Per Samantha referral: Referral for lower neck/upper back to mid back PT with myofascial release (therapist may choose from and do a combination of: deep tissue massage, cupping, dry needling, etc). No soft tissue manipulation of suboccipital region if you start to feel worse. All joint manipulation is fine.       Time In: 8:03 AM  Time Out:  8:51 AM  Total Billable Time: 48 minutes (RE-EVAL -1, TE-1, MT-1)                         Subjective     Patient reports: shoulder feels okay and continuing to improve. Continues with neck pain that  worsens throughout the day. The neck pain is helped by stretching, cupping, muscle relaxers, massage, and ice. The pain is in the left  "thoracic region to the shoulder blade and into the left side of the neck.   He was compliant with home exercise program.  Response to previous treatment: shoulder is good  Functional change: improved shoulder strength    Pain: 5/10  Location: left thoracic region to the shoulder blade and into the left side of the neck.       ______________________________________________________________________________________________________    Objective     Observation: enters clinic independently      Posture: FHP and rounded shoulders      Cervical Range of Motion:     Degrees Pain   Flexion 45     yes   Extension 50 no   Right Rotation 80  no      Left Rotation 60 yes      Right Side Bending 45 yes   Left Side Bending 45 yes        Special Tests:  Distraction -   Compression -      Shoulder MMT and ROM have been assessed previously - see note from April 15th     Joint Mobility: hypomobile thoracic spine     Palpation: TTP @ left levator scapulae, UT, and posterior scalene, left upper thoracic paraspinals, rhomboids, periscpaular region     Sensation:intact      Treatment     Dominic received the treatments listed below:          Bold=performed    therapeutic exercises to develop strength, endurance, ROM, flexibility, and posture for 10 minutes including:  +thoracic extension over full foam roll in supine  +lumbar locked quadruped thoracic rotation  +standing open books 2x10 each side  Gentle PROM into all planes  Sidelying shoulder abduction 2x10 - 3#- progressed to 5#   sidelying ER with 3 lb DB 2x10 - progressed to 5#   Sidelying flexion with 3# 2x10- progressed to 5#   open books 2x10  UBE 3'/3'  pulleys 3 mins scaption and flexion each direction  3' pulleys into internal rotation  Supine w/ EOM raised 45 deg flexion 2x10   Elevated HOB tricep extension 3 x 10 RTB  +AAROM dowel Internal rotation with shoulder at 90 deg abd   serratus punches 3x10 in supine- using wooden dowel  LS stretch 3x30"  UT stretch 3x30"  +mid back " "stretch 3x30"  supine dowel flexion AAROM 2x10 3" holds  Supine dowel abduction AAROM 2x10 3" holds  supine dowel ER AAROM 2x10 3" holds        manual therapy techniques: PROM were applied to the: L+ shoulder for 15 minutes:  +Prone thoracic PA to upper thoracic region particularly T3 for improved thoracic mobility  +subscapularis release L  +Manual LS stretch   +STM/MFR to left LS and UT   PROM with very slight distraction and oscillation- flexion, abduction, and ER  GrI-II  inferior GH mobs   contract relax flexion  +contract relax shoulder abd   Scapular mobs all 4 directions in SL   Passive stretching to L+ upper trap          neuromuscular re-education activities to improve: muscle re-education and motor control of L+ UE, scapula for 00 minutes.   The following activities were included:- not performed  ball on the wall ABCs 2 reps- used red weighted medicine ball today  body blade IR/ER 2x30"  wall serratus push ups X 20  shoulder taps at mat  2x10  Push up plus at mat 2x10  serratus wall slides 2x10   prone scapular retraction 2x10 3-5" holds  prone rows 2x10  prone horizontal abd 2x10  prone extensions 2x10  Gentle Scap squeeze - small range 3 x 10      Therapeutic activities: 00 minutes to improve overhead function, carrying, lifting- not performed   overhead press 5# 2x10 (visual cuing at mirror)  overhead carries 3# DB , 3 laps - progressed to 5 lb today  +suitcase carry 15#KB 2 laps around gym  Education on postural endurance and form with overhead mobility           Patient Education and Home Exercises       Education provided:   - progress and PT POC  - HEP    Written Home Exercises Provided: yes. Exercises were reviewed and Dominic was able to demonstrate them prior to the end of the session.  Dominic demonstrated good  understanding of the education provided. See Electronic Medical Record under Patient Instructions for exercises provided during therapy sessions    Assessment     Neck is assessed today " as we have a referral from Dr. Singh. Neck AROM limited by left sided muscular tightness. Patient is hypomobile in cervicothoracic region likely attributing to impaired soft tissue extensibility and symptoms. Tenderness is present at left levator scapulae, Upper trap, posterior scalene, left upper thoracic paraspinals, rhomboids, and left periscapular region. Patient responds well to manual therapy interventions today. Also, added a few exercises today to improve thoracic mobility and these are printed for patient's HEP. Will incorporate neck/thoracic spine tx into current PT POC while continuing to address post op rehab for the left shoulder.    Dominic Is progressing well towards his goals.   Patient prognosis is Excellent.   Patient will continue to benefit from skilled outpatient physical therapy to address the deficits listed in the problem list box on initial evaluation, provide pt/family education and to maximize pt's level of independence in the home and community environment.     Goals:      Short Term Goals 4 weeks   1. Pt will be independent with HEP to supplement PT in improving functional use of LUE.- MET  2. Pt will increase pain free left shoulder PROM in all planes to WNL to improve functional mobility of UE- MET  3. Formally assess strength when allowed and set goals accordingly- MET  4. Pt will adhere to post op precautions to allow for proper tissue healing- MET     Long Term Goals 8 weeks   1.  Pt will have full PROM of left shoulder to show improvements in overall movement patterns. - MET  2. Pt to show proper postural awareness with no VC from PT- MET  3. Pt to have good tolerance to AAROM and AROM activities per protocol -MET  4. Patient will increase left shoulder strength to >/=4/5- MET    Updated Goals for shoulder:  5. Pt to improve shoulder flexion and abduction AROM to >/=160 deg in seated and supine positions to improve movement patterns- almost met  6. Pt to improve shoulder ER to  >/=50 deg to improve AROM- MET  7. Pt will be able to don/doff shirts and clothing without pain in order to improve independence- almost met  8. Pt will demo strength >/=4+/5 in the affected shoulder to improve use of LUE  9.Pt will report pain free overhead lifting to top shelf of fridge and/or cabinets to improve function       Updated goals for neck:   Patient to improve cervical AROM to full motion in all planes  Pt to demo improved mobility of cervicothoracic spine to improve ROM   Pt to demo >/=75% improvement in pain to improve QoL      Plan   Neck was assessed today and can be incorporated into Plan of Care at this time. Patient scheduled for PT once  a week through 5/28/24    Will plan to dry needle upper trap at next visit.      Stephany Styles, PT , DPT

## 2024-05-06 NOTE — PROGRESS NOTES
[facility] OUTPATIENT THERAPY AND WELLNESS   Physical Therapy Treatment Note      Name: Dominic Collazo  [facility] Number: [id number]    Therapy Diagnosis:   Encounter Diagnoses   Name Primary?    Impaired range of motion of left shoulder Yes    Impaired strength of upper extremity     Impaired function of upper extremity                Physician: Sea Valadez MD    Visit Date: 5/7/2024     Physician Orders: PT Eval and Treat   NOTE: 2-3 x per week x 6 weeks     Protocol = [email or web address]  Medical Diagnosis from Referral:   S43.432A (ICD-10-CM) - Paralabral cyst of left shoulder, initial encounter   S43.432A (ICD-10-CM) - Superior glenoid labrum lesion of left shoulder, initial encounter        Dr. Singh Referral:   S13.4XXD (ICD-10-CM) - Whiplash injury to neck, subsequent encounter   M54.2 (ICD-10-CM) - Cervicalgia of vqlaudpo-lcakrwq-dtfhi region   G44.86 (ICD-10-CM) - Cervicogenic headache     Evaluation Date: 12/29/2023  Authorization Period Expiration: 12/31/24  Plan of Care Expiration: 5/28/24  Progress Note Due: 5/15/24  Date of Surgery: 12/14/23  Visit # / Visits authorized: 20/ 40  FOTO:  FOTO performed 4/15  Precautions: see protocol   Per Samantha referral: Referral for lower neck/upper back to mid back PT with myofascial release (therapist may choose from and do a combination of: deep tissue massage, cupping, dry needling, etc). No soft tissue manipulation of suboccipital region if you start to feel worse. All joint manipulation is fine.       Time In: 8:04 AM  Time Out:  8:52 AM  Total Billable Time: 48 minutes (TE-2, MT-1)                         Subjective     Patient reports: shoulder has been feeling a lot better. No shoulder pain today. Feels that the exercises last session helped things loosen up.  He was compliant with home exercise program.  Response to previous treatment: felt ok  Functional change: improving,  "ongoing    Pain: 5/10  Location: bottom of the neck and top of left shoulder blade      ______________________________________________________________________________________________________    Objective from last visit:    Observation: enters clinic independently      Posture: FHP and rounded shoulders      Cervical Range of Motion:     Degrees Pain   Flexion 45     yes   Extension 50 no   Right Rotation 80  no      Left Rotation 60 yes      Right Side Bending 45 yes   Left Side Bending 45 yes        Special Tests:  Distraction -   Compression -      Shoulder MMT and ROM have been assessed previously - see note from April 15th     Joint Mobility: hypomobile thoracic spine     Palpation: TTP @ left levator scapulae, UT, and posterior scalene, left upper thoracic paraspinals, rhomboids, periscpaular region     Sensation:intact      Treatment     Dominic received the treatments listed below:          Bold=performed    therapeutic exercises to develop strength, endurance, ROM, flexibility, and posture for 28 minutes including:  thoracic extension over full foam roll in supine  lumbar locked quadruped thoracic rotation  standing open books 2x10 each side  LS stretch 3x30"  UT stretch 3x30"  open books 2x10  UBE 3'/3' +level 3  Gentle PROM into all planes  Sidelying shoulder abduction 2x10 - 3#- progressed to 5#   sidelying ER with 3 lb DB 2x10 - progressed to 5#   Sidelying flexion with 3# 2x10- progressed to 5#   pulleys 3 mins scaption and flexion each direction  3' pulleys into internal rotation  Supine w/ EOM raised 45 deg flexion 2x10   Elevated HOB tricep extension 3 x 10 RTB  +AAROM dowel Internal rotation with shoulder at 90 deg abd   serratus punches 3x10 in supine- using wooden dowel  +mid back stretch 3x30"  supine dowel flexion AAROM 2x10 3" holds  Supine dowel abduction AAROM 2x10 3" holds  supine dowel ER AAROM 2x10 3" holds        manual therapy techniques: PROM were applied to the: L neck/ shoulder for " "20 minutes:  +Prone thoracic PA to upper thoracic region particularly T3 for improved thoracic mobility  +subscapularis release L  +Manual LS stretch   +STM/MFR to left LS and UT     Palpation to assess for trigger points of left upper trapezius muscles.    Dry needling informed consent obtained after thorough explanation of risks and benefits. Signed consent form will be scanned into medical record.  70% isopropyl alcohol wipe down performed to treatment site with single use sterile prep pads.     DN performed with:  0.30x40 J Type Needles in left upper trapezius muscles muscles.  Treatment performed with patient prone position.     DN performed to reduce pain and muscle tension, promote blood flow, and improve ROM and function. Pt tolerated tx well without adverse effects. Dominic was educated on what to expect following the procedure and she verbalized understanding.       PROM with very slight distraction and oscillation- flexion, abduction, and ER  GrI-II  inferior GH mobs   contract relax flexion  +contract relax shoulder abd   Scapular mobs all 4 directions in SL   Passive stretching to L+ upper trap          neuromuscular re-education activities to improve: muscle re-education and motor control of L+ UE, scapula for 00 minutes.   The following activities were included:- not performed  ball on the wall ABCs 2 reps- used red weighted medicine ball today  body blade IR/ER 2x30"  wall serratus push ups X 20  shoulder taps at mat  2x10  Push up plus at mat 2x10  serratus wall slides 2x10   prone scapular retraction 2x10 3-5" holds  prone rows 2x10  prone horizontal abd 2x10  prone extensions 2x10  Gentle Scap squeeze - small range 3 x 10      Therapeutic activities: 00 minutes to improve overhead function, carrying, lifting- not performed   overhead press 5# 2x10 (visual cuing at mirror)  overhead carries 3# DB , 3 laps - progressed to 5 lb today  +suitcase carry 15#KB 2 laps around gym  Education on postural " endurance and form with overhead mobility           Patient Education and Home Exercises       Education provided:   - progress and PT POC  - HEP   - dry needling consent (scanned into chart), dry needling and what to expect after tx    Written Home Exercises Provided: Patient instructed to cont prior HEP. Exercises were reviewed and Dominic was able to demonstrate them prior to the end of the session.  Dominic demonstrated good  understanding of the education provided. See Electronic Medical Record under Patient Instructions for exercises provided during therapy sessions    Assessment     Patient opts for dry needling today. Dry needling consent form reviewed with and signed by patient and will be scanned into chart. Dry needling performed to left upper trapezius muscle with excellent response from patient. Decreased pain noted after dry needling treatment and pt educated on what to expect post treatment. Continue to incorporate neck/thoracic spine tx into current PT POC while continuing to address post op rehab for the left shoulder. Resume strengthening exercises and activities for the left shoulder next session.    Dominic Is progressing well towards his goals.   Patient prognosis is Excellent.   Patient will continue to benefit from skilled outpatient physical therapy to address the deficits listed in the problem list box on initial evaluation, provide pt/family education and to maximize pt's level of independence in the home and community environment.     Goals:      Short Term Goals 4 weeks   1. Pt will be independent with HEP to supplement PT in improving functional use of LUE.- MET  2. Pt will increase pain free left shoulder PROM in all planes to WNL to improve functional mobility of UE- MET  3. Formally assess strength when allowed and set goals accordingly- MET  4. Pt will adhere to post op precautions to allow for proper tissue healing- MET     Long Term Goals 8 weeks   1.  Pt will have full PROM of  left shoulder to show improvements in overall movement patterns. - MET  2. Pt to show proper postural awareness with no VC from PT- MET  3. Pt to have good tolerance to AAROM and AROM activities per protocol -MET  4. Patient will increase left shoulder strength to >/=4/5- MET    Updated Goals for shoulder:  5. Pt to improve shoulder flexion and abduction AROM to >/=160 deg in seated and supine positions to improve movement patterns- almost met  6. Pt to improve shoulder ER to >/=50 deg to improve AROM- MET  7. Pt will be able to don/doff shirts and clothing without pain in order to improve independence- almost met  8. Pt will demo strength >/=4+/5 in the affected shoulder to improve use of LUE  9.Pt will report pain free overhead lifting to top shelf of fridge and/or cabinets to improve function       Updated goals for neck:   Patient to improve cervical AROM to full motion in all planes  Pt to demo improved mobility of cervicothoracic spine to improve ROM   Pt to demo >/=75% improvement in pain to improve QoL      Plan   Neck was assessed last visit and can be incorporated into Plan of Care at this time. Patient scheduled for PT once  a week through 5/28/24    Assess response to dry needling next visit.  Resume strengthening exercises and activities for the left shoulder next session.      Stephany Styles, PT , DPT

## 2024-05-07 ENCOUNTER — CLINICAL SUPPORT (OUTPATIENT)
Dept: REHABILITATION | Facility: HOSPITAL | Age: 44
End: 2024-05-07
Payer: COMMERCIAL

## 2024-05-07 DIAGNOSIS — M25.612 IMPAIRED RANGE OF MOTION OF LEFT SHOULDER: Primary | ICD-10-CM

## 2024-05-07 DIAGNOSIS — R68.89 IMPAIRED FUNCTION OF UPPER EXTREMITY: ICD-10-CM

## 2024-05-07 DIAGNOSIS — R29.898 IMPAIRED STRENGTH OF UPPER EXTREMITY: ICD-10-CM

## 2024-05-07 PROCEDURE — 97110 THERAPEUTIC EXERCISES: CPT | Mod: PO

## 2024-05-07 PROCEDURE — 97140 MANUAL THERAPY 1/> REGIONS: CPT | Mod: PO

## 2024-05-14 DIAGNOSIS — F90.2 ADHD (ATTENTION DEFICIT HYPERACTIVITY DISORDER), COMBINED TYPE: ICD-10-CM

## 2024-05-14 RX ORDER — LISDEXAMFETAMINE DIMESYLATE 40 MG/1
40 CAPSULE ORAL DAILY
Qty: 30 CAPSULE | Refills: 0 | Status: SHIPPED | OUTPATIENT
Start: 2024-05-14 | End: 2024-06-04 | Stop reason: SDUPTHER

## 2024-05-21 DIAGNOSIS — M54.2 NECK PAIN: ICD-10-CM

## 2024-05-21 DIAGNOSIS — F90.2 ADHD (ATTENTION DEFICIT HYPERACTIVITY DISORDER), COMBINED TYPE: ICD-10-CM

## 2024-05-22 RX ORDER — DEXTROAMPHETAMINE SULFATE 10 MG/1
10 TABLET ORAL
Qty: 30 TABLET | Refills: 0 | Status: SHIPPED | OUTPATIENT
Start: 2024-05-22

## 2024-05-22 RX ORDER — METHOCARBAMOL 750 MG/1
TABLET, FILM COATED ORAL
Qty: 40 TABLET | Refills: 3 | Status: SHIPPED | OUTPATIENT
Start: 2024-05-22

## 2024-05-22 NOTE — TELEPHONE ENCOUNTER
No care due was identified.  [facility] Embedded Care Due Messages. Reference number: [id number].   5/21/2024 10:22:51 PM CDT

## 2024-05-23 ENCOUNTER — PATIENT MESSAGE (OUTPATIENT)
Dept: PSYCHIATRY | Facility: CLINIC | Age: 44
End: 2024-05-23
Payer: COMMERCIAL

## 2024-05-23 ENCOUNTER — HOSPITAL ENCOUNTER (EMERGENCY)
Facility: HOSPITAL | Age: 44
Discharge: HOME | End: 2024-05-23
Attending: EMERGENCY MEDICINE
Payer: COMMERCIAL

## 2024-05-23 VITALS
DIASTOLIC BLOOD PRESSURE: 80 MMHG | TEMPERATURE: 98 F | HEIGHT: 75 IN | RESPIRATION RATE: 18 BRPM | HEART RATE: 98 BPM | SYSTOLIC BLOOD PRESSURE: 141 MMHG | OXYGEN SATURATION: 95 % | BODY MASS INDEX: 39.17 KG/M2 | WEIGHT: 315 LBS

## 2024-05-23 DIAGNOSIS — R45.89 ANXIOUS APPEARANCE: ICD-10-CM

## 2024-05-23 DIAGNOSIS — F41.9 ANXIETY: ICD-10-CM

## 2024-05-23 DIAGNOSIS — R06.02 SHORTNESS OF BREATH: ICD-10-CM

## 2024-05-23 DIAGNOSIS — F41.0 PANIC ATTACK: Primary | ICD-10-CM

## 2024-05-23 DIAGNOSIS — R94.31 EKG ABNORMALITIES: ICD-10-CM

## 2024-05-23 DIAGNOSIS — R78.89: ICD-10-CM

## 2024-05-23 LAB
ALBUMIN SERPL BCP-MCNC: 4.5 G/DL (ref 3.5–5.2)
ALLENS TEST: ABNORMAL
ALP SERPL-CCNC: 67 U/L (ref 55–135)
ALT SERPL W/O P-5'-P-CCNC: 81 U/L (ref 10–44)
ANION GAP SERPL CALC-SCNC: 18 MMOL/L (ref 8–16)
AST SERPL-CCNC: 61 U/L (ref 10–40)
B-OH-BUTYR BLD STRIP-SCNC: 0.9 MMOL/L (ref 0–0.5)
BACTERIA #/AREA URNS HPF: NORMAL /HPF
BASOPHILS # BLD AUTO: 0.11 K/UL (ref 0–0.2)
BASOPHILS NFR BLD: 0.6 % (ref 0–1.9)
BILIRUB SERPL-MCNC: 0.5 MG/DL (ref 0.1–1)
BILIRUB UR QL STRIP: NEGATIVE
BNP SERPL-MCNC: <10 PG/ML (ref 0–99)
BUN SERPL-MCNC: 21 MG/DL (ref 6–20)
CALCIUM SERPL-MCNC: 10.3 MG/DL (ref 8.7–10.5)
CHLORIDE SERPL-SCNC: 99 MMOL/L (ref 95–110)
CK SERPL-CCNC: 621 U/L (ref 20–200)
CLARITY UR: CLEAR
CO2 SERPL-SCNC: 21 MMOL/L (ref 23–29)
COLOR UR: YELLOW
CREAT SERPL-MCNC: 1.4 MG/DL (ref 0.5–1.4)
DIFFERENTIAL METHOD BLD: ABNORMAL
EOSINOPHIL # BLD AUTO: 0.1 K/UL (ref 0–0.5)
EOSINOPHIL NFR BLD: 0.4 % (ref 0–8)
ERYTHROCYTE [DISTWIDTH] IN BLOOD BY AUTOMATED COUNT: 13 % (ref 11.5–14.5)
EST. GFR  (NO RACE VARIABLE): >60 ML/MIN/1.73 M^2
FIO2: 21 %
GLUCOSE SERPL-MCNC: 176 MG/DL (ref 70–110)
GLUCOSE UR QL STRIP: NEGATIVE
HCT VFR BLD AUTO: 45.7 % (ref 40–54)
HGB BLD-MCNC: 15.9 G/DL (ref 14–18)
HGB UR QL STRIP: NEGATIVE
HYALINE CASTS #/AREA URNS LPF: 0 /LPF
IMM GRANULOCYTES # BLD AUTO: 0.07 K/UL (ref 0–0.04)
IMM GRANULOCYTES NFR BLD AUTO: 0.4 % (ref 0–0.5)
KETONES UR QL STRIP: ABNORMAL
LACTATE SERPL-SCNC: 1.2 MMOL/L (ref 0.5–2.2)
LEUKOCYTE ESTERASE UR QL STRIP: NEGATIVE
LYMPHOCYTES # BLD AUTO: 2.1 K/UL (ref 1–4.8)
LYMPHOCYTES NFR BLD: 10.9 % (ref 18–48)
MCH RBC QN AUTO: 31.1 PG (ref 27–31)
MCHC RBC AUTO-ENTMCNC: 34.8 G/DL (ref 32–36)
MCV RBC AUTO: 89 FL (ref 82–98)
MICROSCOPIC COMMENT: NORMAL
MONOCYTES # BLD AUTO: 1.6 K/UL (ref 0.3–1)
MONOCYTES NFR BLD: 8.6 % (ref 4–15)
NEUTROPHILS # BLD AUTO: 15.1 K/UL (ref 1.8–7.7)
NEUTROPHILS NFR BLD: 79.1 % (ref 38–73)
NITRITE UR QL STRIP: NEGATIVE
NRBC BLD-RTO: 0 /100 WBC
OHS QRS DURATION: 102 MS
OHS QTC CALCULATION: 437 MS
PCO2 BLDA: 44.5 MMHG (ref 35–45)
PH SMN: 7.38 [PH] (ref 7.35–7.45)
PH UR STRIP: 6 [PH] (ref 5–8)
PLATELET # BLD AUTO: 283 K/UL (ref 150–450)
PMV BLD AUTO: 11.3 FL (ref 9.2–12.9)
PO2 BLDA: 48.1 MMHG (ref 40–60)
POC BASE DEFICIT: 0.5 MMOL/L (ref -2–2)
POC HCO3: 26.1 MMOL/L (ref 24–28)
POC PERFORMED BY: ABNORMAL
POC SATURATED O2: 82.2 % (ref 95–100)
POTASSIUM SERPL-SCNC: 4.5 MMOL/L (ref 3.5–5.1)
PROT SERPL-MCNC: 7.6 G/DL (ref 6–8.4)
PROT UR QL STRIP: ABNORMAL
RBC # BLD AUTO: 5.11 M/UL (ref 4.6–6.2)
RBC #/AREA URNS HPF: 1 /HPF (ref 0–4)
SODIUM SERPL-SCNC: 138 MMOL/L (ref 136–145)
SP GR UR STRIP: 1.01 (ref 1–1.03)
SPECIMEN SOURCE: ABNORMAL
TROPONIN I SERPL DL<=0.01 NG/ML-MCNC: <0.006 NG/ML (ref 0–0.03)
URN SPEC COLLECT METH UR: ABNORMAL
UROBILINOGEN UR STRIP-ACNC: NEGATIVE EU/DL
WBC # BLD AUTO: 19.06 K/UL (ref 3.9–12.7)
WBC #/AREA URNS HPF: 3 /HPF (ref 0–5)

## 2024-05-23 PROCEDURE — 82010 KETONE BODYS QUAN: CPT

## 2024-05-23 PROCEDURE — 87040 BLOOD CULTURE FOR BACTERIA: CPT | Mod: 59 | Performed by: EMERGENCY MEDICINE

## 2024-05-23 PROCEDURE — 85025 COMPLETE CBC W/AUTO DIFF WBC: CPT

## 2024-05-23 PROCEDURE — 80053 COMPREHEN METABOLIC PANEL: CPT

## 2024-05-23 PROCEDURE — 25000003 PHARM REV CODE 250: Performed by: EMERGENCY MEDICINE

## 2024-05-23 PROCEDURE — 87154 CUL TYP ID BLD PTHGN 6+ TRGT: CPT | Performed by: EMERGENCY MEDICINE

## 2024-05-23 PROCEDURE — 25000003 PHARM REV CODE 250

## 2024-05-23 PROCEDURE — 84484 ASSAY OF TROPONIN QUANT: CPT

## 2024-05-23 PROCEDURE — 82803 BLOOD GASES ANY COMBINATION: CPT

## 2024-05-23 PROCEDURE — 93010 ELECTROCARDIOGRAM REPORT: CPT | Mod: ,,, | Performed by: INTERNAL MEDICINE

## 2024-05-23 PROCEDURE — 96360 HYDRATION IV INFUSION INIT: CPT

## 2024-05-23 PROCEDURE — 83605 ASSAY OF LACTIC ACID: CPT | Performed by: EMERGENCY MEDICINE

## 2024-05-23 PROCEDURE — 81000 URINALYSIS NONAUTO W/SCOPE: CPT | Performed by: EMERGENCY MEDICINE

## 2024-05-23 PROCEDURE — 83880 ASSAY OF NATRIURETIC PEPTIDE: CPT

## 2024-05-23 PROCEDURE — 82550 ASSAY OF CK (CPK): CPT | Performed by: EMERGENCY MEDICINE

## 2024-05-23 PROCEDURE — 93005 ELECTROCARDIOGRAM TRACING: CPT

## 2024-05-23 PROCEDURE — 99285 EMERGENCY DEPT VISIT HI MDM: CPT | Mod: 25

## 2024-05-23 PROCEDURE — 99900035 HC TECH TIME PER 15 MIN (STAT)

## 2024-05-23 RX ORDER — ALPRAZOLAM 1 MG/1
1 TABLET ORAL
Status: COMPLETED | OUTPATIENT
Start: 2024-05-23 | End: 2024-05-23

## 2024-05-23 RX ORDER — METHOCARBAMOL 750 MG/1
750 TABLET, FILM COATED ORAL
Status: COMPLETED | OUTPATIENT
Start: 2024-05-23 | End: 2024-05-23

## 2024-05-23 RX ORDER — HYDROXYZINE HYDROCHLORIDE 25 MG/1
50 TABLET, FILM COATED ORAL
Status: COMPLETED | OUTPATIENT
Start: 2024-05-23 | End: 2024-05-23

## 2024-05-23 RX ORDER — NAPROXEN SODIUM 220 MG/1
324 TABLET, FILM COATED ORAL
Status: COMPLETED | OUTPATIENT
Start: 2024-05-23 | End: 2024-05-23

## 2024-05-23 RX ADMIN — HYDROXYZINE HYDROCHLORIDE 50 MG: 25 TABLET ORAL at 08:05

## 2024-05-23 RX ADMIN — METHOCARBAMOL 750 MG: 750 TABLET ORAL at 08:05

## 2024-05-23 RX ADMIN — SODIUM CHLORIDE 1000 ML: 9 INJECTION, SOLUTION INTRAVENOUS at 09:05

## 2024-05-23 RX ADMIN — ALPRAZOLAM 1 MG: 1 TABLET ORAL at 08:05

## 2024-05-23 RX ADMIN — ASPIRIN 324 MG: 81 TABLET, CHEWABLE ORAL at 08:05

## 2024-05-23 NOTE — DISCHARGE INSTRUCTIONS
All of your lab work looked reassuring today! Please continue to take her anxiety medications as you have been prescribed by your psychiatrist.  Be on the lookout for a call from [facility] to schedule an appointment with a cardiologist due to the changes found on your EKG today.    Thank you for allowing me and my emergency team to take care of you here today! I hope you feel better soon. Please do not hesitate to return with any additional concerns that may arise from this or any new problem you encounter.    Our goal in the emergency department is to always give you outstanding care and exceptional service. If you receive a survey by mail or e-mail in the next week regarding your experience in our ED, we would greatly appreciate you completing it. Your feedback provides us with a way to recognize our staff who give very good care and it helps us learn how to improve when your experience was below the excellence we aspire to be!    Brook [place], PA-C [facility], [place], and [facility]   Emergency Room Physician Assistant

## 2024-05-23 NOTE — ED PROVIDER NOTES
"Encounter Date: 5/23/2024       History     Chief Complaint   Patient presents with    Panic Attack     Patient reports smoking marijuana around 1100 last night. He reports waking up around 0300 with extreme anxiety and racing thought pattern. Patient felt overwhelmed and increased anxiety with vomiting. He reports racing thoughts have resolved but continues to have anxiety, SOB, headache, and bilateral shoulder pain. He reports mental health hx.      Patient is a 43-year-old male with a past medical history of adjustment disorder with mixed anxiety and depressed mood, anxiety, depression, ED, hyperlipidemia, hypertension, insomnia, LYNDA, and prediabetes who presents to the emergency room for shortness of breath and feelings of anxiousness since this morning around 3am. Patient states that he smoked marijuana vape pen around 11:00 p.m. last night.  He woke up around 3:00 a.m. with racing thoughts and shortness of breath that he contributes to his anxiety.  States that he felt "overwhelmed" and had an episode of vomiting. He reports that his anxiety has "never been this bad before." Since arriving to the emergency room, racing thoughts have gradually improved.  However, he still feels anxious and intermittently short of breath.  Also endorses a headache, that he attributes to his racing thoughts,   And bilateral shoulder pain from "tension." Denies chest pain, abdominal pain, visual changes, weakness, numbness, tingling, fever, body aches, chills, changes in bowel movements, or others at this time.  No treatment attempted prior to arrival.    The history is provided by the patient. No  was used.     Review of patient's allergies indicates:   Allergen Reactions    Ciprofloxacin      swelling    Penicillins Rash    Sulfa (sulfonamide antibiotics) Rash    Toradol [ketorolac] Nausea Only    Bell pepper Nausea Only    Meloxicam Nausea Only    Sulfamethoxazole-trimethoprim      Past Medical History: "   Diagnosis Date    Adjustment disorder with mixed anxiety and depressed mood     Anxiety     Colon polyp     Depression     ED (erectile dysfunction)     Family history of prostate cancer 9/29/2014    Hx of umbilical hernia repair 10/16/2020    Hyperlipidemia     Hypertension     Hypogonadism male     Insomnia     Low testosterone     LYNDA (obstructive sleep apnea)     Prediabetes      Past Surgical History:   Procedure Laterality Date    ADENOIDECTOMY      COLONOSCOPY N/A 12/5/2023    Procedure: COLONOSCOPY;  Surgeon: Jonnathan Velazquez MD;  Location: [facility] ENDO (4TH FLR);  Service: Colon and Rectal;  Laterality: N/A;  inst. to pt. portal. Takes Ozempic inst. to hold.Tbou  referral: Dr. Cross  11/28-lvm for precall-MS  12/4-last dose Ozempic 11/26/23, precall complete-KPvt    HERNIA REPAIR      REPAIR OF INCARCERATED UMBILICAL HERNIA N/A 10/16/2020    Procedure: REPAIR, HERNIA, UMBILICAL, INCARCERATED, AGE 5 YEARS OR OLDER with mesh ;  Surgeon: Conrad Loco MD;  Location: [facility] OR 2ND FLR;  Service: General;  Laterality: N/A;    REPAIR OF LABRUM OF HIP Left 12/14/2023    Procedure: REPAIR,LABRUM,SHOULDER;  Surgeon: Sea Valadez MD;  Location: [facility] OR;  Service: Orthopedics;  Laterality: Left;    SHOULDER ARTHROSCOPY W/ SUPERIOR LABRAL ANTERIOR POSTERIOR LESION REPAIR Left 12/14/2023    Procedure: ARTHROSCOPY, SHOULDER,;  Surgeon: Sea Valadez MD;  Location: [facility] OR;  Service: Orthopedics;  Laterality: Left;  Arthrex knotless suturetak labral anchors, long plastic cannulas, arthroscopic elevators/rasps marissa notified cc    TONSILLECTOMY       Family History   Problem Relation Name Age of Onset    Hypertension Mother      Hyperlipidemia Mother      Diabetes Father      Hyperlipidemia Father      Hypertension Father      Heart disease Father  46        CAD    No Known Problems Sister      Cancer Maternal Aunt          colon cancer    Stroke Maternal Uncle      Cancer Paternal Uncle           prostate cancer     Social History     Tobacco Use    Smoking status: Never     Passive exposure: Past    Smokeless tobacco: Never   Substance Use Topics    Alcohol use: Yes     Comment: less than once per month    Drug use: No     Review of Systems   Constitutional:  Negative for chills, diaphoresis, fatigue and fever.   HENT:  Negative for congestion, sore throat and trouble swallowing.    Respiratory:  Positive for shortness of breath. Negative for cough.    Cardiovascular:  Negative for chest pain and palpitations.   Gastrointestinal:  Negative for abdominal pain, blood in stool, constipation, diarrhea, nausea and vomiting.   Genitourinary:  Negative for difficulty urinating, dysuria, frequency and hematuria.   Musculoskeletal:  Positive for arthralgias (bilateral shoulders). Negative for back pain and myalgias.   Skin:  Negative for rash and wound.   Neurological:  Positive for headaches. Negative for weakness, light-headedness and numbness.   Psychiatric/Behavioral:  The patient is nervous/anxious.        Physical Exam     Initial Vitals [05/23/24 0838]   BP Pulse Resp Temp SpO2   (!) 153/85 99 20 98.2 °F (36.8 °C) 95 %      MAP       --         Physical Exam    Nursing note and vitals reviewed.  Constitutional: He appears well-developed and well-nourished. He is not diaphoretic. No distress.   Patient is sitting in chair, appears anxious.  Rapid speech.  Awake and alert.  Maintaining airway appropriately.   HENT:   Head: Normocephalic and atraumatic.   Right Ear: External ear normal.   Left Ear: External ear normal.   Eyes: Conjunctivae and EOM are normal. Pupils are equal, round, and reactive to light.   Neck: Neck supple.   Normal range of motion.  Cardiovascular:  Normal rate and regular rhythm.           No murmur heard.  Pulmonary/Chest: Breath sounds normal. No respiratory distress. He has no wheezes. He has no rhonchi. He has no rales.   Abdominal: Abdomen is soft. He exhibits no distension. There is  no abdominal tenderness. There is no rebound and no guarding.   Musculoskeletal:         General: No tenderness or edema. Normal range of motion.      Cervical back: Normal range of motion and neck supple.     Neurological: He is alert and oriented to person, place, and time. He has normal strength.   Skin: Skin is warm. Capillary refill takes less than 2 seconds.   Psychiatric: His mood appears anxious. His speech is rapid and/or pressured.         ED Course   Procedures  Labs Reviewed   CBC W/ AUTO DIFFERENTIAL - Abnormal; Notable for the following components:       Result Value    WBC 19.06 (*)     MCH 31.1 (*)     Gran # (ANC) 15.1 (*)     Immature Grans (Abs) 0.07 (*)     Mono # 1.6 (*)     Gran % 79.1 (*)     Lymph % 10.9 (*)     All other components within normal limits   COMPREHENSIVE METABOLIC PANEL - Abnormal; Notable for the following components:    CO2 21 (*)     Glucose 176 (*)     BUN 21 (*)     AST 61 (*)     ALT 81 (*)     Anion Gap 18 (*)     All other components within normal limits   CK - Abnormal; Notable for the following components:     (*)     All other components within normal limits   URINALYSIS, REFLEX TO URINE CULTURE - Abnormal; Notable for the following components:    Protein, UA 1+ (*)     Ketones, UA Trace (*)     All other components within normal limits    Narrative:     Specimen Source->Urine   BETA - HYDROXYBUTYRATE, SERUM - Abnormal; Notable for the following components:    Beta-Hydroxybutyrate 0.9 (*)     All other components within normal limits   CULTURE, BLOOD   CULTURE, BLOOD   TROPONIN I   B-TYPE NATRIURETIC PEPTIDE   LACTIC ACID, PLASMA   URINALYSIS MICROSCOPIC    Narrative:     Specimen Source->Urine          Imaging Results              X-Ray Chest AP Portable (Final result)  Result time 05/23/24 10:50:51      Final result by Julio Dougherty MD (05/23/24 10:50:51)                   Impression:      No acute cardiopulmonary abnormality.      Electronically  signed by: Julio Dougherty  Date:    05/23/2024  Time:    10:50               Narrative:    EXAMINATION:  XR CHEST AP PORTABLE    CLINICAL HISTORY:  Shortness of breath    TECHNIQUE:  Single frontal view of the chest was performed.    COMPARISON:  None    FINDINGS:  Overlying monitoring leads.  Patient is slightly rotated without abnormal cardiomediastinal widening noting portable technique.  No confluent airspace opacity or lobar consolidation.  No pleural effusion or gross pneumothorax.  No acute osseous abnormality.                                       Medications   hydrOXYzine HCL tablet 50 mg (50 mg Oral Given 5/23/24 0851)   methocarbamoL tablet 750 mg (750 mg Oral Given 5/23/24 0851)   ALPRAZolam tablet 1 mg (1 mg Oral Given 5/23/24 0854)   aspirin chewable tablet 324 mg (324 mg Oral Given 5/23/24 0853)   sodium chloride 0.9% bolus 1,000 mL 1,000 mL (0 mLs Intravenous Stopped 5/23/24 1105)     Medical Decision Making  Patient presents to emergency room for shortness of breath and feelings of anxiousness.  Vital signs stable.  Physical exam as stated above.    Differential Diagnosis includes, but is not limited to PE, MI/ACS, pneumothorax, pericardial effusion/tamonade, pneumonia, lung abscess, pericarditis/myocarditis, pleural effusion, lung mass, CHF exacerbation, asthma exacerbation, COPD exacerbation, aspirated/ingested foreign body, airway obstruction, anemia, metabolic derangement, allergy/atopy, influenza, viral URI, or viral syndrome.  Patient is not tachycardic.  Maintaining O2 saturation greater than 95%.  No risk factors that would suggest pulmonary embolism.  No history of asthma, CHF, or COPD.  No history of aspirated/ingested foreign body.  Chest x-ray unremarkable.  Effusion consolidation, or pneumothorax. Lab work relatively reassuring.  Patient with elevated anion gap and beta hydroxybutyrate.  However, VBG without acidosis.  Glucose in 170s.  He is currently on metformin and Ozempic.  He is  able to tolerate PO for adequate hydration outpatient. EKG initially with T wave inversions not seen on previous. Clinical presentation and physical exam most suggestive of anxiety with panic attack. Patient was given hydroxyzine with home dosage of Xanax with improvement of symptoms.  Will refer to Cardiology for abnormal EKG.    I see no indication of an emergent process beyond that addressed during our encounter. Patient stable for discharge at this time. I have counseled the patient regarding follow up with Family Medicine, Psychiatry, and Cardiology and gave strict return precautions. I have discussed the final diagnosis and gave instructions regarding home medications. Patient verbalized understanding and is agreeable.     Problems Addressed:  Anxiety: acute illness or injury  Anxious appearance: acute illness or injury  EKG abnormalities: acute illness or injury  Elevated beta-hydroxybutyric acid level: acute illness or injury  Panic attack: acute illness or injury  Shortness of breath: acute illness or injury    Amount and/or Complexity of Data Reviewed  External Data Reviewed: notes.     Details: Patient currently sees Dr. Burdick, psychiatry. Last appointment was 04/03/24. See workup for last psychiatry note.   Labs: ordered. Decision-making details documented in ED Course.  Radiology: ordered. Decision-making details documented in ED Course.  ECG/medicine tests: ordered. Decision-making details documented in ED Course.    Risk  OTC drugs.  Prescription drug management.  Risk Details: Comorbidities taken into consideration during the patient's evaluation and treatment include adjustment disorder with mixed anxiety and depressed mood, anxiety, depression, ED, hyperlipidemia, hypertension, insomnia, LYNDA, and prediabetes.  Social determinants of health taken into consideration during development of our treatment plan include difficulty in obtaining follow-up, obtaining medications, health literacy, access to  "healthy options for preventative/conservative management, and/or support systems due to, but not limited to, transportation limitations, socioeconomic status, and environmental factors.                ED Course as of 05/23/24 1144   Thu May 23, 2024   0834 Patient states that he smoked a marijuana vape and 4-5 hours later started to feel anxious. Patient visibly anxious in triage. Reports that he has racing thoughts. He has a history of anxiety sees psychiatry. He takes Wellbutrin, Ambien, Vyvanse, and Xanax. Has not taken any medications this morning.  [BJ]   0841 Plan from last psychiatry note:  "States he takes 3000mcg b12 otc daily. Instructed pt to discontinue as likely at toxic levels     -Continue Wellbutrin  mg daily  -Continue vitamin D to 2.5k IU once daily  -Continue buspar to 20mg bid  -Continue Effexor to 150mg mg daily   -Continue xanax 1mg bid prn anxiety or insomnia  -Continue ambien to CR 12.5mg qhs insomnia  -Continue vyvanse 40mg daily in AM  -Continue dexedine 10mg daily prn in afternoon for focus extended work hrs  -Continue testosterone therapy as prescribed by other provider" [BJ]   0849 Patient with abnormal EKG per supervising physician, Dr. Mitchell. ACS workup initiated.  [BJ]   0902 EKG 12-lead  Independent interpretation of EKG sinus tachycardia without ST elevations. T wave inversions noted in leads II, aVL, aVF, V5, and V6. T wave inversions slightly new in comparison to previous EKG done in 07/2023. [BJ]   0923 CBC auto differential(!)  CBC with increase in white blood cells to 19.06.  This may be secondary to patient smoking marijuana.  He is afebrile at this time.  H/H stable and within normal limits.  Platelet count within normal limits. [BJ]   0949 BNP  BNP relatively unremarkable. [BJ]   0949 Comprehensive metabolic panel(!)    CMP with elevation in glucose of 176.  Electrolytes otherwise within normal limits.  BUN elevated to 21.  Creatinine within normal limits.  AST and ALT " elevated to 61 and 81 respectively.  Anion gap elevated to 18. [BJ]   0950 Due to elevated anion gap, beta hydroxybutyrate ordered. [BJ]   0950 Due to elevation in white blood cell count, CPK, lactic, and blood cultures ordered. [BJ]   1022 Beta - Hydroxybutyrate, Serum(!)  Elevated [CD]   1025 Troponin I #1  Troponin WNL.  [BJ]   1026 Per chart review, patient is taking Ozempic and Metformin.  [BJ]   1035 CPK(!)  elevated [CD]   1035 Lactic acid, plasma  wnl [CD]   1038 CPK(!)  CPK elevated to 621. [BJ]   1038 Lactic acid, plasma  Lactic within normal limits. [BJ]   1046 POCT Venous Blood Gas(!!)  VBG without acidosis.  [BJ]   1051 BP: 135/79 [BJ]   1123 Urinalysis, Reflex to Urine Culture Urine, Clean Catch(!)  Urinalysis without leukocytes or occult blood.  Trace ketones. [BJ]   1123 Urinalysis Microscopic  Microscopic urinalysis without bacteria.  No increase in red or white blood cells. [BJ]   1123 X-Ray Chest AP Portable  Independent interpretation of chest x-ray without effusion, consolidation, or pneumothorax.  Trachea midline. No cardiomegaly. Agree with radiology reading.  [BJ]   1133 Discussed patient with supervising physician, Dr. Mitchell.  Lab work coming back reassuring.  On re-evaluation, patient is feeling much better.  Appears less anxious. Will plan for discharge. [BJ]      ED Course User Index  [BJ] Clementine Navarrete PA-C  [CD] Garrison Mitchell, DO                           Clinical Impression:  Final diagnoses:  [R45.89] Anxious appearance  [R06.02] Shortness of breath  [R94.31] EKG abnormalities  [F41.9] Anxiety  [F41.0] Panic attack (Primary)  [R78.89] Elevated beta-hydroxybutyric acid level          ED Disposition Condition    Discharge Stable          ED Prescriptions    None       Follow-up Information       Follow up With Specialties Details Why Contact Info Additional Information    [facility] - Cardiology Cardiology Schedule an appointment as soon as possible for a visit   [address]  [facility] [id number] [phone] Please park in [address] or [address] and enter building by using [place] entrance. Check in at central registration near kiosk in [facility]. Proceed to [address] waiting area once checked in.    Arjun Cross MD Family Medicine Schedule an appointment as soon as possible for a visit   [address] [facility] [address]  [phone]               This note was partially created using [employer] Voice Recognition software. Typographical and content errors may occur with this process. While efforts are made to detect and correct such errors, in some cases errors will persist. For this reason, wording in this document should be considered in the proper context and not strictly verbatim.        Clementine Navarrete PA-C  05/23/24 1144

## 2024-05-23 NOTE — Clinical Note
"Dominic"Deshawn Collazo was seen and treated in our emergency department on 5/23/2024.  He may return to work on 05/24/2024.       If you have any questions or concerns, please don't hesitate to call.      Clementine Navarrete PA-C"

## 2024-05-25 LAB
ACINETOBACTER CALCOACETICUS/BAUMANNII COMPLEX: NOT DETECTED
BACTEROIDES FRAGILIS: NOT DETECTED
CANDIDA ALBICANS: NOT DETECTED
CANDIDA AURIS: NOT DETECTED
CANDIDA GLABRATA: NOT DETECTED
CANDIDA KRUSEI: NOT DETECTED
CANDIDA PARAPSILOSIS: NOT DETECTED
CANDIDA TROPICALIS: NOT DETECTED
CRYPTOCOCCUS NEOFORMANS/GATTII: NOT DETECTED
CTX-M GENE (ESBL PRODUCER): ABNORMAL
ENTEROBACTER CLOACAE COMPLEX: NOT DETECTED
ENTEROBACTERALES: NOT DETECTED
ENTEROCOCCUS FAECALIS: NOT DETECTED
ENTEROCOCCUS FAECIUM: NOT DETECTED
ESCHERICHIA COLI: NOT DETECTED
HAEMOPHILUS INFLUENZAE: NOT DETECTED
IMP GENE (CARBAPENEM RESISTANT): ABNORMAL
KLEBSIELLA AEROGENES: NOT DETECTED
KLEBSIELLA OXYTOCA: NOT DETECTED
KLEBSIELLA PNEUMONIAE GROUP: NOT DETECTED
KPC RESISTANCE GENE (CARBAPENEM): ABNORMAL
LISTERIA MONOCYTOGENES: NOT DETECTED
MCR-1: ABNORMAL
MEC A/C AND MREJ (MRSA): ABNORMAL
MEC A/C: ABNORMAL
NDM GENE (CARBAPENEM RESISTANT): ABNORMAL
NEISSERIA MENINGITIDIS: NOT DETECTED
OXA-48-LIKE (CARBAPENEM RESISTANT): ABNORMAL
PROTEUS SPECIES: NOT DETECTED
PSEUDOMONAS AERUGINOSA: NOT DETECTED
SALMONELLA SP: NOT DETECTED
SERRATIA MARCESCENS: NOT DETECTED
STAPHYLOCOCCUS AUREUS: NOT DETECTED
STAPHYLOCOCCUS EPIDERMIDIS: NOT DETECTED
STAPHYLOCOCCUS LUGDUNESIS: NOT DETECTED
STAPHYLOCOCCUS SPECIES: DETECTED
STENOTROPHOMONAS MALTOPHILIA: NOT DETECTED
STREPTOCOCCUS AGALACTIAE: NOT DETECTED
STREPTOCOCCUS PNEUMONIAE: NOT DETECTED
STREPTOCOCCUS PYOGENES: NOT DETECTED
STREPTOCOCCUS SPECIES: NOT DETECTED
VAN A/B (VRE GENE): ABNORMAL
VIM GENE (CARBAPENEM RESISTANT): ABNORMAL

## 2024-05-25 NOTE — ED NOTES
Addendum: lab called 5/25/24 to report Anaerobic blood culture has Gm + Cocci in clusters. Results reports to current staff attending MD-Dr. Dave.

## 2024-05-27 ENCOUNTER — NURSE TRIAGE (OUTPATIENT)
Dept: ADMINISTRATIVE | Facility: CLINIC | Age: 44
End: 2024-05-27
Payer: COMMERCIAL

## 2024-05-27 ENCOUNTER — HOSPITAL ENCOUNTER (EMERGENCY)
Facility: HOSPITAL | Age: 44
Discharge: HOME | End: 2024-05-27
Attending: EMERGENCY MEDICINE
Payer: COMMERCIAL

## 2024-05-27 VITALS
OXYGEN SATURATION: 97 % | HEART RATE: 80 BPM | TEMPERATURE: 98 F | SYSTOLIC BLOOD PRESSURE: 130 MMHG | RESPIRATION RATE: 18 BRPM | DIASTOLIC BLOOD PRESSURE: 74 MMHG

## 2024-05-27 DIAGNOSIS — B34.9 ACUTE VIRAL SYNDROME: Primary | ICD-10-CM

## 2024-05-27 LAB
ALBUMIN SERPL BCP-MCNC: 4.1 G/DL (ref 3.5–5.2)
ALP SERPL-CCNC: 60 U/L (ref 55–135)
ALT SERPL W/O P-5'-P-CCNC: 50 U/L (ref 10–44)
ANION GAP SERPL CALC-SCNC: 13 MMOL/L (ref 8–16)
AST SERPL-CCNC: 32 U/L (ref 10–40)
BACTERIA BLD CULT: ABNORMAL
BASOPHILS # BLD AUTO: 0.08 K/UL (ref 0–0.2)
BASOPHILS NFR BLD: 0.7 % (ref 0–1.9)
BILIRUB SERPL-MCNC: 0.3 MG/DL (ref 0.1–1)
BILIRUB UR QL STRIP: NEGATIVE
BUN SERPL-MCNC: 7 MG/DL (ref 6–20)
CALCIUM SERPL-MCNC: 10.1 MG/DL (ref 8.7–10.5)
CHLORIDE SERPL-SCNC: 101 MMOL/L (ref 95–110)
CK SERPL-CCNC: 142 U/L (ref 20–200)
CLARITY UR: CLEAR
CO2 SERPL-SCNC: 28 MMOL/L (ref 23–29)
COLOR UR: YELLOW
CREAT SERPL-MCNC: 1.2 MG/DL (ref 0.5–1.4)
DIFFERENTIAL METHOD BLD: NORMAL
EOSINOPHIL # BLD AUTO: 0.4 K/UL (ref 0–0.5)
EOSINOPHIL NFR BLD: 3.2 % (ref 0–8)
ERYTHROCYTE [DISTWIDTH] IN BLOOD BY AUTOMATED COUNT: 12.9 % (ref 11.5–14.5)
EST. GFR  (NO RACE VARIABLE): >60 ML/MIN/1.73 M^2
GLUCOSE SERPL-MCNC: 111 MG/DL (ref 70–110)
GLUCOSE UR QL STRIP: NEGATIVE
HCT VFR BLD AUTO: 45 % (ref 40–54)
HGB BLD-MCNC: 15.4 G/DL (ref 14–18)
HGB UR QL STRIP: NEGATIVE
IMM GRANULOCYTES # BLD AUTO: 0.03 K/UL (ref 0–0.04)
IMM GRANULOCYTES NFR BLD AUTO: 0.3 % (ref 0–0.5)
KETONES UR QL STRIP: NEGATIVE
LACTATE SERPL-SCNC: 1.3 MMOL/L (ref 0.5–2.2)
LEUKOCYTE ESTERASE UR QL STRIP: NEGATIVE
LYMPHOCYTES # BLD AUTO: 2.4 K/UL (ref 1–4.8)
LYMPHOCYTES NFR BLD: 22.2 % (ref 18–48)
MCH RBC QN AUTO: 31 PG (ref 27–31)
MCHC RBC AUTO-ENTMCNC: 34.2 G/DL (ref 32–36)
MCV RBC AUTO: 91 FL (ref 82–98)
MONOCYTES # BLD AUTO: 1 K/UL (ref 0.3–1)
MONOCYTES NFR BLD: 9.5 % (ref 4–15)
NEUTROPHILS # BLD AUTO: 6.9 K/UL (ref 1.8–7.7)
NEUTROPHILS NFR BLD: 64.1 % (ref 38–73)
NITRITE UR QL STRIP: NEGATIVE
NRBC BLD-RTO: 0 /100 WBC
PH UR STRIP: 8 [PH] (ref 5–8)
PLATELET # BLD AUTO: 231 K/UL (ref 150–450)
PMV BLD AUTO: 11.1 FL (ref 9.2–12.9)
POTASSIUM SERPL-SCNC: 4 MMOL/L (ref 3.5–5.1)
PROCALCITONIN SERPL IA-MCNC: 0.14 NG/ML
PROT SERPL-MCNC: 7.5 G/DL (ref 6–8.4)
PROT UR QL STRIP: NEGATIVE
RBC # BLD AUTO: 4.97 M/UL (ref 4.6–6.2)
SODIUM SERPL-SCNC: 142 MMOL/L (ref 136–145)
SP GR UR STRIP: 1.01 (ref 1–1.03)
URN SPEC COLLECT METH UR: NORMAL
UROBILINOGEN UR STRIP-ACNC: NEGATIVE EU/DL
WBC # BLD AUTO: 10.79 K/UL (ref 3.9–12.7)

## 2024-05-27 PROCEDURE — 82550 ASSAY OF CK (CPK): CPT | Performed by: NURSE PRACTITIONER

## 2024-05-27 PROCEDURE — 84145 PROCALCITONIN (PCT): CPT | Performed by: NURSE PRACTITIONER

## 2024-05-27 PROCEDURE — 81003 URINALYSIS AUTO W/O SCOPE: CPT | Performed by: NURSE PRACTITIONER

## 2024-05-27 PROCEDURE — 87040 BLOOD CULTURE FOR BACTERIA: CPT | Performed by: NURSE PRACTITIONER

## 2024-05-27 PROCEDURE — 25000003 PHARM REV CODE 250: Performed by: NURSE PRACTITIONER

## 2024-05-27 PROCEDURE — 96361 HYDRATE IV INFUSION ADD-ON: CPT

## 2024-05-27 PROCEDURE — 85025 COMPLETE CBC W/AUTO DIFF WBC: CPT | Performed by: NURSE PRACTITIONER

## 2024-05-27 PROCEDURE — 80053 COMPREHEN METABOLIC PANEL: CPT | Performed by: NURSE PRACTITIONER

## 2024-05-27 PROCEDURE — 83605 ASSAY OF LACTIC ACID: CPT | Performed by: NURSE PRACTITIONER

## 2024-05-27 PROCEDURE — 63600175 PHARM REV CODE 636 W HCPCS: Performed by: EMERGENCY MEDICINE

## 2024-05-27 PROCEDURE — 25500020 PHARM REV CODE 255: Performed by: EMERGENCY MEDICINE

## 2024-05-27 PROCEDURE — 99284 EMERGENCY DEPT VISIT MOD MDM: CPT | Mod: 25

## 2024-05-27 PROCEDURE — 96374 THER/PROPH/DIAG INJ IV PUSH: CPT

## 2024-05-27 RX ORDER — ONDANSETRON 4 MG/1
4 TABLET, ORALLY DISINTEGRATING ORAL EVERY 6 HOURS PRN
Qty: 12 TABLET | Refills: 0 | Status: SHIPPED | OUTPATIENT
Start: 2024-05-27

## 2024-05-27 RX ORDER — ONDANSETRON HYDROCHLORIDE 2 MG/ML
4 INJECTION, SOLUTION INTRAVENOUS
Status: COMPLETED | OUTPATIENT
Start: 2024-05-27 | End: 2024-05-27

## 2024-05-27 RX ADMIN — SODIUM CHLORIDE 1000 ML: 9 INJECTION, SOLUTION INTRAVENOUS at 04:05

## 2024-05-27 RX ADMIN — ONDANSETRON 4 MG: 2 INJECTION INTRAMUSCULAR; INTRAVENOUS at 06:05

## 2024-05-27 RX ADMIN — IOHEXOL 100 ML: 350 INJECTION, SOLUTION INTRAVENOUS at 07:05

## 2024-05-27 NOTE — ED NOTES
Pt reports to the ED with complaints of weakness and fatigue. He also states that he received a call saying to come back to the ED due to abnormal labs. He was here last Thursday for the same symptoms. Pt is A&Ox4, wife is at the bedside. No further needs at this time.

## 2024-05-27 NOTE — FIRST PROVIDER EVALUATION
" Emergency Department TeleTriage Encounter Note      CHIEF COMPLAINT    Chief Complaint   Patient presents with    abnormal labs      Pt states that he was called about 30 mins ago to come to ED for an infection. Pt state over feeling of fatigue, with a feeling of being off, states N/V/D no vomiting in he last couple of days. Symptoms started Thursday was seen and symptoms have worsened since         VITAL SIGNS   Initial Vitals [05/27/24 1317]   BP Pulse Resp Temp SpO2   (!) 153/94 86 20 -- 97 %      MAP       --            ALLERGIES    Review of patient's allergies indicates:   Allergen Reactions    Ciprofloxacin      swelling    Penicillins Rash    Sulfa (sulfonamide antibiotics) Rash    Toradol [ketorolac] Nausea Only    Bell pepper Nausea Only    Meloxicam Nausea Only    Sulfamethoxazole-trimethoprim        PROVIDER TRIAGE NOTE  This is a teletriage evaluation of a 43 y.o. male presenting to the ED complaining of referral to ED for abnormal labs from 5/23- blood culture shows staph.  Reports generalized fatigue, n/v/d.  States he feels "off."     Sitting upright. No resp distress.     Initial orders will be placed and care will be transferred to an alternate provider when patient is roomed for a full evaluation. Any additional orders and the final disposition will be determined by that provider.       ORDERS  Labs Reviewed   CULTURE, BLOOD   CULTURE, BLOOD   COMPREHENSIVE METABOLIC PANEL   CBC W/ AUTO DIFFERENTIAL   LACTIC ACID, PLASMA   URINALYSIS, REFLEX TO URINE CULTURE   PROCALCITONIN       ED Orders (720h ago, onward)      Start Ordered     Status Ordering Provider    05/27/24 1330 05/27/24 1329  Lactic acid, plasma  STAT         Ordered CARLOS NETTLES N.    05/27/24 1330 05/27/24 1329  Urinalysis, Reflex to Urine Culture Urine, Clean Catch  STAT         Ordered CARLOS NETTLES N.    05/27/24 1330 05/27/24 1329  Blood culture #1 **CANNOT BE ORDERED STAT**  Once         Ordered YOON, " CARLOS N.    05/27/24 1330 05/27/24 1329  Blood culture #2 **CANNOT BE ORDERED STAT**  Once         Ordered CARLOS NETTLES N.    05/27/24 1330 05/27/24 1329  Insert Saline lock IV  Once         Ordered CARLOS NETTLES N.    05/27/24 1330 05/27/24 1329  sodium chloride 0.9% bolus 1,000 mL 1,000 mL  ED 1 Time         Ordered CARLOS NETTLES N.    05/27/24 1330 05/27/24 1329  Procalcitonin  Once         Ordered CARLOS NETTLES N.    05/27/24 1329 05/27/24 1329  Comprehensive metabolic panel  STAT         Ordered CARLOS NETTLES N.    05/27/24 1329 05/27/24 1329  CBC auto differential  STAT         Ordered CARLOS NETTLES              Virtual Visit Note: The provider triage portion of this emergency department evaluation and documentation was performed via [employer], a HIPAA-compliant telemedicine application, in concert with a tele-presenter in the room. A face to face patient evaluation with one of my colleagues will occur once the patient is placed in an emergency department room.      DISCLAIMER: This note was prepared with [employer] voice recognition transcription software. Garbled syntax, mangled pronouns, and other bizarre constructions may be attributed to that software system.

## 2024-05-27 NOTE — TELEPHONE ENCOUNTER
"Pt calling in states he was in ED a few days ago for not feeling well. States got some abnormal lab results and does not know if they are concerning or if he just needs to follow up. States symptoms are still present adding he does not "feel right." Weakness, lightheadedness, fatigue, and confusion (states feels disoriented at times). Pt advised per protocol to call 911. States he thinks he can have wife drive him POV. Advised based on protocol, 911 is advised. Pt states he understands and will go to ED. Advised to call back with concerns or worsening symptoms. Verbalized understanding.     Reason for Disposition   Difficult to awaken or acting confused (e.g., disoriented, slurred speech)    Protocols used: Neurologic Deficit-A-[place]    "

## 2024-05-27 NOTE — ED NOTES
Pt requesting medication for his nausea. Notified Roxane RICHARDSON via secure chat. No orders placed at this time.

## 2024-05-27 NOTE — ED PROVIDER NOTES
Encounter Date: 5/27/2024       History     Chief Complaint   Patient presents with    abnormal labs      Pt states that he was called about 30 mins ago to come to ED for an infection. Pt state over feeling of fatigue, with a feeling of being off, states N/V/D no vomiting in he last couple of days. Symptoms started Thursday was seen and symptoms have worsened since       The patient is 43-year-old male who came to the emergency department with confusion nausea vomiting dizziness.  He was here on Thursday, 4 days ago and was discharged.  He was called to come back because 1 of his blood cultures was positive but likely a contusion on it.  The patient states he still feels bad.  He does not have a fever but continues to feel fatigued.  He remains nauseated and having diarrhea but no vomiting.      Review of patient's allergies indicates:   Allergen Reactions    Ciprofloxacin      swelling    Penicillins Rash    Sulfa (sulfonamide antibiotics) Rash    Toradol [ketorolac] Nausea Only    Bell pepper Nausea Only    Meloxicam Nausea Only    Sulfamethoxazole-trimethoprim      Past Medical History:   Diagnosis Date    Adjustment disorder with mixed anxiety and depressed mood     Anxiety     Colon polyp     Depression     ED (erectile dysfunction)     Family history of prostate cancer 9/29/2014    Hx of umbilical hernia repair 10/16/2020    Hyperlipidemia     Hypertension     Hypogonadism male     Insomnia     Low testosterone     LYNDA (obstructive sleep apnea)     Prediabetes      Past Surgical History:   Procedure Laterality Date    ADENOIDECTOMY      COLONOSCOPY N/A 12/5/2023    Procedure: COLONOSCOPY;  Surgeon: Jonnathan Velazquez MD;  Location: [address]);  Service: Colon and Rectal;  Laterality: N/A;  inst. to pt. portal. Takes Ozempic inst. to hold.Tbou  referral: Dr. Cross  11/28-lvm for precall-MS  12/4-last dose Ozempic 11/26/23, precall complete-KPvt    HERNIA REPAIR      REPAIR OF INCARCERATED UMBILICAL  HERNIA N/A 10/16/2020    Procedure: REPAIR, HERNIA, UMBILICAL, INCARCERATED, AGE 5 YEARS OR OLDER with mesh ;  Surgeon: Conrad Loco MD;  Location: [address];  Service: General;  Laterality: N/A;    REPAIR OF LABRUM OF HIP Left 12/14/2023    Procedure: REPAIR,LABRUM,SHOULDER;  Surgeon: Sea Valadez MD;  Location: [facility] OR;  Service: Orthopedics;  Laterality: Left;    SHOULDER ARTHROSCOPY W/ SUPERIOR LABRAL ANTERIOR POSTERIOR LESION REPAIR Left 12/14/2023    Procedure: ARTHROSCOPY, SHOULDER,;  Surgeon: Sea Valadez MD;  Location: [facility] OR;  Service: Orthopedics;  Laterality: Left;  Arthrex knotless suturetak labral anchors, long plastic cannulas, arthroscopic elevators/rasps marissa notified cc    TONSILLECTOMY       Family History   Problem Relation Name Age of Onset    Hypertension Mother      Hyperlipidemia Mother      Diabetes Father      Hyperlipidemia Father      Hypertension Father      Heart disease Father  46        CAD    No Known Problems Sister      Cancer Maternal Aunt          colon cancer    Stroke Maternal Uncle      Cancer Paternal Uncle          prostate cancer     Social History     Tobacco Use    Smoking status: Never     Passive exposure: Past    Smokeless tobacco: Never   Substance Use Topics    Alcohol use: Yes     Comment: less than once per month    Drug use: No     Review of Systems   Constitutional:  Positive for activity change and fatigue. Negative for fever.   HENT:  Negative for sore throat.    Respiratory:  Negative for shortness of breath.    Cardiovascular:  Negative for chest pain.   Gastrointestinal:  Positive for diarrhea and nausea. Negative for vomiting.   Genitourinary:  Negative for dysuria.   Musculoskeletal:  Negative for back pain.   Skin:  Negative for rash.   Neurological:  Negative for weakness.   Hematological:  Does not bruise/bleed easily.       Physical Exam     Initial Vitals   BP Pulse Resp Temp SpO2   05/27/24 1317 05/27/24 1317  05/27/24 1317 05/27/24 1539 05/27/24 1317   (!) 153/94 86 20 98.1 °F (36.7 °C) 97 %      MAP       --                Physical Exam    Nursing note and vitals reviewed.  Constitutional: He appears well-developed and well-nourished.   obese   HENT:   Head: Normocephalic and atraumatic.   Eyes: EOM are normal. Pupils are equal, round, and reactive to light.   Neck: Neck supple.   Normal range of motion.  Cardiovascular:  Normal rate, regular rhythm, normal heart sounds and intact distal pulses.           Pulmonary/Chest: Breath sounds normal.   Abdominal: Abdomen is soft. Bowel sounds are normal. He exhibits no distension. There is no abdominal tenderness. There is no rebound and no guarding.   Musculoskeletal:         General: No edema. Normal range of motion.      Cervical back: Normal range of motion and neck supple.     Neurological: He is alert and oriented to person, place, and time.   Skin: Skin is warm and dry.   Psychiatric: He has a normal mood and affect. His behavior is normal. Judgment and thought content normal.         ED Course   Procedures  Labs Reviewed   COMPREHENSIVE METABOLIC PANEL - Abnormal; Notable for the following components:       Result Value    Glucose 111 (*)     ALT 50 (*)     All other components within normal limits   CULTURE, BLOOD   CULTURE, BLOOD   CBC W/ AUTO DIFFERENTIAL   LACTIC ACID, PLASMA   URINALYSIS, REFLEX TO URINE CULTURE    Narrative:     Specimen Source->Urine   PROCALCITONIN   CK          Imaging Results              CTA Chest Non-Coronary (PE Studies) (Final result)  Result time 05/27/24 19:30:32      Final result by Jeremias Arroyo MD (05/27/24 19:30:32)                   Impression:      1. No evidence of acute pulmonary thromboembolism.  2. No acute findings elsewhere in the chest.      Electronically signed by: Jeremias Arroyo  Date:    05/27/2024  Time:    19:30               Narrative:    EXAMINATION:  CTA CHEST NON CORONARY (PE STUDIES)    CLINICAL  HISTORY:  Pulmonary embolism (PE) suspected, neg D-dimer;possible pneumonia, widened mediastinum of CXR;    TECHNIQUE:  Following the intravenous administration of 100 cc of Omnipaque 350 contrast material, 1.25 mm contiguous axial images were acquired through the chest utilizing the CT pulmonary angiogram protocol.  2-D coronal and sagittal reconstructed images of the chest and sagittal oblique MIP images of the pulmonary arterial system were generated from the source data.    COMPARISON:  Chest x-ray, 05/27/2024    FINDINGS:  Pulmonary arterial system: This is a good quality examination for the evaluation of pulmonary thromboembolism with good opacification of the pulmonary arteries to the level of the segmental branches of the pulmonary arterial system. There is no evidence of acute pulmonary thromboembolism. No large saddle pulmonary embolism, enlargement of the main pulmonary artery, or secondary findings of right ventricular strain.    Aorta and heart: The heart is normal in size.  No pericardial fluid.  No thoracic aortic aneurysm.  No thoracic aortic dissection.    Airways: Unremarkable.    Lymph nodes: No pathologically enlarged lymph nodes in the chest or axilla.    Lungs: The lungs are clear with no evidence of airspace consolidation, mass, or bronchiectasis.  No emphysematous lung architecture.    Pleura: No significant volume of pleural fluid or pneumothorax.  No pleural based masses.    Visualized upper abdominal soft tissues: No significant abnormalities appreciated.    Bones: There is degenerative change of the thoracic spine.                                        X-Ray Chest AP Portable (Final result)  Result time 05/27/24 17:42:07      Final result by Harsha Smith MD (05/27/24 17:42:07)                   Impression:      Stable examination findings of mild widening of the mediastinum.  Consideration for contrast enhanced CT scan of the chest may be obtained as clinically warranted.    This report  was flagged in Epic as abnormal.      Electronically signed by: Harsha Smith MD  Date:    05/27/2024  Time:    17:42               Narrative:    EXAMINATION:  XR CHEST AP PORTABLE    CLINICAL HISTORY:  fatigue;    TECHNIQUE:  Single frontal view of the chest was performed.    COMPARISON:  05/23/2024.    FINDINGS:  The trachea is unremarkable.  The cardiomediastinal silhouette is stable with enlargement of the right-sided chambers and mild widening of the mediastinum.  There is no evidence of free air beneath the hemidiaphragms.  There are no pleural effusions.  There is no evidence of a pneumothorax.  There is no evidence of pneumomediastinum.  No airspace opacity is present.  The osseous structures are unremarkable.                                       Medications   sodium chloride 0.9% bolus 1,000 mL 1,000 mL (0 mLs Intravenous Stopped 5/27/24 1813)   ondansetron injection 4 mg (4 mg Intravenous Given 5/27/24 1809)   iohexoL (OMNIPAQUE 350) injection 100 mL (100 mLs Intravenous Given 5/27/24 1905)     Medical Decision Making  DDx included viral exanthem, dehydration, UTI, mononucleosis, hypothyroidism, myositis, electrolyte derangement, anemia, neoplasm     MDM:  The patient is here with multiple vague complaints.  He will be given a L of fluids.  His labs are normal today.  He does not have a fever.  Chest x-ray shows a possible widened mediastinum and the radiologist recommended a CT of the chest, this will be ordered as this will help exclude any underlying pneumonia as well.    2030:  CTA is normal.  The patient states he feels much better after the fluids and Zofran.  He will be discharged at this time.    Amount and/or Complexity of Data Reviewed  Labs:  Decision-making details documented in ED Course.  Radiology: ordered. Decision-making details documented in ED Course.    Risk  Prescription drug management.               ED Course as of 05/27/24 2030   Mon May 27, 2024   1818 Lactic acid, plasma [ST]    1818 Comprehensive metabolic panel(!) [ST]   1818 Urinalysis, Reflex to Urine Culture Urine, Clean Catch [ST]   1818 Procalcitonin [ST]   1818 CPK [ST]   1818 CBC auto differential [ST]   2023 CTA Chest Non-Coronary (PE Studies)  Impression:     1. No evidence of acute pulmonary thromboembolism.  2. No acute findings elsewhere in the chest.   [ST]      ED Course User Index  [ST] Briana Betts MD                           Clinical Impression:  Final diagnoses:  [B34.9] Acute viral syndrome (Primary)          ED Disposition Condition    Discharge Stable          ED Prescriptions       Medication Sig Dispense Start Date End Date Auth. Provider    ondansetron (ZOFRAN-ODT) 4 MG TbDL Take 1 tablet (4 mg total) by mouth every 6 (six) hours as needed (nausea). 12 tablet 5/27/2024 -- Briana Betts MD          Follow-up Information       Follow up With Specialties Details Why Contact Info    Arjun Cross MD Family Medicine Schedule an appointment as soon as possible for a visit  As needed [address] [facility] [address]  [phone]               Briana Betts MD  05/27/24 2030

## 2024-05-28 LAB — BACTERIA BLD CULT: NORMAL

## 2024-05-30 ENCOUNTER — TELEPHONE (OUTPATIENT)
Dept: NEUROLOGY | Facility: CLINIC | Age: 44
End: 2024-05-30
Payer: COMMERCIAL

## 2024-05-30 NOTE — TELEPHONE ENCOUNTER
Called Pt to confirm tomorrow's appt. I was unable to speak with him but left a vm.     Pt was advised to arrive early and informed about the 10 min natacha period.

## 2024-06-01 LAB
BACTERIA BLD CULT: NORMAL
BACTERIA BLD CULT: NORMAL

## 2024-06-04 DIAGNOSIS — F90.2 ADHD (ATTENTION DEFICIT HYPERACTIVITY DISORDER), COMBINED TYPE: ICD-10-CM

## 2024-06-06 RX ORDER — LISDEXAMFETAMINE DIMESYLATE 40 MG/1
40 CAPSULE ORAL DAILY
Qty: 30 CAPSULE | Refills: 0 | Status: SHIPPED | OUTPATIENT
Start: 2024-06-06

## 2024-06-09 ENCOUNTER — HOSPITAL ENCOUNTER (INPATIENT)
Facility: HOSPITAL | Age: 44
LOS: 1 days | Discharge: HOME | DRG: 062 | End: 2024-06-11
Attending: EMERGENCY MEDICINE | Admitting: STUDENT IN AN ORGANIZED HEALTH CARE EDUCATION/TRAINING PROGRAM
Payer: COMMERCIAL

## 2024-06-09 DIAGNOSIS — G45.9 TIA (TRANSIENT ISCHEMIC ATTACK): ICD-10-CM

## 2024-06-09 DIAGNOSIS — R29.818 ACUTE FOCAL NEUROLOGICAL DEFICIT: ICD-10-CM

## 2024-06-09 DIAGNOSIS — I63.9 ACUTE ISCHEMIC STROKE: ICD-10-CM

## 2024-06-09 DIAGNOSIS — R53.1 ACUTE LEFT-SIDED WEAKNESS: Primary | ICD-10-CM

## 2024-06-09 DIAGNOSIS — I63.9 STROKE: ICD-10-CM

## 2024-06-09 LAB
ALBUMIN SERPL BCP-MCNC: 4.4 G/DL (ref 3.5–5.2)
ALLENS TEST: NORMAL
ALLENS TEST: NORMAL
ALP SERPL-CCNC: 70 U/L (ref 55–135)
ALT SERPL W/O P-5'-P-CCNC: 94 U/L (ref 10–44)
ANION GAP SERPL CALC-SCNC: 15 MMOL/L (ref 8–16)
APTT PPP: 28.5 SEC (ref 21–32)
AST SERPL-CCNC: 55 U/L (ref 10–40)
BASOPHILS # BLD AUTO: 0.13 K/UL (ref 0–0.2)
BASOPHILS NFR BLD: 1.1 % (ref 0–1.9)
BILIRUB SERPL-MCNC: 0.4 MG/DL (ref 0.1–1)
BUN SERPL-MCNC: 13 MG/DL (ref 6–20)
CALCIUM SERPL-MCNC: 10.3 MG/DL (ref 8.7–10.5)
CHLORIDE SERPL-SCNC: 102 MMOL/L (ref 95–110)
CHOLEST SERPL-MCNC: 224 MG/DL (ref 120–199)
CHOLEST/HDLC SERPL: 6.2 {RATIO} (ref 2–5)
CO2 SERPL-SCNC: 22 MMOL/L (ref 23–29)
CREAT SERPL-MCNC: 1.1 MG/DL (ref 0.5–1.4)
CREAT SERPL-MCNC: 1.1 MG/DL (ref 0.5–1.4)
DELSYS: NORMAL
DELSYS: NORMAL
DIFFERENTIAL METHOD BLD: NORMAL
EOSINOPHIL # BLD AUTO: 0.5 K/UL (ref 0–0.5)
EOSINOPHIL NFR BLD: 4.1 % (ref 0–8)
ERYTHROCYTE [DISTWIDTH] IN BLOOD BY AUTOMATED COUNT: 13.2 % (ref 11.5–14.5)
EST. GFR  (NO RACE VARIABLE): >60 ML/MIN/1.73 M^2
GLUCOSE SERPL-MCNC: 105 MG/DL (ref 70–110)
HCT VFR BLD AUTO: 49.9 % (ref 40–54)
HDLC SERPL-MCNC: 36 MG/DL (ref 40–75)
HDLC SERPL: 16.1 % (ref 20–50)
HGB BLD-MCNC: 16.7 G/DL (ref 14–18)
IMM GRANULOCYTES # BLD AUTO: 0.04 K/UL (ref 0–0.04)
IMM GRANULOCYTES NFR BLD AUTO: 0.4 % (ref 0–0.5)
INR PPP: 1.1 (ref 0.8–1.2)
LDLC SERPL CALC-MCNC: 143.8 MG/DL (ref 63–159)
LYMPHOCYTES # BLD AUTO: 4.2 K/UL (ref 1–4.8)
LYMPHOCYTES NFR BLD: 37.1 % (ref 18–48)
MCH RBC QN AUTO: 30.4 PG (ref 27–31)
MCHC RBC AUTO-ENTMCNC: 33.5 G/DL (ref 32–36)
MCV RBC AUTO: 91 FL (ref 82–98)
MODE: NORMAL
MODE: NORMAL
MONOCYTES # BLD AUTO: 0.8 K/UL (ref 0.3–1)
MONOCYTES NFR BLD: 7 % (ref 4–15)
NEUTROPHILS # BLD AUTO: 5.7 K/UL (ref 1.8–7.7)
NEUTROPHILS NFR BLD: 50.3 % (ref 38–73)
NONHDLC SERPL-MCNC: 188 MG/DL
NRBC BLD-RTO: 0 /100 WBC
PLATELET # BLD AUTO: 310 K/UL (ref 150–450)
PMV BLD AUTO: 10.6 FL (ref 9.2–12.9)
POC PTINR: 1.2 (ref 0.9–1.2)
POCT GLUCOSE: 116 MG/DL (ref 70–110)
POCT GLUCOSE: 92 MG/DL (ref 70–110)
POTASSIUM SERPL-SCNC: 4.8 MMOL/L (ref 3.5–5.1)
PROT SERPL-MCNC: 7.8 G/DL (ref 6–8.4)
PROTHROMBIN TIME: 11.8 SEC (ref 9–12.5)
RBC # BLD AUTO: 5.49 M/UL (ref 4.6–6.2)
SAMPLE: NORMAL
SAMPLE: NORMAL
SITE: NORMAL
SITE: NORMAL
SODIUM SERPL-SCNC: 139 MMOL/L (ref 136–145)
TRIGL SERPL-MCNC: 221 MG/DL (ref 30–150)
TROPONIN I SERPL DL<=0.01 NG/ML-MCNC: <0.006 NG/ML (ref 0–0.03)
WBC # BLD AUTO: 11.34 K/UL (ref 3.9–12.7)

## 2024-06-09 PROCEDURE — 85610 PROTHROMBIN TIME: CPT

## 2024-06-09 PROCEDURE — 99215 OFFICE O/P EST HI 40 MIN: CPT | Mod: GT,,, | Performed by: PSYCHIATRY & NEUROLOGY

## 2024-06-09 PROCEDURE — 99285 EMERGENCY DEPT VISIT HI MDM: CPT | Mod: 25

## 2024-06-09 PROCEDURE — 84439 ASSAY OF FREE THYROXINE: CPT | Performed by: EMERGENCY MEDICINE

## 2024-06-09 PROCEDURE — 85730 THROMBOPLASTIN TIME PARTIAL: CPT | Performed by: EMERGENCY MEDICINE

## 2024-06-09 PROCEDURE — 82565 ASSAY OF CREATININE: CPT

## 2024-06-09 PROCEDURE — 83036 HEMOGLOBIN GLYCOSYLATED A1C: CPT | Performed by: EMERGENCY MEDICINE

## 2024-06-09 PROCEDURE — 63600175 PHARM REV CODE 636 W HCPCS: Mod: JG

## 2024-06-09 PROCEDURE — 84443 ASSAY THYROID STIM HORMONE: CPT | Performed by: EMERGENCY MEDICINE

## 2024-06-09 PROCEDURE — 3E03317 INTRODUCTION OF OTHER THROMBOLYTIC INTO PERIPHERAL VEIN, PERCUTANEOUS APPROACH: ICD-10-PCS | Performed by: FAMILY MEDICINE

## 2024-06-09 PROCEDURE — 85025 COMPLETE CBC W/AUTO DIFF WBC: CPT | Performed by: EMERGENCY MEDICINE

## 2024-06-09 PROCEDURE — 84484 ASSAY OF TROPONIN QUANT: CPT | Performed by: EMERGENCY MEDICINE

## 2024-06-09 PROCEDURE — 96374 THER/PROPH/DIAG INJ IV PUSH: CPT

## 2024-06-09 PROCEDURE — 80061 LIPID PANEL: CPT | Performed by: EMERGENCY MEDICINE

## 2024-06-09 PROCEDURE — 93005 ELECTROCARDIOGRAM TRACING: CPT

## 2024-06-09 PROCEDURE — 99900035 HC TECH TIME PER 15 MIN (STAT)

## 2024-06-09 PROCEDURE — 82962 GLUCOSE BLOOD TEST: CPT

## 2024-06-09 PROCEDURE — 85610 PROTHROMBIN TIME: CPT | Performed by: EMERGENCY MEDICINE

## 2024-06-09 PROCEDURE — 93010 ELECTROCARDIOGRAM REPORT: CPT | Mod: ,,, | Performed by: INTERNAL MEDICINE

## 2024-06-09 PROCEDURE — 80053 COMPREHEN METABOLIC PANEL: CPT | Performed by: EMERGENCY MEDICINE

## 2024-06-09 RX ADMIN — Medication 25 MG: at 11:06

## 2024-06-09 RX ADMIN — IOHEXOL 100 ML: 350 INJECTION, SOLUTION INTRAVENOUS at 11:06

## 2024-06-09 NOTE — Clinical Note
Diagnosis: Acute focal neurological deficit [606135]   Future Attending Provider: SAVANNA JAEGER [[id number]]   Reason for IP Medical Treatment  (Clinical interventions that can only be accomplished in the IP setting? ) :: stroke   I certify that Inpatient services for greater than or equal to 2 midnights are medically necessary:: Yes   Plans for Post-Acute care--if anticipated (pick the single best option):: A. No post acute care anticipated at this time

## 2024-06-10 PROBLEM — I63.9 ACUTE ISCHEMIC STROKE: Status: ACTIVE | Noted: 2024-06-10

## 2024-06-10 LAB
APTT PPP: 28.6 SEC (ref 21–32)
ASCENDING AORTA: 3.66 CM
AV INDEX (PROSTH): 1.05
AV MEAN GRADIENT: 6 MMHG
AV PEAK GRADIENT: 10 MMHG
AV VALVE AREA BY VELOCITY RATIO: 4.68 CM²
AV VALVE AREA: 5.13 CM²
AV VELOCITY RATIO: 0.96
BILIRUB UR QL STRIP: NEGATIVE
BSA FOR ECHO PROCEDURE: 2.84 M2
CLARITY UR: CLEAR
COLOR UR: YELLOW
CV ECHO LV RWT: 0.56 CM
DOP CALC AO PEAK VEL: 1.6 M/S
DOP CALC AO VTI: 33.2 CM
DOP CALC LVOT AREA: 4.9 CM2
DOP CALC LVOT DIAMETER: 2.49 CM
DOP CALC LVOT PEAK VEL: 1.54 M/S
DOP CALC LVOT STROKE VOLUME: 170.35 CM3
DOP CALC MV VTI: 20.5 CM
DOP CALCLVOT PEAK VEL VTI: 35 CM
E WAVE DECELERATION TIME: 252.95 MSEC
E/A RATIO: 0.97
E/E' RATIO: 8.71 M/S
ECHO LV POSTERIOR WALL: 1.22 CM (ref 0.6–1.1)
ESTIMATED AVG GLUCOSE: 154 MG/DL (ref 68–131)
FRACTIONAL SHORTENING: 28 % (ref 28–44)
GLUCOSE UR QL STRIP: NEGATIVE
HBA1C MFR BLD: 7 % (ref 4–5.6)
HGB UR QL STRIP: NEGATIVE
INR PPP: 1.1 (ref 0.8–1.2)
INTERVENTRICULAR SEPTUM: 1.12 CM (ref 0.6–1.1)
IVC DIAMETER: 1.97 CM
KETONES UR QL STRIP: NEGATIVE
LA MAJOR: 6.08 CM
LA MINOR: 5.8 CM
LA WIDTH: 3.9 CM
LEFT ATRIUM SIZE: 3.7 CM
LEFT ATRIUM VOLUME INDEX MOD: 19.8 ML/M2
LEFT ATRIUM VOLUME INDEX: 26.6 ML/M2
LEFT ATRIUM VOLUME MOD: 54.3 CM3
LEFT ATRIUM VOLUME: 72.82 CM3
LEFT INTERNAL DIMENSION IN SYSTOLE: 3.15 CM (ref 2.1–4)
LEFT VENTRICLE DIASTOLIC VOLUME INDEX: 31.91 ML/M2
LEFT VENTRICLE DIASTOLIC VOLUME: 87.43 ML
LEFT VENTRICLE MASS INDEX: 67 G/M2
LEFT VENTRICLE SYSTOLIC VOLUME INDEX: 14.4 ML/M2
LEFT VENTRICLE SYSTOLIC VOLUME: 39.51 ML
LEFT VENTRICULAR INTERNAL DIMENSION IN DIASTOLE: 4.39 CM (ref 3.5–6)
LEFT VENTRICULAR MASS: 183.82 G
LEUKOCYTE ESTERASE UR QL STRIP: NEGATIVE
LV LATERAL E/E' RATIO: 7.63 M/S
LV SEPTAL E/E' RATIO: 10.17 M/S
LVOT MG: 6.2 MMHG
LVOT MV: 1.2 CM/S
MV A" WAVE DURATION": 128.45 MSEC
MV MEAN GRADIENT: 1 MMHG
MV PEAK A VEL: 0.63 M/S
MV PEAK E VEL: 0.61 M/S
MV PEAK GRADIENT: 2 MMHG
MV STENOSIS PRESSURE HALF TIME: 73.35 MS
MV VALVE AREA BY CONTINUITY EQUATION: 8.31 CM2
MV VALVE AREA P 1/2 METHOD: 3 CM2
NITRITE UR QL STRIP: NEGATIVE
OHS LV EJECTION FRACTION SIMPSONS BIPLANE MOD: 71 %
PH UR STRIP: 6 [PH] (ref 5–8)
PISA TR MAX VEL: 2.25 M/S
POCT GLUCOSE: 81 MG/DL (ref 70–110)
PROT UR QL STRIP: NEGATIVE
PROTHROMBIN TIME: 11.7 SEC (ref 9–12.5)
PULM VEIN S/D RATIO: 1.56
PV MV: 0.98 M/S
PV PEAK D VEL: 0.43 M/S
PV PEAK GRADIENT: 8 MMHG
PV PEAK S VEL: 0.67 M/S
PV PEAK VELOCITY: 1.43 M/S
RA MAJOR: 5.82 CM
RA PRESSURE ESTIMATED: 3 MMHG
RA WIDTH: 4.53 CM
RV TB RVSP: 5 MMHG
RV TISSUE DOPPLER FREE WALL SYSTOLIC VELOCITY 1 (APICAL 4 CHAMBER VIEW): 12.47 CM/S
SINUS: 3.85 CM
SP GR UR STRIP: 1.03 (ref 1–1.03)
STJ: 3.61 CM
T4 FREE SERPL-MCNC: 0.97 NG/DL (ref 0.71–1.51)
TDI LATERAL: 0.08 M/S
TDI SEPTAL: 0.06 M/S
TDI: 0.07 M/S
TR MAX PG: 20 MMHG
TRICUSPID ANNULAR PLANE SYSTOLIC EXCURSION: 2.27 CM
TROPONIN I SERPL DL<=0.01 NG/ML-MCNC: <0.006 NG/ML (ref 0–0.03)
TSH SERPL DL<=0.005 MIU/L-ACNC: 0.14 UIU/ML (ref 0.4–4)
TV REST PULMONARY ARTERY PRESSURE: 23 MMHG
URN SPEC COLLECT METH UR: NORMAL
UROBILINOGEN UR STRIP-ACNC: NEGATIVE EU/DL
Z-SCORE OF LEFT VENTRICULAR DIMENSION IN END DIASTOLE: -19.06
Z-SCORE OF LEFT VENTRICULAR DIMENSION IN END SYSTOLE: -13.34

## 2024-06-10 PROCEDURE — 25000003 PHARM REV CODE 250: Performed by: FAMILY MEDICINE

## 2024-06-10 PROCEDURE — 85730 THROMBOPLASTIN TIME PARTIAL: CPT | Performed by: STUDENT IN AN ORGANIZED HEALTH CARE EDUCATION/TRAINING PROGRAM

## 2024-06-10 PROCEDURE — 94660 CPAP INITIATION&MGMT: CPT

## 2024-06-10 PROCEDURE — 84484 ASSAY OF TROPONIN QUANT: CPT | Performed by: STUDENT IN AN ORGANIZED HEALTH CARE EDUCATION/TRAINING PROGRAM

## 2024-06-10 PROCEDURE — 97530 THERAPEUTIC ACTIVITIES: CPT

## 2024-06-10 PROCEDURE — 27100171 HC OXYGEN HIGH FLOW UP TO 24 HOURS

## 2024-06-10 PROCEDURE — 36415 COLL VENOUS BLD VENIPUNCTURE: CPT | Performed by: STUDENT IN AN ORGANIZED HEALTH CARE EDUCATION/TRAINING PROGRAM

## 2024-06-10 PROCEDURE — 25500020 PHARM REV CODE 255: Performed by: EMERGENCY MEDICINE

## 2024-06-10 PROCEDURE — 25000003 PHARM REV CODE 250: Performed by: STUDENT IN AN ORGANIZED HEALTH CARE EDUCATION/TRAINING PROGRAM

## 2024-06-10 PROCEDURE — 81003 URINALYSIS AUTO W/O SCOPE: CPT | Performed by: EMERGENCY MEDICINE

## 2024-06-10 PROCEDURE — 85610 PROTHROMBIN TIME: CPT | Performed by: STUDENT IN AN ORGANIZED HEALTH CARE EDUCATION/TRAINING PROGRAM

## 2024-06-10 PROCEDURE — 97161 PT EVAL LOW COMPLEX 20 MIN: CPT

## 2024-06-10 PROCEDURE — 27000190 HC CPAP FULL FACE MASK W/VALVE

## 2024-06-10 PROCEDURE — 20000000 HC ICU ROOM

## 2024-06-10 PROCEDURE — 92523 SPEECH SOUND LANG COMPREHEN: CPT

## 2024-06-10 PROCEDURE — 99900035 HC TECH TIME PER 15 MIN (STAT)

## 2024-06-10 PROCEDURE — 92610 EVALUATE SWALLOWING FUNCTION: CPT

## 2024-06-10 PROCEDURE — 25000003 PHARM REV CODE 250: Performed by: HOSPITALIST

## 2024-06-10 PROCEDURE — 97535 SELF CARE MNGMENT TRAINING: CPT

## 2024-06-10 PROCEDURE — 97165 OT EVAL LOW COMPLEX 30 MIN: CPT

## 2024-06-10 PROCEDURE — 94761 N-INVAS EAR/PLS OXIMETRY MLT: CPT

## 2024-06-10 RX ORDER — VENLAFAXINE HYDROCHLORIDE 75 MG/1
150 CAPSULE, EXTENDED RELEASE ORAL DAILY
Status: DISCONTINUED | OUTPATIENT
Start: 2024-06-10 | End: 2024-06-11 | Stop reason: HOSPADM

## 2024-06-10 RX ORDER — SODIUM CHLORIDE 0.9 % (FLUSH) 0.9 %
10 SYRINGE (ML) INJECTION
Status: DISCONTINUED | OUTPATIENT
Start: 2024-06-10 | End: 2024-06-11 | Stop reason: HOSPADM

## 2024-06-10 RX ORDER — METOPROLOL SUCCINATE 50 MG/1
100 TABLET, EXTENDED RELEASE ORAL DAILY
Status: DISCONTINUED | OUTPATIENT
Start: 2024-06-10 | End: 2024-06-11 | Stop reason: HOSPADM

## 2024-06-10 RX ORDER — BISACODYL 10 MG/1
10 SUPPOSITORY RECTAL DAILY PRN
Status: DISCONTINUED | OUTPATIENT
Start: 2024-06-10 | End: 2024-06-11 | Stop reason: HOSPADM

## 2024-06-10 RX ORDER — BUSPIRONE HYDROCHLORIDE 5 MG/1
20 TABLET ORAL 2 TIMES DAILY
Status: DISCONTINUED | OUTPATIENT
Start: 2024-06-10 | End: 2024-06-11 | Stop reason: HOSPADM

## 2024-06-10 RX ORDER — ATORVASTATIN CALCIUM 40 MG/1
40 TABLET, FILM COATED ORAL DAILY
Status: DISCONTINUED | OUTPATIENT
Start: 2024-06-10 | End: 2024-06-11 | Stop reason: HOSPADM

## 2024-06-10 RX ORDER — LABETALOL HYDROCHLORIDE 5 MG/ML
10 INJECTION, SOLUTION INTRAVENOUS
Status: DISCONTINUED | OUTPATIENT
Start: 2024-06-10 | End: 2024-06-11 | Stop reason: HOSPADM

## 2024-06-10 RX ORDER — BUPROPION HYDROCHLORIDE 150 MG/1
150 TABLET ORAL DAILY
Status: DISCONTINUED | OUTPATIENT
Start: 2024-06-10 | End: 2024-06-11 | Stop reason: HOSPADM

## 2024-06-10 RX ORDER — ALPRAZOLAM 1 MG/1
1 TABLET ORAL 2 TIMES DAILY PRN
Status: DISCONTINUED | OUTPATIENT
Start: 2024-06-10 | End: 2024-06-11 | Stop reason: HOSPADM

## 2024-06-10 RX ORDER — ACETAMINOPHEN 325 MG/1
650 TABLET ORAL EVERY 6 HOURS PRN
Status: DISCONTINUED | OUTPATIENT
Start: 2024-06-10 | End: 2024-06-11 | Stop reason: HOSPADM

## 2024-06-10 RX ORDER — MUPIROCIN 20 MG/G
OINTMENT TOPICAL 2 TIMES DAILY
Status: DISCONTINUED | OUTPATIENT
Start: 2024-06-10 | End: 2024-06-11 | Stop reason: HOSPADM

## 2024-06-10 RX ADMIN — VENLAFAXINE HYDROCHLORIDE 150 MG: 75 CAPSULE, EXTENDED RELEASE ORAL at 01:06

## 2024-06-10 RX ADMIN — METOPROLOL SUCCINATE 100 MG: 50 TABLET, EXTENDED RELEASE ORAL at 08:06

## 2024-06-10 RX ADMIN — MUPIROCIN: 20 OINTMENT TOPICAL at 08:06

## 2024-06-10 RX ADMIN — ALPRAZOLAM 1 MG: 1 TABLET ORAL at 11:06

## 2024-06-10 RX ADMIN — ACETAMINOPHEN 650 MG: 325 TABLET ORAL at 10:06

## 2024-06-10 RX ADMIN — BUSPIRONE HYDROCHLORIDE 20 MG: 5 TABLET ORAL at 08:06

## 2024-06-10 RX ADMIN — BUPROPION HYDROCHLORIDE 150 MG: 150 TABLET, EXTENDED RELEASE ORAL at 01:06

## 2024-06-10 RX ADMIN — ATORVASTATIN CALCIUM 40 MG: 40 TABLET, FILM COATED ORAL at 08:06

## 2024-06-10 NOTE — NURSING
Care plan reviewed with patient and spouse verbalized understanding. Stroke Education provided on BE FAST signs and symptoms of numbness and tingling.stroke education booklet at the bedside. Safety maintained bed alarm on call bell within reach, nonskid sock on, fall risk band on.

## 2024-06-10 NOTE — TELEMEDICINE CONSULT
[facility]  Vascular Neurology  Comprehensive Stroke Center  TeleVascular Neurology Acute Consultation Note        Consult Information  Consults    Consulting Provider: RANCHO EDUARDO   Current Providers  No providers found    Patient Location:  [facility] EMERGENCY DEPARTMENT Emergency Department    Spoke hospital nurse at bedside with patient assisting consultant.  Patient information was obtained from patient.       Stroke Documentation  Acute Stroke Times   Last Known Normal Date: 06/09/24  Last Known Normal Time: 2200  Stroke Team Called Date: 06/09/24  Stroke Team Called Time: 2315  Stroke Team Arrival Date: 06/09/24  Stroke Team Arrival Time: 2315  Thrombolytic Therapy Recommended: Yes    NIH Scale:  1a. Level of Consciousness: 0-->Alert, keenly responsive  1b. LOC Questions: 0-->Answers both questions correctly  1c. LOC Commands: 0-->Performs both tasks correctly  2. Best Gaze: 0-->Normal  3. Visual: 0-->No visual loss  4. Facial Palsy: 0-->Normal symmetrical movements  5a. Motor Arm, Left: 1-->Drift, limb holds 90 (or 45) degrees, but drifts down before full 10 seconds, does not hit bed or other support  5b. Motor Arm, Right: 0-->No drift, limb holds 90 (or 45) degrees for full 10 secs  6a. Motor Leg, Left: 1-->Drift, leg falls by the end of the 5-sec period but does not hit bed  6b. Motor Leg, Right: 0-->No drift, leg holds 30 degree position for full 5 secs  7. Limb Ataxia: 0-->Absent  8. Sensory: 1-->Mild-to-moderate sensory loss, patient feels pinprick is less sharp or is dull on the affected side, or there is a loss of superficial pain with pinprick, but patient is aware of being touched  9. Best Language: 0-->No aphasia, normal  10. Dysarthria: 0-->Normal  11. Extinction and Inattention (formerly Neglect): 0-->No abnormality  Total (NIH Stroke Scale): 3      Modified Rc:    Landon Coma Scale:     ABCD2 Score:    [id number] Score:    HAS -BLED Score:    ICH Score:    Hunt  "& Meadows Classification:      Blood pressure (!) 150/102, pulse 82, temperature 98.1 °F (36.7 °C), temperature source Oral, resp. rate 16, height 6' 3" (1.905 m), weight (!) 149.7 kg (330 lb 0.5 oz), SpO2 97%.    Van Negative  In your opinion, this was a: Tier 1     Medical Decision Making  HPI:  43 y.o. male Referring [facility]    LKN: 2200 Symptoms: left sided weaknes/ numbness Has the referring seen the patient ( yes or no): no      Images personally reviewed and interpreted:  Study: Head CT and CTA Head & Neck  Study Interpretation: no acute intra-cranial process     Assessment and plan:  Ddx: TIA/stroke/HTN urgency      CT head with no acute intra-cranial process. Patient is candidate of TNK given his symptoms consistent with acute ischemic stroke.   Recommend IV Tenecteplase 0.25mg/kg IV push (max dose 25mg); If Tenecteplase is not available use Alteplase 0.9mg/kg IV bolus followed by infusion (max dose 90mg)      Additional Recommendations:   1. Neurological assessment and vital signs (except temperature) every 15 minutes x 2 hours, then every 30 minutes x 6 hours, then every hour x 16 hours..  2. Frequency of BP assessments may need to be increased if systolic BP stays >= 180 mm Hg or diastolic BP stays >= 105 mm Hg. Administer antihypertensive meds as ordered  3. Temperature every 4 hours or as required.  4. Follow hospital protocol for further orders re: post thrombolytic therapy patient management.  5. No antithrombotics or anticoagulants (including but not limited to: heparin, warfarin, aspirin, clopidogrel, or dipyridamole) for 24 hours, then start antithrombotics as ordered by treating physician     I also recommend obtaining CTA head and neck with and without contrast. They will call me with CTA results if abnormal. If LVO, patient will be transferred for thrombectomy. Otherwise, below is plan.     - Admit to MICU for 24 hour monitoring s/p IV TNK   - Please use IV TPA order set  - Keep Strict " NPO until patient passes bedside SS  - Head of bed flat, IV Fluids  - BP: Aggressive Treatment to keep blood pressure less than 180/105 mmHg, using: labetalol or nicardipine, avoid hydralazine/nitrates  - Emergent CT Brain for: acute neurologic decline, nausea, vomiting, or new headache  - Repeat Imaging, ideally MRI Brain in 24 hours post treatment, but if unable to obtain MRI Brain, please get repeat non-con CT Brain  - LDL, HgA1C  - Neuro checks, vital checks  - Neuro consult if in house available or transfer for neuro consult  - Stroke/TIA work-up with MRI brain, Echo, PT/OT/ Speech and swallow evaluation.      Lytics recommendation: Recommend IV Tenecteplase 0.25mg/kg IV push (max dose 25mg); If Tenecteplase is not available use Alteplase 0.9mg/kg IV bolus followed by infusion (max dose 90mg)     Additional Recommendations:   Neurological assessment and vital signs (except temperature) every 15 minutes x 2 hours, then every 30 minutes x 6 hours, then every hour x 16 hours..  Frequency of BP assessments may need to be increased if systolic BP stays >= 180 mm Hg or diastolic BP stays >= 105 mm Hg. Administer antihypertensive meds as ordered  Temperature every 4 hours or as required.  Follow hospital protocol for further orders re: post thrombolytic therapy patient management.  No antithrombotics or anticoagulants (including but not limited to: heparin, warfarin, aspirin, clopidigrel, or dipyridamole) for 24 hours, then start antithrombotics as ordered by treating physician    Adapted from the American Heart Association/American Stroke Association (AHA/ASA) and American Association of Neuroscience Nurses (AANN) Guidelines.   Thrombectomy recommendation: Awaiting CTA results from [facility] for determination   Placement recommendation: pending further studies               ROS  Physical Exam  Past Medical History:   Diagnosis Date    Adjustment disorder with mixed anxiety and depressed mood     Anxiety     Colon polyp      Depression     ED (erectile dysfunction)     Family history of prostate cancer 9/29/2014    Hx of umbilical hernia repair 10/16/2020    Hyperlipidemia     Hypertension     Hypogonadism male     Insomnia     Low testosterone     LYNDA (obstructive sleep apnea)     Prediabetes      Past Surgical History:   Procedure Laterality Date    ADENOIDECTOMY      COLONOSCOPY N/A 12/5/2023    Procedure: COLONOSCOPY;  Surgeon: Jonnathan Velazquez MD;  Location: [facility] (4TH FLR);  Service: Colon and Rectal;  Laterality: N/A;  inst. to pt. portal. Takes Ozempic inst. to hold.Tbou  referral: Dr. Cross  11/28-lvm for precall-MS  12/4-last dose Ozempic 11/26/23, precall complete-KPvt    HERNIA REPAIR      REPAIR OF INCARCERATED UMBILICAL HERNIA N/A 10/16/2020    Procedure: REPAIR, HERNIA, UMBILICAL, INCARCERATED, AGE 5 YEARS OR OLDER with mesh ;  Surgeon: Conrad Loco MD;  Location: [facility] OR 2ND FLR;  Service: General;  Laterality: N/A;    REPAIR OF LABRUM OF HIP Left 12/14/2023    Procedure: REPAIR,LABRUM,SHOULDER;  Surgeon: Sea Valadez MD;  Location: [facility] OR;  Service: Orthopedics;  Laterality: Left;    SHOULDER ARTHROSCOPY W/ SUPERIOR LABRAL ANTERIOR POSTERIOR LESION REPAIR Left 12/14/2023    Procedure: ARTHROSCOPY, SHOULDER,;  Surgeon: Sea Valadez MD;  Location: [facility] OR;  Service: Orthopedics;  Laterality: Left;  Arthrex knotless suturetak labral anchors, long plastic cannulas, arthroscopic elevators/rasps marissa notified cc    TONSILLECTOMY       Family History   Problem Relation Name Age of Onset    Hypertension Mother      Hyperlipidemia Mother      Diabetes Father      Hyperlipidemia Father      Hypertension Father      Heart disease Father  46        CAD    No Known Problems Sister      Cancer Maternal Aunt          colon cancer    Stroke Maternal Uncle      Cancer Paternal Uncle          prostate cancer       Diagnoses  Stroke like symptoms    Lillian Mays MD      Emergent/Acute  neurological consultation requested by spoke provider due to critical concerns for possible cerebrovascular event that could result in permanent loss of neurologic/bodily function, severe disability or death of this patient.  Immediate/timely evaluation by a highly prepared expert is paramount for optimal outcomes  High risk for neurological deterioration if not properly diagnosed  High risk for neurological deterioration if not treated promplty/as soon as possible  Complex diagnostic evaluation may be required (advanced imaging)  High risk treatment options (thrombolytics and/or thrombectomy)    Patient care was coordinated with spoke provider, including but not limted to    Discussing likely diagnosis/etiology of symptoms  Making recommendations for further diagnostic studies  Making recommendations for intravenous thrombolytics or other advanced therapies  Making recommendations for disposition (admission/transfer for higher level of care)

## 2024-06-10 NOTE — H&P
[facility] Medicine  History & Physical    Patient Name: Dominic Collazo  MRN: [id number]  Patient Class: IP- Inpatient  Admission Date: 6/9/2024  Attending Physician: Mary Valdez MD  Primary Care Provider: Arjun Cross MD         Patient information was obtained from patient and ER records.     Subjective:     Principal Problem:Acute ischemic stroke    Chief Complaint:   Chief Complaint   Patient presents with    Fatigue     Weakness, numbness to L side of face and LUE that started at 2200.         HPI: 43 years old man with past medical history of anxiety, depression, HLD, HTN, LYNDA on BiPap, history of concussion and follows up with Neurology.  The patient presented with acute left-sided weakness.  CT head was negative.  CTA is negative for dissection or large vessel occlusion.  Patient was evaluated by tele Neurology and he was deemed a candidate for tPA.  After receiving the tPA his weakness has completely resolved.  The patient then admitted to the ICU for frequent neuro checks and vital sign checks.    Past Medical History:   Diagnosis Date    Adjustment disorder with mixed anxiety and depressed mood     Anxiety     Colon polyp     Depression     ED (erectile dysfunction)     Family history of prostate cancer 9/29/2014    Hx of umbilical hernia repair 10/16/2020    Hyperlipidemia     Hypertension     Hypogonadism male     Insomnia     Low testosterone     LYNDA (obstructive sleep apnea)     Prediabetes        Past Surgical History:   Procedure Laterality Date    ADENOIDECTOMY      COLONOSCOPY N/A 12/5/2023    Procedure: COLONOSCOPY;  Surgeon: Jonnathan Velazquez MD;  Location: [facility] ([address] [place] [address]);  Service: Colon and Rectal;  Laterality: N/A;  inst. to pt. portal. Takes Ozempic inst. to hold.Tbou  referral: Dr. Cross  11/28-lvm for precall-MS  12/4-last dose Ozempic 11/26/23, precall complete-KPvt    HERNIA REPAIR      REPAIR OF INCARCERATED UMBILICAL HERNIA N/A  10/16/2020    Procedure: REPAIR, HERNIA, UMBILICAL, INCARCERATED, AGE 5 YEARS OR OLDER with mesh ;  Surgeon: Conrad Loco MD;  Location: [address];  Service: General;  Laterality: N/A;    REPAIR OF LABRUM OF HIP Left 12/14/2023    Procedure: REPAIR,LABRUM,SHOULDER;  Surgeon: Sea Valadez MD;  Location: [facility] OR;  Service: Orthopedics;  Laterality: Left;    SHOULDER ARTHROSCOPY W/ SUPERIOR LABRAL ANTERIOR POSTERIOR LESION REPAIR Left 12/14/2023    Procedure: ARTHROSCOPY, SHOULDER,;  Surgeon: Sea Valadez MD;  Location: [facility] OR;  Service: Orthopedics;  Laterality: Left;  Arthrex knotless suturetak labral anchors, long plastic cannulas, arthroscopic elevators/rasps marissa notified cc    TONSILLECTOMY         Review of patient's allergies indicates:   Allergen Reactions    Ciprofloxacin      swelling    Penicillins Rash    Sulfa (sulfonamide antibiotics) Rash    Toradol [ketorolac] Nausea Only    Bell pepper Nausea Only    Meloxicam Nausea Only    Sulfamethoxazole-trimethoprim        Current Facility-Administered Medications on File Prior to Encounter   Medication    lactated ringers infusion    LIDOcaine (PF) 10 mg/ml (1%) injection 10 mg     Current Outpatient Medications on File Prior to Encounter   Medication Sig    acetaminophen (TYLENOL) 325 MG tablet Take 2 tablets (650 mg total) by mouth every 6 (six) hours as needed for Pain.    ALPRAZolam (XANAX) 1 MG tablet Take 1 tablet (1 mg total) by mouth 2 (two) times daily as needed for Anxiety.    buPROPion (WELLBUTRIN XL) 150 MG TB24 tablet Take 1 tablet (150 mg total) by mouth once daily.    busPIRone (BUSPAR) 10 MG tablet Take 2 tablets (20 mg total) by mouth 2 (two) times daily.    dextroamphetamine sulfate (DEXTROSTAT) 10 MG tablet Take 1 tablet (10 mg total) by mouth after lunch.    lisdexamfetamine (VYVANSE) 40 MG Cap Take 1 capsule (40 mg total) by mouth once daily.    losartan (COZAAR) 25 MG tablet Take 1 tablet (25 mg total) by  "mouth once daily.    metFORMIN (GLUCOPHAGE-XR) 500 MG ER 24hr tablet Take 1 tablet (500 mg total) by mouth 2 (two) times daily with meals.    methocarbamoL (ROBAXIN) 750 MG Tab TAKE 1 TABLET(750 MG) BY MOUTH FOUR TIMES DAILY AS NEEDED FOR MUSCLE STRAIN    metoprolol succinate (TOPROL-XL) 100 MG 24 hr tablet Take 1 tablet (100 mg total) by mouth once daily.    modafiniL (PROVIGIL) 200 MG Tab Take 1 tablet (200 mg total) by mouth 2 (two) times daily as needed (sleepiness). First dose upon awakening and second dose 4-6 hours later    rosuvastatin (CRESTOR) 10 MG tablet Take 1 tablet by mouth once daily.    venlafaxine (EFFEXOR-XR) 150 MG Cp24 Take 1 capsule (150 mg total) by mouth once daily.    zolpidem (AMBIEN CR) 12.5 MG CR tablet Take 1 tablet (12.5 mg total) by mouth nightly as needed for Insomnia.    blood sugar diagnostic (ONETOUCH VERIO TEST STRIPS) Strp 1 each by Misc.(Non-Drug; Combo Route) route 3 (three) times daily.    blood-glucose meter kit To check BG 2 times daily, to use with insurance preferred meter    cetirizine HCl (ZYRTEC ORAL) Take 1 tablet by mouth daily as needed.    cyanocobalamin (VITAMIN B-12) 1000 MCG tablet Take 100 mcg by mouth once daily.    ergocalciferol, vitamin D2, (VITAMIN D ORAL) Take 5,000 Units by mouth once daily.    fexofenadine (ALLEGRA) 60 MG tablet Take 60 mg by mouth every morning.    iron,carb/vit C/vit B12/folic (IRON 100 PLUS ORAL) Take by mouth once daily at 6am.    lancets 33 gauge Misc Test BG 2 times a day    multivitamin capsule Take 1 capsule by mouth once daily.    needle, disp, 18 G (BD REGULAR BEVEL NEEDLES) 18 gauge x 1" Ndle 1 each by Misc.(Non-Drug; Combo Route) route once a week. Use to draw up testosterone    ondansetron (ZOFRAN-ODT) 4 MG TbDL Take 1 tablet (4 mg total) by mouth every 6 (six) hours as needed (nausea).    riboflavin, vitamin B2, (VITAMIN B-2 ORAL) Take 1 tablet by mouth once daily.    scopolamine (TRANSDERM-SCOP) 1.3-1.5 mg (1 mg over 3 " "days) Place 1 patch onto the skin every 72 hours.    semaglutide (OZEMPIC) 2 mg/dose (8 mg/3 mL) PnIj Inject 2 mg into the skin every 7 days.    syringe with needle 3 mL 21 gauge x 1" Syrg 1 each by Misc.(Non-Drug; Combo Route) route once a week. Use to inject testosterone    testosterone cypionate (DEPOTESTOTERONE CYPIONATE) 200 mg/mL injection Inject 1 mL (200 mg total) into the muscle every 7 days.     Family History       Problem Relation (Age of Onset)    Cancer Maternal Aunt, Paternal Uncle    Diabetes Father    Heart disease Father (46)    Hyperlipidemia Mother, Father    Hypertension Mother, Father    No Known Problems Sister    Stroke Maternal Uncle          Tobacco Use    Smoking status: Never     Passive exposure: Past    Smokeless tobacco: Never   Substance and Sexual Activity    Alcohol use: Yes     Comment: less than once per month    Drug use: No    Sexual activity: Yes     Partners: Female     Review of Systems   Neurological:  Positive for weakness and numbness.   All other systems reviewed and are negative.    Objective:     Vital Signs (Most Recent):  Temp: 98.2 °F (36.8 °C) (06/10/24 0130)  Pulse: 74 (06/10/24 0245)  Resp: (!) 23 (06/10/24 0245)  BP: 137/84 (06/10/24 0230)  SpO2: 96 % (06/10/24 0245) Vital Signs (24h Range):  Temp:  [98.1 °F (36.7 °C)-98.5 °F (36.9 °C)] 98.2 °F (36.8 °C)  Pulse:  [72-82] 74  Resp:  [15-33] 23  SpO2:  [94 %-97 %] 96 %  BP: (132-176)/() 137/84     Weight: (!) 153 kg (337 lb 4.9 oz)  Body mass index is 42.16 kg/m².     Physical Exam  Constitutional:       Appearance: He is obese.   HENT:      Right Ear: External ear normal.      Left Ear: External ear normal.      Mouth/Throat:      Mouth: Mucous membranes are dry.   Eyes:      Extraocular Movements: Extraocular movements intact.      Pupils: Pupils are equal, round, and reactive to light.   Cardiovascular:      Rate and Rhythm: Normal rate and regular rhythm.   Pulmonary:      Effort: Pulmonary effort is " normal.      Breath sounds: Normal breath sounds.   Abdominal:      General: Abdomen is flat. Bowel sounds are normal.      Palpations: Abdomen is soft.   Musculoskeletal:         General: Normal range of motion.   Skin:     General: Skin is warm.      Capillary Refill: Capillary refill takes less than 2 seconds.   Neurological:      General: No focal deficit present.      Mental Status: He is alert and oriented to person, place, and time.              CRANIAL NERVES     CN III, IV, VI   Pupils are equal, round, and reactive to light.       Significant Labs: All pertinent labs within the past 24 hours have been reviewed.    Significant Imaging: I have reviewed all pertinent imaging results/findings within the past 24 hours.  Assessment/Plan:     * Acute ischemic stroke  Status post tPA with complete resolution of symptoms.  Admit to ICU.  Neuro checks per neurology recommendation.  Q 1 vital checks.  Low threshold for CT head if change in clinical status.  IV labetalol p.r.n. to keep blood pressure less than 180/105.  High-intensity statin.  HbA1c and lipid panel.  TTE with bubble.  Neurology consult.  Speech consult.      ADHD (attention deficit hyperactivity disorder), combined type  Hold home medications      LYNDA on CPAP    CPAP at night    Adjustment disorder with mixed anxiety and depressed mood  Hold home medications in the setting of his acute stroke      Hyperlipidemia  High-intensity statin        VTE Risk Mitigation (From admission, onward)           Ordered     Reason for No Pharmacological VTE Prophylaxis  Once        Question:  Reasons:  Answer:  Risk of Bleeding    06/10/24 0043     IP VTE HIGH RISK PATIENT  Once         06/10/24 0043     Place sequential compression device  Until discontinued         06/10/24 0043                  Critical care time spent on the evaluation and treatment of severe organ dysfunction, review of pertinent labs and imaging studies, discussions with consulting providers and  discussions with patient/family: 30 minutes.                  Mary Valdez MD  Department of Hospital Medicine  [facility] - Intensive Care

## 2024-06-10 NOTE — NURSING
Pupils noted to be unequal; unsure if baseline per pt and previous RN documentation. Dr. Valdez notified; orders placed for STAT CT head.

## 2024-06-10 NOTE — PLAN OF CARE
Problem: Adult Inpatient Plan of Care  Goal: Plan of Care Review  6/10/2024 1802 by Wilfredo Dillon RN  Outcome: Progressing  6/10/2024 1801 by Wilfredo Dillon RN  Outcome: Progressing  Goal: Patient-Specific Goal (Individualized)  6/10/2024 1802 by Wilfredo Dillon RN  Outcome: Progressing  6/10/2024 1801 by Wilfredo Dillon RN  Outcome: Progressing  Goal: Absence of Hospital-Acquired Illness or Injury  6/10/2024 1802 by Wilfredo Dillon RN  Outcome: Progressing  6/10/2024 1801 by Wilfredo Dillon RN  Outcome: Progressing  Goal: Optimal Comfort and Wellbeing  6/10/2024 1802 by Wilfredo Dillon RN  Outcome: Progressing  6/10/2024 1801 by Wilfredo Dillon RN  Outcome: Progressing  Goal: Readiness for Transition of Care  6/10/2024 1802 by Wilfredo Dillon RN  Outcome: Progressing  6/10/2024 1801 by Wilfredo Dillon RN  Outcome: Progressing     Problem: Stroke, Ischemic (Includes Transient Ischemic Attack)  Goal: Optimal Coping  6/10/2024 1802 by Wilfredo Dillon RN  Outcome: Progressing  6/10/2024 1801 by Wilfredo Dillon RN  Outcome: Progressing  Goal: Effective Bowel Elimination  6/10/2024 1802 by Wilfredo Dillon RN  Outcome: Progressing  6/10/2024 1801 by Wilfredo Dillon RN  Outcome: Progressing  Goal: Optimal Cerebral Tissue Perfusion  6/10/2024 1802 by Wilfredo Dillon RN  Outcome: Progressing  6/10/2024 1801 by Wilfredo Dillon RN  Outcome: Progressing  Goal: Optimal Cognitive Function  6/10/2024 1802 by Wilfredo Dillno RN  Outcome: Progressing  6/10/2024 1801 by Wilfredo Dillon RN  Outcome: Progressing  Goal: Improved Communication Skills  6/10/2024 1802 by Wilfredo Dillon RN  Outcome: Progressing  6/10/2024 1801 by Wilfredo Dillon RN  Outcome: Progressing  Goal: Optimal Functional Ability  6/10/2024 1802 by Wilfredo Dillon RN  Outcome: Progressing  6/10/2024 1801 by Barrosse, Shahira, RN  Outcome: Progressing  Goal: Optimal Nutrition Intake  6/10/2024 2278  by Wilfredo Dillon RN  Outcome: Progressing  6/10/2024 1801 by Wilfredo Dillon RN  Outcome: Progressing  Goal: Effective Oxygenation and Ventilation  6/10/2024 1802 by Wilfredo Dillon RN  Outcome: Progressing  6/10/2024 1801 by Wilfredo Dillon RN  Outcome: Progressing  Goal: Improved Sensorimotor Function  6/10/2024 1802 by Wilfredo Dillon RN  Outcome: Progressing  6/10/2024 1801 by Wilfredo Dillon RN  Outcome: Progressing  Goal: Safe and Effective Swallow  6/10/2024 1802 by Wilfredo Dillon RN  Outcome: Progressing  6/10/2024 1801 by Wilfredo Dillon RN  Outcome: Progressing  Goal: Effective Urinary Elimination  6/10/2024 1802 by Wilfredo Dillon RN  Outcome: Progressing  6/10/2024 1801 by Wilfredo Dillon RN  Outcome: Progressing     Problem: Diabetes Comorbidity  Goal: Blood Glucose Level Within Targeted Range  6/10/2024 1802 by Wilfredo Dillon RN  Outcome: Progressing  6/10/2024 1801 by Wilfredo Dillon RN  Outcome: Progressing

## 2024-06-10 NOTE — PLAN OF CARE
Problem: Physical Therapy  Goal: Physical Therapy Goal  Description: Goals to be met by: discharge date     Patient will increase functional independence with mobility by performin. Sit to stand transfer with [place]  2. Bed to chair transfer with [place] using No Assistive Device  3.  Tolerate seated OOB chair > 1 hr  4. Gait  x 150 feet with [place] and Supervision using No Assistive Device without LOB..     Outcome: Progressing       PT Evaluation performed.  Pt cooperative and motivated throughout.  Rec cont acute therapy session.  Ongoing assessment for probable no DME needs and no further therapy needed upon discharge.

## 2024-06-10 NOTE — HPI
43 years old man with past medical history of anxiety, depression, HLD, HTN, LYNDA on BiPap, history of concussion and follows up with Neurology.  The patient presented with acute left-sided weakness.  CT head was negative.  CTA is negative for dissection or large vessel occlusion.  Patient was evaluated by tele Neurology and he was deemed a candidate for tPA.  After receiving the tPA his weakness has completely resolved.  The patient then admitted to the ICU for frequent neuro checks and vital sign checks.

## 2024-06-10 NOTE — PLAN OF CARE
The sw met with the pt and his son who was in the room during the assessment. The pt's employed at [employer]. The pt's independent with his ADL's and doesn't use dme. The pt still drives but Viki Collazo(wife)[phone] will transport the pt home at d/c. The pt lives in [place] with his wife and 2 dependent children. The sw completed the white board in the pt's room with her name and contact info. The sw gave him a d/c brochure with her contact info on it. The sw encouraged the pt to call if he has any further questions or concerns. The sw will continue to follow the pt throughout his transitions of care and will assist with any d/c needs. The pt states he has been under a lot of job related stress lately due to finding misappropriations with dandre money.    [facility] - Intensive Care  Initial Discharge Assessment       Primary Care Provider: Arjun Cross MD    Admission Diagnosis: Stroke [I63.9]  Acute left-sided weakness [R53.1]  Acute focal neurological deficit [R29.818]    Admission Date: 6/9/2024  Expected Discharge Date:     Transition of Care Barriers: (P) None    Payor: BLUE CROSS BLUE SHIELD / Plan: BC OF [facility] / Product Type: Commercial /     Extended Emergency Contact Information  Primary Emergency Contact: Viki Collazo  Address: [address] [place], [place] [address] [facility]  Home Phone: [phone]  Relation: Spouse    Discharge Plan A: (P) Home with family  Discharge Plan B: (P) Home Health      [facility] - [place], [place] - [facility] [address]  [address]  [address]  [facility] [address]  Phone: [phone] Fax: [phone]    [facility] - [place], [place] - [address]  [address]  [facility] [address]  Phone: [phone] Fax: [phone]    [facility] [address] [address]  Phone:  [phone] Fax: [phone]    [facility]  [address]  [place] [address]  Phone: [phone] Fax: [phone]    [id number] - [place], [place] - [address] AT [facility] [address]  [address]  [place] [address]  Phone: [phone] Fax: [phone]    [id number] - [place], [place] - [address] AT [facility] [address]  [address]  [place] [address]  Phone: [phone] Fax: [phone]      Initial Assessment (most recent)       Adult Discharge Assessment - 06/10/24 1657          Discharge Assessment    Assessment Type Discharge Planning Assessment (P)      Confirmed/corrected address, phone number and insurance Yes (P)      Confirmed Demographics Correct on Facesheet (P)      Source of Information patient (P)      When was your last doctors appointment? -- (P)    about 2 months ago    Communicated MINNIE with patient/caregiver Date not available/Unable to determine (P)      Reason For Admission Acute ischemic stroke (P)      People in Home child(rosario), dependent;spouse (P)      Do you expect to return to your current living situation? Yes (P)      Do you have help at home or someone to help you manage your care at home? Yes (P)      Who are your caregiver(s) and their phone number(s)? Viki Collazo(wife)[phone] (P)      Prior to hospitilization cognitive status: Alert/Oriented (P)      Current cognitive status: Alert/Oriented (P)      Walking or Climbing Stairs Difficulty no (P)      Dressing/Bathing Difficulty no (P)      Home Accessibility wheelchair accessible (P)      Home Layout Able to live on 1st floor (P)      Equipment Currently Used at Home none (P)      Readmission within 30 days? No (P)      Patient currently being followed by outpatient case management? No (P)      Do you currently have service(s) that help you manage your care at home? No (P)      Do you take  prescription medications? Yes (P)      Do you have prescription coverage? Yes (P)      Coverage BCBS (P)      Do you have any problems affording any of your prescribed medications? No (P)      Is the patient taking medications as prescribed? yes (P)      Who is going to help you get home at discharge? Viki Collazo(wife)[phone] (P)      How do you get to doctors appointments? car, drives self (P)      Are you on dialysis? No (P)      Do you take coumadin? No (P)      Discharge Plan A Home with family (P)      Discharge Plan B Home Health (P)      DME Needed Upon Discharge  other (see comments) (P)    TBD    Discharge Plan discussed with: Patient (P)      Transition of Care Barriers None (P)         Physical Activity    On average, how many days per week do you engage in moderate to strenuous exercise (like a brisk walk)? 0 days (P)      On average, how many minutes do you engage in exercise at this level? 0 min (P)         Financial Resource Strain    How hard is it for you to pay for the very basics like food, housing, medical care, and heating? Not very hard (P)         Housing Stability    In the last 12 months, was there a time when you were not able to pay the mortgage or rent on time? No (P)      At any time in the past 12 months, were you homeless or living in a shelter (including now)? No (P)         Transportation Needs    Has the lack of transportation kept you from medical appointments, meetings, work or from getting things needed for daily living? No (P)         Food Insecurity    Within the past 12 months, you worried that your food would run out before you got the money to buy more. Never true (P)      Within the past 12 months, the food you bought just didn't last and you didn't have money to get more. Never true (P)         Stress    Do you feel stress - tense, restless, nervous, or anxious, or unable to sleep at night because your mind is troubled all the time - these days? Very much (P)          Social Isolation    How often do you feel lonely or isolated from those around you?  Never (P)         Alcohol Use    Q1: How often do you have a drink containing alcohol? Monthly or less (P)      Q2: How many drinks containing alcohol do you have on a typical day when you are drinking? 1 or 2 (P)      Q3: How often do you have six or more drinks on one occasion? Never (P)         Utilities    In the past 12 months has the electric, gas, oil, or water company threatened to shut off services in your home? No (P)         Health Literacy    How often do you need to have someone help you when you read instructions, pamphlets, or other written material from your doctor or pharmacy? Never (P)         OTHER    Name(s) of People in Home Viki Zay(wife)[phone] (P)

## 2024-06-10 NOTE — ED PROVIDER NOTES
"Encounter Date: 6/9/2024       History     Chief Complaint   Patient presents with    Fatigue     Weakness, numbness to L side of face and LUE that started at 2200.      Patient is a 44 yo M with a pmhx of anxiety, adjustment disorder with mixed anxiety and depressed mood, ED, HTN, HLD, LYNDA who presents to the ED with the complaint of LUE and LLE weakness with L sided facial "tingling." Pt and wife states that these symptoms approximately 1 hour PTA. He denies any vision change, difficulty with speech, nausea, vomiting. He does report a generalized tension-like HA associated with his L sided weakness. He denies any use of any anticoagulants or antiplatelet agents. He denies any recent illness. He denies any new medications.       Review of patient's allergies indicates:   Allergen Reactions    Ciprofloxacin      swelling    Penicillins Rash    Sulfa (sulfonamide antibiotics) Rash    Toradol [ketorolac] Nausea Only    Bell pepper Nausea Only    Meloxicam Nausea Only    Sulfamethoxazole-trimethoprim      Past Medical History:   Diagnosis Date    Adjustment disorder with mixed anxiety and depressed mood     Anxiety     Colon polyp     Depression     ED (erectile dysfunction)     Family history of prostate cancer 9/29/2014    Hx of umbilical hernia repair 10/16/2020    Hyperlipidemia     Hypertension     Hypogonadism male     Insomnia     Low testosterone     LYNDA (obstructive sleep apnea)     Prediabetes      Past Surgical History:   Procedure Laterality Date    ADENOIDECTOMY      COLONOSCOPY N/A 12/5/2023    Procedure: COLONOSCOPY;  Surgeon: Jonnathan Velazquez MD;  Location: [address]);  Service: Colon and Rectal;  Laterality: N/A;  inst. to pt. portal. Takes Ozempic inst. to hold.Tbou  referral: Dr. Cross  11/28-m for precall-MS  12/4-last dose Ozempic 11/26/23, precall complete-KPvt    HERNIA REPAIR      REPAIR OF INCARCERATED UMBILICAL HERNIA N/A 10/16/2020    Procedure: REPAIR, HERNIA, UMBILICAL, " INCARCERATED, AGE 5 YEARS OR OLDER with mesh ;  Surgeon: Conrad Loco MD;  Location: [address];  Service: General;  Laterality: N/A;    REPAIR OF LABRUM OF HIP Left 12/14/2023    Procedure: REPAIR,LABRUM,SHOULDER;  Surgeon: Sea Valadez MD;  Location: [facility] OR;  Service: Orthopedics;  Laterality: Left;    SHOULDER ARTHROSCOPY W/ SUPERIOR LABRAL ANTERIOR POSTERIOR LESION REPAIR Left 12/14/2023    Procedure: ARTHROSCOPY, SHOULDER,;  Surgeon: Sea Valadez MD;  Location: [facility] OR;  Service: Orthopedics;  Laterality: Left;  Arthrex knotless suturetak labral anchors, long plastic cannulas, arthroscopic elevators/rasps marissa notified cc    TONSILLECTOMY       Family History   Problem Relation Name Age of Onset    Hypertension Mother      Hyperlipidemia Mother      Diabetes Father      Hyperlipidemia Father      Hypertension Father      Heart disease Father  46        CAD    No Known Problems Sister      Cancer Maternal Aunt          colon cancer    Stroke Maternal Uncle      Cancer Paternal Uncle          prostate cancer     Social History     Tobacco Use    Smoking status: Never     Passive exposure: Past    Smokeless tobacco: Never   Substance Use Topics    Alcohol use: Yes     Comment: less than once per month    Drug use: No     Review of Systems   Constitutional:  Negative for chills, fatigue and fever.   Respiratory:  Negative for cough and shortness of breath.    Cardiovascular:  Negative for chest pain, palpitations and leg swelling.   Gastrointestinal:  Negative for abdominal pain, nausea and vomiting.   Neurological:  Positive for weakness. Negative for dizziness, seizures, facial asymmetry, speech difficulty and headaches.   Psychiatric/Behavioral:  Negative for agitation and confusion.        Physical Exam     Initial Vitals [06/09/24 2304]   BP Pulse Resp Temp SpO2   (!) 150/102 82 16 98.1 °F (36.7 °C) 97 %      MAP       --         Physical Exam    Nursing note and vitals  reviewed.  Constitutional: He appears well-developed and well-nourished. No distress.   HENT:   Head: Normocephalic and atraumatic.   Right Ear: External ear normal.   Left Ear: External ear normal.   No overt signs of head trauma.    Eyes: EOM are normal. Pupils are equal, round, and reactive to light.   Neck: Neck supple.   Normal range of motion.  Cardiovascular:  Normal rate, regular rhythm, normal heart sounds and intact distal pulses.           Pulmonary/Chest: Breath sounds normal.   Abdominal: Abdomen is soft. Bowel sounds are normal.   Musculoskeletal:      Cervical back: Normal range of motion and neck supple.     Neurological: He is alert and oriented to person, place, and time.   Strength 3/5 in both the LUE and LLE  No pronator drift  No facial droop  No slurred speech  No aphasia   Gait WNL   AAOx3  GCS 15    Skin: Skin is warm.         ED Course   Critical Care    Date/Time: 6/10/2024 1:20 AM    Performed by: Erik Ribera MD  Authorized by: Erik Ribear MD  Direct patient critical care time: 15 minutes  Additional history critical care time: 5 minutes  Ordering / reviewing critical care time: 5 minutes  Documentation critical care time: 10 minutes  Consulting other physicians critical care time: 10 minutes  Consult with family critical care time: 5 minutes  Total critical care time (exclusive of procedural time) : 50 minutes        Labs Reviewed   COMPREHENSIVE METABOLIC PANEL - Abnormal; Notable for the following components:       Result Value    CO2 22 (*)     AST 55 (*)     ALT 94 (*)     All other components within normal limits   TSH - Abnormal; Notable for the following components:    TSH 0.139 (*)     All other components within normal limits   LIPID PANEL - Abnormal; Notable for the following components:    Cholesterol 224 (*)     Triglycerides 221 (*)     HDL 36 (*)     HDL/Cholesterol Ratio 16.1 (*)     Total Cholesterol/HDL Ratio 6.2 (*)     All other components within  normal limits   POCT GLUCOSE - Abnormal; Notable for the following components:    POCT Glucose 116 (*)     All other components within normal limits   CBC W/ AUTO DIFFERENTIAL   PROTIME-INR   APTT   TROPONIN I   T4, FREE   HEMOGLOBIN A1C   DRUG SCREEN PANEL, URINE EMERGENCY   URINALYSIS, REFLEX TO URINE CULTURE   HEMOGLOBIN A1C   TROPONIN I   APTT   PROTIME-INR   POCT GLUCOSE, HAND-HELD DEVICE   POCT GLUCOSE   ISTAT PROCEDURE   ISTAT CREATININE          Imaging Results              CTA Head and Neck (xpd) (Final result)  Result time 06/10/24 00:04:22      Final result by Sweta Arroyo MD (06/10/24 00:04:22)                   Impression:      No acute abnormality. No high-grade stenosis or major vessel occlusion.      Electronically signed by: Sweta Arroyo  Date:    06/10/2024  Time:    00:04               Narrative:    EXAMINATION:  CTA HEAD AND NECK (XPD)    CLINICAL HISTORY:  Neuro deficit, acute, stroke suspected;    TECHNIQUE:  Non contrast low dose axial images were obtained through the head. CT angiogram was performed from the level of the treva to the top of the head following the IV administration of one hundredmL of Omnipaque 350.   Sagittal and coronal reconstructions and maximum intensity projection reconstructions were performed. Arterial stenosis percentages are based on NASCET measurement criteria.  Rapid AI software was utilized to evaluate for intracranial hemorrhage.    COMPARISON:  CT brain 11/01/2015    FINDINGS:  Intracranial Compartment:    Ventricles and sulci are normal in size for age without evidence of hydrocephalus. No extra-axial blood or fluid collections.    The brain parenchyma appears normal. No parenchymal mass, hemorrhage, edema, or major vascular distribution infarct.    Skull/Extracranial Contents (limited evaluation): No fracture. Mastoid air cells and paranasal sinuses are essentially clear.    Non-Vascular Structures of the Neck/Thoracic Inlet (limited evaluation):  Normal.    Aorta: Normal 3 vessel arch.    Extracranial carotid circulation: No hemodynamically significant stenosis, aneurysmal dilatation, or dissection.    Extracranial vertebral circulation: No hemodynamically significant stenosis, aneurysmal dilatation, or dissection.    Intracranial Arteries: No focal high-grade stenosis, occlusion, or aneurysm.    Venous structures (limited evaluation): Normal.                                       Medications   sodium chloride 0.9% flush 10 mL (has no administration in time range)   sodium chloride 0.9% flush 10 mL (has no administration in time range)   sodium chloride 0.9% bolus 500 mL 500 mL (has no administration in time range)   labetaloL injection 10 mg (has no administration in time range)   bisacodyL suppository 10 mg (has no administration in time range)   atorvastatin tablet 40 mg (has no administration in time range)   metoprolol succinate (TOPROL-XL) 24 hr tablet 100 mg (has no administration in time range)   iohexoL (OMNIPAQUE 350) injection 100 mL (100 mLs Intravenous Given 6/9/24 9261)   tenecteplase (TNKase) IV KIT 25 mg (25 mg Intravenous Given 6/9/24 0687)     Medical Decision Making  Amount and/or Complexity of Data Reviewed  Labs: ordered.  Radiology: ordered. Decision-making details documented in ED Course.    Risk  Prescription drug management.  Decision regarding hospitalization.               ED Course as of 06/10/24 0121   Mon Mike 10, 2024   0001  Vascular neurology recommended administration of  TNK in light of patient's symptoms.  He did review the CTA did  did not find any findings of acute hemorrhage.  Both eyes in the vascular neurologist, Dr. Mays,  discussed the risks versus benefits of administration of TNK. The patient denies any specific /absolute contraindications to TNK administration.   He verbalized understanding of potential risks and benefits of administration of this medication.  He demonstrates capacity to make his own medical  decisions.  He does not appear to be under the influence of alcohol or drugs.  Verbalized to me, in the presence of his nursing staff in his wife at bedside, to proceed with administration of TNK [LC]   0012 CTA Head and Neck (xpd)  No acute abnormality. No high-grade stenosis or major vessel occlusion.      [LC]   0033 Case discussed with [facility] who will admit patient to their service.  [LC]      ED Course User Index  [LC] Erik Ribera MD               Medical Decision Making:   Initial Assessment:   See HPI   Clinical Tests:   Lab Tests: Reviewed and Ordered  Radiological Study: Ordered and Reviewed  ED Management:  -   Patient presented to the ED with complaint of acute onset of left upper and left lower extremity weakness that began approximately 1 hour prior to arrival; does report mild left-sided facial paresthesia but denied any dysarthria, aphasia, vision change or other acute neurological abnormalities; stroke code  initiated in light of patient's presenting symptoms and time of onset; the patient was evaluated by vascular Neurology; CTA of the head and neck demonstrates no acute hemorrhage or large vessel occlusion; following a lengthy discussion with the patient regarding risks versus benefits, ruling out any possible contraindications to thrombolytics, and obtaining verbal consent, the patient was administered 25mg of TNK; following administration, patient states that he regained normal strength and function to the LUE and LLE; On reassessment, NIH stroke scale 0. I discussed the case with the on call [facility]ist who has agreed to admit the patient to his service for observation of perceived acute neurological deficit and subsequent resolution; the patient is comfortable with the plan for admission             Clinical Impression:  Final diagnoses:  [R29.818] Acute focal neurological deficit  [I63.9] Stroke  [R53.1] Acute left-sided weakness (Primary)          ED Disposition Condition     Admit                 Erik Ribera MD  06/10/24 0101       Erik Ribera MD  06/10/24 0121
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
## 2024-06-10 NOTE — EICU
"            e-ICU admit note    Reason for ICU admission:    TIA/CVA    HPI:     44 yo male 1 hr PTA c/o of LUE and LLE weakness with L sided facial "tingling."     /102    CTA head neck normal    Reviewed by Tele-Neurology:    NIH stroke scale 3 (L UE drift, L LE drift, mild sensory loss on L)    Patient is candidate of TNK given his symptoms consistent with acute ischemic stroke.       Pmx:  anxiety, adjustment disorder with mixed anxiety and depressed mood, ED, HTN, HLD, LYNDA        Pt viewed at 1:52 am:    70 sinus, 96%, R 28, 140/80    Assessment/Plan:    Acute CVA  Tenecteplase 0.25mg/kg IV push (max dose 25mg)  Maintain BP < 180/110 (labetalol or nicardipine, avoid hydralazine/nitrates )  No antithrombotics or anticoagulants for 24 hours  Repeat CT in 24 hrs or MRI  statin  "
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
## 2024-06-10 NOTE — ED NOTES
Pt presented to ED via POV c/o left sided weakness that began tonight. Pt states he initially noticed some twitching in his eyes and then felt increasing weakness in his left side which prompted his visit to the ED. Upon arrival pt noted to have weakness to upper and lower extremities of the left side. Pt taken to CT scan from triage and a code stroke was initiated.

## 2024-06-10 NOTE — CONSULTS
"  [facility] - Intensive Care  Adult Nutrition  Consult Note    SUMMARY     Recommendations    Recommendation:  1. Encourage intake at meals as tolerated. 2. Monitor weight/labs.   3. RD to follow to monitor po intake    Goals:  Pt will tolerate diet with at least 50-75% intake at meals by RD follow up  Nutrition Goal Status: new  Communication of RD Recs: reviewed with RN    Assessment and Plan  No nutrition dx at this time    Malnutrition Assessment  Weight Loss (Malnutrition):  (2% x 6 months)       Reason for Assessment  Reason For Assessment: consult (stroke pathway)  Diagnosis:  (acute ischemic stroke)  Relevant Medical History: HLD, HTN, LYNDA, prediabetes, tonsillectomy, hernia repair  General Information Comments: Pt on Cardiac diet. Intake not recorded. Seen by SLP. Pt working with therapy at visit-unable to assess NFPE. Cardiac diet handouts attached to d/c paperwork. Joseph 22-skin intact. Noted 7lb weight loss x 6 months.  Nutrition Discharge Planning: pt to d/c on Cardiac diet    Nutrition Risk Screen  Nutrition Risk Screen: no indicators present    Nutrition/Diet History  Food Preferences: no Hinduism or cultural food prefs identified  Spiritual, Cultural Beliefs, Congregational Practices, Values that Affect Care: no  Factors Affecting Nutritional Intake: None identified at this time    Anthropometrics  Temp: 98.6 °F (37 °C)  Height: 6' 3" (190.5 cm)  Height (inches): 75 in  Weight Method: Bed Scale  Weight: (!) 152.9 kg (337 lb 1.3 oz)  Weight (lb): (!) 337.09 lb  Ideal Body Weight (IBW), Male: 196 lb  % Ideal Body Weight, Male (lb): 171.98 %  BMI (Calculated): 42.1  BMI Grade: greater than 40 - morbid obesity  Usual Body Weight (UBW), kg: (!) 156 kg (12/14)  % Usual Body Weight: 98.22  % Weight Change From Usual Weight: -1.99 %     Lab/Procedures/Meds  Pertinent Labs Reviewed: reviewed  Pertinent Medications Reviewed: reviewed    Estimated/Assessed Needs  Weight Used For Calorie Calculations: 89 kg (196 lb " 3.4 oz) (IBW)  Energy Calorie Requirements (kcal): 225g (25 kcal/kg IBW)  Energy Need Method: Kcal/kg  Protein Requirements: 89g (1.0g/kg)  Weight Used For Protein Calculations: 89 kg (196 lb 3.4 oz)  Estimated Fluid Requirement Method: RDA Method  RDA Method (mL): 225     Nutrition Prescription Ordered  Current Diet Order: Cardiac    Evaluation of Received Nutrient/Fluid Intake  I/O: 0/600  Energy Calories Required: meeting needs  Protein Required: meeting needs  Fluid Required: meeting needs  Comments: LBM 6/9  % Intake of Estimated Energy Needs: Other: diet just started  % Meal Intake: Other: diet just started    Nutrition Risk  Level of Risk/Frequency of Follow-up:  (2xweekly)     Monitor and Evaluation  Food and Nutrient Intake: food and beverage intake  Food and Nutrient Adminstration: diet order  Physical Activity and Function: nutrition-related ADLs and IADLs  Anthropometric Measurements: weight  Biochemical Data, Medical Tests and Procedures: electrolyte and renal panel  Nutrition-Focused Physical Findings: overall appearance     Nutrition Related Social Determinants of Health: SDOH: Adequate food in home environment     Nutrition Follow-Up  RD Follow-up?: Yes

## 2024-06-10 NOTE — PLAN OF CARE
Care Plan    Pt remains in ICU at this time. Pt taken to CT d/t unequal pupils; NIHSS 0 throughout the shift. VSS. Stroke education provided. POC reviewed with pt and family. Questions and concerns addressed. Safety and infection precautions in place. See below and flowsheets for full assessment and VS info.     Neuro:  [place] Coma Scale  Best Eye Response: 4-->(E4) spontaneous  Best Motor Response: 6-->(M6) obeys commands  Best Verbal Response: 5-->(V5) oriented  [place] Coma Scale Score: 15  Assessment Qualifiers: patient not sedated/intubated, no eye obstruction present  Pupil PERRLA: yes  24 hr Temp:  [98.1 °F (36.7 °C)-98.5 °F (36.9 °C)]      CV:  Rhythm: normal sinus rhythm  DVT prophylaxis: VTE Required Core Measure: (SCDs) Sequential compression device initiated/maintained, Per order contraindicated for SCDs/Anticoagulants    Resp:          GI/:  GI prophylaxis: no     Last Bowel Movement: 06/09/24      Intake/Output Summary (Last 24 hours) at 6/10/2024 0559  Last data filed at 6/10/2024 0248  Gross per 24 hour   Intake --   Output 500 ml   Net -500 ml       Labs/Accuchecks:  Recent Labs   Lab 06/09/24  2320   WBC 11.34   RBC 5.49   HGB 16.7   HCT 49.9         Recent Labs   Lab 06/09/24  2320      K 4.8   CO2 22*      BUN 13   CREATININE 1.1   ALKPHOS 70   ALT 94*   AST 55*   BILITOT 0.4      Recent Labs   Lab 06/09/24  2320   INR 1.1   APTT 28.5      Recent Labs   Lab 06/09/24  2320   TROPONINI <0.006       Electrolytes: No replacement orders  Accuchecks: none    Gtts/LDAs:      Lines/Drains/Airways       Peripheral Intravenous Line  Duration                  Peripheral IV - Single Lumen 06/09/24 2319 20 G Right Antecubital <1 day         Peripheral IV - Single Lumen 06/09/24 2343 20 G Right Hand <1 day                    Skin/Wounds     Wounds: No  Wound care consulted: No    Consults  Consults (From admission, onward)          Status Ordering Provider     Inpatient consult to [facility]  Neurology  Once        Provider:  (Not yet assigned)    Acknowledged MALACHI CAMPO     Inpatient consult to Physical Medicine Rehab  Once        Provider:  (Not yet assigned)    Acknowledged MALACHI CAMPO     Inpatient consult to Registered Dietitian/Nutritionist  Once        Provider:  (Not yet assigned)    Acknowledged MALACHI CAMPO     IP consult to case management/social work  Once        Provider:  (Not yet assigned)    Acknowledged MALACHI CAMPO     Consult to Telemedicine - Acute Stroke  Once        Provider:  Lillian Mays MD    Acknowledged RANCHO EDUARDO

## 2024-06-10 NOTE — ED NOTES
Telestroke consult in process. Neuro recommending TNK administration. Pt consented for TNK by neurologist. Pt and wife at bedside agreeable and verbalized understanding of risks/benefit.

## 2024-06-10 NOTE — ASSESSMENT & PLAN NOTE
Status post tPA with complete resolution of symptoms.  Admit to ICU.  Neuro checks per neurology recommendation.  Q 1 vital checks.  Low threshold for CT head if change in clinical status.  IV labetalol p.r.n. to keep blood pressure less than 180/105- resumed Metoprolol on 6/11  High-intensity statin.  HbA1c 7.0  lipid panel.  TTE with bubble-  negative for shunt  Neurology consult.  Speech consult.  CT head 24 hours after TNK -no acute abnormality

## 2024-06-10 NOTE — CONSULTS
[facility] Note    Primary Attending Physician: Dr. Kilgore  ICU Attending: Dr. Walker  ICU Fellow: Dr. Shrestha    Reason for Consult:     Stroke    Subjective:      History of Present Illness:  Dominic Collazo is a 43 y.o. male with PMH of Anxiety/Depression, HLD, HTN, LYNDA on nightly BiPAP, and T2DM who presents to [facility] ED 6/9/2024 with chief complaint of acute onset L facial droop and L. Sided upper and lower extremity weakness.     Patient states he has been extremely stressed lately due to this job and has been having increased anxiety. He states last night around 9:30pm, he and his wife noticed that was having some L. sided facial droop and facial tingling. He also noted some L. Sided upper and lower extremity weakness and had trouble moving his arm and leg. He also endorsed some vision changes, saying he couldn't focus his vision on one spot so they decided to come to the ED.    In the ED, patient was stroke activated and received TNK x1 at 23:57. VSS, afebrile, HR 80s, BP 170s/80s. CTH and CTA negative for acute processes.     Past Medical History:  Past Medical History:   Diagnosis Date    Acute ischemic stroke 6/10/2024    Adjustment disorder with mixed anxiety and depressed mood     Anxiety     Colon polyp     Depression     ED (erectile dysfunction)     Family history of prostate cancer 9/29/2014    Hx of umbilical hernia repair 10/16/2020    Hyperlipidemia     Hypertension     Hypogonadism male     Insomnia     Low testosterone     LYNDA (obstructive sleep apnea)     Prediabetes        Past Surgical History:  Past Surgical History:   Procedure Laterality Date    ADENOIDECTOMY      COLONOSCOPY N/A 12/5/2023    Procedure: COLONOSCOPY;  Surgeon: Jonnathan Velazquez MD;  Location: [facility] ENDO ([address] [place] [address]);  Service: Colon and Rectal;  Laterality: N/A;  inst. to pt. portal. Takes Ozempic inst. to hold.[facility]  referral: Dr. Cross  11/28-[facility] for preca-MS  12/4-last  dose Ozempic 11/26/23, precall complete-KPvt    HERNIA REPAIR      REPAIR OF INCARCERATED UMBILICAL HERNIA N/A 10/16/2020    Procedure: REPAIR, HERNIA, UMBILICAL, INCARCERATED, AGE 5 YEARS OR OLDER with mesh ;  Surgeon: Conrad Loco MD;  Location: [address];  Service: General;  Laterality: N/A;    REPAIR OF LABRUM OF HIP Left 12/14/2023    Procedure: REPAIR,LABRUM,SHOULDER;  Surgeon: Sea Valadez MD;  Location: [facility] OR;  Service: Orthopedics;  Laterality: Left;    SHOULDER ARTHROSCOPY W/ SUPERIOR LABRAL ANTERIOR POSTERIOR LESION REPAIR Left 12/14/2023    Procedure: ARTHROSCOPY, SHOULDER,;  Surgeon: Sea Valadez MD;  Location: [facility] OR;  Service: Orthopedics;  Laterality: Left;  Arthrex knotless suturetak labral anchors, long plastic cannulas, arthroscopic elevators/rasps marissa notified cc    TONSILLECTOMY         Allergies:  Review of patient's allergies indicates:   Allergen Reactions    Ciprofloxacin      swelling    Penicillins Rash    Sulfa (sulfonamide antibiotics) Rash    Toradol [ketorolac] Nausea Only    Bell pepper Nausea Only    Meloxicam Nausea Only    Sulfamethoxazole-trimethoprim        Medications:   In-Hospital Scheduled Medications:   atorvastatin  40 mg Oral Daily    metoprolol succinate  100 mg Oral Daily    mupirocin   Nasal BID      In-Hospital PRN Medications:    Current Facility-Administered Medications:     bisacodyL, 10 mg, Rectal, Daily PRN    labetalol, 10 mg, Intravenous, Q15 Min PRN    sodium chloride 0.9%, 500 mL, Intravenous, PRN    sodium chloride 0.9%, 10 mL, Intravenous, PRN    sodium chloride 0.9%, 10 mL, Intravenous, PRN   In-Hospital IV Infusion Medications:     Home Medications:  Prior to Admission medications    Medication Sig Start Date End Date Taking? Authorizing Provider   acetaminophen (TYLENOL) 325 MG tablet Take 2 tablets (650 mg total) by mouth every 6 (six) hours as needed for Pain. 3/14/24  Yes Fritz Desir MD   ALPRAZolam (XANAX) 1  MG tablet Take 1 tablet (1 mg total) by mouth 2 (two) times daily as needed for Anxiety. 4/3/24 8/1/24 Yes Meño Burdick PA   buPROPion (WELLBUTRIN XL) 150 MG TB24 tablet Take 1 tablet (150 mg total) by mouth once daily. 4/3/24  Yes Meño Burdick PA   busPIRone (BUSPAR) 10 MG tablet Take 2 tablets (20 mg total) by mouth 2 (two) times daily. 4/3/24  Yes Meño Burdick PA   dextroamphetamine sulfate (DEXTROSTAT) 10 MG tablet Take 1 tablet (10 mg total) by mouth after lunch. 5/22/24  Yes Meño Burdick PA   lisdexamfetamine (VYVANSE) 40 MG Cap Take 1 capsule (40 mg total) by mouth once daily. 6/6/24  Yes Aranza Burdick PA-C   losartan (COZAAR) 25 MG tablet Take 1 tablet (25 mg total) by mouth once daily. 1/17/24 1/16/25 Yes Arjun Cross MD   metFORMIN (GLUCOPHAGE-XR) 500 MG ER 24hr tablet Take 1 tablet (500 mg total) by mouth 2 (two) times daily with meals. 8/31/23 8/30/24 Yes Arjun Cross MD   methocarbamoL (ROBAXIN) 750 MG Tab TAKE 1 TABLET(750 MG) BY MOUTH FOUR TIMES DAILY AS NEEDED FOR MUSCLE STRAIN 5/22/24  Yes Arjun Cross MD   metoprolol succinate (TOPROL-XL) 100 MG 24 hr tablet Take 1 tablet (100 mg total) by mouth once daily. 8/31/23  Yes Arjun Cross MD   modafiniL (PROVIGIL) 200 MG Tab Take 1 tablet (200 mg total) by mouth 2 (two) times daily as needed (sleepiness). First dose upon awakening and second dose 4-6 hours later 4/4/24  Yes Sea Fletcher MD   rosuvastatin (CRESTOR) 10 MG tablet Take 1 tablet by mouth once daily. 9/8/23  Yes Arjun Cross MD   venlafaxine (EFFEXOR-XR) 150 MG Cp24 Take 1 capsule (150 mg total) by mouth once daily. 4/3/24 9/30/24 Yes Meño Burdick PA   zolpidem (AMBIEN CR) 12.5 MG CR tablet Take 1 tablet (12.5 mg total) by mouth nightly as needed for Insomnia. 4/3/24 10/2/24 Yes Meño Burdick PA   blood sugar diagnostic (ONETOUCH VERIO TEST STRIPS) Strp 1 each by Misc.(Non-Drug; Combo Route) route 3 (three) times daily. 3/15/24   [place],  "Arjun BUSTAMANTE MD   blood-glucose meter kit To check BG 2 times daily, to use with insurance preferred meter 12/23/22 12/23/23  Arjun Cross MD   cetirizine HCl (ZYRTEC ORAL) Take 1 tablet by mouth daily as needed.    Provider, Historical   cyanocobalamin (VITAMIN B-12) 1000 MCG tablet Take 100 mcg by mouth once daily.    Provider, Historical   ergocalciferol, vitamin D2, (VITAMIN D ORAL) Take 5,000 Units by mouth once daily.    Provider, Historical   fexofenadine (ALLEGRA) 60 MG tablet Take 60 mg by mouth every morning.    Provider, Historical   iron,carb/vit C/vit B12/folic (IRON 100 PLUS ORAL) Take by mouth once daily at 6am.    Provider, Historical   lancets 33 gauge Misc Test BG 2 times a day 5/24/23   Arjun Cross MD   multivitamin capsule Take 1 capsule by mouth once daily.    Provider, Historical   needle, disp, 18 G (BD REGULAR BEVEL NEEDLES) 18 gauge x 1" Ndle 1 each by Misc.(Non-Drug; Combo Route) route once a week. Use to draw up testosterone 3/15/24   Clare Rome MD   ondansetron (ZOFRAN-ODT) 4 MG TbDL Take 1 tablet (4 mg total) by mouth every 6 (six) hours as needed (nausea). 5/27/24   Briana Betts MD   riboflavin, vitamin B2, (VITAMIN B-2 ORAL) Take 1 tablet by mouth once daily.    Provider, Historical   scopolamine (TRANSDERM-SCOP) 1.3-1.5 mg (1 mg over 3 days) Place 1 patch onto the skin every 72 hours. 10/3/22   Arjun Cross MD   semaglutide (OZEMPIC) 2 mg/dose (8 mg/3 mL) PnIj Inject 2 mg into the skin every 7 days. 9/8/23 9/7/24  Arjun Cross MD   syringe with needle 3 mL 21 gauge x 1" Syrg 1 each by Misc.(Non-Drug; Combo Route) route once a week. Use to inject testosterone 3/15/24   Clare Rome MD   testosterone cypionate (DEPOTESTOTERONE CYPIONATE) 200 mg/mL injection Inject 1 mL (200 mg total) into the muscle every 7 days. 3/15/24 9/13/24  Clare Rome MD       Family History:  Family History   Problem Relation Name Age of Onset " "   Hypertension Mother      Hyperlipidemia Mother      Diabetes Father      Hyperlipidemia Father      Hypertension Father      Heart disease Father  46        CAD    No Known Problems Sister      Cancer Maternal Aunt          colon cancer    Stroke Maternal Uncle      Cancer Paternal Uncle          prostate cancer       Social History:  Social History     Tobacco Use    Smoking status: Never     Passive exposure: Past    Smokeless tobacco: Never   Substance Use Topics    Alcohol use: Yes     Comment: less than once per month    Drug use: No       Review of Systems:  All other systems are reviewed and are negative.     Objective:   Last 24 Hour Vital Signs:  BP  Min: 132/67  Max: 176/86  Temp  Av.3 °F (36.8 °C)  Min: 98.1 °F (36.7 °C)  Max: 98.5 °F (36.9 °C)  Pulse  Av  Min: 69  Max: 82  Resp  Av.8  Min: 13  Max: 33  SpO2  Av.9 %  Min: 91 %  Max: 98 %  Height  Av' 3" (190.5 cm)  Min: 6' 3" (190.5 cm)  Max: 6' 3" (190.5 cm)  Weight  Av.4 kg (333 lb 10.7 oz)  Min: 149.7 kg (330 lb 0.5 oz)  Max: 153 kg (337 lb 4.9 oz)  I/O last 3 completed shifts:  In: -   Out: 500 [Urine:500]    Physical Examination:  Physical Exam  Constitutional:       General: He is not in acute distress.     Appearance: Normal appearance. He is obese. He is not ill-appearing, toxic-appearing or diaphoretic.   HENT:      Head: Normocephalic and atraumatic.      Right Ear: External ear normal.      Left Ear: External ear normal.      Nose: Nose normal. No congestion or rhinorrhea.      Mouth/Throat:      Mouth: Mucous membranes are moist.      Pharynx: Oropharynx is clear.   Eyes:      Extraocular Movements: Extraocular movements intact.      Conjunctiva/sclera: Conjunctivae normal.      Pupils: Pupils are equal, round, and reactive to light.   Cardiovascular:      Rate and Rhythm: Normal rate and regular rhythm.      Pulses: Normal pulses.      Heart sounds: Normal heart sounds.   Pulmonary:      Effort: Pulmonary " effort is normal.      Breath sounds: Normal breath sounds.   Abdominal:      General: Abdomen is flat. Bowel sounds are normal.      Palpations: Abdomen is soft.   Musculoskeletal:         General: Normal range of motion.      Cervical back: Normal range of motion.      Right lower leg: No edema.      Left lower leg: No edema.   Skin:     General: Skin is warm and dry.   Neurological:      General: No focal deficit present.      Mental Status: He is alert and oriented to person, place, and time. Mental status is at baseline.      Comments: B/L UE and LE strength 5/5  Still endorsing slightly decrease sensation on L forehead and L shin   Psychiatric:         Mood and Affect: Mood normal.         Behavior: Behavior normal.         Thought Content: Thought content normal.       Laboratory:  Trended Lab Data:  Recent Labs     06/09/24  2319 06/09/24  2320 06/10/24  0545   WBC  --  11.34  --    HGB  --  16.7  --    HCT  --  49.9  --    PLT  --  310  --    NA  --  139  --    K  --  4.8  --    CL  --  102  --    CO2  --  22*  --    BUN  --  13  --    CREATININE  --  1.1  --    GLU  --  105  --    BILITOT  --  0.4  --    AST  --  55*  --    ALT  --  94*  --    ALKPHOS  --  70  --    CALCIUM  --  10.3  --    ALBUMIN  --  4.4  --    PROT  --  7.8  --    INR 1.2 1.1 1.1       Cardiac:   Recent Labs   Lab 06/09/24 2320   TROPONINI <0.006       Urinalysis:   Lab Results   Component Value Date    COLORU Yellow 05/27/2024    SPECGRAV 1.010 05/27/2024    NITRITE Negative 05/27/2024    KETONESU Negative 05/27/2024    UROBILINOGEN Negative 05/27/2024       Microbiology:  None    Radiology:  CT Head Without Contrast   Final Result      1. No evidence of acute intracranial hemorrhage, extraxial fluid or midline shift. 2. No evidence of acute large vessel infarction.         Electronically signed by: Virtual Radiologist   Date:    06/10/2024   Time:    09:22      CTA Head and Neck (xpd)   Final Result      No acute abnormality. No  high-grade stenosis or major vessel occlusion.         Electronically signed by: Sweta Arroyo   Date:    06/10/2024   Time:    00:04      CT Head Without Contrast    (Results Pending)   US Abdomen Limited    (Results Pending)   MRI Brain Without Contrast    (Results Pending)     I have personally reviewed the above labs and imaging.    Current Medications:     Infusions:       Scheduled:   atorvastatin  40 mg Oral Daily    metoprolol succinate  100 mg Oral Daily    mupirocin   Nasal BID        PRN:    Current Facility-Administered Medications:     bisacodyL, 10 mg, Rectal, Daily PRN    labetalol, 10 mg, Intravenous, Q15 Min PRN    sodium chloride 0.9%, 500 mL, Intravenous, PRN    sodium chloride 0.9%, 10 mL, Intravenous, PRN    sodium chloride 0.9%, 10 mL, Intravenous, PRN     Assessment:     Dominic Collazo is a 43 y.o. male with PMH of Anxiety/Depression, HLD, HTN, LYNDA on nightly BiPAP, and T2DM who presents to [facility] ED 6/9/2024 with chief complaint of acute onset L facial droop and L. Sided upper and lower extremity weakness. In the ED, patient was stroke activated, CTH and CTA negative for acute processes, s/p TNK x1 with reported symptom improvement. Now in ICU for continued Neuro monitoring and repeat CTH and MRI s/p 24 hours.      Plan:     Neuro:  Acute Ischemic Stroke  - Patient presented with sudden onset L sided weakness x1 day  - CTH negative, CTA negative for LVO, s/p tPA 2357, with complete symptom resolution  - TTE negative for intracardiac shunt, EF 71% normal diastolic fxn.   - EKG NSR with ST depressions in I, II, aVF, V3-6, Trop 0.006  - TSH 0.139, T4 0.97, Hgb A1c 7%,   - Q1 Neuro checks  - HOB 30 (HeadPoST Trial found no difference in flat vs elevated HOB post 24)   - BP <180/105  - Continue Atorvastatin 40mg daily, Toprol XL 100mg daily  - NPO pending SLP eval  - MRI Brain in 24h post tx   - Repeat CTH 24h post tx  - Follow Neuro recs     Anxiety/Depression  - Patient reports  taking Bupropion, buspirone, alprazolam, and lisdexamfetamine at home  - Home meds currently held  - CTM    Cardiovascular/Hemodynamics:  Hypertension  - Home meds Losartan 25mg daily, Metoprolol succinate 100mg daily  - BP goal <180/105  - Continue Metoprolol succinate 100mg daily    HLD  - Lipid panel Cholesterol 224, , , HDL 36   - Continue atorvastatin 40mg daily    Respiratory:   LYNDA  - On home nightly BiPAP  - Continue BiPAP inpatient    GI/FEN:  F: None  E: K>4, Mg>2 (K 4.8, Mg   N: Cardiac diet    Elevated LFTs  - Chronically elevated, possibly 2/2 hepatic steatosis  - AST 55 (baseline 50-60s), ALT 94 (baseline 70-90s)  - Will obtain abdominal U/S    ID:   - No acute issues    Renal:   - No acute issues    Heme/Onc:   - No acute issues    Endocrine:  T2DM  - A1c 7%, on home metformin  - SSI + Accuchecks  - Goal glucose 140-180 while in ICU    TSH: 0.139, T4 0.97   - Follow up with PCP    Rheum/MSK:  - No acute issues     Critical Care Daily Checklist:    A: Awake: RASS Goal/Actual Goal: 0  Actual: Shah Agitation Sedation Scale (RASS): 0   B: Spontaneous Breathing Trial Performed? NA   C: SAT & SBT Coordinated?  NA                      D: Delirium: CAM-ICU Overall CAM-ICU: NA   E: Early Mobility Performed? Yes   F: Feeding Goal: Cardiac  Status: Cardiac     AS: Analgesia/Sedation None   T: Thromboembolic Prophylaxis None (s/p TNK)   H: HOB > 300 Yes    U: Stress Ulcer Prophylaxis (if needed) None   G: Glucose Control None glucose within goal   B: Bowel Function Stool Occurrence: 0 ; Regimen: none   I: Indwelling Catheter (Lines & Gannon) Necessity x2 PIV   D: De-escalation of Antimicrobials/Pharmacotherapies None    Plan for the day/ETD CTM Neuro status, pending repeat CTH/MRI    Code Status:  Family/Goals of Care: Full  NA     Davida Nielsen MD  Internal Medicine PGY-1  [facility]

## 2024-06-10 NOTE — SUBJECTIVE & OBJECTIVE
Past Medical History:   Diagnosis Date    Adjustment disorder with mixed anxiety and depressed mood     Anxiety     Colon polyp     Depression     ED (erectile dysfunction)     Family history of prostate cancer 9/29/2014    Hx of umbilical hernia repair 10/16/2020    Hyperlipidemia     Hypertension     Hypogonadism male     Insomnia     Low testosterone     LYNDA (obstructive sleep apnea)     Prediabetes        Past Surgical History:   Procedure Laterality Date    ADENOIDECTOMY      COLONOSCOPY N/A 12/5/2023    Procedure: COLONOSCOPY;  Surgeon: Jonnathan Velazquez MD;  Location: [facility] ENDO (4TH FLR);  Service: Colon and Rectal;  Laterality: N/A;  inst. to pt. portal. Takes Ozempic inst. to hold.[facility]  referral: Dr. Cross  11/28-[facility] for precall-MS  12/4-last dose Ozempic 11/26/23, precall complete-KPvt    HERNIA REPAIR      REPAIR OF INCARCERATED UMBILICAL HERNIA N/A 10/16/2020    Procedure: REPAIR, HERNIA, UMBILICAL, INCARCERATED, AGE 5 YEARS OR OLDER with mesh ;  Surgeon: Conrad Loco MD;  Location: [facility] OR 2ND FLR;  Service: General;  Laterality: N/A;    REPAIR OF LABRUM OF HIP Left 12/14/2023    Procedure: REPAIR,LABRUM,SHOULDER;  Surgeon: Sea Valadez MD;  Location: [facility] OR;  Service: Orthopedics;  Laterality: Left;    SHOULDER ARTHROSCOPY W/ SUPERIOR LABRAL ANTERIOR POSTERIOR LESION REPAIR Left 12/14/2023    Procedure: ARTHROSCOPY, SHOULDER,;  Surgeon: Sea Valadez MD;  Location: [facility] OR;  Service: Orthopedics;  Laterality: Left;  Arthrex knotless suturetak labral anchors, long plastic cannulas, arthroscopic elevators/rasps marissa notified cc    TONSILLECTOMY         Review of patient's allergies indicates:   Allergen Reactions    Ciprofloxacin      swelling    Penicillins Rash    Sulfa (sulfonamide antibiotics) Rash    Toradol [ketorolac] Nausea Only    Bell pepper Nausea Only    Meloxicam Nausea Only    Sulfamethoxazole-trimethoprim        Current Facility-Administered Medications on  File Prior to Encounter   Medication    lactated ringers infusion    LIDOcaine (PF) 10 mg/ml (1%) injection 10 mg     Current Outpatient Medications on File Prior to Encounter   Medication Sig    acetaminophen (TYLENOL) 325 MG tablet Take 2 tablets (650 mg total) by mouth every 6 (six) hours as needed for Pain.    ALPRAZolam (XANAX) 1 MG tablet Take 1 tablet (1 mg total) by mouth 2 (two) times daily as needed for Anxiety.    buPROPion (WELLBUTRIN XL) 150 MG TB24 tablet Take 1 tablet (150 mg total) by mouth once daily.    busPIRone (BUSPAR) 10 MG tablet Take 2 tablets (20 mg total) by mouth 2 (two) times daily.    dextroamphetamine sulfate (DEXTROSTAT) 10 MG tablet Take 1 tablet (10 mg total) by mouth after lunch.    lisdexamfetamine (VYVANSE) 40 MG Cap Take 1 capsule (40 mg total) by mouth once daily.    losartan (COZAAR) 25 MG tablet Take 1 tablet (25 mg total) by mouth once daily.    metFORMIN (GLUCOPHAGE-XR) 500 MG ER 24hr tablet Take 1 tablet (500 mg total) by mouth 2 (two) times daily with meals.    methocarbamoL (ROBAXIN) 750 MG Tab TAKE 1 TABLET(750 MG) BY MOUTH FOUR TIMES DAILY AS NEEDED FOR MUSCLE STRAIN    metoprolol succinate (TOPROL-XL) 100 MG 24 hr tablet Take 1 tablet (100 mg total) by mouth once daily.    modafiniL (PROVIGIL) 200 MG Tab Take 1 tablet (200 mg total) by mouth 2 (two) times daily as needed (sleepiness). First dose upon awakening and second dose 4-6 hours later    rosuvastatin (CRESTOR) 10 MG tablet Take 1 tablet by mouth once daily.    venlafaxine (EFFEXOR-XR) 150 MG Cp24 Take 1 capsule (150 mg total) by mouth once daily.    zolpidem (AMBIEN CR) 12.5 MG CR tablet Take 1 tablet (12.5 mg total) by mouth nightly as needed for Insomnia.    blood sugar diagnostic (ONETOUCH VERIO TEST STRIPS) Strp 1 each by Misc.(Non-Drug; Combo Route) route 3 (three) times daily.    blood-glucose meter kit To check BG 2 times daily, to use with insurance preferred meter    cetirizine HCl (ZYRTEC ORAL) Take 1  "tablet by mouth daily as needed.    cyanocobalamin (VITAMIN B-12) 1000 MCG tablet Take 100 mcg by mouth once daily.    ergocalciferol, vitamin D2, (VITAMIN D ORAL) Take 5,000 Units by mouth once daily.    fexofenadine (ALLEGRA) 60 MG tablet Take 60 mg by mouth every morning.    iron,carb/vit C/vit B12/folic (IRON 100 PLUS ORAL) Take by mouth once daily at 6am.    lancets 33 gauge Misc Test BG 2 times a day    multivitamin capsule Take 1 capsule by mouth once daily.    needle, disp, 18 G (BD REGULAR BEVEL NEEDLES) 18 gauge x 1" Ndle 1 each by Misc.(Non-Drug; Combo Route) route once a week. Use to draw up testosterone    ondansetron (ZOFRAN-ODT) 4 MG TbDL Take 1 tablet (4 mg total) by mouth every 6 (six) hours as needed (nausea).    riboflavin, vitamin B2, (VITAMIN B-2 ORAL) Take 1 tablet by mouth once daily.    scopolamine (TRANSDERM-SCOP) 1.3-1.5 mg (1 mg over 3 days) Place 1 patch onto the skin every 72 hours.    semaglutide (OZEMPIC) 2 mg/dose (8 mg/3 mL) PnIj Inject 2 mg into the skin every 7 days.    syringe with needle 3 mL 21 gauge x 1" Syrg 1 each by Misc.(Non-Drug; Combo Route) route once a week. Use to inject testosterone    testosterone cypionate (DEPOTESTOTERONE CYPIONATE) 200 mg/mL injection Inject 1 mL (200 mg total) into the muscle every 7 days.     Family History       Problem Relation (Age of Onset)    Cancer Maternal Aunt, Paternal Uncle    Diabetes Father    Heart disease Father (46)    Hyperlipidemia Mother, Father    Hypertension Mother, Father    No Known Problems Sister    Stroke Maternal Uncle          Tobacco Use    Smoking status: Never     Passive exposure: Past    Smokeless tobacco: Never   Substance and Sexual Activity    Alcohol use: Yes     Comment: less than once per month    Drug use: No    Sexual activity: Yes     Partners: Female     Review of Systems   Neurological:  Positive for weakness and numbness.   All other systems reviewed and are negative.    Objective:     Vital Signs " (Most Recent):  Temp: 98.2 °F (36.8 °C) (06/10/24 0130)  Pulse: 74 (06/10/24 0245)  Resp: (!) 23 (06/10/24 0245)  BP: 137/84 (06/10/24 0230)  SpO2: 96 % (06/10/24 0245) Vital Signs (24h Range):  Temp:  [98.1 °F (36.7 °C)-98.5 °F (36.9 °C)] 98.2 °F (36.8 °C)  Pulse:  [72-82] 74  Resp:  [15-33] 23  SpO2:  [94 %-97 %] 96 %  BP: (132-176)/() 137/84     Weight: (!) 153 kg (337 lb 4.9 oz)  Body mass index is 42.16 kg/m².     Physical Exam  Constitutional:       Appearance: He is obese.   HENT:      Right Ear: External ear normal.      Left Ear: External ear normal.      Mouth/Throat:      Mouth: Mucous membranes are dry.   Eyes:      Extraocular Movements: Extraocular movements intact.      Pupils: Pupils are equal, round, and reactive to light.   Cardiovascular:      Rate and Rhythm: Normal rate and regular rhythm.   Pulmonary:      Effort: Pulmonary effort is normal.      Breath sounds: Normal breath sounds.   Abdominal:      General: Abdomen is flat. Bowel sounds are normal.      Palpations: Abdomen is soft.   Musculoskeletal:         General: Normal range of motion.   Skin:     General: Skin is warm.      Capillary Refill: Capillary refill takes less than 2 seconds.   Neurological:      General: No focal deficit present.      Mental Status: He is alert and oriented to person, place, and time.              CRANIAL NERVES     CN III, IV, VI   Pupils are equal, round, and reactive to light.       Significant Labs: All pertinent labs within the past 24 hours have been reviewed.    Significant Imaging: I have reviewed all pertinent imaging results/findings within the past 24 hours.

## 2024-06-10 NOTE — ED NOTES
MD and multiple Rns at bedside. TNK to be given at this time. Cardiac monitor, continuous pulse ox and nibp cuff in place. Pt confirmed consent with ED MD.

## 2024-06-10 NOTE — CARE UPDATE
Patient admitted with acute ischemic stroke s/p tPA with complete resolution of symptoms.  Reports slight headaches now otherwise no new problems.  Pressure stable with goal of 180/105  Hemoglobin A1c 7.0  Lipid panel: Tc 24, , HDL 36,   Continue Q 1 hour vitals check-neuro check  TTE pending

## 2024-06-10 NOTE — CONSULTS
"NEUROLOGY ICU CONSULT    Reason for consult:  L sided weakness and numbness s/p TNK    CC:  LSW and numbness    HPI:   Dominic Collazo is a 43 y.o. M with a PMH of HTN, HLD, LYNDA on BiPAP, concussion who presented to [facility] on 6/9/24 for acute left sided weakness and numbness, LKN 10 PM on 6/9/234.    Pt reports that yesterday evening  (LKN 10 PM on 6/9/24) he was experiencing a lot of stress at work when he noticed that tingling and then numbness in the L face. He went to get up to walk and noted his left side was weak and he was leaning toward the R side. Denied any other symptoms at that time such as visual loss, dysarthria, dysphagia or vertigo    Pt was evaluated by telestroke, at that time NIHSS = 3 for L arm drift, L leg drift, and mild sensory loss on the L. He received TNK at 2357 on 6/9/24. After receiving TNK, 5 minutes later pt had resolution of left sided weakness and L sided numbness. CTH non con with no hemorrhage, CTA head and neck with LVOs. Pending MRI brain     Additionally, pt reports that two weeks ago he had an episode of difficulty speaking that lasted for 4-5 hours. He knew what he wanted to say, but it was very difficult to get words out. When he was able to get words out, he noted that his speech was slurred. He went to be evaluated at [place] of 5/23/24 and he was diagnosed with panic attack. There is no documentation of speech difficulties from that visit. They documented racing thoughts and anxiety following smoking a MJ vape pen.     Pt reports he was hesitant to come to the ER again this time around, because he was worried they would tell him he is just having a panic attack again and they would turn him away.     Pt reports a different episode that occurred July 1st 2023, he was surprised to find out he was getting a $25,000 pay cut and afterward he "fell out" backwards into the bathtub, hit his head and lost consciousness for 30-45 minutes.     ROS: As per " HPI    Histories:     Allergies:  Ciprofloxacin, Penicillins, Sulfa (sulfonamide antibiotics), Toradol [ketorolac], Bell pepper, Meloxicam, and Sulfamethoxazole-trimethoprim    Current Medications:    Current Facility-Administered Medications   Medication Dose Route Frequency Provider Last Rate Last Admin    acetaminophen tablet 650 mg  650 mg Oral Q6H PRN Eliz Tsai MD   650 mg at 06/10/24 1006    ALPRAZolam tablet 1 mg  1 mg Oral BID PRN Eliz Tsai MD   1 mg at 06/10/24 1153    atorvastatin tablet 40 mg  40 mg Oral Daily Mary Valdez MD   40 mg at 06/10/24 0840    bisacodyL suppository 10 mg  10 mg Rectal Daily PRN Mary Valdez MD        buPROPion TB24 tablet 150 mg  150 mg Oral Daily JameEliz Jimenez MD   150 mg at 06/10/24 1324    busPIRone tablet 20 mg  20 mg Oral BID Eliz Tsai MD        labetaloL injection 10 mg  10 mg Intravenous Q15 Min PRN Mary Valdez MD        metoprolol succinate (TOPROL-XL) 24 hr tablet 100 mg  100 mg Oral Daily Mary Valdez MD   100 mg at 06/10/24 0840    mupirocin 2 % ointment   Nasal BID Juan Jose Louie MD   Given at 06/10/24 0851    sodium chloride 0.9% bolus 500 mL 500 mL  500 mL Intravenous PRN Mary Valdez MD        sodium chloride 0.9% flush 10 mL  10 mL Intravenous PRN Mary Valdez MD        sodium chloride 0.9% flush 10 mL  10 mL Intravenous PRN Mary Valdez MD        venlafaxine 24 hr capsule 150 mg  150 mg Oral Daily [facility]   150 mg at 06/10/24 1324     Facility-Administered Medications Ordered in Other Encounters   Medication Dose Route Frequency Provider Last Rate Last Admin    lactated ringers infusion   Intravenous Continuous Stan Marcos DNP        LIDOcaine (PF) 10 mg/ml (1%) injection 10 mg  1 mL Intradermal Once Stan Marcos DNP           Past Medical/Surgical/Family History:  Medical:    Past Medical History:   Diagnosis Date    Acute ischemic stroke 6/10/2024    Adjustment disorder with mixed anxiety and depressed mood     Anxiety     Colon polyp     Depression     ED (erectile dysfunction)     Family history of prostate cancer 9/29/2014    Hx of umbilical hernia repair 10/16/2020    Hyperlipidemia     Hypertension     Hypogonadism male     Insomnia     Low testosterone     LYNDA (obstructive sleep apnea)     Prediabetes       Surgeries:   Past Surgical History:   Procedure Laterality Date    ADENOIDECTOMY      COLONOSCOPY N/A 12/5/2023    Procedure: COLONOSCOPY;  Surgeon: Jonnathan Velazquez MD;  Location: [facility] ENDO (4TH FLR);  Service: Colon and Rectal;  Laterality: N/A;  inst. to pt. portal. Takes Ozempic inst. to hold.Tbou  referral: Dr. Cross  11/28-lvm for precall-MS  12/4-last dose Ozempic 11/26/23, precall complete-KPvt    HERNIA REPAIR      REPAIR OF INCARCERATED UMBILICAL HERNIA N/A 10/16/2020    Procedure: REPAIR, HERNIA, UMBILICAL, INCARCERATED, AGE 5 YEARS OR OLDER with mesh ;  Surgeon: Conrad Loco MD;  Location: [facility] OR 2ND FLR;  Service: General;  Laterality: N/A;    REPAIR OF LABRUM OF HIP Left 12/14/2023    Procedure: REPAIR,LABRUM,SHOULDER;  Surgeon: Sea Valadez MD;  Location: [facility] OR;  Service: Orthopedics;  Laterality: Left;    SHOULDER ARTHROSCOPY W/ SUPERIOR LABRAL ANTERIOR POSTERIOR LESION REPAIR Left 12/14/2023    Procedure: ARTHROSCOPY, SHOULDER,;  Surgeon: Sea Valadez MD;  Location: [facility] OR;  Service: Orthopedics;  Laterality: Left;  Arthrex knotless suturetak labral anchors, long plastic cannulas, arthroscopic elevators/rasps marissa notified cc    TONSILLECTOMY        Family:   Family History   Problem Relation Name Age of Onset    Hypertension Mother      Hyperlipidemia Mother      Diabetes Father      Hyperlipidemia Father      Hypertension Father      Heart disease Father  46        CAD    No Known Problems Sister      Cancer Maternal Aunt           colon cancer    Stroke Maternal Uncle      Cancer Paternal Uncle          prostate cancer   , no family history of stroke    Social History:    Substance Abuse/Dependence History:  Tobacco: denied  EtOH:  occasional  Ilicits: MJ occasional    Occupational/Employment History:  Occupation: Works at [employer], assists with course content creation      Current Evaluation:     Vital Signs:   Vitals:    06/10/24 1330   BP: 128/82   Pulse: 66   Resp: (!) 21   Temp:         Neurological Examination     NIH Stroke Scale      Interval: 24 hours post onset of symptoms +/- 20 minutes  Time: 1:47 PM  Person Administering Scale: Lin King    Administer stroke scale items in the order listed. Record performance in each category after each subscale exam. Do not go back and change scores. Follow directions provided for each exam technique. Scores should reflect what the patient does, not what the clinician thinks the patient can do. The clinician should record answers while administering the exam and work quickly. Except where indicated, the patient should not be coached (i.e., repeated requests to patient to make a special effort).      1a  Level of consciousness: 0=alert; keenly responsive   1b. LOC questions:  0=Answers both tasks correctly   1c. LOC commands: 0=Answers both tasks correctly   2.  Best Gaze: 0=normal   3.  Visual: 0=No visual loss   4. Facial Palsy: 0=Normal symmetric movement   5a.  Motor left arm: 0=No drift, limb holds 90 (or 45) degrees for full 10 seconds   5b.  Motor right arm: 0=No drift, limb holds 90 (or 45) degrees for full 10 seconds   6a. motor left le=No drift, limb holds 90 (or 45) degrees for full 10 seconds   6b  Motor right le=No drift, limb holds 90 (or 45) degrees for full 10 seconds   7. Limb Ataxia: 0=Absent   8.  Sensory: 1=Mild to moderate sensory loss; patient feels pinprick is less sharp or is dull on the affected side; there is a loss of superficial pain with  "pinprick but patient is aware He is being touched   9. Best Language:  0=No aphasia, normal   10. Dysarthria: 0=Normal   11. Extinction and Inattention: 0=No abnormality    Total:   1        Orientation  Alert, awake, oriented to self, place, time, and situation.  Memory  Recent and remote memory intact.  Language  No dysarthria, No aphasia.   Cranial Nerves  PERRL, VF intact, EOMI, V1-V3 intact, symmetric facial expression, hearing grossly intact, SCM & TPZ 5/5, tongue midline, symmetric palate elevation.  Motor  Normal Bulk  Normal Tone  5/5 strength in 4 extremities  Sensory  Decreased sensation to pinprick on L face, arm and leg. States R side feels 100% sensation, and left side feels about 90% compared to the R side  DTR   +2 symmetric  Cerebellar/Gait  Normal finger to nose and heel to shin.   Deferred gait    LABORATORY STUDIES:  CBC:   Recent Labs   Lab 06/09/24 2320   WBC 11.34   HGB 16.7   HCT 49.9      MCV 91   RDW 13.2     BMP:   Recent Labs   Lab 06/09/24 2320      K 4.8      CO2 22*   BUN 13   CREATININE 1.1      CALCIUM 10.3     LFTs:   Recent Labs   Lab 06/09/24 2320   PROT 7.8   ALBUMIN 4.4   BILITOT 0.4   AST 55*   ALKPHOS 70   ALT 94*     Coags:   Recent Labs   Lab 06/09/24  2319 06/09/24  2320 06/10/24  0545   INR 1.2 1.1 1.1     FLP:   Recent Labs   Lab 06/09/24 2320   CHOL 224*   LDLCALC 143.8   TRIG 221*   CHOLHDL 16.1*     DM:   Recent Labs   Lab 06/09/24 2320   HGBA1C 7.0*   LDLCALC 143.8   CREATININE 1.1     Thyroid:   Recent Labs   Lab 06/09/24 2320   TSH 0.139*   FREET4 0.97     Anemia: No results for input(s): "IRON", "TIBC", "FERRITIN", "AQNEDMZG25", "FOLATE" in the last 168 hours.  Cardiac:   Recent Labs   Lab 06/09/24  2320 06/10/24  0545   TROPONINI <0.006 <0.006     Urinalysis:   Recent Labs   Lab 06/10/24  0829   COLORU Yellow   APPEARANCEUA Clear   PHUR 6.0   SPECGRAV 1.030   PROTEINUA Negative   GLUCUA Negative   KETONESU Negative   BILIRUBINUA " "Negative   OCCULTUA Negative   NITRITE Negative   UROBILINOGEN Negative   LEUKOCYTESUR Negative     Urine Micro:  No results for input(s): "RBCUA", "WBCUA", "BACTERIA", "SQUAMEPITHEL", "MICROCMT" in the last 168 hours.     RADIOLOGY STUDIES:  Imaging    CTH non con 6/10/24  FINDINGS:  Brain: No evidence of acute intracranial hemorrhage, extraxial fluid or midline shift. Cerebral ventricles: No ventriculomegaly. Paranasal sinuses: Visualized sinuses are unremarkable. No fluid levels. Mastoid air cells: Bilateral mastoid air cells normally aerated. Bones: Unremarkable. No acute fracture. Soft tissues: Unremarkable     Impression:     1. No evidence of acute intracranial hemorrhage, extraxial fluid or midline shift. 2. No evidence of acute large vessel infarction.    CTA head and neck 6/9/24    Impression:     No acute abnormality. No high-grade stenosis or major vessel occlusion.'    MRI brain wo  Pending    TTE 6/10/24    Left Ventricle: The left ventricle is normal in size. There is concentric remodeling. There is normal systolic function. Biplane (2D) method of discs ejection fraction is 71%. There is normal diastolic function.    Right Ventricle: Normal right ventricular cavity size. Systolic function is normal. TAPSE is 2.27 cm.    Right Atrium: Right atrium is mildly dilated.    Pulmonary Artery: The estimated pulmonary artery systolic pressure is 23 mmHg.    IVC/SVC: Normal venous pressure at 3 mmHg.    Study is negative for shunt.      ASSESSMENT:    Dominic Collazo is a 43 y.o. M with a PMH of HTN, HLD, DM2 (last A1c 7%), LYNDA on BiPAP, prior concussion who presented to [facility] on 6/9/24 for acute left sided weakness and numbness, LKN 10 PM on 6/9/234. S/p TNK given at 2357 on 6/9/24, pt had resolution of symptoms.    Impression:  Acute left sided weakness and numbness resolved with TNK, concern for acute stroke vs. TIA. His risk factors for stroke include HTN, HLD, LYNDA and DM2. ABCD2 score is  " 5 (moderate risk of recurrent TIA)    Recommendations  1) TNK given at 2357 on 6/9/234    - AVOID arterial sticks, venipuncture, insertion of indwelling urethral catheter, insertion of NG tube during the first 24 hours after IV thrombolytic infusion.     - AVOID antiplatelet and antithrombotic medications during the first 24 hours of thrombolytic infusion.    2)  Anti-platelet therapy: Begin therapy with ASA 81 and Plavix 24 hours after administration of TNK    3) Admit to: MICU, neurochecks q15 min for the first two hours after TNK administration, then q30 min for 6 hours, then q1 hour for the next 12 hours    4) Recommend serum glucose between 140 and 180    5) Recommend SBP goal < 180 and DBP < 105    6) Labetalol 10 mg IV PRN for BP above goal if HR >65 or Hydralazine 10 mg IV for BP   above goal if HR < 65    - Use Cardene gtt it BP refractory to above medications    7) Keep NPO; if passes swallow screen, can have full liquids and oral medications but will have to wait until SLP swallow study to allow solid foods    8) Atorvastatin 40 mg; goal LDL <70    9) Labs: HgA1C, Fasting Lipid Panel, TSH, ESR, Vitamin B-12, Folate    10) Imaging: MRI brain wo contrast pending    11) Repeat CT Head WO after 24 hours of TNK administration to assess for any hemorrhagic conversion    12) DVT Prophylaxis: TEDs/SCDs    13) Initiate rehab efforts with PT/OT/ST      Case to be discussed with Dr. Paz    Will continue to follow.    Lin King MD  Neurology PGY3

## 2024-06-10 NOTE — ED NOTES
Pt c/o mild headache. Pt rates pain 3/10. Pt remains aaox4 and has no further neurologic symptoms at this time.  Admit team notified via secure chat. MD requesting notification if headache worsens. Pt instructed to notify nurse if HA worsens and v/u.

## 2024-06-10 NOTE — ASSESSMENT & PLAN NOTE
Status post tPA with complete resolution of symptoms.  Admit to ICU.  Neuro checks per neurology recommendation.  Q 1 vital checks.  Low threshold for CT head if change in clinical status.  IV labetalol p.r.n. to keep blood pressure less than 180/105.  High-intensity statin.  HbA1c and lipid panel.  TTE with bubble.  Neurology consult.  Speech consult.

## 2024-06-10 NOTE — PT/OT/SLP EVAL
Speech Language Pathology Evaluation  Cognitive/Bedside Swallow    Patient Name:  Dominic Collazo   MRN:  [id number]  Admitting Diagnosis: Acute ischemic stroke    Recommendations:                  General Recommendations:  follow up with PCP and his current Neuro MD  Diet recommendations:  Regular Diet - IDDSI Level 7, Thin liquids - IDDSI Level 0   Aspiration Precautions: standard, whole meds   General Precautions: Standard, fall  Communication strategies:  none    Assessment:     Dominic Collazo is a 43 y.o. male admitted with CVA who does not exhibit any dysphagia or cognitive/communication impairments at this time.     History per MD    Principal Problem:Acute ischemic stroke     Chief Complaint   Patient presents with    Fatigue       Weakness, numbness to L side of face and LUE that started at 2200.          HPI: 43 years old man with past medical history of anxiety, depression, HLD, HTN, LYNDA on BiPap, history of concussion and follows up with Neurology.  The patient presented with acute left-sided weakness.  CT head was negative.  CTA is negative for dissection or large vessel occlusion.  Patient was evaluated by tele Neurology and he was deemed a candidate for tPA.  After receiving the tPA his weakness has completely resolved.  The patient then admitted to the ICU for frequent neuro checks and vital sign checks.         Past Medical History:   Diagnosis Date    Acute ischemic stroke 6/10/2024    Adjustment disorder with mixed anxiety and depressed mood     Anxiety     Colon polyp     Depression     ED (erectile dysfunction)     Family history of prostate cancer 9/29/2014    Hx of umbilical hernia repair 10/16/2020    Hyperlipidemia     Hypertension     Hypogonadism male     Insomnia     Low testosterone     LYNDA (obstructive sleep apnea)     Prediabetes        Past Surgical History:   Procedure Laterality Date    ADENOIDECTOMY      COLONOSCOPY N/A 12/5/2023    Procedure: COLONOSCOPY;  Surgeon:  "Jonnathan Velazquez MD;  Location: [facility] ENDO (4TH FLR);  Service: Colon and Rectal;  Laterality: N/A;  inst. to pt. portal. Takes Ozempic inst. to hold.Tb  referral: Dr. Cross  11/28-lvm for precall-MS  12/4-last dose Ozempic 11/26/23, precall complete-KPvt    HERNIA REPAIR      REPAIR OF INCARCERATED UMBILICAL HERNIA N/A 10/16/2020    Procedure: REPAIR, HERNIA, UMBILICAL, INCARCERATED, AGE 5 YEARS OR OLDER with mesh ;  Surgeon: Conrad Loco MD;  Location: [facility] 2ND FLR;  Service: General;  Laterality: N/A;    REPAIR OF LABRUM OF HIP Left 12/14/2023    Procedure: REPAIR,LABRUM,SHOULDER;  Surgeon: Sea Valadez MD;  Location: [facility];  Service: Orthopedics;  Laterality: Left;    SHOULDER ARTHROSCOPY W/ SUPERIOR LABRAL ANTERIOR POSTERIOR LESION REPAIR Left 12/14/2023    Procedure: ARTHROSCOPY, SHOULDER,;  Surgeon: Sea Valadez MD;  Location: [facility] OR;  Service: Orthopedics;  Laterality: Left;  Arthrex knotless suturetak labral anchors, long plastic cannulas, arthroscopic elevators/rasps marissa notified cc    TONSILLECTOMY         Social History: Patient lives at home with wife and has son.    Prior Intubation HX:  n/a    CT of the head: 1. No evidence of acute intracranial hemorrhage, extraxial fluid or midline shift. 2. No evidence of acute large vessel infarction.       Chest X-Rays: none on admit     Prior diet: unrestricted diet    Occupation/hobbies/homemaking: works FT at [employer].    Subjective     Consult received for clinical swallow eval/speech/lang eval this date, SLP did communicate with RN prior to eval/treat.    Patient goals: "I am having some anxiety right now."     Pain/Comfort:  Pain Rating 1: 0/10    Respiratory Status: room air     Objective:     Cognitive Status:    WFL  Oriented x4  Attention intact  Problem solving- intact       Receptive Language:   Comprehension:   WFL    Pragmatics:    Appropriate     Expressive Language:  Verbal:  fluent verbal output  Reported " 1-2 incidents of anomia with simple words  Able to engage in open ended discourse with no issues noted at this moment  Rec: follow up with Neuro if additional issues occur        Motor Speech:  No dysarthria     Voice:   Clear     Visual-Spatial:  none    Reading:   WFL     Written Expression:   DNT    Oral Musculature Evaluation  Oral Musculature: WFL  Dentition: present and adequate  Secretion Management: adequate  Mucosal Quality: good, adequate  Mandibular Strength and Mobility: WFL  Oral Labial Strength and Mobility: WFL  Lingual Strength and Mobility: WFL  Buccal Strength and Mobility: WFL  Volitional Cough: elicited  Volitional Swallow: timely swallow  Voice Prior to PO Intake: clear voice    Bedside Swallow Eval:   Consistencies Assessed:  Thin liquids water by straw  Solids cracker      Oral Phase:   WFL    Pharyngeal Phase:   no overt clinical signs/symptoms of aspiration  no overt clinical signs/symptoms of pharyngeal dysphagia  Timely swallow     Compensatory Strategies  none    Treatment: Reviewed recs with pt, role of SLP and answered questions related to current dx.   No IP needs for now.     Goals:   Multidisciplinary Problems       SLP Goals       Not on file                    Plan:     Patient to be seen:      Plan of Care expires:     Plan of Care reviewed with:  patient   SLP Follow-Up:  No       Discharge recommendations:  Therapy Intensity Recommendations at Discharge: No Therapy Indicated   Barriers to Discharge:  none    Time Tracking:     SLP Treatment Date:   06/10/24  Speech Start Time:  1022  Speech Stop Time:  1040     Speech Total Time (min):  18 min    Billable Minutes: Eval 9  and Eval Swallow and Oral Function 9    06/10/2024

## 2024-06-10 NOTE — SUBJECTIVE & OBJECTIVE
HPI:  43 y.o. male Referring Facility: [facility]    LKN: [id number] Symptoms: left sided weaknes/ numbness Has the referring seen the patient ( yes or no): no      Images personally reviewed and interpreted:  Study: Head CT and CTA Head & Neck  Study Interpretation: no acute intra-cranial process     Assessment and plan:  Ddx: TIA/stroke/HTN urgency      CT head with no acute intra-cranial process. Patient is candidate of TNK given his symptoms consistent with acute ischemic stroke.   Recommend IV Tenecteplase 0.25mg/kg IV push (max dose 25mg); If Tenecteplase is not available use Alteplase 0.9mg/kg IV bolus followed by infusion (max dose 90mg)      Additional Recommendations:   1. Neurological assessment and vital signs (except temperature) every 15 minutes x 2 hours, then every 30 minutes x 6 hours, then every hour x 16 hours..  2. Frequency of BP assessments may need to be increased if systolic BP stays >= 180 mm Hg or diastolic BP stays >= 105 mm Hg. Administer antihypertensive meds as ordered  3. Temperature every 4 hours or as required.  4. Follow hospital protocol for further orders re: post thrombolytic therapy patient management.  5. No antithrombotics or anticoagulants (including but not limited to: heparin, warfarin, aspirin, clopidogrel, or dipyridamole) for 24 hours, then start antithrombotics as ordered by treating physician     I also recommend obtaining CTA head and neck with and without contrast. They will call me with CTA results if abnormal. If LVO, patient will be transferred for thrombectomy. Otherwise, below is plan.     - Admit to MICU for 24 hour monitoring s/p IV TNK   - Please use IV TPA order set  - Keep Strict NPO until patient passes bedside SS  - Head of bed flat, IV Fluids  - BP: Aggressive Treatment to keep blood pressure less than 180/105 mmHg, using: labetalol or nicardipine, avoid hydralazine/nitrates  - Emergent CT Brain for: acute neurologic decline, nausea, vomiting, or new  headache  - Repeat Imaging, ideally MRI Brain in 24 hours post treatment, but if unable to obtain MRI Brain, please get repeat non-con CT Brain  - LDL, HgA1C  - Neuro checks, vital checks  - Neuro consult if in house available or transfer for neuro consult  - Stroke/TIA work-up with MRI brain, Echo, PT/OT/ Speech and swallow evaluation.      [employer] recommendation: Recommend IV Tenecteplase 0.25mg/kg IV push (max dose 25mg); If Tenecteplase is not available use Alteplase 0.9mg/kg IV bolus followed by infusion (max dose 90mg)     Additional Recommendations:   Neurological assessment and vital signs (except temperature) every 15 minutes x 2 hours, then every 30 minutes x 6 hours, then every hour x 16 hours..  Frequency of BP assessments may need to be increased if systolic BP stays >= 180 mm Hg or diastolic BP stays >= 105 mm Hg. Administer antihypertensive meds as ordered  Temperature every 4 hours or as required.  Follow hospital protocol for further orders re: post thrombolytic therapy patient management.  No antithrombotics or anticoagulants (including but not limited to: heparin, warfarin, aspirin, clopidigrel, or dipyridamole) for 24 hours, then start antithrombotics as ordered by treating physician    Adapted from the American Heart Association/American Stroke Association (AHA/ASA) and American Association of Neuroscience Nurses (AANN) Guidelines.   Thrombectomy recommendation: Awaiting CTA results from [facility] for determination   Placement recommendation: pending further studies

## 2024-06-10 NOTE — PT/OT/SLP EVAL
"Physical Therapy Evaluation    Patient Name:  Dominic Collazo   MRN:  [id number]    Recommendations:     Discharge Recommendations: No Therapy Indicated   Discharge Equipment Recommendations: none   Barriers to discharge: None    Assessment:     Dominic Collazo is a 43 y.o. male admitted with a medical diagnosis of Acute ischemic stroke.  He presents with the following impairments/functional limitations: gait instability, impaired sensation, pain, decreased safety awareness.   PT Evaluation performed.  Pt cooperative and motivated throughout.      On eval patient grossly independent/supervision limited by line management and did not mobilize far from bed 2/2 acuity. Acute PT POC to follow x1 more visit. Anticipate D/c home when stable with no further therapy or equipment needs. Per patient report he has an OP neurology appt follow up later this week.                Rehab Prognosis: Good; patient would benefit from acute skilled PT services to address these deficits and reach maximum level of function.    Recent Surgery: * No surgery found *      Plan:     During this hospitalization, patient to be seen 2 x/week to address the identified rehab impairments via gait training, therapeutic activities, therapeutic exercises, neuromuscular re-education and progress toward the following goals:    Plan of Care Expires:  07/10/24    Subjective     Chief Complaint: reports HA 3/10 L sided front head and feeling "a little off" " something right here" pointing to L head and upper L side of face when questioned about pain and about sensory deficits.  Pt reports no other pain and no dizziness or nausea;  c/o discomfort in general in back/buttock from hosp bed  Patient/Family Comments/goals: PLOF of fully independent with all ADL and Mobility  Pain/Comfort:  Pain Rating 1: 3/10 (L sided HA)  Location - Orientation 1: anterior  Location 1: head  Pain Addressed 1: Reposition, Nurse notified  Pain Rating " Post-Intervention 1: 3/10    Patients cultural, spiritual, Rastafari conflicts given the current situation: no    Living Environment:  Pt lives w his wife in 2 story home (w bed/bath on 2nd floor) and 4-5 LARS home.  Pt has tub/shower combo with no DME used.  Pt ambulates community levels with no DME use.  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  Pt is +.  Equipment used at home: none.    Upon discharge, patient will have assistance from his wife.    Objective:     Communicated with nsg prior to session.  Patient found supine with telemetry, pulse ox (continuous), oxygen  upon PT entry to room.    General Precautions: Standard, fall  Orthopedic Precautions:N/A   Braces: N/A  Respiratory Status: Nasal cannula reporting it is for when he dozes off his oxygen decreases and at night used cpap for sleeping;  twice during session when pt conversing decreased O2 from average 95% to upper 80s immediately resolving w pt's concentrated/aware breathing.  Session perf without O2 as per pt's preference but returned intact at end of session.    Exams:  Cog:  A & O x 4           expressing self clearly and following all commands appropriately  ROM:  BLE  WFL  throughout  MMT:  BLE  WFL throughout;  grossly 5/5  Sensory:  reports no numbness or tingling;  Reports L ant HA/face only current issue (informed nsg); reports has tingling at bottom of both feet (DM neuropathy probable per pt) which is an occasional chronic condition;  BLE proprioception intact  Static sit balance EOB:  Good        Dynamic sit balance EOB:  Good  Static stance balance :  Good-        Dynamic stance balance:  Good-        both without AD use    Functional Mobility:  Bed mobility:  SBA and VC technique for back comfort and assist with multi line management;  seated scoot Ind  Txfs:  sup<>sit SBA w VC tech for increased ease of movement/energy conservation and breathing awareness when complete tasks;  sit<>stand CGA/SBA  without AD use with VC and assist line management only w no LOB demo'd  Pre gait and Gait:   amb ~2 feet around to BS chair with no LOB CGA/close SBA and line management w VC technique and posture re ed provided; limited currently by pt's acuity - progression planned in PT POC      AM-PAC 6 CLICK MOBILITY  Total Score:24       Treatment & Education:  Provided ed to pt and his wife for PT POC, safety w all mobility, importance of calling a staff member when performing any mobility OOB.  Initiated bed mob and txfs training and pre gait and gait training.  Ed initiation static and dynamic balance and endurance activities along w posture ariel.  Pt demo good progress w recovery at this time compared to yesterday endorsed by pt and his wife. Vitals stable throughout session.  Supine resting BP  131/77        Seated in OOB chair following activity /85    Patient left up in chair with all lines intact, call button in reach, nsg notified, and wife present.    GOALS:   Multidisciplinary Problems       Physical Therapy Goals          Problem: Physical Therapy    Goal Priority Disciplines Outcome Goal Variances Interventions   Physical Therapy Goal     PT, PT/OT Progressing     Description: Goals to be met by: discharge date     Patient will increase functional independence with mobility by performin. Sit to stand transfer with [place]  2. Bed to chair transfer with [place] using No Assistive Device  3.  Tolerate seated OOB chair > 1 hr  4. Gait  x 150 feet with [place] and Supervision using No Assistive Device without LOB..                          History:     Past Medical History:   Diagnosis Date    Acute ischemic stroke 6/10/2024    Adjustment disorder with mixed anxiety and depressed mood     Anxiety     Colon polyp     Depression     ED (erectile dysfunction)     Family history of prostate cancer 2014    Hx of umbilical hernia repair 10/16/2020    Hyperlipidemia     Hypertension      Hypogonadism male     Insomnia     Low testosterone     LYNDA (obstructive sleep apnea)     Prediabetes        Past Surgical History:   Procedure Laterality Date    ADENOIDECTOMY      COLONOSCOPY N/A 12/5/2023    Procedure: COLONOSCOPY;  Surgeon: Jonnatahn Velazquez MD;  Location: [facility] (4TH FLR);  Service: Colon and Rectal;  Laterality: N/A;  inst. to pt. portal. Takes Ozempic inst. to hold.Tbou  referral: Dr. Cross  11/28-lvm for precall-MS  12/4-last dose Ozempic 11/26/23, precall complete-KPvt    HERNIA REPAIR      REPAIR OF INCARCERATED UMBILICAL HERNIA N/A 10/16/2020    Procedure: REPAIR, HERNIA, UMBILICAL, INCARCERATED, AGE 5 YEARS OR OLDER with mesh ;  Surgeon: Conrad Loco MD;  Location: [facility] OR 2ND FLR;  Service: General;  Laterality: N/A;    REPAIR OF LABRUM OF HIP Left 12/14/2023    Procedure: REPAIR,LABRUM,SHOULDER;  Surgeon: Sea Valadez MD;  Location: [facility] OR;  Service: Orthopedics;  Laterality: Left;    SHOULDER ARTHROSCOPY W/ SUPERIOR LABRAL ANTERIOR POSTERIOR LESION REPAIR Left 12/14/2023    Procedure: ARTHROSCOPY, SHOULDER,;  Surgeon: Sea Valadez MD;  Location: [facility] OR;  Service: Orthopedics;  Laterality: Left;  Arthrex knotless suturetak labral anchors, long plastic cannulas, arthroscopic elevators/rasps marissa notified cc    TONSILLECTOMY         Time Tracking:     PT Received On: 06/10/24  PT Start Time: 1212     PT Stop Time: 1255  PT Total Time (min): 43 min     Billable Minutes: Evaluation 13 and Therapeutic Activity 30      06/10/2024

## 2024-06-11 VITALS
SYSTOLIC BLOOD PRESSURE: 141 MMHG | BODY MASS INDEX: 39.17 KG/M2 | RESPIRATION RATE: 18 BRPM | TEMPERATURE: 98 F | DIASTOLIC BLOOD PRESSURE: 85 MMHG | WEIGHT: 315 LBS | OXYGEN SATURATION: 95 % | HEART RATE: 74 BPM | HEIGHT: 75 IN

## 2024-06-11 LAB
ALBUMIN SERPL BCP-MCNC: 4.4 G/DL (ref 3.5–5.2)
ALP SERPL-CCNC: 66 U/L (ref 55–135)
ALT SERPL W/O P-5'-P-CCNC: 89 U/L (ref 10–44)
ANION GAP SERPL CALC-SCNC: 14 MMOL/L (ref 8–16)
AST SERPL-CCNC: 65 U/L (ref 10–40)
BASOPHILS # BLD AUTO: 0.09 K/UL (ref 0–0.2)
BASOPHILS NFR BLD: 1 % (ref 0–1.9)
BILIRUB DIRECT SERPL-MCNC: 0.1 MG/DL (ref 0.1–0.3)
BILIRUB SERPL-MCNC: 0.3 MG/DL (ref 0.1–1)
BUN SERPL-MCNC: 16 MG/DL (ref 6–20)
CALCIUM SERPL-MCNC: 9.9 MG/DL (ref 8.7–10.5)
CHLORIDE SERPL-SCNC: 103 MMOL/L (ref 95–110)
CO2 SERPL-SCNC: 18 MMOL/L (ref 23–29)
CREAT SERPL-MCNC: 0.9 MG/DL (ref 0.5–1.4)
DIFFERENTIAL METHOD BLD: NORMAL
EOSINOPHIL # BLD AUTO: 0.4 K/UL (ref 0–0.5)
EOSINOPHIL NFR BLD: 4.3 % (ref 0–8)
ERYTHROCYTE [DISTWIDTH] IN BLOOD BY AUTOMATED COUNT: 13.3 % (ref 11.5–14.5)
EST. GFR  (NO RACE VARIABLE): >60 ML/MIN/1.73 M^2
GLUCOSE SERPL-MCNC: 91 MG/DL (ref 70–110)
HCT VFR BLD AUTO: 47.4 % (ref 40–54)
HGB BLD-MCNC: 15.8 G/DL (ref 14–18)
IMM GRANULOCYTES # BLD AUTO: 0.02 K/UL (ref 0–0.04)
IMM GRANULOCYTES NFR BLD AUTO: 0.2 % (ref 0–0.5)
LYMPHOCYTES # BLD AUTO: 2.8 K/UL (ref 1–4.8)
LYMPHOCYTES NFR BLD: 31.1 % (ref 18–48)
MAGNESIUM SERPL-MCNC: 1.9 MG/DL (ref 1.6–2.6)
MCH RBC QN AUTO: 30.4 PG (ref 27–31)
MCHC RBC AUTO-ENTMCNC: 33.3 G/DL (ref 32–36)
MCV RBC AUTO: 91 FL (ref 82–98)
MONOCYTES # BLD AUTO: 0.6 K/UL (ref 0.3–1)
MONOCYTES NFR BLD: 6.9 % (ref 4–15)
NEUTROPHILS # BLD AUTO: 5.1 K/UL (ref 1.8–7.7)
NEUTROPHILS NFR BLD: 56.5 % (ref 38–73)
NRBC BLD-RTO: 0 /100 WBC
OHS QRS DURATION: 94 MS
OHS QTC CALCULATION: 422 MS
PHOSPHATE SERPL-MCNC: 4.9 MG/DL (ref 2.7–4.5)
PLATELET # BLD AUTO: 267 K/UL (ref 150–450)
PMV BLD AUTO: 10.9 FL (ref 9.2–12.9)
POTASSIUM SERPL-SCNC: 4.8 MMOL/L (ref 3.5–5.1)
PROT SERPL-MCNC: 8 G/DL (ref 6–8.4)
RBC # BLD AUTO: 5.2 M/UL (ref 4.6–6.2)
SODIUM SERPL-SCNC: 135 MMOL/L (ref 136–145)
WBC # BLD AUTO: 9.08 K/UL (ref 3.9–12.7)

## 2024-06-11 PROCEDURE — 85025 COMPLETE CBC W/AUTO DIFF WBC: CPT | Performed by: FAMILY MEDICINE

## 2024-06-11 PROCEDURE — 36415 COLL VENOUS BLD VENIPUNCTURE: CPT | Performed by: FAMILY MEDICINE

## 2024-06-11 PROCEDURE — 25000003 PHARM REV CODE 250: Performed by: STUDENT IN AN ORGANIZED HEALTH CARE EDUCATION/TRAINING PROGRAM

## 2024-06-11 PROCEDURE — 97535 SELF CARE MNGMENT TRAINING: CPT

## 2024-06-11 PROCEDURE — 36415 COLL VENOUS BLD VENIPUNCTURE: CPT | Performed by: STUDENT IN AN ORGANIZED HEALTH CARE EDUCATION/TRAINING PROGRAM

## 2024-06-11 PROCEDURE — 94761 N-INVAS EAR/PLS OXIMETRY MLT: CPT

## 2024-06-11 PROCEDURE — 63600175 PHARM REV CODE 636 W HCPCS

## 2024-06-11 PROCEDURE — 27100171 HC OXYGEN HIGH FLOW UP TO 24 HOURS

## 2024-06-11 PROCEDURE — 80076 HEPATIC FUNCTION PANEL: CPT | Performed by: STUDENT IN AN ORGANIZED HEALTH CARE EDUCATION/TRAINING PROGRAM

## 2024-06-11 PROCEDURE — 25000003 PHARM REV CODE 250

## 2024-06-11 PROCEDURE — 94660 CPAP INITIATION&MGMT: CPT

## 2024-06-11 PROCEDURE — 97116 GAIT TRAINING THERAPY: CPT

## 2024-06-11 PROCEDURE — 5A09357 ASSISTANCE WITH RESPIRATORY VENTILATION, LESS THAN 24 CONSECUTIVE HOURS, CONTINUOUS POSITIVE AIRWAY PRESSURE: ICD-10-PCS | Performed by: FAMILY MEDICINE

## 2024-06-11 PROCEDURE — 97530 THERAPEUTIC ACTIVITIES: CPT

## 2024-06-11 PROCEDURE — 99900035 HC TECH TIME PER 15 MIN (STAT)

## 2024-06-11 PROCEDURE — 80048 BASIC METABOLIC PNL TOTAL CA: CPT | Performed by: STUDENT IN AN ORGANIZED HEALTH CARE EDUCATION/TRAINING PROGRAM

## 2024-06-11 PROCEDURE — 99499 UNLISTED E&M SERVICE: CPT | Mod: GT,,, | Performed by: PSYCHIATRY & NEUROLOGY

## 2024-06-11 PROCEDURE — 84100 ASSAY OF PHOSPHORUS: CPT | Performed by: STUDENT IN AN ORGANIZED HEALTH CARE EDUCATION/TRAINING PROGRAM

## 2024-06-11 PROCEDURE — 25000003 PHARM REV CODE 250: Performed by: FAMILY MEDICINE

## 2024-06-11 PROCEDURE — 83735 ASSAY OF MAGNESIUM: CPT | Performed by: STUDENT IN AN ORGANIZED HEALTH CARE EDUCATION/TRAINING PROGRAM

## 2024-06-11 RX ORDER — NAPROXEN SODIUM 220 MG/1
81 TABLET, FILM COATED ORAL DAILY
Qty: 30 TABLET | Refills: 11 | Status: SHIPPED | OUTPATIENT
Start: 2024-06-12 | End: 2025-06-12

## 2024-06-11 RX ORDER — ENOXAPARIN SODIUM 100 MG/ML
40 INJECTION SUBCUTANEOUS EVERY 12 HOURS
Status: DISCONTINUED | OUTPATIENT
Start: 2024-06-11 | End: 2024-06-11 | Stop reason: HOSPADM

## 2024-06-11 RX ORDER — CLOPIDOGREL BISULFATE 75 MG/1
75 TABLET ORAL DAILY
Status: DISCONTINUED | OUTPATIENT
Start: 2024-06-11 | End: 2024-06-11 | Stop reason: HOSPADM

## 2024-06-11 RX ORDER — CLOPIDOGREL BISULFATE 75 MG/1
75 TABLET ORAL DAILY
Qty: 21 TABLET | Refills: 0 | Status: SHIPPED | OUTPATIENT
Start: 2024-06-12 | End: 2025-06-12

## 2024-06-11 RX ORDER — ATORVASTATIN CALCIUM 40 MG/1
40 TABLET, FILM COATED ORAL DAILY
Qty: 90 TABLET | Refills: 3 | Status: SHIPPED | OUTPATIENT
Start: 2024-06-12 | End: 2025-06-12

## 2024-06-11 RX ORDER — LOSARTAN POTASSIUM 25 MG/1
25 TABLET ORAL DAILY
Status: DISCONTINUED | OUTPATIENT
Start: 2024-06-11 | End: 2024-06-11 | Stop reason: HOSPADM

## 2024-06-11 RX ORDER — NAPROXEN SODIUM 220 MG/1
81 TABLET, FILM COATED ORAL DAILY
Status: DISCONTINUED | OUTPATIENT
Start: 2024-06-11 | End: 2024-06-11 | Stop reason: HOSPADM

## 2024-06-11 RX ADMIN — BUSPIRONE HYDROCHLORIDE 20 MG: 5 TABLET ORAL at 09:06

## 2024-06-11 RX ADMIN — ENOXAPARIN SODIUM 40 MG: 40 INJECTION SUBCUTANEOUS at 11:06

## 2024-06-11 RX ADMIN — LOSARTAN POTASSIUM 25 MG: 25 TABLET, FILM COATED ORAL at 01:06

## 2024-06-11 RX ADMIN — CLOPIDOGREL BISULFATE 75 MG: 75 TABLET ORAL at 09:06

## 2024-06-11 RX ADMIN — VENLAFAXINE HYDROCHLORIDE 150 MG: 75 CAPSULE, EXTENDED RELEASE ORAL at 09:06

## 2024-06-11 RX ADMIN — ASPIRIN 81 MG: 81 TABLET, CHEWABLE ORAL at 09:06

## 2024-06-11 RX ADMIN — METOPROLOL SUCCINATE 100 MG: 50 TABLET, EXTENDED RELEASE ORAL at 09:06

## 2024-06-11 RX ADMIN — MUPIROCIN: 20 OINTMENT TOPICAL at 09:06

## 2024-06-11 RX ADMIN — BUPROPION HYDROCHLORIDE 150 MG: 150 TABLET, EXTENDED RELEASE ORAL at 09:06

## 2024-06-11 RX ADMIN — ATORVASTATIN CALCIUM 40 MG: 40 TABLET, FILM COATED ORAL at 09:06

## 2024-06-11 NOTE — PLAN OF CARE
Problem: Physical Therapy  Goal: Physical Therapy Goal  Description: Goals to be met by: discharge date     Patient will increase functional independence with mobility by performin. Sit to stand transfer with [place]    MET  2. Bed to chair transfer with [place] using No Assistive Device   MET  3.  Tolerate seated OOB chair > 1 hr          MET  4. Gait  x 150 feet with [place] and Supervision using No Assistive Device without LOB.. MET    Outcome: Met     Pt cooperative throughout PT session and demonstrated independence with all functional mobility skills including bed mobility, transfers, and ambulation without AD use without LOB.  Pt provided stroke signs/symptoms education along with importance of immediate response time to improve outcome if events in future.  Pt return verbalized BEFAST education.  Will discharge acute PT services as pt is independent and reports at his baseline with mobility.  No further needs or DME rec at this time.

## 2024-06-11 NOTE — NURSING
Patient d/c paperwork reviewed and given to patient and spouse. PIV x's 2 d/c'd, patient dressed and all belongings packed. Transport bringing pt to the lobby via wheelchair with all belongings and family.

## 2024-06-11 NOTE — SUBJECTIVE & OBJECTIVE
"Interval History: Awake, alert, sitting out on the chair, denies any symptoms   CT head 24 hours after TNK -no acute abnormality  Metoprolol was resumed yesterday 6/10 -continue   Continue PT/OT/ST  Step-down to floor    Review of Systems   Constitutional:  Negative for activity change.   Neurological:  Negative for weakness and numbness.   Psychiatric/Behavioral:  Negative for agitation.    All other systems reviewed and are negative.    Objective:     Vital Signs (Most Recent):  Temp: 97.6 °F (36.4 °C) (06/11/24 0730)  Pulse: 75 (06/11/24 1115)  Resp: 19 (06/11/24 1115)  BP: 138/88 (06/11/24 1115)  SpO2: (!) 94 % (06/11/24 1115) Vital Signs (24h Range):  Temp:  [97.6 °F (36.4 °C)-98.5 °F (36.9 °C)] 97.6 °F (36.4 °C)  Pulse:  [65-80] 75  Resp:  [14-35] 19  SpO2:  [90 %-100 %] 94 %  BP: (124-158)/() 138/88     Weight: (!) 152.9 kg (337 lb 1.3 oz)  Body mass index is 42.13 kg/m².    Intake/Output Summary (Last 24 hours) at 6/11/2024 1140  Last data filed at 6/11/2024 0911  Gross per 24 hour   Intake --   Output 2000 ml   Net -2000 ml         Physical Exam        Significant Labs: A1C:   Recent Labs   Lab 04/17/24  1516 06/09/24  2320   HGBA1C 6.9*  6.9* 7.0*     ABGs: No results for input(s): "PH", "PCO2", "HCO3", "POCSATURATED", "BE", "TOTALHB", "COHB", "METHB", "O2HB", "POCFIO2", "PO2" in the last 48 hours.  Blood Culture: No results for input(s): "LABBLOO" in the last 48 hours.  CBC:   Recent Labs   Lab 06/09/24  2320 06/11/24  0836   WBC 11.34 9.08   HGB 16.7 15.8   HCT 49.9 47.4    267     CMP:   Recent Labs   Lab 06/09/24  2320 06/11/24  0337    135*   K 4.8 4.8    103   CO2 22* 18*    91   BUN 13 16   CREATININE 1.1 0.9   CALCIUM 10.3 9.9   PROT 7.8 8.0   ALBUMIN 4.4 4.4   BILITOT 0.4 0.3   ALKPHOS 70 66   AST 55* 65*   ALT 94* 89*   ANIONGAP 15 14     Lactic Acid: No results for input(s): "LACTATE" in the last 48 hours.  Lipase: No results for input(s): "LIPASE" in the last 48 " "hours.  Lipid Panel:   Recent Labs   Lab 06/09/24 2320   CHOL 224*   HDL 36*   LDLCALC 143.8   TRIG 221*   CHOLHDL 16.1*     Magnesium:   Recent Labs   Lab 06/11/24  0337   MG 1.9     Troponin:   Recent Labs   Lab 06/09/24  2320 06/10/24  0545   TROPONINI <0.006 <0.006     TSH:   Recent Labs   Lab 06/09/24 2320   TSH 0.139*     Urine Culture: No results for input(s): "LABURIN" in the last 48 hours.  Urine Studies:   Recent Labs   Lab 06/10/24  0829   COLORU Yellow   APPEARANCEUA Clear   PHUR 6.0   SPECGRAV 1.030   PROTEINUA Negative   GLUCUA Negative   KETONESU Negative   BILIRUBINUA Negative   OCCULTUA Negative   NITRITE Negative   UROBILINOGEN Negative   LEUKOCYTESUR Negative       Significant Imaging: I have reviewed all pertinent imaging results/findings within the past 24 hours.  "

## 2024-06-11 NOTE — PLAN OF CARE
Patient remains in ICU .  No apparent distress noted overnight. Pt taken to MRI and CT, B/P cuff off. AAOx 4 and follows commands. NIHSS 0 throughout this shift. NSR on monitor. VSS. SpO2 adequate on room air. BiPAP overnight, 14/10 FiO2 21%. UOP 1400 ml/shift.    Cardiac diet tolerated well. NPO after midnight.    Spouse at bedside overnight. Plan of care reviewed with patient and spouse throughout shift. Stroke education provided. Stroke education booklet at bedside. All questions/concerns addressed. Patient safety and infection precautions maintained. Report given to AM nurse.    Problem: Adult Inpatient Plan of Care  Goal: Plan of Care Review  Outcome: Progressing  -Patient and spouse educated on the signs and symptoms of a stroke.   -Patient and spouse educated on his risk factors of hypertension and diabetes.  -Patient and spouse verbalized understanding.  Goal: Patient-Specific Goal (Individualized)  Outcome: Progressing  Goal: Absence of Hospital-Acquired Illness or Injury  Outcome: Progressing  Goal: Optimal Comfort and Wellbeing  Outcome: Progressing  Goal: Readiness for Transition of Care  Outcome: Progressing     Problem: Bariatric Environmental Safety  Goal: Safety Maintained with Care  Outcome: Progressing     Problem: Stroke, Ischemic (Includes Transient Ischemic Attack)  Goal: Optimal Coping  Outcome: Progressing  Goal: Effective Bowel Elimination  Outcome: Progressing  Goal: Optimal Cerebral Tissue Perfusion  Outcome: Progressing  Goal: Optimal Cognitive Function  Outcome: Progressing  Goal: Improved Communication Skills  Outcome: Progressing  Goal: Optimal Functional Ability  Outcome: Progressing  Goal: Optimal Nutrition Intake  Outcome: Progressing  Goal: Effective Oxygenation and Ventilation  Outcome: Progressing  Goal: Improved Sensorimotor Function  Outcome: Progressing  Goal: Safe and Effective Swallow  Outcome: Progressing  Goal: Effective Urinary Elimination  Outcome: Progressing      Problem: Infection  Goal: Absence of Infection Signs and Symptoms  Outcome: Progressing     Problem: Diabetes Comorbidity  Goal: Blood Glucose Level Within Targeted Range  Outcome: Progressing     Problem: Fall Injury Risk  Goal: Absence of Fall and Fall-Related Injury  Outcome: Progressing

## 2024-06-11 NOTE — PROGRESS NOTES
Pt admitted to OT that he took his home xanax this AM while inpatient in ICU. I spoke with patient and verified that he took same amount ordered on MAR PRN. I explained that we have this medication and are able to give it to him, but he cannot medicate himself with home meds. He explained that yesterday it took two hours to receive the medication after asking. He is dealing with a lot of stress due to a work situation and could not wait this long today. I explained that yesterday's delay was due to the medication needing to be ordered and then verified by pharmacy, but from now on the medication is readily available and can be at his bedside within minutes. He stated understanding and well as his wife at bedside and stated he would not take his home medications again. Dr. Kilgore notified of this incident. Pt stated he took this medication at 7AM on 6/11/2024.

## 2024-06-11 NOTE — PROGRESS NOTES
[facility] Note    Primary Attending Physician: Dr. Kilgore  ICU Attending: Dr. Walker  ICU Fellow: Dr. Shrestha    Reason for Consult:     Stroke    Subjective:      History of Present Illness:  Dominic Collazo is a 43 y.o. male with PMH of Anxiety/Depression, HLD, HTN, LYNDA on nightly BiPAP, and T2DM who presents to [facility] ED 6/9/2024 with chief complaint of acute onset L facial droop and L. Sided upper and lower extremity weakness.     Patient states he has been extremely stressed lately due to this job and has been having increased anxiety. He states last night around 9:30pm, he and his wife noticed that was having some L. sided facial droop and facial tingling. He also noted some L. Sided upper and lower extremity weakness and had trouble moving his arm and leg. He also endorsed some vision changes, saying he couldn't focus his vision on one spot so they decided to come to the ED.    In the ED, patient was stroke activated and received TNK x1 at 23:57. VSS, afebrile, HR 80s, BP 170s/80s. CTH and CTA negative for acute processes.     Interval Hx:  No acute complaints this AM. Repeat CTH overnight unchanged from presentation. MRI from overnight pending read.     Past Medical History:  Past Medical History:   Diagnosis Date    Acute ischemic stroke 6/10/2024    Adjustment disorder with mixed anxiety and depressed mood     Anxiety     Colon polyp     Depression     ED (erectile dysfunction)     Family history of prostate cancer 9/29/2014    Hx of umbilical hernia repair 10/16/2020    Hyperlipidemia     Hypertension     Hypogonadism male     Insomnia     Low testosterone     LYNDA (obstructive sleep apnea)     Prediabetes        Past Surgical History:  Past Surgical History:   Procedure Laterality Date    ADENOIDECTOMY      COLONOSCOPY N/A 12/5/2023    Procedure: COLONOSCOPY;  Surgeon: Jonnathan Velazquez MD;  Location: [facility] ([address] [place] [address]);  Service: Colon and Rectal;   Laterality: N/A;  inst. to pt. portal. Takes Ozempic inst. to hold.Tbou  referral: Dr. Cross  11/28-lvm for precall-MS  12/4-last dose Ozempic 11/26/23, precall complete-KPvt    HERNIA REPAIR      REPAIR OF INCARCERATED UMBILICAL HERNIA N/A 10/16/2020    Procedure: REPAIR, HERNIA, UMBILICAL, INCARCERATED, AGE 5 YEARS OR OLDER with mesh ;  Surgeon: Conrad Loco MD;  Location: [address];  Service: General;  Laterality: N/A;    REPAIR OF LABRUM OF HIP Left 12/14/2023    Procedure: REPAIR,LABRUM,SHOULDER;  Surgeon: Sea Valadez MD;  Location: [facility];  Service: Orthopedics;  Laterality: Left;    SHOULDER ARTHROSCOPY W/ SUPERIOR LABRAL ANTERIOR POSTERIOR LESION REPAIR Left 12/14/2023    Procedure: ARTHROSCOPY, SHOULDER,;  Surgeon: Sea Valadez MD;  Location: [facility] OR;  Service: Orthopedics;  Laterality: Left;  Arthrex knotless suturetak labral anchors, long plastic cannulas, arthroscopic elevators/rasps marissa notified cc    TONSILLECTOMY         Allergies:  Review of patient's allergies indicates:   Allergen Reactions    Ciprofloxacin      swelling    Penicillins Rash    Sulfa (sulfonamide antibiotics) Rash    Toradol [ketorolac] Nausea Only    Bell pepper Nausea Only    Meloxicam Nausea Only    Sulfamethoxazole-trimethoprim        Medications:   In-Hospital Scheduled Medications:   atorvastatin  40 mg Oral Daily    buPROPion  150 mg Oral Daily    busPIRone  20 mg Oral BID    metoprolol succinate  100 mg Oral Daily    mupirocin   Nasal BID    venlafaxine  150 mg Oral Daily      In-Hospital PRN Medications:    Current Facility-Administered Medications:     acetaminophen, 650 mg, Oral, Q6H PRN    ALPRAZolam, 1 mg, Oral, BID PRN    bisacodyL, 10 mg, Rectal, Daily PRN    labetalol, 10 mg, Intravenous, Q15 Min PRN    sodium chloride 0.9%, 500 mL, Intravenous, PRN    sodium chloride 0.9%, 10 mL, Intravenous, PRN    sodium chloride 0.9%, 10 mL, Intravenous, PRN   In-Hospital IV Infusion  Medications:     Home Medications:  Prior to Admission medications    Medication Sig Start Date End Date Taking? Authorizing Provider   acetaminophen (TYLENOL) 325 MG tablet Take 2 tablets (650 mg total) by mouth every 6 (six) hours as needed for Pain. 3/14/24  Yes Fritz Desir MD   ALPRAZolam (XANAX) 1 MG tablet Take 1 tablet (1 mg total) by mouth 2 (two) times daily as needed for Anxiety. 4/3/24 8/1/24 Yes Meño Burdick PA   buPROPion (WELLBUTRIN XL) 150 MG TB24 tablet Take 1 tablet (150 mg total) by mouth once daily. 4/3/24  Yes Meño Burdick PA   busPIRone (BUSPAR) 10 MG tablet Take 2 tablets (20 mg total) by mouth 2 (two) times daily. 4/3/24  Yes Meño Burdick PA   dextroamphetamine sulfate (DEXTROSTAT) 10 MG tablet Take 1 tablet (10 mg total) by mouth after lunch. 5/22/24  Yes Meño Burdick PA   lisdexamfetamine (VYVANSE) 40 MG Cap Take 1 capsule (40 mg total) by mouth once daily. 6/6/24  Yes Aranza Burdick PA-C   losartan (COZAAR) 25 MG tablet Take 1 tablet (25 mg total) by mouth once daily. 1/17/24 1/16/25 Yes Arjun Cross MD   metFORMIN (GLUCOPHAGE-XR) 500 MG ER 24hr tablet Take 1 tablet (500 mg total) by mouth 2 (two) times daily with meals. 8/31/23 8/30/24 Yes Arjun Cross MD   methocarbamoL (ROBAXIN) 750 MG Tab TAKE 1 TABLET(750 MG) BY MOUTH FOUR TIMES DAILY AS NEEDED FOR MUSCLE STRAIN 5/22/24  Yes Arjun Cross MD   metoprolol succinate (TOPROL-XL) 100 MG 24 hr tablet Take 1 tablet (100 mg total) by mouth once daily. 8/31/23  Yes Arjun Cross MD   modafiniL (PROVIGIL) 200 MG Tab Take 1 tablet (200 mg total) by mouth 2 (two) times daily as needed (sleepiness). First dose upon awakening and second dose 4-6 hours later 4/4/24  Yes Sea Fletcher MD   rosuvastatin (CRESTOR) 10 MG tablet Take 1 tablet by mouth once daily. 9/8/23  Yes Arjun Cross MD   venlafaxine (EFFEXOR-XR) 150 MG Cp24 Take 1 capsule (150 mg total) by mouth once daily. 4/3/24 9/30/24 Yes  "Meño Burdick PA   zolpidem (AMBIEN CR) 12.5 MG CR tablet Take 1 tablet (12.5 mg total) by mouth nightly as needed for Insomnia. 4/3/24 10/2/24 Yes Meño Burdick PA   blood sugar diagnostic (ONETOUCH VERIO TEST STRIPS) Strp 1 each by Misc.(Non-Drug; Combo Route) route 3 (three) times daily. 3/15/24   Arjun Cross MD   blood-glucose meter kit To check BG 2 times daily, to use with insurance preferred meter 12/23/22 12/23/23  Arjun Cross MD   cetirizine HCl (ZYRTEC ORAL) Take 1 tablet by mouth daily as needed.    Provider, Historical   cyanocobalamin (VITAMIN B-12) 1000 MCG tablet Take 100 mcg by mouth once daily.    Provider, Historical   ergocalciferol, vitamin D2, (VITAMIN D ORAL) Take 5,000 Units by mouth once daily.    Provider, Historical   fexofenadine (ALLEGRA) 60 MG tablet Take 60 mg by mouth every morning.    Provider, Historical   iron,carb/vit C/vit B12/folic (IRON 100 PLUS ORAL) Take by mouth once daily at 6am.    Provider, Historical   lancets 33 gauge [facility] Test BG 2 times a day 5/24/23   Arjun Cross MD   multivitamin capsule Take 1 capsule by mouth once daily.    Provider, Historical   needle, disp, 18 G (BD REGULAR BEVEL NEEDLES) 18 gauge x 1" Ndle 1 each by Misc.(Non-Drug; Combo Route) route once a week. Use to draw up testosterone 3/15/24   Clare Rome MD   ondansetron (ZOFRAN-ODT) 4 MG TbDL Take 1 tablet (4 mg total) by mouth every 6 (six) hours as needed (nausea). 5/27/24   Briana Betts MD   riboflavin, vitamin B2, (VITAMIN B-2 ORAL) Take 1 tablet by mouth once daily.    Provider, Historical   scopolamine (TRANSDERM-SCOP) 1.3-1.5 mg (1 mg over 3 days) Place 1 patch onto the skin every 72 hours. 10/3/22   Arjun Cross MD   semaglutide (OZEMPIC) 2 mg/dose (8 mg/3 mL) PnReji Inject 2 mg into the skin every 7 days. 9/8/23 9/7/24  Arjun Cross MD   syringe with needle 3 mL 21 gauge x 1" Syrg 1 each by Misc.(Non-Drug; Combo Route) route once a week. Use " "to inject testosterone 3/15/24   Clare Rome MD   testosterone cypionate (DEPOTESTOTERONE CYPIONATE) 200 mg/mL injection Inject 1 mL (200 mg total) into the muscle every 7 days. 3/15/24 9/13/24  Clare Rome MD       Family History:  Family History   Problem Relation Name Age of Onset    Hypertension Mother      Hyperlipidemia Mother      Diabetes Father      Hyperlipidemia Father      Hypertension Father      Heart disease Father  46        CAD    No Known Problems Sister      Cancer Maternal Aunt          colon cancer    Stroke Maternal Uncle      Cancer Paternal Uncle          prostate cancer       Social History:  Social History     Tobacco Use    Smoking status: Never     Passive exposure: Past    Smokeless tobacco: Never   Substance Use Topics    Alcohol use: Yes     Comment: less than once per month    Drug use: No       Review of Systems:  All other systems are reviewed and are negative.     Objective:   Last 24 Hour Vital Signs:  BP  Min: 124/76  Max: 158/80  Temp  Av.3 °F (36.8 °C)  Min: 98 °F (36.7 °C)  Max: 98.6 °F (37 °C)  Pulse  Av.9  Min: 65  Max: 83  Resp  Av.9  Min: 12  Max: 35  SpO2  Av %  Min: 90 %  Max: 100 %  Height  Av' 3" (190.5 cm)  Min: 6' 3" (190.5 cm)  Max: 6' 3" (190.5 cm)  Weight  Av.9 kg (337 lb 0.7 oz)  Min: 152.9 kg (337 lb)  Max: 152.9 kg (337 lb 1.3 oz)  I/O last 3 completed shifts:  In: -   Out: 2500 [Urine:2500]    Physical Examination:  Physical Exam  Constitutional:       General: He is not in acute distress.     Appearance: Normal appearance. He is obese. He is not ill-appearing, toxic-appearing or diaphoretic.   HENT:      Head: Normocephalic and atraumatic.      Right Ear: External ear normal.      Left Ear: External ear normal.      Nose: Nose normal. No congestion or rhinorrhea.      Mouth/Throat:      Mouth: Mucous membranes are moist.      Pharynx: Oropharynx is clear.   Eyes:      Extraocular Movements: Extraocular " movements intact.      Conjunctiva/sclera: Conjunctivae normal.      Pupils: Pupils are equal, round, and reactive to light.   Cardiovascular:      Rate and Rhythm: Normal rate and regular rhythm.      Pulses: Normal pulses.      Heart sounds: Normal heart sounds.   Pulmonary:      Effort: Pulmonary effort is normal.      Breath sounds: Normal breath sounds.   Abdominal:      General: Abdomen is flat. Bowel sounds are normal.      Palpations: Abdomen is soft.   Musculoskeletal:         General: Normal range of motion.      Cervical back: Normal range of motion.      Right lower leg: No edema.      Left lower leg: No edema.   Skin:     General: Skin is warm and dry.   Neurological:      General: No focal deficit present.      Mental Status: He is alert and oriented to person, place, and time. Mental status is at baseline.      Comments: B/L UE and LE strength 5/5  Still endorsing slightly decrease sensation on L forehead and L shin   Psychiatric:         Mood and Affect: Mood normal.         Behavior: Behavior normal.         Thought Content: Thought content normal.     Laboratory:  Trended Lab Data:  Recent Labs     06/09/24  2319 06/09/24  2320 06/10/24  0545 06/11/24  0337   WBC  --  11.34  --   --    HGB  --  16.7  --   --    HCT  --  49.9  --   --    PLT  --  310  --   --    NA  --  139  --  135*   K  --  4.8  --  4.8   CL  --  102  --  103   CO2  --  22*  --  18*   BUN  --  13  --  16   CREATININE  --  1.1  --  0.9   GLU  --  105  --  91   BILITOT  --  0.4  --   --    AST  --  55*  --   --    ALT  --  94*  --   --    ALKPHOS  --  70  --   --    CALCIUM  --  10.3  --  9.9   ALBUMIN  --  4.4  --   --    PROT  --  7.8  --   --    MG  --   --   --  1.9   PHOS  --   --   --  4.9*   INR 1.2 1.1 1.1  --        Cardiac:   Recent Labs   Lab 06/09/24  2320 06/10/24  0545   TROPONINI <0.006 <0.006       Urinalysis:   Lab Results   Component Value Date    COLORU Yellow 06/10/2024    SPECGRAV 1.030 06/10/2024    NITRITE  Negative 06/10/2024    KETONESU Negative 06/10/2024    UROBILINOGEN Negative 06/10/2024       Microbiology:  None    Radiology:  CT Head Without Contrast   Final Result      No acute abnormality.  No significant change compared to prior exam.         Electronically signed by: Leo Zamora MD   Date:    06/11/2024   Time:    07:06      CT Head Without Contrast   Final Result      1. No evidence of acute intracranial hemorrhage, extraxial fluid or midline shift. 2. No evidence of acute large vessel infarction.         Electronically signed by: Virtual Radiologist   Date:    06/10/2024   Time:    09:22      CTA Head and Neck (xpd)   Final Result      No acute abnormality. No high-grade stenosis or major vessel occlusion.         Electronically signed by: Sweta Arroyo   Date:    06/10/2024   Time:    00:04      US Abdomen Limited    (Results Pending)   MRI Brain Without Contrast    (Results Pending)     I have personally reviewed the above labs and imaging.    Current Medications:     Infusions:       Scheduled:   atorvastatin  40 mg Oral Daily    buPROPion  150 mg Oral Daily    busPIRone  20 mg Oral BID    metoprolol succinate  100 mg Oral Daily    mupirocin   Nasal BID    venlafaxine  150 mg Oral Daily        PRN:    Current Facility-Administered Medications:     acetaminophen, 650 mg, Oral, Q6H PRN    ALPRAZolam, 1 mg, Oral, BID PRN    bisacodyL, 10 mg, Rectal, Daily PRN    labetalol, 10 mg, Intravenous, Q15 Min PRN    sodium chloride 0.9%, 500 mL, Intravenous, PRN    sodium chloride 0.9%, 10 mL, Intravenous, PRN    sodium chloride 0.9%, 10 mL, Intravenous, PRN     Assessment:     Dominic Collazo is a 43 y.o. male with PMH of Anxiety/Depression, HLD, HTN, LYNDA on nightly BiPAP, and T2DM who presents to [facility] ED 6/9/2024 with chief complaint of acute onset L facial droop and L. Sided upper and lower extremity weakness. In the ED, patient was stroke activated, CTH and CTA negative for acute processes,  s/p TNK x1 with reported symptom improvement. Now in ICU for continued Neuro monitoring . Repeat MRI and CTH negative. Patient stable for step down.      Plan:     Neuro:  Acute Ischemic Stroke  - Patient presented with sudden onset L sided weakness x1 day  - CTH negative, CTA negative for LVO, s/p TNK 2357, with complete symptom resolution  - TTE negative for intracardiac shunt, EF 71% normal diastolic fxn.   - EKG NSR with ST depressions in I, II, aVF, V3-6, Trop 0.006  - TSH 0.139, T4 0.97, Hgb A1c 7%     - ASA/Plavix 24 hours s/p TNK  - Continue Atorvastatin 40mg daily, Toprol XL 100mg daily  - MRI Brain no acute intracranial findings. No evidence of acute ischemia or recent infarction, as questioned clinically.   - Repeat CTH 24h - no acute intracranial findings. No evidence of acute ischemia or recent infarction, as questioned clinically  - Ddx includes hemiplegic migraine vs TIA  - Follow Neuro recs   - Q1 Neuro checks  - HOB 30 (HeadPoST Trial found no difference in flat vs elevated HOB post 24)   - BP <180/105  - Follow up with neurology regarding ASA/Plavix.     Anxiety/Depression  - Patient reports taking Bupropion, buspirone, alprazolam, and lisdexamfetamine at home  - Home meds currently held  - CTM    Cardiovascular/Hemodynamics:  Hypertension  - Home meds Losartan 25mg daily, Metoprolol succinate 100mg daily  - BP goal <180/105  - Continue Metoprolol succinate 100mg daily    HLD  - Lipid panel Cholesterol 224, , , HDL 36   - Continue atorvastatin 40mg daily    Respiratory:   LYNDA  - On home nightly BiPAP  - Continue BiPAP inpatient    GI/FEN:  F: None  E: K>4, Mg>2 (K 4.8, Mg 1.9)  N: Cardiac diet    Elevated LFTs  - Chronically elevated, possibly 2/2 hepatic steatosis  - AST 55 (baseline 50-60s), ALT 94 (baseline 70-90s)  - Will obtain abdominal U/S  - AST 65/89 on repeat.   - Continue to monitor     ID:   - No acute issues    Renal:   - No acute issues    Heme/Onc:   - No acute  issues    Endocrine:  T2DM  - A1c 7%, on home metformin  - SSI + Accuchecks  - Goal glucose 140-180 while in ICU    TSH: 0.139, T4 0.97   - Follow up with PCP    Rheum/MSK:  - No acute issues     Critical Care Daily Checklist:    A: Awake: RASS Goal/Actual Goal: 0  Actual: Shah Agitation Sedation Scale (RASS): 0   B: Spontaneous Breathing Trial Performed? NA   C: SAT & SBT Coordinated?  NA                      D: Delirium: CAM-ICU Overall CAM-ICU: NA   E: Early Mobility Performed? Yes   F: Feeding Goal: Cardiac  Status: Cardiac     AS: Analgesia/Sedation None   T: Thromboembolic Prophylaxis Lovenox   H: HOB > 300 Yes    U: Stress Ulcer Prophylaxis (if needed) None   G: Glucose Control None glucose within goal   B: Bowel Function Stool Occurrence: 0 ; Regimen: none   I: Indwelling Catheter (Lines & Gannon) Necessity x2 PIV   D: De-escalation of Antimicrobials/Pharmacotherapies None    Plan for the day/ETD CTM Neuro status, pending repeat CTH/MRI    Code Status:  Family/Goals of Care: Full  NA     Dispo: Stable for step down.    Sea Biggs MD  Family Medicine PGY-1  [facility]

## 2024-06-11 NOTE — PROGRESS NOTES
The pt's wife is at bedside and she will transport the pt home at d/c. The sw stressed the importance of the pt going to his hsp f/u's and taking his medications. The pt acknowledged understanding and states he will comply. The pt has no further Case Management needs and is clear to d/c.     Future Appointments   Date Time Provider Department Center   6/12/2024  2:40 PM Keshav Singh MD [facility] NEURO [place]   6/18/2024 10:00 AM Melania Aparicio MD [facility] IMPRI [facility]   6/25/2024  3:00 PM Meño Burdick PA [facility] PSYCH [address]   7/1/2024  5:00 PM Sea Fletcher MD [facility]    [facility] - Intensive Care  Discharge Final Note    Primary Care Provider: Arjun Cross MD    Expected Discharge Date: 6/11/2024    Final Discharge Note (most recent)       Final Note - 06/10/24 1657          Final Note    Assessment Type Discharge Planning Assessment        Post-Acute Status    Coverage BCBS                     Important Message from Medicare             Contact Info       Melania Aparicio MD   Specialty: Internal Medicine    [address]  [address]  [place] [address]   Phone: [phone]       Next Steps: Follow up on 6/18/2024    Instructions: 10:00am HSP F/U DX:Acute ischemic-stroke    [facility] [place] [facility]   Specialty: Neurology    [address]  [place] [address]   Phone: [phone]       Next Steps: Follow up on 6/12/2024    Instructions: Dr. Keshav Singh   2:40pm

## 2024-06-11 NOTE — ASSESSMENT & PLAN NOTE
Status post tPA with complete resolution of symptoms.  Admit to ICU.  Neuro checks per neurology recommendation.  Q 1 vital checks.  Low threshold for CT head if change in clinical status.  IV labetalol p.r.n. to keep blood pressure less than 180/105- resumed Metoprolol on 6/11  High-intensity statin.  HbA1c 7.0  lipid panel.  TTE with bubble-  negative for shunt  Neurology consult- appreciates rec's  Speech consult.  CT head 24 hours after TNK -no acute abnormality

## 2024-06-11 NOTE — PT/OT/SLP PROGRESS
Physical Therapy Treatment and Discharge Summary    Patient Name:  Dominic Collazo   MRN:  [id number]    Recommendations:     Discharge Recommendations: No Therapy Indicated  Discharge Equipment Recommendations: none  Barriers to discharge: None    Assessment:     Dominic Collazo is a 43 y.o. male admitted with a medical diagnosis of Acute ischemic stroke.  He presents with the following impairments/functional limitations:  (knowledge for self monitoring and response if s/s appear at later date and relaxation methods (non pharmacological)).    Pt cooperative throughout PT session and demonstrated independence with all functional mobility skills including bed mobility, transfers, and ambulation without AD use without LOB.  Pt provided stroke signs/symptoms education along with importance of immediate response time to improve outcome if events in future.  Pt return verbalized [facility] education.  Will discharge acute PT services as pt is independent and reports at his baseline with mobility.  No further needs or DME rec at this time.               Rehab Prognosis: Good; patient did benefit from acute skilled PT services to address these deficits and reach maximum level of function.    Recent Surgery: * No surgery found *      Plan:     During this hospitalization, patient to be seen 2 x/week to address the identified rehab impairments via gait training, therapeutic activities, therapeutic exercises, neuromuscular re-education and progress toward the following goals:    Plan of Care Expires:  07/10/24    Subjective     Chief Complaint: reports no dizziness or nausea; reports no pain today - HA earlier this morning but none at time of session;  reports no new sensory deficits  Patient/Family Comments/goals: return home independent  Pain/Comfort:  Pain Rating 1: 0/10  Pain Rating Post-Intervention 1: 0/10      Objective:     Communicated with nsg prior to session.  Patient found supine with telemetry, blood  pressure cuff, pulse ox (continuous) upon PT entry to room.     General Precautions: Standard, fall  Orthopedic Precautions: N/A  Braces: N/A  Respiratory Status: Room air     Functional Mobility:  Bed mobility:  Independent with R roll and seated scoot ind  Txfs : sup<>sit with Ind ;   txfs sit<>stand Sup/Ind without LOB and demo good ease of movement;  txfs low surface chair<>stance Sup/Ind  Pre gait and gait:  static stance without LOB SBA;  amb ~150' with no AD with SBA/Ind without LOB on straight path without veering and during change in direction including 180* turns;  higher level balance challenges successful without LOB including B side stepping, fwd/back stepping, change in speed of gait, and simulation home envmt with small space negotiation in room;  pt demo independence throughout       AM-PAC 6 CLICK MOBILITY  Turning over in bed (including adjusting bedclothes, sheets and blankets)?: 4  Sitting down on and standing up from a chair with arms (e.g., wheelchair, bedside commode, etc.): 4  Moving from lying on back to sitting on the side of the bed?: 4  Moving to and from a bed to a chair (including a wheelchair)?: 4  Need to walk in hospital room?: 4  Climbing 3-5 steps with a railing?: 4  Basic Mobility Total Score: 24       Treatment & Education:  Provided pt and his spouse w education for PT POC and  for s/s to be aware of w self monitoring and care and BE FAST which pt was able to return vocalize successfully;  ed provided for importance of timeliness during any episodes and not to wait to contact ED/health care provider.  Ed relaxation importance along with following all instruction from MDs and nsg upon discharge.  Ed importance pt continue with f/u neurology appointment he reports having later this week.  Pt verbalized understanding and agreement.  Perf bed mobility, txfs training, gait training, endurance and balance act and higher level gait/bal challenges.  Pt reports he feels like he is at  baseline with mobility.    Patient left up in chair with all lines intact, call button in reach, and wife present..    GOALS:   Multidisciplinary Problems       Physical Therapy Goals       Not on file              Multidisciplinary Problems (Resolved)          Problem: Physical Therapy    Goal Priority Disciplines Outcome Goal Variances Interventions   Physical Therapy Goal   (Resolved)     PT, PT/OT Met     Description: Goals to be met by: discharge date     Patient will increase functional independence with mobility by performin. Sit to stand transfer with [place]    MET  2. Bed to chair transfer with [place] using No Assistive Device   MET  3.  Tolerate seated OOB chair > 1 hr          MET  4. Gait  x 150 feet with [place] and Supervision using No Assistive Device without LOB.. MET                         Time Tracking:     PT Received On: 24  PT Start Time: 0935     PT Stop Time: 1000  PT Total Time (min): 25 min     Billable Minutes: Gait Training 12 and Therapeutic Activity 13    Treatment Type: Treatment, Family Training  PT/PTA: PT     Number of PTA visits since last PT visit: 0     2024

## 2024-06-11 NOTE — PT/OT/SLP PROGRESS
Occupational Therapy   Treatment and discharge    Name: Dominic Collazo  MRN: [id number]  Admitting Diagnosis:  Acute ischemic stroke       Recommendations:     Discharge Recommendations: No Therapy Indicated  Discharge Equipment Recommendations:  none  Barriers to discharge:  Other (Comment) (no acute OT barriers)    Assessment:     Dominic Collazo is a 43 y.o. male with a medical diagnosis of Acute ischemic stroke.  He presents with .The primary encounter diagnosis was Acute left-sided weakness. Diagnoses of Acute focal neurological deficit and Stroke were also pertinent to this visit.  . Performance deficits affecting function are impaired sensation, other (comment) (decreased emotional / coping).     Patient at independence level of function. Patient with WFL BP / vitals throughout session, able to mobilize to true bathroom with independence and complete simulated item retrieval/ transport with independence and without device. Reciprocates BEFAST and improved ability to self-recall / discuss stress management techniques to support improved coping / health outcomes. ROBI Meier and ROBI Friedman informed / escalated by OT this date after patient admitted to OT (during context of therapeutic conversation regarding discussion of non-pharmacological stress management strategies) of self-taking personal Xanax; RNs report completed escalation to MD (Dr. Kilgore). D/c acute OT; please reconsult as appropriate. Recommend d/c home, no needs or DME.    Rehab Prognosis: d/c acute OT; at functional baseline    Plan:     Patient to be seen  (d/c acute OT) to address the above listed problems via self-care/home management, therapeutic activities, therapeutic exercises, neuromuscular re-education  Plan of Care Expires: 06/11/24  Plan of Care Reviewed with: patient, spouse    Subjective     Chief Complaint: sleepiness  Patient/Family Comments/goals: reports feeling about 95% his baseline, endorsing very, very slight  decreased sensation on left UE  Pain/Comfort:  Pain Rating 1: 0/10  Pain Addressed 1: Reposition  Pain Rating Post-Intervention 1: 0/10    Objective:     Communicated with: nursing prior to session.ICU RN clears OT to remove tele monitors briefly to facilitate therapy inclusive to bathroom context.  Patient found up in chair with pulse ox (continuous), telemetry upon OT entry to room.    General Precautions: Standard, fall    Orthopedic Precautions:N/A  Braces: N/A  Respiratory Status: Room air     Occupational Performance:       Functional Mobility/Transfers:  Patient completed Sit <> Stand Transfer with independence  with  no assistive device   Patient completed Toilet Transfer Step Transfer technique with independence with  no AD  Functional Mobility: in room mobility to / from bathroom inclusive of reaching to open door, sit to stand on commode chair (no voiding), and standing at sink with independence/ no device and without LOB or adverse symptoms; item retrieval and transport from closet, carrying item and placing on bed followed by returning to closet requiring bending/ reaching/ placing without adverse reaction with independence; affirmation of performance provided    Activities of daily living; Area of occupation: rest and sleep:  Toileting: grossly at independence via simulation in true bathroom context (no voiding this date).  Sleep: reeducated on importance of sleep, instilling sleep routines (inclusive of limiting phone use/ modifying environment); affirmed patient's self awareness for need to prioritize sleep for brain health - patient indicates plans to follow up with his sleep doctor      [facility] 6 Click ADL: 24    Treatment & Education:  Patient oriented and agreeable. Patient and spouse re-ed on OT role.  Patient with self recall of ~80% of BEFAST from memory, and with prompting / re-ed recalls 100%.   Patient endorsing feeling back to baseline with just slight sensation difference on LUE (same as  prior date).   ADL / mobility training as above in bathroom context and with emphasis on safe item retrieval / transport.  Active engagement in sleep education / collaborative discussion as above.  Patient endorsing no pain during session, but self reports 9/10 self perceived stress earlier in morning exacerbated by ICU context and current at 6/10 stress.  Active engagement in discussing nonpharmacologic strategies for stress management. Therapeutic use of self and reflective responses used during collaborative patient/ therapist guided discussion.  Patient with self-stress relief strategy of looking at saved sweet / funny videos of his children and using different apps to help calm. 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  Patient in agreement of above suggestions, followed by reports to OT that he ingested a personal Xanax this date.   Therapist answered inquiries in scope; patient / spouse in agreement for no further need of acute OT services.      Patient left up in chair with all lines intact, call button in reach, and nursing notified spouse present    RN s informed of Xanax whom escalated to MD (see above)  GOALS:   Multidisciplinary Problems       Occupational Therapy Goals       Not on file              Multidisciplinary Problems (Resolved)          Problem: Occupational Therapy    Goal Priority Disciplines Outcome Interventions   Occupational Therapy Goal   (Resolved)     OT, PT/OT Met    Description: Goals to be met by: 6/17/2024     Patient will increase functional independence with ADLs by performing:    Dressing regimen inclusive of item retrieval with independence and no  device in a timely manner. Grossly met  100% reciprocation for patient education, CVA/BEFAST education, ADL/IADL/ Mobility modifications, and visual scanning as appropriate to support safe d/c to least restrictive environment.  met  100% reciprocation of at least 3 strategies for self / stress management to optimize engagement in meaningful occupation. met                                 Time Tracking:     OT Date of Treatment: 06/11/24  OT Start Time: 1028  OT Stop Time: 1052  OT Total Time (min): 24 min    Billable Minutes:Self Care/Home Management 10 min  Therapeutic Activity 14 min    OT/MENA: OT          6/11/2024

## 2024-06-11 NOTE — PLAN OF CARE
Patient at independence level of function. Patient with WFL BP / vitals throughout session, able to mobilize to true bathroom with independence and complete simulated item retrieval/ transport with independence and without device. Reciprocates BEFAST and improved ability to self-recall / discuss stress management techniques to support improved coping / health outcomes. RN Renea and ROBI Friedman informed / escalated by OT this date after patient admitted to OT (during context of therapeutic conversation regarding discussion of non-pharmacological stress management strategies) of self-taking personal Xanax; RNs report completed escalation to MD (Dr. Kilgore). D/c acute OT; please reconsult as appropriate. Recommend d/c home, no needs or DME.    Problem: Occupational Therapy  Goal: Occupational Therapy Goal  Description: Goals to be met by: 6/17/2024     Patient will increase functional independence with ADLs by performing:    Dressing regimen inclusive of item retrieval with independence and no device in a timely manner. Grossly met  100% reciprocation for patient education, CVA/BEFAST education, ADL/IADL/ Mobility modifications, and visual scanning as appropriate to support safe d/c to least restrictive environment.  met  100% reciprocation of at least 3 strategies for self / stress management to optimize engagement in meaningful occupation. met      Outcome: Met

## 2024-06-11 NOTE — DISCHARGE SUMMARY
[facility] Medicine  Discharge Summary      Patient Name: Dominic Collazo  MRN: [id number]  BRITNEY: [id number]  Patient Class: IP- Inpatient  Admission Date: 6/9/2024  Hospital Length of Stay: 1 days  Discharge Date and Time: 6/11/2024  3:30 PM  Attending Physician: Eliz Tsai*   Discharging Provider: Eliz Tsai MD  Primary Care Provider: Arjun Cross MD    Primary Care Team: Networked reference to record PCT     HPI:   43 years old man with past medical history of anxiety, depression, HLD, HTN, LYNDA on BiPap, history of concussion and follows up with Neurology.  The patient presented with acute left-sided weakness.  CT head was negative.  CTA is negative for dissection or large vessel occlusion.  Patient was evaluated by tele Neurology and he was deemed a candidate for tPA.  After receiving the tPA his weakness has completely resolved.  The patient then admitted to the ICU for frequent neuro checks and vital sign checks.    * No surgery found *      Hospital Course:   No notes on file     Goals of Care Treatment Preferences:  Code Status: Full Code      Consults:   Consults (From admission, onward)          Status Ordering Provider     Inpatient consult to [facility]  Once        Provider:  (Not yet assigned)    Completed MALACHI CAMPO     Inpatient consult to Physical Medicine Rehab  Once        Provider:  (Not yet assigned)    Acknowledged MALACHI CAMPO     Inpatient consult to Registered Dietitian/Nutritionist  Once        Provider:  (Not yet assigned)    Completed MALACHI CAMPO     IP consult to case management/social work  Once        Provider:  (Not yet assigned)    Completed MALACHI CAMPO     Consult to Telemedicine - Acute Stroke  Once        Provider:  Lillian Mays MD    Acknowledged RANCHO EDUARDO            Neuro  * Acute ischemic stroke  Status post tPA with complete resolution of  symptoms.  Admit to ICU.  Neuro checks per neurology recommendation.  Q 1 vital checks.  Low threshold for CT head if change in clinical status.  IV labetalol p.r.n. to keep blood pressure less than 180/105- resumed Metoprolol on 6/11  High-intensity statin.  HbA1c 7.0  lipid panel.  TTE with bubble-  negative for shunt  Neurology consult- appreciates rec's  Speech consult.  CT head 24 hours after TNK -no acute abnormality      Cardiac/Vascular  Hyperlipidemia  High-intensity statin      Other  ADHD (attention deficit hyperactivity disorder), combined type  Hold home medications      LYNDA on CPAP    CPAP at night    Adjustment disorder with mixed anxiety and depressed mood  Hold home medications in the setting of his acute stroke        Final Active Diagnoses:    Diagnosis Date Noted POA    PRINCIPAL PROBLEM:  Acute ischemic stroke [I63.9] 06/10/2024 Yes    ADHD (attention deficit hyperactivity disorder), combined type [F90.2] 03/05/2024 Yes    LYNDA on CPAP [G47.33] 09/09/2014 Yes    Hyperlipidemia [E78.5] 09/09/2014 Yes    Adjustment disorder with mixed anxiety and depressed mood [F43.23] 09/09/2014 Yes      Problems Resolved During this Admission:       Discharged Condition: stable    Disposition: Home or Self Care    Follow Up:    Patient Instructions:      Diet Cardiac     Activity as tolerated       Significant Diagnostic Studies: N/A    Pending Diagnostic Studies:       None           Medications:  Reconciled Home Medications:      Medication List        START taking these medications      aspirin 81 MG Chew  Take 1 tablet (81 mg total) by mouth once daily.  Start taking on: June 12, 2024     atorvastatin 40 MG tablet  Commonly known as: LIPITOR  Take 1 tablet (40 mg total) by mouth once daily.  Start taking on: June 12, 2024     clopidogreL 75 mg tablet  Commonly known as: PLAVIX  Take 1 tablet (75 mg total) by mouth once daily.  Start taking on: June 12, 2024            CONTINUE taking these medications   "    acetaminophen 325 MG tablet  Commonly known as: TYLENOL  Take 2 tablets (650 mg total) by mouth every 6 (six) hours as needed for Pain.     ALPRAZolam 1 MG tablet  Commonly known as: XANAX  Take 1 tablet (1 mg total) by mouth 2 (two) times daily as needed for Anxiety.     blood-glucose meter kit  To check BG 2 times daily, to use with insurance preferred meter     buPROPion 150 MG TB24 tablet  Commonly known as: WELLBUTRIN XL  Take 1 tablet (150 mg total) by mouth once daily.     busPIRone 10 MG tablet  Commonly known as: BUSPAR  Take 2 tablets (20 mg total) by mouth 2 (two) times daily.     cyanocobalamin 1000 MCG tablet  Commonly known as: VITAMIN B-12  Take 100 mcg by mouth once daily.     dextroamphetamine sulfate 10 MG tablet  Commonly known as: DEXTROSTAT  Take 1 tablet (10 mg total) by mouth after lunch.     IRON 100 PLUS ORAL  Take by mouth once daily at 6am.     lisdexamfetamine 40 MG Cap  Commonly known as: VYVANSE  Take 1 capsule (40 mg total) by mouth once daily.     losartan 25 MG tablet  Commonly known as: COZAAR  Take 1 tablet (25 mg total) by mouth once daily.     metFORMIN 500 MG ER 24hr tablet  Commonly known as: GLUCOPHAGE-XR  Take 1 tablet (500 mg total) by mouth 2 (two) times daily with meals.     methocarbamoL 750 MG Tab  Commonly known as: ROBAXIN  TAKE 1 TABLET(750 MG) BY MOUTH FOUR TIMES DAILY AS NEEDED FOR MUSCLE STRAIN     metoprolol succinate 100 MG 24 hr tablet  Commonly known as: TOPROL-XL  Take 1 tablet (100 mg total) by mouth once daily.     modafiniL 200 MG Tab  Commonly known as: PROVIGIL  Take 1 tablet (200 mg total) by mouth 2 (two) times daily as needed (sleepiness). First dose upon awakening and second dose 4-6 hours later     multivitamin capsule  Take 1 capsule by mouth once daily.     needle (disp) 18 G 18 gauge x 1" Ndle  Commonly known as: BD REGULAR BEVEL NEEDLES  1 each by Misc.(Non-Drug; Combo Route) route once a week. Use to draw up testosterone     ondansetron 4 MG " "Tbdl  Commonly known as: ZOFRAN-ODT  Take 1 tablet (4 mg total) by mouth every 6 (six) hours as needed (nausea).     ONETOUCH DELICA PLUS LANCET 33 gauge Misc  Generic drug: lancets  Test BG 2 times a day     ONETOUCH VERIO TEST STRIPS Strp  Generic drug: blood sugar diagnostic  1 each by Misc.(Non-Drug; Combo Route) route 3 (three) times daily.     OZEMPIC 2 mg/dose (8 mg/3 mL) Pnij  Generic drug: semaglutide  Inject 2 mg into the skin every 7 days.     scopolamine 1.3-1.5 mg (1 mg over 3 days)  Commonly known as: TRANSDERM-SCOP  Place 1 patch onto the skin every 72 hours.     syringe with needle 3 mL 21 gauge x 1" Syrg  1 each by Misc.(Non-Drug; Combo Route) route once a week. Use to inject testosterone     testosterone cypionate 200 mg/mL injection  Commonly known as: DEPOTESTOTERONE CYPIONATE  Inject 1 mL (200 mg total) into the muscle every 7 days.     venlafaxine 150 MG Cp24  Commonly known as: EFFEXOR-XR  Take 1 capsule (150 mg total) by mouth once daily.     VITAMIN B-2 ORAL  Take 1 tablet by mouth once daily.     VITAMIN D ORAL  Take 5,000 Units by mouth once daily.     zolpidem 12.5 MG CR tablet  Commonly known as: AMBIEN CR  Take 1 tablet (12.5 mg total) by mouth nightly as needed for Insomnia.     ZYRTEC ORAL  Take 1 tablet by mouth daily as needed.            STOP taking these medications      fexofenadine 60 MG tablet  Commonly known as: ALLEGRA     rosuvastatin 10 MG tablet  Commonly known as: CRESTOR              Indwelling Lines/Drains at time of discharge:   Lines/Drains/Airways       None                   Time spent on the discharge of patient: 35 minutes    Critical care time spent on the evaluation and treatment of severe organ dysfunction, review of pertinent labs and imaging studies, discussions with consulting providers and discussions with patient/family: 35 minutes.     Eliz Tsai MD  Department of Hospital Medicine  [facility]  "

## 2024-06-11 NOTE — PROGRESS NOTES
NEUROLOGY Progress Note    Subjective:       MRI brain complete, no acute stroke noted.    Today pt reports he feels back at baseline. No new symptoms. Counseled and educated on signs of stroke.    Pt reports he had a brief frontal headache during the episode of left sided weakness that brought him to the hospital, it did not last more than 1 hr. Pt states he had a headache becauser he was worrying about work and was furrowing his brow.    ROS: As per HPI    Histories:     Allergies:  Ciprofloxacin, Penicillins, Sulfa (sulfonamide antibiotics), Toradol [ketorolac], Bell pepper, Meloxicam, and Sulfamethoxazole-trimethoprim    Current Medications:    Current Facility-Administered Medications   Medication Dose Route Frequency Provider Last Rate Last Admin    acetaminophen tablet 650 mg  650 mg Oral Q6H PRN Eliz Tsai MD   650 mg at 06/10/24 1006    ALPRAZolam tablet 1 mg  1 mg Oral BID PRN Eliz Tsai MD   1 mg at 06/10/24 1153    aspirin chewable tablet 81 mg  81 mg Oral Daily Sea Biggs MD        atorvastatin tablet 40 mg  40 mg Oral Daily Mary Valdez MD   40 mg at 06/10/24 0840    bisacodyL suppository 10 mg  10 mg Rectal Daily PRN Mary Valdez MD        buPROPion TB24 tablet 150 mg  150 mg Oral Daily Eliz Tsai MD   150 mg at 06/10/24 1324    busPIRone tablet 20 mg  20 mg Oral BID Eliz Tsai MD   20 mg at 06/10/24 2030    clopidogreL tablet 75 mg  75 mg Oral Daily Sea Biggs MD        labetaloL injection 10 mg  10 mg Intravenous Q15 Min PRN Mary Valdez MD        metoprolol succinate (TOPROL-XL) 24 hr tablet 100 mg  100 mg Oral Daily Mary Valdez MD   100 mg at 06/10/24 0840    mupirocin 2 % ointment   Nasal BID Juan Jose Louie MD   Given at 06/10/24 2030    sodium chloride 0.9% bolus 500 mL 500 mL  500 mL Intravenous PRN Mary Valdez MD        sodium chloride 0.9% flush 10 mL  10  mL Intravenous PRN Mary Valdez MD        sodium chloride 0.9% flush 10 mL  10 mL Intravenous PRN Mary Valdez MD        venlafaxine 24 hr capsule 150 mg  150 mg Oral Daily Jame-Eliz Jimenez MD   150 mg at 06/10/24 1324     Facility-Administered Medications Ordered in Other Encounters   Medication Dose Route Frequency Provider Last Rate Last Admin    lactated ringers infusion   Intravenous Continuous Stan Marcos DNP        LIDOcaine (PF) 10 mg/ml (1%) injection 10 mg  1 mL Intradermal Once Stan Marcos DNP           Past Medical/Surgical/Family History:  Medical:   Past Medical History:   Diagnosis Date    Acute ischemic stroke 6/10/2024    Adjustment disorder with mixed anxiety and depressed mood     Anxiety     Colon polyp     Depression     ED (erectile dysfunction)     Family history of prostate cancer 9/29/2014    Hx of umbilical hernia repair 10/16/2020    Hyperlipidemia     Hypertension     Hypogonadism male     Insomnia     Low testosterone     LYNDA (obstructive sleep apnea)     Prediabetes       Surgeries:   Past Surgical History:   Procedure Laterality Date    ADENOIDECTOMY      COLONOSCOPY N/A 12/5/2023    Procedure: COLONOSCOPY;  Surgeon: Jonnathan Velazquez MD;  Location: [facility] (4TH FLR);  Service: Colon and Rectal;  Laterality: N/A;  inst. to pt. portal. Takes Ozempic inst. to hold.Tbou  referral: Dr. Cross  11/28-[facility] for precall-MS  12/4-last dose Ozempic 11/26/23, precall complete-KPvt    HERNIA REPAIR      REPAIR OF INCARCERATED UMBILICAL HERNIA N/A 10/16/2020    Procedure: REPAIR, HERNIA, UMBILICAL, INCARCERATED, AGE 5 YEARS OR OLDER with mesh ;  Surgeon: Conrad Loco MD;  [facility];  Service: General;  Laterality: N/A;    REPAIR OF LABRUM OF HIP Left 12/14/2023    Procedure: REPAIR,LABRUM,SHOULDER;  Surgeon: Sea Valadez MD;  Location: [facility];  Service: Orthopedics;  Laterality: Left;    SHOULDER ARTHROSCOPY W/  SUPERIOR LABRAL ANTERIOR POSTERIOR LESION REPAIR Left 12/14/2023    Procedure: ARTHROSCOPY, SHOULDER,;  Surgeon: Sea Valadez MD;  Location: [facility];  Service: Orthopedics;  Laterality: Left;  Arthrex knotless suturetak labral anchors, long plastic cannulas, arthroscopic elevators/rasps marissa notified cc    TONSILLECTOMY        Family:   Family History   Problem Relation Name Age of Onset    Hypertension Mother      Hyperlipidemia Mother      Diabetes Father      Hyperlipidemia Father      Hypertension Father      Heart disease Father  46        CAD    No Known Problems Sister      Cancer Maternal Aunt          colon cancer    Stroke Maternal Uncle      Cancer Paternal Uncle          prostate cancer   , no family history of stroke    Social History:    Substance Abuse/Dependence History:  Tobacco: denied  EtOH:  occasional  Ilicits: MJ occasional    Occupational/Employment History:  Occupation: Works at [employer], assists with course content creation      Current Evaluation:     Vital Signs:   Vitals:    06/11/24 0800   BP: 134/76   Pulse: 74   Resp: 17   Temp:         Neurological Examination     NIH Stroke Scale      Interval: 24 hours post onset of symptoms +/- 20 minutes  Time: 1:47 PM  Person Administering Scale: Lin King    Administer stroke scale items in the order listed. Record performance in each category after each subscale exam. Do not go back and change scores. Follow directions provided for each exam technique. Scores should reflect what the patient does, not what the clinician thinks the patient can do. The clinician should record answers while administering the exam and work quickly. Except where indicated, the patient should not be coached (i.e., repeated requests to patient to make a special effort).      1a  Level of consciousness: 0=alert; keenly responsive   1b. LOC questions:  0=Answers both tasks correctly   1c. LOC commands: 0=Answers both tasks correctly   2.  Best Gaze:  0=normal   3.  Visual: 0=No visual loss   4. Facial Palsy: 0=Normal symmetric movement   5a.  Motor left arm: 0=No drift, limb holds 90 (or 45) degrees for full 10 seconds   5b.  Motor right arm: 0=No drift, limb holds 90 (or 45) degrees for full 10 seconds   6a. motor left le=No drift, limb holds 90 (or 45) degrees for full 10 seconds   6b  Motor right le=No drift, limb holds 90 (or 45) degrees for full 10 seconds   7. Limb Ataxia: 0=Absent   8.  Sensory: 1=Mild to moderate sensory loss; patient feels pinprick is less sharp or is dull on the affected side; there is a loss of superficial pain with pinprick but patient is aware He is being touched   9. Best Language:  0=No aphasia, normal   10. Dysarthria: 0=Normal   11. Extinction and Inattention: 0=No abnormality    Total:   1        Orientation  Alert, awake, oriented to self, place, time, and situation.  Memory  Recent and remote memory intact.  Language  No dysarthria, No aphasia.   Cranial Nerves  PERRL, VF intact, EOMI, V1-V3 intact, symmetric facial expression, hearing grossly intact, SCM & TPZ 5/5, tongue midline, symmetric palate elevation.  Motor  Normal Bulk  Normal Tone  5/5 strength in 4 extremities  Sensory  Decreased sensation to pinprick on L face, arm and leg. States R side feels 100% sensation, and left side feels about 90% compared to the R side  DTR   +2 symmetric  Cerebellar/Gait  Normal finger to nose and heel to shin.   Deferred gait    LABORATORY STUDIES:  CBC:   Recent Labs   Lab 24  0836   WBC 11.34 9.08   HGB 16.7 15.8   HCT 49.9 47.4    267   MCV 91 91   RDW 13.2 13.3     BMP:   Recent Labs   Lab 24  0337    135*   K 4.8 4.8    103   CO2 22* 18*   BUN 13 16   CREATININE 1.1 0.9    91   CALCIUM 10.3 9.9   MG  --  1.9   PHOS  --  4.9*     LFTs:   Recent Labs   Lab 24   PROT 7.8   ALBUMIN 4.4   BILITOT 0.4   AST 55*   ALKPHOS 70   ALT 94*     Coags:  "  Recent Labs   Lab 06/09/24  2319 06/09/24  2320 06/10/24  0545   INR 1.2 1.1 1.1     FLP:   Recent Labs   Lab 06/09/24  2320   CHOL 224*   LDLCALC 143.8   TRIG 221*   CHOLHDL 16.1*     DM:   Recent Labs   Lab 06/09/24 2320 06/11/24  0337   HGBA1C 7.0*  --    LDLCALC 143.8  --    CREATININE 1.1 0.9     Thyroid:   Recent Labs   Lab 06/09/24 2320   TSH 0.139*   FREET4 0.97     Anemia: No results for input(s): "IRON", "TIBC", "FERRITIN", "[id number]", "FOLATE" in the last 168 hours.  Cardiac:   Recent Labs   Lab 06/09/24  2320 06/10/24  0545   TROPONINI <0.006 <0.006     Urinalysis:   Recent Labs   Lab 06/10/24  0829   COLORU Yellow   APPEARANCEUA Clear   PHUR 6.0   SPECGRAV 1.030   PROTEINUA Negative   GLUCUA Negative   KETONESU Negative   BILIRUBINUA Negative   OCCULTUA Negative   NITRITE Negative   UROBILINOGEN Negative   LEUKOCYTESUR Negative     Urine Micro:  No results for input(s): "RBCUA", "WBCUA", "BACTERIA", "SQUAMEPITHEL", "MICROCMT" in the last 168 hours.     RADIOLOGY STUDIES:  Imaging    CTH non con 6/10/24  FINDINGS:  Brain: No evidence of acute intracranial hemorrhage, extraxial fluid or midline shift. Cerebral ventricles: No ventriculomegaly. Paranasal sinuses: Visualized sinuses are unremarkable. No fluid levels. Mastoid air cells: Bilateral mastoid air cells normally aerated. Bones: Unremarkable. No acute fracture. Soft tissues: Unremarkable     Impression:     1. No evidence of acute intracranial hemorrhage, extraxial fluid or midline shift. 2. No evidence of acute large vessel infarction.    CTA head and neck 6/9/24    Impression:     No acute abnormality. No high-grade stenosis or major vessel occlusion.'    MRI brain wo 6/10/24  FINDINGS:  Parenchyma: There is no restricted diffusion to suggest acute or subacute ischemic infarct.     Additional comments: There is no midline shift, abnormal extra-axial fluid collection, or acute intracranial hemorrhage. The basal cisterns are patent.   "   Ventricles: Normal.     Flow voids: The normal major intracranial arterial flow voids are visualized.     Sinuses and mastoid air cells: Essentially clear     Orbits: Normal     Midline structures: The pituitary and craniocervical junction are normal.     Marrow: Normal        Impression:     No acute intracranial findings.  No evidence of acute ischemia or recent infarction, as questioned clinically.    TTE 6/10/24    Left Ventricle: The left ventricle is normal in size. There is concentric remodeling. There is normal systolic function. Biplane (2D) method of discs ejection fraction is 71%. There is normal diastolic function.    Right Ventricle: Normal right ventricular cavity size. Systolic function is normal. TAPSE is 2.27 cm.    Right Atrium: Right atrium is mildly dilated.    Pulmonary Artery: The estimated pulmonary artery systolic pressure is 23 mmHg.    IVC/SVC: Normal venous pressure at 3 mmHg.    Study is negative for shunt.      ASSESSMENT:    Dominic Collazo is a 43 y.o. M with a PMH of HTN, HLD, DM2 (last A1c 7%), LYNDA on BiPAP, prior concussion who presented to [facility] on 6/9/24 for acute left sided weakness and numbness, LKN 10 PM on 6/9/234. S/p TNK given at 2357 on 6/9/24, pt had resolution of symptoms.    Impression:  Acute left sided weakness and numbness resolved with TNK, concern for TIA. No acute stroke noted on MRI brain. His risk factors for stroke include HTN, HLD, LYNDA and DM2. While patient is relatively young to have stroke, he has several risk factors and his ABCD2 score is 5 (moderate risk of recurrent TIA). No concern for hemiplegic migraine, pt's headaches do not meet criteria for migrainous disorder    Recommendations  - Continue ASA 81 mg and Plavix 75 mg daily for 21 days, followed by ASA 81 mg daily monotherapy thereafter  - Increase atorvastatin from 40 mg to 80 mg nightly, goal LDL <70  - A1c 7.0%, .8  - Normotension, normoglycemia  - PT/OT/SLP consulted;  appreciate recommendations  - OK to resume chemical DVT ppx  - FU with [facility] after discharge, ideally within 4-6 weeks      Case to be discussed with Dr. Banks    Will sign off at this time, no further recommendations. Please do note hesitate to reach out with any additional questions or concerns.    Lin King MD  [employer] PGY3

## 2024-06-12 ENCOUNTER — PATIENT OUTREACH (OUTPATIENT)
Dept: ADMINISTRATIVE | Facility: CLINIC | Age: 44
End: 2024-06-12
Payer: COMMERCIAL

## 2024-06-12 ENCOUNTER — OFFICE VISIT (OUTPATIENT)
Dept: NEUROLOGY | Facility: CLINIC | Age: 44
End: 2024-06-12
Payer: COMMERCIAL

## 2024-06-12 VITALS
WEIGHT: 315 LBS | BODY MASS INDEX: 41.33 KG/M2 | HEART RATE: 86 BPM | DIASTOLIC BLOOD PRESSURE: 83 MMHG | SYSTOLIC BLOOD PRESSURE: 129 MMHG

## 2024-06-12 DIAGNOSIS — R41.89 COGNITIVE CHANGE: ICD-10-CM

## 2024-06-12 DIAGNOSIS — S13.4XXD WHIPLASH INJURY TO NECK, SUBSEQUENT ENCOUNTER: ICD-10-CM

## 2024-06-12 DIAGNOSIS — G44.86 CERVICOGENIC HEADACHE: ICD-10-CM

## 2024-06-12 DIAGNOSIS — S06.0X9S CONCUSSION WITH LOSS OF CONSCIOUSNESS, SEQUELA: Primary | ICD-10-CM

## 2024-06-12 DIAGNOSIS — F34.89 OTHER SPECIFIED PERSISTENT MOOD DISORDERS: ICD-10-CM

## 2024-06-12 DIAGNOSIS — R26.89 IMBALANCE: ICD-10-CM

## 2024-06-12 DIAGNOSIS — E11.69 TYPE 2 DIABETES MELLITUS WITH MORBID OBESITY: ICD-10-CM

## 2024-06-12 DIAGNOSIS — E66.01 TYPE 2 DIABETES MELLITUS WITH MORBID OBESITY: ICD-10-CM

## 2024-06-12 DIAGNOSIS — M54.2 CERVICALGIA OF OCCIPITO-ATLANTO-AXIAL REGION: ICD-10-CM

## 2024-06-12 DIAGNOSIS — G47.9 FATIGUE DUE TO SLEEP PATTERN DISTURBANCE: ICD-10-CM

## 2024-06-12 DIAGNOSIS — I63.9 CEREBROVASCULAR ACCIDENT (CVA), UNSPECIFIED MECHANISM: ICD-10-CM

## 2024-06-12 DIAGNOSIS — R53.83 FATIGUE DUE TO SLEEP PATTERN DISTURBANCE: ICD-10-CM

## 2024-06-12 PROBLEM — G45.9 TIA (TRANSIENT ISCHEMIC ATTACK): Status: ACTIVE | Noted: 2024-06-12

## 2024-06-12 PROCEDURE — 99214 OFFICE O/P EST MOD 30 MIN: CPT | Mod: S$GLB,,, | Performed by: PSYCHIATRY & NEUROLOGY

## 2024-06-12 PROCEDURE — 3008F BODY MASS INDEX DOCD: CPT | Mod: CPTII,S$GLB,, | Performed by: PSYCHIATRY & NEUROLOGY

## 2024-06-12 PROCEDURE — 1160F RVW MEDS BY RX/DR IN RCRD: CPT | Mod: CPTII,S$GLB,, | Performed by: PSYCHIATRY & NEUROLOGY

## 2024-06-12 PROCEDURE — 3079F DIAST BP 80-89 MM HG: CPT | Mod: CPTII,S$GLB,, | Performed by: PSYCHIATRY & NEUROLOGY

## 2024-06-12 PROCEDURE — 1111F DSCHRG MED/CURRENT MED MERGE: CPT | Mod: CPTII,S$GLB,, | Performed by: PSYCHIATRY & NEUROLOGY

## 2024-06-12 PROCEDURE — 3051F HG A1C>EQUAL 7.0%<8.0%: CPT | Mod: CPTII,S$GLB,, | Performed by: PSYCHIATRY & NEUROLOGY

## 2024-06-12 PROCEDURE — 99999 PR PBB SHADOW E&M-EST. PATIENT-LVL III: CPT | Mod: PBBFAC,,, | Performed by: PSYCHIATRY & NEUROLOGY

## 2024-06-12 PROCEDURE — 1159F MED LIST DOCD IN RCRD: CPT | Mod: CPTII,S$GLB,, | Performed by: PSYCHIATRY & NEUROLOGY

## 2024-06-12 PROCEDURE — 3074F SYST BP LT 130 MM HG: CPT | Mod: CPTII,S$GLB,, | Performed by: PSYCHIATRY & NEUROLOGY

## 2024-06-12 PROCEDURE — 4010F ACE/ARB THERAPY RXD/TAKEN: CPT | Mod: CPTII,S$GLB,, | Performed by: PSYCHIATRY & NEUROLOGY

## 2024-06-12 RX ORDER — TIZANIDINE 4 MG/1
4 TABLET ORAL NIGHTLY PRN
Qty: 30 TABLET | Refills: 5 | Status: SHIPPED | OUTPATIENT
Start: 2024-06-12

## 2024-06-12 NOTE — PATIENT INSTRUCTIONS
Continue lower neck/upper back to mid back PT with myofascial release placed previously (therapist may choose from and do a combination of: deep tissue massage, cupping, dry needling, etc). No soft tissue manipulation of suboccipital region if you start to feel worse. All joint manipulation is fine.  Continue light cardio but no contact sport situations  Continue vestibular PT exercises  Continue ST exercises  Continue social work or psychology (whoever can see the patient soonest) consultation, therapy, and treatment. 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 The information from this consultation will be used by myself and other providers/therapists to help guide an individual care plan to help treat this patient's symptoms from TBI/neck injury. Any delays or failures to address cognitive, sleep, psychological symptoms after TBI/neck injury can contribute to persistent symptoms, prolong their overall recovery process, and potentially lead to permanent symptoms. And of note, this referral is not for neuropsychology or neurocognitive testing. This referral is specifically for social work or psychological interventions based on the consultation these services provide.  Continue to follow with psychiatrist   Continue tizanidine 4 mg (full tablet) nightly. Take 30 minutes before bed. Never drink alcohol or drive after taking this medication  Do not take methocarbamol at same time as tizanidine  No work until sees vascular neurology  Referral for vascular neurology  Continue aspirin and plavix and statin  change until see vascular neurology
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
## 2024-06-12 NOTE — ADDENDUM NOTE
Addended by: MARIBEL DHILLON on: 6/12/2024 03:06 PM     Modules accepted: Orders     Benign prostatic hyperplasia with lower urinary tract symptoms, symptom details unspecified

## 2024-06-12 NOTE — PROGRESS NOTES
C3 nurse spoke with Dominic Collazo for a TCC post hospital discharge follow up call. The patient has a scheduled HOSFU appointment with Melania Aparicio on 06/18/24 @ 1000.

## 2024-06-14 ENCOUNTER — PATIENT MESSAGE (OUTPATIENT)
Dept: NEUROLOGY | Facility: CLINIC | Age: 44
End: 2024-06-14
Payer: COMMERCIAL

## 2024-06-18 PROBLEM — G45.9 TIA (TRANSIENT ISCHEMIC ATTACK): Status: RESOLVED | Noted: 2024-06-12 | Resolved: 2024-06-18

## 2024-06-18 PROBLEM — E11.65 TYPE 2 DIABETES MELLITUS WITH HYPERGLYCEMIA, WITHOUT LONG-TERM CURRENT USE OF INSULIN: Status: ACTIVE | Noted: 2024-06-18

## 2024-06-19 ENCOUNTER — PATIENT MESSAGE (OUTPATIENT)
Dept: PSYCHIATRY | Facility: CLINIC | Age: 44
End: 2024-06-19
Payer: COMMERCIAL

## 2024-06-21 RX ORDER — NEEDLES, DISPOSABLE 27GX1/2"
1 NEEDLE, DISPOSABLE MISCELLANEOUS WEEKLY
Qty: 12 EACH | Refills: 4 | Status: SHIPPED | OUTPATIENT
Start: 2024-06-21

## 2024-06-25 ENCOUNTER — OFFICE VISIT (OUTPATIENT)
Dept: PSYCHIATRY | Facility: CLINIC | Age: 44
End: 2024-06-25
Payer: COMMERCIAL

## 2024-06-25 ENCOUNTER — PATIENT MESSAGE (OUTPATIENT)
Dept: PSYCHIATRY | Facility: CLINIC | Age: 44
End: 2024-06-25

## 2024-06-25 DIAGNOSIS — F51.05 INSOMNIA DUE TO OTHER MENTAL DISORDER: ICD-10-CM

## 2024-06-25 DIAGNOSIS — F41.0 GENERALIZED ANXIETY DISORDER WITH PANIC ATTACKS: ICD-10-CM

## 2024-06-25 DIAGNOSIS — F33.2 MDD (MAJOR DEPRESSIVE DISORDER), RECURRENT SEVERE, WITHOUT PSYCHOSIS: Primary | ICD-10-CM

## 2024-06-25 DIAGNOSIS — F41.1 GENERALIZED ANXIETY DISORDER WITH PANIC ATTACKS: ICD-10-CM

## 2024-06-25 DIAGNOSIS — F99 INSOMNIA DUE TO OTHER MENTAL DISORDER: ICD-10-CM

## 2024-06-25 PROCEDURE — 1159F MED LIST DOCD IN RCRD: CPT | Mod: CPTII,95,, | Performed by: PHYSICIAN ASSISTANT

## 2024-06-25 PROCEDURE — 1160F RVW MEDS BY RX/DR IN RCRD: CPT | Mod: CPTII,95,, | Performed by: PHYSICIAN ASSISTANT

## 2024-06-25 PROCEDURE — 3051F HG A1C>EQUAL 7.0%<8.0%: CPT | Mod: CPTII,95,, | Performed by: PHYSICIAN ASSISTANT

## 2024-06-25 PROCEDURE — 4010F ACE/ARB THERAPY RXD/TAKEN: CPT | Mod: CPTII,95,, | Performed by: PHYSICIAN ASSISTANT

## 2024-06-25 PROCEDURE — 99215 OFFICE O/P EST HI 40 MIN: CPT | Mod: 95,,, | Performed by: PHYSICIAN ASSISTANT

## 2024-06-25 PROCEDURE — 1111F DSCHRG MED/CURRENT MED MERGE: CPT | Mod: CPTII,95,, | Performed by: PHYSICIAN ASSISTANT

## 2024-06-25 PROCEDURE — 90833 PSYTX W PT W E/M 30 MIN: CPT | Mod: 95,,, | Performed by: PHYSICIAN ASSISTANT

## 2024-06-25 RX ORDER — ALPRAZOLAM 1 MG/1
1 TABLET ORAL 2 TIMES DAILY PRN
Qty: 60 TABLET | Refills: 1 | Status: SHIPPED | OUTPATIENT
Start: 2024-06-25 | End: 2024-10-23

## 2024-06-25 RX ORDER — ZOLPIDEM TARTRATE 12.5 MG/1
12.5 TABLET, FILM COATED, EXTENDED RELEASE ORAL NIGHTLY PRN
Qty: 30 TABLET | Refills: 3 | Status: SHIPPED | OUTPATIENT
Start: 2024-06-25 | End: 2024-12-24

## 2024-06-25 RX ORDER — BUSPIRONE HYDROCHLORIDE 10 MG/1
20 TABLET ORAL 2 TIMES DAILY
Qty: 180 TABLET | Refills: 1 | Status: SHIPPED | OUTPATIENT
Start: 2024-06-25

## 2024-06-25 NOTE — PROGRESS NOTES
Outpatient Psychiatry Follow-Up Visit (MD/ADA)    6/25/2024     The patient location is: [place]  The chief complaint leading to consultation is: depression and anxiety    Visit type: audiovisual    Face to Face time with patient: 50 minutes  62 minutes of total time spent on the encounter, which includes face to face time and non-face to face time preparing to see the patient (eg, review of tests), Obtaining and/or reviewing separately obtained history, Documenting clinical information in the electronic or other health record, Independently interpreting results (not separately reported) and communicating results to the patient/family/caregiver, or Care coordination (not separately reported).     Each patient to whom he or she provides medical services by telemedicine is:  (1) informed of the relationship between the physician and patient and the respective role of any other health care provider with respect to management of the patient; and (2) notified that he or she may decline to receive medical services by telemedicine and may withdraw from such care at any time.    Clinical Status of Patient:  Outpatient (Ambulatory)    Chief Complaint:  Dominic Collazo is a 43 y.o. male who presents today for follow-up of depression, anxiety, adjustment problems, and attention problems.  Met with patient.      Interval History and Content of Current Session:  Interim Events/Subjective Report/Content of Current Session:     Pt since prior visit had found out of coworker was stealing work pt was doing and claiming that it was coworker's, pt reports severely distressed as it cost patient about $50k due to he lost due to his coworker claiming pt's work as his own.    Pt had been dealing with depression and anxiety prior to this. This has worsened his depression and anxiety and is currently severely depressed since finding out coworker was scamming him near end of May.    Pt reports he has been unable to work due to  "anxiety, stress, depression since 6/3/24, states he told his employer he was taking medical leave on 6/3/24 and would not be able to work due to severity of mental health issues.    Pt reports on 6/9/23 pt had left sided facial drooping, had general left side weakness, went to ED, was dx with TIA, pt had to stay in ICU briefly after given tPA. Pt reports within 5 minutes of receiving tPA pt reports that his weakness and disorentation, facial drooping resolved.    Pt today reports he has full resolution of neurological symptoms since receiving tPA.    Discussed at this time will discontinue vyvanse and dexedrine until assessed further with neuro follow up.    Placing pt on FMLA leave at this time.    Pt is scheduled to f/u with neurologist for post stroke f/u in September. Pt reports he wants to get a much sooner visit.     Discussed with pt he should also follow up with his PCP regarding current health problems    Pt reports today: "stressed and depressed beyond anything"    Mood overall is "depressed and anxious, very overwhelmed".    Patients mood is depressed and anxious, affect appears mood congruent. Linear and logical, friendly and cooperative, good eye contact.    Denies SI/HI/AVH. Pt reports sleeping well 7 hrs on ambien and decreased appetite. Denies side effects of medications.    Pt reports taking medications as prescribed and behaving appropriately during interview today.    Psychotherapy:  Target symptoms: depression, anxiety , work stress  Why chosen therapy is appropriate versus another modality: relevant to diagnosis, patient responds to this modality, evidence based practice  Outcome monitoring methods: self-report, observation  Therapeutic intervention type: insight oriented psychotherapy, supportive psychotherapy  Topics discussed/themes: work stress, stress related to medical comorbidities, building skills sets for symptom management, symptom recognition  The patient's response to the " "intervention is accepting. The patient's progress toward treatment goals is good.   Duration of intervention: 25 minutes.      Prior visit:    Pt reports today: "doing better overall, the focus and fatigue is better, I'm more organized and less irritable on the vyvanse"    Had switched from sunsoni and nuvigil to vyvanse and both were ineffective for ADHD and also residual daytime sleepiness despite using cpap for robert.    Mood overall is "pretty good, its improved, still pretty tired. Not too depressed or anxious"    Patients mood is steady, affect appears mood congruent. Linear and logical, friendly and cooperative, good eye contact.    Denies SI/HI/AVH. Pt reports sleeping well and normal appetite. Denies side effects of medications.    Pt reports taking medications as prescribed and behaving appropriately during interview today.    Pt reports continued issues with fatigue despite adequate sleep on cpap. Will work up fatigue/depression panel lab work. Also will check testosterone and e2 as pt on test replacement and requested it be rechecked, last was about 4-5 months ago    States he takes 3000mcg b12 otc daily. Instructed pt to discontinue as likely at toxic levels    Previous medication trials:  Ativan- effective  Seroquel- sedation  Lexapro  Wellbutrin and Buspar- effective,   vistaril- sedation, still had axniety  Prozac - sexual side effects  Cymbalta Sexual side effects and retrograde ejaculation    Review of Systems     Review of Systems   Constitutional:  Positive for malaise/fatigue. Negative for fever.   HENT:  Negative for sore throat.    Eyes:  Negative for photophobia.   Respiratory:  Negative for cough.    Cardiovascular:  Negative for chest pain and palpitations.   Gastrointestinal:  Negative for abdominal pain.   Genitourinary:  Negative for dysuria.   Musculoskeletal:  Negative for myalgias.   Skin:  Negative for rash.   Neurological:  Negative for dizziness, tingling, tremors, sensory change, " speech change, focal weakness, seizures, loss of consciousness, weakness and headaches.   Endo/Heme/Allergies:  Does not bruise/bleed easily.   Psychiatric/Behavioral:  Positive for depression. Negative for hallucinations, substance abuse and suicidal ideas. The patient is nervous/anxious. The patient does not have insomnia.          Past Medical, Family and Social History: The patient's past medical, family and social history have been reviewed and updated as appropriate within the electronic medical record - see encounter notes.    Compliance: yes    Side effects: see above    Risk Parameters:  Patient reports no suicidal ideation  Patient reports no homicidal ideation  Patient reports no self-injurious behavior  Patient reports no violent behavior    Exam (detailed: at least 9 elements; comprehensive: all 15 elements)   Constitutional  Vitals:    There were no vitals filed for this visit.     General:  age appropriate, obese     Musculoskeletal  Muscle Strength/Tone:  not examined   Gait & Station:  THEA due to video visit      Psychiatric  Speech:  no latency; no press   Mood & Affect:  Depressed and anxious  Mood congruent   Thought Process:  normal and logical   Associations:  intact   Thought Content:  normal, no suicidality, no homicidality, delusions, or paranoia   Insight:  intact   Judgement: behavior is adequate to circumstances   Orientation:  grossly intact   Memory: intact for content of interview   Language: grossly intact   Attention Span & Concentration:  able to focus   Fund of Knowledge:  intact and appropriate to age and level of education     Assessment and Diagnosis   Status/Progress: Based on the examination today, the patient's problem(s) is/are inadequately controlled.  New problems have not been presented today.   Co-morbidities are complicating management of the primary condition.  There are no active rule-out diagnoses for this patient at this time.     General Impression:       ICD-10-CM  ICD-9-CM   1. MDD (major depressive disorder), recurrent severe, without psychosis  F33.2 296.33   2. Generalized anxiety disorder with panic attacks  F41.1 300.02    F41.0 300.01   3. Insomnia due to other mental disorder  F51.05 300.9    F99 327.02           Intervention/Counseling/Treatment Plan   Treatment Plan/Recommendations:   Medication Management: Continue current medications.     Continue Wellbutrin  mg daily    Continue vitamin D 2.5k IU once daily    continue buspar to 20mg bid    continue Effexor to 150mg mg daily     continue xanax 1mg bid prn anxiety or insomnia    continue ambien to CR 12.5mg qhs insomnia    Stop vyvanse and dexedrine- f/u with neuro regarding dx TIA    Continue testosterone therapy as prescribed by other provider    Placing pt on FMLA leave at this time, will submit paperwork provider.    F/u with PCP and neurology regarding recent dx and tx for TIA.    The risks and benefits of medication were discussed with the patient.  Discussed diagnosis, risks and benefits of proposed treatment above vs alternative treatments vs no treatment, and potential side effects of these treatments. The patient expresses understanding of the above and displays the capacity to agree with this treatment given said understanding. Patient also agrees that, currently, the benefits outweigh the risks and would like to pursue treatment at this time.  Discussed inherent unpredictability of medications in each individual.   Encouraged Patient to keep future appointments.   Take medications as prescribed and abstain from substance abuse.   In the event of an emergency, patient was advised to go to the emergency room      Return to Clinic: 1 month      Brandon Burdick PA-C

## 2024-06-30 NOTE — PROGRESS NOTES
The patient location is: [place]  The chief complaint leading to consultation is: trouble with sleep    Visit type: audiovisual    15 minutes of total time spent on the encounter, which includes face to face time and non-face to face time preparing to see the patient (eg, review of tests), Obtaining and/or reviewing separately obtained history, Documenting clinical information in the electronic or other health record, Independently interpreting results (not separately reported) and communicating results to the patient/family/caregiver, or Care coordination (not separately reported).     Each patient to whom he or she provides medical services by telemedicine is:  (1) informed of the relationship between the physician and patient and the respective role of any other health care provider with respect to management of the patient; and (2) notified that he or she may decline to receive medical services by telemedicine and may withdraw from such care at any time.    ESTABLISHED PATIENT VISIT     Dominic Collazo  is a pleasant 43 y.o. male  with history of  chronic pain, HTN, ED, BMI elevated.    Here today for: PAP follow-up    Since last visit:   See interval history in table below    Past Medical History:   Diagnosis Date    Acute ischemic stroke 6/10/2024    Adjustment disorder with mixed anxiety and depressed mood     Anxiety     Colon polyp     Depression     ED (erectile dysfunction)     Family history of prostate cancer 9/29/2014    Hx of umbilical hernia repair 10/16/2020    Hyperlipidemia     Hypertension     Hypogonadism male     Insomnia     Low testosterone     LYNDA (obstructive sleep apnea)     Prediabetes     Type 2 diabetes mellitus with hyperglycemia, without long-term current use of insulin 6/18/2024     Patient Active Problem List   Diagnosis    HTN (hypertension)    Hyperlipidemia    Adjustment disorder with mixed anxiety and depressed mood    LYNDA on CPAP    Acromioclavicular joint  separation, type 1    Left wrist sprain    Trauma    Mid back pain    Decreased ROM of wrist    Acute left-sided weakness    Chronic pain    Tenosynovitis, de Quervain    Morbid obesity    Thoracic back pain    Erectile dysfunction due to arterial insufficiency    Male hypogonadism    NAFLD (nonalcoholic fatty liver disease)    Elevated liver enzymes    Panic attack    Insomnia due to other mental disorder    Adjustment insomnia    BPH with urinary obstruction    Shoulder pain    Neck pain    Decreased range of motion of left shoulder    BMI 40.0-44.9, adult    Other specified persistent mood disorders    Concussion with loss of consciousness    Post concussion syndrome    Deficient smooth pursuit eye movements    Saccadic eye movement deficiency    Convergence insufficiency    Cognitive communication deficit    Paralabral cyst of left shoulder    Superior glenoid labrum lesion of left shoulder    Impaired range of motion of left shoulder    Impaired strength of upper extremity    Impaired function of upper extremity    ADHD (attention deficit hyperactivity disorder), combined type    Long-term current use of testosterone replacement therapy    Vitamin D insufficiency    Generalized anxiety disorder with panic attacks    MDD (major depressive disorder), recurrent episode, moderate    Acute ischemic stroke    Type 2 diabetes mellitus with hyperglycemia, without long-term current use of insulin    MDD (major depressive disorder), recurrent severe, without psychosis       Current Outpatient Medications:     acetaminophen (TYLENOL) 325 MG tablet, Take 2 tablets (650 mg total) by mouth every 6 (six) hours as needed for Pain., Disp: 30 tablet, Rfl: 0    ALPRAZolam (XANAX) 1 MG tablet, Take 1 tablet (1 mg total) by mouth 2 (two) times daily as needed for Anxiety., Disp: 60 tablet, Rfl: 1    aspirin 81 MG Chew, Take 1 tablet (81 mg total) by mouth once daily., Disp: 30 tablet, Rfl: 11    atorvastatin (LIPITOR) 40 MG tablet,  "Take 1 tablet (40 mg total) by mouth once daily., Disp: 90 tablet, Rfl: 3    blood sugar diagnostic (ONETOUCH VERIO TEST STRIPS) Strp, 1 each by Misc.(Non-Drug; Combo Route) route 3 (three) times daily., Disp: 100 each, Rfl: 11    blood-glucose meter kit, To check BG 2 times daily, to use with insurance preferred meter, Disp: 1 each, Rfl: 0    buPROPion (WELLBUTRIN XL) 150 MG TB24 tablet, Take 1 tablet (150 mg total) by mouth once daily., Disp: 90 tablet, Rfl: 1    busPIRone (BUSPAR) 10 MG tablet, Take 2 tablets (20 mg total) by mouth 2 (two) times daily., Disp: 180 tablet, Rfl: 1    cetirizine HCl (ZYRTEC ORAL), Take 1 tablet by mouth daily as needed., Disp: , Rfl:     clopidogreL (PLAVIX) 75 mg tablet, Take 1 tablet (75 mg total) by mouth once daily., Disp: 21 tablet, Rfl: 0    ergocalciferol, vitamin D2, (VITAMIN D ORAL), Take 5,000 Units by mouth once daily., Disp: , Rfl:     iron,carb/vit C/vit B12/folic (IRON 100 PLUS ORAL), Take by mouth once daily at 6am., Disp: , Rfl:     lancets 33 gauge Misc, Test BG 2 times a day, Disp: 200 each, Rfl: 3    losartan (COZAAR) 25 MG tablet, Take 1 tablet (25 mg total) by mouth once daily., Disp: 30 tablet, Rfl: 11    metFORMIN (GLUCOPHAGE-XR) 500 MG ER 24hr tablet, Take 1 tablet (500 mg total) by mouth 2 (two) times daily with meals., Disp: 180 tablet, Rfl: 3    methocarbamoL (ROBAXIN) 750 MG Tab, TAKE 1 TABLET(750 MG) BY MOUTH FOUR TIMES DAILY AS NEEDED FOR MUSCLE STRAIN, Disp: 40 tablet, Rfl: 3    metoprolol succinate (TOPROL-XL) 100 MG 24 hr tablet, Take 1 tablet (100 mg total) by mouth once daily., Disp: 90 tablet, Rfl: 3    modafiniL (PROVIGIL) 200 MG Tab, Take 1 tablet (200 mg total) by mouth 2 (two) times daily as needed (sleepiness). First dose upon awakening and second dose 4-6 hours later, Disp: 60 tablet, Rfl: 2    multivitamin capsule, Take 1 capsule by mouth once daily., Disp: , Rfl:     needle, disp, 18 G (BD REGULAR BEVEL NEEDLES) 18 gauge x 1" Ndle, 1 each " "by Misc.(Non-Drug; Combo Route) route once a week. Use to draw up testosterone, Disp: 12 each, Rfl: 4    ondansetron (ZOFRAN-ODT) 4 MG TbDL, Take 1 tablet (4 mg total) by mouth every 6 (six) hours as needed (nausea)., Disp: 12 tablet, Rfl: 0    riboflavin, vitamin B2, (VITAMIN B-2 ORAL), Take 1 tablet by mouth once daily., Disp: , Rfl:     scopolamine (TRANSDERM-SCOP) 1.3-1.5 mg (1 mg over 3 days), Place 1 patch onto the skin every 72 hours., Disp: 4 patch, Rfl: 0    semaglutide (OZEMPIC) 2 mg/dose (8 mg/3 mL) PnIj, Inject 2 mg into the skin every 7 days., Disp: 9 mL, Rfl: 3    syringe with needle 3 mL 21 gauge x 1" Syrg, 1 each by Misc.(Non-Drug; Combo Route) route once a week. Use to inject testosterone, Disp: 12 each, Rfl: 4    testosterone cypionate (DEPOTESTOTERONE CYPIONATE) 200 mg/mL injection, Inject 1 mL (200 mg total) into the muscle every 7 days., Disp: 5 mL, Rfl: 3    tiZANidine (ZANAFLEX) 4 MG tablet, Take 1 tablet (4 mg total) by mouth nightly as needed (Muscle pain)., Disp: 30 tablet, Rfl: 5    venlafaxine (EFFEXOR-XR) 150 MG Cp24, Take 1 capsule (150 mg total) by mouth once daily., Disp: 90 capsule, Rfl: 1    zolpidem (AMBIEN CR) 12.5 MG CR tablet, Take 1 tablet (12.5 mg total) by mouth nightly as needed for Insomnia., Disp: 30 tablet, Rfl: 3  No current facility-administered medications for this visit.    Facility-Administered Medications Ordered in Other Visits:     lactated ringers infusion, , Intravenous, Continuous, Stan Marcos DNP    LIDOcaine (PF) 10 mg/ml (1%) injection 10 mg, 1 mL, Intradermal, Once, Stan Marcos DNP       There were no vitals filed for this visit.    Physical Exam:    GEN:   Well-appearing  Psych:  Appropriate affect, demonstrates insight  SKIN:  No rash on the face or bridge of the nose    LABS:  Lab Results   Component Value Date    HGB 15.8 06/11/2024    CO2 18 (L) 06/11/2024         RECORDS REVIEWED PREVIOUSLY:    PSG Dec 26,2009: AHi 15.7  CPAP12.28.2009: rx " 8cwp  BiPAP 10.27.2011: bipap 16/12    10.13.22: AUto Bilevel 10-25 (PS 4-8) 89/90 x 8hr 33min,   (I x/15/18.7, E x/10/12.2), Leak 1hr 33min, AHI 1.1                  PROBLEM DESCRIPTION/ Sx on Presentation Interval Hx STATUS PLAN   LYNDA     Moderate severity    PAP history   Dx Study    Mask Nasal pillows (likes, but high leaks)  Tried dreamwear nasal  Nasal mask (skin sensitivity)  Tried FFM (sensitive skin)   DME HME   My Air    CPAP age [age]   PAP altn    Benefits    PROBS Severe dry mouth            Since last visit:     6/30/24: 28/30 x 8h 11min, V auto capri 10 (10/10.5/11.1)  ps 4 max 20, leak 3/45/91, AHI 1.6    Using nightly    Some irritation with nasal pillows       controlled       PAP PLAN   E min 10 cwp (decr to 8)   I max 20 cwp (16)   PS/epr 4    RAMP    Other    Altn.      -consider traditional nasal mask          The patient is using and benefitting from PAP therapy     Daytime Sx     SLEEPINESS   Duration    Testing    Usual TST    Max TST    H/H Hx    SP    sleep atx    sleep Inert.    Naps    Cataplexy    ESS (intake) 13/24   Meds prior Modafinil 200 prn (did not last)  Sunosi 150mg (helps some)  Armodafinil 250 (helps some)  Vyvanse 40mg (paused after TIA)  Dextrostat 10 (rarely used)     Meds now Modafinil 200mg prn      Follows with psychiatry                  Since last visit:     Off of vyvanse since he had a TIA  Still sleepy in the afternoon       Still sleepy     -modafinil 200mg in AM  -modafinil 100mg-200mg about 12PM         Insomnia     SLEEP SCHEDULE   Duration    Wind- down    Envmnt    CBTi    Meds prior    Meds now Xanx or   ambien CR 12.5  per psych   Bed Time 11P   Lights out    Latency Can take awhile    Arousals 3-5   Back to sleep    Stim. ctrl    Wake time 6:15A (9A)   Caffeine    Naps yes   Nocturia 1-2   Work     PAP: waking less    Some trouble falling asleep    Waking 3-5 x per ight          Increased stress  Harder to fall asleep          -consider CBTi   Other issues:      RTC:  in approximately 6 months

## 2024-07-01 ENCOUNTER — PATIENT MESSAGE (OUTPATIENT)
Dept: SLEEP MEDICINE | Facility: CLINIC | Age: 44
End: 2024-07-01

## 2024-07-01 ENCOUNTER — OFFICE VISIT (OUTPATIENT)
Dept: SLEEP MEDICINE | Facility: CLINIC | Age: 44
End: 2024-07-01
Payer: COMMERCIAL

## 2024-07-01 DIAGNOSIS — R40.0 SOMNOLENCE: ICD-10-CM

## 2024-07-01 DIAGNOSIS — F51.09 OTHER INSOMNIA NOT DUE TO A SUBSTANCE OR KNOWN PHYSIOLOGICAL CONDITION: ICD-10-CM

## 2024-07-01 DIAGNOSIS — G47.33 SEVERE OBSTRUCTIVE SLEEP APNEA: Primary | ICD-10-CM

## 2024-07-01 PROCEDURE — 3051F HG A1C>EQUAL 7.0%<8.0%: CPT | Mod: CPTII,95,, | Performed by: INTERNAL MEDICINE

## 2024-07-01 PROCEDURE — 1111F DSCHRG MED/CURRENT MED MERGE: CPT | Mod: CPTII,95,, | Performed by: INTERNAL MEDICINE

## 2024-07-01 PROCEDURE — 99214 OFFICE O/P EST MOD 30 MIN: CPT | Mod: 95,,, | Performed by: INTERNAL MEDICINE

## 2024-07-01 PROCEDURE — 4010F ACE/ARB THERAPY RXD/TAKEN: CPT | Mod: CPTII,95,, | Performed by: INTERNAL MEDICINE

## 2024-07-23 ENCOUNTER — OFFICE VISIT (OUTPATIENT)
Dept: PSYCHIATRY | Facility: CLINIC | Age: 44
End: 2024-07-23
Payer: COMMERCIAL

## 2024-07-23 ENCOUNTER — PATIENT MESSAGE (OUTPATIENT)
Dept: PSYCHIATRY | Facility: CLINIC | Age: 44
End: 2024-07-23

## 2024-07-23 DIAGNOSIS — F41.0 GENERALIZED ANXIETY DISORDER WITH PANIC ATTACKS: ICD-10-CM

## 2024-07-23 DIAGNOSIS — F41.1 GENERALIZED ANXIETY DISORDER WITH PANIC ATTACKS: ICD-10-CM

## 2024-07-23 DIAGNOSIS — F51.05 INSOMNIA DUE TO OTHER MENTAL DISORDER: ICD-10-CM

## 2024-07-23 DIAGNOSIS — F33.2 MDD (MAJOR DEPRESSIVE DISORDER), RECURRENT SEVERE, WITHOUT PSYCHOSIS: Primary | ICD-10-CM

## 2024-07-23 DIAGNOSIS — F99 INSOMNIA DUE TO OTHER MENTAL DISORDER: ICD-10-CM

## 2024-07-23 PROCEDURE — 1160F RVW MEDS BY RX/DR IN RCRD: CPT | Mod: CPTII,95,, | Performed by: PHYSICIAN ASSISTANT

## 2024-07-23 PROCEDURE — 4010F ACE/ARB THERAPY RXD/TAKEN: CPT | Mod: CPTII,95,, | Performed by: PHYSICIAN ASSISTANT

## 2024-07-23 PROCEDURE — 99215 OFFICE O/P EST HI 40 MIN: CPT | Mod: 95,,, | Performed by: PHYSICIAN ASSISTANT

## 2024-07-23 PROCEDURE — 90833 PSYTX W PT W E/M 30 MIN: CPT | Mod: 95,,, | Performed by: PHYSICIAN ASSISTANT

## 2024-07-23 PROCEDURE — 3051F HG A1C>EQUAL 7.0%<8.0%: CPT | Mod: CPTII,95,, | Performed by: PHYSICIAN ASSISTANT

## 2024-07-23 PROCEDURE — 1159F MED LIST DOCD IN RCRD: CPT | Mod: CPTII,95,, | Performed by: PHYSICIAN ASSISTANT

## 2024-07-23 RX ORDER — MIRTAZAPINE 15 MG/1
15 TABLET, FILM COATED ORAL NIGHTLY
Qty: 30 TABLET | Refills: 2 | Status: SHIPPED | OUTPATIENT
Start: 2024-07-23 | End: 2024-10-21

## 2024-07-23 NOTE — PROGRESS NOTES
Outpatient Psychiatry Follow-Up Visit (MD/ADA)    7/23/2024     The patient location is: [place]  The chief complaint leading to consultation is: depression and anxiety    Visit type: audiovisual    Face to Face time with patient: 58 minutes  61 minutes of total time spent on the encounter, which includes face to face time and non-face to face time preparing to see the patient (eg, review of tests), Obtaining and/or reviewing separately obtained history, Documenting clinical information in the electronic or other health record, Independently interpreting results (not separately reported) and communicating results to the patient/family/caregiver, or Care coordination (not separately reported).     Each patient to whom he or she provides medical services by telemedicine is:  (1) informed of the relationship between the physician and patient and the respective role of any other health care provider with respect to management of the patient; and (2) notified that he or she may decline to receive medical services by telemedicine and may withdraw from such care at any time.    Clinical Status of Patient:  Outpatient (Ambulatory)    Chief Complaint:  Dominic Collazo is a 43 y.o. male who presents today for follow-up of depression, anxiety, adjustment problems, and attention problems.  Met with patient.      Interval History and Content of Current Session:  Interim Events/Subjective Report/Content of Current Session:     Pt went to ED on 7/21/24 at  for panic attack. Had taken thc gummy prior to ED visit but states he rarely takes them. Pt reports he was overwhelmed with work and health related stress. Was given ativan, benadryl, and droperidol.     States job related stress, states he returned to work after he had uncovered that his job was owing millions of dollars in government grants. States that he also has payments from [employer] for work he did we never paid to him by employer.    Pt returned to work on 7/1,  "pt reports since he returned to work has had significant stress related to work and sorting out issues related to work (see previous notes below).    Pt reports today: "feel like I've been overwhelmed. Feel like I've been paralyzed with the amount of stress I've been under"    Pt reports having feelings of panic attack more frequently since returning to work and trying to sort through not being paid previously and having his work stolen by his supervisor. States he is currently worried his job may be trying to fire him.     Mood overall is "very anxious"    Pt was placed FMLA leave at last visit due to pt being in severe psychological stress and unable to work at this time    Patients mood is anxious, affect appears mood congruent. Linear and logical, friendly and cooperative, good eye contact.    Denies SI/HI/AVH. Pt reports sleeping 4-5 hr per night and then a few hrs in daytime.     Decreased appetite. Denies side effects of medications.    Pt reports taking medications as prescribed and behaving appropriately during interview today.      Psychotherapy:  Target symptoms: depression, anxiety , work stress  Why chosen therapy is appropriate versus another modality: relevant to diagnosis, patient responds to this modality, evidence based practice  Outcome monitoring methods: self-report, observation  Therapeutic intervention type: insight oriented psychotherapy, supportive psychotherapy  Topics discussed/themes: work stress, stress related to medical comorbidities, building skills sets for symptom management, symptom recognition  The patient's response to the intervention is accepting. The patient's progress toward treatment goals is good.   Duration of intervention: 25 minutes.      Prior visit:    Pt since prior visit had found out of coworker was stealing work pt was doing and claiming that it was coworker's, pt reports severely distressed as it cost patient about $50k due to he lost due to his coworker claiming " "pt's work as his own.    Pt had been dealing with depression and anxiety prior to this. This has worsened his depression and anxiety and is currently severely depressed since finding out coworker was scamming him near end of May.    Pt reports he has been unable to work due to anxiety, stress, depression since 6/3/24, states he told his employer he was taking medical leave on 6/3/24 and would not be able to work due to severity of mental health issues.    Pt reports on 6/9/23 pt had left sided facial drooping, had general left side weakness, went to ED, was dx with TIA, pt had to stay in ICU briefly after given tPA. Pt reports within 5 minutes of receiving tPA pt reports that his weakness and disorentation, facial drooping resolved.    Pt today reports he has full resolution of neurological symptoms since receiving tPA.    Discussed at this time will discontinue vyvanse and dexedrine until assessed further with neuro follow up.    Placed pt on FMLA leave at prior visit    Pt is scheduled to f/u with neurologist for post stroke f/u in September. Pt reports he wants to get a much sooner visit.     Discussed with pt he should also follow up with his PCP regarding current health problems    Pt reports today: "stressed and depressed beyond anything"    Mood overall is "depressed and anxious, very overwhelmed".    Patients mood is depressed and anxious, affect appears mood congruent. Linear and logical, friendly and cooperative, good eye contact.    Denies SI/HI/AVH. Pt reports sleeping well 7 hrs on ambien and decreased appetite. Denies side effects of medications.    Pt reports taking medications as prescribed and behaving appropriately during interview today.    Previous medication trials:  Ativan- effective  Seroquel- sedation  Lexapro  Wellbutrin and Buspar- effective,   vistaril- sedation, still had axniety  Prozac - sexual side effects  Cymbalta Sexual side effects and retrograde ejaculation    Review of Systems "     Review of Systems   Constitutional:  Positive for malaise/fatigue. Negative for fever.   HENT:  Negative for sore throat.    Eyes:  Negative for photophobia.   Respiratory:  Negative for cough.    Cardiovascular:  Negative for chest pain and palpitations.   Gastrointestinal:  Negative for abdominal pain.   Genitourinary:  Negative for dysuria.   Musculoskeletal:  Negative for myalgias.   Skin:  Negative for rash.   Neurological:  Negative for dizziness, tingling, tremors, sensory change, speech change, focal weakness, seizures, loss of consciousness, weakness and headaches.   Endo/Heme/Allergies:  Does not bruise/bleed easily.   Psychiatric/Behavioral:  Positive for depression. Negative for hallucinations, substance abuse and suicidal ideas. The patient is nervous/anxious and has insomnia.        Past Medical, Family and Social History: The patient's past medical, family and social history have been reviewed and updated as appropriate within the electronic medical record - see encounter notes.    Compliance: yes    Side effects: see above    Risk Parameters:  Patient reports no suicidal ideation  Patient reports no homicidal ideation  Patient reports no self-injurious behavior  Patient reports no violent behavior    Exam (detailed: at least 9 elements; comprehensive: all 15 elements)   Constitutional  Vitals:    There were no vitals filed for this visit.     General:  age appropriate, obese     Musculoskeletal  Muscle Strength/Tone:  not examined   Gait & Station:  THEA due to video visit      Psychiatric  Speech:  no latency; no press   Mood & Affect:  Depressed and anxious  Mood congruent   Thought Process:  normal and logical   Associations:  intact   Thought Content:  normal, no suicidality, no homicidality, delusions, or paranoia   Insight:  intact   Judgement: behavior is adequate to circumstances   Orientation:  grossly intact   Memory: intact for content of interview   Language: grossly intact   Attention  Span & Concentration:  able to focus   Fund of Knowledge:  intact and appropriate to age and level of education     Assessment and Diagnosis   Status/Progress: Based on the examination today, the patient's problem(s) is/are inadequately controlled.  New problems have not been presented today.   Co-morbidities are complicating management of the primary condition.  There are no active rule-out diagnoses for this patient at this time.     General Impression:       ICD-10-CM ICD-9-CM   1. MDD (major depressive disorder), recurrent severe, without psychosis  F33.2 296.33   2. Generalized anxiety disorder with panic attacks  F41.1 300.02    F41.0 300.01   3. Insomnia due to other mental disorder  F51.05 300.9    F99 327.02       Intervention/Counseling/Treatment Plan   Treatment Plan/Recommendations:   Medication Management: Continue current medications.     Continue wellbutrin xl 150mg daily    Start remeron 15mg qhs    Continue vitamin D3 otc 2k IU once daily    continue buspar to 20mg bid    continue Effexor to 150mg mg daily     continue xanax 1mg bid prn anxiety or insomnia    continue ambien to CR 12.5mg qhs insomnia    Continue testosterone therapy as prescribed by other provider    Continue provigil as prescribed by sleep medicine      Have placed pt on FMLA leave at prior visit  Instructed pt recommended that he take off next week of work for medical leave    F/u with PCP and neurology regarding recent dx and tx for TIA.    The risks and benefits of medication were discussed with the patient.  Discussed diagnosis, risks and benefits of proposed treatment above vs alternative treatments vs no treatment, and potential side effects of these treatments. The patient expresses understanding of the above and displays the capacity to agree with this treatment given said understanding. Patient also agrees that, currently, the benefits outweigh the risks and would like to pursue treatment at this time.  Discussed inherent  unpredictability of medications in each individual.   Encouraged Patient to keep future appointments.   Take medications as prescribed and abstain from substance abuse.   In the event of an emergency, patient was advised to go to the emergency room      Return to Clinic: 3 weeks      Brandon Burdick PA-C

## 2024-07-31 DIAGNOSIS — E11.65 TYPE 2 DIABETES MELLITUS WITH HYPERGLYCEMIA, WITHOUT LONG-TERM CURRENT USE OF INSULIN: Primary | ICD-10-CM

## 2024-07-31 NOTE — TELEPHONE ENCOUNTER
No care due was identified.  [facility] Embedded Care Due Messages. Reference number: [id number].   7/31/2024 5:20:13 PM CDT

## 2024-08-01 ENCOUNTER — PATIENT OUTREACH (OUTPATIENT)
Dept: ADMINISTRATIVE | Facility: HOSPITAL | Age: 44
End: 2024-08-01
Payer: COMMERCIAL

## 2024-08-01 RX ORDER — LANCETS 33 GAUGE
EACH MISCELLANEOUS
Qty: 200 EACH | Refills: 3 | Status: SHIPPED | OUTPATIENT
Start: 2024-08-01

## 2024-08-01 NOTE — PROGRESS NOTES
Population Health Chart Review & Patient Outreach Details      Additional [facility] Health Notes:               Updates Requested / Reviewed:      Care Everywhere, , and Immunizations Reconciliation Completed or Queried: [place]         Health Maintenance Topics Overdue:      [facility] Score: 1     Eye Exam                       Health Maintenance Topic(s) Outreach Outcomes & Actions Taken:    Primary Care Appt - Outreach Outcomes & Actions Taken  : Primary Care Appt Scheduled

## 2024-08-01 NOTE — TELEPHONE ENCOUNTER
Refill Decision Note   Dominic Collazo  is requesting a refill authorization.    Brief Assessment and Rationale for Refill:  Approve       Medication Therapy Plan:  Acute care/admission documentation reviewed. No change in therapy. Ok to approve.      Extended chart review required: Yes   Comments:     Note composed:11:20 AM 08/01/2024

## 2024-08-02 DIAGNOSIS — E11.69 TYPE 2 DIABETES MELLITUS WITH MORBID OBESITY: ICD-10-CM

## 2024-08-02 DIAGNOSIS — E66.01 TYPE 2 DIABETES MELLITUS WITH MORBID OBESITY: ICD-10-CM

## 2024-08-02 RX ORDER — METFORMIN HYDROCHLORIDE 500 MG/1
500 TABLET, EXTENDED RELEASE ORAL 2 TIMES DAILY WITH MEALS
Qty: 180 TABLET | Refills: 0 | Status: SHIPPED | OUTPATIENT
Start: 2024-08-02

## 2024-08-02 NOTE — TELEPHONE ENCOUNTER
Refill Routing Note   Medication(s) are not appropriate for processing by [facility] for the following reason(s):        ED/Hospital Visit since last OV with provider  Drug-disease interaction    [facility] action(s):  Defer        Medication Therapy Plan: metFORMIN and NAFLD (nonalcoholic fatty liver disease)      Appointments  past 12m or future 3m with PCP    Date Provider   Last Visit   8/31/2023 Arjun Cross MD   Next Visit   8/15/2024 Arjun Cross MD   ED visits in past 90 days: 2        Note composed:11:51 AM 08/02/2024

## 2024-08-02 NOTE — TELEPHONE ENCOUNTER
No care due was identified.  [facility] Embedded Care Due Messages. Reference number: [id number].   8/02/2024 4:13:09 AM CDT

## 2024-08-04 RX ORDER — METOPROLOL SUCCINATE 100 MG/1
100 TABLET, EXTENDED RELEASE ORAL DAILY
Qty: 90 TABLET | Refills: 0 | Status: SHIPPED | OUTPATIENT
Start: 2024-08-04

## 2024-08-09 ENCOUNTER — PATIENT OUTREACH (OUTPATIENT)
Dept: ADMINISTRATIVE | Facility: HOSPITAL | Age: 44
End: 2024-08-09
Payer: COMMERCIAL

## 2024-08-12 ENCOUNTER — PATIENT OUTREACH (OUTPATIENT)
Dept: ADMINISTRATIVE | Facility: HOSPITAL | Age: 44
End: 2024-08-12
Payer: COMMERCIAL

## 2024-08-13 ENCOUNTER — OFFICE VISIT (OUTPATIENT)
Dept: PSYCHIATRY | Facility: CLINIC | Age: 44
End: 2024-08-13
Payer: COMMERCIAL

## 2024-08-13 DIAGNOSIS — F41.0 GENERALIZED ANXIETY DISORDER WITH PANIC ATTACKS: ICD-10-CM

## 2024-08-13 DIAGNOSIS — F99 INSOMNIA DUE TO OTHER MENTAL DISORDER: ICD-10-CM

## 2024-08-13 DIAGNOSIS — F51.05 INSOMNIA DUE TO OTHER MENTAL DISORDER: ICD-10-CM

## 2024-08-13 DIAGNOSIS — F41.1 GENERALIZED ANXIETY DISORDER WITH PANIC ATTACKS: ICD-10-CM

## 2024-08-13 DIAGNOSIS — F90.2 ADHD (ATTENTION DEFICIT HYPERACTIVITY DISORDER), COMBINED TYPE: ICD-10-CM

## 2024-08-13 DIAGNOSIS — F33.1 MDD (MAJOR DEPRESSIVE DISORDER), RECURRENT EPISODE, MODERATE: Primary | ICD-10-CM

## 2024-08-13 DIAGNOSIS — F33.2 MDD (MAJOR DEPRESSIVE DISORDER), RECURRENT SEVERE, WITHOUT PSYCHOSIS: ICD-10-CM

## 2024-08-13 PROCEDURE — 4010F ACE/ARB THERAPY RXD/TAKEN: CPT | Mod: CPTII,95,, | Performed by: PHYSICIAN ASSISTANT

## 2024-08-13 PROCEDURE — 3051F HG A1C>EQUAL 7.0%<8.0%: CPT | Mod: CPTII,95,, | Performed by: PHYSICIAN ASSISTANT

## 2024-08-13 PROCEDURE — 90833 PSYTX W PT W E/M 30 MIN: CPT | Mod: 95,,, | Performed by: PHYSICIAN ASSISTANT

## 2024-08-13 PROCEDURE — G2211 COMPLEX E/M VISIT ADD ON: HCPCS | Mod: 95,,, | Performed by: PHYSICIAN ASSISTANT

## 2024-08-13 PROCEDURE — 99214 OFFICE O/P EST MOD 30 MIN: CPT | Mod: 95,,, | Performed by: PHYSICIAN ASSISTANT

## 2024-08-13 PROCEDURE — 1160F RVW MEDS BY RX/DR IN RCRD: CPT | Mod: CPTII,95,, | Performed by: PHYSICIAN ASSISTANT

## 2024-08-13 PROCEDURE — 1159F MED LIST DOCD IN RCRD: CPT | Mod: CPTII,95,, | Performed by: PHYSICIAN ASSISTANT

## 2024-08-13 RX ORDER — BUPROPION HYDROCHLORIDE 150 MG/1
150 TABLET ORAL DAILY
Qty: 90 TABLET | Refills: 1 | Status: SHIPPED | OUTPATIENT
Start: 2024-08-13

## 2024-08-13 RX ORDER — ZOLPIDEM TARTRATE 12.5 MG/1
12.5 TABLET, FILM COATED, EXTENDED RELEASE ORAL NIGHTLY PRN
Qty: 30 TABLET | Refills: 3 | Status: SHIPPED | OUTPATIENT
Start: 2024-08-13 | End: 2025-02-11

## 2024-08-13 RX ORDER — ALPRAZOLAM 1 MG/1
1 TABLET ORAL 2 TIMES DAILY PRN
Qty: 60 TABLET | Refills: 1 | Status: SHIPPED | OUTPATIENT
Start: 2024-08-13 | End: 2024-12-11

## 2024-08-13 RX ORDER — VENLAFAXINE HYDROCHLORIDE 150 MG/1
150 CAPSULE, EXTENDED RELEASE ORAL DAILY
Qty: 90 CAPSULE | Refills: 1 | Status: SHIPPED | OUTPATIENT
Start: 2024-08-13 | End: 2025-02-09

## 2024-08-13 RX ORDER — MIRTAZAPINE 15 MG/1
15 TABLET, FILM COATED ORAL NIGHTLY
Qty: 90 TABLET | Refills: 1 | Status: SHIPPED | OUTPATIENT
Start: 2024-08-13 | End: 2025-02-09

## 2024-08-13 NOTE — PROGRESS NOTES
"Outpatient Psychiatry Follow-Up Visit (MD/ADA)    8/13/2024     The patient location is: [place]  The chief complaint leading to consultation is: depression and anxiety    Visit type: audiovisual    Face to Face time with patient: 33 minutes  35 minutes of total time spent on the encounter, which includes face to face time and non-face to face time preparing to see the patient (eg, review of tests), Obtaining and/or reviewing separately obtained history, Documenting clinical information in the electronic or other health record, Independently interpreting results (not separately reported) and communicating results to the patient/family/caregiver, or Care coordination (not separately reported).     Each patient to whom he or she provides medical services by telemedicine is:  (1) informed of the relationship between the physician and patient and the respective role of any other health care provider with respect to management of the patient; and (2) notified that he or she may decline to receive medical services by telemedicine and may withdraw from such care at any time.    Clinical Status of Patient:  Outpatient (Ambulatory)    Chief Complaint:  Dominic Collazo is a 43 y.o. male who presents today for follow-up of depression, anxiety, adjustment problems, and attention problems.  Met with patient.      Interval History and Content of Current Session:  Interim Events/Subjective Report/Content of Current Session:     Pt reports today: "having a rough time"    Pt reports continued stress from job    Pt went to ED and was hospitalized for SI per ED provider. Pt denies ever reporting SI, was inpt psych for 4 days and was discharged on 7/30/24    Pt reports was started on depakote and stopped effexor while inpatient. Pt since out stopped depakote and restarted effexor and says was felt better.    Pt reports employer has been harassing him regarding pt uncovering fraudulent activity by the company and feels " "employer is not trying to rectify the problem but wants to cover it up and feels like they want to fire him.    Pt states there are multiple payments for work projects he has not been paid for. Pt is thinking about going to get  for help.    States he plans on going back to work soon and trying to find another job in the meantime. States he has a few job leads    Pt is currently on FMLA leave.    Reports has been sleeping better on remeron and mood has improved    Mood overall is "I feel decent right now, stressed about work but I'm feeling better. Overall Im feeling more like my normal self but have times when my anxiety flares up its hard to calm it down"    Patients mood is steady, affect appears mood congruent. Linear and logical, friendly and cooperative, good eye contact.    Denies SI/HI/AVH. Pt reports sleeping well on remeron and normal appetite. Denies side effects of medications.    Pt reports taking medications as prescribed and behaving appropriately during interview today.      Psychotherapy:  Target symptoms: depression, anxiety , work stress  Why chosen therapy is appropriate versus another modality: relevant to diagnosis, patient responds to this modality, evidence based practice  Outcome monitoring methods: self-report, observation  Therapeutic intervention type: insight oriented psychotherapy, supportive psychotherapy  Topics discussed/themes: work stress, stress related to medical comorbidities, building skills sets for symptom management, symptom recognition  The patient's response to the intervention is accepting. The patient's progress toward treatment goals is good.   Duration of intervention: 18 minutes.      Prior visit:    Pt went to ED on 7/21/24 at  for panic attack. Had taken thc gummy prior to ED visit but states he rarely takes them. Pt reports he was overwhelmed with work and health related stress. Was given ativan, benadryl, and droperidol.     States job related stress, " "states he returned to work after he had uncovered that his job was owing millions of dollars in government grants. States that he also has payments from [employer] for work he did we never paid to him by employer.    Pt returned to work on 7/1, pt reports since he returned to work has had significant stress related to work and sorting out issues related to work (see previous notes below).    Pt reports today: "feel like I've been overwhelmed. Feel like I've been paralyzed with the amount of stress I've been under"    Pt reports having feelings of panic attack more frequently since returning to work and trying to sort through not being paid previously and having his work stolen by his supervisor. States he is currently worried his job may be trying to fire him.     Mood overall is "very anxious"    Pt was placed FMLA leave at last visit due to pt being in severe psychological stress and unable to work at this time    Patients mood is anxious, affect appears mood congruent. Linear and logical, friendly and cooperative, good eye contact.    Denies SI/HI/AVH. Pt reports sleeping 4-5 hr per night and then a few hrs in daytime.     Decreased appetite. Denies side effects of medications.    Pt reports taking medications as prescribed and behaving appropriately during interview today.    Previous medication trials:  Ativan- effective  Seroquel- sedation  Lexapro  Wellbutrin and Buspar- effective,   vistaril- sedation, still had axniety  Prozac - sexual side effects  Cymbalta- Sexual side effects and retrograde ejaculation    Review of Systems     Review of Systems   Constitutional:  Negative for fever and malaise/fatigue.   HENT:  Negative for sore throat.    Eyes:  Negative for photophobia.   Respiratory:  Negative for cough.    Cardiovascular:  Negative for chest pain and palpitations.   Gastrointestinal:  Negative for abdominal pain.   Genitourinary:  Negative for dysuria.   Musculoskeletal:  Negative for myalgias.   Skin:  " Negative for rash.   Neurological:  Negative for dizziness, tingling, tremors, sensory change, speech change, focal weakness, seizures, loss of consciousness, weakness and headaches.   Endo/Heme/Allergies:  Does not bruise/bleed easily.   Psychiatric/Behavioral:  Positive for depression. Negative for hallucinations, substance abuse and suicidal ideas. The patient is nervous/anxious. The patient does not have insomnia.      Psychiatric Review Of Systems - Is patient experiencing or having changes in:  sleep: no  appetite: no  weight: no  energy/anergy: yes  interest/pleasure/anhedonia: yes  somatic symptoms: no  libido: no  anxiety/panic: yes  guilty/hopelessness: no  concentration: no  S.I.B.s/risky behavior: no  Irritability: no  Racing thoughts: no  Impulsive behaviors: no  Paranoia: no  AVH: no     Past Medical, Family and Social History: The patient's past medical, family and social history have been reviewed and updated as appropriate within the electronic medical record - see encounter notes.    Compliance: yes    Side effects: see above    Risk Parameters:  Patient reports no suicidal ideation  Patient reports no homicidal ideation  Patient reports no self-injurious behavior  Patient reports no violent behavior    Exam (detailed: at least 9 elements; comprehensive: all 15 elements)   Constitutional  Vitals:    There were no vitals filed for this visit.     General:  age appropriate, obese     Musculoskeletal  Muscle Strength/Tone:  not examined   Gait & Station:  THEA due to video visit      Psychiatric  Speech:  no latency; no press   Mood & Affect:  steady  Mood congruent   Thought Process:  normal and logical   Associations:  intact   Thought Content:  normal, no suicidality, no homicidality, delusions, or paranoia   Insight:  intact   Judgement: behavior is adequate to circumstances   Orientation:  grossly intact   Memory: intact for content of interview   Language: grossly intact   Attention Span &  Concentration:  able to focus   Fund of Knowledge:  intact and appropriate to age and level of education     Assessment and Diagnosis   Status/Progress: Based on the examination today, the patient's problem(s) is/are adequately but not ideally controlled.  New problems have not been presented today.   Co-morbidities are complicating management of the primary condition.  There are no active rule-out diagnoses for this patient at this time.     General Impression:       ICD-10-CM ICD-9-CM   1. MDD (major depressive disorder), recurrent episode, moderate  F33.1 296.32   2. Generalized anxiety disorder with panic attacks  F41.1 300.02    F41.0 300.01   3. Insomnia due to other mental disorder  F51.05 300.9    F99 327.02   4. ADHD (attention deficit hyperactivity disorder), combined type  F90.2 314.01   5. MDD (major depressive disorder), recurrent severe, without psychosis  F33.2 296.33       Intervention/Counseling/Treatment Plan   Treatment Plan/Recommendations:   Medication Management: Continue current medications.     Continue wellbutrin xl 150mg daily    Continue remeron 15mg qhs    Continue vitamin D3 otc 2k IU once daily    continue buspar to 20mg bid    continue Effexor to 150mg mg daily     continue xanax 1mg bid prn anxiety or insomnia    continue ambien to CR 12.5mg qhs insomnia    Continue testosterone therapy as prescribed by other provider    Continue provigil as prescribed by sleep medicine      pt on FMLA leave    F/u with PCP and neurology regarding recent dx and tx for TIA.    The risks and benefits of medication were discussed with the patient.  Discussed diagnosis, risks and benefits of proposed treatment above vs alternative treatments vs no treatment, and potential side effects of these treatments. The patient expresses understanding of the above and displays the capacity to agree with this treatment given said understanding. Patient also agrees that, currently, the benefits outweigh the risks and  would like to pursue treatment at this time.  Discussed inherent unpredictability of medications in each individual.   Encouraged Patient to keep future appointments.   Take medications as prescribed and abstain from substance abuse.   In the event of an emergency, patient was advised to go to the emergency room      Return to Clinic: 1 month      Brandon Burdick PA-C

## 2024-08-15 ENCOUNTER — OFFICE VISIT (OUTPATIENT)
Dept: INTERNAL MEDICINE | Facility: CLINIC | Age: 44
End: 2024-08-15
Payer: COMMERCIAL

## 2024-08-15 VITALS
BODY MASS INDEX: 39.17 KG/M2 | DIASTOLIC BLOOD PRESSURE: 88 MMHG | HEIGHT: 75 IN | TEMPERATURE: 98 F | RESPIRATION RATE: 19 BRPM | SYSTOLIC BLOOD PRESSURE: 124 MMHG | WEIGHT: 315 LBS | OXYGEN SATURATION: 98 % | HEART RATE: 88 BPM

## 2024-08-15 DIAGNOSIS — E29.1 MALE HYPOGONADISM: ICD-10-CM

## 2024-08-15 DIAGNOSIS — F51.05 INSOMNIA DUE TO OTHER MENTAL DISORDER: ICD-10-CM

## 2024-08-15 DIAGNOSIS — F99 INSOMNIA DUE TO OTHER MENTAL DISORDER: ICD-10-CM

## 2024-08-15 DIAGNOSIS — E55.9 VITAMIN D INSUFFICIENCY: ICD-10-CM

## 2024-08-15 DIAGNOSIS — E11.59 HYPERTENSION ASSOCIATED WITH TYPE 2 DIABETES MELLITUS: ICD-10-CM

## 2024-08-15 DIAGNOSIS — G47.33 OSA (OBSTRUCTIVE SLEEP APNEA): ICD-10-CM

## 2024-08-15 DIAGNOSIS — E11.69 TYPE 2 DIABETES MELLITUS WITH MORBID OBESITY: ICD-10-CM

## 2024-08-15 DIAGNOSIS — E66.01 TYPE 2 DIABETES MELLITUS WITH MORBID OBESITY: ICD-10-CM

## 2024-08-15 DIAGNOSIS — E11.69 HYPERLIPIDEMIA ASSOCIATED WITH TYPE 2 DIABETES MELLITUS: ICD-10-CM

## 2024-08-15 DIAGNOSIS — Z00.00 WELL ADULT EXAM: Primary | ICD-10-CM

## 2024-08-15 DIAGNOSIS — N13.8 BPH WITH URINARY OBSTRUCTION: ICD-10-CM

## 2024-08-15 DIAGNOSIS — F33.1 MDD (MAJOR DEPRESSIVE DISORDER), RECURRENT EPISODE, MODERATE: ICD-10-CM

## 2024-08-15 DIAGNOSIS — I15.2 HYPERTENSION ASSOCIATED WITH TYPE 2 DIABETES MELLITUS: ICD-10-CM

## 2024-08-15 DIAGNOSIS — N40.1 BPH WITH URINARY OBSTRUCTION: ICD-10-CM

## 2024-08-15 DIAGNOSIS — Z86.73 H/O: CVA (CEREBROVASCULAR ACCIDENT): ICD-10-CM

## 2024-08-15 DIAGNOSIS — E78.5 HYPERLIPIDEMIA ASSOCIATED WITH TYPE 2 DIABETES MELLITUS: ICD-10-CM

## 2024-08-15 DIAGNOSIS — E11.65 TYPE 2 DIABETES MELLITUS WITH HYPERGLYCEMIA, WITHOUT LONG-TERM CURRENT USE OF INSULIN: ICD-10-CM

## 2024-08-15 PROCEDURE — 3008F BODY MASS INDEX DOCD: CPT | Mod: CPTII,S$GLB,, | Performed by: FAMILY MEDICINE

## 2024-08-15 PROCEDURE — 1160F RVW MEDS BY RX/DR IN RCRD: CPT | Mod: CPTII,S$GLB,, | Performed by: FAMILY MEDICINE

## 2024-08-15 PROCEDURE — 3051F HG A1C>EQUAL 7.0%<8.0%: CPT | Mod: CPTII,S$GLB,, | Performed by: FAMILY MEDICINE

## 2024-08-15 PROCEDURE — 3079F DIAST BP 80-89 MM HG: CPT | Mod: CPTII,S$GLB,, | Performed by: FAMILY MEDICINE

## 2024-08-15 PROCEDURE — 3074F SYST BP LT 130 MM HG: CPT | Mod: CPTII,S$GLB,, | Performed by: FAMILY MEDICINE

## 2024-08-15 PROCEDURE — 99999 PR PBB SHADOW E&M-EST. PATIENT-LVL IV: CPT | Mod: PBBFAC,,, | Performed by: FAMILY MEDICINE

## 2024-08-15 PROCEDURE — 1159F MED LIST DOCD IN RCRD: CPT | Mod: CPTII,S$GLB,, | Performed by: FAMILY MEDICINE

## 2024-08-15 PROCEDURE — 99396 PREV VISIT EST AGE 40-64: CPT | Mod: S$GLB,,, | Performed by: FAMILY MEDICINE

## 2024-08-15 PROCEDURE — 4010F ACE/ARB THERAPY RXD/TAKEN: CPT | Mod: CPTII,S$GLB,, | Performed by: FAMILY MEDICINE

## 2024-08-15 RX ORDER — SEMAGLUTIDE 2.68 MG/ML
2 INJECTION, SOLUTION SUBCUTANEOUS
Qty: 9 ML | Refills: 3 | Status: SHIPPED | OUTPATIENT
Start: 2024-08-15 | End: 2025-08-15

## 2024-08-15 RX ORDER — LANOLIN ALCOHOL/MO/W.PET/CERES
100 CREAM (GRAM) TOPICAL
COMMUNITY

## 2024-08-15 RX ORDER — METOPROLOL SUCCINATE 100 MG/1
100 TABLET, EXTENDED RELEASE ORAL DAILY
Qty: 90 TABLET | Refills: 3 | Status: SHIPPED | OUTPATIENT
Start: 2024-08-15

## 2024-08-15 RX ORDER — LOSARTAN POTASSIUM 25 MG/1
25 TABLET ORAL DAILY
Qty: 90 TABLET | Refills: 3 | Status: SHIPPED | OUTPATIENT
Start: 2024-08-15 | End: 2025-08-15

## 2024-08-15 RX ORDER — METFORMIN HYDROCHLORIDE 500 MG/1
500 TABLET, EXTENDED RELEASE ORAL 2 TIMES DAILY WITH MEALS
Qty: 180 TABLET | Refills: 3 | Status: SHIPPED | OUTPATIENT
Start: 2024-08-15

## 2024-08-15 RX ORDER — ARMODAFINIL 250 MG/1
250 TABLET ORAL
COMMUNITY
Start: 2024-04-08

## 2024-08-15 NOTE — PROGRESS NOTES
Subjective     Patient ID: Dominic Collazo is a 43 y.o. male.    Chief Complaint: Annual Exam    HPI 43-year-old white male presents to clinic today for annual physical exam.  Hypertension remains well controlled on metoprolol 100 mg daily and losartan 25 mg daily.  Type 2 diabetes remained stable on metformin 500 mg b.i.d. and Ozempic 2 mg weekly.  He continues to be followed by Endocrinology secondary to hypogonadism which remains stable on testosterone supplementation.  Patient suffered a TIA and June and is status post treatment with tPA with resolution of symptoms.  At this time he remains stable on aspirin 81 mg daily and Lipitor 40 mg daily.  He continues to be followed by Psychiatry for treatment of anxiety and depression.  He reports a past surgical history of tonsillectomy, appendectomy, and tPA secondary CVA.  He reports a family history of his mother having hypertension and hyperlipidemia.  His father had hypertension, hyperlipidemia, and in multiple angiograms with his 1st occurring at the age of 46.  He is up-to-date with all vaccinations.  Review of Systems   Constitutional:  Negative for appetite change, chills, fatigue and fever.   HENT:  Negative for nasal congestion, ear pain, hearing loss, postnasal drip, rhinorrhea, sinus pressure/congestion, sore throat and tinnitus.    Eyes:  Negative for redness, itching and visual disturbance.   Respiratory:  Negative for cough, chest tightness and shortness of breath.    Cardiovascular:  Negative for chest pain and palpitations.   Gastrointestinal:  Negative for abdominal pain, constipation, diarrhea, nausea and vomiting.   Genitourinary:  Negative for decreased urine volume, difficulty urinating, dysuria, frequency, hematuria and urgency.   Musculoskeletal:  Negative for back pain, myalgias, neck pain and neck stiffness.   Integumentary:  Negative for rash.   Neurological:  Negative for dizziness, light-headedness and headaches.    Psychiatric/Behavioral: Negative.            Objective     Physical Exam  Vitals and nursing note reviewed.   Constitutional:       General: He is not in acute distress.     Appearance: Normal appearance. He is well-developed. He is not diaphoretic.   HENT:      Head: Normocephalic and atraumatic.      Right Ear: External ear normal.      Left Ear: External ear normal.      Nose: Nose normal.      Mouth/Throat:      Pharynx: No oropharyngeal exudate.   Eyes:      General: No scleral icterus.        Right eye: No discharge.         Left eye: No discharge.      Conjunctiva/sclera: Conjunctivae normal.      Pupils: Pupils are equal, round, and reactive to light.   Neck:      Thyroid: No thyromegaly.      Vascular: No JVD.      Trachea: No tracheal deviation.   Cardiovascular:      Rate and Rhythm: Normal rate and regular rhythm.      Heart sounds: Normal heart sounds. No murmur heard.     No friction rub. No gallop.   Pulmonary:      Effort: Pulmonary effort is normal. No respiratory distress.      Breath sounds: Normal breath sounds. No stridor. No wheezing, rhonchi or rales.   Chest:      Chest wall: No tenderness.   Abdominal:      General: Bowel sounds are normal. There is no distension.      Palpations: Abdomen is soft. There is no mass.      Tenderness: There is no abdominal tenderness. There is no guarding or rebound.   Musculoskeletal:         General: No tenderness. Normal range of motion.      Cervical back: Normal range of motion and neck supple.   Lymphadenopathy:      Cervical: No cervical adenopathy.   Skin:     General: Skin is warm and dry.      Coloration: Skin is not pale.      Findings: No erythema or rash.   Neurological:      Mental Status: He is alert and oriented to person, place, and time.   Psychiatric:         Mood and Affect: Mood normal.         Behavior: Behavior normal.         Thought Content: Thought content normal.         Judgment: Judgment normal.            Assessment and Plan      1. Well adult exam  -     CBC Auto Differential; Future; Expected date: 08/15/2024  -     Comprehensive Metabolic Panel; Future; Expected date: 08/15/2024  -     Lipid Panel; Future; Expected date: 08/15/2024  -     T4, Free; Future; Expected date: 08/15/2024  -     TSH; Future; Expected date: 08/15/2024  -     Vitamin D; Future; Expected date: 08/15/2024  -     Hemoglobin A1C; Future; Expected date: 08/15/2024  -     Microalbumin/Creatinine Ratio, Urine; Future; Expected date: 08/15/2024  -     Cancel: Vitamin D; Future; Expected date: 08/15/2024  -     Prostate Specific Antigen, Diagnostic; Future; Expected date: 08/15/2024  -     TESTOSTERONE PANEL; Future; Expected date: 08/15/2024    2. Hypertension associated with type 2 diabetes mellitus  -     losartan (COZAAR) 25 MG tablet; Take 1 tablet (25 mg total) by mouth once daily.  Dispense: 90 tablet; Refill: 3  -     metoprolol succinate (TOPROL-XL) 100 MG 24 hr tablet; Take 1 tablet (100 mg total) by mouth once daily.  Dispense: 90 tablet; Refill: 3    3. Hyperlipidemia associated with type 2 diabetes mellitus  -     Lipid Panel; Future; Expected date: 08/15/2024    4. BPH with urinary obstruction  -     Prostate Specific Antigen, Diagnostic; Future; Expected date: 08/15/2024    5. Type 2 diabetes mellitus with hyperglycemia, without long-term current use of insulin  -     Comprehensive Metabolic Panel; Future; Expected date: 08/15/2024  -     Hemoglobin A1C; Future; Expected date: 08/15/2024  -     Microalbumin/Creatinine Ratio, Urine; Future; Expected date: 08/15/2024    6. Type 2 diabetes mellitus with morbid obesity  -     Comprehensive Metabolic Panel; Future; Expected date: 08/15/2024  -     Hemoglobin A1C; Future; Expected date: 08/15/2024  -     Microalbumin/Creatinine Ratio, Urine; Future; Expected date: 08/15/2024  -     metFORMIN (GLUCOPHAGE-XR) 500 MG ER 24hr tablet; Take 1 tablet (500 mg total) by mouth 2 (two) times daily with meals.  Dispense:  180 tablet; Refill: 3  -     semaglutide (OZEMPIC) 2 mg/dose (8 mg/3 mL) PnIj; Inject 2 mg into the skin every 7 days.  Dispense: 9 mL; Refill: 3    7. Male hypogonadism  -     TESTOSTERONE PANEL; Future; Expected date: 08/15/2024    8. Insomnia due to other mental disorder    9. LYNDA (obstructive sleep apnea)    10. MDD (major depressive disorder), recurrent episode, moderate    11. Vitamin D insufficiency  -     Vitamin D; Future; Expected date: 08/15/2024    12. H/O: CVA (cerebrovascular accident)        1. CBC, CMP, TSH, free T4, fasting lipids, vitamin-D level, hemoglobin A1c, urine microalbumin to creatinine ratio, and testosterone panel.    2. Continue metoprolol 100 mg daily and losartan 25 mg daily.  Hypertension is well controlled.  3. Continue Lipitor 40 mg daily.  Hyperlipidemia stable.    4. Continue metformin 500 mg b.i.d. and Ozempic 2 mg weekly.  Diabetes is stable.  5. Continue testosterone supplementation as prescribed and continue follow-up with endocrinology as scheduled.  Hypogonadism is stable.  6. BPH is stable.    7. Continue use of CPAP daily.  Sleep apnea and insomnia are stable.    8. Continue follow-up with psychiatry as scheduled.  Anxiety and depression are stable.    9. Continue over-the-counter vitamin-D 2000 units daily.  Vitamin-D insufficiency is stable.    10. Patient suffered a CVA in June and is status post tPA with resolution of symptoms.  Follow-up with neurology as scheduled.    11. Return to clinic as needed or in 1 year for annual physical exam.               Follow up in about 1 year (around 8/15/2025), or if symptoms worsen or fail to improve, for Annual exam.

## 2024-08-27 DIAGNOSIS — G47.33 OSA (OBSTRUCTIVE SLEEP APNEA): Primary | ICD-10-CM

## 2024-08-27 DIAGNOSIS — R40.0 SOMNOLENCE: ICD-10-CM

## 2024-08-27 RX ORDER — MODAFINIL 200 MG/1
200 TABLET ORAL 2 TIMES DAILY PRN
Qty: 60 TABLET | Refills: 5 | Status: SHIPPED | OUTPATIENT
Start: 2024-08-27

## 2024-09-06 ENCOUNTER — TELEPHONE (OUTPATIENT)
Dept: PHARMACY | Facility: CLINIC | Age: 44
End: 2024-09-06
Payer: COMMERCIAL

## 2024-09-06 NOTE — TELEPHONE ENCOUNTER
[facility]/Population Health Chart Review & Patient Outreach Details For Medication Adherence Project    Reason for Outreach Encounter: 3rd Party payor non-compliance report ([employer], [employer], [employer], etc)    2.  Patient Outreach Method: Reviewed patient chart     3.   Medication in question:   Hypertension Medications               losartan (COZAAR) 25 MG tablet Take 1 tablet (25 mg total) by mouth once daily.    metoprolol succinate (TOPROL-XL) 100 MG 24 hr tablet Take 1 tablet (100 mg total) by mouth once daily.               LAST FILLED: 8/24/24 for 30 day supply    4.  Reviewed and or Updates Made To: Patient Chart    5. Outreach Outcomes and/or actions taken: Patient filled medication and is on track to be adherent    Additional Notes:

## 2024-09-16 PROBLEM — I63.9 ACUTE ISCHEMIC STROKE: Status: RESOLVED | Noted: 2024-06-10 | Resolved: 2024-09-16

## 2024-10-17 ENCOUNTER — TELEPHONE (OUTPATIENT)
Dept: PHARMACY | Facility: CLINIC | Age: 44
End: 2024-10-17
Payer: COMMERCIAL

## 2024-10-17 NOTE — TELEPHONE ENCOUNTER
[facility]/Population Health Chart Review & Patient Outreach Details For Medication Adherence Project    Reason for Outreach Encounter: 3rd Party payor non-compliance report ([employer], [employer], [employer], etc)  2.  Patient Outreach Method: Reviewed patient chart   3.   Medication in question:   Hypertension Medications               losartan (COZAAR) 25 MG tablet Take 1 tablet (25 mg total) by mouth once daily.    metoprolol succinate (TOPROL-XL) 100 MG 24 hr tablet Take 1 tablet (100 mg total) by mouth once daily.                 Losartan  last filled  9/23/24 for 30 day supply      4.  Reviewed and or Updates Made To: Patient Chart  5. Outreach Outcomes and/or actions taken: Patient filled medication and is on track to be adherent  Additional Notes:

## 2024-10-21 ENCOUNTER — LAB VISIT (OUTPATIENT)
Dept: LAB | Facility: HOSPITAL | Age: 44
End: 2024-10-21
Attending: FAMILY MEDICINE
Payer: COMMERCIAL

## 2024-10-21 DIAGNOSIS — N40.1 BPH WITH URINARY OBSTRUCTION: ICD-10-CM

## 2024-10-21 DIAGNOSIS — E55.9 VITAMIN D INSUFFICIENCY: ICD-10-CM

## 2024-10-21 DIAGNOSIS — N13.8 BPH WITH URINARY OBSTRUCTION: ICD-10-CM

## 2024-10-21 DIAGNOSIS — E11.69 TYPE 2 DIABETES MELLITUS WITH MORBID OBESITY: ICD-10-CM

## 2024-10-21 DIAGNOSIS — E11.65 TYPE 2 DIABETES MELLITUS WITH HYPERGLYCEMIA, WITHOUT LONG-TERM CURRENT USE OF INSULIN: ICD-10-CM

## 2024-10-21 DIAGNOSIS — E78.5 HYPERLIPIDEMIA ASSOCIATED WITH TYPE 2 DIABETES MELLITUS: ICD-10-CM

## 2024-10-21 DIAGNOSIS — E29.1 MALE HYPOGONADISM: ICD-10-CM

## 2024-10-21 DIAGNOSIS — E66.01 TYPE 2 DIABETES MELLITUS WITH MORBID OBESITY: ICD-10-CM

## 2024-10-21 DIAGNOSIS — E11.69 HYPERLIPIDEMIA ASSOCIATED WITH TYPE 2 DIABETES MELLITUS: ICD-10-CM

## 2024-10-21 DIAGNOSIS — Z00.00 WELL ADULT EXAM: ICD-10-CM

## 2024-10-21 LAB
25(OH)D3+25(OH)D2 SERPL-MCNC: 36 NG/ML (ref 30–96)
ALBUMIN SERPL BCP-MCNC: 4 G/DL (ref 3.5–5.2)
ALP SERPL-CCNC: 72 U/L (ref 40–150)
ALT SERPL W/O P-5'-P-CCNC: 97 U/L (ref 10–44)
ANION GAP SERPL CALC-SCNC: 13 MMOL/L (ref 8–16)
AST SERPL-CCNC: 48 U/L (ref 10–40)
BASOPHILS # BLD AUTO: 0.09 K/UL (ref 0–0.2)
BASOPHILS NFR BLD: 0.9 % (ref 0–1.9)
BILIRUB SERPL-MCNC: 0.3 MG/DL (ref 0.1–1)
BUN SERPL-MCNC: 17 MG/DL (ref 6–20)
CALCIUM SERPL-MCNC: 9.1 MG/DL (ref 8.7–10.5)
CHLORIDE SERPL-SCNC: 105 MMOL/L (ref 95–110)
CHOLEST SERPL-MCNC: 181 MG/DL (ref 120–199)
CHOLEST/HDLC SERPL: 3.9 {RATIO} (ref 2–5)
CO2 SERPL-SCNC: 19 MMOL/L (ref 23–29)
COMPLEXED PSA SERPL-MCNC: 0.52 NG/ML (ref 0–4)
CREAT SERPL-MCNC: 0.8 MG/DL (ref 0.5–1.4)
DIFFERENTIAL METHOD BLD: ABNORMAL
EOSINOPHIL # BLD AUTO: 0.5 K/UL (ref 0–0.5)
EOSINOPHIL NFR BLD: 5.1 % (ref 0–8)
ERYTHROCYTE [DISTWIDTH] IN BLOOD BY AUTOMATED COUNT: 13.8 % (ref 11.5–14.5)
EST. GFR  (NO RACE VARIABLE): >60 ML/MIN/1.73 M^2
ESTIMATED AVG GLUCOSE: 154 MG/DL (ref 68–131)
GLUCOSE SERPL-MCNC: 158 MG/DL (ref 70–110)
HBA1C MFR BLD: 7 % (ref 4–5.6)
HCT VFR BLD AUTO: 41.2 % (ref 40–54)
HDLC SERPL-MCNC: 46 MG/DL (ref 40–75)
HDLC SERPL: 25.4 % (ref 20–50)
HGB BLD-MCNC: 13.8 G/DL (ref 14–18)
IMM GRANULOCYTES # BLD AUTO: 0.05 K/UL (ref 0–0.04)
IMM GRANULOCYTES NFR BLD AUTO: 0.5 % (ref 0–0.5)
LDLC SERPL CALC-MCNC: 86.2 MG/DL (ref 63–159)
LYMPHOCYTES # BLD AUTO: 3.1 K/UL (ref 1–4.8)
LYMPHOCYTES NFR BLD: 31.3 % (ref 18–48)
MCH RBC QN AUTO: 31.2 PG (ref 27–31)
MCHC RBC AUTO-ENTMCNC: 33.5 G/DL (ref 32–36)
MCV RBC AUTO: 93 FL (ref 82–98)
MONOCYTES # BLD AUTO: 0.6 K/UL (ref 0.3–1)
MONOCYTES NFR BLD: 6.3 % (ref 4–15)
NEUTROPHILS # BLD AUTO: 5.6 K/UL (ref 1.8–7.7)
NEUTROPHILS NFR BLD: 55.9 % (ref 38–73)
NONHDLC SERPL-MCNC: 135 MG/DL
NRBC BLD-RTO: 0 /100 WBC
PLATELET # BLD AUTO: 260 K/UL (ref 150–450)
PMV BLD AUTO: 11.6 FL (ref 9.2–12.9)
POTASSIUM SERPL-SCNC: 4.3 MMOL/L (ref 3.5–5.1)
PROT SERPL-MCNC: 6.9 G/DL (ref 6–8.4)
RBC # BLD AUTO: 4.43 M/UL (ref 4.6–6.2)
SODIUM SERPL-SCNC: 137 MMOL/L (ref 136–145)
T4 FREE SERPL-MCNC: 0.79 NG/DL (ref 0.71–1.51)
TRIGL SERPL-MCNC: 244 MG/DL (ref 30–150)
TSH SERPL DL<=0.005 MIU/L-ACNC: 0.92 UIU/ML (ref 0.4–4)
WBC # BLD AUTO: 10 K/UL (ref 3.9–12.7)

## 2024-10-21 PROCEDURE — 82040 ASSAY OF SERUM ALBUMIN: CPT | Performed by: FAMILY MEDICINE

## 2024-10-21 PROCEDURE — 36415 COLL VENOUS BLD VENIPUNCTURE: CPT | Performed by: FAMILY MEDICINE

## 2024-10-21 PROCEDURE — 84403 ASSAY OF TOTAL TESTOSTERONE: CPT | Performed by: FAMILY MEDICINE

## 2024-10-21 PROCEDURE — 80053 COMPREHEN METABOLIC PANEL: CPT | Performed by: FAMILY MEDICINE

## 2024-10-21 PROCEDURE — 84443 ASSAY THYROID STIM HORMONE: CPT | Performed by: FAMILY MEDICINE

## 2024-10-21 PROCEDURE — 84439 ASSAY OF FREE THYROXINE: CPT | Performed by: FAMILY MEDICINE

## 2024-10-21 PROCEDURE — 85025 COMPLETE CBC W/AUTO DIFF WBC: CPT | Performed by: FAMILY MEDICINE

## 2024-10-21 PROCEDURE — 80061 LIPID PANEL: CPT | Performed by: FAMILY MEDICINE

## 2024-10-21 PROCEDURE — 83036 HEMOGLOBIN GLYCOSYLATED A1C: CPT | Performed by: FAMILY MEDICINE

## 2024-10-21 PROCEDURE — 82306 VITAMIN D 25 HYDROXY: CPT | Performed by: FAMILY MEDICINE

## 2024-10-21 PROCEDURE — 84153 ASSAY OF PSA TOTAL: CPT | Performed by: FAMILY MEDICINE

## 2024-10-22 ENCOUNTER — OFFICE VISIT (OUTPATIENT)
Dept: PSYCHIATRY | Facility: CLINIC | Age: 44
End: 2024-10-22
Payer: COMMERCIAL

## 2024-10-22 ENCOUNTER — TELEPHONE (OUTPATIENT)
Dept: INTERNAL MEDICINE | Facility: CLINIC | Age: 44
End: 2024-10-22
Payer: COMMERCIAL

## 2024-10-22 ENCOUNTER — PATIENT MESSAGE (OUTPATIENT)
Dept: PSYCHIATRY | Facility: CLINIC | Age: 44
End: 2024-10-22
Payer: COMMERCIAL

## 2024-10-22 ENCOUNTER — LAB VISIT (OUTPATIENT)
Dept: LAB | Facility: HOSPITAL | Age: 44
End: 2024-10-22
Attending: INTERNAL MEDICINE
Payer: COMMERCIAL

## 2024-10-22 DIAGNOSIS — D64.9 ANEMIA, UNSPECIFIED TYPE: ICD-10-CM

## 2024-10-22 DIAGNOSIS — F41.0 GENERALIZED ANXIETY DISORDER WITH PANIC ATTACKS: ICD-10-CM

## 2024-10-22 DIAGNOSIS — D64.9 ANEMIA, UNSPECIFIED TYPE: Primary | ICD-10-CM

## 2024-10-22 DIAGNOSIS — F33.2 MDD (MAJOR DEPRESSIVE DISORDER), RECURRENT SEVERE, WITHOUT PSYCHOSIS: ICD-10-CM

## 2024-10-22 DIAGNOSIS — F41.1 GENERALIZED ANXIETY DISORDER WITH PANIC ATTACKS: ICD-10-CM

## 2024-10-22 DIAGNOSIS — F99 INSOMNIA DUE TO OTHER MENTAL DISORDER: ICD-10-CM

## 2024-10-22 DIAGNOSIS — F33.0 MDD (MAJOR DEPRESSIVE DISORDER), RECURRENT EPISODE, MILD: Primary | ICD-10-CM

## 2024-10-22 DIAGNOSIS — F51.05 INSOMNIA DUE TO OTHER MENTAL DISORDER: ICD-10-CM

## 2024-10-22 LAB
FERRITIN SERPL-MCNC: 260 NG/ML (ref 20–300)
IRON SERPL-MCNC: 85 UG/DL (ref 45–160)
SATURATED IRON: 22 % (ref 20–50)
TOTAL IRON BINDING CAPACITY: 391 UG/DL (ref 250–450)
TRANSFERRIN SERPL-MCNC: 264 MG/DL (ref 200–375)

## 2024-10-22 PROCEDURE — 90833 PSYTX W PT W E/M 30 MIN: CPT | Mod: 95,,, | Performed by: PHYSICIAN ASSISTANT

## 2024-10-22 PROCEDURE — 82180 ASSAY OF ASCORBIC ACID: CPT | Performed by: INTERNAL MEDICINE

## 2024-10-22 PROCEDURE — 99214 OFFICE O/P EST MOD 30 MIN: CPT | Mod: 95,,, | Performed by: PHYSICIAN ASSISTANT

## 2024-10-22 PROCEDURE — 1159F MED LIST DOCD IN RCRD: CPT | Mod: CPTII,95,, | Performed by: PHYSICIAN ASSISTANT

## 2024-10-22 PROCEDURE — 3061F NEG MICROALBUMINURIA REV: CPT | Mod: CPTII,95,, | Performed by: PHYSICIAN ASSISTANT

## 2024-10-22 PROCEDURE — 84466 ASSAY OF TRANSFERRIN: CPT | Performed by: INTERNAL MEDICINE

## 2024-10-22 PROCEDURE — 36415 COLL VENOUS BLD VENIPUNCTURE: CPT | Performed by: INTERNAL MEDICINE

## 2024-10-22 PROCEDURE — 4010F ACE/ARB THERAPY RXD/TAKEN: CPT | Mod: CPTII,95,, | Performed by: PHYSICIAN ASSISTANT

## 2024-10-22 PROCEDURE — 1160F RVW MEDS BY RX/DR IN RCRD: CPT | Mod: CPTII,95,, | Performed by: PHYSICIAN ASSISTANT

## 2024-10-22 PROCEDURE — 82728 ASSAY OF FERRITIN: CPT | Performed by: INTERNAL MEDICINE

## 2024-10-22 PROCEDURE — 3051F HG A1C>EQUAL 7.0%<8.0%: CPT | Mod: CPTII,95,, | Performed by: PHYSICIAN ASSISTANT

## 2024-10-22 PROCEDURE — 3066F NEPHROPATHY DOC TX: CPT | Mod: CPTII,95,, | Performed by: PHYSICIAN ASSISTANT

## 2024-10-22 RX ORDER — BUSPIRONE HYDROCHLORIDE 10 MG/1
20 TABLET ORAL 2 TIMES DAILY
Qty: 180 TABLET | Refills: 1 | Status: SHIPPED | OUTPATIENT
Start: 2024-10-22

## 2024-10-22 RX ORDER — ZOLPIDEM TARTRATE 12.5 MG/1
12.5 TABLET, FILM COATED, EXTENDED RELEASE ORAL NIGHTLY PRN
Qty: 30 TABLET | Refills: 3 | Status: SHIPPED | OUTPATIENT
Start: 2024-10-22 | End: 2025-04-22

## 2024-10-22 RX ORDER — ALPRAZOLAM 1 MG/1
1 TABLET ORAL 2 TIMES DAILY PRN
Qty: 60 TABLET | Refills: 2 | Status: SHIPPED | OUTPATIENT
Start: 2024-10-22 | End: 2025-02-19

## 2024-10-22 NOTE — TELEPHONE ENCOUNTER
Spoke with pt in regards to lab results and getting labs scheduled pt also requested vitamin c lab draw to be added.        ----- Message from Shanita Holder MD sent at 10/22/2024 10:24 AM CDT -----  I am covering for Dr. Cross.  Labs are significant for elevated triglycerides, consistent with previous results, but total cholesterol has improved.  Fasting glucose is elevated at 158, but hemoglobin A1c is stable at 7.  Liver enzymes remain elevated and relatively stable.  Labs also reveal a new onset anemia.  Recommend iron studies.  Please schedule.

## 2024-10-22 NOTE — TELEPHONE ENCOUNTER
----- Message from Med Assistant Alberts sent at 10/22/2024 10:46 AM CDT -----  Pt is requesting to have a vitamin C lab added on.  ----- Message -----  From: Shanita Holder MD  Sent: 10/22/2024  10:24 AM CDT  To: [facility] Staff    I am covering for Dr. Cross.  Labs are significant for elevated triglycerides, consistent with previous results, but total cholesterol has improved.  Fasting glucose is elevated at 158, but hemoglobin A1c is stable at 7.  Liver enzymes remain elevated and relatively stable.  Labs also reveal a new onset anemia.  Recommend iron studies.  Please schedule.

## 2024-10-22 NOTE — PROGRESS NOTES
"Outpatient Psychiatry Follow-Up Visit (MD/ADA)    10/22/2024     The patient location is: [place]  The chief complaint leading to consultation is: depression and anxiety    Visit type: audiovisual    Face to Face time with patient: 32 minutes  40 minutes of total time spent on the encounter, which includes face to face time and non-face to face time preparing to see the patient (eg, review of tests), Obtaining and/or reviewing separately obtained history, Documenting clinical information in the electronic or other health record, Independently interpreting results (not separately reported) and communicating results to the patient/family/caregiver, or Care coordination (not separately reported).     Each patient to whom he or she provides medical services by telemedicine is:  (1) informed of the relationship between the physician and patient and the respective role of any other health care provider with respect to management of the patient; and (2) notified that he or she may decline to receive medical services by telemedicine and may withdraw from such care at any time.    Clinical Status of Patient:  Outpatient (Ambulatory)    Chief Complaint:  Dominic Collazo is a 44 y.o. male who presents today for follow-up of depression, anxiety, adjustment problems, and attention problems.  Met with patient.      Interval History and Content of Current Session:  Interim Events/Subjective Report/Content of Current Session:     Pt reports today: "overall doing ok, a lot of stuff going on"    Reports since being on remeron mood and anxiety is significantly improved.    Pt still on paid leave from work due to medical issues and also pt reports after he confronted company with legal problems they had.     States that maryann has put him on paid leave until the end of the year, states he is being terminated at end of year on 12/31/24.    Reports accidentally stopped taking buspar and anxiety increased, added it back and " "anxiety improved.    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 States he has been applying to a few jobs.    Mood overall is "overall doing ok, pretty good most days"    Reports sleeping around 6 hrs per night, takes short nap in afternoon    Patients mood is steady, affect appears mood congruent. Linear and logical, friendly and cooperative, good eye contact.    Denies SI/HI/AVH. Pt reports sleeping well and decreased appetite. Denies side effects of medications.    Pt reports taking medications as prescribed and behaving appropriately during interview today.      Psychotherapy:  Target symptoms: depression, anxiety , work stress  Why chosen therapy is appropriate versus another modality: relevant to diagnosis, patient responds to this modality, evidence based practice  Outcome monitoring methods: self-report, observation  Therapeutic intervention type: insight oriented psychotherapy, supportive psychotherapy  Topics discussed/themes: work stress, stress related to medical comorbidities, building skills sets for symptom management, symptom recognition  The patient's response to the intervention is accepting. The patient's progress toward treatment goals is good.   Duration of intervention: 17 minutes.      Prior visit:    Pt reports today: "having a rough time"    Pt reports continued stress from job    Pt went to ED and was hospitalized for SI per ED provider. Pt denies ever reporting SI, was inpt psych for 4 days and was discharged on 7/30/24    Pt reports was started on depakote and stopped effexor while inpatient. Pt since out stopped depakote and restarted effexor and says was felt better.    Pt reports employer has been harassing him regarding pt uncovering fraudulent activity by the company and feels employer is not trying to rectify the problem but wants to cover it up and feels like they want to fire him.    Pt states there are multiple payments for work projects he has not " "been paid for. Pt is thinking about going to get  for help.    States he plans on going back to work soon and trying to find another job in the meantime. States he has a few job leads    Pt is currently on FMLA leave.    Reports has been sleeping better on remeron and mood has improved    Mood overall is "I feel decent right now, stressed about work but I'm feeling better. Overall Im feeling more like my normal self but have times when my anxiety flares up its hard to calm it down"    Patients mood is steady, affect appears mood congruent. Linear and logical, friendly and cooperative, good eye contact.    Denies SI/HI/AVH. Pt reports sleeping well on remeron and normal appetite. Denies side effects of medications.    Pt reports taking medications as prescribed and behaving appropriately during interview today.    Previous medication trials:  Ativan- effective  Seroquel- sedation  Lexapro  Wellbutrin and Buspar- effective,   vistaril- sedation, still had axniety  Prozac - sexual side effects  Cymbalta- Sexual side effects and retrograde ejaculation    Review of Systems     Review of Systems   Constitutional:  Negative for fever and malaise/fatigue.   HENT:  Negative for sore throat.    Eyes:  Negative for photophobia.   Respiratory:  Negative for cough.    Cardiovascular:  Negative for chest pain and palpitations.   Gastrointestinal:  Negative for abdominal pain.   Genitourinary:  Negative for dysuria.   Musculoskeletal:  Negative for myalgias.   Skin:  Negative for rash.   Neurological:  Negative for dizziness, tingling, tremors, sensory change, speech change, focal weakness, seizures, loss of consciousness, weakness and headaches.   Endo/Heme/Allergies:  Does not bruise/bleed easily.   Psychiatric/Behavioral:  Negative for depression, hallucinations, substance abuse and suicidal ideas. The patient is not nervous/anxious and does not have insomnia.      Psychiatric Review Of Systems - Is patient experiencing " or having changes in:  sleep: no  appetite: no  weight: no  energy/anergy: yes  interest/pleasure/anhedonia: yes  somatic symptoms: no  libido: no  anxiety/panic: yes  guilty/hopelessness: no  concentration: no  S.I.B.s/risky behavior: no  Irritability: no  Racing thoughts: no  Impulsive behaviors: no  Paranoia: no  AVH: no     Past Medical, Family and Social History: The patient's past medical, family and social history have been reviewed and updated as appropriate within the electronic medical record - see encounter notes.    Compliance: yes    Side effects: see above    Risk Parameters:  Patient reports no suicidal ideation  Patient reports no homicidal ideation  Patient reports no self-injurious behavior  Patient reports no violent behavior    Exam (detailed: at least 9 elements; comprehensive: all 15 elements)   Constitutional  Vitals:    There were no vitals filed for this visit.     General:  age appropriate, obese     Musculoskeletal  Muscle Strength/Tone:  not examined   Gait & Station:  THEA due to video visit      Psychiatric  Speech:  no latency; no press   Mood & Affect:  steady  Mood congruent   Thought Process:  normal and logical   Associations:  intact   Thought Content:  normal, no suicidality, no homicidality, delusions, or paranoia   Insight:  intact   Judgement: behavior is adequate to circumstances   Orientation:  grossly intact   Memory: intact for content of interview   Language: grossly intact   Attention Span & Concentration:  able to focus   Fund of Knowledge:  intact and appropriate to age and level of education     Assessment and Diagnosis   Status/Progress: Based on the examination today, the patient's problem(s) is/are adequately but not ideally controlled.  New problems have not been presented today.   Co-morbidities are complicating management of the primary condition.  There are no active rule-out diagnoses for this patient at this time.     General Impression:       ICD-10-CM  ICD-9-CM   1. MDD (major depressive disorder), recurrent episode, mild  F33.0 296.31   2. Generalized anxiety disorder with panic attacks  F41.1 300.02    F41.0 300.01   3. Insomnia due to other mental disorder  F51.05 300.9    F99 327.02         Intervention/Counseling/Treatment Plan   Treatment Plan/Recommendations:   Medication Management: Continue current medications.     Continue wellbutrin xl 150mg daily    Continue remeron 15mg qhs    Continue vitamin D3 otc 2k IU once daily    continue buspar to 20mg bid    continue Effexor to 150mg mg daily     continue xanax 1mg bid prn anxiety or insomnia    continue ambien to CR 12.5mg qhs insomnia    Continue testosterone therapy as prescribed by other provider    Continue provigil as prescribed by sleep medicine      F/u with PCP and neurology regarding recent dx and tx for TIA.    The risks and benefits of medication were discussed with the patient.  Discussed diagnosis, risks and benefits of proposed treatment above vs alternative treatments vs no treatment, and potential side effects of these treatments. The patient expresses understanding of the above and displays the capacity to agree with this treatment given said understanding. Patient also agrees that, currently, the benefits outweigh the risks and would like to pursue treatment at this time.  Discussed inherent unpredictability of medications in each individual.   Encouraged Patient to keep future appointments.   Take medications as prescribed and abstain from substance abuse.   In the event of an emergency, patient was advised to go to the emergency room      Return to Clinic: 2 months      Brandon Burdick PA-C

## 2024-10-23 ENCOUNTER — PATIENT MESSAGE (OUTPATIENT)
Dept: PSYCHIATRY | Facility: CLINIC | Age: 44
End: 2024-10-23
Payer: COMMERCIAL

## 2024-10-28 LAB — VIT C SERPL-MCNC: 6 MG/L (ref 2–19)

## 2024-10-31 ENCOUNTER — TELEPHONE (OUTPATIENT)
Dept: OTOLARYNGOLOGY | Facility: CLINIC | Age: 44
End: 2024-10-31
Payer: COMMERCIAL

## 2024-11-01 ENCOUNTER — OFFICE VISIT (OUTPATIENT)
Dept: NEUROLOGY | Facility: CLINIC | Age: 44
End: 2024-11-01
Payer: COMMERCIAL

## 2024-11-01 VITALS
SYSTOLIC BLOOD PRESSURE: 145 MMHG | DIASTOLIC BLOOD PRESSURE: 89 MMHG | BODY MASS INDEX: 39.17 KG/M2 | HEIGHT: 75 IN | HEART RATE: 96 BPM | WEIGHT: 315 LBS

## 2024-11-01 DIAGNOSIS — S06.0X9S CONCUSSION WITH LOSS OF CONSCIOUSNESS, SEQUELA: ICD-10-CM

## 2024-11-01 DIAGNOSIS — I63.9 CEREBROVASCULAR ACCIDENT (CVA), UNSPECIFIED MECHANISM: Primary | ICD-10-CM

## 2024-11-01 DIAGNOSIS — S13.4XXD WHIPLASH INJURY TO NECK, SUBSEQUENT ENCOUNTER: ICD-10-CM

## 2024-11-01 PROCEDURE — 99999 PR PBB SHADOW E&M-EST. PATIENT-LVL V: CPT | Mod: PBBFAC,,, | Performed by: STUDENT IN AN ORGANIZED HEALTH CARE EDUCATION/TRAINING PROGRAM

## 2024-11-06 ENCOUNTER — TELEPHONE (OUTPATIENT)
Dept: OTOLARYNGOLOGY | Facility: CLINIC | Age: 44
End: 2024-11-06
Payer: COMMERCIAL

## 2024-11-06 NOTE — TELEPHONE ENCOUNTER
I called patient to inform him that he will need to seen in person but there was no answer. I left  for him to call back.
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
## 2024-11-08 ENCOUNTER — OFFICE VISIT (OUTPATIENT)
Dept: SPINE | Facility: CLINIC | Age: 44
End: 2024-11-08
Attending: PHYSICAL MEDICINE & REHABILITATION
Payer: COMMERCIAL

## 2024-11-08 ENCOUNTER — HOSPITAL ENCOUNTER (OUTPATIENT)
Dept: RADIOLOGY | Facility: OTHER | Age: 44
Discharge: HOME | End: 2024-11-08
Attending: PHYSICAL MEDICINE & REHABILITATION
Payer: COMMERCIAL

## 2024-11-08 ENCOUNTER — TELEPHONE (OUTPATIENT)
Dept: OTOLARYNGOLOGY | Facility: CLINIC | Age: 44
End: 2024-11-08
Payer: COMMERCIAL

## 2024-11-08 ENCOUNTER — TELEPHONE (OUTPATIENT)
Dept: PAIN MEDICINE | Facility: CLINIC | Age: 44
End: 2024-11-08
Payer: COMMERCIAL

## 2024-11-08 ENCOUNTER — PATIENT MESSAGE (OUTPATIENT)
Dept: OTOLARYNGOLOGY | Facility: CLINIC | Age: 44
End: 2024-11-08
Payer: COMMERCIAL

## 2024-11-08 VITALS
RESPIRATION RATE: 18 BRPM | SYSTOLIC BLOOD PRESSURE: 114 MMHG | DIASTOLIC BLOOD PRESSURE: 81 MMHG | HEIGHT: 75 IN | OXYGEN SATURATION: 100 % | HEART RATE: 83 BPM | WEIGHT: 315 LBS | BODY MASS INDEX: 39.17 KG/M2

## 2024-11-08 DIAGNOSIS — M53.3 SI (SACROILIAC) JOINT DYSFUNCTION: ICD-10-CM

## 2024-11-08 DIAGNOSIS — M53.3 SI (SACROILIAC) JOINT DYSFUNCTION: Primary | ICD-10-CM

## 2024-11-08 DIAGNOSIS — M54.2 NECK PAIN: ICD-10-CM

## 2024-11-08 PROCEDURE — 72050 X-RAY EXAM NECK SPINE 4/5VWS: CPT | Mod: 26,,, | Performed by: RADIOLOGY

## 2024-11-08 PROCEDURE — 99999 PR PBB SHADOW E&M-EST. PATIENT-LVL V: CPT | Mod: PBBFAC,,, | Performed by: PHYSICAL MEDICINE & REHABILITATION

## 2024-11-08 PROCEDURE — 72050 X-RAY EXAM NECK SPINE 4/5VWS: CPT | Mod: TC,FY

## 2024-11-08 PROCEDURE — 72110 X-RAY EXAM L-2 SPINE 4/>VWS: CPT | Mod: 26,,, | Performed by: RADIOLOGY

## 2024-11-08 PROCEDURE — 72110 X-RAY EXAM L-2 SPINE 4/>VWS: CPT | Mod: TC,FY

## 2024-11-08 NOTE — TELEPHONE ENCOUNTER
I called to get patient virtual appointment changed to in person but there was no answer. I left  for patient to call back.

## 2024-11-08 NOTE — TELEPHONE ENCOUNTER
----- Message from Benitez Aviles MD sent at 2024  9:48 AM CST -----  Regarding: Order for SELWYN OLEA    Patient Name: SELWYN OLEA([id number])  Sex: Male  : 1980      PCP: WDAE TAYLOR    Center: [facility]     Types of orders made on 2024: Imaging, Outpatient Referral, Procedure                                       Request    Order Date:2024  Ordering User:BENITEZ AVILES [[id number]]  Encounter Provider:Benitez Aviles MD [[id number]]  Authorizing Provider: Benitez Aviles MD [[id number]]  Department:[facility] BACK AND SPINE[[id number]]    Common Order Information  Procedure -> Sacroiliac Injection (Specify laterality) Cmt: left si joint    Order Specific Information  Order: Procedure Order to Pain Management [Custom: [id number]]  Order #:          [id number]: [id number] FUTURE    Priority: Routine  Class: Clinic Performed    Future Order Information      Expires on:2025            Expected by:2024                   Associated Diagnoses      M53.3 SI (sacroiliac) joint dysfunction      Is patient on anti-coagulants? -> No         Facility Name: -> [facility]           Priority: Routine  Class: Clinic Performed    Future Order Information      Expires on:2025            Expected by:2024                   Associated Diagnoses      M53.3 SI (sacroiliac) joint dysfunction      Procedure -> Sacroiliac Injection (Specify laterality) Cmt: left si joint        Is patient on anti-coagulants? -> No         Facility Name: -> [facility]

## 2024-11-11 ENCOUNTER — TELEPHONE (OUTPATIENT)
Dept: OTOLARYNGOLOGY | Facility: CLINIC | Age: 44
End: 2024-11-11
Payer: COMMERCIAL

## 2024-11-11 ENCOUNTER — TELEPHONE (OUTPATIENT)
Dept: PAIN MEDICINE | Facility: CLINIC | Age: 44
End: 2024-11-11
Payer: COMMERCIAL

## 2024-11-11 NOTE — TELEPHONE ENCOUNTER
----- Message from Benitez Aviles MD sent at 2024  9:48 AM CST -----  Regarding: Order for SELWYN OLEA    Patient Name: SELWYN OLEA([id number])  Sex: Male  : 1980      PCP: WADE TAYLOR    Center: [facility]     Types of orders made on 2024: Imaging, Outpatient Referral, Procedure                                       Request    Order Date:2024  Ordering User:BENITEZ AVILES [[id number]  Encounter Provider:Benitez Aviles MD [[id number]]  Authorizing Provider: Benitez Aviles MD [[id number]]  Department:[facility] BACK AND SPINE[[id number]]    Common Order Information  Procedure -> Sacroiliac Injection (Specify laterality) Cmt: left si joint    Order Specific Information  Order: Procedure Order to Pain Management [Custom: [id number]]  Order #:          [id number]: [id number] FUTURE    Priority: Routine  Class: Clinic Performed    Future Order Information      Expires on:2025            Expected by:2024                   Associated Diagnoses      M53.3 SI (sacroiliac) joint dysfunction      Is patient on anti-coagulants? -> No         Facility Name: -> [facility]           Priority: Routine  Class: Clinic Performed    Future Order Information      Expires on:2025            Expected by:2024                   Associated Diagnoses      M53.3 SI (sacroiliac) joint dysfunction      Procedure -> Sacroiliac Injection (Specify laterality) Cmt: left si joint        Is patient on anti-coagulants? -> No         Facility Name: -> [facility]

## 2024-11-11 NOTE — TELEPHONE ENCOUNTER
----- Message from Daniela Suero MD sent at 11/11/2024 11:52 AM CST -----  Lexus,  I do not see any other spots until December 11th at 2 pm. You can offer that one.  Thanks.  Daniela Suero M.D.  ----- Message -----  From: Lexus Escamilla MA  Sent: 11/11/2024  11:31 AM CST  To: Daniela Suero MD    Hi good morning I got in touch with the virtual patient that's on 11/27 and I offered him the 8:30 slot with a 8:00 audio on 11/20 but he wanted a later time cause he said he would have to drop his kids off at school.    Lexus OSMAN MA
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
## 2024-11-12 ENCOUNTER — OFFICE VISIT (OUTPATIENT)
Dept: ORTHOPEDICS | Facility: CLINIC | Age: 44
End: 2024-11-12
Payer: COMMERCIAL

## 2024-11-12 VITALS — HEIGHT: 75 IN | WEIGHT: 315 LBS | BODY MASS INDEX: 39.17 KG/M2

## 2024-11-12 DIAGNOSIS — Z98.890 STATUS POST LABRAL REPAIR OF SHOULDER: Primary | ICD-10-CM

## 2024-11-12 DIAGNOSIS — M54.12 CERVICAL RADICULOPATHY: ICD-10-CM

## 2024-11-12 PROCEDURE — 99999 PR PBB SHADOW E&M-EST. PATIENT-LVL V: CPT | Mod: PBBFAC,,, | Performed by: ORTHOPAEDIC SURGERY

## 2024-11-13 ENCOUNTER — TELEPHONE (OUTPATIENT)
Dept: PAIN MEDICINE | Facility: CLINIC | Age: 44
End: 2024-11-13
Payer: COMMERCIAL

## 2024-11-13 NOTE — TELEPHONE ENCOUNTER
----- Message from Benitez Aviles MD sent at 2024  9:48 AM CST -----  Regarding: Order for SELWYN OLEA    Patient Name: SELWYN OLEA([id number])  Sex: Male  : 1980      PCP: WADE TAYLOR    Center: [facility]     Types of orders made on 2024: Imaging, Outpatient Referral, Procedure                                       Request    Order Date:2024  Ordering User:BENITEZ AVILES [[id number]]  Encounter Provider:Benitez Aviles MD [[id number]]  Authorizing Provider: Benitez Aviles MD [[id number]]  Department:[facility][[id number]]    Common Order Information  Procedure -> Sacroiliac Injection (Specify laterality) Cmt: left si joint    Order Specific Information  Order: Procedure Order to Pain Management [Custom: [id number]]  Order #:          [id number]: 1 FUTURE    Priority: Routine  Class: Clinic Performed    Future Order Information      Expires on:2025            Expected by:2024                   Associated Diagnoses      M53.3 SI (sacroiliac) joint dysfunction      Is patient on anti-coagulants? -> No         Facility Name: -> [facility]           Priority: Routine  Class: Clinic Performed    Future Order Information      Expires on:2025            Expected by:2024                   Associated Diagnoses      M53.3 SI (sacroiliac) joint dysfunction      Procedure -> Sacroiliac Injection (Specify laterality) Cmt: left si joint        Is patient on anti-coagulants? -> No         Facility Name: -> [facility]

## 2024-11-16 ENCOUNTER — HOSPITAL ENCOUNTER (OUTPATIENT)
Dept: RADIOLOGY | Facility: HOSPITAL | Age: 44
Discharge: HOME | End: 2024-11-16
Attending: ORTHOPAEDIC SURGERY
Payer: COMMERCIAL

## 2024-11-16 DIAGNOSIS — M54.12 CERVICAL RADICULOPATHY: ICD-10-CM

## 2024-11-16 PROCEDURE — 72141 MRI NECK SPINE W/O DYE: CPT | Mod: TC

## 2024-11-16 PROCEDURE — 72141 MRI NECK SPINE W/O DYE: CPT | Mod: 26,,, | Performed by: RADIOLOGY

## 2024-11-18 ENCOUNTER — PATIENT MESSAGE (OUTPATIENT)
Dept: ORTHOPEDICS | Facility: CLINIC | Age: 44
End: 2024-11-18
Payer: COMMERCIAL

## 2024-11-20 ENCOUNTER — CLINICAL SUPPORT (OUTPATIENT)
Dept: REHABILITATION | Facility: HOSPITAL | Age: 44
End: 2024-11-20
Attending: PHYSICAL MEDICINE & REHABILITATION
Payer: COMMERCIAL

## 2024-11-20 DIAGNOSIS — R29.3 POOR POSTURE: ICD-10-CM

## 2024-11-20 DIAGNOSIS — M54.2 NECK PAIN: ICD-10-CM

## 2024-11-20 DIAGNOSIS — R29.898 DECREASED STRENGTH OF TRUNK AND BACK: Primary | ICD-10-CM

## 2024-11-20 PROCEDURE — 97110 THERAPEUTIC EXERCISES: CPT

## 2024-11-20 PROCEDURE — 97750 PHYSICAL PERFORMANCE TEST: CPT | Mod: 32

## 2024-11-20 PROCEDURE — 97530 THERAPEUTIC ACTIVITIES: CPT
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
## 2024-11-25 ENCOUNTER — PATIENT MESSAGE (OUTPATIENT)
Dept: ENDOCRINOLOGY | Facility: CLINIC | Age: 44
End: 2024-11-25
Payer: COMMERCIAL

## 2024-11-25 DIAGNOSIS — E29.1 MALE HYPOGONADISM: ICD-10-CM

## 2024-11-25 RX ORDER — TESTOSTERONE CYPIONATE 200 MG/ML
200 INJECTION, SOLUTION INTRAMUSCULAR
Qty: 5 ML | Refills: 3 | OUTPATIENT
Start: 2024-11-25 | End: 2025-05-26

## 2024-11-27 ENCOUNTER — CLINICAL SUPPORT (OUTPATIENT)
Dept: REHABILITATION | Facility: HOSPITAL | Age: 44
End: 2024-11-27
Attending: PHYSICAL MEDICINE & REHABILITATION
Payer: COMMERCIAL

## 2024-11-27 DIAGNOSIS — R29.3 POOR POSTURE: ICD-10-CM

## 2024-11-27 DIAGNOSIS — R29.898 DECREASED STRENGTH OF TRUNK AND BACK: Primary | ICD-10-CM

## 2024-11-27 PROCEDURE — 97110 THERAPEUTIC EXERCISES: CPT

## 2024-11-27 PROCEDURE — 97112 NEUROMUSCULAR REEDUCATION: CPT

## 2024-11-27 NOTE — PROGRESS NOTES
"[facility]      Name: Dominic Collazo  Clinic Number: [id number]    Therapy Diagnosis:   Encounter Diagnoses   Name Primary?    Decreased strength of trunk and back Yes    Poor posture      Physician: Elisha Montelongo, *    Visit Date: 11/27/2024    Physician Orders: PT Eval and Treat  Medical Diagnosis from Referral: M54.2 (ICD-10-CM) - Neck pain  Evaluation Date: 11/20/2024  Authorization Period Expiration: 11/8/2025  Plan of Care Expiration: 1/29/2025  Reassessment Due: 12/20/2024  Visit # / Visits authorized: 2/11  (eval + 10)  MedX Testing:MedX testing visit 2    Time In: 9:30 AM  Time Out: 10:30 AM  Total Billable Time: 60 minutes  INSURANCE and OUTCOMES: Fee for Service with FOTO Outcomes 1/3    Precautions:   standard, diabetes, and history of stroke    Pattern of pain determined: 1 RADHA, 5    Subjective   Dominic reports continued neck pain, more sever today.      Patient reports tolerating previous visit without adverse response  Patient reports their pain to be 6/10 on a 0-10 scale with 0 being no pain and 10 being the worst pain imaginable.  Pain Location: neck     Occupation: coding, works from home  Leisure: rigo   Pt goals:  "learn how to adjust my work space, reduce pain"     Objective     Cervical IM Testing Results:    Initial test 11/27/24   ROM 24-84 deg   Max Peak Torque 195    Min Peak Torque 98    Flex/Ext Ratio 2:1   % below normative data 60%     Outcomes:  Initial score: 36% functional ability   Visit 5 score:  Goal: 54% functional ability       Treatment    Dominic received the treatments listed below:      Dominic received neuromuscular education   MedX testing performed day 2: Patient  received neuromuscular education to engage spinal musculature correctly for motor control and engagement of musculature for 15 minutes including the MedX exercise component and practice and standard testing. MedX dynamic exercise and baseline isometric test " "performed with instructions to guide the patient safely through the testing procedure. Patient instructed to perform isometric test correctly and safely while building to an optimal force with a pain-free effort. Patient also instructed that they should feel support/pressure from MedX restraints but no pain/discomfort, and encouraged to report any pain to therapist. Patient demonstrated appropriate understanding of information and tolerance of test.  Education regarding purpose of test, safety during test given, and reviewed possible more soreness and strategies        11/27/2024    12:46 PM   [facility]   Visit Number 2   VAS Pain Rating 6   Treadmill Time (in min.) 5 min   Retraction in Sitting 10   Retraction with Extension 10   Flexion 2   Rotation 5   Scapular Retraction 20   Cervical Extension Seat Pad 0   Seat Adjustment 157   Top Dead Center 66   Counterweight 1.1   Cervical Flexion 84   Cervical Extension 24   Cervical Peak Torque 195 ft. lbs.   Ice - Z Lie (in min.) 0         therapeutic exercises to develop strength, endurance, ROM, flexibility, posture, and core stabilization for 40 minutes including:  RIS x 10  Levator scap stretch 20" x 2  Thoracic extension over 1/2 roll x 10  Retraction + extension x 10  Cervical rotation with towel 5" x 5  Scapular retraction with GTB x 20      Peripheral muscle strengthening which included 1 set of 15-20 repetitions at a slow, controlled 10-13 second per rep pace focused on strengthening supporting musculature for improved body mechanics and functional mobility.  Pt and therapist focused on proper form during treatment to ensure optimal strengthening of each targeted muscle group.  Machines were utilized including torso rotation, leg press, hip abd and hip add, leg ext.  Leg curl, triceps, biceps, chest and row added visit 3    therapeutic activities to improve functional performance for 00  minutes, including:    manual therapy techniques: Manual traction " and Soft tissue Mobilization were applied to the: neck for 00 minutes, including:      cold pack for 0 minutes to neck - patient declined.    Home Exercises Provided and Patient Education Provided   Home exercises include: LS stretch, RIS, SOC, thoracic extension, rotation with towel, scapular retraction   Cardio program: visit 5  Lifting education date: visit 11  Posture/Lumbar roll:  recommended  Fridge Magnet Discharge handout (date given):  Equipment at home/gym membership: no      Education provided:   - PT role and POC  - HEP  - MedX testing results  - pacing and extension hold for max strength and endurance gains  - RPE scale    Written Home Exercises Provided: yes.  Exercises were reviewed and Dominic was able to demonstrate them prior to the end of the session.  Dominic demonstrated good  understanding of the education provided.     See EMR under Patient Instructions for exercises provided 11/27/2024.          Assessment     Pt presents to second healthy back visit reporting moderate neck pain, was able to demo HEP with Mod VC for form. Added scapular retraction strengthening and towel rotation stretch to program today.  Isometric strength testing performed today. He demonstrates 60% strength deficits as compared to men the same age.    Pt was also able to complete half of the peripheral strengthening exercises without increased discomfort and will complete the complete circuit next visit as tolerated.    Patient is making good progress towards established goals.  Pt will continue to benefit from skilled outpatient physical therapy to address the deficits stated in the impairment chart, provide pt/family education and to maximize pt's level of independence in the home and community environment.     Anticipated Barriers for therapy: co-morbidities  Pt's spiritual, cultural and educational needs considered and pt agreeable to plan of care and goals as stated below:     Goals:   Short term goals: 6 weeks or 10  visits   - Pt will demonstratte increased cervical MedX ROM as measured by med ex by 3 degrees from initial test which results in improved  ROM of neck for ease with ADLs and driving. Appropriate and Ongoing  - Pt will demonstrate independence with reducing or controlling symptoms with ther ex, movement, or position independently, able to reduce pain 1-2 points on pain scale using strategies taught in therapy.  Appropriate and Ongoing  - Pt will demonstrate increased MedX average isometric strength value by 25% with  when compared to the initial testing resulting in improved ability to perform bending, lifting, and carrying activities safely and confidently. Appropriate and Ongoing     Long term goals: 10 weeks or 20 visits  - Pt will demonstratte increased cervical MedX ROM as measured by med ex by 6 degrees from initial test which results in functional ROM of neck for ease with ADLs and driving.  Appropriate and Ongoing  - Pt will demonstrate increased MedX average isometric strength value  by 45% from initial test to improve ability to lift and carry, and sustain good posture while performing ADL's. Appropriate and Ongoing  - Pt will demonstrate reduced pain and improved functional outcomes as reported on the FOTO by reaching an intake score of >/= 54% functional ability in order to demonstrate subjective improvement in patient's condition. . Appropriate and Ongoing  - Pt will demonstrate independence with reducing or controlling symptoms with ther ex, movement, or position independently, able to reduce pain 2-4 points on pain scale using strategies taught in therapy. Appropriate and Ongoing  - Pt will demonstrate independence with the HEP at discharge. Appropriate and Ongoing  - Pt will demonstrate improved body mechanics and ergonomic awareness to reduce pain to less than 2/10 during work day (patient goal)  Appropriate and Ongoing      Plan   Continue with established Plan of Care towards established PT goals.        Therapist: Monique Beatty, PT  11/27/2024

## 2024-12-11 ENCOUNTER — CLINICAL SUPPORT (OUTPATIENT)
Dept: AUDIOLOGY | Facility: CLINIC | Age: 44
End: 2024-12-11
Payer: COMMERCIAL

## 2024-12-11 ENCOUNTER — OFFICE VISIT (OUTPATIENT)
Dept: PSYCHIATRY | Facility: CLINIC | Age: 44
End: 2024-12-11
Payer: COMMERCIAL

## 2024-12-11 ENCOUNTER — OFFICE VISIT (OUTPATIENT)
Dept: OTOLARYNGOLOGY | Facility: CLINIC | Age: 44
End: 2024-12-11
Payer: COMMERCIAL

## 2024-12-11 DIAGNOSIS — F51.05 INSOMNIA DUE TO OTHER MENTAL DISORDER: ICD-10-CM

## 2024-12-11 DIAGNOSIS — F41.1 GENERALIZED ANXIETY DISORDER WITH PANIC ATTACKS: ICD-10-CM

## 2024-12-11 DIAGNOSIS — F43.23 ADJUSTMENT DISORDER WITH MIXED ANXIETY AND DEPRESSED MOOD: ICD-10-CM

## 2024-12-11 DIAGNOSIS — F41.0 GENERALIZED ANXIETY DISORDER WITH PANIC ATTACKS: ICD-10-CM

## 2024-12-11 DIAGNOSIS — H93.13 TINNITUS OF BOTH EARS: Primary | ICD-10-CM

## 2024-12-11 DIAGNOSIS — F33.2 MDD (MAJOR DEPRESSIVE DISORDER), RECURRENT SEVERE, WITHOUT PSYCHOSIS: Primary | ICD-10-CM

## 2024-12-11 DIAGNOSIS — F99 INSOMNIA DUE TO OTHER MENTAL DISORDER: ICD-10-CM

## 2024-12-11 DIAGNOSIS — F90.2 ADHD (ATTENTION DEFICIT HYPERACTIVITY DISORDER), COMBINED TYPE: ICD-10-CM

## 2024-12-11 PROCEDURE — 3066F NEPHROPATHY DOC TX: CPT | Mod: CPTII,S$GLB,, | Performed by: OTOLARYNGOLOGY

## 2024-12-11 PROCEDURE — 1159F MED LIST DOCD IN RCRD: CPT | Mod: CPTII,S$GLB,, | Performed by: OTOLARYNGOLOGY

## 2024-12-11 PROCEDURE — 4010F ACE/ARB THERAPY RXD/TAKEN: CPT | Mod: CPTII,95,, | Performed by: PHYSICIAN ASSISTANT

## 2024-12-11 PROCEDURE — 90833 PSYTX W PT W E/M 30 MIN: CPT | Mod: 95,,, | Performed by: PHYSICIAN ASSISTANT

## 2024-12-11 PROCEDURE — 1160F RVW MEDS BY RX/DR IN RCRD: CPT | Mod: CPTII,S$GLB,, | Performed by: OTOLARYNGOLOGY

## 2024-12-11 PROCEDURE — 99999 PR PBB SHADOW E&M-EST. PATIENT-LVL I: CPT | Mod: PBBFAC,,, | Performed by: AUDIOLOGIST

## 2024-12-11 PROCEDURE — 1159F MED LIST DOCD IN RCRD: CPT | Mod: CPTII,95,, | Performed by: PHYSICIAN ASSISTANT

## 2024-12-11 PROCEDURE — 92557 COMPREHENSIVE HEARING TEST: CPT | Mod: S$GLB,,, | Performed by: AUDIOLOGIST

## 2024-12-11 PROCEDURE — 99999 PR PBB SHADOW E&M-EST. PATIENT-LVL IV: CPT | Mod: PBBFAC,,, | Performed by: OTOLARYNGOLOGY

## 2024-12-11 PROCEDURE — 4010F ACE/ARB THERAPY RXD/TAKEN: CPT | Mod: CPTII,S$GLB,, | Performed by: OTOLARYNGOLOGY

## 2024-12-11 PROCEDURE — 3051F HG A1C>EQUAL 7.0%<8.0%: CPT | Mod: CPTII,S$GLB,, | Performed by: OTOLARYNGOLOGY

## 2024-12-11 PROCEDURE — 1160F RVW MEDS BY RX/DR IN RCRD: CPT | Mod: CPTII,95,, | Performed by: PHYSICIAN ASSISTANT

## 2024-12-11 PROCEDURE — 92567 TYMPANOMETRY: CPT | Mod: S$GLB,,, | Performed by: AUDIOLOGIST

## 2024-12-11 PROCEDURE — 99214 OFFICE O/P EST MOD 30 MIN: CPT | Mod: 95,,, | Performed by: PHYSICIAN ASSISTANT

## 2024-12-11 PROCEDURE — 3061F NEG MICROALBUMINURIA REV: CPT | Mod: CPTII,S$GLB,, | Performed by: OTOLARYNGOLOGY

## 2024-12-11 PROCEDURE — 3051F HG A1C>EQUAL 7.0%<8.0%: CPT | Mod: CPTII,95,, | Performed by: PHYSICIAN ASSISTANT

## 2024-12-11 PROCEDURE — G2211 COMPLEX E/M VISIT ADD ON: HCPCS | Mod: 95,,, | Performed by: PHYSICIAN ASSISTANT

## 2024-12-11 PROCEDURE — 99205 OFFICE O/P NEW HI 60 MIN: CPT | Mod: S$GLB,,, | Performed by: OTOLARYNGOLOGY

## 2024-12-11 PROCEDURE — 3061F NEG MICROALBUMINURIA REV: CPT | Mod: CPTII,95,, | Performed by: PHYSICIAN ASSISTANT

## 2024-12-11 PROCEDURE — 3066F NEPHROPATHY DOC TX: CPT | Mod: CPTII,95,, | Performed by: PHYSICIAN ASSISTANT

## 2024-12-11 RX ORDER — VENLAFAXINE HYDROCHLORIDE 75 MG/1
75 CAPSULE, EXTENDED RELEASE ORAL DAILY
Qty: 30 CAPSULE | Refills: 2 | Status: SHIPPED | OUTPATIENT
Start: 2024-12-11 | End: 2025-03-11

## 2024-12-11 NOTE — PROGRESS NOTES
Dominic Collazo was seen in the clinic today for an audiological evaluation.  Dominic Collazo reported tinnitus of both ears.    Audiological testing revealed normal hearing sensitivity for the right ear and normal hearing sensitivity for the left ear.  A speech reception threshold was obtained at 15 dBHL for the right ear and at 15 dBHL for the left ear.  Speech discrimination was 100% for the right ear and 100% for the left ear.      Tympanometry testing revealed a Type A tympanogram for the right ear and could not be sealed for the left ear.      Recommendations:  1. Otologic evaluation  2. Annual audiological evaluation  3. Hearing protection when in noise

## 2024-12-11 NOTE — PROGRESS NOTES
45 y/o male presents by self referral for evaluation of tinnitus first noted about 6 months ago. More prominent on right side. Mid high frequency. Has nice speakers/ sound equipment at home - unplugged everything to see if it was coming from that - it was not. No one else could hear it. Feels hearing is okay but admits to some trouble understanding 17 y/o son (gómez) - others have trouble with him too. No head trauma. Interested in  sound/ computer programming. 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 Uncovered concerning issues at work. No real closure yet and no one stepping up to solve or rectify issue. Job has been stressed and now is at risk. 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 One attack recently ended him in ER and then PEC'd for 4 days (all psych meds stopped that he had been on and was given other meds). Got out in 4 days but had unreal experience like out of a book. Since then in June had stroke - facial weakness and left sided weakness - went to ER - had stroke w/u and TPA and w/in min his symptoms nearly all resolved and he felt better. Had CT Head, CTA Head, MRI after. Was told MRI negative stroke. Saw Neurologist since for stroke f/u - no new info gained. May f/u with his neuro dr who took care of prior concussion issue.    Fam hx re: ears - father with HL likely noise related. Mother in 70s started to wear HA.     Has been on Buspar & Wellbutrin for some time. Venlafaxine added 2021 (recent slight increase given recent events/ stressors). Remeron added for sleep. Only takes Xanax occasionally for panic attack.  See mental health specialist for anxiety/ depression.    PMHx, PSHx, Meds, Allergies, SocHx, FamHx reviewed in EPIC    Review of Systems     Constitutional: Positive for fatigue.  Negative for fever.      HENT: Negative for ear discharge,  ear pain, hearing loss, postnasal drip, runny nose, sinus infection, sinus pressure, stuffy nose, trouble swallowing and voice change.      Eyes:  Positive for photophobia. Negative for eye drainage and eye itching.     Respiratory:  Positive for shortness of breath, sleep apnea and snoring. Negative for cough and wheezing.      Cardiovascular:  Positive for foot swelling. Negative for chest pain.     Gastrointestinal:  Positive for constipation. Negative for abdominal pain, acid reflux and heartburn.     Genitourinary: Positive for difficulty urinating.     Musc: Positive for aching muscles, back pain and neck pain. Negative for aching joints.     Skin: Positive for rash.     Allergy: Positive for food allergies and seasonal allergies.     Endocrine: Positive for cold intolerance.     Neurological: Positive for dizziness, headaches, light-headedness and tremors.     Hematologic: Positive for bruises/bleeds easily.     Psychiatric: Positive for decreased concentration, depression, nervous/anxious and sleep disturbance.           PE: AF, RR 18   Gen: male, well nourished, well developed, NAD, cooperative, good historian  Ears:  EAC patent & TM translucent with normal bony landmarks bilaterally  Nose: external nose wnl, nasal septum near midline, inferior turbinates flat medially (surg?) - right inf turb with dry mucus, no visible purulence or polyps  OC/OP:  MMM, tongue protrudes midline, palate raises symmetrically, tonsils absent with appropriate surgical scarring, no erythema or exudate  Neck: supple, no TTP, no LAD or masses  Face:  no TTP, no erythema or flushing  Respiratory: Breathing comfortably without retractions  Skin: facial skin intact without visible lesions or flushing  Lymph: no neck lympadenopathy  Neuro:  facial movement symmetric, speech fluid, gait stable, tongue protrudes midline  Psych: alert & oriented x 3, reasonable, normal affect    Audiogram obtained and reviewed w/ patient and his  wife.  Levels wnl. SRT 15 dB & % bilaterally. Type A tymp on right, CNS left.      Impression:   1. Tinnitus of both ears      right > left      2. Adjustment disorder with mixed anxiety and depressed mood            Discussion and Plan:    Reviewed history, symptoms, exam findings and audiogram.    Reviewed his recent history at length and recommendations for management of tinnitus (some noted in list in AVS). Recent life stressor events certainly may have played a role in tinnitus development. Discussed case with Audiologist - Patient to consider using current ear hearing device with ethan to tune out sounds of tinnitus when needed. Could consider tinnitus masker in the future if process too cumbersome or not successful.    In the least - recommend 6 month follow up with audio given some asymmetry to tinnitus to recheck hearing.     Otherwise, follow up with our clinic for any ear, nose or throat problems ([phone]).         Parts or all of this note were created by voice recognition software; typographical errors in translating may be present.

## 2024-12-11 NOTE — PATIENT INSTRUCTIONS
Reviewed history, symptoms, exam findings and audiogram.    Reviewed his recent history and recommendations for management of tinnitus (some noted below).  Consider using current ear hearing device with ethan to tune out sounds of tinnitus when needed. Could consider tinnitus masker in the future if process too cumbersome or not successful.    In the least - recommend 6 month follow up with audio given some asymmetry to tinnitus to recheck hearing.     Otherwise, follow up with our clinic for any ear, nose or throat problems ([phone]).     Tinnitus management recommendations:  Avoid anxiety or stress, as these stimulate an already sensitive hearing system.  Have adequate rest and avoid fatigue.  Avoid the use of stimulants to the nervous system, including coffee (caffeine), alcohol, and smoking (nicotine).  Sleep with your head propped up in an elevated position. This may usually be accomplished with the use of one or two extra pillows. This lessens head congestion, and tinnitus may become less noticeable.  Be aware that tinnitus is usually more noticeable after retiring for the night and the surroundings are quieter. Any noise in the room, such as a ticking clock or softly playing radio, helps to mask tinnitus and make it less irritating.  Use a tinnitus masker if you find this helpful  Some people benefit by using a hearing aid, if they have hearing loss, as it amplifies outside noise (like masking)  Avoid situations that can further damage hearing (excessive noise), and protect your ears from injury and occupational hazards. Use protective ear wear when appropriate.  Counseling or cognitive behavioral therapy may be beneficial for some people.

## 2024-12-11 NOTE — PROGRESS NOTES
"Outpatient Psychiatry Follow-Up Visit (MD/ADA)    12/11/2024     The patient location is: [place]  The chief complaint leading to consultation is: depression and anxiety    Visit type: audiovisual    Face to Face time with patient: 30 minutes  32 minutes of total time spent on the encounter, which includes face to face time and non-face to face time preparing to see the patient (eg, review of tests), Obtaining and/or reviewing separately obtained history, Documenting clinical information in the electronic or other health record, Independently interpreting results (not separately reported) and communicating results to the patient/family/caregiver, or Care coordination (not separately reported).     Each patient to whom he or she provides medical services by telemedicine is:  (1) informed of the relationship between the physician and patient and the respective role of any other health care provider with respect to management of the patient; and (2) notified that he or she may decline to receive medical services by telemedicine and may withdraw from such care at any time.    Clinical Status of Patient:  Outpatient (Ambulatory)    Chief Complaint:  Dominic Collazo is a 44 y.o. male who presents today for follow-up of depression, anxiety, adjustment problems, and attention problems.  Met with patient.      Interval History and Content of Current Session:  Interim Events/Subjective Report/Content of Current Session:    Pt reports today: "a lot going on. A lot of anxiety with work."    Reports having high anxiety currently. Poor sleep and having nightmares of being evicted if doesn't have income if he gets fired from job at the end of the year.    Pt still on paid leave from work due to medical issues and also pt reports after he confronted company with legal problems they had.     States that [employer] has put him on paid leave until the end of the year, states he is being terminated at end of year on " "12/31/24.    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 States he has been applying to a few jobs.    Mood overall is "not good. Anxious and worried"    Reports sleeping around 4-5 hrs per night, states is having nightmares about losing his home etc due to lack of job.    Patients mood is steady, affect appears mood congruent. Linear and logical, friendly and cooperative, good eye contact.    Denies SI/HI/AVH. Pt reports sleeping well and decreased appetite. Denies side effects of medications.    Pt reports taking medications as prescribed and behaving appropriately during interview today.      Psychotherapy:  Target symptoms: depression, anxiety , work stress  Why chosen therapy is appropriate versus another modality: relevant to diagnosis, patient responds to this modality, evidence based practice  Outcome monitoring methods: self-report, observation  Therapeutic intervention type: insight oriented psychotherapy, supportive psychotherapy  Topics discussed/themes: work stress, stress related to medical comorbidities, building skills sets for symptom management, symptom recognition  The patient's response to the intervention is accepting. The patient's progress toward treatment goals is good.   Duration of intervention: 16 minutes.      Prior visit:    Pt reports today: "overall doing ok, a lot of stuff going on"    Reports since being on remeron mood and anxiety is significantly improved.    Pt still on paid leave from work due to medical issues and also pt reports after he confronted company with legal problems they had.     States that [employer] has put him on paid leave until the end of the year, states he is being terminated at end of year on 12/31/24.    Reports accidentally stopped taking buspar and anxiety increased, added it back and anxiety improved.    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 States he has been applying to a few jobs.    Mood " "overall is "overall doing ok, pretty good most days"    Reports sleeping around 6 hrs per night, takes short nap in afternoon    Patients mood is steady, affect appears mood congruent. Linear and logical, friendly and cooperative, good eye contact.    Denies SI/HI/AVH. Pt reports sleeping well and decreased appetite. Denies side effects of medications.    Pt reports taking medications as prescribed and behaving appropriately during interview today.    Previous medication trials:  Ativan- effective  Seroquel- sedation  Lexapro  Wellbutrin and Buspar- effective,   vistaril- sedation, still had axniety  Prozac - sexual side effects  Cymbalta- Sexual side effects and retrograde ejaculation    Review of Systems     Review of Systems   Constitutional:  Negative for fever and malaise/fatigue.   HENT:  Negative for sore throat.    Eyes:  Negative for photophobia.   Respiratory:  Negative for cough.    Cardiovascular:  Negative for chest pain and palpitations.   Gastrointestinal:  Negative for abdominal pain.   Genitourinary:  Negative for dysuria.   Musculoskeletal:  Negative for myalgias.   Skin:  Negative for rash.   Neurological:  Negative for dizziness, tingling, tremors, sensory change, speech change, focal weakness, seizures, loss of consciousness, weakness and headaches.   Endo/Heme/Allergies:  Does not bruise/bleed easily.   Psychiatric/Behavioral:  Positive for depression. Negative for hallucinations, substance abuse and suicidal ideas. The patient is nervous/anxious and has insomnia.      Psychiatric Review Of Systems - Is patient experiencing or having changes in:  sleep: no  appetite: no  weight: no  energy/anergy: yes  interest/pleasure/anhedonia: yes  somatic symptoms: no  libido: no  anxiety/panic: yes  guilty/hopelessness: no  concentration: no  S.I.B.s/risky behavior: no  Irritability: no  Racing thoughts: no  Impulsive behaviors: no  Paranoia: no  AVH: no     Past Medical, Family and Social History: The " patient's past medical, family and social history have been reviewed and updated as appropriate within the electronic medical record - see encounter notes.    Compliance: yes    Side effects: see above    Risk Parameters:  Patient reports no suicidal ideation  Patient reports no homicidal ideation  Patient reports no self-injurious behavior  Patient reports no violent behavior    Exam (detailed: at least 9 elements; comprehensive: all 15 elements)   Constitutional  Vitals:    There were no vitals filed for this visit.     General:  age appropriate, obese     Musculoskeletal  Muscle Strength/Tone:  not examined   Gait & Station:  THEA due to video visit      Psychiatric  Speech:  no latency; no press   Mood & Affect:  Anxious and depressed  Mood congruent   Thought Process:  normal and logical   Associations:  intact   Thought Content:  normal, no suicidality, no homicidality, delusions, or paranoia   Insight:  intact   Judgement: behavior is adequate to circumstances   Orientation:  grossly intact   Memory: intact for content of interview   Language: grossly intact   Attention Span & Concentration:  able to focus   Fund of Knowledge:  intact and appropriate to age and level of education     Assessment and Diagnosis   Status/Progress: Based on the examination today, the patient's problem(s) is/are inadequately controlled.  New problems have not been presented today.   Co-morbidities are complicating management of the primary condition.  There are no active rule-out diagnoses for this patient at this time.     General Impression:       ICD-10-CM ICD-9-CM   1. MDD (major depressive disorder), recurrent severe, without psychosis  F33.2 296.33   2. Generalized anxiety disorder with panic attacks  F41.1 300.02    F41.0 300.01   3. Insomnia due to other mental disorder  F51.05 300.9    F99 327.02   4. ADHD (attention deficit hyperactivity disorder), combined type  F90.2 314.01       Intervention/Counseling/Treatment Plan    Treatment Plan/Recommendations:   Medication Management: Continue current medications.     Continue wellbutrin xl 150mg daily    Continue remeron 15mg qhs    Continue vitamin D3 otc 2k IU once daily    continue buspar to 20mg bid    Increase Effexor to 225mg mg daily     continue xanax 1mg bid prn anxiety or insomnia    continue ambien to CR 12.5mg qhs insomnia    Continue testosterone therapy as prescribed by other provider    Continue provigil as prescribed by sleep medicine      F/u with PCP and neurology regarding recent dx and tx for TIA.    The risks and benefits of medication were discussed with the patient.  Discussed diagnosis, risks and benefits of proposed treatment above vs alternative treatments vs no treatment, and potential side effects of these treatments. The patient expresses understanding of the above and displays the capacity to agree with this treatment given said understanding. Patient also agrees that, currently, the benefits outweigh the risks and would like to pursue treatment at this time.  Discussed inherent unpredictability of medications in each individual.   Encouraged Patient to keep future appointments.   Take medications as prescribed and abstain from substance abuse.   In the event of an emergency, patient was advised to go to the emergency room      Return to Clinic: 2 weeks      Brandon Burdick PA-C

## 2024-12-16 ENCOUNTER — CLINICAL SUPPORT (OUTPATIENT)
Dept: REHABILITATION | Facility: HOSPITAL | Age: 44
End: 2024-12-16
Attending: PHYSICAL MEDICINE & REHABILITATION
Payer: COMMERCIAL

## 2024-12-16 DIAGNOSIS — R29.3 POOR POSTURE: ICD-10-CM

## 2024-12-16 DIAGNOSIS — R29.898 DECREASED STRENGTH OF TRUNK AND BACK: Primary | ICD-10-CM

## 2024-12-16 PROCEDURE — 97112 NEUROMUSCULAR REEDUCATION: CPT | Mod: CQ

## 2024-12-16 PROCEDURE — 97110 THERAPEUTIC EXERCISES: CPT | Mod: CQ

## 2024-12-16 NOTE — PROGRESS NOTES
"[facility] Treatment      Name: Dominic Oneill Streator  Clinic Number: [id number]    Therapy Diagnosis:   Encounter Diagnoses   Name Primary?    Decreased strength of trunk and back Yes    Poor posture      Physician: Elisha Montelongo, *    Visit Date: 12/16/2024    Physician Orders: PT Eval and Treat  Medical Diagnosis from Referral: M54.2 (ICD-10-CM) - Neck pain  Evaluation Date: 11/20/2024  Authorization Period Expiration: 11/8/2025  Plan of Care Expiration: 1/29/2025  Reassessment Due: 12/20/2024  Visit # / Visits authorized: 3/11  (eval + 10)  MedX Testing:MedX testing visit 2    Time In:10:00 AM   Time Out: 11;00 AM   Total Billable Time: 55 minutes  INSURANCE and OUTCOMES: Fee for Service with FOTO Outcomes 1/3    Precautions:   standard, diabetes, and history of stroke    Pattern of pain determined: 1 RADHA, 5    Subjective   Dominic reports chronic neck and lower back pain. States that he had to miss previous appts due to increased soreness from MedX testing and also had Covid.      Patient reports tolerating previous visit without adverse response  Patient reports their pain to be 6/10 on a 0-10 scale with 0 being no pain and 10 being the worst pain imaginable.  Pain Location: neck. (L) arm      Occupation: coding, works from home  Leisure: rigo   Pt goals:  "learn how to adjust my work space, reduce pain"     Objective     Cervical IM Testing Results:    Initial test 11/27/24   ROM 24-84 deg   Max Peak Torque 195    Min Peak Torque 98    Flex/Ext Ratio 2:1   % below normative data 60%     Outcomes:  Initial score: 36% functional ability   Visit 5 score:  Goal: 54% functional ability       Treatment    Dominic received the treatments listed below:    Dominic received neuromuscular education  to isolate and engage spinal stabilization musculature correctly for motor control and coordination to aid in function and posture for 10 minutes on the Medical Medx Machine.  Patient " "performed MedX dynamic exercise with emphasis on spinal muscular control using pacer throughout  active range of motion. Therapist assisted patient in achieving optimal exertion for neural reeducation and endurance training by using the  Juan Exertion Rating scale, by instructing the patient to aim for mid range of exertion, performing 15-20 repetitions, slowly, correctly,and safely.          12/16/2024    10:49 AM   Health[facility] Therapy - Short   Visit Number 3   VAS Pain Rating 6   Time 5   Retraction in Sitting 10   Retraction with Extension 10   Flexion 2   Rotation 5   Scapular Retraction 20   Cervical Weight 150 lbs   Repetitions 20   Rating of Perceived Exertion 2      therapeutic exercises to develop strength, endurance, ROM, flexibility, posture, and core stabilization for 45 minutes including:    RIS x 10  +UT stretch 2 x 20 sec  Levator scap stretch 20" x 2  Thoracic extension over 1/2 roll x 10  Retraction + extension x 10  Cervical rotation with towel 5" x 5  Scapular retraction with GTB x 20  +Seated thoracic extension w/1/2 foam roll x 10      Peripheral muscle strengthening which included 1 set of 15-20 repetitions at a slow, controlled 10-13 second per rep pace focused on strengthening supporting musculature for improved body mechanics and functional mobility.  Pt and therapist focused on proper form during treatment to ensure optimal strengthening of each targeted muscle group.  Machines were utilized including torso rotation, leg press, hip abd and hip add, leg ext.  Leg curl, triceps, biceps, chest and row added visit 3     manual therapy techniques: Manual traction and Soft tissue Mobilization were applied to the: neck for 00 minutes, including:      cold pack for5 minutes to neck      Home Exercises Provided and Patient Education Provided   Home exercises include: LS stretch, RIS, SOC, thoracic extension, rotation with towel, scapular retraction   Cardio program: visit 5  Lifting education date: " visit 11  Posture/Lumbar roll:  recommended  Fridge Magnet Discharge handout (date given):  Equipment at home/gym membership: no      Education provided:   - cues w/exs  MedX performance  Precor ex performance  HB protocol    Written Home Exercises Provided: yes.  Exercises were reviewed and Dominic was able to demonstrate them prior to the end of the session.  Dominic demonstrated good  understanding of the education provided.     See EMR under Patient Instructions for exercises provided 11/27/2024.    Assessment   Dominic presents to his third healthy back visit reporting chronic neck and lower back pain. Pain level rated as 6/10 currently. He reports having to miss some visits due to soreness after eval and also onset of Covid..  Treatment continued with flexibility, strengthening and neuromuscular reeducation ex's. Added Seated thoracic ext stretch this visit.   He was able to perform ex's with min cues and without increased pain.  Cervical MedX resistance was initiated at 150 in/lbs and he completed reps with a RPE = 2/10. He completed peripheral strengthening ex's without c/o. Will continue per HB protocol and patient tolerance.    Patient is making  progress towards established goals.  Pt will continue to benefit from skilled outpatient physical therapy to address the deficits stated in the impairment chart, provide pt/family education and to maximize pt's level of independence in the home and community environment.     Anticipated Barriers for therapy: co-morbidities  Pt's spiritual, cultural and educational needs considered and pt agreeable to plan of care and goals as stated below:     Goals:   Short term goals: 6 weeks or 10 visits   - Pt will demonstratte increased cervical MedX ROM as measured by med ex by 3 degrees from initial test which results in improved  ROM of neck for ease with ADLs and driving. Appropriate and Ongoing  - Pt will demonstrate independence with reducing or controlling symptoms  with ther ex, movement, or position independently, able to reduce pain 1-2 points on pain scale using strategies taught in therapy.  Appropriate and Ongoing  - Pt will demonstrate increased MedX average isometric strength value by 25% with  when compared to the initial testing resulting in improved ability to perform bending, lifting, and carrying activities safely and confidently. Appropriate and Ongoing     Long term goals: 10 weeks or 20 visits  - Pt will demonstratte increased cervical MedX ROM as measured by med ex by 6 degrees from initial test which results in functional ROM of neck for ease with ADLs and driving.  Appropriate and Ongoing  - Pt will demonstrate increased MedX average isometric strength value  by 45% from initial test to improve ability to lift and carry, and sustain good posture while performing ADL's. Appropriate and Ongoing  - Pt will demonstrate reduced pain and improved functional outcomes as reported on the FOTO by reaching an intake score of >/= 54% functional ability in order to demonstrate subjective improvement in patient's condition. . Appropriate and Ongoing  - Pt will demonstrate independence with reducing or controlling symptoms with ther ex, movement, or position independently, able to reduce pain 2-4 points on pain scale using strategies taught in therapy. Appropriate and Ongoing  - Pt will demonstrate independence with the HEP at discharge. Appropriate and Ongoing  - Pt will demonstrate improved body mechanics and ergonomic awareness to reduce pain to less than 2/10 during work day (patient goal)  Appropriate and Ongoing    Plan   Continue with established Plan of Care towards established PT goals.     Therapist: Emmanuel Moore, PTA  12/16/2024

## 2024-12-18 ENCOUNTER — PATIENT MESSAGE (OUTPATIENT)
Dept: SPINE | Facility: CLINIC | Age: 44
End: 2024-12-18
Payer: COMMERCIAL

## 2024-12-18 ENCOUNTER — DOCUMENTATION ONLY (OUTPATIENT)
Dept: REHABILITATION | Facility: HOSPITAL | Age: 44
End: 2024-12-18
Payer: COMMERCIAL

## 2024-12-18 ENCOUNTER — PATIENT MESSAGE (OUTPATIENT)
Dept: ORTHOPEDICS | Facility: CLINIC | Age: 44
End: 2024-12-18
Payer: COMMERCIAL

## 2024-12-18 DIAGNOSIS — M53.3 SI (SACROILIAC) JOINT DYSFUNCTION: Primary | ICD-10-CM
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
## 2024-12-19 ENCOUNTER — TELEPHONE (OUTPATIENT)
Dept: PAIN MEDICINE | Facility: CLINIC | Age: 44
End: 2024-12-19
Payer: COMMERCIAL

## 2024-12-19 DIAGNOSIS — M46.1 SACROILIITIS: Primary | ICD-10-CM

## 2024-12-19 NOTE — TELEPHONE ENCOUNTER
----- Message from Benitez Aviles MD sent at 2024  4:49 PM CST -----  Regarding: Order for SELWYN OLEA    Patient Name: SELWYN OLEA([id number])  Sex: Male  : 1980      PCP: WADE TAYLOR    Center: [facility]     Types of orders made on 2024: Procedure Request    Order Date:2024  Ordering User:BENITEZ AVILES [[id number]]  Encounter Provider:Benitez Aviles MD [[id number]]  Authorizing Provider: Benitez Aviles MD [[id number]]  Department:[facility] BACK AND SPINE[[id number]]    Common Order Information  Procedure -> Sacroiliac Injection (Specify laterality) Cmt: left    Pre-op Diagnosis -> SI (sacroiliac) joint dysfunction     Order Specific Information  Order: Procedure Order to Pain Management [Custom: [id number]]  Order #:          [id number]: 1 FUTURE    Priority: Routine  Class: Clinic Performed    Future Order Information      Expires on:2025            Expected by:2024                   Associated Diagnoses      M53.3 SI (sacroiliac) joint dysfunction      Is patient on anti-coagulants? -> No         Facility Name: -> [facility]           Priority: Routine  Class: Clinic Performed    Future Order Information      Expires on:2025            Expected by:2024                   Associated Diagnoses      M53.3 SI (sacroiliac) joint dysfunction      Procedure -> Sacroiliac Injection (Specify laterality) Cmt: left        Is patient on anti-coagulants? -> No         Pre-op Diagnosis -> SI (sacroiliac) joint dysfunction         Facility Name: -> [facility]

## 2024-12-19 NOTE — TELEPHONE ENCOUNTER
----- Message from Benitez Aviles MD sent at 2024  4:49 PM CST -----  Regarding: Order for SELWNY OLEA    Patient Name: SELWYN OLEA([id number])  Sex: Male  : 1980      PCP: WADE TAYLOR    Center: [facility]     Types of orders made on 2024: Procedure Request    Order Date:2024  Ordering User:BENITEZ AVILES [[id number]]  Encounter Provider:Benitez Aviles MD [[id number]]  Authorizing Provider: Benitez Aviles MD [[id number]]  Department:[facility] BACK AND SPINE[[id number]]    Common Order Information  Procedure -> Sacroiliac Injection (Specify laterality) Cmt: left    Pre-op Diagnosis -> SI (sacroiliac) joint dysfunction     Order Specific Information  Order: Procedure Order to Pain Management [Custom: [id number]]  Order #:          [id number]: 1 FUTURE    Priority: Routine  Class: Clinic Performed    Future Order Information      Expires on:2025            Expected by:2024                   Associated Diagnoses      M53.3 SI (sacroiliac) joint dysfunction      Is patient on anti-coagulants? -> No         Facility Name: -> [facility]           Priority: Routine  Class: Clinic Performed    Future Order Information      Expires on:2025            Expected by:2024                   Associated Diagnoses      M53.3 SI (sacroiliac) joint dysfunction      Procedure -> Sacroiliac Injection (Specify laterality) Cmt: left        Is patient on anti-coagulants? -> No         Pre-op Diagnosis -> SI (sacroiliac) joint dysfunction         Facility Name: -> [facility]

## 2024-12-26 DIAGNOSIS — M54.2 NECK PAIN: ICD-10-CM

## 2024-12-26 RX ORDER — NAPROXEN SODIUM 220 MG/1
81 TABLET, FILM COATED ORAL DAILY
Qty: 30 TABLET | Refills: 11 | OUTPATIENT
Start: 2024-12-26 | End: 2025-12-26

## 2024-12-26 RX ORDER — METHOCARBAMOL 750 MG/1
TABLET, FILM COATED ORAL
Qty: 40 TABLET | Refills: 3 | Status: SHIPPED | OUTPATIENT
Start: 2024-12-26

## 2024-12-26 NOTE — TELEPHONE ENCOUNTER
No care due was identified.  [facility] Embedded Care Due Messages. Reference number: [id number].   12/26/2024 10:12:52 AM CST

## 2024-12-26 NOTE — TELEPHONE ENCOUNTER
Refill Routing Note   Medication(s) are not appropriate for processing by [facility] for the following reason(s):        Outside of protocol    [facility] action(s):  Route               Appointments  past 12m or future 3m with PCP    Date Provider   Last Visit   8/15/2024 Arjun Cross MD   Next Visit   Visit date not found Arjun Cross MD   ED visits in past 90 days: 0        Note composed:10:22 AM 12/26/2024

## 2025-01-03 ENCOUNTER — TELEPHONE (OUTPATIENT)
Dept: PAIN MEDICINE | Facility: CLINIC | Age: 45
End: 2025-01-03
Payer: COMMERCIAL

## 2025-01-06 ENCOUNTER — OFFICE VISIT (OUTPATIENT)
Dept: SLEEP MEDICINE | Facility: CLINIC | Age: 45
End: 2025-01-06
Payer: COMMERCIAL

## 2025-01-06 DIAGNOSIS — G47.10 HYPERSOMNOLENCE: Primary | ICD-10-CM

## 2025-01-06 DIAGNOSIS — G47.33 OSA (OBSTRUCTIVE SLEEP APNEA): ICD-10-CM

## 2025-01-06 NOTE — PROGRESS NOTES
The patient location is: [place]  The chief complaint leading to consultation is: trouble with sleep    Visit type: audiovisual    15 minutes of total time spent on the encounter, which includes face to face time and non-face to face time preparing to see the patient (eg, review of tests), Obtaining and/or reviewing separately obtained history, Documenting clinical information in the electronic or other health record, Independently interpreting results (not separately reported) and communicating results to the patient/family/caregiver, or Care coordination (not separately reported).     Each patient to whom he or she provides medical services by telemedicine is:  (1) informed of the relationship between the physician and patient and the respective role of any other health care provider with respect to management of the patient; and (2) notified that he or she may decline to receive medical services by telemedicine and may withdraw from such care at any time.    ESTABLISHED PATIENT VISIT     Dominic Collazo  is a pleasant 44 y.o. male  established with the [facility] sleep medicine clinic    Here today for: follow-up    Since last visit:   See interval history in table below    Past Medical History:   Diagnosis Date    Acute ischemic stroke 6/10/2024    Adjustment disorder with mixed anxiety and depressed mood     Anxiety     Colon polyp     Depression     ED (erectile dysfunction)     Family history of prostate cancer 9/29/2014    Hx of umbilical hernia repair 10/16/2020    Hyperlipidemia     Hypertension     Hypogonadism male     Insomnia     Low testosterone     LYNDA (obstructive sleep apnea)     Prediabetes     Type 2 diabetes mellitus with hyperglycemia, without long-term current use of insulin 6/18/2024     Patient Active Problem List   Diagnosis    Hypertension associated with type 2 diabetes mellitus    Hyperlipidemia associated with type 2 diabetes mellitus    Adjustment disorder with mixed anxiety  and depressed mood    LYNDA on CPAP    Acromioclavicular joint separation, type 1    Left wrist sprain    Trauma    Mid back pain    Decreased ROM of wrist    Acute left-sided weakness    Chronic pain    Tenosynovitis, de Quervain    Morbid obesity    Thoracic back pain    Erectile dysfunction due to arterial insufficiency    Male hypogonadism    NAFLD (nonalcoholic fatty liver disease)    Elevated liver enzymes    Panic attack    Insomnia due to other mental disorder    Adjustment insomnia    BPH with urinary obstruction    Shoulder pain    Neck pain    Decreased range of motion of left shoulder    BMI 40.0-44.9, adult    Other specified persistent mood disorders    Concussion with loss of consciousness    Post concussion syndrome    Deficient smooth pursuit eye movements    Saccadic eye movement deficiency    Convergence insufficiency    Cognitive communication deficit    Paralabral cyst of left shoulder    Superior glenoid labrum lesion of left shoulder    Impaired range of motion of left shoulder    Impaired strength of upper extremity    Impaired function of upper extremity    ADHD (attention deficit hyperactivity disorder), combined type    Long-term current use of testosterone replacement therapy    Vitamin D insufficiency    Generalized anxiety disorder with panic attacks    MDD (major depressive disorder), recurrent episode, moderate    Type 2 diabetes mellitus with hyperglycemia, without long-term current use of insulin    MDD (major depressive disorder), recurrent severe, without psychosis    Decreased strength of trunk and back    Poor posture       Current Outpatient Medications:     acetaminophen (TYLENOL) 325 MG tablet, Take 2 tablets (650 mg total) by mouth every 6 (six) hours as needed for Pain., Disp: 30 tablet, Rfl: 0    ALPRAZolam (XANAX) 1 MG tablet, Take 1 tablet (1 mg total) by mouth 2 (two) times daily as needed for Anxiety., Disp: 60 tablet, Rfl: 2    aspirin 81 MG Chew, CHEW 1 tablet (81 mg  total) by mouth once daily., Disp: 30 tablet, Rfl: 11    atorvastatin (LIPITOR) 40 MG tablet, Take 1 tablet (40 mg total) by mouth once daily., Disp: 90 tablet, Rfl: 3    blood sugar diagnostic (ONETOUCH VERIO TEST STRIPS) Strp, 1 each by Misc.(Non-Drug; Combo Route) route 3 (three) times daily., Disp: 100 each, Rfl: 11    blood-glucose meter kit, To check BG 2 times daily, to use with insurance preferred meter, Disp: 1 each, Rfl: 0    buPROPion (WELLBUTRIN XL) 150 MG TB24 tablet, Take 1 tablet (150 mg total) by mouth once daily., Disp: 90 tablet, Rfl: 1    busPIRone (BUSPAR) 10 MG tablet, Take 2 tablets (20 mg total) by mouth 2 (two) times daily., Disp: 180 tablet, Rfl: 1    cetirizine HCl (ZYRTEC ORAL), Take 1 tablet by mouth daily as needed., Disp: , Rfl:     clopidogreL (PLAVIX) 75 mg tablet, Take 1 tablet (75 mg total) by mouth once daily., Disp: 21 tablet, Rfl: 0    cyanocobalamin (VITAMIN B-12) 1000 MCG tablet, Take 100 mcg by mouth., Disp: , Rfl:     ergocalciferol, vitamin D2, (VITAMIN D ORAL), Take 5,000 Units by mouth once daily., Disp: , Rfl:     iron,carb/vit C/vit B12/folic (IRON 100 PLUS ORAL), Take by mouth once daily at 6am., Disp: , Rfl:     lancets (ONETOUCH DELICA PLUS LANCET) 33 gauge Misc, Test BG 2 times a day, Disp: 200 each, Rfl: 3    losartan (COZAAR) 25 MG tablet, Take 1 tablet (25 mg total) by mouth once daily., Disp: 90 tablet, Rfl: 3    metFORMIN (GLUCOPHAGE-XR) 500 MG ER 24hr tablet, Take 1 tablet (500 mg total) by mouth 2 (two) times daily with meals., Disp: 180 tablet, Rfl: 3    methocarbamoL (ROBAXIN) 750 MG Tab, TAKE 1 TABLET(750 MG) BY MOUTH FOUR TIMES DAILY AS NEEDED FOR MUSCLE STRAIN, Disp: 40 tablet, Rfl: 3    metoprolol succinate (TOPROL-XL) 100 MG 24 hr tablet, Take 1 tablet (100 mg total) by mouth once daily., Disp: 90 tablet, Rfl: 3    mirtazapine (REMERON) 15 MG tablet, Take 1 tablet (15 mg total) by mouth every evening., Disp: 90 tablet, Rfl: 1    modafiniL (PROVIGIL) 200  "MG Tab, Take 1 tablet (200 mg total) by mouth 2 (two) times daily as needed (sleepiness). First dose upon awakening and second dose 4-6 hours later, Disp: 60 tablet, Rfl: 5    multivitamin capsule, Take 1 capsule by mouth once daily., Disp: , Rfl:     needle, disp, 18 G (BD REGULAR BEVEL NEEDLES) 18 gauge x 1" Ndle, 1 each by Misc.(Non-Drug; Combo Route) route once a week. Use to draw up testosterone, Disp: 12 each, Rfl: 4    ondansetron (ZOFRAN-ODT) 4 MG TbDL, Take 1 tablet (4 mg total) by mouth every 6 (six) hours as needed (nausea)., Disp: 12 tablet, Rfl: 0    riboflavin, vitamin B2, (VITAMIN B-2 ORAL), Take 1 tablet by mouth once daily., Disp: , Rfl:     scopolamine (TRANSDERM-SCOP) 1.3-1.5 mg (1 mg over 3 days), Place 1 patch onto the skin every 72 hours., Disp: 4 patch, Rfl: 0    semaglutide (OZEMPIC) 2 mg/dose (8 mg/3 mL) PnIj, Inject 2 mg into the skin every 7 days., Disp: 9 mL, Rfl: 3    syringe with needle 3 mL 21 gauge x 1" Syrg, 1 each by Misc.(Non-Drug; Combo Route) route once a week. Use to inject testosterone, Disp: 12 each, Rfl: 4    testosterone cypionate (DEPOTESTOTERONE CYPIONATE) 200 mg/mL injection, Inject 1 mL (200 mg total) into the muscle every 7 days., Disp: 5 mL, Rfl: 3    tiZANidine (ZANAFLEX) 4 MG tablet, Take 1 tablet (4 mg total) by mouth nightly as needed (Muscle pain)., Disp: 30 tablet, Rfl: 5    venlafaxine (EFFEXOR-XR) 150 MG Cp24, Take 1 capsule (150 mg total) by mouth once daily., Disp: 90 capsule, Rfl: 1    venlafaxine (EFFEXOR-XR) 75 MG 24 hr capsule, Take 1 capsule (75 mg total) by mouth once daily. Take along with 150mg capsule for total daily dosage of 225mg., Disp: 30 capsule, Rfl: 2    zolpidem (AMBIEN CR) 12.5 MG CR tablet, Take 1 tablet (12.5 mg total) by mouth nightly as needed for Insomnia., Disp: 30 tablet, Rfl: 3  No current facility-administered medications for this visit.    Facility-Administered Medications Ordered in Other Visits:     lactated ringers infusion, , " Intravenous, Continuous, Stan Marcos DNP    LIDOcaine (PF) 10 mg/ml (1%) injection 10 mg, 1 mL, Intradermal, Once, Stan Marcos DNP       There were no vitals filed for this visit.    Physical Exam:    GEN:   Well-appearing  Psych:  Appropriate affect, demonstrates insight  SKIN:  No rash on the face or bridge of the nose    LABS:  Lab Results   Component Value Date    HGB 13.8 (L) 10/21/2024    CO2 19 (L) 10/21/2024         RECORDS REVIEWED PREVIOUSLY:    PSG Dec 26,2009: AHi 15.7  CPAP12.28.2009: rx 8cwp  BiPAP 10.27.2011: bipap 16/12    Downloads:    10.13.22: AUto Bilevel 10-25 (PS 4-8) 89/90 x 8hr 33min,   (I x/15/18.7, E x/10/12.2), Leak 1hr 33min, AHI 1.1  6/30/24: 28/30 x 8h 11min, V auto capri 10 (10/10.5/11.1)  ps 4 max 20, leak 3/45/91, AHI 1.6      Sig PMH: chronic pain, HTN, ED, BMI elevated.  PROBLEM DESCRIPTION/ Sx on Presentation Interval Hx STATUS PLAN   LYNDA     Moderate severity    PAP history   Dx Study    Mask Nasal pillows (likes, but high leaks)  Tried dreamwear nasal  Nasal mask (skin sensitivity)  Tried FFM (sensitive skin)   DME HME (having trouble)  Will send to [facility]   My Air    CPAP age [age]   PAP altn    Benefits    PROBS Severe dry mouth          LOV 7.1.24 Chance    1.4.25: 24/30 x 0b11led, V auto Capri 8, ps 4, max 16, leak 9/51/111,  AHI 6 (o 2.2, u 3.7)    Increased events with lower pressures    Feels he is doing better since went back to nasal pillows    1.5.25: 3/3 7h 42min, V auto capri 8, ps 4, ipap 16, leak 1/42/106, AHI 3.4     partially controlled         PAP PLAN   E min 10 cwp (decr to 8)   I max 20 cwp (16)   PS/epr 4    RAMP    Other -discussed nasal mask   Altn.    Pressure changes          The patient is using and benefitting from PAP therapy      CHECKOUT   Recall yearly or sooner if problems arise      DME Pt establishing with [facility], pls send orders, prior sleep study and my ofice note   Other         Sleepiness     SLEEPINESS   Duration    Testing    Usual  TST    Max TST    H/H Hx    SP    sleep atx    sleep Inert.    Naps    Cataplexy    ESS (intake) 13/24   Meds prior Modafinil 200 prn (did not last)  Sunosi 150mg (helps some)  Armodafinil 250 (helps some)  Vyvanse 40mg (paused after TIA)  Dextrostat 10 (rarely used)     Meds now Modafinil 200mg prn  Occasional 200mg in afternoon        Modafinil helping with sleepiness       stable     CONTINUE:  -modafinil 200mg  BID             Insomnia     SLEEP SCHEDULE   Duration    Wind- down    Envmnt    CBTi offered   Meds prior    Meds now Xanax or   ambien CR 12.5  per psych   Bed Time 11P   Lights out    Latency Can take awhile    Arousals 3-5   Back to sleep    Stim. ctrl    Wake time 6:15A (9A)   Caffeine    Naps yes   Nocturia 1-2   Work Education- [employer]    PAP: waking less    Some trouble falling asleep    Waking 3-5 x per ight        Still having trouble falling asleep     stable         Hypnotics per psych   Other issues:

## 2025-01-07 NOTE — DISCHARGE INSTRUCTIONS
[facility]  Dr. Jean Marie Benitez  Messaging service # [phone]    POST-PROCEDURE INSTRUCTIONS:  HELPFUL TIPS:    Except for block procedures (medial branch blocks, genicular blocks, hip/shoulder blocks), most procedures take about 2 weeks to start seeing the full effect toward your pain.  If you feel like the pain relief goes away after a day, please wait up to 2 weeks to decide if the procedure provided you any relief    If you had a block procedure (medial branch blocks, genicular blocks, hip/shoulder blocks), you MUST submit your pain diary and percentage relief scores to proceed with the next block and/or procedure.  Please submit the diary/percentages through the [facility] Portal OR you can call [phone] ([employer]) OR [phone] ([employer]) during clinic hours (Monday - Friday, 8AM - 5PM)    Follow up in 2 weeks with either the provider that suggested this procedure OR with Dr. Benitez (including his team members at [facility]).  You may already have a follow up appointment scheduled.  If not, you may make an appointment through the [facility] or you can contact the office that suggested your procedure.  If you would like to make an appointment with Dr. Benitez, you may do so through the [facility] Portal OR you can call [phone] ([employer]) OR [phone] ([employer]) during clinic hours (Monday - Friday, 8AM - 5PM).      What you need to do:    Keep a record of your response to the injection you had today.    How much relief did you get?   When did the relief start and how long did it last?  Were you able to decrease the use of any of your pain medications?  Were you able to increase your level of activity?  How long did the relief last?    What to watch out for:    If you experience any of the following symptoms after your procedure, please notify the messaging service immediately (see above for contact information):   fever (increased oral temperature)   bleeding or  swelling at the injection site,    drainage, rash or redness at the injection site    possible signs of infection    increased pain at the injection site   worsening of your usual pain   severe headache   new or worsening numbness    new arm and/or leg weakness, or    changes in bowel and/or bladder function: urinating or defecating on yourself and not knowing that you did it.    PLEASE FOLLOW ALL INSTRUCTIONS CAREFULLY     Do not engage in strenuous activity (e.g., lifting or pushing heavy objects or repeated bending) for 24 hours.     Do not take a bath, swim or use Jacuzzi for 24 hours after procedure. (A shower is fine).   Remove any Band-Aids when you get home.    Use cold/ice, as needed for comfort.  We recommend the use of cold therapy alternating on for 20 minutes, off for 20 minutes.    Do not apply direct heat (heating pad or heat packs) to the injection site for 24 hours.     Resume your usual medications, unless instructed otherwise by your Pain Physician.     If you are on warfarin (Coumadin) or other blood thinner, resume this medication as instructed by your prescribing Physician.    IF AT ANY POINT YOU ARE VERY CONCERNED ABOUT YOUR SYMPTOMS, PLEASE GO TO THE EMERGENCY ROOM.    If you develop worsening pain, weakness, numbness, lose bowel or bladder control (i.e., having an accident where you did not even know you had to go to the bathroom and suddenly noticed you soiled yourself), saddle anesthesia (a loss of sensation restricted to the area of the buttocks, anus and between the legs -- i.e., those parts of your body that would touch a saddle if you were sitting on one) you need to go immediately to the emergency department for evaluation and treatment.    ----------------------------------------------------------------------------------------------------------------------------------------------------------------  If you received Sedation please read the following instructions:  POST SEDATION  INSTRUCTIONS    Today you received intravenous medication (also known as sedation) that was used to help you relax and/or decrease discomfort during your procedure. This medication will be acting in your body for the next 24 hours, so you might feel a little tired or sleepy. This feeling will slowly wear off.   Common side effects associated with these medications include: drowsiness, dizziness, sleepiness, confusion, feeling excited, difficulty remembering things, lack of steadiness with walking or balance, loss of fine muscle control, slowed reflexes, difficulty focusing, and blurred vision.  Some over-the-counter and prescription medications (e.g., muscle relaxants, opioids, mood-altering medications, sedatives/hypnotics, antihistamines) can interact with the intravenous medication you received and cause an increased risk of the side effects listed above in addition to other potentially life threatening side effects. Use extreme caution if you are taking such medications, and consult with your Pain Physician or prescribing physician if you have any questions.  For the next 12-24 hours:    DO NOT--Drive a car, operate machinery or power tools   DO NOT--Drink any alcoholic beverages (not even beer), they may dangerously increase the risk of side effects.    DO NOT--Make any important legal or business decisions or sign important documents.  We advise you to have someone to assist you at home. Move slowly and carefully. Do not make sudden changes in position. Be aware of dizziness or light-headedness and move accordingly.   If you seek medical treatment within 24 hours, let the nurse or doctor caring for you know that you have received the above medications. If you have any questions or concerns related to your sedation or treatment today please contact us.

## 2025-01-08 NOTE — PRE-PROCEDURE INSTRUCTIONS
Unable to reach patient via phone. Left message with pre procedure instructions and arrival time. The following was sent to pt portal.     Dear Dominic     You are scheduled for your procedure on 1/10/2024 with Dr. Benitez. Please report to [facility] ([place])   [address]     Park in the front lot of the building and enter at the main entrance. Proceed to the second floor for registration.     Arrival time: 6:00 AM     Anesthesia fasting instructions:   IV sedation. You should not eat for 8 hours and can only drink clear liquids (water or black coffee without cream/sugar) up until 2 hours before your scheduled time.  You CANNOT drive yourself and must have a .     Please ensure that you have planned your ride home.      The morning of your procedure please HOLD the following:      Vitamins and Supplements   Aspirin and NSAIDS (Ibuprofen, Mobic, Aleve etc.)  Diabetic medication (Metformin, etc)  Stimulants (Adderall, Vyvanse etc)   Diuretic Medication (Lasix, Dyazide, Aldactone etc)     *Please follow any additional medication instructions received     Shower the night before and the morning of your procedure with antibacterial soap (example: Dial)      Do not apply any products to your skin or hair after showering such as lotions, oils, ointments, creams, powders, lotion, deodorant, make-up, perfumes, etc.     Please leave all jewelry and valuables at home.  Wear loose comfortable clothes. All patients will be placed in a gown.        *If you start to feel sick (fever, chills, coughing, sore throat, etc.)  begin taking any antibiotics or steroid, please contact your doctor's office at [phone] prior to coming to the facility.         Best Wishes,  Pre Admit Testing

## 2025-01-10 ENCOUNTER — HOSPITAL ENCOUNTER (OUTPATIENT)
Facility: HOSPITAL | Age: 45
Discharge: HOME | End: 2025-01-10
Attending: STUDENT IN AN ORGANIZED HEALTH CARE EDUCATION/TRAINING PROGRAM | Admitting: STUDENT IN AN ORGANIZED HEALTH CARE EDUCATION/TRAINING PROGRAM
Payer: COMMERCIAL

## 2025-01-10 VITALS
DIASTOLIC BLOOD PRESSURE: 76 MMHG | WEIGHT: 315 LBS | TEMPERATURE: 98 F | OXYGEN SATURATION: 93 % | SYSTOLIC BLOOD PRESSURE: 131 MMHG | BODY MASS INDEX: 39.17 KG/M2 | RESPIRATION RATE: 16 BRPM | HEART RATE: 88 BPM | HEIGHT: 75 IN

## 2025-01-10 DIAGNOSIS — M46.1 SACROILIITIS: Primary | ICD-10-CM

## 2025-01-10 DIAGNOSIS — G89.29 CHRONIC PAIN: ICD-10-CM

## 2025-01-10 LAB — POCT GLUCOSE: 140 MG/DL (ref 70–110)

## 2025-01-10 PROCEDURE — 82962 GLUCOSE BLOOD TEST: CPT | Performed by: STUDENT IN AN ORGANIZED HEALTH CARE EDUCATION/TRAINING PROGRAM

## 2025-01-10 PROCEDURE — 27096 INJECT SACROILIAC JOINT: CPT | Mod: LT,,, | Performed by: STUDENT IN AN ORGANIZED HEALTH CARE EDUCATION/TRAINING PROGRAM

## 2025-01-10 PROCEDURE — 63600175 PHARM REV CODE 636 W HCPCS: Performed by: STUDENT IN AN ORGANIZED HEALTH CARE EDUCATION/TRAINING PROGRAM

## 2025-01-10 PROCEDURE — 25500020 PHARM REV CODE 255: Performed by: STUDENT IN AN ORGANIZED HEALTH CARE EDUCATION/TRAINING PROGRAM

## 2025-01-10 PROCEDURE — 27096 INJECT SACROILIAC JOINT: CPT | Mod: LT | Performed by: STUDENT IN AN ORGANIZED HEALTH CARE EDUCATION/TRAINING PROGRAM

## 2025-01-10 RX ORDER — BUPIVACAINE HYDROCHLORIDE 2.5 MG/ML
INJECTION, SOLUTION EPIDURAL; INFILTRATION; INTRACAUDAL
Status: DISCONTINUED | OUTPATIENT
Start: 2025-01-10 | End: 2025-01-10 | Stop reason: HOSPADM

## 2025-01-10 RX ORDER — SODIUM CHLORIDE 9 MG/ML
500 INJECTION, SOLUTION INTRAVENOUS CONTINUOUS
Status: DISCONTINUED | OUTPATIENT
Start: 2025-01-10 | End: 2025-01-10 | Stop reason: HOSPADM

## 2025-01-10 RX ORDER — ALPRAZOLAM 0.5 MG/1
0.5 TABLET, ORALLY DISINTEGRATING ORAL ONCE AS NEEDED
Status: DISCONTINUED | OUTPATIENT
Start: 2025-01-10 | End: 2025-01-10 | Stop reason: HOSPADM

## 2025-01-10 RX ORDER — LIDOCAINE HYDROCHLORIDE 10 MG/ML
1 INJECTION, SOLUTION EPIDURAL; INFILTRATION; INTRACAUDAL; PERINEURAL ONCE
Status: DISCONTINUED | OUTPATIENT
Start: 2025-01-10 | End: 2025-01-10 | Stop reason: HOSPADM

## 2025-01-10 RX ORDER — TRIAMCINOLONE ACETONIDE 40 MG/ML
INJECTION, SUSPENSION INTRA-ARTICULAR; INTRAMUSCULAR
Status: DISCONTINUED | OUTPATIENT
Start: 2025-01-10 | End: 2025-01-10 | Stop reason: HOSPADM

## 2025-01-10 RX ORDER — LIDOCAINE HYDROCHLORIDE 20 MG/ML
INJECTION, SOLUTION EPIDURAL; INFILTRATION; INTRACAUDAL; PERINEURAL
Status: DISCONTINUED | OUTPATIENT
Start: 2025-01-10 | End: 2025-01-10 | Stop reason: HOSPADM

## 2025-01-10 RX ORDER — FENTANYL CITRATE 50 UG/ML
INJECTION, SOLUTION INTRAMUSCULAR; INTRAVENOUS
Status: DISCONTINUED | OUTPATIENT
Start: 2025-01-10 | End: 2025-01-10 | Stop reason: HOSPADM

## 2025-01-10 RX ORDER — MIDAZOLAM HYDROCHLORIDE 1 MG/ML
INJECTION INTRAMUSCULAR; INTRAVENOUS
Status: DISCONTINUED | OUTPATIENT
Start: 2025-01-10 | End: 2025-01-10 | Stop reason: HOSPADM

## 2025-01-10 NOTE — H&P
HPI  Dominic Collazo presents for Procedure(s):  Left SI Joint injection    Recent anticoagulation/antiplatelets reviewed and verified with nursing.  Recent antibiotics/recent infections reviewed and verified with nursing.    Past Medical History:   Diagnosis Date    Acute ischemic stroke 6/10/2024    Adjustment disorder with mixed anxiety and depressed mood     Anxiety     Colon polyp     Depression     ED (erectile dysfunction)     Family history of prostate cancer 9/29/2014    Hx of umbilical hernia repair 10/16/2020    Hyperlipidemia     Hypertension     Hypogonadism male     Insomnia     Low testosterone     LYNDA (obstructive sleep apnea)     Prediabetes     Type 2 diabetes mellitus with hyperglycemia, without long-term current use of insulin 6/18/2024     Past Surgical History:   Procedure Laterality Date    ADENOIDECTOMY      COLONOSCOPY N/A 12/5/2023    Procedure: COLONOSCOPY;  Surgeon: Jonnathan Velazquez MD;  [facility]: [facility] (4TH FLR);  Service: Colon and Rectal;  Laterality: N/A;  inst. to pt. portal. Takes Ozempic inst. to hold.Tbou  referral: Dr. Cross  11/28-lvm for precall-MS  12/4-last dose Ozempic 11/26/23, precall complete-KPvt    HERNIA REPAIR      REPAIR OF INCARCERATED UMBILICAL HERNIA N/A 10/16/2020    Procedure: REPAIR, HERNIA, UMBILICAL, INCARCERATED, AGE 5 YEARS OR OLDER with mesh ;  Surgeon: Conrad Loco MD;  Location: [facility] OR 2ND FLR;  Service: General;  Laterality: N/A;    REPAIR OF LABRUM OF HIP Left 12/14/2023    Procedure: REPAIR,LABRUM,SHOULDER;  Surgeon: Sea Valadez MD;  Location: [facility] OR;  Service: Orthopedics;  Laterality: Left;    SHOULDER ARTHROSCOPY W/ SUPERIOR LABRAL ANTERIOR POSTERIOR LESION REPAIR Left 12/14/2023    Procedure: ARTHROSCOPY, SHOULDER,;  Surgeon: Sea Valadez MD;  Location: [facility] OR;  Service: Orthopedics;  Laterality: Left;  Arthrex knotless suturetak labral anchors, long plastic cannulas, arthroscopic elevators/rasps  "marissa notified cc    TONSILLECTOMY       Review of patient's allergies indicates:   Allergen Reactions    Ciprofloxacin Swelling     swelling    Penicillins Rash    Sulfa (sulfonamide antibiotics) Rash and Hives    Toradol [ketorolac] Nausea Only    Bell pepper Nausea Only    Meloxicam Nausea Only    Penicillin v potassium Hives    Sulfamethoxazole-trimethoprim         PMHx, PSHx, Allergies, Medications reviewed in epic      ROS negative except pain complaints in HPI    OBJECTIVE:    /77   Pulse 82   Temp 97.9 °F (36.6 °C) (Temporal)   Resp 18   Ht 6' 3" (1.905 m)   Wt (!) 158.8 kg (350 lb)   SpO2 95%   BMI 43.75 kg/m²     PHYSICAL EXAMINATION:    GENERAL: Well appearing, in no acute distress, alert and oriented to name, situation, location.  PSYCH:  Mood and affect appropriate.  SKIN: Skin color, texture, turgor normal, no rashes or lesions at site of procedure.  CV: regular rate with palpation of the radial artery.  PULM: No evidence of respiratory difficulty, symmetric chest rise. No audible abnormal respiratory sounds.  NEURO: Cranial nerves grossly intact.    Plan:    Proceed with procedure as planned    Jean Marie Benitez  01/10/2025    "

## 2025-01-10 NOTE — DISCHARGE SUMMARY
Discharge Note  Short Stay      SUMMARY     Admit Date: 1/10/2025    Attending Physician: Jean Marie Bentiez MD PhD    Discharge Physician: Jean Marie Benitez      Discharge Date: 1/10/2025 7:36 AM    Procedure(s) (LRB):  Left SI Joint injection (Left)    Final Diagnosis: Sacroiliitis [M46.1]    Disposition: Home or self care    Patient Instructions:   Current Discharge Medication List        CONTINUE these medications which have NOT CHANGED    Details   ALPRAZolam (XANAX) 1 MG tablet Take 1 tablet (1 mg total) by mouth 2 (two) times daily as needed for Anxiety.  Qty: 60 tablet, Refills: 2    Associated Diagnoses: Generalized anxiety disorder with panic attacks      buPROPion (WELLBUTRIN XL) 150 MG TB24 tablet Take 1 tablet (150 mg total) by mouth once daily.  Qty: 90 tablet, Refills: 1    Associated Diagnoses: MDD (major depressive disorder), recurrent episode, moderate      losartan (COZAAR) 25 MG tablet Take 1 tablet (25 mg total) by mouth once daily.  Qty: 90 tablet, Refills: 3    Comments: .  Associated Diagnoses: Hypertension associated with type 2 diabetes mellitus      metFORMIN (GLUCOPHAGE-XR) 500 MG ER 24hr tablet Take 1 tablet (500 mg total) by mouth 2 (two) times daily with meals.  Qty: 180 tablet, Refills: 3    Associated Diagnoses: Type 2 diabetes mellitus with morbid obesity      metoprolol succinate (TOPROL-XL) 100 MG 24 hr tablet Take 1 tablet (100 mg total) by mouth once daily.  Qty: 90 tablet, Refills: 3    Comments: .  Associated Diagnoses: Hypertension associated with type 2 diabetes mellitus      modafiniL (PROVIGIL) 200 MG Tab Take 1 tablet (200 mg total) by mouth 2 (two) times daily as needed (sleepiness). First dose upon awakening and second dose 4-6 hours later  Qty: 60 tablet, Refills: 5    Associated Diagnoses: LYNDA (obstructive sleep apnea); Somnolence      tiZANidine (ZANAFLEX) 4 MG tablet Take 1 tablet (4 mg total) by mouth nightly as needed (Muscle pain).  Qty: 30 tablet, Refills: 5      !!  venlafaxine (EFFEXOR-XR) 150 MG Cp24 Take 1 capsule (150 mg total) by mouth once daily.  Qty: 90 capsule, Refills: 1    Associated Diagnoses: MDD (major depressive disorder), recurrent episode, moderate; Generalized anxiety disorder with panic attacks      acetaminophen (TYLENOL) 325 MG tablet Take 2 tablets (650 mg total) by mouth every 6 (six) hours as needed for Pain.  Qty: 30 tablet, Refills: 0      aspirin 81 MG Chew CHEW 1 tablet (81 mg total) by mouth once daily.  Qty: 30 tablet, Refills: 11      atorvastatin (LIPITOR) 40 MG tablet Take 1 tablet (40 mg total) by mouth once daily.  Qty: 90 tablet, Refills: 3      blood sugar diagnostic (ONETOUCH VERIO TEST STRIPS) Strp 1 each by Misc.(Non-Drug; Combo Route) route 3 (three) times daily.  Qty: 100 each, Refills: 11    Comments: One Touch Verio Strips needed  Associated Diagnoses: Type II diabetes mellitus with hyperosmolarity, uncontrolled      blood-glucose meter kit To check BG 2 times daily, to use with insurance preferred meter  Qty: 1 each, Refills: 0    Associated Diagnoses: Type 2 diabetes mellitus with morbid obesity      busPIRone (BUSPAR) 10 MG tablet Take 2 tablets (20 mg total) by mouth 2 (two) times daily.  Qty: 180 tablet, Refills: 1    Associated Diagnoses: Generalized anxiety disorder with panic attacks; MDD (major depressive disorder), recurrent severe, without psychosis      cetirizine HCl (ZYRTEC ORAL) Take 1 tablet by mouth daily as needed.      clopidogreL (PLAVIX) 75 mg tablet Take 1 tablet (75 mg total) by mouth once daily.  Qty: 21 tablet, Refills: 0      cyanocobalamin (VITAMIN B-12) 1000 MCG tablet Take 100 mcg by mouth.      ergocalciferol, vitamin D2, (VITAMIN D ORAL) Take 5,000 Units by mouth once daily.      iron,carb/vit C/vit B12/folic (IRON 100 PLUS ORAL) Take by mouth once daily at 6am.      lancets (ONETOUCH DELICA PLUS LANCET) 33 gauge Misc Test BG 2 times a day  Qty: 200 each, Refills: 3    Associated Diagnoses: Type 2  "diabetes mellitus with hyperglycemia, without long-term current use of insulin      methocarbamoL (ROBAXIN) 750 MG Tab TAKE 1 TABLET(750 MG) BY MOUTH FOUR TIMES DAILY AS NEEDED FOR MUSCLE STRAIN  Qty: 40 tablet, Refills: 3    Comments: Strength: 750 mg  Associated Diagnoses: Neck pain      mirtazapine (REMERON) 15 MG tablet Take 1 tablet (15 mg total) by mouth every evening.  Qty: 90 tablet, Refills: 1    Associated Diagnoses: MDD (major depressive disorder), recurrent episode, moderate; Generalized anxiety disorder with panic attacks      multivitamin capsule Take 1 capsule by mouth once daily.      needle, disp, 18 G (BD REGULAR BEVEL NEEDLES) 18 gauge x 1" Ndle 1 each by Misc.(Non-Drug; Combo Route) route once a week. Use to draw up testosterone  Qty: 12 each, Refills: 4      ondansetron (ZOFRAN-ODT) 4 MG TbDL Take 1 tablet (4 mg total) by mouth every 6 (six) hours as needed (nausea).  Qty: 12 tablet, Refills: 0      riboflavin, vitamin B2, (VITAMIN B-2 ORAL) Take 1 tablet by mouth once daily.      scopolamine (TRANSDERM-SCOP) 1.3-1.5 mg (1 mg over 3 days) Place 1 patch onto the skin every 72 hours.  Qty: 4 patch, Refills: 0      semaglutide (OZEMPIC) 2 mg/dose (8 mg/3 mL) PnIj Inject 2 mg into the skin every 7 days.  Qty: 9 mL, Refills: 3    Associated Diagnoses: Type 2 diabetes mellitus with morbid obesity      syringe with needle 3 mL 21 gauge x 1" Syrg 1 each by Misc.(Non-Drug; Combo Route) route once a week. Use to inject testosterone  Qty: 12 each, Refills: 4      testosterone cypionate (DEPOTESTOTERONE CYPIONATE) 200 mg/mL injection Inject 1 mL (200 mg total) into the muscle every 7 days.  Qty: 5 mL, Refills: 3    Associated Diagnoses: Male hypogonadism      !! venlafaxine (EFFEXOR-XR) 75 MG 24 hr capsule Take 1 capsule (75 mg total) by mouth once daily. Take along with 150mg capsule for total daily dosage of 225mg.  Qty: 30 capsule, Refills: 2    Associated Diagnoses: MDD (major depressive disorder), " recurrent severe, without psychosis; Generalized anxiety disorder with panic attacks      zolpidem (AMBIEN CR) 12.5 MG CR tablet Take 1 tablet (12.5 mg total) by mouth nightly as needed for Insomnia.  Qty: 30 tablet, Refills: 3    Associated Diagnoses: Insomnia due to other mental disorder       !! - Potential duplicate medications found. Please discuss with provider.              Discharge Diagnosis: Sacroiliitis [M46.1]  Condition on Discharge: Stable with no complications to procedure   Diet on Discharge: Same as before.  Activity: as per instruction sheet.  Discharge to: Home with a responsible adult.  Follow up: 2-4 weeks       Please call my office or pager at [phone] if experienced any weakness or loss of sensation, fever > 101.5, pain uncontrolled with oral medications, persistent nausea/vomiting/or diarrhea, redness or drainage from the incisions, or any other worrisome concerns. If physician on call was not reached or could not communicate with our office for any reason please go to the nearest emergency department      Jean Marie Benitez MD PhD

## 2025-01-10 NOTE — OP NOTE
Sacroiliac Joint Injection under Fluoroscopic Guidance    The procedure, risks, benefits, and options were discussed with the patient. There are no contraindications to the procedure. The patent expressed understanding and agreed to the procedure. Informed written consent was obtained prior to the start of the procedure and can be found in the patient's chart.    PATIENT NAME: Dominic Collazo   MRN: [id number]     DATE OF PROCEDURE: 01/10/2025    PROCEDURE: Left Sacroiliac Joint Injection under Fluoroscopic Guidance    PRE-OP DIAGNOSIS: Sacroiliitis [M46.1]    POST-OP DIAGNOSIS: Same    PHYSICIAN: Jean Marie Benitez MD    ASSISTANTS: None     MEDICATIONS INJECTED: Preservative-free Kenalog 40mg with 3cc of Bupivacine 0.25%     LOCAL ANESTHETIC INJECTED: Xylocaine 2%     SEDATION: Versed 2mg and Fentanyl 50mcg                                                                                                                                                                                     Conscious sedation ordered by M.D. Patient re-evaluation prior to administration of conscious sedation. No changes noted in patient's status from initial evaluation. The patient's vital signs were monitored by RN and patient remained hemodynamically stable throughout the procedure.    Event Time In   Sedation Start 0737   Sedation End 0741       ESTIMATED BLOOD LOSS: None    COMPLICATIONS: None    TECHNIQUE: Time-out was performed to identify the patient and procedure to be performed. With the patient laying in a prone position, the surgical area was prepped and draped in the usual sterile fashion using ChloraPrep and a fenestrated drape. The sacroiliac joint was determined under fluoroscopy guidance. Skin anesthesia was achieved by injecting Lidocaine 2% over the injection site. The sacroiliac joint was  then approached with a 22 gauge, 3.5 inch spinal quinke needle that was introduced under fluoroscopic guidance in the AP and Lateral  views. Once the needle tip was in the area of the joint, and there was no blood, contrast dye Omnipaque (300mg/mL) was injected to confirm placement and there was no vascular runoff. Fluoroscopic imaging in the AP and lateral views revealed a clear outline of the joint space. 4 mL of the medication mixture listed above was injected slowly intraarticular and danial-articular. Displacement of the radio opaque contrast after injection of the medication confirmed that the medication went into the area of the joint. The needles were removed and bleeding was nil.  A sterile dressing was applied. No specimens collected. The patient tolerated the procedure well.     The patient was monitored after the procedure in the recovery area. They were given post-procedure and discharge instructions to follow at home. The patient was discharged in a stable condition.    Jean Marie Benitez MD

## 2025-01-18 DIAGNOSIS — F41.0 GENERALIZED ANXIETY DISORDER WITH PANIC ATTACKS: ICD-10-CM

## 2025-01-18 DIAGNOSIS — F33.2 MDD (MAJOR DEPRESSIVE DISORDER), RECURRENT SEVERE, WITHOUT PSYCHOSIS: ICD-10-CM

## 2025-01-18 DIAGNOSIS — F41.1 GENERALIZED ANXIETY DISORDER WITH PANIC ATTACKS: ICD-10-CM

## 2025-01-20 ENCOUNTER — PATIENT MESSAGE (OUTPATIENT)
Dept: PAIN MEDICINE | Facility: CLINIC | Age: 45
End: 2025-01-20
Payer: COMMERCIAL

## 2025-01-21 RX ORDER — BUSPIRONE HYDROCHLORIDE 10 MG/1
20 TABLET ORAL 2 TIMES DAILY
Qty: 180 TABLET | Refills: 1 | Status: SHIPPED | OUTPATIENT
Start: 2025-01-21

## 2025-01-21 RX ORDER — VENLAFAXINE HYDROCHLORIDE 75 MG/1
75 CAPSULE, EXTENDED RELEASE ORAL DAILY
Qty: 30 CAPSULE | Refills: 2 | Status: SHIPPED | OUTPATIENT
Start: 2025-01-21 | End: 2025-04-21

## 2025-01-22 ENCOUNTER — OFFICE VISIT (OUTPATIENT)
Dept: NEUROSURGERY | Facility: CLINIC | Age: 45
End: 2025-01-22
Payer: COMMERCIAL

## 2025-01-22 DIAGNOSIS — G89.29 CHRONIC MIDLINE THORACIC BACK PAIN: ICD-10-CM

## 2025-01-22 DIAGNOSIS — M54.6 CHRONIC MIDLINE THORACIC BACK PAIN: ICD-10-CM

## 2025-01-22 DIAGNOSIS — M79.18 CERVICAL MYOFASCIAL PAIN SYNDROME: Primary | ICD-10-CM

## 2025-01-22 DIAGNOSIS — R26.89 BALANCE DISORDER: ICD-10-CM

## 2025-01-22 PROCEDURE — 4010F ACE/ARB THERAPY RXD/TAKEN: CPT | Mod: CPTII,95,, | Performed by: NEUROLOGICAL SURGERY

## 2025-01-22 PROCEDURE — 98001 SYNCH AUDIO-VIDEO NEW LOW 30: CPT | Mod: 95,,, | Performed by: NEUROLOGICAL SURGERY

## 2025-01-22 RX ORDER — DICLOFENAC SODIUM 10 MG/G
2 GEL TOPICAL 3 TIMES DAILY PRN
Qty: 200 G | Refills: 6 | Status: SHIPPED | OUTPATIENT
Start: 2025-01-22
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
## 2025-01-23 ENCOUNTER — TELEPHONE (OUTPATIENT)
Dept: SPINE | Facility: CLINIC | Age: 45
End: 2025-01-23
Payer: COMMERCIAL

## 2025-01-23 NOTE — TELEPHONE ENCOUNTER
Patient was left a detailed message to return my call to reschedule his follow up appointment for [facility].

## 2025-01-24 ENCOUNTER — TELEPHONE (OUTPATIENT)
Dept: NEUROSURGERY | Facility: CLINIC | Age: 45
End: 2025-01-24
Payer: COMMERCIAL

## 2025-01-24 ENCOUNTER — TELEPHONE (OUTPATIENT)
Dept: SPINE | Facility: CLINIC | Age: 45
End: 2025-01-24
Payer: COMMERCIAL

## 2025-01-24 ENCOUNTER — TELEPHONE (OUTPATIENT)
Dept: ORTHOPEDICS | Facility: CLINIC | Age: 45
End: 2025-01-24
Payer: COMMERCIAL

## 2025-01-24 DIAGNOSIS — R40.0 SOMNOLENCE: ICD-10-CM

## 2025-01-24 DIAGNOSIS — G47.33 OSA (OBSTRUCTIVE SLEEP APNEA): ICD-10-CM

## 2025-01-24 NOTE — TELEPHONE ENCOUNTER
LVM. Told pt that Dr. Valadez would recommend getting a repeat MRI of the shoulder and if he wants it done at [facility] we can put in an order to get scheduled.

## 2025-01-24 NOTE — TELEPHONE ENCOUNTER
2nd attempt made to assist with rescheduling appointment with .  Patient is to return my call.  Thanks.

## 2025-01-25 NOTE — TELEPHONE ENCOUNTER
Requested Prescriptions     Pending Prescriptions Disp Refills    modafiniL (PROVIGIL) 200 MG Tab [Pharmacy Med Name: Modafinil 200mg Tablet] 60 tablet 4     Sig: Take 1 tablet by mouth twice daily as needed for sleepiness. First dose upon awakening and second dose 4 to 6 hours later.    Lov 01/06/25

## 2025-01-27 ENCOUNTER — TELEPHONE (OUTPATIENT)
Dept: ORTHOPEDICS | Facility: CLINIC | Age: 45
End: 2025-01-27
Payer: COMMERCIAL

## 2025-01-27 DIAGNOSIS — G89.29 CHRONIC LEFT SHOULDER PAIN: Primary | ICD-10-CM

## 2025-01-27 DIAGNOSIS — M25.512 CHRONIC LEFT SHOULDER PAIN: Primary | ICD-10-CM

## 2025-01-27 RX ORDER — MODAFINIL 200 MG/1
TABLET ORAL
Qty: 60 TABLET | Refills: 5 | Status: SHIPPED | OUTPATIENT
Start: 2025-01-27

## 2025-01-28 ENCOUNTER — OFFICE VISIT (OUTPATIENT)
Dept: PSYCHIATRY | Facility: CLINIC | Age: 45
End: 2025-01-28
Payer: COMMERCIAL

## 2025-01-28 DIAGNOSIS — F41.0 GENERALIZED ANXIETY DISORDER WITH PANIC ATTACKS: ICD-10-CM

## 2025-01-28 DIAGNOSIS — F41.1 GENERALIZED ANXIETY DISORDER WITH PANIC ATTACKS: ICD-10-CM

## 2025-01-28 DIAGNOSIS — F33.1 MDD (MAJOR DEPRESSIVE DISORDER), RECURRENT EPISODE, MODERATE: Primary | ICD-10-CM

## 2025-01-28 DIAGNOSIS — F51.05 INSOMNIA DUE TO OTHER MENTAL DISORDER: ICD-10-CM

## 2025-01-28 DIAGNOSIS — F99 INSOMNIA DUE TO OTHER MENTAL DISORDER: ICD-10-CM

## 2025-01-28 RX ORDER — BUPROPION HYDROCHLORIDE 300 MG/1
300 TABLET ORAL DAILY
Qty: 90 TABLET | Refills: 1 | Status: SHIPPED | OUTPATIENT
Start: 2025-01-28
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
## 2025-01-29 ENCOUNTER — HOSPITAL ENCOUNTER (OUTPATIENT)
Dept: RADIOLOGY | Facility: OTHER | Age: 45
Discharge: HOME | End: 2025-01-29
Attending: NEUROLOGICAL SURGERY
Payer: COMMERCIAL

## 2025-01-29 ENCOUNTER — PATIENT MESSAGE (OUTPATIENT)
Dept: SLEEP MEDICINE | Facility: CLINIC | Age: 45
End: 2025-01-29
Payer: COMMERCIAL

## 2025-01-29 DIAGNOSIS — M79.18 CERVICAL MYOFASCIAL PAIN SYNDROME: ICD-10-CM

## 2025-01-29 DIAGNOSIS — M54.6 CHRONIC MIDLINE THORACIC BACK PAIN: ICD-10-CM

## 2025-01-29 DIAGNOSIS — G89.29 CHRONIC MIDLINE THORACIC BACK PAIN: ICD-10-CM

## 2025-02-02 ENCOUNTER — HOSPITAL ENCOUNTER (OUTPATIENT)
Dept: RADIOLOGY | Facility: HOSPITAL | Age: 45
Discharge: HOME | End: 2025-02-02
Attending: NEUROLOGICAL SURGERY
Payer: COMMERCIAL

## 2025-02-02 ENCOUNTER — HOSPITAL ENCOUNTER (OUTPATIENT)
Dept: RADIOLOGY | Facility: HOSPITAL | Age: 45
Discharge: HOME | End: 2025-02-02
Attending: ORTHOPAEDIC SURGERY
Payer: COMMERCIAL

## 2025-02-02 DIAGNOSIS — M25.512 CHRONIC LEFT SHOULDER PAIN: ICD-10-CM

## 2025-02-02 DIAGNOSIS — G89.29 CHRONIC LEFT SHOULDER PAIN: ICD-10-CM

## 2025-02-02 PROCEDURE — 72146 MRI CHEST SPINE W/O DYE: CPT | Mod: TC

## 2025-02-02 PROCEDURE — 73221 MRI JOINT UPR EXTREM W/O DYE: CPT | Mod: TC,LT

## 2025-02-02 PROCEDURE — 72146 MRI CHEST SPINE W/O DYE: CPT | Mod: 26,,, | Performed by: RADIOLOGY

## 2025-02-02 PROCEDURE — 73221 MRI JOINT UPR EXTREM W/O DYE: CPT | Mod: 26,LT,, | Performed by: RADIOLOGY

## 2025-02-07 ENCOUNTER — CLINICAL SUPPORT (OUTPATIENT)
Dept: REHABILITATION | Facility: OTHER | Age: 45
End: 2025-02-07
Attending: NEUROLOGICAL SURGERY
Payer: COMMERCIAL

## 2025-02-07 DIAGNOSIS — M54.6 CHRONIC MIDLINE THORACIC BACK PAIN: ICD-10-CM

## 2025-02-07 DIAGNOSIS — M53.84 DECREASED ROM OF THORACIC SPINE: ICD-10-CM

## 2025-02-07 DIAGNOSIS — M53.82 DECREASED RANGE OF MOTION OF INTERVERTEBRAL DISCS OF CERVICAL SPINE: Primary | ICD-10-CM

## 2025-02-07 DIAGNOSIS — G89.29 CHRONIC MIDLINE THORACIC BACK PAIN: ICD-10-CM

## 2025-02-07 DIAGNOSIS — M79.18 CERVICAL MYOFASCIAL PAIN SYNDROME: ICD-10-CM

## 2025-02-07 PROBLEM — S06.0X9A CONCUSSION WITH LOSS OF CONSCIOUSNESS: Status: RESOLVED | Noted: 2023-10-09 | Resolved: 2025-02-07

## 2025-02-07 PROBLEM — R29.3 POOR POSTURE: Status: RESOLVED | Noted: 2024-11-20 | Resolved: 2025-02-07

## 2025-02-07 PROBLEM — R29.898 DECREASED STRENGTH OF TRUNK AND BACK: Status: RESOLVED | Noted: 2024-11-20 | Resolved: 2025-02-07

## 2025-02-07 PROBLEM — R68.89 IMPAIRED FUNCTION OF UPPER EXTREMITY: Status: RESOLVED | Noted: 2023-12-29 | Resolved: 2025-02-07

## 2025-02-07 PROBLEM — H55.81 SACCADIC EYE MOVEMENT DEFICIENCY: Status: RESOLVED | Noted: 2023-10-09 | Resolved: 2025-02-07

## 2025-02-07 PROBLEM — H51.11 CONVERGENCE INSUFFICIENCY: Status: RESOLVED | Noted: 2023-10-09 | Resolved: 2025-02-07

## 2025-02-07 PROBLEM — R29.898 IMPAIRED STRENGTH OF UPPER EXTREMITY: Status: RESOLVED | Noted: 2023-12-29 | Resolved: 2025-02-07

## 2025-02-07 PROBLEM — F07.81 POST CONCUSSION SYNDROME: Status: RESOLVED | Noted: 2023-10-09 | Resolved: 2025-02-07

## 2025-02-07 PROBLEM — R41.841 COGNITIVE COMMUNICATION DEFICIT: Status: RESOLVED | Noted: 2023-10-27 | Resolved: 2025-02-07

## 2025-02-07 PROBLEM — H55.82 DEFICIENT SMOOTH PURSUIT EYE MOVEMENTS: Status: RESOLVED | Noted: 2023-10-09 | Resolved: 2025-02-07

## 2025-02-07 PROBLEM — M25.612 IMPAIRED RANGE OF MOTION OF LEFT SHOULDER: Status: RESOLVED | Noted: 2023-12-29 | Resolved: 2025-02-07

## 2025-02-07 PROCEDURE — 97530 THERAPEUTIC ACTIVITIES: CPT

## 2025-02-07 PROCEDURE — 97161 PT EVAL LOW COMPLEX 20 MIN: CPT

## 2025-02-07 PROCEDURE — 97110 THERAPEUTIC EXERCISES: CPT
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
## 2025-02-11 ENCOUNTER — CLINICAL SUPPORT (OUTPATIENT)
Dept: REHABILITATION | Facility: OTHER | Age: 45
End: 2025-02-11
Payer: COMMERCIAL

## 2025-02-11 DIAGNOSIS — M53.82 DECREASED RANGE OF MOTION OF INTERVERTEBRAL DISCS OF CERVICAL SPINE: Primary | ICD-10-CM

## 2025-02-11 PROCEDURE — 97110 THERAPEUTIC EXERCISES: CPT

## 2025-02-11 PROCEDURE — 20560 NDL INSJ W/O NJX 1 OR 2 MUSC: CPT

## 2025-02-11 NOTE — PROGRESS NOTES
Outpatient Rehab    Physical Therapy Visit    Patient Name: Dominic Collazo  MRN: [id number]  YOB: 1980  Today's Date: 2/12/2025    Therapy Diagnosis:   Encounter Diagnosis   Name Primary?    Decreased range of motion of intervertebral discs of cervical spine Yes     Physician: Jaylon Elena MD    Physician Orders: PT Eval and Treat   Medical Diagnosis from Referral:   M79.18 (ICD-10-CM) - Cervical myofascial pain syndrome   M54.6,G89.29 (ICD-10-CM) - Chronic midline thoracic back pain   Evaluation Date: 2/7/2025  Authorization Period Expiration: 1/22/25-1/22/26  Plan of Care Expiration: 4/18/25  Visit # / Visits authorized: 1/20 Progress Note Due: 3/7/25  FOTO 1/3     Time In: 1:00 pm  Time Out: 2:00 pm  Total Treatment Time: 60 minutes     Precautions:  L shoulder labral repair             Subjective   Patient reports he has been doing his exercises and has been feeling a little better..         Objective            Treatment:  Therapeutic Exercise  Therapeutic Exercise Activity 1: Standing UBE 2 min F/2 min B for joint nutrition  Therapeutic Exercise Activity 2: Prone CTJ x 2 minutes 5 second holds- HEP review  Therapeutic Exercise Activity 3: Prone W 2 x 10 3 second holds  Therapeutic Exercise Activity 4: Prone Y 2 x 10 3 second holds         Assessment & Plan   Assessment: Patient demonstrated good tolerance to strengthening and mobility work. Improvement in pain/adverse symptoms by the end of the session.       Patient will continue to benefit from skilled outpatient physical therapy to address the deficits listed in the problem list box on initial evaluation, provide pt/family education and to maximize pt's level of independence in the home and community environment.     Patient's spiritual, cultural, and educational needs considered and patient agreeable to plan of care and goals.         Goals: Short Term Goals: 4-5 weeks  1.Report decreased in pain at worse less than  <   / =  5   /10  to increase tolerance for functional activities. On going  2. Pt to improve neck range of motion by 90% to allow for improved functional mobility to allow for improvement in IADLs.  On going  3. Pt to tolerate HEP to improve ROM and independence with ADL's. On going  4. Increased MMT for lower traps/middle traps/rhomboids to > or = 4/5 to increase tolerance for ADL and improve posture. On going     Long Term Goals: 8-10 weeks  1.Report decreased in pain at worse less than  <   / =  2  /10  to increase tolerance for functional activities. On going.  2.Pt to improve neck and shoulder range of motion by 100% to allow for improved functional mobility to allow for improvement in IADLs. On going.  3.  Pt will report 50 or greater score on FOTO neck survey score for neck pain disability to demonstrate decrease in disability and improvement in neck pain. On going.  5. Pt to be Independent with HEP to improve ROM and independence with ADL's. On going.  6. Increased MMT  for lower traps/middle traps/rhomboids to > or = 4+/5 to increase tolerance for ADL and improve posture. On going.     Plan   Plan of care Certification: 2/7/2025 to 4/18/25    Sai Ybarra, PT

## 2025-02-12 ENCOUNTER — PATIENT MESSAGE (OUTPATIENT)
Dept: PSYCHIATRY | Facility: CLINIC | Age: 45
End: 2025-02-12
Payer: COMMERCIAL

## 2025-02-12 ENCOUNTER — OFFICE VISIT (OUTPATIENT)
Facility: CLINIC | Age: 45
End: 2025-02-12
Payer: COMMERCIAL

## 2025-02-12 VITALS — SYSTOLIC BLOOD PRESSURE: 151 MMHG | DIASTOLIC BLOOD PRESSURE: 99 MMHG | HEART RATE: 104 BPM

## 2025-02-12 DIAGNOSIS — F90.2 ADHD (ATTENTION DEFICIT HYPERACTIVITY DISORDER), COMBINED TYPE: Primary | ICD-10-CM

## 2025-02-12 DIAGNOSIS — S06.0X9S CONCUSSION WITH LOSS OF CONSCIOUSNESS, SEQUELA: Primary | ICD-10-CM

## 2025-02-12 DIAGNOSIS — G44.86 CERVICOGENIC HEADACHE: ICD-10-CM

## 2025-02-12 DIAGNOSIS — M54.2 CERVICALGIA OF OCCIPITO-ATLANTO-AXIAL REGION: ICD-10-CM

## 2025-02-12 DIAGNOSIS — M54.81 OCCIPITAL NEURITIS: ICD-10-CM

## 2025-02-12 DIAGNOSIS — F34.89 OTHER SPECIFIED PERSISTENT MOOD DISORDERS: ICD-10-CM

## 2025-02-12 DIAGNOSIS — R26.89 IMBALANCE: ICD-10-CM

## 2025-02-12 DIAGNOSIS — H51.11 CONVERGENCE INSUFFICIENCY: ICD-10-CM

## 2025-02-12 DIAGNOSIS — S13.4XXD WHIPLASH INJURY TO NECK, SUBSEQUENT ENCOUNTER: ICD-10-CM

## 2025-02-12 DIAGNOSIS — R41.89 COGNITIVE CHANGE: ICD-10-CM

## 2025-02-12 PROBLEM — E66.01 MORBID OBESITY: Status: RESOLVED | Noted: 2019-02-25 | Resolved: 2025-02-12

## 2025-02-12 PROCEDURE — 99999 PR PBB SHADOW E&M-EST. PATIENT-LVL V: CPT | Mod: PBBFAC,,, | Performed by: PSYCHIATRY & NEUROLOGY

## 2025-02-12 NOTE — PATIENT INSTRUCTIONS
Agree with doing neck PT with dry needling  Continue light cardio but no contact sport situations  Continue vestibular PT exercises  Continue ST exercises  Referral for social work or psychology (whoever can see the patient soonest) consultation, therapy, and treatment. 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 The information from this consultation will be used by myself and other providers/therapists to help guide an individual care plan to help treat this patient's symptoms from TBI/neck injury. Any delays or failures to address cognitive, sleep, psychological symptoms after TBI/neck injury can contribute to persistent symptoms, prolong their overall recovery process, and potentially lead to permanent symptoms. And of note, this referral is not for neuropsychology or neurocognitive testing. This referral is specifically for social work or psychological interventions based on the consultation these services provide.  Continue to follow with psychiatrist   Continue tizanidine 4 mg (full tablet) nightly. Take 30 minutes before bed. Never drink alcohol or drive after taking this medication  Do not take methocarbamol at same time as tizanidine  Continue aspirin and statin medication per vascular neurology  Okay to resumed ADHD medications

## 2025-02-12 NOTE — PROGRESS NOTES
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 saccadic intrusions of volitional ocular smooth pursuits  no pain with sustained upgaze and convergence  visual motion sensitivity/dizziness produced with rapid eye movements or neck movements  convergence insufficiency with diplopia developed > 5 " accommodation    Sensory: Negative Romberg, unsteady on tandem stance    Gait: Gait WNL, Heel to toe walking WNL    Labs:    No visits with results within 1 Month(s) from this visit.   Latest known visit with results is:   Admission on 01/10/2025, Discharged on 01/10/2025   Component Date Value Ref Range Status    POCT Glucose 01/10/2025 140 (H)  70 - 110 mg/dL Final     I personally reviewed all current labs noted in today's chart.    Imaging:    I personally reviewed all diagnostic images and reports and agree with findings in the radiographical report as noted below unless otherwise specified.    MRI C-Spine 11/16/24:  FINDINGS:  Alignment: Normal.     Vertebrae: Normal marrow signal without fracture.     There is some thickening of the posterior longitudinal ligament from C3 through C5.     Discs: Degenerative findings:     C2-C3: Mild facet arthritis, right greater than left.     C3-C4: Mild right-sided facet arthritis.  Some posterior disc bulging and uncovertebral spurring are present on the " right.     C4-C5: Bilateral facet arthritis, left greater than right.  Mild posterior disc bulge.     C5-C6: Posterior disc osteophyte complex and uncovertebral spurring.  Right-sided facet arthritis.  Moderate right and mild left-sided foraminal stenosis.     C6-C7: Bilateral facet arthritis.     C7-T1: None     T1-T2: None     Cord: Normal.     Skull base and craniocervical junction: Normal.     Paraspinal muscles & soft tissues: Unremarkable.     Impression:     Mild multilevel degenerative changes most severe at C5-6 where there is moderate right foraminal stenosis.    My read:No acute spinal cord abnormalities. Significant stenosis right foraminal C5-C6    Assessment:       ICD-10-CM ICD-9-CM    1. Concussion with loss of consciousness, sequela  S06.0X9S 907.0       2. Whiplash injury to neck, subsequent encounter  S13.4XXD V58.89      847.0       3. Cervicalgia of bblmozpg-ldimfyl-qbkkr region  M54.2 723.1       4. Cervicogenic headache  G44.86 784.0       5. Occipital neuritis  M54.81 723.8       6. Cognitive change  R41.89 799.59       7. Imbalance  R26.89 781.2       8. Convergence insufficiency  H51.11 378.83       9. Other specified persistent mood disorders  F34.89 296.99          44 y.o. male with h/o anxiety, depression, HLD, HTN, LYNDA on BiPap, prediabetes, concussion with LOC who presents for follow up. On exam, he has occipital point tenderness over the bilateral greater and lesser occipital nerve without induction of headaches with no jump sign and no twitch response and no referred pain, right cervical paraspinal muscle spasm, imbalance and on previous exam, bilateral foot temperature decrease. We discussed how mood and pain contribute to cognitive change. Overall, his symptoms are mainly muscular at this time.      This patient's diagnoses of concussion and whiplash are acute complicated injuries with multiple resultant symptoms as noted in the HPI as well as multiple subsequent diagnoses as a  result of these injuries. I will be the primary provider who will both be providing and guiding ongoing medical care for these complex conditions.    Today's medical decision making was based on the number and complexity of problems addressed as documented in today's encounter, risks of complications due to prescription drug management as documented in today's encounter    Plan:     Agree with doing neck PT with dry needling  Continue light cardio but no contact sport situations  Continue vestibular PT exercises  Continue ST exercises  Referral for social work or psychology (whoever can see the patient soonest) consultation, therapy, and treatment. 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 The information from this consultation will be used by myself and other providers/therapists to help guide an individual care plan to help treat this patient's symptoms from TBI/neck injury. Any delays or failures to address cognitive, sleep, psychological symptoms after TBI/neck injury can contribute to persistent symptoms, prolong their overall recovery process, and potentially lead to permanent symptoms. And of note, this referral is not for neuropsychology or neurocognitive testing. This referral is specifically for social work or psychological interventions based on the consultation these services provide.  Continue to follow with psychiatrist   Continue tizanidine 4 mg (full tablet) nightly. Take 30 minutes before bed. Never drink alcohol or drive after taking this medication  Do not take methocarbamol at  same time as tizanidine  Continue aspirin and statin medication per vascular neurology  Okay to resumed ADHD medications    Keshav Singh MD  Sports Neurology

## 2025-02-13 ENCOUNTER — CLINICAL SUPPORT (OUTPATIENT)
Dept: REHABILITATION | Facility: OTHER | Age: 45
End: 2025-02-13
Payer: COMMERCIAL

## 2025-02-13 DIAGNOSIS — M53.84 DECREASED ROM OF THORACIC SPINE: ICD-10-CM

## 2025-02-13 DIAGNOSIS — M53.82 DECREASED RANGE OF MOTION OF INTERVERTEBRAL DISCS OF CERVICAL SPINE: Primary | ICD-10-CM

## 2025-02-13 PROCEDURE — 97112 NEUROMUSCULAR REEDUCATION: CPT | Mod: CQ

## 2025-02-13 PROCEDURE — 97110 THERAPEUTIC EXERCISES: CPT | Mod: CQ

## 2025-02-13 RX ORDER — LISDEXAMFETAMINE DIMESYLATE 30 MG/1
30 CAPSULE ORAL EVERY MORNING
Qty: 30 CAPSULE | Refills: 0 | Status: SHIPPED | OUTPATIENT
Start: 2025-02-13

## 2025-02-13 RX ORDER — LISDEXAMFETAMINE DIMESYLATE 30 MG/1
30 CAPSULE ORAL EVERY MORNING
Qty: 30 CAPSULE | Refills: 0 | Status: SHIPPED | OUTPATIENT
Start: 2025-02-13 | End: 2025-02-13

## 2025-02-13 NOTE — PROGRESS NOTES
Outpatient Rehab    Physical Therapy Visit    Patient Name: Dominic Collazo  MRN: [id number]  YOB: 1980  Today's Date: 2/18/2025    Therapy Diagnosis:   Encounter Diagnoses   Name Primary?    Decreased range of motion of intervertebral discs of cervical spine Yes    Decreased ROM of thoracic spine      Physician: Jaylon Elena MD    Physician Orders: PT Eval and Treat   Medical Diagnosis from Referral:   M79.18 (ICD-10-CM) - Cervical myofascial pain syndrome   M54.6,G89.29 (ICD-10-CM) - Chronic midline thoracic back pain   Evaluation Date: 2/7/2025  Authorization Period Expiration: 1/22/25-1/22/26  Plan of Care Expiration: 4/18/25  Visit # / Visits authorized: 2/20 Progress Note Due: 3/7/25  FOTO 1/3     Time In: 1330  Time Out: 1430  Total Treatment Time: 60 minutes     Precautions:  L shoulder labral repair             Subjective   still has soreness, feels that he has improved some since last session with dry needle.  Pain reported as 3/10.      Objective            Treatment:  Therapeutic Exercise  Therapeutic Exercise Activity 1: Standing UBE 2 min F/2 min B for joint nutrition  Therapeutic Exercise Activity 3: Prone W 2 x 10 3 second holds +1#  Therapeutic Exercise Activity 4: Prone Y 2 x 10 3 second holds +1#  Therapeutic Exercise Activity 5: 2# wand flx, SA punch x15; horizontal abd BTB 2x10 half foam         Assessment & Plan   Assessment: pt presents for follow up visit with some improvements in symptoms. good tolerance too all therex today. Modified wall slide secondary to L shoulder discomfort       Patient will continue to benefit from skilled outpatient physical therapy to address the deficits listed in the problem list box on initial evaluation, provide pt/family education and to maximize pt's level of independence in the home and community environment.     Patient's spiritual, cultural, and educational needs considered and patient agreeable to plan of care and goals.          Goals: Short Term Goals: 4-5 weeks  1.Report decreased in pain at worse less than  <   / =  5  /10  to increase tolerance for functional activities. On going  2. Pt to improve neck range of motion by 90% to allow for improved functional mobility to allow for improvement in IADLs.  On going  3. Pt to tolerate HEP to improve ROM and independence with ADL's. On going  4. Increased MMT for lower traps/middle traps/rhomboids to > or = 4/5 to increase tolerance for ADL and improve posture. On going     Long Term Goals: 8-10 weeks  1.Report decreased in pain at worse less than  <   / =  2  /10  to increase tolerance for functional activities. On going.  2.Pt to improve neck and shoulder range of motion by 100% to allow for improved functional mobility to allow for improvement in IADLs. On going.  3.  Pt will report 50 or greater score on FOTO neck survey score for neck pain disability to demonstrate decrease in disability and improvement in neck pain. On going.  5. Pt to be Independent with HEP to improve ROM and independence with ADL's. On going.  6. Increased MMT  for lower traps/middle traps/rhomboids to > or = 4+/5 to increase tolerance for ADL and improve posture. On going.     Plan   Plan of care Certification: 2/7/2025 to 4/18/25    Arnaldo Schroeder PTA

## 2025-02-19 ENCOUNTER — PATIENT MESSAGE (OUTPATIENT)
Dept: NEUROLOGY | Facility: CLINIC | Age: 45
End: 2025-02-19
Payer: COMMERCIAL

## 2025-02-28 ENCOUNTER — OFFICE VISIT (OUTPATIENT)
Dept: PSYCHIATRY | Facility: CLINIC | Age: 45
End: 2025-02-28
Payer: COMMERCIAL

## 2025-02-28 DIAGNOSIS — F51.05 INSOMNIA DUE TO OTHER MENTAL DISORDER: ICD-10-CM

## 2025-02-28 DIAGNOSIS — F99 INSOMNIA DUE TO OTHER MENTAL DISORDER: ICD-10-CM

## 2025-02-28 DIAGNOSIS — F41.0 GENERALIZED ANXIETY DISORDER WITH PANIC ATTACKS: ICD-10-CM

## 2025-02-28 DIAGNOSIS — F41.1 GENERALIZED ANXIETY DISORDER WITH PANIC ATTACKS: ICD-10-CM

## 2025-02-28 DIAGNOSIS — F33.1 MDD (MAJOR DEPRESSIVE DISORDER), RECURRENT EPISODE, MODERATE: Primary | ICD-10-CM

## 2025-02-28 DIAGNOSIS — F90.2 ADHD (ATTENTION DEFICIT HYPERACTIVITY DISORDER), COMBINED TYPE: ICD-10-CM

## 2025-02-28 RX ORDER — ZOLPIDEM TARTRATE 12.5 MG/1
12.5 TABLET, FILM COATED, EXTENDED RELEASE ORAL NIGHTLY PRN
Qty: 30 TABLET | Refills: 3 | Status: SHIPPED | OUTPATIENT
Start: 2025-02-28 | End: 2025-08-29

## 2025-02-28 RX ORDER — MIRTAZAPINE 15 MG/1
15 TABLET, FILM COATED ORAL NIGHTLY
Qty: 90 TABLET | Refills: 1 | Status: SHIPPED | OUTPATIENT
Start: 2025-02-28 | End: 2025-08-27

## 2025-02-28 RX ORDER — VENLAFAXINE HYDROCHLORIDE 75 MG/1
75 CAPSULE, EXTENDED RELEASE ORAL DAILY
Qty: 30 CAPSULE | Refills: 2 | Status: SHIPPED | OUTPATIENT
Start: 2025-02-28 | End: 2025-05-29

## 2025-02-28 RX ORDER — DEXTROAMPHETAMINE SULFATE 10 MG/1
10 TABLET ORAL
Qty: 30 TABLET | Refills: 0 | Status: SHIPPED | OUTPATIENT
Start: 2025-02-28 | End: 2025-02-28

## 2025-02-28 RX ORDER — DEXTROAMPHETAMINE SACCHARATE, AMPHETAMINE ASPARTATE MONOHYDRATE, DEXTROAMPHETAMINE SULFATE AND AMPHETAMINE SULFATE 2.5; 2.5; 2.5; 2.5 MG/1; MG/1; MG/1; MG/1
10 CAPSULE, EXTENDED RELEASE ORAL EVERY MORNING
Qty: 30 CAPSULE | Refills: 0 | Status: SHIPPED | OUTPATIENT
Start: 2025-02-28 | End: 2025-02-28

## 2025-02-28 RX ORDER — DEXTROAMPHETAMINE SACCHARATE, AMPHETAMINE ASPARTATE MONOHYDRATE, DEXTROAMPHETAMINE SULFATE AND AMPHETAMINE SULFATE 2.5; 2.5; 2.5; 2.5 MG/1; MG/1; MG/1; MG/1
10 CAPSULE, EXTENDED RELEASE ORAL EVERY MORNING
Qty: 30 CAPSULE | Refills: 0 | Status: SHIPPED | OUTPATIENT
Start: 2025-02-28

## 2025-02-28 RX ORDER — VENLAFAXINE HYDROCHLORIDE 150 MG/1
150 CAPSULE, EXTENDED RELEASE ORAL DAILY
Qty: 90 CAPSULE | Refills: 1 | Status: SHIPPED | OUTPATIENT
Start: 2025-02-28 | End: 2025-08-27

## 2025-02-28 RX ORDER — DEXTROAMPHETAMINE SULFATE 10 MG/1
10 TABLET ORAL
Qty: 30 TABLET | Refills: 0 | Status: SHIPPED | OUTPATIENT
Start: 2025-02-28

## 2025-02-28 RX ORDER — BUSPIRONE HYDROCHLORIDE 10 MG/1
20 TABLET ORAL 2 TIMES DAILY
Qty: 180 TABLET | Refills: 1 | Status: SHIPPED | OUTPATIENT
Start: 2025-02-28

## 2025-02-28 RX ORDER — ALPRAZOLAM 1 MG/1
1 TABLET ORAL 2 TIMES DAILY PRN
Qty: 60 TABLET | Refills: 2 | Status: SHIPPED | OUTPATIENT
Start: 2025-02-28 | End: 2025-06-28

## 2025-02-28 RX ORDER — BUPROPION HYDROCHLORIDE 300 MG/1
300 TABLET ORAL DAILY
Qty: 90 TABLET | Refills: 1 | Status: SHIPPED | OUTPATIENT
Start: 2025-02-28

## 2025-02-28 NOTE — PROGRESS NOTES
"Outpatient Psychiatry Follow-Up Visit (MD/ADA)    2/28/2025     The patient location is: [place]  The chief complaint leading to consultation is: depression and anxiety    Visit type: audiovisual    Face to Face time with patient: 20 minutes  22 minutes of total time spent on the encounter, which includes face to face time and non-face to face time preparing to see the patient (eg, review of tests), Obtaining and/or reviewing separately obtained history, Documenting clinical information in the electronic or other health record, Independently interpreting results (not separately reported) and communicating results to the patient/family/caregiver, or Care coordination (not separately reported).     Each patient to whom he or she provides medical services by telemedicine is:  (1) informed of the relationship between the physician and patient and the respective role of any other health care provider with respect to management of the patient; and (2) notified that he or she may decline to receive medical services by telemedicine and may withdraw from such care at any time.    Clinical Status of Patient:  Outpatient (Ambulatory)    Chief Complaint:  Dominic Collazo is a 44 y.o. male who presents today for follow-up of depression, anxiety, adjustment problems, and attention problems.  Met with patient.      Interval History and Content of Current Session:  Interim Events/Subjective Report/Content of Current Session:    Since prior visit was restarted on vyvanse, neurology suggested pt to restart stimulant for ADHD.     Pt reports was unable to get vyvanse due to insurance issues. Discussed with pt will switch to adderall xr, pt is agreeable to plan.    Will restart dextrostat in afternoon as well.    Pt reports today: "up and down, a lot going on."    Lost his job at [employer] on 12/31. Is currently looking for other jobs.     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    Moved out house, wife and " "pt .     Mood overall is "up and down"    Patients mood is steady, affect appears mood congruent. Linear and logical, friendly and cooperative, good eye contact.    Denies SI/HI/AVH. Pt reports sleeping well and normal appetite. Denies side effects of medications.    Pt reports taking medications as prescribed and behaving appropriately during interview today.    Psychotherapy:  Target symptoms: depression, anxiety , work stress  Why chosen therapy is appropriate versus another modality: relevant to diagnosis, patient responds to this modality, evidence based practice  Outcome monitoring methods: self-report, observation  Therapeutic intervention type: insight oriented psychotherapy, supportive psychotherapy  Topics discussed/themes: work stress, stress related to medical comorbidities, building skills sets for symptom management, symptom recognition  The patient's response to the intervention is accepting. The patient's progress toward treatment goals is good.   Duration of intervention: 10 minutes.      Prior visit:    Pt reports today: "a lot going on. Lost my job on 12/31 and my wife and I recently "    Lost his job at [employer] on 12/31. Is currently looking for other jobs.    Moved out house, wife and pt .     Mood overall is "just trying to hang in there. A lot going on"    Patients mood is steady, affect appears mood congruent. Linear and logical, friendly and cooperative, good eye contact.    Denies SI/HI/AVH. Pt reports sleeping well and normal appetite. Denies side effects of medications.    Pt reports taking medications as prescribed and behaving appropriately during interview today.    Previous medication trials:  Ativan- effective  Seroquel- sedation  Lexapro  Wellbutrin and Buspar- effective,   vistaril- sedation, still had axniety  Prozac - sexual side effects  Cymbalta- Sexual side effects and retrograde ejaculation    Review of Systems     Review of Systems "   Constitutional:  Negative for fever and malaise/fatigue.   HENT:  Negative for sore throat.    Eyes:  Negative for photophobia.   Respiratory:  Negative for cough.    Cardiovascular:  Negative for chest pain and palpitations.   Gastrointestinal:  Negative for abdominal pain.   Genitourinary:  Negative for dysuria.   Musculoskeletal:  Negative for myalgias.   Skin:  Negative for rash.   Neurological:  Negative for dizziness, tingling, tremors, sensory change, speech change, focal weakness, seizures, loss of consciousness, weakness and headaches.   Endo/Heme/Allergies:  Does not bruise/bleed easily.   Psychiatric/Behavioral:  Positive for depression. Negative for hallucinations, substance abuse and suicidal ideas. The patient is nervous/anxious. The patient does not have insomnia.      Psychiatric Review Of Systems - Is patient experiencing or having changes in:  sleep: no  appetite: no  weight: no  energy/anergy: yes  interest/pleasure/anhedonia: yes  somatic symptoms: no  libido: no  anxiety/panic: yes  guilty/hopelessness: no  concentration: no  S.I.B.s/risky behavior: no  Irritability: no  Racing thoughts: no  Impulsive behaviors: no  Paranoia: no  AVH: no     Past Medical, Family and Social History: The patient's past medical, family and social history have been reviewed and updated as appropriate within the electronic medical record - see encounter notes.    Compliance: yes    Side effects: see above    Risk Parameters:  Patient reports no suicidal ideation  Patient reports no homicidal ideation  Patient reports no self-injurious behavior  Patient reports no violent behavior    Exam (detailed: at least 9 elements; comprehensive: all 15 elements)   Constitutional  Vitals:    There were no vitals filed for this visit.     General:  age appropriate, obese     Musculoskeletal  Muscle Strength/Tone:  not examined   Gait & Station:  THEA due to video visit      Psychiatric  Speech:  no latency; no press   Mood &  Affect:  steady  Mood congruent   Thought Process:  normal and logical   Associations:  intact   Thought Content:  normal, no suicidality, no homicidality, delusions, or paranoia   Insight:  intact   Judgement: behavior is adequate to circumstances   Orientation:  grossly intact   Memory: intact for content of interview   Language: grossly intact   Attention Span & Concentration:  able to focus   Fund of Knowledge:  intact and appropriate to age and level of education     Assessment and Diagnosis   Status/Progress: Based on the examination today, the patient's problem(s) is/are inadequately controlled.  New problems have not been presented today.   Co-morbidities are complicating management of the primary condition.  There are no active rule-out diagnoses for this patient at this time.     General Impression:       ICD-10-CM ICD-9-CM   1. MDD (major depressive disorder), recurrent episode, moderate  F33.1 296.32   2. Generalized anxiety disorder with panic attacks  F41.1 300.02    F41.0 300.01   3. ADHD (attention deficit hyperactivity disorder), combined type  F90.2 314.01   4. Insomnia due to other mental disorder  F51.05 300.9    F99 327.02           Intervention/Counseling/Treatment Plan   Treatment Plan/Recommendations:   Medication Management: Continue current medications.     continue wellbutrin xl to 300mg daily    Continue remeron 15mg qhs    Continue vitamin D3 otc 2k IU once daily    continue buspar to 20mg bid    continue Effexor to 225mg mg daily     continue xanax 1mg bid prn anxiety or insomnia    continue ambien to CR 12.5mg qhs insomnia  Stop vyvanse  Start adderall xr 10mg daily  Restart dexedrine 10mg after lunch prn ADHD    Continue testosterone therapy as prescribed by other provider    Continue provigil as prescribed by sleep medicine      The risks and benefits of medication were discussed with the patient.  Discussed diagnosis, risks and benefits of proposed treatment above vs alternative  treatments vs no treatment, and potential side effects of these treatments. The patient expresses understanding of the above and displays the capacity to agree with this treatment given said understanding. Patient also agrees that, currently, the benefits outweigh the risks and would like to pursue treatment at this time.  Discussed inherent unpredictability of medications in each individual.   Encouraged Patient to keep future appointments.   Take medications as prescribed and abstain from substance abuse.   In the event of an emergency, patient was advised to go to the emergency room      Return to Clinic: 2 months      Brandon Burdick PA-C

## 2025-03-10 ENCOUNTER — PATIENT MESSAGE (OUTPATIENT)
Dept: NEUROLOGY | Facility: CLINIC | Age: 45
End: 2025-03-10
Payer: COMMERCIAL

## 2025-03-10 ENCOUNTER — TELEPHONE (OUTPATIENT)
Facility: CLINIC | Age: 45
End: 2025-03-10
Payer: COMMERCIAL

## 2025-03-10 NOTE — TELEPHONE ENCOUNTER
/ Therapist Initial Phone Call    LCSW placed call to patient and left voicemail [phone] (Mobile)     LCSW introduced himself as a therapist and  that supports patient's with neurological symptoms at the clinic at [facility]  [facility].     Requested call back or message in portal.

## 2025-03-13 ENCOUNTER — TELEPHONE (OUTPATIENT)
Facility: CLINIC | Age: 45
End: 2025-03-13
Payer: COMMERCIAL

## 2025-03-13 NOTE — TELEPHONE ENCOUNTER
LCSW placed call to patient and left voicemail [phone] (Mobile)      LCSW introduced himself as a therapist and  that supports patient's with neurological symptoms at the clinic at [facility] [place] [facility]  [facility].      Requested call back or message in portal.

## (undated) DEVICE — SUT MCRYL PLUS 4-0 PS2 27IN

## (undated) DEVICE — GLOVE BIOGEL PI MICRO INDIC 8

## (undated) DEVICE — GOWN POLY REINF X-LONG 2XL

## (undated) DEVICE — BLADE SURG CARBON STEEL SZ11

## (undated) DEVICE — MAT SUCTION PUDDLEVAC ORANGE

## (undated) DEVICE — ADHESIVE DERMABOND ADVANCED

## (undated) DEVICE — ELECTRODE REM PLYHSV RETURN 9

## (undated) DEVICE — PACK FLUID CONTROL SHOULDER

## (undated) DEVICE — NDL SPINAL 18GX3.5 SPINOCAN

## (undated) DEVICE — SEE MEDLINE ITEM 152622

## (undated) DEVICE — PAD PREP CUFFED NS 24X48IN

## (undated) DEVICE — PASSER QUICKPASS LASSO NEEDLE

## (undated) DEVICE — TUBING SUC UNIV W/CONN 12FT

## (undated) DEVICE — SEE MEDLINE ITEM 157148

## (undated) DEVICE — TAPE ADH MEDIPORE 4 X 10YDS

## (undated) DEVICE — GAUZE SPONGE 4X4 12PLY

## (undated) DEVICE — PACK SURGERY START

## (undated) DEVICE — SEE MEDLINE ITEM 152764

## (undated) DEVICE — TOWEL OR DISP STRL BLUE 4/PK

## (undated) DEVICE — SYR 50CC LL

## (undated) DEVICE — SEE MEDLINE ITEM 146417

## (undated) DEVICE — SEE L#159833

## (undated) DEVICE — SUT VICRYL 3-0 27 SH

## (undated) DEVICE — CANNULA ARTHRO TWST 8.25MMX9CM

## (undated) DEVICE — SEE MEDLINE ITEM 157117

## (undated) DEVICE — APPLICATOR CHLORAPREP ORN 26ML

## (undated) DEVICE — KIT SUTURETAK PERC INSRT 3MM

## (undated) DEVICE — MANIFOLD 4 PORT

## (undated) DEVICE — COVER TABLE HVY DTY 60X90IN

## (undated) DEVICE — DRAPE STERI U-SHAPED 47X51IN

## (undated) DEVICE — INSTRAMOD SHOULDER TRACTION

## (undated) DEVICE — GLOVE BIOGEL ORTHOPEDIC 8

## (undated) DEVICE — TUBE SET INFLOW/OUTFLOW

## (undated) DEVICE — SUT VICRYL PLUS 3-0 SH 18IN

## (undated) DEVICE — PASSER QP LASSO SUT 45 CRV L

## (undated) DEVICE — COVER OVERHEAD SURG LT BLUE

## (undated) DEVICE — CONNECTOR TUBING STR 5 IN 1

## (undated) DEVICE — DRESSING AQUACEL SACRAL 9 X 9

## (undated) DEVICE — GOWN SURGICAL X-LARGE

## (undated) DEVICE — DRESSING XEROFORM 1X8IN

## (undated) DEVICE — DRAPE U SPLIT SHEET 54X76IN

## (undated) DEVICE — DRAPE STERI-DRAPE 1000 17X11IN

## (undated) DEVICE — DRESSING MEPILEX SACR 22X25CM

## (undated) DEVICE — Device

## (undated) DEVICE — DRAPE THREE-QTR REINF 53X77IN

## (undated) DEVICE — SUPPORT ULNA NERVE PROTECTOR

## (undated) DEVICE — GOWN POLY REINF BRTH SLV LG

## (undated) DEVICE — PAD ABDOMINAL 5X9 STERILE

## (undated) DEVICE — KIT FIBERTAK PERC INSRT 1.8MM

## (undated) DEVICE — SUPPORT SLING SHOT II MEDIUM

## (undated) DEVICE — BLADE SHAVER 4.5 6/BX

## (undated) DEVICE — NDL 18GA X1 1/2 REG BEVEL

## (undated) DEVICE — PACK BASIC

## (undated) DEVICE — DRESSING XEROFORM NONADH 1X8IN

## (undated) DEVICE — NDL SCORPION HD MEGALOADER

## (undated) DEVICE — COVER PROXIMA MAYO STAND

## (undated) DEVICE — SUT PDS II 0 CT-1 VIL MONO

## (undated) DEVICE — TRAY MINOR GEN SURG

## (undated) DEVICE — SUT 2-0 ETHILON 18 FS